# Patient Record
Sex: FEMALE | Race: WHITE | NOT HISPANIC OR LATINO | Employment: OTHER | URBAN - NONMETROPOLITAN AREA
[De-identification: names, ages, dates, MRNs, and addresses within clinical notes are randomized per-mention and may not be internally consistent; named-entity substitution may affect disease eponyms.]

---

## 2017-02-01 ENCOUNTER — LAB REQUISITION (OUTPATIENT)
Dept: LAB | Facility: HOSPITAL | Age: 27
End: 2017-02-01

## 2017-02-01 DIAGNOSIS — R19.7 DIARRHEA: ICD-10-CM

## 2017-02-01 PROCEDURE — 87493 C DIFF AMPLIFIED PROBE: CPT | Performed by: INTERNAL MEDICINE

## 2017-02-02 LAB — C DIFF TOX GENS STL QL NAA+PROBE: POSITIVE

## 2017-02-19 ENCOUNTER — APPOINTMENT (OUTPATIENT)
Dept: GENERAL RADIOLOGY | Facility: HOSPITAL | Age: 27
End: 2017-02-19

## 2017-02-19 ENCOUNTER — HOSPITAL ENCOUNTER (EMERGENCY)
Facility: HOSPITAL | Age: 27
Discharge: HOME | End: 2017-02-19
Attending: EMERGENCY MEDICINE | Admitting: EMERGENCY MEDICINE

## 2017-02-19 VITALS
HEIGHT: 63 IN | OXYGEN SATURATION: 97 % | BODY MASS INDEX: 17.36 KG/M2 | WEIGHT: 98 LBS | SYSTOLIC BLOOD PRESSURE: 137 MMHG | RESPIRATION RATE: 22 BRPM | TEMPERATURE: 97.5 F | HEART RATE: 94 BPM | DIASTOLIC BLOOD PRESSURE: 86 MMHG

## 2017-02-19 DIAGNOSIS — R07.89 CHEST WALL PAIN: Primary | ICD-10-CM

## 2017-02-19 LAB
ALBUMIN SERPL-MCNC: 3.6 G/DL (ref 3.2–4.8)
ALBUMIN/GLOB SERPL: 1.4 G/DL (ref 1.5–2.5)
ALP SERPL-CCNC: 132 U/L (ref 25–100)
ALT SERPL W P-5'-P-CCNC: 55 U/L (ref 7–40)
ANION GAP SERPL CALCULATED.3IONS-SCNC: 13 MMOL/L (ref 3–11)
AST SERPL-CCNC: 39 U/L (ref 0–33)
BASOPHILS # BLD AUTO: 0.01 10*3/MM3 (ref 0–0.2)
BASOPHILS NFR BLD AUTO: 0.1 % (ref 0–1)
BILIRUB SERPL-MCNC: 0.1 MG/DL (ref 0.3–1.2)
BUN BLD-MCNC: 71 MG/DL (ref 9–23)
BUN/CREAT SERPL: 12 (ref 7–25)
CALCIUM SPEC-SCNC: 6.9 MG/DL (ref 8.7–10.4)
CHLORIDE SERPL-SCNC: 99 MMOL/L (ref 99–109)
CO2 SERPL-SCNC: 20 MMOL/L (ref 20–31)
CREAT BLD-MCNC: 5.9 MG/DL (ref 0.6–1.3)
DEPRECATED RDW RBC AUTO: 53.9 FL (ref 37–54)
EOSINOPHIL # BLD AUTO: 0.07 10*3/MM3 (ref 0.1–0.3)
EOSINOPHIL NFR BLD AUTO: 0.5 % (ref 0–3)
ERYTHROCYTE [DISTWIDTH] IN BLOOD BY AUTOMATED COUNT: 15.2 % (ref 11.3–14.5)
GFR SERPL CREATININE-BSD FRML MDRD: 9 ML/MIN/1.73
GLOBULIN UR ELPH-MCNC: 2.5 GM/DL
GLUCOSE BLD-MCNC: 77 MG/DL (ref 70–100)
HCT VFR BLD AUTO: 27.2 % (ref 34.5–44)
HGB BLD-MCNC: 8.9 G/DL (ref 11.5–15.5)
HOLD SPECIMEN: NORMAL
HOLD SPECIMEN: NORMAL
IMM GRANULOCYTES # BLD: 0.21 10*3/MM3 (ref 0–0.03)
IMM GRANULOCYTES NFR BLD: 1.6 % (ref 0–0.6)
LYMPHOCYTES # BLD AUTO: 2.11 10*3/MM3 (ref 0.6–4.8)
LYMPHOCYTES NFR BLD AUTO: 15.9 % (ref 24–44)
MCH RBC QN AUTO: 31.2 PG (ref 27–31)
MCHC RBC AUTO-ENTMCNC: 32.7 G/DL (ref 32–36)
MCV RBC AUTO: 95.4 FL (ref 80–99)
MONOCYTES # BLD AUTO: 1.57 10*3/MM3 (ref 0–1)
MONOCYTES NFR BLD AUTO: 11.9 % (ref 0–12)
NEUTROPHILS # BLD AUTO: 9.26 10*3/MM3 (ref 1.5–8.3)
NEUTROPHILS NFR BLD AUTO: 70 % (ref 41–71)
PLATELET # BLD AUTO: 333 10*3/MM3 (ref 150–450)
PMV BLD AUTO: 8.1 FL (ref 6–12)
POTASSIUM BLD-SCNC: 4.3 MMOL/L (ref 3.5–5.5)
PROT SERPL-MCNC: 6.1 G/DL (ref 5.7–8.2)
RBC # BLD AUTO: 2.85 10*6/MM3 (ref 3.89–5.14)
SODIUM BLD-SCNC: 132 MMOL/L (ref 132–146)
WBC NRBC COR # BLD: 13.23 10*3/MM3 (ref 3.5–10.8)
WHOLE BLOOD HOLD SPECIMEN: NORMAL
WHOLE BLOOD HOLD SPECIMEN: NORMAL

## 2017-02-19 PROCEDURE — 99283 EMERGENCY DEPT VISIT LOW MDM: CPT

## 2017-02-19 PROCEDURE — 85025 COMPLETE CBC W/AUTO DIFF WBC: CPT | Performed by: EMERGENCY MEDICINE

## 2017-02-19 PROCEDURE — 80053 COMPREHEN METABOLIC PANEL: CPT | Performed by: EMERGENCY MEDICINE

## 2017-02-19 PROCEDURE — 71020 HC CHEST PA AND LATERAL: CPT

## 2017-02-19 RX ORDER — LIDOCAINE 50 MG/G
1 PATCH TOPICAL EVERY 24 HOURS
Qty: 30 PATCH | Refills: 0 | Status: SHIPPED | OUTPATIENT
Start: 2017-02-19 | End: 2017-02-23

## 2017-02-19 RX ORDER — SODIUM CHLORIDE 0.9 % (FLUSH) 0.9 %
10 SYRINGE (ML) INJECTION AS NEEDED
Status: DISCONTINUED | OUTPATIENT
Start: 2017-02-19 | End: 2017-02-19 | Stop reason: HOSPADM

## 2017-02-19 NOTE — DISCHARGE INSTRUCTIONS
Follow up with one of the physician centers below to setup primary care.    [facility], [phone], [address], [address], [place] [address]    [facility], [phone], [address], [address] [place] [address], [phone], [address] [place] [address] [address] [place] [address];     [facility], [phone], [address] [place] [address]

## 2017-02-19 NOTE — ED PROVIDER NOTES
Subjective   Patient is a 26 y.o. female presenting with chest pain.   History provided by:  Patient   used: No    Chest Pain   Pain location:  Substernal area, L chest and R chest  Pain quality: sharp and stabbing    Pain radiates to:  Does not radiate  Pain severity:  Severe  Onset quality:  Sudden  Timing:  Constant  Progression:  Unchanged  Chronicity:  New  Context: movement and raising an arm    Context: not breathing    Context comment:  Pt states that she had CPR done on her at [facility] after her heart stopped.  Discharged with percocet 5 but not helping pain.    Relieved by:  Rest  Worsened by:  Movement and coughing  Ineffective treatments: percocet 5 with no relief.  Associated symptoms: no abdominal pain, no back pain, no fever, no headache, no nausea, no palpitations, no shortness of breath, no syncope and no vomiting        Review of Systems   Constitutional: Negative for chills and fever.   Respiratory: Negative for apnea, chest tightness, shortness of breath and wheezing.    Cardiovascular: Positive for chest pain. Negative for palpitations and syncope.   Gastrointestinal: Negative for abdominal pain, constipation, nausea and vomiting.   Musculoskeletal: Negative for back pain.   Neurological: Negative for headaches.   Psychiatric/Behavioral: Negative.    All other systems reviewed and are negative.      Past Medical History   Diagnosis Date   • Abdominal pain    • Anemia    • Bipolar disorder    • CKD (chronic kidney disease), stage IV    • Constipation    • COPD (chronic obstructive pulmonary disease)    • Depression    • End stage renal disease on dialysis    • Esophageal reflux      reflux   • Fahr's syndrome    • Hepatitis C antibody test positive    • Hyperparathyroidism    • Hypertension    • Hypocalcemia    • Nausea and vomiting    • Pancreatitis    • Recurrent urinary tract infection    • Upper gastrointestinal bleeding        Allergies   Allergen Reactions   • Acetaminophen  Itching   • Hydrocodone Itching   • Ibuprofen    • Lortab [Hydrocodone-Acetaminophen]      Lortab TABS   • Ciprofloxacin Rash       Past Surgical History   Procedure Laterality Date   • Dialysis fistula creation       port   • Dilatation and curettage     • Exploratory laparotomy       For uterine and ovarian issues   • Kidney surgery Left 2013     Biopsy       Family History   Problem Relation Age of Onset   • Arthritis Mother    • Hypertension Mother    • Kidney disease Father      Chronic renal failure syndrome   • Pancreatic cancer Father    • Hypertension Brother    • Kidney disease Other    • Diabetes Other      Uncle   • Lung cancer Other      Grandmother   • Hyperlipidemia Other      High cholesterol - Uncle       Social History     Social History   • Marital status:      Spouse name: N/A   • Number of children: N/A   • Years of education: N/A     Social History Main Topics   • Smoking status: Current Every Day Smoker   • Smokeless tobacco: None      Comment: 3 packs daily   • Alcohol use No   • Drug use: Yes     Special: Marijuana   • Sexual activity: Defer     Other Topics Concern   • None     Social History Narrative   • None           Objective   Physical Exam   Constitutional: She is oriented to person, place, and time. She appears well-developed and well-nourished.   Appears drowsy, dry mouth   HENT:   Head: Normocephalic and atraumatic.   Right Ear: External ear normal.   Left Ear: External ear normal.   Nose: Nose normal.   Mouth/Throat: Oropharynx is clear and moist.   Eyes: Conjunctivae and EOM are normal. Pupils are equal, round, and reactive to light. No scleral icterus.   Neck: Normal range of motion. No thyromegaly present.   Cardiovascular: Normal rate, regular rhythm and normal heart sounds.    Pulmonary/Chest: Effort normal and breath sounds normal. No respiratory distress. She has no wheezes. She has no rales. She exhibits tenderness (Exquisitely tender to palpation and even light  touch of the skin).   Abdominal: Soft. Bowel sounds are normal. She exhibits no distension. There is no tenderness.   Musculoskeletal: Normal range of motion.   Lymphadenopathy:     She has no cervical adenopathy.   Neurological: She is alert and oriented to person, place, and time. She has normal reflexes. She displays normal reflexes. No cranial nerve deficit. Coordination normal.   Skin: Skin is warm and dry.   Patient has a fistula in the right arm.  .  There is no ecchymosis or bruising noted on the chest wall.   Psychiatric: She has a normal mood and affect. Her behavior is normal. Judgment and thought content normal.   Nursing note and vitals reviewed.      Procedures         ED Course  ED Course                  MDM  Number of Diagnoses or Management Options  Chest wall pain: new and requires workup  Diagnosis management comments: Patient states she any relief.  States that she did not want go back to  because  they treated her there.  Patient's requesting IV Dilaudid.  I advised her we would not be given her IV Dilaudid.  Her chest x-ray was negative her laboratory data did not looki  far off from her chronic renal failure and other labs.       Amount and/or Complexity of Data Reviewed  Clinical lab tests: ordered and reviewed  Tests in the radiology section of CPT®: ordered and reviewed  Tests in the medicine section of CPT®: reviewed and ordered  Discuss the patient with other providers: yes    Patient Progress  Patient progress: stable      Final diagnoses:   Chest wall pain            MENDEL Florian  02/20/17 6462

## 2017-02-23 ENCOUNTER — OFFICE VISIT (OUTPATIENT)
Dept: FAMILY MEDICINE CLINIC | Facility: CLINIC | Age: 27
End: 2017-02-23

## 2017-02-23 VITALS
HEIGHT: 63 IN | DIASTOLIC BLOOD PRESSURE: 57 MMHG | OXYGEN SATURATION: 98 % | TEMPERATURE: 98.3 F | HEART RATE: 114 BPM | RESPIRATION RATE: 16 BRPM | BODY MASS INDEX: 20.91 KG/M2 | SYSTOLIC BLOOD PRESSURE: 140 MMHG | WEIGHT: 118 LBS

## 2017-02-23 DIAGNOSIS — J45.20 MILD INTERMITTENT ASTHMA WITHOUT COMPLICATION: ICD-10-CM

## 2017-02-23 DIAGNOSIS — A04.72 C. DIFFICILE COLITIS: Primary | ICD-10-CM

## 2017-02-23 DIAGNOSIS — J06.9 ACUTE URI: ICD-10-CM

## 2017-02-23 DIAGNOSIS — J40 BRONCHITIS: ICD-10-CM

## 2017-02-23 PROCEDURE — 99213 OFFICE O/P EST LOW 20 MIN: CPT | Performed by: INTERNAL MEDICINE

## 2017-02-23 RX ORDER — PREDNISONE 10 MG/1
10 TABLET ORAL DAILY
Qty: 5 TABLET | Refills: 0 | Status: ON HOLD | OUTPATIENT
Start: 2017-02-23 | End: 2017-04-19

## 2017-02-23 RX ORDER — ALBUTEROL SULFATE 90 UG/1
2 AEROSOL, METERED RESPIRATORY (INHALATION) EVERY 6 HOURS PRN
Qty: 1 INHALER | Refills: 1 | Status: ON HOLD | OUTPATIENT
Start: 2017-02-23 | End: 2017-04-19

## 2017-02-23 RX ORDER — NIFEDIPINE 60 MG/1
60 TABLET, EXTENDED RELEASE ORAL DAILY
Status: ON HOLD | COMMUNITY
End: 2017-04-18

## 2017-02-23 RX ORDER — HYDROXYZINE PAMOATE 50 MG/1
100 CAPSULE ORAL 4 TIMES DAILY PRN
Status: ON HOLD | COMMUNITY
End: 2017-04-18

## 2017-02-28 NOTE — PROGRESS NOTES
Subjective   Mandy Espino is a 26 y.o. female.     Chief Complaint   Patient presents with   • Follow-up     Patient is here to follow-up from hospitalization in November   • Rib Injury     Patient is having pain in ribs   • Panic Attack     Patient is ahving excessive panic attacks and anxiety       History of Present Illness   Patient is here to be evaluated for C diff infection, s/p recent admission.  Patient gives a complicated medical history, with most recently admitted at  s/p ? Cardiac arrest. Patient says she was resuscitated successfully and later she developed C diff colitis, treated with Metronidazole. She says that diarrhea has resolved at this time.  Patient c/o cough, wheezing, congestion for a week.    The following portions of the patient's history were reviewed and updated as appropriate: allergies, current medications, past family history, past medical history, past social history, past surgical history and problem list.    Past Medical History   Diagnosis Date   • Abdominal pain    • Anemia    • Anxiety    • Asthma    • Bipolar disorder    • CKD (chronic kidney disease), stage IV    • Constipation    • COPD (chronic obstructive pulmonary disease)    • Depression    • End stage renal disease on dialysis    • Esophageal reflux      reflux   • Fahr's syndrome    • Hepatitis C antibody test positive    • Hyperparathyroidism    • Hypertension    • Hypocalcemia    • Nausea and vomiting    • Pancreatitis    • Recurrent urinary tract infection    • Renal insufficiency    • Upper gastrointestinal bleeding        Past Surgical History   Procedure Laterality Date   • Dialysis fistula creation       port   • Dilatation and curettage     • Exploratory laparotomy       For uterine and ovarian issues   • Kidney surgery Left 2013     Biopsy       No current outpatient prescriptions on file prior to visit.     No current facility-administered medications on file prior to visit.        Social History  "    Social History   • Marital status:      Spouse name: N/A   • Number of children: N/A   • Years of education: N/A     Occupational History   • Not on file.     Social History Main Topics   • Smoking status: Current Every Day Smoker   • Smokeless tobacco: Not on file      Comment: 3 packs daily   • Alcohol use No   • Drug use: Yes     Special: Marijuana   • Sexual activity: Defer     Other Topics Concern   • Not on file     Social History Narrative   • No narrative on file       Review of Systems   Constitutional: Negative for chills, fatigue and fever.   HENT: Negative for congestion, ear pain, rhinorrhea, sinus pressure and sore throat.    Eyes: Negative for visual disturbance.   Respiratory: Negative for cough, chest tightness, shortness of breath and wheezing.    Cardiovascular: Negative for chest pain, palpitations and leg swelling.   Gastrointestinal: Negative for abdominal pain, blood in stool, constipation, diarrhea, nausea and vomiting.   Endocrine: Negative for polydipsia and polyuria.   Genitourinary: Negative for dysuria and hematuria.   Musculoskeletal: Negative for back pain.   Skin: Negative for rash.   Neurological: Negative for dizziness, light-headedness, numbness and headaches.   Psychiatric/Behavioral: Positive for confusion, dysphoric mood and sleep disturbance. The patient is nervous/anxious.        Objective   Blood pressure 140/57, pulse 114, temperature 98.3 °F (36.8 °C), temperature source Oral, resp. rate 16, height 63\" (160 cm), weight 118 lb (53.5 kg), last menstrual period 02/16/2017, SpO2 98 %.    Physical Exam   Constitutional: She is oriented to person, place, and time.   Anxious appearing   HENT:   Head: Atraumatic.   Right Ear: External ear normal.   Left Ear: External ear normal.   Eyes: Conjunctivae are normal. Right eye exhibits no discharge. Left eye exhibits no discharge.   Neck: Normal range of motion. Neck supple.   Cardiovascular: Normal rate and regular rhythm.  "   No murmur heard.  Pulmonary/Chest: Effort normal. She has wheezes. She has no rales.   Abdominal: Soft. Bowel sounds are normal. She exhibits no distension. There is no tenderness.   Neurological: She is alert and oriented to person, place, and time.   Skin: No rash noted.   Psychiatric: She has a normal mood and affect.   Nursing note and vitals reviewed.      Assessment/Plan   Mandy was seen today for follow-up, rib injury and panic attack.    Diagnoses and all orders for this visit:    C. difficile colitis    Bronchitis    Acute URI    Mild intermittent asthma without complication    Other orders  -     albuterol (PROVENTIL HFA;VENTOLIN HFA) 108 (90 BASE) MCG/ACT inhaler; Inhale 2 puffs Every 6 (Six) Hours As Needed for wheezing.  -     predniSONE (DELTASONE) 10 MG tablet; Take 1 tablet by mouth Daily.        Diarrhea has resolved. No indication for further C diff colitis treatment.  Acute URI/bronchitis/Asthma - Albuterol inhaler  Patient was advised to f/u with her PCP       No Follow-up on file.    There are no Patient Instructions on file for this visit.

## 2017-03-28 ENCOUNTER — APPOINTMENT (OUTPATIENT)
Dept: GENERAL RADIOLOGY | Facility: HOSPITAL | Age: 27
End: 2017-03-28

## 2017-03-28 ENCOUNTER — HOSPITAL ENCOUNTER (EMERGENCY)
Facility: HOSPITAL | Age: 27
Discharge: HOME | End: 2017-03-28
Attending: EMERGENCY MEDICINE | Admitting: EMERGENCY MEDICINE

## 2017-03-28 VITALS
SYSTOLIC BLOOD PRESSURE: 181 MMHG | HEART RATE: 108 BPM | TEMPERATURE: 98.1 F | OXYGEN SATURATION: 95 % | RESPIRATION RATE: 20 BRPM | DIASTOLIC BLOOD PRESSURE: 107 MMHG | WEIGHT: 100 LBS | BODY MASS INDEX: 18.4 KG/M2 | HEIGHT: 62 IN

## 2017-03-28 DIAGNOSIS — R07.1 CHEST PAIN ON BREATHING: Primary | ICD-10-CM

## 2017-03-28 LAB
ALBUMIN SERPL-MCNC: 4.6 G/DL (ref 3.5–5)
ALBUMIN/GLOB SERPL: 1.5 G/DL (ref 1–2)
ALP SERPL-CCNC: 151 U/L (ref 38–126)
ALT SERPL W P-5'-P-CCNC: 53 U/L (ref 13–69)
ANION GAP SERPL CALCULATED.3IONS-SCNC: 19.6 MMOL/L
AST SERPL-CCNC: 65 U/L (ref 15–46)
BASOPHILS # BLD AUTO: 0.04 10*3/MM3 (ref 0–0.2)
BASOPHILS NFR BLD AUTO: 0.5 % (ref 0–2.5)
BILIRUB SERPL-MCNC: 0.7 MG/DL (ref 0.2–1.3)
BUN BLD-MCNC: 52 MG/DL (ref 7–20)
BUN/CREAT SERPL: 6.1 (ref 7.1–23.5)
CALCIUM SPEC-SCNC: 7.7 MG/DL (ref 8.4–10.2)
CHLORIDE SERPL-SCNC: 96 MMOL/L (ref 98–107)
CK MB SERPL-CCNC: 0.97 NG/ML (ref 0.2–2.4)
CO2 SERPL-SCNC: 27 MMOL/L (ref 26–30)
CREAT BLD-MCNC: 8.5 MG/DL (ref 0.6–1.3)
DEPRECATED RDW RBC AUTO: 65.5 FL (ref 37–54)
EOSINOPHIL # BLD AUTO: 0.29 10*3/MM3 (ref 0–0.7)
EOSINOPHIL NFR BLD AUTO: 3.5 % (ref 0–7)
ERYTHROCYTE [DISTWIDTH] IN BLOOD BY AUTOMATED COUNT: 18.6 % (ref 11.5–14.5)
GFR SERPL CREATININE-BSD FRML MDRD: 6 ML/MIN/1.73
GLOBULIN UR ELPH-MCNC: 3 GM/DL
GLUCOSE BLD-MCNC: 101 MG/DL (ref 74–98)
HCT VFR BLD AUTO: 31 % (ref 37–47)
HGB BLD-MCNC: 9.8 G/DL (ref 12–16)
IMM GRANULOCYTES # BLD: 0.05 10*3/MM3 (ref 0–0.06)
IMM GRANULOCYTES NFR BLD: 0.6 % (ref 0–0.6)
LYMPHOCYTES # BLD AUTO: 1.43 10*3/MM3 (ref 0.6–3.4)
LYMPHOCYTES NFR BLD AUTO: 17.1 % (ref 10–50)
MAGNESIUM SERPL-MCNC: 1.7 MG/DL (ref 1.6–2.3)
MCH RBC QN AUTO: 30.1 PG (ref 27–31)
MCHC RBC AUTO-ENTMCNC: 31.6 G/DL (ref 30–37)
MCV RBC AUTO: 95.1 FL (ref 81–99)
MONOCYTES # BLD AUTO: 0.71 10*3/MM3 (ref 0–0.9)
MONOCYTES NFR BLD AUTO: 8.5 % (ref 0–12)
NEUTROPHILS # BLD AUTO: 5.86 10*3/MM3 (ref 2–6.9)
NEUTROPHILS NFR BLD AUTO: 69.8 % (ref 37–80)
NRBC BLD MANUAL-RTO: 0 /100 WBC (ref 0–0)
NT-PROBNP SERPL-MCNC: ABNORMAL PG/ML (ref 0–125)
PLATELET # BLD AUTO: 193 10*3/MM3 (ref 130–400)
PMV BLD AUTO: 9.4 FL (ref 6–12)
POTASSIUM BLD-SCNC: 5.6 MMOL/L (ref 3.5–5.1)
PROT SERPL-MCNC: 7.6 G/DL (ref 6.3–8.2)
RBC # BLD AUTO: 3.26 10*6/MM3 (ref 4.2–5.4)
SODIUM BLD-SCNC: 137 MMOL/L (ref 137–145)
TROPONIN I SERPL-MCNC: <0.012 NG/ML (ref 0–0.03)
WBC NRBC COR # BLD: 8.38 10*3/MM3 (ref 4.8–10.8)

## 2017-03-28 PROCEDURE — 84484 ASSAY OF TROPONIN QUANT: CPT | Performed by: EMERGENCY MEDICINE

## 2017-03-28 PROCEDURE — 71010 HC CHEST PA OR AP: CPT

## 2017-03-28 PROCEDURE — 93005 ELECTROCARDIOGRAM TRACING: CPT | Performed by: EMERGENCY MEDICINE

## 2017-03-28 PROCEDURE — 82553 CREATINE MB FRACTION: CPT | Performed by: EMERGENCY MEDICINE

## 2017-03-28 PROCEDURE — 99284 EMERGENCY DEPT VISIT MOD MDM: CPT

## 2017-03-28 PROCEDURE — 83880 ASSAY OF NATRIURETIC PEPTIDE: CPT | Performed by: EMERGENCY MEDICINE

## 2017-03-28 PROCEDURE — 36415 COLL VENOUS BLD VENIPUNCTURE: CPT

## 2017-03-28 PROCEDURE — 80053 COMPREHEN METABOLIC PANEL: CPT | Performed by: EMERGENCY MEDICINE

## 2017-03-28 PROCEDURE — 85025 COMPLETE CBC W/AUTO DIFF WBC: CPT | Performed by: EMERGENCY MEDICINE

## 2017-03-28 PROCEDURE — 83735 ASSAY OF MAGNESIUM: CPT | Performed by: EMERGENCY MEDICINE

## 2017-03-28 RX ORDER — HYDROCODONE BITARTRATE AND ACETAMINOPHEN 5; 325 MG/1; MG/1
1 TABLET ORAL ONCE
Status: COMPLETED | OUTPATIENT
Start: 2017-03-28 | End: 2017-03-28

## 2017-03-28 RX ADMIN — HYDROCODONE BITARTRATE AND ACETAMINOPHEN 1 TABLET: 5; 325 TABLET ORAL at 22:31

## 2017-03-29 NOTE — ED PROVIDER NOTES
Subjective   HPI Comments: Patient presents to the emergency department with complaint of acute chest wall pain that is worse when she coughs and associated with pain with deep breathing to the sternum.  She states this she recognizes this pain is similar in presentation and intensity to the chest wall pain she has known since having CPR performed on her back in February while at [facility].  Patient has had no accident.  States that she had dialysis yesterday at which time she received a nitroglycerin under her tongue which was not helpful.      History provided by:  Patient   used: No        Review of Systems   Constitutional: Negative.  Negative for diaphoresis and fever.   HENT: Negative for sore throat.    Eyes: Negative.  Negative for pain and visual disturbance.   Respiratory: Negative for cough, shortness of breath, wheezing and stridor.         See history of present illness patient is hyperventilating voluntarily     Cardiovascular: Negative.  Negative for chest pain.   Gastrointestinal: Negative.  Negative for abdominal pain, diarrhea, nausea and vomiting.   Endocrine: Negative.    Genitourinary: Negative.  Negative for dysuria.   Musculoskeletal: Negative.  Negative for back pain and neck pain.   Skin: Negative.  Negative for pallor and rash.   Allergic/Immunologic: Negative.    Neurological: Negative.  Negative for syncope and headaches.   Hematological: Negative.    Psychiatric/Behavioral: Negative for agitation. Nervous/anxious: patient is voluntarily hyperventilating.        Past Medical History:   Diagnosis Date   • Abdominal pain    • Anemia    • Anxiety    • Asthma    • Bipolar disorder    • CKD (chronic kidney disease), stage IV    • Constipation    • COPD (chronic obstructive pulmonary disease)    • Depression    • End stage renal disease on dialysis    • Esophageal reflux     reflux   • Fahr's syndrome    • Hepatitis C antibody test positive    • Hyperparathyroidism     • Hypertension    • Hypocalcemia    • Nausea and vomiting    • Pancreatitis    • Recurrent urinary tract infection    • Renal insufficiency    • Upper gastrointestinal bleeding        Allergies   Allergen Reactions   • Acetaminophen Itching   • Hydrocodone Itching   • Ibuprofen    • Lortab [Hydrocodone-Acetaminophen]      Lortab TABS   • Ciprofloxacin Rash       Past Surgical History:   Procedure Laterality Date   • DIALYSIS FISTULA CREATION      port   • DILATATION AND CURETTAGE     • EXPLORATORY LAPAROTOMY      For uterine and ovarian issues   • KIDNEY SURGERY Left 2013    Biopsy       Family History   Problem Relation Age of Onset   • Arthritis Mother    • Hypertension Mother    • Kidney disease Father      Chronic renal failure syndrome   • Pancreatic cancer Father    • Hypertension Brother    • Kidney disease Other    • Diabetes Other      Uncle   • Lung cancer Other      Grandmother   • Hyperlipidemia Other      High cholesterol - Uncle       Social History     Social History   • Marital status:      Spouse name: N/A   • Number of children: N/A   • Years of education: N/A     Social History Main Topics   • Smoking status: Current Every Day Smoker   • Smokeless tobacco: None      Comment: 3 packs daily   • Alcohol use No   • Drug use: Yes     Special: Marijuana   • Sexual activity: Defer     Other Topics Concern   • None     Social History Narrative           Objective   Physical Exam   Constitutional: She is oriented to person, place, and time. She appears well-developed.   Appears chronically ill     HENT:   Head: Normocephalic.   Eyes: EOM are normal. Pupils are equal, round, and reactive to light.   Neck: Normal range of motion. Neck supple.   Cardiovascular: Regular rhythm.    Pulmonary/Chest: Effort normal. She has wheezes (diffuse). She has no rales.   Abdominal: Soft. Bowel sounds are normal. She exhibits no distension. There is no rebound and no guarding.   Musculoskeletal: Normal range of  motion. She exhibits no edema.   Neurological: She is alert and oriented to person, place, and time.   Skin: Skin is warm and dry.   Psychiatric: She has a normal mood and affect.   Nursing note and vitals reviewed.      Procedures         ED Course  ED Course                  MDM  Number of Diagnoses or Management Options     Amount and/or Complexity of Data Reviewed  Clinical lab tests: reviewed  Tests in the radiology section of CPT®: reviewed  Decide to obtain previous medical records or to obtain history from someone other than the patient: yes  Review and summarize past medical records: yes        Final diagnoses:   Chest pain on breathing            Antonina Tavares, APRN  03/28/17 9995

## 2017-04-16 ENCOUNTER — HOSPITAL ENCOUNTER (EMERGENCY)
Facility: HOSPITAL | Age: 27
Discharge: ANOTHER HOSPITAL | End: 2017-04-17
Attending: STUDENT IN AN ORGANIZED HEALTH CARE EDUCATION/TRAINING PROGRAM | Admitting: STUDENT IN AN ORGANIZED HEALTH CARE EDUCATION/TRAINING PROGRAM

## 2017-04-16 DIAGNOSIS — Z99.2 END STAGE RENAL DISEASE ON DIALYSIS (HCC): ICD-10-CM

## 2017-04-16 DIAGNOSIS — N18.6 END STAGE RENAL DISEASE ON DIALYSIS (HCC): ICD-10-CM

## 2017-04-16 DIAGNOSIS — J96.01 ACUTE RESPIRATORY FAILURE WITH HYPOXIA (HCC): Primary | ICD-10-CM

## 2017-04-16 DIAGNOSIS — J18.9 PNEUMONIA OF BOTH LUNGS DUE TO INFECTIOUS ORGANISM, UNSPECIFIED PART OF LUNG: ICD-10-CM

## 2017-04-16 PROCEDURE — 99284 EMERGENCY DEPT VISIT MOD MDM: CPT

## 2017-04-16 PROCEDURE — 93005 ELECTROCARDIOGRAM TRACING: CPT | Performed by: STUDENT IN AN ORGANIZED HEALTH CARE EDUCATION/TRAINING PROGRAM

## 2017-04-16 RX ORDER — LORAZEPAM 2 MG/ML
2 INJECTION INTRAMUSCULAR ONCE
Status: COMPLETED | OUTPATIENT
Start: 2017-04-17 | End: 2017-04-17

## 2017-04-17 ENCOUNTER — HOSPITAL ENCOUNTER (OUTPATIENT)
Facility: HOSPITAL | Age: 27
Setting detail: OBSERVATION
Discharge: HOME | End: 2017-04-19
Attending: EMERGENCY MEDICINE | Admitting: FAMILY MEDICINE

## 2017-04-17 ENCOUNTER — APPOINTMENT (OUTPATIENT)
Dept: GENERAL RADIOLOGY | Facility: HOSPITAL | Age: 27
End: 2017-04-17

## 2017-04-17 ENCOUNTER — APPOINTMENT (OUTPATIENT)
Dept: CT IMAGING | Facility: HOSPITAL | Age: 27
End: 2017-04-17

## 2017-04-17 VITALS
DIASTOLIC BLOOD PRESSURE: 83 MMHG | RESPIRATION RATE: 14 BRPM | SYSTOLIC BLOOD PRESSURE: 129 MMHG | OXYGEN SATURATION: 100 % | TEMPERATURE: 97.7 F | HEART RATE: 89 BPM

## 2017-04-17 DIAGNOSIS — R07.9 CHEST PAIN, UNSPECIFIED TYPE: Primary | ICD-10-CM

## 2017-04-17 LAB
ALBUMIN SERPL-MCNC: 4.5 G/DL (ref 3.5–5)
ALBUMIN/GLOB SERPL: 1.4 G/DL (ref 1–2)
ALP SERPL-CCNC: 157 U/L (ref 38–126)
ALT SERPL W P-5'-P-CCNC: 54 U/L (ref 13–69)
ANION GAP SERPL CALCULATED.3IONS-SCNC: 20 MMOL/L
ANION GAP SERPL CALCULATED.3IONS-SCNC: 24.1 MMOL/L
ARTERIAL PATENCY WRIST A: POSITIVE
AST SERPL-CCNC: 73 U/L (ref 15–46)
BASE EXCESS BLDA CALC-SCNC: -3.6 MMOL/L
BASOPHILS # BLD AUTO: 0.04 10*3/MM3 (ref 0–0.2)
BASOPHILS # BLD AUTO: 0.11 10*3/MM3 (ref 0–0.2)
BASOPHILS NFR BLD AUTO: 0.4 % (ref 0–2.5)
BASOPHILS NFR BLD AUTO: 0.5 % (ref 0–2.5)
BDY SITE: ABNORMAL
BILIRUB SERPL-MCNC: 0.8 MG/DL (ref 0.2–1.3)
BUN BLD-MCNC: 35 MG/DL (ref 7–20)
BUN BLD-MCNC: 51 MG/DL (ref 7–20)
BUN/CREAT SERPL: 5.5 (ref 7.1–23.5)
BUN/CREAT SERPL: 7 (ref 7.1–23.5)
CALCIUM SPEC-SCNC: 7.5 MG/DL (ref 8.4–10.2)
CALCIUM SPEC-SCNC: 8.5 MG/DL (ref 8.4–10.2)
CHLORIDE SERPL-SCNC: 95 MMOL/L (ref 98–107)
CHLORIDE SERPL-SCNC: 96 MMOL/L (ref 98–107)
CO2 SERPL-SCNC: 23 MMOL/L (ref 26–30)
CO2 SERPL-SCNC: 26 MMOL/L (ref 26–30)
COHGB MFR BLD: 3.3 %
CREAT BLD-MCNC: 5 MG/DL (ref 0.6–1.3)
CREAT BLD-MCNC: 9.2 MG/DL (ref 0.6–1.3)
D-DIMER, QUANTITATIVE (MAD,POW, STR): 5393 NG/ML (FEU) (ref 0–500)
D-LACTATE SERPL-SCNC: 0.8 MMOL/L (ref 0.5–2)
DEPRECATED RDW RBC AUTO: 61.8 FL (ref 37–54)
DEPRECATED RDW RBC AUTO: 68.9 FL (ref 37–54)
EOSINOPHIL # BLD AUTO: 0.11 10*3/MM3 (ref 0–0.7)
EOSINOPHIL # BLD AUTO: 0.41 10*3/MM3 (ref 0–0.7)
EOSINOPHIL NFR BLD AUTO: 1.2 % (ref 0–7)
EOSINOPHIL NFR BLD AUTO: 1.8 % (ref 0–7)
ERYTHROCYTE [DISTWIDTH] IN BLOOD BY AUTOMATED COUNT: 18.2 % (ref 11.5–14.5)
ERYTHROCYTE [DISTWIDTH] IN BLOOD BY AUTOMATED COUNT: 19.1 % (ref 11.5–14.5)
GFR SERPL CREATININE-BSD FRML MDRD: 10 ML/MIN/1.73
GFR SERPL CREATININE-BSD FRML MDRD: 5 ML/MIN/1.73
GLOBULIN UR ELPH-MCNC: 3.3 GM/DL
GLUCOSE BLD-MCNC: 150 MG/DL (ref 74–98)
GLUCOSE BLD-MCNC: 85 MG/DL (ref 74–98)
HCO3 BLDA-SCNC: 22.4 MMOL/L (ref 22–28)
HCT VFR BLD AUTO: 24.9 % (ref 37–47)
HCT VFR BLD AUTO: 29.1 % (ref 37–47)
HGB BLD-MCNC: 8 G/DL (ref 12–16)
HGB BLD-MCNC: 9 G/DL (ref 12–16)
HGB BLDA-MCNC: 8 G/DL (ref 12–18)
HOROWITZ INDEX BLD+IHG-RTO: 100 %
IMM GRANULOCYTES # BLD: 0.07 10*3/MM3 (ref 0–0.06)
IMM GRANULOCYTES # BLD: 0.18 10*3/MM3 (ref 0–0.06)
IMM GRANULOCYTES NFR BLD: 0.7 % (ref 0–0.6)
IMM GRANULOCYTES NFR BLD: 0.8 % (ref 0–0.6)
LYMPHOCYTES # BLD AUTO: 1.14 10*3/MM3 (ref 0.6–3.4)
LYMPHOCYTES # BLD AUTO: 2.79 10*3/MM3 (ref 0.6–3.4)
LYMPHOCYTES NFR BLD AUTO: 12 % (ref 10–50)
LYMPHOCYTES NFR BLD AUTO: 12.3 % (ref 10–50)
MCH RBC QN AUTO: 29.7 PG (ref 27–31)
MCH RBC QN AUTO: 30.1 PG (ref 27–31)
MCHC RBC AUTO-ENTMCNC: 30.9 G/DL (ref 30–37)
MCHC RBC AUTO-ENTMCNC: 32.1 G/DL (ref 30–37)
MCV RBC AUTO: 92.6 FL (ref 81–99)
MCV RBC AUTO: 97.3 FL (ref 81–99)
METHGB BLD QL: 0.7 %
MODALITY: ABNORMAL
MONOCYTES # BLD AUTO: 0.86 10*3/MM3 (ref 0–0.9)
MONOCYTES # BLD AUTO: 1.37 10*3/MM3 (ref 0–0.9)
MONOCYTES NFR BLD AUTO: 6 % (ref 0–12)
MONOCYTES NFR BLD AUTO: 9.1 % (ref 0–12)
NEUTROPHILS # BLD AUTO: 17.87 10*3/MM3 (ref 2–6.9)
NEUTROPHILS # BLD AUTO: 7.27 10*3/MM3 (ref 2–6.9)
NEUTROPHILS NFR BLD AUTO: 76.6 % (ref 37–80)
NEUTROPHILS NFR BLD AUTO: 78.6 % (ref 37–80)
NRBC BLD MANUAL-RTO: 0 /100 WBC (ref 0–0)
NRBC BLD MANUAL-RTO: 0 /100 WBC (ref 0–0)
OXYHGB MFR BLDV: 97.1 % (ref 94–99)
PCO2 BLDA: 42.9 MM HG (ref 35–45)
PH BLDA: 7.33 PH UNITS
PLATELET # BLD AUTO: 242 10*3/MM3 (ref 130–400)
PLATELET # BLD AUTO: 336 10*3/MM3 (ref 130–400)
PMV BLD AUTO: 9.8 FL (ref 6–12)
PMV BLD AUTO: 9.9 FL (ref 6–12)
PO2 BLDA: 306 MM HG (ref 75–100)
POTASSIUM BLD-SCNC: 5 MMOL/L (ref 3.5–5.1)
POTASSIUM BLD-SCNC: 5.1 MMOL/L (ref 3.5–5.1)
PROT SERPL-MCNC: 7.8 G/DL (ref 6.3–8.2)
RBC # BLD AUTO: 2.69 10*6/MM3 (ref 4.2–5.4)
RBC # BLD AUTO: 2.99 10*6/MM3 (ref 4.2–5.4)
SODIUM BLD-SCNC: 137 MMOL/L (ref 137–145)
SODIUM BLD-SCNC: 137 MMOL/L (ref 137–145)
TROPONIN I SERPL-MCNC: 0.03 NG/ML (ref 0–0.03)
TROPONIN I SERPL-MCNC: 0.05 NG/ML (ref 0–0.03)
WBC NRBC COR # BLD: 22.73 10*3/MM3 (ref 4.8–10.8)
WBC NRBC COR # BLD: 9.49 10*3/MM3 (ref 4.8–10.8)

## 2017-04-17 PROCEDURE — 96367 TX/PROPH/DG ADDL SEQ IV INF: CPT

## 2017-04-17 PROCEDURE — 94660 CPAP INITIATION&MGMT: CPT

## 2017-04-17 PROCEDURE — 84484 ASSAY OF TROPONIN QUANT: CPT | Performed by: STUDENT IN AN ORGANIZED HEALTH CARE EDUCATION/TRAINING PROGRAM

## 2017-04-17 PROCEDURE — 71275 CT ANGIOGRAPHY CHEST: CPT

## 2017-04-17 PROCEDURE — 85025 COMPLETE CBC W/AUTO DIFF WBC: CPT | Performed by: EMERGENCY MEDICINE

## 2017-04-17 PROCEDURE — 96374 THER/PROPH/DIAG INJ IV PUSH: CPT

## 2017-04-17 PROCEDURE — 25010000002 FENTANYL CITRATE (PF) 100 MCG/2ML SOLUTION: Performed by: EMERGENCY MEDICINE

## 2017-04-17 PROCEDURE — 25010000002 PIPERACILLIN SOD-TAZOBACTAM PER 1 G: Performed by: STUDENT IN AN ORGANIZED HEALTH CARE EDUCATION/TRAINING PROGRAM

## 2017-04-17 PROCEDURE — 96365 THER/PROPH/DIAG IV INF INIT: CPT

## 2017-04-17 PROCEDURE — 99285 EMERGENCY DEPT VISIT HI MDM: CPT

## 2017-04-17 PROCEDURE — 96375 TX/PRO/DX INJ NEW DRUG ADDON: CPT

## 2017-04-17 PROCEDURE — 85025 COMPLETE CBC W/AUTO DIFF WBC: CPT | Performed by: STUDENT IN AN ORGANIZED HEALTH CARE EDUCATION/TRAINING PROGRAM

## 2017-04-17 PROCEDURE — 87040 BLOOD CULTURE FOR BACTERIA: CPT | Performed by: STUDENT IN AN ORGANIZED HEALTH CARE EDUCATION/TRAINING PROGRAM

## 2017-04-17 PROCEDURE — 71010 HC CHEST PA OR AP: CPT

## 2017-04-17 PROCEDURE — 84484 ASSAY OF TROPONIN QUANT: CPT | Performed by: EMERGENCY MEDICINE

## 2017-04-17 PROCEDURE — 82805 BLOOD GASES W/O2 SATURATION: CPT

## 2017-04-17 PROCEDURE — 85379 FIBRIN DEGRADATION QUANT: CPT | Performed by: EMERGENCY MEDICINE

## 2017-04-17 PROCEDURE — 94799 UNLISTED PULMONARY SVC/PX: CPT

## 2017-04-17 PROCEDURE — 36600 WITHDRAWAL OF ARTERIAL BLOOD: CPT

## 2017-04-17 PROCEDURE — 80053 COMPREHEN METABOLIC PANEL: CPT | Performed by: STUDENT IN AN ORGANIZED HEALTH CARE EDUCATION/TRAINING PROGRAM

## 2017-04-17 PROCEDURE — 71020 HC CHEST PA AND LATERAL: CPT

## 2017-04-17 PROCEDURE — 25010000002 LORAZEPAM PER 2 MG: Performed by: STUDENT IN AN ORGANIZED HEALTH CARE EDUCATION/TRAINING PROGRAM

## 2017-04-17 PROCEDURE — 82375 ASSAY CARBOXYHB QUANT: CPT

## 2017-04-17 PROCEDURE — 25010000002 VANCOMYCIN PER 500 MG: Performed by: STUDENT IN AN ORGANIZED HEALTH CARE EDUCATION/TRAINING PROGRAM

## 2017-04-17 PROCEDURE — 93005 ELECTROCARDIOGRAM TRACING: CPT | Performed by: EMERGENCY MEDICINE

## 2017-04-17 PROCEDURE — 83605 ASSAY OF LACTIC ACID: CPT | Performed by: STUDENT IN AN ORGANIZED HEALTH CARE EDUCATION/TRAINING PROGRAM

## 2017-04-17 PROCEDURE — 83050 HGB METHEMOGLOBIN QUAN: CPT

## 2017-04-17 PROCEDURE — 96376 TX/PRO/DX INJ SAME DRUG ADON: CPT

## 2017-04-17 RX ORDER — FENTANYL CITRATE 50 UG/ML
50 INJECTION, SOLUTION INTRAMUSCULAR; INTRAVENOUS ONCE
Status: COMPLETED | OUTPATIENT
Start: 2017-04-17 | End: 2017-04-17

## 2017-04-17 RX ORDER — FENTANYL CITRATE 50 UG/ML
50 INJECTION, SOLUTION INTRAMUSCULAR; INTRAVENOUS
Status: DISCONTINUED | OUTPATIENT
Start: 2017-04-17 | End: 2017-04-18 | Stop reason: ALTCHOICE

## 2017-04-17 RX ORDER — SODIUM CHLORIDE 0.9 % (FLUSH) 0.9 %
10 SYRINGE (ML) INJECTION AS NEEDED
Status: DISCONTINUED | OUTPATIENT
Start: 2017-04-17 | End: 2017-04-19 | Stop reason: HOSPADM

## 2017-04-17 RX ORDER — LABETALOL HYDROCHLORIDE 5 MG/ML
10 INJECTION, SOLUTION INTRAVENOUS ONCE
Status: COMPLETED | OUTPATIENT
Start: 2017-04-17 | End: 2017-04-18

## 2017-04-17 RX ADMIN — TAZOBACTAM SODIUM AND PIPERACILLIN SODIUM 3.38 G: 375; 3 INJECTION, SOLUTION INTRAVENOUS at 03:04

## 2017-04-17 RX ADMIN — FENTANYL CITRATE 50 MCG: 50 INJECTION INTRAMUSCULAR; INTRAVENOUS at 22:48

## 2017-04-17 RX ADMIN — VANCOMYCIN HYDROCHLORIDE 1000 MG: 1 INJECTION, POWDER, LYOPHILIZED, FOR SOLUTION INTRAVENOUS at 03:33

## 2017-04-17 RX ADMIN — LORAZEPAM 2 MG: 2 INJECTION INTRAMUSCULAR; INTRAVENOUS at 00:01

## 2017-04-17 RX ADMIN — FENTANYL CITRATE 50 MCG: 50 INJECTION INTRAMUSCULAR; INTRAVENOUS at 22:00

## 2017-04-17 NOTE — ED PROVIDER NOTES
Subjective   HPI Comments: Patient is a 26-year-old female that presents via EMS for shortness of breath.  She has a history of bipolar disorder, hepatitis C, and end-stage renal disease.  Patient's  reports compliance with dialysis with her next appointment being tomorrow.  She took 2 nebulizer treatments at home prior to EMS arrival.  Symptoms started suddenly approximately 1 hour before arrival.  Nothing makes better or worse.  The patient states that she needs Ativan.  She has difficulty speaking in complete sentences at this time due to her work of breathing.      Review of Systems   All other systems reviewed and are negative.      Past Medical History:   Diagnosis Date   • Abdominal pain    • Anemia    • Anxiety    • Asthma    • Bipolar disorder    • CKD (chronic kidney disease), stage IV    • Constipation    • COPD (chronic obstructive pulmonary disease)    • Depression    • End stage renal disease on dialysis    • Esophageal reflux     reflux   • Fahr's syndrome    • Hepatitis C antibody test positive    • Hyperparathyroidism    • Hypertension    • Hypocalcemia    • Nausea and vomiting    • Pancreatitis    • Recurrent urinary tract infection    • Renal insufficiency    • Upper gastrointestinal bleeding        Allergies   Allergen Reactions   • Acetaminophen Itching   • Hydrocodone Itching   • Ibuprofen    • Lortab [Hydrocodone-Acetaminophen]      Lortab TABS   • Ciprofloxacin Rash       Past Surgical History:   Procedure Laterality Date   • DIALYSIS FISTULA CREATION      port   • DILATATION AND CURETTAGE     • EXPLORATORY LAPAROTOMY      For uterine and ovarian issues   • KIDNEY SURGERY Left 2013    Biopsy       Family History   Problem Relation Age of Onset   • Arthritis Mother    • Hypertension Mother    • Kidney disease Father      Chronic renal failure syndrome   • Pancreatic cancer Father    • Hypertension Brother    • Kidney disease Other    • Diabetes Other      Uncle   • Lung cancer Other       Grandmother   • Hyperlipidemia Other      High cholesterol - Uncle       Social History     Social History   • Marital status:      Spouse name: N/A   • Number of children: N/A   • Years of education: N/A     Social History Main Topics   • Smoking status: Current Every Day Smoker   • Smokeless tobacco: None      Comment: 3 packs daily   • Alcohol use No   • Drug use: Yes     Special: Marijuana   • Sexual activity: Defer     Other Topics Concern   • None     Social History Narrative           Objective   Physical Exam   Nursing note and vitals reviewed.  GEN: Patient presents in severe distress.  She is tripoding on the bed holding a nonrebreather mask over her face.  Head: Normocephalic, atraumatic  Eyes: Pupils equal round reactive to light  ENT: Posterior pharynx normal in appearance, oral mucosa is moist  Chest: Nontender to palpation  Cardiovascular: Tachycardic in the 140s   Lungs: Tachypneic with a rate in the 40s, prominent bilateral rales on exam  Abdomen: Soft, nontender, nondistended, no peritoneal signs  Extremities: No edema, normal appearance  Neuro: Will follow commands  Psych: Appears to be in full on fight or flight    Procedures         ED Course  ED Course   Value Comment By Time   WBC: (!) 22.73 (Reviewed) Wayne Vega MD 04/17 0023    Spoke with Dr. Mancuso at 0500 at [facility] who accepted the patient for transfer. Wayne Vega MD 04/17 8220                  MDM  Number of Diagnoses or Management Options  Acute respiratory failure with hypoxia:   End stage renal disease on dialysis:   Pneumonia of both lungs due to infectious organism, unspecified part of lung:   Diagnosis management comments: EKG shows sinus tachycardia with a rate of 142.  There are nonspecific ST segments throughout this EKG.  This is an abnormal EKG.  Chest x-ray was interpreted by radiology as showing bilateral pneumonia.  Patient has a prominent leukocytosis.  Blood cultures were drawn and  antibiotics initiated  Patient went treated with BiPAP as well as lorazepam did stabilize significantly.         Amount and/or Complexity of Data Reviewed  Clinical lab tests: ordered and reviewed  Decide to obtain previous medical records or to obtain history from someone other than the patient: yes  Obtain history from someone other than the patient: yes  Review and summarize past medical records: yes  Discuss the patient with other providers: yes  Independent visualization of images, tracings, or specimens: yes    Critical Care  Total time providing critical care: 30-74 minutes      Final diagnoses:   Acute respiratory failure with hypoxia   Pneumonia of both lungs due to infectious organism, unspecified part of lung   End stage renal disease on dialysis            Wayne Vega MD  04/17/17 0543

## 2017-04-17 NOTE — ED NOTES
Attempted to get pt on NC 4L, pt's sat dropped to 88% from 100%, placed pt back on non-rebreather and  MD notified. Pt to be placed on BiPap, respiratory contacted     Tati Cedeño RN  04/17/17 0030

## 2017-04-18 ENCOUNTER — HOSPITAL ENCOUNTER (OUTPATIENT)
Dept: CT IMAGING | Facility: HOSPITAL | Age: 27
Discharge: HOME | End: 2017-04-18

## 2017-04-18 VITALS
BODY MASS INDEX: 17.07 KG/M2 | RESPIRATION RATE: 19 BRPM | TEMPERATURE: 98.6 F | OXYGEN SATURATION: 98 % | HEART RATE: 86 BPM | DIASTOLIC BLOOD PRESSURE: 82 MMHG | HEIGHT: 63 IN | SYSTOLIC BLOOD PRESSURE: 139 MMHG | WEIGHT: 96.34 LBS

## 2017-04-18 PROBLEM — R04.2 HEMOPTYSIS: Status: ACTIVE | Noted: 2017-04-18

## 2017-04-18 PROBLEM — F41.0 SEVERE ANXIETY WITH PANIC: Status: ACTIVE | Noted: 2017-04-18

## 2017-04-18 PROBLEM — R77.8 ELEVATED TROPONIN: Status: ACTIVE | Noted: 2017-04-18

## 2017-04-18 PROBLEM — G89.29 CHRONIC PAIN: Status: ACTIVE | Noted: 2017-04-18

## 2017-04-18 LAB
ALBUMIN SERPL-MCNC: 3.4 G/DL (ref 3.5–5)
ANION GAP SERPL CALCULATED.3IONS-SCNC: 15 MMOL/L
BASOPHILS # BLD AUTO: 0.03 10*3/MM3 (ref 0–0.2)
BASOPHILS NFR BLD AUTO: 0.6 % (ref 0–2.5)
BUN BLD-MCNC: 42 MG/DL (ref 7–20)
BUN/CREAT SERPL: 6.9 (ref 7.1–23.5)
CALCIUM SPEC-SCNC: 7.2 MG/DL (ref 8.4–10.2)
CHLORIDE SERPL-SCNC: 97 MMOL/L (ref 98–107)
CHOLEST SERPL-MCNC: 122 MG/DL (ref 0–199)
CO2 SERPL-SCNC: 28 MMOL/L (ref 26–30)
CREAT BLD-MCNC: 6.1 MG/DL (ref 0.6–1.3)
DEPRECATED RDW RBC AUTO: 63.5 FL (ref 37–54)
EOSINOPHIL # BLD AUTO: 0.1 10*3/MM3 (ref 0–0.7)
EOSINOPHIL NFR BLD AUTO: 2 % (ref 0–7)
ERYTHROCYTE [DISTWIDTH] IN BLOOD BY AUTOMATED COUNT: 18.3 % (ref 11.5–14.5)
FERRITIN SERPL-MCNC: 198 NG/ML (ref 6.24–137)
GFR SERPL CREATININE-BSD FRML MDRD: 8 ML/MIN/1.73
GLUCOSE BLD-MCNC: 105 MG/DL (ref 74–98)
HCT VFR BLD AUTO: 21.6 % (ref 37–47)
HDLC SERPL-MCNC: 44 MG/DL (ref 40–60)
HGB BLD-MCNC: 6.9 G/DL (ref 12–16)
HOLD SPECIMEN: NORMAL
HOLD SPECIMEN: NORMAL
IMM GRANULOCYTES # BLD: 0.02 10*3/MM3 (ref 0–0.06)
IMM GRANULOCYTES NFR BLD: 0.4 % (ref 0–0.6)
INR PPP: 1.59 (ref 0.9–1.1)
IRON 24H UR-MRATE: 46 MCG/DL (ref 37–181)
LDLC SERPL CALC-MCNC: 61 MG/DL (ref 0–99)
LDLC/HDLC SERPL: 1.39 {RATIO}
LYMPHOCYTES # BLD AUTO: 1.12 10*3/MM3 (ref 0.6–3.4)
LYMPHOCYTES NFR BLD AUTO: 21.9 % (ref 10–50)
MCH RBC QN AUTO: 29.9 PG (ref 27–31)
MCHC RBC AUTO-ENTMCNC: 31.9 G/DL (ref 30–37)
MCV RBC AUTO: 93.5 FL (ref 81–99)
MONOCYTES # BLD AUTO: 0.52 10*3/MM3 (ref 0–0.9)
MONOCYTES NFR BLD AUTO: 10.2 % (ref 0–12)
NEUTROPHILS # BLD AUTO: 3.33 10*3/MM3 (ref 2–6.9)
NEUTROPHILS NFR BLD AUTO: 64.9 % (ref 37–80)
NRBC BLD MANUAL-RTO: 0 /100 WBC (ref 0–0)
PHOSPHATE SERPL-MCNC: 4.9 MG/DL (ref 2.5–4.5)
PLATELET # BLD AUTO: 207 10*3/MM3 (ref 130–400)
PMV BLD AUTO: 10.4 FL (ref 6–12)
POTASSIUM BLD-SCNC: 4 MMOL/L (ref 3.5–5.1)
PROTHROMBIN TIME: 17.4 SECONDS (ref 9.3–12.1)
RBC # BLD AUTO: 2.31 10*6/MM3 (ref 4.2–5.4)
SODIUM BLD-SCNC: 136 MMOL/L (ref 137–145)
TRIGL SERPL-MCNC: 84 MG/DL
TROPONIN I SERPL-MCNC: 0.04 NG/ML (ref 0–0.03)
VIT B12 BLD-MCNC: 634 PG/ML (ref 239–931)
VLDLC SERPL-MCNC: 16.8 MG/DL
WBC NRBC COR # BLD: 5.12 10*3/MM3 (ref 4.8–10.8)
WHOLE BLOOD HOLD SPECIMEN: NORMAL
WHOLE BLOOD HOLD SPECIMEN: NORMAL

## 2017-04-18 PROCEDURE — 25010000002 HYDROMORPHONE PER 4 MG: Performed by: INTERNAL MEDICINE

## 2017-04-18 PROCEDURE — 85610 PROTHROMBIN TIME: CPT | Performed by: FAMILY MEDICINE

## 2017-04-18 PROCEDURE — 86901 BLOOD TYPING SEROLOGIC RH(D): CPT

## 2017-04-18 PROCEDURE — G0257 UNSCHED DIALYSIS ESRD PT HOS: HCPCS

## 2017-04-18 PROCEDURE — 82607 VITAMIN B-12: CPT | Performed by: FAMILY MEDICINE

## 2017-04-18 PROCEDURE — 80069 RENAL FUNCTION PANEL: CPT | Performed by: FAMILY MEDICINE

## 2017-04-18 PROCEDURE — 99219 PR INITIAL OBSERVATION CARE/DAY 50 MINUTES: CPT | Performed by: FAMILY MEDICINE

## 2017-04-18 PROCEDURE — 80074 ACUTE HEPATITIS PANEL: CPT | Performed by: INTERNAL MEDICINE

## 2017-04-18 PROCEDURE — 99244 OFF/OP CNSLTJ NEW/EST MOD 40: CPT | Performed by: INTERNAL MEDICINE

## 2017-04-18 PROCEDURE — 86900 BLOOD TYPING SEROLOGIC ABO: CPT

## 2017-04-18 PROCEDURE — 90935 HEMODIALYSIS ONE EVALUATION: CPT

## 2017-04-18 PROCEDURE — 96376 TX/PRO/DX INJ SAME DRUG ADON: CPT

## 2017-04-18 PROCEDURE — G0378 HOSPITAL OBSERVATION PER HR: HCPCS

## 2017-04-18 PROCEDURE — 96375 TX/PRO/DX INJ NEW DRUG ADDON: CPT

## 2017-04-18 PROCEDURE — 83540 ASSAY OF IRON: CPT | Performed by: FAMILY MEDICINE

## 2017-04-18 PROCEDURE — 84484 ASSAY OF TROPONIN QUANT: CPT | Performed by: FAMILY MEDICINE

## 2017-04-18 PROCEDURE — 96372 THER/PROPH/DIAG INJ SC/IM: CPT

## 2017-04-18 PROCEDURE — 25010000002 LORAZEPAM PER 2 MG: Performed by: INTERNAL MEDICINE

## 2017-04-18 PROCEDURE — 25010000002 FENTANYL CITRATE (PF) 100 MCG/2ML SOLUTION: Performed by: EMERGENCY MEDICINE

## 2017-04-18 PROCEDURE — 25010000002 ENOXAPARIN PER 10 MG: Performed by: EMERGENCY MEDICINE

## 2017-04-18 PROCEDURE — 25010000002 ONDANSETRON PER 1 MG: Performed by: FAMILY MEDICINE

## 2017-04-18 PROCEDURE — 0 IOPAMIDOL 61 % SOLUTION: Performed by: EMERGENCY MEDICINE

## 2017-04-18 PROCEDURE — 80061 LIPID PANEL: CPT | Performed by: FAMILY MEDICINE

## 2017-04-18 PROCEDURE — 85025 COMPLETE CBC W/AUTO DIFF WBC: CPT | Performed by: FAMILY MEDICINE

## 2017-04-18 PROCEDURE — 82728 ASSAY OF FERRITIN: CPT | Performed by: FAMILY MEDICINE

## 2017-04-18 PROCEDURE — 25010000002 HYDROMORPHONE PER 4 MG: Performed by: FAMILY MEDICINE

## 2017-04-18 RX ORDER — CLONAZEPAM 0.5 MG/1
0.25 TABLET ORAL EVERY 8 HOURS PRN
Status: DISCONTINUED | OUTPATIENT
Start: 2017-04-18 | End: 2017-04-18

## 2017-04-18 RX ORDER — IPRATROPIUM BROMIDE AND ALBUTEROL SULFATE 2.5; .5 MG/3ML; MG/3ML
3 SOLUTION RESPIRATORY (INHALATION) EVERY 4 HOURS PRN
Status: DISCONTINUED | OUTPATIENT
Start: 2017-04-18 | End: 2017-04-19 | Stop reason: HOSPADM

## 2017-04-18 RX ORDER — SODIUM CHLORIDE 0.9 % (FLUSH) 0.9 %
1-10 SYRINGE (ML) INJECTION AS NEEDED
Status: DISCONTINUED | OUTPATIENT
Start: 2017-04-18 | End: 2017-04-19 | Stop reason: HOSPADM

## 2017-04-18 RX ORDER — ONDANSETRON 4 MG/1
4 TABLET, ORALLY DISINTEGRATING ORAL EVERY 6 HOURS PRN
Status: DISCONTINUED | OUTPATIENT
Start: 2017-04-18 | End: 2017-04-19 | Stop reason: HOSPADM

## 2017-04-18 RX ORDER — LIDOCAINE 50 MG/G
1 PATCH TOPICAL
Status: DISCONTINUED | OUTPATIENT
Start: 2017-04-19 | End: 2017-04-19 | Stop reason: HOSPADM

## 2017-04-18 RX ORDER — CLONAZEPAM 0.5 MG/1
0.5 TABLET ORAL NIGHTLY
Status: DISCONTINUED | OUTPATIENT
Start: 2017-04-18 | End: 2017-04-19 | Stop reason: HOSPADM

## 2017-04-18 RX ORDER — CLONAZEPAM 0.5 MG/1
0.5 TABLET ORAL EVERY 8 HOURS PRN
Status: DISCONTINUED | OUTPATIENT
Start: 2017-04-18 | End: 2017-04-18

## 2017-04-18 RX ORDER — FAMOTIDINE 20 MG/1
10 TABLET, FILM COATED ORAL 2 TIMES DAILY
Status: DISCONTINUED | OUTPATIENT
Start: 2017-04-18 | End: 2017-04-19 | Stop reason: HOSPADM

## 2017-04-18 RX ORDER — ONDANSETRON 2 MG/ML
4 INJECTION INTRAMUSCULAR; INTRAVENOUS EVERY 6 HOURS PRN
Status: DISCONTINUED | OUTPATIENT
Start: 2017-04-18 | End: 2017-04-19 | Stop reason: HOSPADM

## 2017-04-18 RX ORDER — NICOTINE 21 MG/24HR
1 PATCH, TRANSDERMAL 24 HOURS TRANSDERMAL EVERY 24 HOURS
Status: DISCONTINUED | OUTPATIENT
Start: 2017-04-18 | End: 2017-04-19 | Stop reason: HOSPADM

## 2017-04-18 RX ORDER — ONDANSETRON 4 MG/1
4 TABLET, FILM COATED ORAL EVERY 6 HOURS PRN
Status: DISCONTINUED | OUTPATIENT
Start: 2017-04-18 | End: 2017-04-19 | Stop reason: HOSPADM

## 2017-04-18 RX ORDER — GABAPENTIN 800 MG/1
800 TABLET ORAL DAILY
COMMUNITY
End: 2017-04-19 | Stop reason: HOSPADM

## 2017-04-18 RX ORDER — NIFEDIPINE 60 MG/1
60 TABLET, EXTENDED RELEASE ORAL DAILY
Status: DISCONTINUED | OUTPATIENT
Start: 2017-04-18 | End: 2017-04-19 | Stop reason: HOSPADM

## 2017-04-18 RX ORDER — BISACODYL 10 MG
10 SUPPOSITORY, RECTAL RECTAL DAILY PRN
Status: DISCONTINUED | OUTPATIENT
Start: 2017-04-18 | End: 2017-04-19 | Stop reason: HOSPADM

## 2017-04-18 RX ORDER — HYDROXYZINE PAMOATE 25 MG/1
25 CAPSULE ORAL 4 TIMES DAILY PRN
Status: DISCONTINUED | OUTPATIENT
Start: 2017-04-18 | End: 2017-04-19 | Stop reason: HOSPADM

## 2017-04-18 RX ORDER — LORAZEPAM 2 MG/ML
0.5 INJECTION INTRAMUSCULAR EVERY 6 HOURS PRN
Status: DISCONTINUED | OUTPATIENT
Start: 2017-04-18 | End: 2017-04-19 | Stop reason: HOSPADM

## 2017-04-18 RX ADMIN — NIFEDIPINE 60 MG: 60 TABLET, FILM COATED, EXTENDED RELEASE ORAL at 08:23

## 2017-04-18 RX ADMIN — HYDROMORPHONE HYDROCHLORIDE 0.5 MG: 1 INJECTION, SOLUTION INTRAMUSCULAR; INTRAVENOUS; SUBCUTANEOUS at 12:44

## 2017-04-18 RX ADMIN — HYDROMORPHONE HYDROCHLORIDE 0.5 MG: 1 INJECTION, SOLUTION INTRAMUSCULAR; INTRAVENOUS; SUBCUTANEOUS at 10:09

## 2017-04-18 RX ADMIN — IOPAMIDOL 100 ML: 612 INJECTION, SOLUTION INTRAVENOUS at 00:52

## 2017-04-18 RX ADMIN — NICOTINE 1 PATCH: 21 PATCH TRANSDERMAL at 03:04

## 2017-04-18 RX ADMIN — FAMOTIDINE 10 MG: 20 TABLET, FILM COATED ORAL at 08:24

## 2017-04-18 RX ADMIN — ONDANSETRON 4 MG: 2 INJECTION INTRAMUSCULAR; INTRAVENOUS at 08:24

## 2017-04-18 RX ADMIN — HYDROMORPHONE HYDROCHLORIDE 0.25 MG: 1 INJECTION, SOLUTION INTRAMUSCULAR; INTRAVENOUS; SUBCUTANEOUS at 08:29

## 2017-04-18 RX ADMIN — LORAZEPAM 0.5 MG: 2 INJECTION INTRAMUSCULAR; INTRAVENOUS at 16:30

## 2017-04-18 RX ADMIN — FAMOTIDINE 10 MG: 20 TABLET, FILM COATED ORAL at 18:34

## 2017-04-18 RX ADMIN — CLONAZEPAM 0.5 MG: 0.5 TABLET ORAL at 21:08

## 2017-04-18 RX ADMIN — LORAZEPAM 0.5 MG: 2 INJECTION INTRAMUSCULAR; INTRAVENOUS at 23:10

## 2017-04-18 RX ADMIN — ENOXAPARIN SODIUM 20 MG: 150 INJECTION SUBCUTANEOUS at 00:57

## 2017-04-18 RX ADMIN — HYDROMORPHONE HYDROCHLORIDE 0.5 MG: 1 INJECTION, SOLUTION INTRAMUSCULAR; INTRAVENOUS; SUBCUTANEOUS at 04:14

## 2017-04-18 RX ADMIN — CLONAZEPAM 0.25 MG: 0.5 TABLET ORAL at 08:45

## 2017-04-18 RX ADMIN — LORAZEPAM 0.5 MG: 2 INJECTION INTRAMUSCULAR; INTRAVENOUS at 10:11

## 2017-04-18 RX ADMIN — HYDROMORPHONE HYDROCHLORIDE 0.5 MG: 1 INJECTION, SOLUTION INTRAMUSCULAR; INTRAVENOUS; SUBCUTANEOUS at 23:10

## 2017-04-18 RX ADMIN — FENTANYL CITRATE 50 MCG: 50 INJECTION INTRAMUSCULAR; INTRAVENOUS at 00:54

## 2017-04-18 RX ADMIN — HYDROMORPHONE HYDROCHLORIDE 0.5 MG: 1 INJECTION, SOLUTION INTRAMUSCULAR; INTRAVENOUS; SUBCUTANEOUS at 18:34

## 2017-04-18 RX ADMIN — HYDROMORPHONE HYDROCHLORIDE 0.5 MG: 1 INJECTION, SOLUTION INTRAMUSCULAR; INTRAVENOUS; SUBCUTANEOUS at 02:01

## 2017-04-18 RX ADMIN — CLONAZEPAM 0.5 MG: 0.5 TABLET ORAL at 02:01

## 2017-04-18 RX ADMIN — HYDROMORPHONE HYDROCHLORIDE 0.5 MG: 1 INJECTION, SOLUTION INTRAMUSCULAR; INTRAVENOUS; SUBCUTANEOUS at 06:43

## 2017-04-18 RX ADMIN — LABETALOL HYDROCHLORIDE 10 MG: 5 INJECTION, SOLUTION INTRAVENOUS at 00:02

## 2017-04-18 RX ADMIN — HYDROMORPHONE HYDROCHLORIDE 0.5 MG: 1 INJECTION, SOLUTION INTRAMUSCULAR; INTRAVENOUS; SUBCUTANEOUS at 16:27

## 2017-04-18 NOTE — PROGRESS NOTES
[facility]    [facility] NOTE    Name:  Mandy Espino   Age:  26 y.o.  Sex:  female  :  1990  MRN:  [id number]   Visit Number:  [id number]  Admission Date:  2017  Date Of Service:  17  Primary Care Physician:  Neo Gresham DO     LOS: 0 days :  Patient Care Team:  Neo Gresham DO as PCP - General (Family Medicine):    Chief Complaint:    Pleuritic Chest Pain    Subjective / Interval History:   She is seen this morning and was complaining of pleuritic chest pain in her right lower lobe.  Patient states that the pain was worse when she takes deep breaths.  She was asking for pain medications.    Review of Systems:   Review of Systems   Constitutional: Positive for fatigue. Negative for chills and fever.   HENT: Negative for congestion, ear pain, rhinorrhea, sinus pressure and sore throat.    Eyes: Negative for visual disturbance.   Respiratory: Positive for chest tightness and shortness of breath. Negative for cough and wheezing.    Cardiovascular: Negative for chest pain, palpitations and leg swelling.   Gastrointestinal: Negative for abdominal pain, blood in stool, constipation, diarrhea, nausea and vomiting.   Endocrine: Negative for polydipsia and polyuria.   Genitourinary: Negative for dysuria and hematuria.   Musculoskeletal: Negative for back pain.   Skin: Negative for rash.   Neurological: Negative for dizziness, light-headedness, numbness and headaches.   Psychiatric/Behavioral: Negative for dysphoric mood and sleep disturbance. The patient is not nervous/anxious.          Vital Signs:    Temp:  [98.1 °F (36.7 °C)-98.7 °F (37.1 °C)] 98.6 °F (37 °C)  Heart Rate:  [] 94  Resp:  [14-24] 18  BP: (119-200)/() 119/65    Intake and output:       I/O this shift:  In:  [P.O.:720]  Out:  [Other:]    Physical Examination:  Physical Exam   Constitutional: She is oriented to person, place, and time. She appears well-developed and  well-nourished.   HENT:   Head: Normocephalic and atraumatic.   Mouth/Throat: Oropharynx is clear and moist.   Eyes: Conjunctivae are normal. Pupils are equal, round, and reactive to light.   Neck: Normal range of motion. No JVD present. No thyromegaly present.   Cardiovascular: Normal rate and normal heart sounds.    No murmur heard.  Pulmonary/Chest: Effort normal. No respiratory distress. She has no wheezes. She has rales ( bibasilar).   Abdominal: Soft. Bowel sounds are normal. She exhibits no distension. There is no tenderness. There is no rebound.   Musculoskeletal: Normal range of motion. She exhibits no edema or tenderness.   Neurological: She is alert and oriented to person, place, and time.   Skin: Skin is warm and dry.   Psychiatric: She has a normal mood and affect. Her behavior is normal.         Laboratory results:  I have reviewed the patient's laboratory results.      Results from last 7 days  Lab Units 04/18/17 0538 04/17/17 2136 04/17/17  0003   SODIUM mmol/L 136* 137 137   POTASSIUM mmol/L 4.0 5.0 5.1   CHLORIDE mmol/L 97* 96* 95*   TOTAL CO2 mmol/L 28.0 26.0 23.0*   BUN mg/dL 42* 35* 51*   CREATININE mg/dL 6.10* 5.00* 9.20*   CALCIUM mg/dL 7.2* 8.5 7.5*   BILIRUBIN mg/dL  --   --  0.8   ALK PHOS U/L  --   --  157*   ALT (SGPT) U/L  --   --  54   AST (SGOT) U/L  --   --  73*   GLUCOSE mg/dL 105* 85 150*       Results from last 7 days  Lab Units 04/18/17  0538 04/17/17 2136 04/17/17  0003   WBC 10*3/mm3 5.12 9.49 22.73*   HEMOGLOBIN g/dL 6.9* 8.0* 9.0*   HEMATOCRIT % 21.6* 24.9* 29.1*   PLATELETS 10*3/mm3 207 242 336       Results from last 7 days  Lab Units 04/18/17  0538   INR  1.59*       Results from last 7 days  Lab Units 04/18/17  0250 04/17/17 2135 04/17/17  0003   TROPONIN I ng/mL 0.044* 0.052* 0.028       Results from last 7 days  Lab Units 04/17/17  0303 04/17/17  0300   BLOODCX  No growth at 24 hours No growth at 24 hours       Radiology results:  I have reviewed the patient's  radiology reports.  Imaging Results (last 24 hours)     Procedure Component Value Units Date/Time    CT Chest Pulmonary Embolism With Contrast [[id number]] Collected:  04/18/17 0745     Updated:  04/18/17 0748    Narrative:       PROCEDURE: CT CHEST PULMONARY EMBOLISM W CONTRAST-     HISTORY: CP / tachyc; R07.9-Chest pain, unspecified     COMPARISON: None .     TECHNIQUE: Multiple axial CT images were obtained from the thoracic  inlet through the upper abdomen following the administration of Isovue  300 per the CT PE protocol. Coronal and oblique 3D MIP images were  reconstructed from the original axial data set.      FINDINGS: There are no filling defects within the pulmonary arteries to  suggest an embolus. The thoracic aorta is normal in caliber with no  evidence of dissection. The heart is enlarged. There are no pleural or  pericardial effusions. There is no adenopathy. Lung windows reveal  patchy bilateral groundglass opacities. The visualized upper abdomen is  unremarkable. Bone windows reveal no acute osseous abnormalities.       Impression:       1. No evidence of pulmonary embolus or dissection.   2. Cardiomegaly and patchy bilateral groundglass opacities with the  differential including pulmonary edema and a pneumonia             175.15 mGy.cm          This study was performed with techniques to keep radiation doses as low  as reasonably achievable (ALARA). Individualized dose reduction  techniques using automated exposure control or adjustment of mA and/or  kV according to the patient size were employed.      This report was finalized on 4/18/2017 7:46 AM by Kari Jack M.D..    XR Chest 2 View [[id number]] Collected:  04/18/17 0746     Updated:  04/18/17 0748    Narrative:       PROCEDURE: XR CHEST 2 VW-        HISTORY: R chest pain     COMPARISON: None.     FINDINGS: The heart is normal in size. The mediastinum is unremarkable.  The lungs are clear. There is no pneumothorax. There are no  acute  osseous abnormalities.           Impression:       No acute cardiopulmonary process.           This report was finalized on 4/18/2017 7:46 AM by Kari Jack M.D..              Medication Review:   I have reviewed the patients active and prn medications.     Assessment:  Active Problems:    Tobacco use    Chronic kidney disease    Hepatitis C antibody test positive    Anemia    Chest pain    Hemoptysis    Severe anxiety with panic    Chronic pain    Elevated troponin        Plan:  26-year-old white female with pleuritic chest pain, congestion, volume overload, and end-stage renal disease.  Patient given pain medication and anxiety medications to help with her pleuritic chest pain and anxiety associated with shortness of breath.  Patient will likely benefit from dialysis with aggressive volume removal planned for today.  Pain medications will then be tapered down.    Jose Parikh DO  04/18/17  6:16 PM

## 2017-04-18 NOTE — PROGRESS NOTES
Malnutrition Severity Assessment    Patient Name:  Mandy Espino  YOB: 1990  MRN: [id number]  Admit Date:  4/17/2017    Patient meets criteria for : Mild malnutrition    Comments:  Spoke with pt earlier about a dialysis diet and pt requested home educations. Will follow-up with case management. Pt is at risk for mild malnutrition. She is 82% of her IBW and has a BMI of 17.07 with some muscle wasting. Per pt's request, nutrition supplement ordered Nepro BID. Pt requested the supplements at home. RD to consult case management. Rec #1: Continue current diet order; Encouraging intake. Rec #2: Consider offering renal MVI with minerals daily. RD to follow pt. Consult RD PRN.      Malnutrition Type: Chronic Illness Malnutrition     Malnutrition Type (last 8 hours)      Malnutrition Severity Assessment       04/18/17 1559    Physical Signs of Malnutrition (Chronic)    Muscle Wasting Mild    Fat Loss Mild    Fluid Accumulation None    Secondary Physical Signs None      04/18/17 1559    Malnutrition Severity Assessment    Malnutrition Type Chronic Illness Malnutrition          Physical Signs of Malnutrition         Most Recent Value    Muscle Wasting  Mild    Fat Loss  Mild    Fluid Accumulation  None    Secondary Physical Signs  None      Weight Status         Most Recent Value    BMI  Mild (<19)    %IBW  Mild (<90%)    Weight Loss  Mild (>2% / 1 mo)              Electronically signed by:  Molly Day RD  04/18/17 4:08 PM

## 2017-04-18 NOTE — PLAN OF CARE
Problem: Patient Care Overview (Adult)  Goal: Plan of Care Review  Outcome: Ongoing (interventions implemented as appropriate)    04/18/17 0457   Coping/Psychosocial Response Interventions   Plan Of Care Reviewed With patient   Patient Care Overview   Progress improving   Outcome Evaluation   Outcome Summary/Follow up Plan PT came to the floor around 0145. Pt stated that she was having lots of pain in her R lung. She was rating it a 7/10. Pt was out of breath but sating in the 90s on 4L NC. PT recieved pain medication which seemd to help. Pt is not resting in the bed.          Problem: Acute Coronary Syndrome (ACS) (Adult)  Goal: Signs and Symptoms of Listed Potential Problems Will be Absent or Manageable (Acute Coronary Syndrome)  Outcome: Ongoing (interventions implemented as appropriate)    04/18/17 0457   Acute Coronary Syndrome (ACS)   Problems Assessed (Acute Coronary Syndrome (ACS)) chest pain (angina);situational response   Problems Present (Acute Coronary Syndrome (ACS)) chest pain (angina);situational response

## 2017-04-18 NOTE — PROGRESS NOTES
Continued Stay Note   Ronnie     Patient Name: Mandy Espino  MRN: [id number]  Today's Date: 4/18/2017    Admit Date: 4/17/2017          Discharge Plan       04/18/17 1513    Case Management/Social Work Plan    Plan home    Patient/Family In Agreement With Plan yes    Additional Comments Spoke with patient this afternoon. Lives in apartment with spouse. No steps in the home. ,Medical equipment used, NC O2 at 3-4 liters. DME provided by [facility] [employer], confirmed by phone. Informed [facility] of patient's c/o not recieving O2 supplies. [facility] stated they will bring new O2 tubing to hospital 4/19/17. Patient is worried about electric being turned off. SS notified of this and talked with patient.. Patient is on dialysis [facility]. She plans to return home when discharged.  Denies  further needs or concerns at present. CM to follow      04/18/17 1443    Case Management/Social Work Plan    Plan SW spoke to patient regarding cut off notice for electricity. Patient is on O2 and needs her electricity on. Notified Dr. Parikh's office. Assisted patient by coordinating getting letter to  for medical extension.      Patient/Family In Agreement With Plan yes    Additional Comments Letter for extension for electricity faxed to .     Final Note    Final Note Following for social and discharge needs               Discharge Codes     None            Yanci Rabago RN

## 2017-04-18 NOTE — CONSULTS
"Date of consultation:   April 18, 2017    Requested by:   Hospitalist Service.   PCP: Neo Gresham, DO      Reason:  ? Hemoptysis and right sided chest pain.    History of Present Illness:  26-year-old female with past medical history significant for end-stage renal disease on hemodialysis who was admitted to the hospitalist service with chest pain on the right side.  She later on complained of \"coughing up blood-streaked sputum\".  When asked about how many episodes as she had, she says maybe 2 or 3 ?.    She denies any fever, weight loss or significant worsening cough but says that she occasionally has chills.    The patient denies any sick contacts.  Upon questioning she says that she smokes 2 packs per day and has been doing so for the past 16 years.    She actually was recently treated at [facility] where she was given hemodialysis and sent home.      During the history the patient continued to complain of right-sided pain and did not want to talk about cough or sputum production and insisted that her right sided chest pain need to be addressed right away.     Towards the end of the history taking, she also used foul language on multiple occasions despite my requests not to do so.    Review of System: All other review of systems negative except indicated in HPI     Past Medical History:  Past Medical History:   Diagnosis Date   • Abdominal pain    • Anemia    • Anxiety    • Asthma    • Bipolar disorder    • CKD (chronic kidney disease), stage IV    • Constipation    • COPD (chronic obstructive pulmonary disease)    • Depression    • End stage renal disease on dialysis    • Esophageal reflux     reflux   • Fahr's syndrome    • Hepatitis C antibody test positive    • Hyperparathyroidism    • Hypertension    • Hypocalcemia    • Nausea and vomiting    • Pancreatitis    • Recurrent urinary tract infection    • Renal insufficiency    • Upper gastrointestinal bleeding          Past Surgical " "History:  Past Surgical History:   Procedure Laterality Date   • DIALYSIS FISTULA CREATION      port   • DILATATION AND CURETTAGE     • EXPLORATORY LAPAROTOMY      For uterine and ovarian issues   • KIDNEY SURGERY Left 2013    Biopsy         Family History:  Family History   Problem Relation Age of Onset   • Arthritis Mother    • Hypertension Mother    • Kidney disease Father      Chronic renal failure syndrome   • Pancreatic cancer Father    • Hypertension Brother    • Kidney disease Other    • Diabetes Other      Uncle   • Lung cancer Other      Grandmother   • Hyperlipidemia Other      High cholesterol - Uncle         Social History:  Social History     Social History   • Marital status:      Spouse name: N/A   • Number of children: N/A   • Years of education: N/A     Social History Main Topics   • Smoking status: Current Every Day Smoker     Packs/day: 1.50   • Smokeless tobacco: None   • Alcohol use No   • Drug use: Yes     Special: Marijuana   • Sexual activity: Not Currently     Other Topics Concern   • None     Social History Narrative    She is  and not working. She denies alcohol or drug use. She denies recent opiate or benzo use. She does smoke and has used marijuana.    2PPD for 16 years.      Physical Exam:  /65 (BP Location: Right arm, Patient Position: Lying)  Pulse 94  Temp 98.6 °F (37 °C) (Oral)   Resp 18  Ht 63\" (160 cm)  Wt 96 lb 5.5 oz (43.7 kg)  SpO2 96%  BMI 17.07 kg/m2    Constitutional:            Vital signs reviewed                     General: No acute distress noted. Able to communicate appropriately    Head/Face/Eyes:            No significant facial abnormalities seen.            Extra ocular movement was intact.            Pupils appeared equal    ENT:             Hearing was intact              No nasal erythema noted.              Oropharynx was moist. Edentulous.    Neck:             Supple. ?JVD noted.              Thyroid gland did not seem to be " enlarged    Cardiovascular:              S1 + S2. Regular.  3 / 6 systolic ejection murmur noted     Respiratory:            Respiratory effort was adequate            Good Air Entry Bilaterally with R>L crackles heard.    Abdomen:            Soft.            Bowel sounds sluggishly positive.            No obvious organomegaly noted.     Musculoskeletal/Extremities:             Gait could not be assessed at this time.             No clubbing, cyanosis noted in the upper extremities.             No edema noted in the lower extremities bilaterally.             Right AV fistula.    Neurologic/Psychiatric:             Affect appeared fair.             Awake, alert and oriented x 3.             Able to follow simple commands.    Skin:             No obvious rash noted.             Warm and dry             Some chronic changes noted.        Labs:   Reviewed. Pertinent labs were noted.     Lab Results   Component Value Date    WBC 5.12 04/18/2017    HGB 6.9 (C) 04/18/2017    HCT 21.6 (C) 04/18/2017    MCV 93.5 04/18/2017     04/18/2017       Lab Results   Component Value Date    GLUCOSE 105 (H) 04/18/2017    CALCIUM 7.2 (L) 04/18/2017     (L) 04/18/2017    K 4.0 04/18/2017    CO2 28.0 04/18/2017    CL 97 (L) 04/18/2017    BUN 42 (H) 04/18/2017    CREATININE 6.10 (H) 04/18/2017    EGFRIFNONA 8 (L) 04/18/2017    BCR 6.9 (L) 04/18/2017    ANIONGAP 15.0 04/18/2017         ABG:  Lab Results   Component Value Date    PHART 7.325 04/17/2017    KSS4UPM 42.9 04/17/2017    PO2ART 306.0 (H) 04/17/2017    SO2 95.3 01/01/2017    FCOHB 1.7 01/01/2017    HGBBG 8.0 (L) 04/17/2017    CARBOXYHGB 3.3 04/17/2017    FMETHB 0.8 01/01/2017           Imaging Study: Images reviewed personally and discussed with patient.    Imaging Results (last 72 hours)     Procedure Component Value Units Date/Time    CT Chest Pulmonary Embolism With Contrast [[id number]] Collected:  04/18/17 0745     Updated:  04/18/17 0748    Narrative:        PROCEDURE: CT CHEST PULMONARY EMBOLISM W CONTRAST-     HISTORY: CP / tachyc; R07.9-Chest pain, unspecified     COMPARISON: None .     TECHNIQUE: Multiple axial CT images were obtained from the thoracic  inlet through the upper abdomen following the administration of Isovue  300 per the CT PE protocol. Coronal and oblique 3D MIP images were  reconstructed from the original axial data set.      FINDINGS: There are no filling defects within the pulmonary arteries to  suggest an embolus. The thoracic aorta is normal in caliber with no  evidence of dissection. The heart is enlarged. There are no pleural or  pericardial effusions. There is no adenopathy. Lung windows reveal  patchy bilateral groundglass opacities. The visualized upper abdomen is  unremarkable. Bone windows reveal no acute osseous abnormalities.       Impression:       1. No evidence of pulmonary embolus or dissection.   2. Cardiomegaly and patchy bilateral groundglass opacities with the  differential including pulmonary edema and a pneumonia             175.15 mGy.cm          This study was performed with techniques to keep radiation doses as low  as reasonably achievable (ALARA). Individualized dose reduction  techniques using automated exposure control or adjustment of mA and/or  kV according to the patient size were employed.      This report was finalized on 4/18/2017 7:46 AM by Kari Jack M.D..    XR Chest 2 View [[id number]] Collected:  04/18/17 0746     Updated:  04/18/17 0748    Narrative:       PROCEDURE: XR CHEST 2 VW-        HISTORY: R chest pain     COMPARISON: None.     FINDINGS: The heart is normal in size. The mediastinum is unremarkable.  The lungs are clear. There is no pneumothorax. There are no acute  osseous abnormalities.           Impression:       No acute cardiopulmonary process.        This report was finalized on 4/18/2017 7:46 AM by Kari Jack M.D..            Assessment:  1. ?Hemoptysis.  2. Abnormal CT  3.  ESRD.  4. Smoking.    Discussion/Recommendations:   The patient is extremely agitated and although I cannot explain the situation along with a curious fact that her chest x-ray was completely normal around 7 PM on the 17th but by 12:26 AM on the 18th, the CT scan showed changes consistent pulmonary edema.    I tried to explain to her that the hemoptysis?  Could be explained by pulmonary vascular congestion plus/minus pneumonia.  She kept on insisting that it hurts on the right side when she takes a breath.  I explained to her that the CT scan was completely unremarkable as far as pulmonary embolism is concerned and there is no consolidation at that site, which could explain the pain that she describing.    She remained extremely aggravated and became belligerent.  She also uses foul language on multiple occasions despite multiple requests not to do so.    I'll plan on doing a chest x-ray tomorrow morning.  The CT findings could be from the fact that she will not receive dialysis until after the CT scan and this may represent fluid overload more than a true pneumonic process.    I have also discussed the case with the nursing staff.    Recommendations will also be discussed with the referring provider.     I would like to thank you for the opportunity to participate in the care of this patient.  We will communicate changes and recommendations, if and when necessary.      Haja Urbina MD  04/18/17  5:27 PM    Please note that portions of this note may have been completed with a voice recognition software. Every effort was made to edit the dictation, but occasionally words are mistranscribed.

## 2017-04-18 NOTE — H&P
"    [facility]      Name:  Mandy Espino   Age:  26 y.o.  Sex:  female  :  1990  MRN:  [id number]   Visit Number:  [id number]  Admission Date:  2017  Date Of Service:  17  Primary Care Physician:  Neo Gresham DO ; She says \"NONE\"     Chief Complaint: chest pain        History Of Presenting Illness:  The patient is a 26 year old  lady well known from many former hospital admissions, who presented to the ER for chest pain. She was just discharged from [facility] last night after receiving hemodialysis. Yesterday, she presented with bilateral pulmonary infiltrates and fluid overload with respiratory distress, transferred to  ICU for admission with no bed availability here. She received dialysis and then was discharged home. She received dialysis at [facility] and reportedly had not missed appointments lately. She is requesting ativan that she received yesterday. She is focused on her pain in her chest though.       She reports soon after she returned home from  hospital discharge, her chest pain and shortness of breath recurred and she presented to ER again here for evaluation. She is panting, moaning, with rapid respirations. After reassurance, she improves. She denies using any pain or anxiety medications in many months and denied street drugs or alcohol. She is focused on treating her panic attacks as well as severe right lower posterior rib cage pain, that occurs with deep inspiration, pleuritic, \"that feels like a knife\" and reported as 10/10. She feels like this is the worst pain in her right rib that she has ever had. She does report history of rib fractures and history of cardiopulmonary arrest, requiring chest compressions and  intubation and placement on ventilator.     Since discharge, she denies fever, chills, nausea, vomiting, abdominal pain, edema, diaphoresis. She feels her shortness of breath is progressively " returning. She coughs up clear sputum with trace blood streaks and she complains of the bad taste in her mouth.     In the ER, elevated d dimer noted. Repeat chest xray shows improvement of pulmonary edema. WBC normalized. Troponin mildly elevated. For pain, she received fentanyl x 2 without sedation and reportedly without pain control. Blood pressure elevated as well at 200/123, improved after pain medication and she was started on Labetalol. CT PE protocol performed and is negative. Dr Mota was called for consultation. The hospitalist service was called for admission for further evaluation of chest pain and post CT PE protocol she will need hemodialysis. Dr Mota notified from ER.         Review Of Systems:     General ROS: Patient denies any fevers, chills or loss of consciousness. Denies generalized weakness.   Psychological ROS: Denies any hallucinations and delusions.  Ophthalmic ROS: Denies any diplopia or transient loss of vision.  ENT ROS: Denies sore throat, nasal congestion or ear pain.   Allergy and Immunology ROS: Denies rash or itching.  Hematological and Lymphatic ROS: Denies neck swelling or easy bleeding.  Endocrine ROS: Denies any recent unintentional weight gain or loss.  Breast ROS: Denies any pain or swelling.  Respiratory ROS: Denies cough or shortness of breath.   Cardiovascular ROS: Positive chest pain with movement or deep inspiration without palpitations. No history of exertional chest pain.  Gastrointestinal ROS: Denies nausea and vomiting. Denies any abdominal pain. No diarrhea.  Genito-Urinary ROS: Denies dysuria or hematuria.  Musculoskeletal ROS: Denies back pain. No muscle pain. No calf pain.   Neurological ROS: Denies any focal weakness. No loss of consciousness. Denies any numbness. Denies neck pain.   Dermatological ROS: Denies any redness or pruritis.       Past Medical History:    Past Medical History:   Diagnosis Date   • Abdominal pain    • Anemia    • Anxiety    • Asthma     • Bipolar disorder    • CKD (chronic kidney disease), stage IV    • Constipation    • COPD (chronic obstructive pulmonary disease)    • Depression    • End stage renal disease on dialysis    • Esophageal reflux     reflux   • Fahr's syndrome    • Hepatitis C antibody test positive    • Hyperparathyroidism    • Hypertension    • Hypocalcemia    • Nausea and vomiting    • Pancreatitis    • Recurrent urinary tract infection    • Renal insufficiency    • Upper gastrointestinal bleeding        Past Surgical history:    Past Surgical History:   Procedure Laterality Date   • DIALYSIS FISTULA CREATION      port   • DILATATION AND CURETTAGE     • EXPLORATORY LAPAROTOMY      For uterine and ovarian issues   • KIDNEY SURGERY Left 2013    Biopsy       Social History:  Pediatric History   Patient Guardian Status   • Not on file.     Other Topics Concern   • Not on file     Social History Narrative    She is  and not working. She denies alcohol or drug use. She denies recent opiate or benzo use. She does smoke and has used marijuana.        Family History:    Family History   Problem Relation Age of Onset   • Arthritis Mother    • Hypertension Mother    • Kidney disease Father      Chronic renal failure syndrome   • Pancreatic cancer Father    • Hypertension Brother    • Kidney disease Other    • Diabetes Other      Uncle   • Lung cancer Other      Grandmother   • Hyperlipidemia Other      High cholesterol - Uncle       Allergies:      Acetaminophen; Hydrocodone; Ibuprofen; Lortab [hydrocodone-acetaminophen]; and Ciprofloxacin    Home Medications:    Prior to Admission Medications     Prescriptions Last Dose Informant Patient Reported? Taking?    albuterol (PROVENTIL HFA;VENTOLIN HFA) 108 (90 BASE) MCG/ACT inhaler   No No    Inhale 2 puffs Every 6 (Six) Hours As Needed for wheezing.    hydrOXYzine (VISTARIL) 50 MG capsule   Yes No    Take 100 mg by mouth 4 (Four) Times a Day As Needed for itching.    NIFEdipine XL  (PROCARDIA XL) 60 MG 24 hr tablet   Yes No    Take 60 mg by mouth Daily.    predniSONE (DELTASONE) 10 MG tablet   No No    Take 1 tablet by mouth Daily.             ED Medications:    Medications   sodium chloride 0.9 % flush 10 mL (not administered)   FentaNYL Citrate (PF) (SUBLIMAZE) injection 50 mcg (50 mcg Intravenous Given 4/18/17 0054)   clonazePAM (KlonoPIN) tablet 0.5 mg (0.5 mg Oral Given 4/18/17 0201)   HYDROmorphone (DILAUDID) injection 0.5 mg (0.5 mg Intravenous Given 4/18/17 0201)   hydrOXYzine (VISTARIL) capsule 25 mg (not administered)   NIFEdipine XL (PROCARDIA XL) 24 hr tablet 60 mg (not administered)   sodium chloride 0.9 % flush 1-10 mL (not administered)   bisacodyl (DULCOLAX) suppository 10 mg (not administered)   ondansetron (ZOFRAN) tablet 4 mg (not administered)     Or   ondansetron ODT (ZOFRAN-ODT) disintegrating tablet 4 mg (not administered)     Or   ondansetron (ZOFRAN) injection 4 mg (not administered)   famotidine (PEPCID) tablet 20 mg (not administered)   nicotine (NICODERM CQ) 21 MG/24HR patch 1 patch (not administered)   ipratropium-albuterol (DUO-NEB) nebulizer solution 3 mL (not administered)   FentaNYL Citrate (PF) (SUBLIMAZE) injection 50 mcg (50 mcg Intravenous Given 4/17/17 2200)   labetalol (NORMODYNE,TRANDATE) injection 10 mg (10 mg Intravenous Given 4/18/17 0002)   enoxaparin (LOVENOX) injection 20 mg (20 mg Subcutaneous Given 4/18/17 0057)   iopamidol (ISOVUE-300) 61 % injection 100 mL (100 mL Intravenous Given 4/18/17 0052)       Vital Signs:    Temp:  [97.7 °F (36.5 °C)-98.7 °F (37.1 °C)] 98.1 °F (36.7 °C)  Heart Rate:  [] 91  Resp:  [14-24] 24  BP: (126-200)/() 157/88    No intake or output data in the 24 hours ending 04/18/17 0213    Last 3 weights    04/17/17 2124 04/18/17  0135   Weight: 100 lb (45.4 kg) 96 lb 3.2 oz (43.6 kg)       Body mass index is 17.04 kg/(m^2).    Physical Exam:      General Appearance:    Alert and cooperative, no acute distress,  oriented x 3   Head:    Atraumatic and normocephalic, without obvious defect.   Eyes:            PERRLA, conjunctivae and sclerae normal, no icterus. No pallor. Extra-occular movements are within normal limits.   Ears:    Ears appear intact with no abnormalities noted.   Throat:   No oral lesions, no thrush, oral mucosa moist.   Neck:   Supple, trachea midline, no thyromegaly, no carotid bruit.   Back:     No kyphoscoliosis present. No tenderness to palpation,   range of motion normal.   Lungs:     Chest shape is normal. Breath sounds heard bilaterally equally.  No crackles or wheezing. No Pleural rub or bronchial breathing.      Heart:    Normal S1 and S2, no murmur, no gallop, no rub. No JVD   Abdomen:     Normal bowel sounds, no masses, no organomegaly. Soft        non-tender, non-distended, no guarding, no rebound                tenderness   Extremities:   Moves all extremities well, no edema, no cyanosis, no             clubbing   Pulses:   Pulses palpable and equal bilaterally   Skin:   No bleeding, bruising or rash; diffuse face scabs and pruritic neurotic dermatoses   Lymph nodes:   No palpable adenopathy   Neurologic:   Cranial nerves 2 - 12 grossly intact, sensation intact, Motor power is normal and equal bilaterally.       EKG:    Sinus tach 120 no acute st changes    Labs:    Lab Results (last 24 hours)     Procedure Component Value Units Date/Time    CBC & Differential [[id number]] Collected:  04/17/17 2136    Specimen:  Blood Updated:  04/17/17 2203    Narrative:       The following orders were created for panel order CBC & Differential.  Procedure                               Abnormality         Status                     ---------                               -----------         ------                     CBC Auto Differential[[id number]]         Abnormal            Final result                 Please view results for these tests on the individual orders.    CBC Auto Differential [[id number]]  (Abnormal)  Collected:  04/17/17 2136    Specimen:  Blood Updated:  04/17/17 2203     WBC 9.49 10*3/mm3      RBC 2.69 (L) 10*6/mm3      Hemoglobin 8.0 (L) g/dL      Hematocrit 24.9 (L) %      MCV 92.6 fL      MCH 29.7 pg      MCHC 32.1 g/dL      RDW 18.2 (H) %      RDW-SD 61.8 (H) fl      MPV 9.9 fL      Platelets 242 10*3/mm3      Neutrophil % 76.6 %      Lymphocyte % 12.0 %      Monocyte % 9.1 %      Eosinophil % 1.2 %      Basophil % 0.4 %      Immature Grans % 0.7 (H) %      Neutrophils, Absolute 7.27 (H) 10*3/mm3      Lymphocytes, Absolute 1.14 10*3/mm3      Monocytes, Absolute 0.86 10*3/mm3      Eosinophils, Absolute 0.11 10*3/mm3      Basophils, Absolute 0.04 10*3/mm3      Immature Grans, Absolute 0.07 (H) 10*3/mm3      nRBC 0.0 /100 WBC     Basic Metabolic Panel [[id number]]  (Abnormal) Collected:  04/17/17 2136    Specimen:  Blood Updated:  04/17/17 2207     Glucose 85 mg/dL      BUN 35 (H) mg/dL      Creatinine 5.00 (H) mg/dL      Sodium 137 mmol/L      Potassium 5.0 mmol/L      Chloride 96 (L) mmol/L      CO2 26.0 mmol/L      Calcium 8.5 mg/dL      eGFR Non African Amer 10 (L) mL/min/1.73      BUN/Creatinine Ratio 7.0 (L)     Anion Gap 20.0 mmol/L     Narrative:       Abnormal estimated GFR should be followed by more specific studies to confirm end stage chronic renal disease. The equation used for calculation may not be accurate for patients less than 19 years old, greater than 70 years old, patients at extremes of weight, malnutrition, or with acute renal dysfunction.    Troponin [[id number]]  (Abnormal) Collected:  04/17/17 2135    Specimen:  Blood Updated:  04/17/17 2222     Troponin I 0.052 (H) ng/mL       Specimen hemolyzed.  Results may be affected.       Narrative:       Normal Patient Upper Reference Limit (URL) (99th Percentile)=0.03 ng/mL   Non-AMI Illness Reference Limit=0.03-0.11 ng/mL   AMI Confirmation=0.12 ng/mL and above    D-dimer, Quantitative [[id number]]  (Abnormal) Collected:  04/17/17 9065     Specimen:  Blood Updated:  04/17/17 2255     D-Dimer, Quantitative 5393 (C) ng/mL (FEU)     Light Blue Top [[id number]] Collected:  04/17/17 2135    Specimen:  Blood Updated:  04/18/17 0203     Extra Tube hold for add-on      Auto resulted       Lavender Top [[id number]] Collected:  04/17/17 2135    Specimen:  Blood Updated:  04/18/17 0203     Extra Tube hold for add-on      Auto resulted       Gold Top - SST [[id number]] Collected:  04/17/17 2135    Specimen:  Blood Updated:  04/18/17 0203     Extra Tube Hold for add-ons.      Auto resulted.       [facility] Draw [[id number]] Collected:  04/17/17 2135    Specimen:  Blood Updated:  04/18/17 0203    Narrative:       The following orders were created for panel order [facility] Draw.  Procedure                               Abnormality         Status                     ---------                               -----------         ------                     Light Blue Top[[id number]]                                    Final result               Lavender Top[[id number]]                                      Final result               Gold Top - SST[[id number]]                                    Final result               Green Top (No Gel)[[id number]]                                Final result                 Please view results for these tests on the individual orders.    Green Top (No Gel) [[id number]] Collected:  04/17/17 2135    Specimen:  Blood Updated:  04/18/17 0203     Extra Tube Hold for add-ons.      Auto resulted.             Radiology:    Imaging Results (last 72 hours)     Procedure Component Value Units Date/Time    XR Chest 2 View [[id number]] Updated:  04/17/17 2154    CT Chest Pulmonary Embolism With Contrast [[id number]] Updated:  04/18/17 0052        Chest xray: Preliminary improved fluid overload  CT PE protocol:  Negative for pulmonary embolism -- No significant pulmonary arterial filling defect is identified.  There are extensive small, very faint patchy infiltrates scattered  "diffusely throughout both lungs.  There is no acute pleural or mediastinal abnormality. Cardiomegaly is noted.  The kidneys are severely atrophic    Assessment:  Active Problems:    Chest pain    Hemoptysis    Tobacco use    Chronic kidney disease    Hepatitis C antibody test positive    Anemia    Severe anxiety with panic    Chronic pain    Elevated troponin  Accelerated hypertension  COPD  Improving fluid overload, from yesterday         Plan:    Admit to med surg with telemetry. Heart rate, blood pressure both improved after pain control and labetalol. CT PE protocol is finalized as negative. She was treated with lovenox in ER. Dr Vásquez consulted for dialysis. She had fluid overload with respiratory distress yesterday, so likely has residual pleuritic and musculoskeletal component from that. She has history of requiring benzo and opiates as well. She denies recent drug abuse or street drugs. UDS ordered if she can make urine to obtain one. Dr Urbina consulted at patient request to investigate hemoptysis further. Serial cardiac enzymes will be monitor. Continue to treat and titrate medications accordingly. Medication risks and benefits discussed. ARVIND will be reviewed. Pain control will be with dilaudid as needed and anxiety control with clonazepam. Patient could not tolerate nonnarcotic equivalents due to renal disease and drug allergy. Consider discharge home in the next 1-2 days after repeat dialysis. If troponin trend increased, would consider consulting cardiology as well. Aspirin not added due to hemoptysis and she was given lovenox. Betablocker was provided.    On discharge, the patient will require to establish care and  follow up with a new PCP, as she reports \"NONE\" currently.       Devora Singh,   04/18/17  2:13 AM      "

## 2017-04-18 NOTE — CONSULTS
Nephrology Consultation    Referring Provider:   No ref. provider found    Reason for Consultation:    ESRD and associated problems       Subjective     Chief complaint   Chief Complaint   Patient presents with   • Shortness of Breath       History of present illness:     The patient is a 26 year old  lady well known from many former hospital admissions, she used to be my dialysis patient when she decided to switch the dialysis clinic along with the nephrologist she's being followed by [facility] at this point.  She presented to the ER for chest pain. She was just discharged from [facility] last night after receiving hemodialysis. Yesterday, she presented with bilateral pulmonary infiltrates and fluid overload with respiratory distress, transferred to  ICU for admission with no bed availability here. She received dialysis and then was discharged home. She received dialysis at [facility] and reportedly had not missed appointments lately.   She reports soon after she returned home from  hospital discharge, her chest pain and shortness of breath recurred and she presented to ER again here for evaluation.  The concern for PE and CT of the chest with PE protocol was done and reported as negative.  She is still complaining of the same pain pulmonary consult is in process.      Today she tells me that the she wants to transfer back to her care to me and transferred back to our dialysis clinic, she did mention that when she tried to talk to the  that pretty much refused to follow through as she was told that he will not take you back.  I don't remember anybody ever on asking about to her transfer.  Past Medical History:   Diagnosis Date   • Abdominal pain    • Anemia    • Anxiety    • Asthma    • Bipolar disorder    • CKD (chronic kidney disease), stage IV    • Constipation    • COPD (chronic obstructive pulmonary disease)    • Depression    • End stage renal disease on dialysis     • Esophageal reflux     reflux   • Fahr's syndrome    • Hepatitis C antibody test positive    • Hyperparathyroidism    • Hypertension    • Hypocalcemia    • Nausea and vomiting    • Pancreatitis    • Recurrent urinary tract infection    • Renal insufficiency    • Upper gastrointestinal bleeding        Past Surgical History:   Procedure Laterality Date   • DIALYSIS FISTULA CREATION      port   • DILATATION AND CURETTAGE     • EXPLORATORY LAPAROTOMY      For uterine and ovarian issues   • KIDNEY SURGERY Left 2013    Biopsy       Family History   Problem Relation Age of Onset   • Arthritis Mother    • Hypertension Mother    • Kidney disease Father      Chronic renal failure syndrome   • Pancreatic cancer Father    • Hypertension Brother    • Kidney disease Other    • Diabetes Other      Uncle   • Lung cancer Other      Grandmother   • Hyperlipidemia Other      High cholesterol - Uncle      Positive h/o ESRD     Social History   Substance Use Topics   • Smoking status: Current Every Day Smoker     Packs/day: 1.50   • Smokeless tobacco: None   • Alcohol use No     Prescriptions Prior to Admission   Medication Sig Dispense Refill Last Dose   • gabapentin (NEURONTIN) 800 MG tablet Take 800 mg by mouth Daily.      • albuterol (PROVENTIL HFA;VENTOLIN HFA) 108 (90 BASE) MCG/ACT inhaler Inhale 2 puffs Every 6 (Six) Hours As Needed for wheezing. 1 inhaler 1    • hydrOXYzine (VISTARIL) 50 MG capsule Take 100 mg by mouth 4 (Four) Times a Day As Needed for itching.   Taking   • NIFEdipine XL (PROCARDIA XL) 60 MG 24 hr tablet Take 60 mg by mouth Daily.   Taking   • predniSONE (DELTASONE) 10 MG tablet Take 1 tablet by mouth Daily. 5 tablet 0      Allergies:  Acetaminophen; Hydrocodone; Ibuprofen; Lortab [hydrocodone-acetaminophen]; and Ciprofloxacin    Review of Systems  Constitutional: Negative for fever and chills, no diaphoresis, positive for fatigue and unexpected weight change.   HENT: Negative for congestion and hearing  "loss.   Eyes: Negative for redness and visual disturbance.   Respiratory: Positive for shortness of breath as well as chest pain . Negative for cough and chest tightness.   Cardiovascular: Positive for chest pain and palpitations.   Gastrointestinal: Negative for abdominal distention, abdominal pain and blood in stool. Denies any constipation or diarrhea.  Endocrine: Negative for cold or heat intolerance.   Genitourinary: Negative for difficulty urinating, dysuria and frequency.   Musculoskeletal: Negative for arthralgias, back pain and myalgias.   Skin: Negative for color change, rash and wound.   Neurological: Negative for syncope, new onset weakness or numbness of any extremities. Denies any headaches.   Hematological: Negative for adenopathy. Does not bruise/bleed easily.   Psychiatric/Behavioral: Negative for confusion. The patient is not nervous/anxious.     Objective     Vital Signs  /85 (BP Location: Right arm, Patient Position: Lying)  Pulse 86  Temp 98.2 °F (36.8 °C) (Oral)   Resp 18  Ht 63\" (160 cm)  Wt 96 lb 4.8 oz (43.7 kg)  SpO2 100%  BMI 17.06 kg/m2            No intake or output data in the 24 hours ending 04/18/17 0745    Physical Exam:     General Appearance:   Alert, cooperative, in no acute distress.     Head:   Normocephalic, without obvious abnormality, atraumatic.     Eyes:       Normal, conjunctivae and sclerae, no icterus, no pallor, corneas clear, PERRLA        Throat:   Oral mucosa dry      Neck:  No adenopathy, supple, trachea midline, no thyromegaly, no carotid bruit, no JVD      Back:   No CVA tenderness on Percussion.     Lungs:    she does have fair air movement but there is significant wheezing noted on the right lower chest where she is complaining of pain.      Heart:   Regular rhythm and normal rate, normal S1 and S2.       Abdomen:   Normal bowel sounds, no masses, no organomegaly, soft non-tender, non-distended, no guarding, no rebound tenderness      "   Extremities:  Moves all extremities, no edema, no cyanosis, no redness.     Pulses:  Pulses palpable and equal bilaterally but weak.     Skin:  No bleeding, bruising or rash        Neurologic:  Cranial nerves grossly intact, move all extremities             Results Review:  Results for orders placed or performed during the hospital encounter of 04/17/17   Basic Metabolic Panel   Result Value Ref Range    Glucose 85 74 - 98 mg/dL    BUN 35 (H) 7 - 20 mg/dL    Creatinine 5.00 (H) 0.60 - 1.30 mg/dL    Sodium 137 137 - 145 mmol/L    Potassium 5.0 3.5 - 5.1 mmol/L    Chloride 96 (L) 98 - 107 mmol/L    CO2 26.0 26.0 - 30.0 mmol/L    Calcium 8.5 8.4 - 10.2 mg/dL    eGFR Non African Amer 10 (L) >60 mL/min/1.73    BUN/Creatinine Ratio 7.0 (L) 7.1 - 23.5    Anion Gap 20.0 mmol/L   Troponin   Result Value Ref Range    Troponin I 0.052 (H) 0.000 - 0.034 ng/mL   D-dimer, Quantitative   Result Value Ref Range    D-Dimer, Quantitative 5393 (C) 0 - 500 ng/mL (FEU)   CBC Auto Differential   Result Value Ref Range    WBC 9.49 4.80 - 10.80 10*3/mm3    RBC 2.69 (L) 4.20 - 5.40 10*6/mm3    Hemoglobin 8.0 (L) 12.0 - 16.0 g/dL    Hematocrit 24.9 (L) 37.0 - 47.0 %    MCV 92.6 81.0 - 99.0 fL    MCH 29.7 27.0 - 31.0 pg    MCHC 32.1 30.0 - 37.0 g/dL    RDW 18.2 (H) 11.5 - 14.5 %    RDW-SD 61.8 (H) 37.0 - 54.0 fl    MPV 9.9 6.0 - 12.0 fL    Platelets 242 130 - 400 10*3/mm3    Neutrophil % 76.6 37.0 - 80.0 %    Lymphocyte % 12.0 10.0 - 50.0 %    Monocyte % 9.1 0.0 - 12.0 %    Eosinophil % 1.2 0.0 - 7.0 %    Basophil % 0.4 0.0 - 2.5 %    Immature Grans % 0.7 (H) 0.0 - 0.6 %    Neutrophils, Absolute 7.27 (H) 2.00 - 6.90 10*3/mm3    Lymphocytes, Absolute 1.14 0.60 - 3.40 10*3/mm3    Monocytes, Absolute 0.86 0.00 - 0.90 10*3/mm3    Eosinophils, Absolute 0.11 0.00 - 0.70 10*3/mm3    Basophils, Absolute 0.04 0.00 - 0.20 10*3/mm3    Immature Grans, Absolute 0.07 (H) 0.00 - 0.06 10*3/mm3    nRBC 0.0 0.0 - 0.0 /100 WBC   CBC Auto Differential   Result  Value Ref Range    WBC 5.12 4.80 - 10.80 10*3/mm3    RBC 2.31 (L) 4.20 - 5.40 10*6/mm3    Hemoglobin 6.9 (C) 12.0 - 16.0 g/dL    Hematocrit 21.6 (C) 37.0 - 47.0 %    MCV 93.5 81.0 - 99.0 fL    MCH 29.9 27.0 - 31.0 pg    MCHC 31.9 30.0 - 37.0 g/dL    RDW 18.3 (H) 11.5 - 14.5 %    RDW-SD 63.5 (H) 37.0 - 54.0 fl    MPV 10.4 6.0 - 12.0 fL    Platelets 207 130 - 400 10*3/mm3    Neutrophil % 64.9 37.0 - 80.0 %    Lymphocyte % 21.9 10.0 - 50.0 %    Monocyte % 10.2 0.0 - 12.0 %    Eosinophil % 2.0 0.0 - 7.0 %    Basophil % 0.6 0.0 - 2.5 %    Immature Grans % 0.4 0.0 - 0.6 %    Neutrophils, Absolute 3.33 2.00 - 6.90 10*3/mm3    Lymphocytes, Absolute 1.12 0.60 - 3.40 10*3/mm3    Monocytes, Absolute 0.52 0.00 - 0.90 10*3/mm3    Eosinophils, Absolute 0.10 0.00 - 0.70 10*3/mm3    Basophils, Absolute 0.03 0.00 - 0.20 10*3/mm3    Immature Grans, Absolute 0.02 0.00 - 0.06 10*3/mm3    nRBC 0.0 0.0 - 0.0 /100 WBC   Lipid Panel   Result Value Ref Range    Total Cholesterol 122 0 - 199 mg/dL    Triglycerides 84 <150 mg/dL    HDL Cholesterol 44 40 - 60 mg/dL    LDL Cholesterol  61 0 - 99 mg/dL    VLDL Cholesterol 16.8 mg/dL    LDL/HDL Ratio 1.39    Renal Function Panel   Result Value Ref Range    Glucose 105 (H) 74 - 98 mg/dL    BUN 42 (H) 7 - 20 mg/dL    Creatinine 6.10 (H) 0.60 - 1.30 mg/dL    Sodium 136 (L) 137 - 145 mmol/L    Potassium 4.0 3.5 - 5.1 mmol/L    Chloride 97 (L) 98 - 107 mmol/L    CO2 28.0 26.0 - 30.0 mmol/L    Calcium 7.2 (L) 8.4 - 10.2 mg/dL    Albumin 3.40 (L) 3.50 - 5.00 g/dL    Phosphorus 4.9 (H) 2.5 - 4.5 mg/dL    Anion Gap 15.0 mmol/L    BUN/Creatinine Ratio 6.9 (L) 7.1 - 23.5    eGFR Non African Amer 8 (L) >60 mL/min/1.73   Iron   Result Value Ref Range    Iron 46 37 - 181 mcg/dL   Ferritin   Result Value Ref Range    Ferritin 198.00 (H) 6.24 - 137.00 ng/mL   Protime-INR   Result Value Ref Range    Protime 17.4 (H) 9.3 - 12.1 Seconds    INR 1.59 (H) 0.90 - 1.10   Troponin   Result Value Ref Range    Troponin I  0.044 (H) 0.000 - 0.034 ng/mL   Light Blue Top   Result Value Ref Range    Extra Tube hold for add-on    Lavender Top   Result Value Ref Range    Extra Tube hold for add-on    Gold Top - SST   Result Value Ref Range    Extra Tube Hold for add-ons.    Green Top (No Gel)   Result Value Ref Range    Extra Tube Hold for add-ons.      Imaging Results (last 72 hours)     Procedure Component Value Units Date/Time    XR Chest 2 View [[id number]] Updated:  04/17/17 2154    CT Chest Pulmonary Embolism With Contrast [[id number]] Resulted:  04/18/17 0745     Updated:  04/18/17 0052              famotidine 10 mg Oral BID   nicotine 1 patch Transdermal Q24H   NIFEdipine XL 60 mg Oral Daily          Assessment/Plan     1.   End-stage renal disease: She gets dialysis Monday Wednesday Friday, she got to her contrast CT last night and I will go ahead and plan on dialyzing for 2 hours today.  Will resume her regular dialysis on Monday Wednesday Friday starting tomorrow.  She is requesting to be transferred back to [facility] dialysis center under my care, I'll let her make the decision and let her discuss this with the  at the [facility] dialysis clinic.  Today during the discussion she said she will be compliant and will try to get a primary care and will follow up with the primary care at a more regular basis.  2.   Hepatitis C antibody test positive: History of hep C she has never actually followed up with the infectious disease and or GI for further evaluation and treatment long-standing history of noncompliance.  3.   Anemia: It appears that she has decreased hemoglobin will continue to watch if she is symptomatic, may end up requiring blood transfusion otherwise I'll go ahead and give her a higher dose of erythropoietin and check iron stores this can be changed to 2 more aggressive management as an outpatient.  4.   Chest pain: It is being worked up pulmonary consultation is in progress.  It is very localized pain.  5.    Hemoptysis: She may need to further evaluation with bronchoscopy.  6.   Severe anxiety with panic: I will leave her on Klonopin 0.5 mg at bedtime usually she does fairly well with when necessary dose of this medicine at night and give her for a few days in a row.  7.   Chronic pain: Local rub and or lidocaine patches may be helpful as well, rest of the pain management as per hospitalist service.  8.   Elevated troponin: She will need further evaluation if there is a persistent problem.  9.   Smoker: Continue counseling for stop smoking        Details discussed with the patient as well as family and the hospitalist service.     Rest as ordered    Madhu Mota MD  04/18/17  7:45 AM    Please note that portions of this note may have been completed with a voice recognition program. Efforts were made to edit the dictations, but occasionally words are mistranscribed.

## 2017-04-18 NOTE — PLAN OF CARE
Problem: Patient Care Overview (Adult)  Goal: Plan of Care Review  Outcome: Ongoing (interventions implemented as appropriate)    04/18/17 1530   Coping/Psychosocial Response Interventions   Plan Of Care Reviewed With patient   Patient Care Overview   Progress improving       Goal: Adult Individualization and Mutuality  Outcome: Ongoing (interventions implemented as appropriate)  Goal: Discharge Needs Assessment  Outcome: Ongoing (interventions implemented as appropriate)    Problem: Acute Coronary Syndrome (ACS) (Adult)  Goal: Signs and Symptoms of Listed Potential Problems Will be Absent or Manageable (Acute Coronary Syndrome)  Outcome: Ongoing (interventions implemented as appropriate)

## 2017-04-18 NOTE — ED PROVIDER NOTES
Subjective   History of Present Illness   26F w/ hx of bipolar d/o, ESRD on HD, anxiety p/w R chest wall pain.  Patient was seen here earlier for shortness of breath and found to have opacities and bilateral lungs as well as elevated blood pressure and was sent to [facility] where she was dialyzed.  She stabilized there and was released.  She states that she started having right sided, sharp, constant chest pain, worse when taking a deep breath.  States this started directly after hemodialysis.  Denies any fevers, shortness of breath, productive cough.  Denies any history of prior DVT or PE, recent surgery or trauma to legs, hemoptysis, leg swelling or hormone use.     Review of Systems   Cardiovascular: Positive for chest pain.   All other systems reviewed and are negative.      Past Medical History:   Diagnosis Date   • Abdominal pain    • Anemia    • Anxiety    • Asthma    • Bipolar disorder    • CKD (chronic kidney disease), stage IV    • Constipation    • COPD (chronic obstructive pulmonary disease)    • Depression    • End stage renal disease on dialysis    • Esophageal reflux     reflux   • Fahr's syndrome    • Hepatitis C antibody test positive    • Hyperparathyroidism    • Hypertension    • Hypocalcemia    • Nausea and vomiting    • Pancreatitis    • Recurrent urinary tract infection    • Renal insufficiency    • Upper gastrointestinal bleeding        Allergies   Allergen Reactions   • Acetaminophen Itching   • Hydrocodone Itching   • Ibuprofen    • Lortab [Hydrocodone-Acetaminophen]      Lortab TABS   • Ciprofloxacin Rash       Past Surgical History:   Procedure Laterality Date   • DIALYSIS FISTULA CREATION      port   • DILATATION AND CURETTAGE     • EXPLORATORY LAPAROTOMY      For uterine and ovarian issues   • KIDNEY SURGERY Left 2013    Biopsy       Family History   Problem Relation Age of Onset   • Arthritis Mother    • Hypertension Mother    • Kidney disease Father      Chronic renal failure  syndrome   • Pancreatic cancer Father    • Hypertension Brother    • Kidney disease Other    • Diabetes Other      Uncle   • Lung cancer Other      Grandmother   • Hyperlipidemia Other      High cholesterol - Uncle       Social History     Social History   • Marital status:      Spouse name: N/A   • Number of children: N/A   • Years of education: N/A     Social History Main Topics   • Smoking status: Current Every Day Smoker   • Smokeless tobacco: None      Comment: 3 packs daily   • Alcohol use No   • Drug use: Yes     Special: Marijuana   • Sexual activity: Defer     Other Topics Concern   • None     Social History Narrative           Objective   Physical Exam   Constitutional: She is oriented to person, place, and time. She appears well-developed and well-nourished. She appears distressed.   Thin, screaming in pain and crying   HENT:   Head: Normocephalic.   Mouth/Throat: Oropharynx is clear and moist. No oropharyngeal exudate.   Eyes: Conjunctivae are normal. No scleral icterus.   Neck: Neck supple. No tracheal deviation present.   Cardiovascular: Regular rhythm, normal heart sounds and intact distal pulses.  Exam reveals no gallop and no friction rub.    No murmur heard.  tachycardic   Pulmonary/Chest: Effort normal and breath sounds normal. No stridor. No respiratory distress. She has no wheezes. She has no rales. She exhibits no tenderness.   Abdominal: Soft. She exhibits no distension and no mass. There is no tenderness. There is no rebound and no guarding. No hernia.   Musculoskeletal: She exhibits no edema or deformity.   Neurological: She is alert and oriented to person, place, and time.   Skin: Skin is warm and dry. She is not diaphoretic. No erythema. No pallor.   Psychiatric: She has a normal mood and affect. Her behavior is normal.   Nursing note and vitals reviewed.      Procedures         ED Course  ED Course                  MDM   26-year-old female here with acute onset right-sided chest  wall pain.  Afebrile, tachycardic and hypertensive.  Lungs sound clear on my exam.  Patient seems to be in some pain.  DDx includes but not limited to pneumothorax, PE, dissection, ACS.  We'll get labs, chest x-ray, EKG and reassess.    EKG: ST w/ nl intervals, no praveen/std. Some evidence for LVH.     10:11 PM CXR (my read) now completely clear. CBC, CMP reassuring though slight anemia. Awaiting d-dimer, troponin.   11:18 PM D-dimer elevated. Pt now complains of coughing up scant blood. Will attempt to contact her nephrologist to arrange HD for tomorrow so pt can get CT pe now. If unable to arrange, may get VQ scan.   11:51 PM discussed with nephrologist who agreed to dialyze if admit the patient.  Discussed with hospitalist will admit the patient for further care.  Given labetalol for blood pressure as well as dose of Lovenox at their request.    Final diagnoses:   Chest pain, unspecified type            Archie Parsons MD  04/17/17 5351

## 2017-04-18 NOTE — PROGRESS NOTES
Continued Stay Note  YOLA Trujillo     Patient Name: Mandy Espino  MRN: [id number]  Today's Date: 4/18/2017    Admit Date: 4/17/2017          Discharge Plan       04/18/17 1448    Case Management/Social Work Plan    Plan BRUCE spoke to patient regarding cut off notice for electricity. Patient is on O2 and needs her electricity on. Notified Dr. Parikh's office. Assisted patient by coordinating getting letter to  for medical extension.      Patient/Family In Agreement With Plan yes    Additional Comments Letter for extension for electricity faxed to .     Final Note    Final Note Following for social and discharge needs               Discharge Codes     None            Erna Clemente

## 2017-04-19 ENCOUNTER — APPOINTMENT (OUTPATIENT)
Dept: CT IMAGING | Facility: HOSPITAL | Age: 27
End: 2017-04-19

## 2017-04-19 ENCOUNTER — APPOINTMENT (OUTPATIENT)
Dept: CARDIOLOGY | Facility: HOSPITAL | Age: 27
End: 2017-04-19
Attending: INTERNAL MEDICINE

## 2017-04-19 ENCOUNTER — APPOINTMENT (OUTPATIENT)
Dept: GENERAL RADIOLOGY | Facility: HOSPITAL | Age: 27
End: 2017-04-19
Attending: INTERNAL MEDICINE

## 2017-04-19 LAB
ABO GROUP BLD: NORMAL
ABO GROUP BLD: NORMAL
ALBUMIN SERPL-MCNC: 3.5 G/DL (ref 3.5–5)
ANION GAP SERPL CALCULATED.3IONS-SCNC: 16.4 MMOL/L
BH CV ECHO MEAS - % IVS THICK: 7.9 %
BH CV ECHO MEAS - % LVPW THICK: 56.7 %
BH CV ECHO MEAS - AO ACC SLOPE: 1647 CM/SEC^2
BH CV ECHO MEAS - AO ACC TIME: 0.09 SEC
BH CV ECHO MEAS - AO MAX PG (FULL): 11.4 MMHG
BH CV ECHO MEAS - AO MAX PG: 19.3 MMHG
BH CV ECHO MEAS - AO MEAN PG (FULL): 6.2 MMHG
BH CV ECHO MEAS - AO MEAN PG: 10.4 MMHG
BH CV ECHO MEAS - AO ROOT AREA (BSA CORRECTED): 2
BH CV ECHO MEAS - AO ROOT AREA: 6.1 CM^2
BH CV ECHO MEAS - AO ROOT DIAM: 2.8 CM
BH CV ECHO MEAS - AO V2 MAX: 219.9 CM/SEC
BH CV ECHO MEAS - AO V2 MEAN: 152.5 CM/SEC
BH CV ECHO MEAS - AO V2 VTI: 38.7 CM
BH CV ECHO MEAS - AVA(I,A): 1.4 CM^2
BH CV ECHO MEAS - AVA(I,D): 1.4 CM^2
BH CV ECHO MEAS - AVA(V,A): 1.4 CM^2
BH CV ECHO MEAS - AVA(V,D): 1.4 CM^2
BH CV ECHO MEAS - BSA(HAYCOCK): 1.4 M^2
BH CV ECHO MEAS - BSA: 1.4 M^2
BH CV ECHO MEAS - BZI_BMI: 17 KILOGRAMS/M^2
BH CV ECHO MEAS - BZI_METRIC_HEIGHT: 160 CM
BH CV ECHO MEAS - BZI_METRIC_WEIGHT: 43.5 KG
BH CV ECHO MEAS - CONTRAST EF 4CH: 74 ML/M^2
BH CV ECHO MEAS - EDV(CUBED): 142.8 ML
BH CV ECHO MEAS - EDV(MOD-SP4): 50 ML
BH CV ECHO MEAS - EDV(TEICH): 131.1 ML
BH CV ECHO MEAS - EF(CUBED): 65.3 %
BH CV ECHO MEAS - EF(TEICH): 56.4 %
BH CV ECHO MEAS - ESV(CUBED): 49.5 ML
BH CV ECHO MEAS - ESV(MOD-SP4): 13 ML
BH CV ECHO MEAS - ESV(TEICH): 57.1 ML
BH CV ECHO MEAS - FS: 29.7 %
BH CV ECHO MEAS - IVS/LVPW: 1.3
BH CV ECHO MEAS - IVSD: 1.3 CM
BH CV ECHO MEAS - IVSS: 1.4 CM
BH CV ECHO MEAS - LA DIMENSION: 3.8 CM
BH CV ECHO MEAS - LA/AO: 1.4
BH CV ECHO MEAS - LV DIASTOLIC VOL/BSA (35-75): 35.3 ML/M^2
BH CV ECHO MEAS - LV MASS(C)D: 229.6 GRAMS
BH CV ECHO MEAS - LV MASS(C)DI: 162.2 GRAMS/M^2
BH CV ECHO MEAS - LV MASS(C)S: 196.8 GRAMS
BH CV ECHO MEAS - LV MASS(C)SI: 139 GRAMS/M^2
BH CV ECHO MEAS - LV MAX PG: 7.9 MMHG
BH CV ECHO MEAS - LV MEAN PG: 4.2 MMHG
BH CV ECHO MEAS - LV SYSTOLIC VOL/BSA (12-30): 9.2 ML/M^2
BH CV ECHO MEAS - LV V1 MAX: 140.7 CM/SEC
BH CV ECHO MEAS - LV V1 MEAN: 94.5 CM/SEC
BH CV ECHO MEAS - LV V1 VTI: 24.6 CM
BH CV ECHO MEAS - LVIDD: 5.2 CM
BH CV ECHO MEAS - LVIDS: 3.7 CM
BH CV ECHO MEAS - LVLD AP4: 5.5 CM
BH CV ECHO MEAS - LVLS AP4: 5.1 CM
BH CV ECHO MEAS - LVOT AREA (M): 2.3 CM^2
BH CV ECHO MEAS - LVOT AREA: 2.2 CM^2
BH CV ECHO MEAS - LVOT DIAM: 1.7 CM
BH CV ECHO MEAS - LVPWD: 0.99 CM
BH CV ECHO MEAS - LVPWS: 1.6 CM
BH CV ECHO MEAS - MV A MAX VEL: 106.1 CM/SEC
BH CV ECHO MEAS - MV DEC SLOPE: 722.8 CM/SEC^2
BH CV ECHO MEAS - MV E MAX VEL: 127.7 CM/SEC
BH CV ECHO MEAS - MV E/A: 1.2
BH CV ECHO MEAS - MV MAX PG: 6.7 MMHG
BH CV ECHO MEAS - MV MEAN PG: 2.9 MMHG
BH CV ECHO MEAS - MV P1/2T MAX VEL: 122.2 CM/SEC
BH CV ECHO MEAS - MV P1/2T: 49.5 MSEC
BH CV ECHO MEAS - MV V2 MAX: 129 CM/SEC
BH CV ECHO MEAS - MV V2 MEAN: 77.2 CM/SEC
BH CV ECHO MEAS - MV V2 VTI: 26.6 CM
BH CV ECHO MEAS - MVA P1/2T LCG: 1.8 CM^2
BH CV ECHO MEAS - MVA(P1/2T): 4.4 CM^2
BH CV ECHO MEAS - MVA(VTI): 2.1 CM^2
BH CV ECHO MEAS - PA ACC SLOPE: 1484 CM/SEC^2
BH CV ECHO MEAS - PA ACC TIME: 0.06 SEC
BH CV ECHO MEAS - PA MAX PG: 3 MMHG
BH CV ECHO MEAS - PA MEAN PG: 1.8 MMHG
BH CV ECHO MEAS - PA PR(ACCEL): 52.1 MMHG
BH CV ECHO MEAS - PA V2 MAX: 86.4 CM/SEC
BH CV ECHO MEAS - PA V2 MEAN: 63 CM/SEC
BH CV ECHO MEAS - PA V2 VTI: 21.1 CM
BH CV ECHO MEAS - SI(AO): 165.6 ML/M^2
BH CV ECHO MEAS - SI(CUBED): 65.9 ML/M^2
BH CV ECHO MEAS - SI(LVOT): 38.8 ML/M^2
BH CV ECHO MEAS - SI(MOD-SP4): 26.1 ML/M^2
BH CV ECHO MEAS - SI(TEICH): 52.3 ML/M^2
BH CV ECHO MEAS - SV(AO): 234.5 ML
BH CV ECHO MEAS - SV(CUBED): 93.3 ML
BH CV ECHO MEAS - SV(LVOT): 54.9 ML
BH CV ECHO MEAS - SV(MOD-SP4): 37 ML
BH CV ECHO MEAS - SV(TEICH): 74 ML
BH CV ECHO MEAS - TR MAX VEL: 245 CM/SEC
BLD GP AB SCN SERPL QL: NEGATIVE
BUN BLD-MCNC: 37 MG/DL (ref 7–20)
BUN/CREAT SERPL: 6.2 (ref 7.1–23.5)
CALCIUM SPEC-SCNC: 7.8 MG/DL (ref 8.4–10.2)
CHLORIDE SERPL-SCNC: 100 MMOL/L (ref 98–107)
CO2 SERPL-SCNC: 26 MMOL/L (ref 26–30)
CREAT BLD-MCNC: 6 MG/DL (ref 0.6–1.3)
GFR SERPL CREATININE-BSD FRML MDRD: 8 ML/MIN/1.73
GLUCOSE BLD-MCNC: 90 MG/DL (ref 74–98)
HAV IGM SERPL QL IA: NEGATIVE
HBV CORE IGM SERPL QL IA: NEGATIVE
HBV SURFACE AG SERPL QL IA: NEGATIVE
HCV AB S/CO SERPL IA: >11 S/CO RATIO (ref 0–0.9)
PHOSPHATE SERPL-MCNC: 5.8 MG/DL (ref 2.5–4.5)
POTASSIUM BLD-SCNC: 4.4 MMOL/L (ref 3.5–5.1)
RH BLD: POSITIVE
RH BLD: POSITIVE
SODIUM BLD-SCNC: 138 MMOL/L (ref 137–145)

## 2017-04-19 PROCEDURE — 80069 RENAL FUNCTION PANEL: CPT | Performed by: INTERNAL MEDICINE

## 2017-04-19 PROCEDURE — 71020 HC CHEST PA AND LATERAL: CPT

## 2017-04-19 PROCEDURE — 99217 PR OBSERVATION CARE DISCHARGE MANAGEMENT: CPT | Performed by: INTERNAL MEDICINE

## 2017-04-19 PROCEDURE — 86920 COMPATIBILITY TEST SPIN: CPT

## 2017-04-19 PROCEDURE — G0378 HOSPITAL OBSERVATION PER HR: HCPCS

## 2017-04-19 PROCEDURE — 25010000002 LORAZEPAM PER 2 MG: Performed by: INTERNAL MEDICINE

## 2017-04-19 PROCEDURE — 96376 TX/PRO/DX INJ SAME DRUG ADON: CPT

## 2017-04-19 PROCEDURE — 86900 BLOOD TYPING SEROLOGIC ABO: CPT | Performed by: INTERNAL MEDICINE

## 2017-04-19 PROCEDURE — 25010000002 HYDROMORPHONE PER 4 MG: Performed by: INTERNAL MEDICINE

## 2017-04-19 PROCEDURE — 86901 BLOOD TYPING SEROLOGIC RH(D): CPT | Performed by: INTERNAL MEDICINE

## 2017-04-19 PROCEDURE — 86850 RBC ANTIBODY SCREEN: CPT | Performed by: INTERNAL MEDICINE

## 2017-04-19 PROCEDURE — 93306 TTE W/DOPPLER COMPLETE: CPT

## 2017-04-19 RX ORDER — ALBUTEROL SULFATE 90 UG/1
2 AEROSOL, METERED RESPIRATORY (INHALATION) EVERY 6 HOURS PRN
Qty: 1 INHALER | Refills: 1 | Status: SHIPPED | OUTPATIENT
Start: 2017-04-19 | End: 2017-05-17

## 2017-04-19 RX ORDER — LIDOCAINE 50 MG/G
1 PATCH TOPICAL
Qty: 15 PATCH | Refills: 0 | Status: ON HOLD | OUTPATIENT
Start: 2017-04-19 | End: 2017-05-05

## 2017-04-19 RX ORDER — PREDNISONE 20 MG/1
40 TABLET ORAL DAILY
Qty: 10 TABLET | Refills: 0 | Status: ON HOLD | OUTPATIENT
Start: 2017-04-19 | End: 2017-05-05

## 2017-04-19 RX ADMIN — FAMOTIDINE 10 MG: 20 TABLET, FILM COATED ORAL at 07:51

## 2017-04-19 RX ADMIN — NIFEDIPINE 60 MG: 60 TABLET, FILM COATED, EXTENDED RELEASE ORAL at 07:51

## 2017-04-19 RX ADMIN — NICOTINE 1 PATCH: 21 PATCH TRANSDERMAL at 03:54

## 2017-04-19 RX ADMIN — HYDROMORPHONE HYDROCHLORIDE 0.5 MG: 1 INJECTION, SOLUTION INTRAMUSCULAR; INTRAVENOUS; SUBCUTANEOUS at 07:50

## 2017-04-19 RX ADMIN — LORAZEPAM 0.5 MG: 2 INJECTION INTRAMUSCULAR; INTRAVENOUS at 07:51

## 2017-04-19 NOTE — PROGRESS NOTES
Nutrition Services    Patient Name:  Mandy Espino  YOB: 1990  MRN: [id number]  Admit Date:  4/17/2017        RD spoke with  about supplying pt with Nepro at home per pt request.  is to speak with Dr. Mota about writing a prescription for Nepro at home depending on insurance policy. RD to follow up with pt and . Consult RD PRN.      Electronically signed by:  Molly Day RD  04/19/17 8:28 AM

## 2017-04-19 NOTE — PROGRESS NOTES
"The patient called me to bedside, yelling at me \"what is going on\"?. She was outside standing and walking around the [place] without difficulty without facial grimace and appearing to have no pain. She continues to report subjective pain severe and she is demanding that her \"dilaudid increased\" and her \"ativan be 1mg every 2 hours\". She has continued to have constant drug seeking behavior with me and the nursing staff. I explained that her current medication dosing is already at high levels, to be on dialysis, and that it is not safe to increased the medication further. I educated her that continued drug seeking behavior means that medications should likely be lowered and further discontinued. I encouraged her to stay in hospital overnight to discuss with Dr Parikh and Dr Mota further. She continued to state she was going to sign out against medical advice, but changed her mind and she agreed to stay. I offered lidoderm patch, which she accepted. She was also advised to stop throwing crayons in her room, while angry.   "

## 2017-04-19 NOTE — DISCHARGE INSTR - OTHER ORDERS
If you have any questions about your recovery, please call [phone]. A registered nurse is available 24 hours a day 7 days a week to assist you.

## 2017-04-19 NOTE — NURSING NOTE
Pt   Asking  For  Pain med   Notified  Dr bell  No  New  Orders  Pt  Refusing   Dialysis   Unless  She  Gets  Pain meds   Dr dooley  And  Dr bell  Informed  Pt   Will be  D/c   Pt   Cussing  Throwing  Stuff in  Room    Emotional  Support   Given

## 2017-04-19 NOTE — PROGRESS NOTES
"Nephrology Progress Note.    LOS: 0 days    Patient Care Team:  Neo Gresham DO as PCP - General (Family Medicine)    Chief Complaint:    Chief Complaint   Patient presents with   • Shortness of Breath       Subjective   Patient seen and examined this morning.  Events from last night noted.  Patient denies having any fevers chills.  No nausea or vomiting no abdominal pain.  Denies any chest pain shortness of breath cough or sputum production.  There is no significant edema.   Patient also denies having new onset weakness of numbness of either extremity. She has been walking out of the hospital and smoking without any problem. She has drug seeking behavior and noncompliance with medicine but not dialysis.      Review of Systems:    The pertinent  ROS was done and it is noted above, rest  was negative.    Objective     Vital Signs  /82 (BP Location: Right arm, Patient Position: Lying)  Pulse 86  Temp 98.6 °F (37 °C) (Oral)   Resp 19  Ht 63\" (160 cm)  Wt 96 lb 5.5 oz (43.7 kg)  SpO2 98%  BMI 17.07 kg/m2           Intake/Output Summary (Last 24 hours) at 04/19/17 0748  Last data filed at 04/18/17 1824   Gross per 24 hour   Intake             1200 ml   Output             2000 ml   Net             -800 ml       Physical Exam:    General Appearance: alert, oriented x 3, no acute distress,   HEENT: pupils round and reactive to light, oral mucosa dry, extra occular movements intact.  Neck: supple, no JVD, trachea midline  Lungs: Clear to Auscultation, unlabored breathing effort  Heart: RRR, normal S1 and S2, no S3, no rub  Abdomen: soft, non-tender, no palpable bladder, present bowel sounds to auscultation  Extremities: no edema, cyanosis or clubbing.   Neuro: normal speech and mental status, grossly non focal.     Results Review:      Results from last 7 days  Lab Units 04/18/17  0538 04/17/17  2136 04/17/17  0003   SODIUM mmol/L 136* 137 137   POTASSIUM mmol/L 4.0 5.0 5.1   CHLORIDE mmol/L 97* 96* 95* "   TOTAL CO2 mmol/L 28.0 26.0 23.0*   BUN mg/dL 42* 35* 51*   CREATININE mg/dL 6.10* 5.00* 9.20*   CALCIUM mg/dL 7.2* 8.5 7.5*   BILIRUBIN mg/dL  --   --  0.8   ALK PHOS U/L  --   --  157*   ALT (SGPT) U/L  --   --  54   AST (SGOT) U/L  --   --  73*   GLUCOSE mg/dL 105* 85 150*       Estimated Creatinine Clearance: 9.6 mL/min (by C-G formula based on Cr of 6.1).      Results from last 7 days  Lab Units 04/18/17  0538   PHOSPHORUS mg/dL 4.9*               Results from last 7 days  Lab Units 04/18/17  0538 04/17/17  2136 04/17/17  0003   WBC 10*3/mm3 5.12 9.49 22.73*   HEMOGLOBIN g/dL 6.9* 8.0* 9.0*   PLATELETS 10*3/mm3 207 242 336         Results from last 7 days  Lab Units 04/18/17  0538   INR  1.59*         Imaging Results (last 24 hours)     ** No results found for the last 24 hours. **          clonazePAM 0.5 mg Oral Nightly   famotidine 10 mg Oral BID   lidocaine 1 patch Transdermal Q24H   nicotine 1 patch Transdermal Q24H   NIFEdipine XL 60 mg Oral Daily          Medication Review:   Current Facility-Administered Medications   Medication Dose Route Frequency Provider Last Rate Last Dose   • bisacodyl (DULCOLAX) suppository 10 mg  10 mg Rectal Daily PRN Devora Singh DO       • clonazePAM (KlonoPIN) tablet 0.5 mg  0.5 mg Oral Nightly Madhu Mota MD   0.5 mg at 04/18/17 2108   • famotidine (PEPCID) tablet 10 mg  10 mg Oral BID Devora Singh DO   10 mg at 04/18/17 1834   • HYDROmorphone (DILAUDID) injection 0.5 mg  0.5 mg Intravenous Q4H PRN Jose Parikh DO   0.5 mg at 04/18/17 2310   • hydrOXYzine (VISTARIL) capsule 25 mg  25 mg Oral 4x Daily PRN Devora Singh DO       • ipratropium-albuterol (DUO-NEB) nebulizer solution 3 mL  3 mL Nebulization Q4H PRN Devora Singh DO       • lidocaine (LIDODERM) 5 % 1 patch  1 patch Transdermal Q24H Devora Singh DO       • LORazepam (ATIVAN) injection 0.5 mg  0.5 mg Intravenous Q6H PRN Jose Parikh DO   0.5 mg at 04/18/17 6920   • nicotine (NICODERM CQ)  21 MG/24HR patch 1 patch  1 patch Transdermal Q24H Devora Singh, DO   1 patch at 04/19/17 0354   • NIFEdipine XL (PROCARDIA XL) 24 hr tablet 60 mg  60 mg Oral Daily Devora Singh, DO   60 mg at 04/18/17 0823   • ondansetron (ZOFRAN) tablet 4 mg  4 mg Oral Q6H PRN Devora Marieedee, DO        Or   • ondansetron ODT (ZOFRAN-ODT) disintegrating tablet 4 mg  4 mg Oral Q6H PRN Devora Marieedee, DO        Or   • ondansetron (ZOFRAN) injection 4 mg  4 mg Intravenous Q6H PRN Devora MIRACLE Gandee, DO   4 mg at 04/18/17 0824   • sodium chloride 0.9 % bolus 1,000 mL  1,000 mL Intravenous PRN Madhu Mota MD       • sodium chloride 0.9 % flush 1-10 mL  1-10 mL Intravenous PRN Devora MIRACLE Francisco, DO       • sodium chloride 0.9 % flush 10 mL  10 mL Intravenous PRN Triage Protocol Emergency, MD           Assessment/Plan     1. End-stage renal disease: She gets dialysis Monday Wednesday Friday, She is requesting to be transferred back to [facility] dialysis center under my care, I'll let her make the decision and let her discuss this with the  at the [facility] dialysis clinic. Today during the discussion she said she will be compliant and will try to get a primary care and will follow up with the primary care at a more regular basis. Dialysis being done again today.  2. Hepatitis C antibody test positive: History of hep C she has never actually followed up with the infectious disease and or GI for further evaluation and treatment long-standing history of noncompliance.  3. Anemia: Being transfused 1 unit today during dialysis, continue XAVI and will increase the dose to be more aggressive with the treatment.  4. Chest pain: It is being worked up pulmonary consultation is in progress. It is very localized pain.  5. Hemoptysis: She may need to further evaluation with bronchoscopy.  6. Severe anxiety with panic: I will leave her on Klonopin 0.5 mg at bedtime usually she does fairly well with when necessary dose of this medicine at night and  give her for a few days in a row.  7. Chronic pain: Local rub and or lidocaine patches may be helpful as well, rest of the pain management as per hospitalist service.  8. Elevated troponin: She will need further evaluation if there is a persistent problem.  9. Smoker: Continue counseling for stop smoking        Details were discussed with the patient as well as family in the room.  I will also discussed the assessment and plan with the hospitalist service.    Rest as ordered.    Madhu Mota MD  04/19/17  7:48 AM  Please note that portions of this note may have been completed with a voice recognition program. Efforts were made to edit the dictations, but occasionally words are mistranscribed.

## 2017-04-19 NOTE — PLAN OF CARE
Problem: Patient Care Overview (Adult)  Goal: Plan of Care Review  Outcome: Ongoing (interventions implemented as appropriate)  Goal: Adult Individualization and Mutuality  Outcome: Outcome(s) achieved Date Met:  04/19/17 04/19/17 0618   Individualization   Patient Specific Goals To decrease amount of pain medicine being used       Goal: Discharge Needs Assessment  Outcome: Ongoing (interventions implemented as appropriate)    04/19/17 0618   Discharge Needs Assessment   Concerns To Be Addressed basic needs concerns   Readmission Within The Last 30 Days no previous admission in last 30 days         Problem: Acute Coronary Syndrome (ACS) (Adult)  Goal: Signs and Symptoms of Listed Potential Problems Will be Absent or Manageable (Acute Coronary Syndrome)  Outcome: Ongoing (interventions implemented as appropriate)    04/19/17 0618   Acute Coronary Syndrome (ACS)   Problems Assessed (Acute Coronary Syndrome (ACS)) all   Problems Present (Acute Coronary Syndrome (ACS)) none

## 2017-04-19 NOTE — DISCHARGE SUMMARY
[facility]   DISCHARGE SUMMARY      Name:  Mandy Espino   Age:  26 y.o.  Sex:  female  :  1990  MRN:  [id number]   Visit Number:  [id number]  Primary Care Physician:  Neo Gresham DO  Date of Discharge:  2017  Admission Date:  2017      Discharge Diagnosis:   Patient Active Problem List   Diagnosis   • Fahr's syndrome   • Tobacco use   • Headache   • Chronic kidney disease   • Hepatitis C antibody test positive   • Anemia   • Bipolar disorder   • Depression   • Esophageal reflux   • Hyperparathyroidism   • Hypocalcemia   • Coccydynia   • Chest pain   • Hemoptysis   • Severe anxiety with panic   • Chronic pain   • Elevated troponin       Presenting Problem:  Chest pain [R07.9]       Consults:   Consults     Date and Time Order Name Status Description    2017 1829 Inpatient Consult to Pulmonology      2017 0213 Inpatient Consult to Pulmonology Completed     2017 0125 Inpatient Consult to Nephrology Completed     2017 2345 IP General Consult (Use specialty-specific consult if known)            Procedures Performed:  NA         History of Presenting Illness:  The patient is a 26 year old  lady well known from many former hospital admissions, who presented to the ER for chest pain. She was just discharged from  hospital last night after receiving hemodialysis. Yesterday, she presented with bilateral pulmonary infiltrates and fluid overload with respiratory distress, transferred to  ICU for admission with no bed availability here. She received dialysis and then was discharged home. She received dialysis at [facility] and reportedly had not missed appointments lately. She is requesting ativan that she received a day prior to admission although she was focused on her pain in her chest.        She reported soon after she returned home from  hospital discharge, her chest pain and shortness of breath recurred and she presented to ER again  "here for evaluation. She is panting, moaning, with rapid respirations. After reassurance, she improves. She denies using any pain or anxiety medications in many months and denied street drugs or alcohol. She is focused on treating her panic attacks as well as severe right lower posterior rib cage pain, that occurs with deep inspiration, pleuritic, \"that feels like a knife\" and reported as 10/10. She feels like this is the worst pain in her right rib that she has ever had. She does report history of rib fractures and history of cardiopulmonary arrest, requiring chest compressions and intubation and placement on ventilator.      Since discharge, she denies fever, chills, nausea, vomiting, abdominal pain, edema, diaphoresis. She feels her shortness of breath is progressively returning. She coughs up clear sputum with trace blood streaks and she complains of the bad taste in her mouth.      In the ER, elevated d dimer noted. Repeat chest xray shows improvement of pulmonary edema. WBC normalized. Troponin mildly elevated. For pain, she received fentanyl x 2 without sedation and reportedly without pain control. Blood pressure elevated as well at 200/123, improved after pain medication and she was started on Labetalol. CT PE protocol performed and is negative. Dr Mota was called for consultation. The hospitalist service was called for admission for further evaluation of chest pain and post CT PE protocol she will need hemodialysis. Dr Mota notified from ER.     Hospital Course:  Patient was admitted to the medicine service for monitoring of chest pain.  Patient reported persistent cough which was initially yellow and then progressed to blood streaked sputum.  She was also having chest pains in her right lower lobe region which was not reproducible to palpation.  She apparently had these symptoms for several days even while being admitted at [facility].  Patient states the other hospital did not address her pain.  "     Cardiac causes of chest pain were ruled out.  She appears to have pleuritic pain possibly due to COPD, smoking 2 PPD, and a history of chest trauma following CPR.  Fluid removal appeared to help with symptoms however patient continued to ask for pain medications and anxiety medications.  She was eventually found in the parking lot self-administering street drugs.  She was ambulating and functioning fairly normal except when she was seen by physicians at which time she would act up in pain and beg for medications.  Patient has drug seeking behaviors. Pain medications and anxiety medications will not be given upon discharge given her behaviors. Acute causes of chest pain were ruled out and patient was stable to discharge home.  Lidoderm patch was presrcribed for musculoskeletal chest pains.    She may follow up with dialysis with [facility] but will need to make her own appointment.  Contact was provided.      Vital Signs:  Heart Rate:  [86] 86  Resp:  [19] 19  BP: (139)/(82) 139/82    Physical Exam:  Physical Exam   Constitutional: She is oriented to person, place, and time. She appears well-developed and well-nourished.   HENT:   Head: Normocephalic and atraumatic.   Eyes: Conjunctivae are normal.   Pulmonary/Chest: Effort normal. She has wheezes (bibasilar).   Musculoskeletal: Normal range of motion.   Neurological: She is alert and oriented to person, place, and time.   Psychiatric: She has a normal mood and affect. Her behavior is normal.   Nursing note and vitals reviewed.      Pertinent Lab Results:       Results from last 7 days  Lab Units 04/19/17  0801 04/18/17  0538 04/17/17  2136 04/17/17  0003   SODIUM mmol/L 138 136* 137 137   POTASSIUM mmol/L 4.4 4.0 5.0 5.1   CHLORIDE mmol/L 100 97* 96* 95*   TOTAL CO2 mmol/L 26.0 28.0 26.0 23.0*   BUN mg/dL 37* 42* 35* 51*   CREATININE mg/dL 6.00* 6.10* 5.00* 9.20*   CALCIUM mg/dL 7.8* 7.2* 8.5 7.5*   BILIRUBIN mg/dL  --   --   --  0.8   ALK PHOS U/L  --   --   --  157*    ALT (SGPT) U/L  --   --   --  54   AST (SGOT) U/L  --   --   --  73*   GLUCOSE mg/dL 90 105* 85 150*       Results from last 7 days  Lab Units 04/18/17  0538 04/17/17  2136 04/17/17  0003   WBC 10*3/mm3 5.12 9.49 22.73*   HEMOGLOBIN g/dL 6.9* 8.0* 9.0*   HEMATOCRIT % 21.6* 24.9* 29.1*   PLATELETS 10*3/mm3 207 242 336       Results from last 7 days  Lab Units 04/18/17  0538   INR  1.59*     Blood Culture   Date Value Ref Range Status   04/17/2017 No growth at 2 days  Preliminary   04/17/2017 No growth at 2 days  Preliminary         Pertinent Radiology Results:  Imaging Results (all)     Procedure Component Value Units Date/Time    CT Chest Pulmonary Embolism With Contrast [[id number]] Collected:  04/18/17 0745     Updated:  04/18/17 0748    Narrative:       PROCEDURE: CT CHEST PULMONARY EMBOLISM W CONTRAST-     HISTORY: CP / tachyc; R07.9-Chest pain, unspecified     COMPARISON: None .     TECHNIQUE: Multiple axial CT images were obtained from the thoracic  inlet through the upper abdomen following the administration of Isovue  300 per the CT PE protocol. Coronal and oblique 3D MIP images were  reconstructed from the original axial data set.      FINDINGS: There are no filling defects within the pulmonary arteries to  suggest an embolus. The thoracic aorta is normal in caliber with no  evidence of dissection. The heart is enlarged. There are no pleural or  pericardial effusions. There is no adenopathy. Lung windows reveal  patchy bilateral groundglass opacities. The visualized upper abdomen is  unremarkable. Bone windows reveal no acute osseous abnormalities.       Impression:       1. No evidence of pulmonary embolus or dissection.   2. Cardiomegaly and patchy bilateral groundglass opacities with the  differential including pulmonary edema and a pneumonia             175.15 mGy.cm          This study was performed with techniques to keep radiation doses as low  as reasonably achievable (ALARA). Individualized dose  reduction  techniques using automated exposure control or adjustment of mA and/or  kV according to the patient size were employed.      This report was finalized on 4/18/2017 7:46 AM by Kari Jack M.D..    XR Chest 2 View [[id number]] Collected:  04/18/17 0746     Updated:  04/18/17 0748    Narrative:       PROCEDURE: XR CHEST 2 VW-        HISTORY: R chest pain     COMPARISON: None.     FINDINGS: The heart is normal in size. The mediastinum is unremarkable.  The lungs are clear. There is no pneumothorax. There are no acute  osseous abnormalities.           Impression:       No acute cardiopulmonary process.           This report was finalized on 4/18/2017 7:46 AM by Kari Jack M.D..    XR Chest 2 View [[id number]] Collected:  04/19/17 0909     Updated:  04/19/17 0912    Narrative:       PROCEDURE: XR CHEST 2 VW-        HISTORY: SOB; R07.9-Chest pain, unspecified     COMPARISON: April 17, 2017.     FINDINGS: The heart is enlarged. The mediastinum is unremarkable. The  previously described groundglass opacities on the recent CT of the chest  are not appreciated radiographically. There is no pneumothorax. There  are no acute osseous abnormalities.           Impression:       Stable cardiomegaly.           This report was finalized on 4/19/2017 9:09 AM by Kari Jack M.D..          Condition on Discharge:    fair    Code status during the hospital stay:  Full    Discharge Disposition:  Home or Self Carefair    Discharge Medication:   Mandy Espino   Home Medication Instructions XOCHILT:[id number]    Printed on:04/19/17 9081   Medication Information                      albuterol (PROVENTIL HFA;VENTOLIN HFA) 108 (90 BASE) MCG/ACT inhaler  Inhale 2 puffs Every 6 (Six) Hours As Needed for Wheezing.             lidocaine (LIDODERM) 5 %  Place 1 patch on the skin Daily. Remove & Discard patch within 12 hours or as directed by MD             predniSONE (DELTASONE) 20 MG tablet  Take 2 tablets by  mouth Daily.                 Discharge Diet:   renal    Activity at Discharge:   as tolerated    Follow-up Appointments:  Future Appointments  Date Time Provider Department Center   4/25/2017 2:45 PM Yuri Witt MD [facility] None         Test Results Pending at Discharge:  none       Jose Pairkh DO  04/19/17  2:45 PM    Time: Discharge 40 min

## 2017-04-22 LAB
BACTERIA SPEC AEROBE CULT: NORMAL
BACTERIA SPEC AEROBE CULT: NORMAL

## 2017-04-24 LAB
ABO + RH BLD: NORMAL
ABO + RH BLD: NORMAL
BH BB BLOOD EXPIRATION DATE: NORMAL
BH BB BLOOD EXPIRATION DATE: NORMAL
BH BB BLOOD TYPE BARCODE: 7300
BH BB BLOOD TYPE BARCODE: 7300
BH BB DISPENSE STATUS: NORMAL
BH BB DISPENSE STATUS: NORMAL
BH BB PRODUCT CODE: NORMAL
BH BB PRODUCT CODE: NORMAL
BH BB UNIT NUMBER: NORMAL
BH BB UNIT NUMBER: NORMAL
CROSSMATCH INTERPRETATION: NORMAL
CROSSMATCH INTERPRETATION: NORMAL
UNIT  ABO: NORMAL
UNIT  ABO: NORMAL
UNIT  RH: NORMAL
UNIT  RH: NORMAL

## 2017-05-02 ENCOUNTER — APPOINTMENT (OUTPATIENT)
Dept: CT IMAGING | Facility: HOSPITAL | Age: 27
End: 2017-05-02

## 2017-05-02 ENCOUNTER — HOSPITAL ENCOUNTER (INPATIENT)
Facility: HOSPITAL | Age: 27
LOS: 4 days | Discharge: HOME | End: 2017-05-06
Attending: EMERGENCY MEDICINE | Admitting: HOSPITALIST

## 2017-05-02 DIAGNOSIS — J18.9 PNEUMONIA OF RIGHT LUNG DUE TO INFECTIOUS ORGANISM, UNSPECIFIED PART OF LUNG: Primary | ICD-10-CM

## 2017-05-02 DIAGNOSIS — Z72.0 TOBACCO ABUSE: ICD-10-CM

## 2017-05-02 PROBLEM — J44.9 COPD (CHRONIC OBSTRUCTIVE PULMONARY DISEASE) (HCC): Status: ACTIVE | Noted: 2017-05-02

## 2017-05-02 PROBLEM — D72.829 LEUKOCYTOSIS: Status: ACTIVE | Noted: 2017-05-02

## 2017-05-02 PROBLEM — M62.82 NON-TRAUMATIC RHABDOMYOLYSIS: Status: ACTIVE | Noted: 2017-05-02

## 2017-05-02 LAB
ALBUMIN SERPL-MCNC: 3.8 G/DL (ref 3.5–5)
ALBUMIN/GLOB SERPL: 1.2 G/DL (ref 1–2)
ALP SERPL-CCNC: 89 U/L (ref 38–126)
ALT SERPL W P-5'-P-CCNC: 62 U/L (ref 13–69)
ANION GAP SERPL CALCULATED.3IONS-SCNC: 18.2 MMOL/L
AST SERPL-CCNC: 108 U/L (ref 15–46)
BASOPHILS # BLD AUTO: 0.03 10*3/MM3 (ref 0–0.2)
BASOPHILS NFR BLD AUTO: 0.2 % (ref 0–2.5)
BILIRUB SERPL-MCNC: 0.6 MG/DL (ref 0.2–1.3)
BUN BLD-MCNC: 35 MG/DL (ref 7–20)
BUN/CREAT SERPL: 5.5 (ref 7.1–23.5)
CALCIUM SPEC-SCNC: 8.2 MG/DL (ref 8.4–10.2)
CHLORIDE SERPL-SCNC: 93 MMOL/L (ref 98–107)
CK MB SERPL-CCNC: 35.8 NG/ML (ref 0.2–2.4)
CK MB SERPL-RTO: 2.6 % (ref 0–2)
CK SERPL-CCNC: 1374 U/L (ref 30–170)
CO2 SERPL-SCNC: 26 MMOL/L (ref 26–30)
CREAT BLD-MCNC: 6.4 MG/DL (ref 0.6–1.3)
D-LACTATE SERPL-SCNC: 1.1 MMOL/L (ref 0.5–2)
DEPRECATED RDW RBC AUTO: 60.3 FL (ref 37–54)
EOSINOPHIL # BLD AUTO: 0.04 10*3/MM3 (ref 0–0.7)
EOSINOPHIL NFR BLD AUTO: 0.3 % (ref 0–7)
ERYTHROCYTE [DISTWIDTH] IN BLOOD BY AUTOMATED COUNT: 18 % (ref 11.5–14.5)
FLUAV AG NPH QL: NEGATIVE
FLUBV AG NPH QL IA: NEGATIVE
GFR SERPL CREATININE-BSD FRML MDRD: 8 ML/MIN/1.73
GLOBULIN UR ELPH-MCNC: 3.2 GM/DL
GLUCOSE BLD-MCNC: 107 MG/DL (ref 74–98)
HCG SERPL QL: NEGATIVE
HCT VFR BLD AUTO: 27.7 % (ref 37–47)
HGB BLD-MCNC: 9.2 G/DL (ref 12–16)
IMM GRANULOCYTES # BLD: 0.14 10*3/MM3 (ref 0–0.06)
IMM GRANULOCYTES NFR BLD: 1 % (ref 0–0.6)
LYMPHOCYTES # BLD AUTO: 0.74 10*3/MM3 (ref 0.6–3.4)
LYMPHOCYTES NFR BLD AUTO: 5.1 % (ref 10–50)
MAGNESIUM SERPL-MCNC: 1.8 MG/DL (ref 1.6–2.3)
MCH RBC QN AUTO: 30.4 PG (ref 27–31)
MCHC RBC AUTO-ENTMCNC: 33.2 G/DL (ref 30–37)
MCV RBC AUTO: 91.4 FL (ref 81–99)
MONOCYTES # BLD AUTO: 1.24 10*3/MM3 (ref 0–0.9)
MONOCYTES NFR BLD AUTO: 8.6 % (ref 0–12)
NEUTROPHILS # BLD AUTO: 12.28 10*3/MM3 (ref 2–6.9)
NEUTROPHILS NFR BLD AUTO: 84.8 % (ref 37–80)
NRBC BLD MANUAL-RTO: 0 /100 WBC (ref 0–0)
PLATELET # BLD AUTO: 286 10*3/MM3 (ref 130–400)
PMV BLD AUTO: 9.5 FL (ref 6–12)
POTASSIUM BLD-SCNC: 4.2 MMOL/L (ref 3.5–5.1)
PROT SERPL-MCNC: 7 G/DL (ref 6.3–8.2)
RBC # BLD AUTO: 3.03 10*6/MM3 (ref 4.2–5.4)
SODIUM BLD-SCNC: 133 MMOL/L (ref 137–145)
TROPONIN I SERPL-MCNC: 0.13 NG/ML (ref 0–0.03)
WBC NRBC COR # BLD: 14.47 10*3/MM3 (ref 4.8–10.8)

## 2017-05-02 PROCEDURE — 85025 COMPLETE CBC W/AUTO DIFF WBC: CPT | Performed by: EMERGENCY MEDICINE

## 2017-05-02 PROCEDURE — 82553 CREATINE MB FRACTION: CPT | Performed by: EMERGENCY MEDICINE

## 2017-05-02 PROCEDURE — 94640 AIRWAY INHALATION TREATMENT: CPT

## 2017-05-02 PROCEDURE — 82550 ASSAY OF CK (CPK): CPT | Performed by: EMERGENCY MEDICINE

## 2017-05-02 PROCEDURE — 94799 UNLISTED PULMONARY SVC/PX: CPT

## 2017-05-02 PROCEDURE — 83605 ASSAY OF LACTIC ACID: CPT | Performed by: EMERGENCY MEDICINE

## 2017-05-02 PROCEDURE — 25010000002 PIPERACILLIN SOD-TAZOBACTAM PER 1 G: Performed by: EMERGENCY MEDICINE

## 2017-05-02 PROCEDURE — 84703 CHORIONIC GONADOTROPIN ASSAY: CPT | Performed by: EMERGENCY MEDICINE

## 2017-05-02 PROCEDURE — 25010000002 PIPERACILLIN SOD-TAZOBACTAM PER 1 G: Performed by: HOSPITALIST

## 2017-05-02 PROCEDURE — 80053 COMPREHEN METABOLIC PANEL: CPT | Performed by: EMERGENCY MEDICINE

## 2017-05-02 PROCEDURE — 94644 CONT INHLJ TX 1ST HOUR: CPT

## 2017-05-02 PROCEDURE — 87804 INFLUENZA ASSAY W/OPTIC: CPT | Performed by: EMERGENCY MEDICINE

## 2017-05-02 PROCEDURE — 87081 CULTURE SCREEN ONLY: CPT | Performed by: HOSPITALIST

## 2017-05-02 PROCEDURE — 84484 ASSAY OF TROPONIN QUANT: CPT | Performed by: EMERGENCY MEDICINE

## 2017-05-02 PROCEDURE — 99222 1ST HOSP IP/OBS MODERATE 55: CPT | Performed by: HOSPITALIST

## 2017-05-02 PROCEDURE — 25010000002 VANCOMYCIN PER 500 MG: Performed by: EMERGENCY MEDICINE

## 2017-05-02 PROCEDURE — 83735 ASSAY OF MAGNESIUM: CPT | Performed by: EMERGENCY MEDICINE

## 2017-05-02 PROCEDURE — 99285 EMERGENCY DEPT VISIT HI MDM: CPT

## 2017-05-02 PROCEDURE — 87040 BLOOD CULTURE FOR BACTERIA: CPT | Performed by: EMERGENCY MEDICINE

## 2017-05-02 PROCEDURE — 25010000002 MORPHINE PER 10 MG: Performed by: INTERNAL MEDICINE

## 2017-05-02 PROCEDURE — 71250 CT THORAX DX C-: CPT

## 2017-05-02 PROCEDURE — 93005 ELECTROCARDIOGRAM TRACING: CPT | Performed by: EMERGENCY MEDICINE

## 2017-05-02 RX ORDER — HEPARIN SODIUM 5000 [USP'U]/ML
5000 INJECTION, SOLUTION INTRAVENOUS; SUBCUTANEOUS EVERY 12 HOURS SCHEDULED
Status: DISCONTINUED | OUTPATIENT
Start: 2017-05-02 | End: 2017-05-06 | Stop reason: HOSPADM

## 2017-05-02 RX ORDER — IPRATROPIUM BROMIDE AND ALBUTEROL SULFATE 2.5; .5 MG/3ML; MG/3ML
6 SOLUTION RESPIRATORY (INHALATION) ONCE
Status: COMPLETED | OUTPATIENT
Start: 2017-05-02 | End: 2017-05-02

## 2017-05-02 RX ORDER — LISINOPRIL 10 MG/1
10 TABLET ORAL DAILY
Status: ON HOLD | COMMUNITY
End: 2017-05-05

## 2017-05-02 RX ORDER — MORPHINE SULFATE 30 MG/1
30 TABLET, FILM COATED, EXTENDED RELEASE ORAL EVERY 12 HOURS SCHEDULED
Status: DISCONTINUED | OUTPATIENT
Start: 2017-05-02 | End: 2017-05-03

## 2017-05-02 RX ORDER — MORPHINE SULFATE 30 MG/1
30 TABLET, FILM COATED, EXTENDED RELEASE ORAL EVERY 12 HOURS SCHEDULED
Status: DISCONTINUED | OUTPATIENT
Start: 2017-05-02 | End: 2017-05-02

## 2017-05-02 RX ORDER — MORPHINE SULFATE 2 MG/ML
2 INJECTION, SOLUTION INTRAMUSCULAR; INTRAVENOUS EVERY 4 HOURS PRN
Status: DISCONTINUED | OUTPATIENT
Start: 2017-05-02 | End: 2017-05-06 | Stop reason: HOSPADM

## 2017-05-02 RX ORDER — SODIUM CHLORIDE FOR INHALATION 3 %
4 VIAL, NEBULIZER (ML) INHALATION EVERY 6 HOURS
Status: DISPENSED | OUTPATIENT
Start: 2017-05-02 | End: 2017-05-04

## 2017-05-02 RX ORDER — BUDESONIDE 0.5 MG/2ML
0.5 INHALANT ORAL
Status: DISCONTINUED | OUTPATIENT
Start: 2017-05-02 | End: 2017-05-06 | Stop reason: HOSPADM

## 2017-05-02 RX ORDER — IPRATROPIUM BROMIDE AND ALBUTEROL SULFATE 2.5; .5 MG/3ML; MG/3ML
3 SOLUTION RESPIRATORY (INHALATION)
Status: DISCONTINUED | OUTPATIENT
Start: 2017-05-02 | End: 2017-05-06 | Stop reason: HOSPADM

## 2017-05-02 RX ORDER — SODIUM CHLORIDE 0.9 % (FLUSH) 0.9 %
1-10 SYRINGE (ML) INJECTION AS NEEDED
Status: DISCONTINUED | OUTPATIENT
Start: 2017-05-02 | End: 2017-05-06 | Stop reason: HOSPADM

## 2017-05-02 RX ADMIN — IPRATROPIUM BROMIDE AND ALBUTEROL SULFATE 6 ML: .5; 3 SOLUTION RESPIRATORY (INHALATION) at 11:26

## 2017-05-02 RX ADMIN — TAZOBACTAM SODIUM AND PIPERACILLIN SODIUM 2.25 G: 250; 2 INJECTION, SOLUTION INTRAVENOUS at 22:51

## 2017-05-02 RX ADMIN — MORPHINE SULFATE 2 MG: 2 INJECTION, SOLUTION INTRAMUSCULAR; INTRAVENOUS at 20:41

## 2017-05-02 RX ADMIN — IPRATROPIUM BROMIDE AND ALBUTEROL SULFATE 3 ML: .5; 3 SOLUTION RESPIRATORY (INHALATION) at 20:56

## 2017-05-02 RX ADMIN — VANCOMYCIN HYDROCHLORIDE 1000 MG: 1 INJECTION, POWDER, LYOPHILIZED, FOR SOLUTION INTRAVENOUS at 16:02

## 2017-05-02 RX ADMIN — SODIUM CHLORIDE SOLN NEBU 3% 4 ML: 3 NEBU SOLN at 20:56

## 2017-05-02 RX ADMIN — TAZOBACTAM SODIUM AND PIPERACILLIN SODIUM 3.38 G: 375; 3 INJECTION, SOLUTION INTRAVENOUS at 14:17

## 2017-05-02 RX ADMIN — MORPHINE SULFATE 30 MG: 30 TABLET, EXTENDED RELEASE ORAL at 17:16

## 2017-05-03 ENCOUNTER — APPOINTMENT (OUTPATIENT)
Dept: CARDIOLOGY | Facility: HOSPITAL | Age: 27
End: 2017-05-03
Attending: HOSPITALIST

## 2017-05-03 LAB
ABO GROUP BLD: NORMAL
ANION GAP SERPL CALCULATED.3IONS-SCNC: 18.3 MMOL/L
ANISOCYTOSIS BLD QL: NORMAL
BASOPHILS # BLD AUTO: 0.01 10*3/MM3 (ref 0–0.2)
BASOPHILS NFR BLD AUTO: 0.1 % (ref 0–2.5)
BH CV ECHO MEAS - % IVS THICK: 26.1 %
BH CV ECHO MEAS - % LVPW THICK: 53.8 %
BH CV ECHO MEAS - AO ACC SLOPE: 2628 CM/SEC^2
BH CV ECHO MEAS - AO ACC TIME: 0.08 SEC
BH CV ECHO MEAS - AO MAX PG (FULL): 14 MMHG
BH CV ECHO MEAS - AO MAX PG: 28.6 MMHG
BH CV ECHO MEAS - AO MEAN PG (FULL): 5.2 MMHG
BH CV ECHO MEAS - AO MEAN PG: 12.6 MMHG
BH CV ECHO MEAS - AO ROOT AREA (BSA CORRECTED): 1.8
BH CV ECHO MEAS - AO ROOT AREA: 5.1 CM^2
BH CV ECHO MEAS - AO ROOT DIAM: 2.5 CM
BH CV ECHO MEAS - AO V2 MAX: 267.4 CM/SEC
BH CV ECHO MEAS - AO V2 MEAN: 161.3 CM/SEC
BH CV ECHO MEAS - AO V2 VTI: 38.6 CM
BH CV ECHO MEAS - AVA(I,A): 1.7 CM^2
BH CV ECHO MEAS - AVA(I,D): 1.7 CM^2
BH CV ECHO MEAS - AVA(V,A): 1.7 CM^2
BH CV ECHO MEAS - AVA(V,D): 1.7 CM^2
BH CV ECHO MEAS - BSA(HAYCOCK): 1.4 M^2
BH CV ECHO MEAS - BSA: 1.4 M^2
BH CV ECHO MEAS - BZI_BMI: 19.8 KILOGRAMS/M^2
BH CV ECHO MEAS - BZI_METRIC_HEIGHT: 154.9 CM
BH CV ECHO MEAS - BZI_METRIC_WEIGHT: 47.6 KG
BH CV ECHO MEAS - CONTRAST EF 4CH: 64.7 ML/M^2
BH CV ECHO MEAS - EDV(CUBED): 157.2 ML
BH CV ECHO MEAS - EDV(MOD-SP4): 17 ML
BH CV ECHO MEAS - EDV(TEICH): 141.1 ML
BH CV ECHO MEAS - EF(CUBED): 75.1 %
BH CV ECHO MEAS - EF(MOD-SP4): 64.7 %
BH CV ECHO MEAS - EF(TEICH): 66.5 %
BH CV ECHO MEAS - ESV(CUBED): 39.2 ML
BH CV ECHO MEAS - ESV(MOD-SP4): 6 ML
BH CV ECHO MEAS - ESV(TEICH): 47.3 ML
BH CV ECHO MEAS - FS: 37.1 %
BH CV ECHO MEAS - IVS/LVPW: 0.88
BH CV ECHO MEAS - IVSD: 0.82 CM
BH CV ECHO MEAS - IVSS: 1 CM
BH CV ECHO MEAS - LA DIMENSION: 3.2 CM
BH CV ECHO MEAS - LA/AO: 1.3
BH CV ECHO MEAS - LV DIASTOLIC VOL/BSA (35-75): 11.8 ML/M^2
BH CV ECHO MEAS - LV MASS(C)D: 173.7 GRAMS
BH CV ECHO MEAS - LV MASS(C)DI: 120.9 GRAMS/M^2
BH CV ECHO MEAS - LV MASS(C)S: 135.6 GRAMS
BH CV ECHO MEAS - LV MASS(C)SI: 94.4 GRAMS/M^2
BH CV ECHO MEAS - LV MAX PG: 14.6 MMHG
BH CV ECHO MEAS - LV MEAN PG: 7.4 MMHG
BH CV ECHO MEAS - LV SYSTOLIC VOL/BSA (12-30): 4.2 ML/M^2
BH CV ECHO MEAS - LV V1 MAX: 191.3 CM/SEC
BH CV ECHO MEAS - LV V1 MEAN: 124.9 CM/SEC
BH CV ECHO MEAS - LV V1 VTI: 27.7 CM
BH CV ECHO MEAS - LVIDD: 5.4 CM
BH CV ECHO MEAS - LVIDS: 3.4 CM
BH CV ECHO MEAS - LVLD AP4: 4.9 CM
BH CV ECHO MEAS - LVLS AP4: 4.8 CM
BH CV ECHO MEAS - LVOT AREA (M): 2.3 CM^2
BH CV ECHO MEAS - LVOT AREA: 2.4 CM^2
BH CV ECHO MEAS - LVOT DIAM: 1.7 CM
BH CV ECHO MEAS - LVPWD: 0.93 CM
BH CV ECHO MEAS - LVPWS: 1.4 CM
BH CV ECHO MEAS - MV A MAX VEL: 89.9 CM/SEC
BH CV ECHO MEAS - MV DEC SLOPE: 506.7 CM/SEC^2
BH CV ECHO MEAS - MV E MAX VEL: 122.3 CM/SEC
BH CV ECHO MEAS - MV E/A: 1.4
BH CV ECHO MEAS - MV MAX PG: 5.9 MMHG
BH CV ECHO MEAS - MV MEAN PG: 2.9 MMHG
BH CV ECHO MEAS - MV P1/2T MAX VEL: 120.6 CM/SEC
BH CV ECHO MEAS - MV P1/2T: 69.7 MSEC
BH CV ECHO MEAS - MV V2 MAX: 121.2 CM/SEC
BH CV ECHO MEAS - MV V2 MEAN: 79.4 CM/SEC
BH CV ECHO MEAS - MV V2 VTI: 26.6 CM
BH CV ECHO MEAS - MVA P1/2T LCG: 1.8 CM^2
BH CV ECHO MEAS - MVA(P1/2T): 3.2 CM^2
BH CV ECHO MEAS - MVA(VTI): 2.5 CM^2
BH CV ECHO MEAS - PA ACC SLOPE: 1020 CM/SEC^2
BH CV ECHO MEAS - PA ACC TIME: 0.12 SEC
BH CV ECHO MEAS - PA MAX PG: 5.2 MMHG
BH CV ECHO MEAS - PA MEAN PG: 2.8 MMHG
BH CV ECHO MEAS - PA PR(ACCEL): 26.7 MMHG
BH CV ECHO MEAS - PA V2 MAX: 114 CM/SEC
BH CV ECHO MEAS - PA V2 MEAN: 77.4 CM/SEC
BH CV ECHO MEAS - PA V2 VTI: 22.6 CM
BH CV ECHO MEAS - SI(AO): 136 ML/M^2
BH CV ECHO MEAS - SI(CUBED): 82.2 ML/M^2
BH CV ECHO MEAS - SI(LVOT): 45.6 ML/M^2
BH CV ECHO MEAS - SI(MOD-SP4): 7.7 ML/M^2
BH CV ECHO MEAS - SI(TEICH): 65.3 ML/M^2
BH CV ECHO MEAS - SV(AO): 195.3 ML
BH CV ECHO MEAS - SV(CUBED): 118.1 ML
BH CV ECHO MEAS - SV(LVOT): 65.4 ML
BH CV ECHO MEAS - SV(MOD-SP4): 11 ML
BH CV ECHO MEAS - SV(TEICH): 93.9 ML
BH CV ECHO MEAS - TR MAX VEL: 213.4 CM/SEC
BLD GP AB SCN SERPL QL: NEGATIVE
BUN BLD-MCNC: 43 MG/DL (ref 7–20)
BUN/CREAT SERPL: 5.2 (ref 7.1–23.5)
CALCIUM SPEC-SCNC: 7 MG/DL (ref 8.4–10.2)
CHLORIDE SERPL-SCNC: 95 MMOL/L (ref 98–107)
CK MB SERPL-CCNC: 10.8 NG/ML (ref 0.2–2.4)
CK MB SERPL-RTO: 1.4 % (ref 0–2)
CK SERPL-CCNC: 777 U/L (ref 30–170)
CO2 SERPL-SCNC: 25 MMOL/L (ref 26–30)
CREAT BLD-MCNC: 8.2 MG/DL (ref 0.6–1.3)
DACRYOCYTES BLD QL SMEAR: NORMAL
DEPRECATED RDW RBC AUTO: 60.6 FL (ref 37–54)
DEPRECATED RDW RBC AUTO: 62.3 FL (ref 37–54)
ELLIPTOCYTES BLD QL SMEAR: NORMAL
EOSINOPHIL # BLD AUTO: 0.13 10*3/MM3 (ref 0–0.7)
EOSINOPHIL # BLD MANUAL: 0.26 10*3/MM3 (ref 0–0.7)
EOSINOPHIL NFR BLD AUTO: 1.5 % (ref 0–7)
EOSINOPHIL NFR BLD MANUAL: 3 % (ref 0–7)
ERYTHROCYTE [DISTWIDTH] IN BLOOD BY AUTOMATED COUNT: 17.8 % (ref 11.5–14.5)
ERYTHROCYTE [DISTWIDTH] IN BLOOD BY AUTOMATED COUNT: 18.1 % (ref 11.5–14.5)
GFR SERPL CREATININE-BSD FRML MDRD: 6 ML/MIN/1.73
GLUCOSE BLD-MCNC: 108 MG/DL (ref 74–98)
HCT VFR BLD AUTO: 22.5 % (ref 37–47)
HCT VFR BLD AUTO: 23.2 % (ref 37–47)
HGB BLD-MCNC: 7.3 G/DL (ref 12–16)
HGB BLD-MCNC: 7.3 G/DL (ref 12–16)
HYPOCHROMIA BLD QL: NORMAL
IMM GRANULOCYTES # BLD: 0.09 10*3/MM3 (ref 0–0.06)
IMM GRANULOCYTES NFR BLD: 1 % (ref 0–0.6)
LYMPHOCYTES # BLD AUTO: 0.74 10*3/MM3 (ref 0.6–3.4)
LYMPHOCYTES # BLD MANUAL: 1.03 10*3/MM3 (ref 0.6–3.4)
LYMPHOCYTES NFR BLD AUTO: 8.5 % (ref 10–50)
LYMPHOCYTES NFR BLD MANUAL: 12 % (ref 10–50)
LYMPHOCYTES NFR BLD MANUAL: 9 % (ref 0–12)
MCH RBC QN AUTO: 29.6 PG (ref 27–31)
MCH RBC QN AUTO: 29.8 PG (ref 27–31)
MCHC RBC AUTO-ENTMCNC: 31.5 G/DL (ref 30–37)
MCHC RBC AUTO-ENTMCNC: 32.4 G/DL (ref 30–37)
MCV RBC AUTO: 91.8 FL (ref 81–99)
MCV RBC AUTO: 93.9 FL (ref 81–99)
MONOCYTES # BLD AUTO: 0.77 10*3/MM3 (ref 0–0.9)
MONOCYTES # BLD AUTO: 1.08 10*3/MM3 (ref 0–0.9)
MONOCYTES NFR BLD AUTO: 12.4 % (ref 0–12)
NEUTROPHILS # BLD AUTO: 6.52 10*3/MM3 (ref 2–6.9)
NEUTROPHILS # BLD AUTO: 6.69 10*3/MM3 (ref 2–6.9)
NEUTROPHILS NFR BLD AUTO: 76.5 % (ref 37–80)
NEUTROPHILS NFR BLD MANUAL: 74 % (ref 37–80)
NEUTS BAND NFR BLD MANUAL: 2 % (ref 0–6)
NRBC BLD MANUAL-RTO: 0 /100 WBC (ref 0–0)
PLATELET # BLD AUTO: 236 10*3/MM3 (ref 130–400)
PLATELET # BLD AUTO: 241 10*3/MM3 (ref 130–400)
PMV BLD AUTO: 9.1 FL (ref 6–12)
PMV BLD AUTO: 9.7 FL (ref 6–12)
POIKILOCYTOSIS BLD QL SMEAR: NORMAL
POLYCHROMASIA BLD QL SMEAR: NORMAL
POTASSIUM BLD-SCNC: 4.3 MMOL/L (ref 3.5–5.1)
RBC # BLD AUTO: 2.45 10*6/MM3 (ref 4.2–5.4)
RBC # BLD AUTO: 2.47 10*6/MM3 (ref 4.2–5.4)
RH BLD: POSITIVE
SMALL PLATELETS BLD QL SMEAR: ADEQUATE
SODIUM BLD-SCNC: 134 MMOL/L (ref 137–145)
TROPONIN I SERPL-MCNC: 0.07 NG/ML (ref 0–0.03)
TSH SERPL DL<=0.05 MIU/L-ACNC: 4.36 MIU/ML (ref 0.47–4.68)
WBC MORPH BLD: NORMAL
WBC NRBC COR # BLD: 8.58 10*3/MM3 (ref 4.8–10.8)
WBC NRBC COR # BLD: 8.74 10*3/MM3 (ref 4.8–10.8)

## 2017-05-03 PROCEDURE — 86920 COMPATIBILITY TEST SPIN: CPT

## 2017-05-03 PROCEDURE — 25010000002 MORPHINE PER 10 MG: Performed by: INTERNAL MEDICINE

## 2017-05-03 PROCEDURE — 86900 BLOOD TYPING SEROLOGIC ABO: CPT | Performed by: INTERNAL MEDICINE

## 2017-05-03 PROCEDURE — 82553 CREATINE MB FRACTION: CPT | Performed by: HOSPITALIST

## 2017-05-03 PROCEDURE — 36591 DRAW BLOOD OFF VENOUS DEVICE: CPT

## 2017-05-03 PROCEDURE — 94799 UNLISTED PULMONARY SVC/PX: CPT

## 2017-05-03 PROCEDURE — 36430 TRANSFUSION BLD/BLD COMPNT: CPT

## 2017-05-03 PROCEDURE — 85025 COMPLETE CBC W/AUTO DIFF WBC: CPT | Performed by: HOSPITALIST

## 2017-05-03 PROCEDURE — 86901 BLOOD TYPING SEROLOGIC RH(D): CPT | Performed by: INTERNAL MEDICINE

## 2017-05-03 PROCEDURE — 87070 CULTURE OTHR SPECIMN AEROBIC: CPT | Performed by: INTERNAL MEDICINE

## 2017-05-03 PROCEDURE — 93306 TTE W/DOPPLER COMPLETE: CPT

## 2017-05-03 PROCEDURE — 5A1D60Z PERFORMANCE OF URINARY FILTRATION, MULTIPLE: ICD-10-PCS | Performed by: INTERNAL MEDICINE

## 2017-05-03 PROCEDURE — 82550 ASSAY OF CK (CPK): CPT | Performed by: HOSPITALIST

## 2017-05-03 PROCEDURE — P9016 RBC LEUKOCYTES REDUCED: HCPCS

## 2017-05-03 PROCEDURE — 99232 SBSQ HOSP IP/OBS MODERATE 35: CPT | Performed by: HOSPITALIST

## 2017-05-03 PROCEDURE — 84484 ASSAY OF TROPONIN QUANT: CPT | Performed by: HOSPITALIST

## 2017-05-03 PROCEDURE — 25010000002 PIPERACILLIN SOD-TAZOBACTAM PER 1 G: Performed by: HOSPITALIST

## 2017-05-03 PROCEDURE — 87205 SMEAR GRAM STAIN: CPT | Performed by: INTERNAL MEDICINE

## 2017-05-03 PROCEDURE — 85027 COMPLETE CBC AUTOMATED: CPT | Performed by: HOSPITALIST

## 2017-05-03 PROCEDURE — 25010000002 VANCOMYCIN PER 500 MG: Performed by: HOSPITALIST

## 2017-05-03 PROCEDURE — 80048 BASIC METABOLIC PNL TOTAL CA: CPT | Performed by: HOSPITALIST

## 2017-05-03 PROCEDURE — 99255 IP/OBS CONSLTJ NEW/EST HI 80: CPT | Performed by: INTERNAL MEDICINE

## 2017-05-03 PROCEDURE — 86900 BLOOD TYPING SEROLOGIC ABO: CPT

## 2017-05-03 PROCEDURE — 85007 BL SMEAR W/DIFF WBC COUNT: CPT | Performed by: HOSPITALIST

## 2017-05-03 PROCEDURE — 25010000002 HEPARIN (PORCINE) PER 1000 UNITS: Performed by: HOSPITALIST

## 2017-05-03 PROCEDURE — 90935 HEMODIALYSIS ONE EVALUATION: CPT

## 2017-05-03 PROCEDURE — 86850 RBC ANTIBODY SCREEN: CPT | Performed by: INTERNAL MEDICINE

## 2017-05-03 PROCEDURE — 84443 ASSAY THYROID STIM HORMONE: CPT | Performed by: HOSPITALIST

## 2017-05-03 RX ORDER — MORPHINE SULFATE 30 MG/1
30 TABLET, FILM COATED, EXTENDED RELEASE ORAL EVERY 12 HOURS
Status: DISCONTINUED | OUTPATIENT
Start: 2017-05-03 | End: 2017-05-06 | Stop reason: HOSPADM

## 2017-05-03 RX ORDER — CALCIUM CARBONATE 200(500)MG
3 TABLET,CHEWABLE ORAL 3 TIMES DAILY
Status: DISCONTINUED | OUTPATIENT
Start: 2017-05-03 | End: 2017-05-06 | Stop reason: HOSPADM

## 2017-05-03 RX ADMIN — MORPHINE SULFATE 2 MG: 2 INJECTION, SOLUTION INTRAMUSCULAR; INTRAVENOUS at 17:34

## 2017-05-03 RX ADMIN — MORPHINE SULFATE 30 MG: 30 TABLET, EXTENDED RELEASE ORAL at 06:42

## 2017-05-03 RX ADMIN — HEPARIN SODIUM 5000 UNITS: 5000 INJECTION, SOLUTION INTRAVENOUS; SUBCUTANEOUS at 09:16

## 2017-05-03 RX ADMIN — MORPHINE SULFATE 2 MG: 2 INJECTION, SOLUTION INTRAMUSCULAR; INTRAVENOUS at 13:30

## 2017-05-03 RX ADMIN — VANCOMYCIN HYDROCHLORIDE 750 MG: 750 INJECTION, SOLUTION INTRAVENOUS at 15:54

## 2017-05-03 RX ADMIN — TAZOBACTAM SODIUM AND PIPERACILLIN SODIUM 2.25 G: 250; 2 INJECTION, SOLUTION INTRAVENOUS at 06:36

## 2017-05-03 RX ADMIN — MORPHINE SULFATE 2 MG: 2 INJECTION, SOLUTION INTRAMUSCULAR; INTRAVENOUS at 21:19

## 2017-05-03 RX ADMIN — CALCIUM CARBONATE 3 TABLET: 500 TABLET, CHEWABLE ORAL at 15:54

## 2017-05-03 RX ADMIN — CALCIUM CARBONATE 3 TABLET: 500 TABLET, CHEWABLE ORAL at 21:18

## 2017-05-03 RX ADMIN — HEPARIN SODIUM 5000 UNITS: 5000 INJECTION, SOLUTION INTRAVENOUS; SUBCUTANEOUS at 21:18

## 2017-05-03 RX ADMIN — MORPHINE SULFATE 2 MG: 2 INJECTION, SOLUTION INTRAMUSCULAR; INTRAVENOUS at 09:16

## 2017-05-03 RX ADMIN — IPRATROPIUM BROMIDE AND ALBUTEROL SULFATE 3 ML: .5; 3 SOLUTION RESPIRATORY (INHALATION) at 07:22

## 2017-05-03 RX ADMIN — MORPHINE SULFATE 2 MG: 2 INJECTION, SOLUTION INTRAMUSCULAR; INTRAVENOUS at 01:50

## 2017-05-03 RX ADMIN — IPRATROPIUM BROMIDE AND ALBUTEROL SULFATE 3 ML: .5; 3 SOLUTION RESPIRATORY (INHALATION) at 13:03

## 2017-05-03 RX ADMIN — TAZOBACTAM SODIUM AND PIPERACILLIN SODIUM 2.25 G: 250; 2 INJECTION, SOLUTION INTRAVENOUS at 15:10

## 2017-05-03 RX ADMIN — MORPHINE SULFATE 30 MG: 30 TABLET, EXTENDED RELEASE ORAL at 17:33

## 2017-05-03 RX ADMIN — TAZOBACTAM SODIUM AND PIPERACILLIN SODIUM 2.25 G: 250; 2 INJECTION, SOLUTION INTRAVENOUS at 22:33

## 2017-05-03 RX ADMIN — BUDESONIDE 0.5 MG: 0.5 INHALANT RESPIRATORY (INHALATION) at 07:21

## 2017-05-04 LAB
ABO + RH BLD: NORMAL
ABO + RH BLD: NORMAL
ALBUMIN SERPL-MCNC: 3.3 G/DL (ref 3.5–5)
ANION GAP SERPL CALCULATED.3IONS-SCNC: 16.8 MMOL/L
BH BB BLOOD EXPIRATION DATE: NORMAL
BH BB BLOOD EXPIRATION DATE: NORMAL
BH BB BLOOD TYPE BARCODE: 7300
BH BB BLOOD TYPE BARCODE: 7300
BH BB DISPENSE STATUS: NORMAL
BH BB DISPENSE STATUS: NORMAL
BH BB PRODUCT CODE: NORMAL
BH BB PRODUCT CODE: NORMAL
BH BB UNIT NUMBER: NORMAL
BH BB UNIT NUMBER: NORMAL
BUN BLD-MCNC: 24 MG/DL (ref 7–20)
BUN/CREAT SERPL: 3.9 (ref 7.1–23.5)
CALCIUM SPEC-SCNC: 7.6 MG/DL (ref 8.4–10.2)
CHLORIDE SERPL-SCNC: 94 MMOL/L (ref 98–107)
CO2 SERPL-SCNC: 27 MMOL/L (ref 26–30)
CREAT BLD-MCNC: 6.1 MG/DL (ref 0.6–1.3)
CROSSMATCH INTERPRETATION: NORMAL
CROSSMATCH INTERPRETATION: NORMAL
DEPRECATED RDW RBC AUTO: 57.9 FL (ref 37–54)
ERYTHROCYTE [DISTWIDTH] IN BLOOD BY AUTOMATED COUNT: 18.4 % (ref 11.5–14.5)
GFR SERPL CREATININE-BSD FRML MDRD: 8 ML/MIN/1.73
GLUCOSE BLD-MCNC: 90 MG/DL (ref 74–98)
HCT VFR BLD AUTO: 30.5 % (ref 37–47)
HGB BLD-MCNC: 9.9 G/DL (ref 12–16)
MCH RBC QN AUTO: 28.4 PG (ref 27–31)
MCHC RBC AUTO-ENTMCNC: 32.5 G/DL (ref 30–37)
MCV RBC AUTO: 87.6 FL (ref 81–99)
MRSA SPEC QL CULT: NORMAL
PHOSPHATE SERPL-MCNC: 5.3 MG/DL (ref 2.5–4.5)
PLATELET # BLD AUTO: 214 10*3/MM3 (ref 130–400)
PMV BLD AUTO: 9.4 FL (ref 6–12)
POTASSIUM BLD-SCNC: 3.8 MMOL/L (ref 3.5–5.1)
RBC # BLD AUTO: 3.48 10*6/MM3 (ref 4.2–5.4)
SODIUM BLD-SCNC: 134 MMOL/L (ref 137–145)
UNIT  ABO: NORMAL
UNIT  ABO: NORMAL
UNIT  RH: NORMAL
UNIT  RH: NORMAL
WBC NRBC COR # BLD: 8.6 10*3/MM3 (ref 4.8–10.8)

## 2017-05-04 PROCEDURE — 25010000002 PIPERACILLIN SOD-TAZOBACTAM PER 1 G: Performed by: HOSPITALIST

## 2017-05-04 PROCEDURE — 99232 SBSQ HOSP IP/OBS MODERATE 35: CPT | Performed by: HOSPITALIST

## 2017-05-04 PROCEDURE — 25010000002 MORPHINE PER 10 MG: Performed by: HOSPITALIST

## 2017-05-04 PROCEDURE — 94799 UNLISTED PULMONARY SVC/PX: CPT

## 2017-05-04 PROCEDURE — 25010000002 HEPARIN (PORCINE) PER 1000 UNITS: Performed by: HOSPITALIST

## 2017-05-04 PROCEDURE — 25010000002 MORPHINE PER 10 MG: Performed by: INTERNAL MEDICINE

## 2017-05-04 PROCEDURE — 63510000001 EPOETIN ALFA PER 1000 UNITS: Performed by: INTERNAL MEDICINE

## 2017-05-04 PROCEDURE — 85027 COMPLETE CBC AUTOMATED: CPT | Performed by: HOSPITALIST

## 2017-05-04 PROCEDURE — 25010000002 KETOROLAC TROMETHAMINE PER 15 MG: Performed by: HOSPITALIST

## 2017-05-04 PROCEDURE — 80069 RENAL FUNCTION PANEL: CPT | Performed by: HOSPITALIST

## 2017-05-04 RX ORDER — NICOTINE 21 MG/24HR
1 PATCH, TRANSDERMAL 24 HOURS TRANSDERMAL EVERY 24 HOURS
Status: DISCONTINUED | OUTPATIENT
Start: 2017-05-04 | End: 2017-05-06 | Stop reason: HOSPADM

## 2017-05-04 RX ORDER — MORPHINE SULFATE 2 MG/ML
2 INJECTION, SOLUTION INTRAMUSCULAR; INTRAVENOUS ONCE
Status: COMPLETED | OUTPATIENT
Start: 2017-05-04 | End: 2017-05-04

## 2017-05-04 RX ORDER — MINOXIDIL 10 MG/1
10 TABLET ORAL 2 TIMES DAILY
COMMUNITY
End: 2017-05-17

## 2017-05-04 RX ORDER — GABAPENTIN 100 MG/1
100 CAPSULE ORAL 3 TIMES DAILY
Status: ON HOLD | COMMUNITY
End: 2017-05-05

## 2017-05-04 RX ORDER — CITALOPRAM 10 MG/1
10 TABLET ORAL DAILY
COMMUNITY
End: 2017-05-17

## 2017-05-04 RX ORDER — IPRATROPIUM BROMIDE AND ALBUTEROL SULFATE 2.5; .5 MG/3ML; MG/3ML
3 SOLUTION RESPIRATORY (INHALATION) EVERY 4 HOURS PRN
COMMUNITY
End: 2017-05-17

## 2017-05-04 RX ORDER — GABAPENTIN 800 MG/1
800 TABLET ORAL 3 TIMES DAILY
Status: ON HOLD | COMMUNITY
End: 2017-05-05

## 2017-05-04 RX ORDER — DEXTROMETHORPHAN HYDROBROMIDE AND PROMETHAZINE HYDROCHLORIDE 15; 6.25 MG/5ML; MG/5ML
5 SYRUP ORAL 4 TIMES DAILY PRN
COMMUNITY
End: 2017-05-17

## 2017-05-04 RX ORDER — GABAPENTIN 600 MG/1
600 TABLET ORAL 3 TIMES DAILY
COMMUNITY
End: 2017-05-17

## 2017-05-04 RX ORDER — NIFEDIPINE 30 MG/1
30 TABLET, EXTENDED RELEASE ORAL DAILY
Status: ON HOLD | COMMUNITY
End: 2017-05-05

## 2017-05-04 RX ORDER — CHLORDIAZEPOXIDE HYDROCHLORIDE 25 MG/1
25 CAPSULE, GELATIN COATED ORAL ONCE
Status: COMPLETED | OUTPATIENT
Start: 2017-05-04 | End: 2017-05-04

## 2017-05-04 RX ORDER — KETOROLAC TROMETHAMINE 30 MG/ML
30 INJECTION, SOLUTION INTRAMUSCULAR; INTRAVENOUS EVERY 6 HOURS PRN
Status: DISCONTINUED | OUTPATIENT
Start: 2017-05-04 | End: 2017-05-06 | Stop reason: HOSPADM

## 2017-05-04 RX ORDER — LISINOPRIL 20 MG/1
20 TABLET ORAL 2 TIMES DAILY
COMMUNITY
End: 2017-05-17

## 2017-05-04 RX ADMIN — SODIUM CHLORIDE SOLN NEBU 3% 4 ML: 3 NEBU SOLN at 01:52

## 2017-05-04 RX ADMIN — HEPARIN SODIUM 5000 UNITS: 5000 INJECTION, SOLUTION INTRAVENOUS; SUBCUTANEOUS at 20:29

## 2017-05-04 RX ADMIN — KETOROLAC TROMETHAMINE 30 MG: 30 INJECTION, SOLUTION INTRAMUSCULAR at 16:25

## 2017-05-04 RX ADMIN — MORPHINE SULFATE 2 MG: 2 INJECTION, SOLUTION INTRAMUSCULAR; INTRAVENOUS at 09:51

## 2017-05-04 RX ADMIN — BUDESONIDE 0.5 MG: 0.5 INHALANT RESPIRATORY (INHALATION) at 07:01

## 2017-05-04 RX ADMIN — IPRATROPIUM BROMIDE AND ALBUTEROL SULFATE 3 ML: .5; 3 SOLUTION RESPIRATORY (INHALATION) at 07:01

## 2017-05-04 RX ADMIN — ERYTHROPOIETIN 10000 UNITS: 10000 INJECTION, SOLUTION INTRAVENOUS; SUBCUTANEOUS at 09:58

## 2017-05-04 RX ADMIN — MORPHINE SULFATE 2 MG: 2 INJECTION, SOLUTION INTRAMUSCULAR; INTRAVENOUS at 20:29

## 2017-05-04 RX ADMIN — IPRATROPIUM BROMIDE AND ALBUTEROL SULFATE 3 ML: .5; 3 SOLUTION RESPIRATORY (INHALATION) at 19:08

## 2017-05-04 RX ADMIN — MORPHINE SULFATE 2 MG: 2 INJECTION, SOLUTION INTRAMUSCULAR; INTRAVENOUS at 01:50

## 2017-05-04 RX ADMIN — BUDESONIDE 0.5 MG: 0.5 INHALANT RESPIRATORY (INHALATION) at 19:08

## 2017-05-04 RX ADMIN — TAZOBACTAM SODIUM AND PIPERACILLIN SODIUM 2.25 G: 250; 2 INJECTION, SOLUTION INTRAVENOUS at 18:31

## 2017-05-04 RX ADMIN — HEPARIN SODIUM 5000 UNITS: 5000 INJECTION, SOLUTION INTRAVENOUS; SUBCUTANEOUS at 09:51

## 2017-05-04 RX ADMIN — MORPHINE SULFATE 2 MG: 2 INJECTION, SOLUTION INTRAMUSCULAR; INTRAVENOUS at 05:44

## 2017-05-04 RX ADMIN — CALCIUM CARBONATE 3 TABLET: 500 TABLET, CHEWABLE ORAL at 09:51

## 2017-05-04 RX ADMIN — MORPHINE SULFATE 30 MG: 30 TABLET, EXTENDED RELEASE ORAL at 18:26

## 2017-05-04 RX ADMIN — TAZOBACTAM SODIUM AND PIPERACILLIN SODIUM 2.25 G: 250; 2 INJECTION, SOLUTION INTRAVENOUS at 23:26

## 2017-05-04 RX ADMIN — TAZOBACTAM SODIUM AND PIPERACILLIN SODIUM 2.25 G: 250; 2 INJECTION, SOLUTION INTRAVENOUS at 05:45

## 2017-05-04 RX ADMIN — IPRATROPIUM BROMIDE AND ALBUTEROL SULFATE 3 ML: .5; 3 SOLUTION RESPIRATORY (INHALATION) at 13:35

## 2017-05-04 RX ADMIN — MORPHINE SULFATE 2 MG: 2 INJECTION, SOLUTION INTRAMUSCULAR; INTRAVENOUS at 14:06

## 2017-05-04 RX ADMIN — NICOTINE 1 PATCH: 14 PATCH TRANSDERMAL at 11:51

## 2017-05-04 RX ADMIN — CHLORDIAZEPOXIDE HYDROCHLORIDE 25 MG: 25 CAPSULE ORAL at 16:36

## 2017-05-04 RX ADMIN — MORPHINE SULFATE 2 MG: 2 INJECTION, SOLUTION INTRAMUSCULAR; INTRAVENOUS at 16:20

## 2017-05-04 RX ADMIN — MORPHINE SULFATE 2 MG: 2 INJECTION, SOLUTION INTRAMUSCULAR; INTRAVENOUS at 16:25

## 2017-05-04 RX ADMIN — CALCIUM CARBONATE 3 TABLET: 500 TABLET, CHEWABLE ORAL at 16:24

## 2017-05-04 RX ADMIN — MORPHINE SULFATE 30 MG: 30 TABLET, EXTENDED RELEASE ORAL at 05:43

## 2017-05-04 RX ADMIN — CALCIUM CARBONATE 3 TABLET: 500 TABLET, CHEWABLE ORAL at 20:29

## 2017-05-05 ENCOUNTER — APPOINTMENT (OUTPATIENT)
Dept: GENERAL RADIOLOGY | Facility: HOSPITAL | Age: 27
End: 2017-05-05

## 2017-05-05 ENCOUNTER — DOCUMENTATION (OUTPATIENT)
Dept: EMERGENCY DEPT | Facility: HOSPITAL | Age: 27
End: 2017-05-05

## 2017-05-05 LAB
ACINETOBACTER SCREEN CX: NO GROWTH
ALBUMIN SERPL-MCNC: 3.6 G/DL (ref 3.5–5)
ANION GAP SERPL CALCULATED.3IONS-SCNC: 19.7 MMOL/L
BACTERIA SPEC RESP CULT: NORMAL
BASOPHILS # BLD AUTO: 0.05 10*3/MM3 (ref 0–0.2)
BASOPHILS NFR BLD AUTO: 0.3 % (ref 0–2.5)
BUN BLD-MCNC: 31 MG/DL (ref 7–20)
BUN/CREAT SERPL: 4 (ref 7.1–23.5)
CALCIUM SPEC-SCNC: 8.9 MG/DL (ref 8.4–10.2)
CHLORIDE SERPL-SCNC: 90 MMOL/L (ref 98–107)
CO2 SERPL-SCNC: 26 MMOL/L (ref 26–30)
CREAT BLD-MCNC: 7.8 MG/DL (ref 0.6–1.3)
DEPRECATED RDW RBC AUTO: 54.2 FL (ref 37–54)
EOSINOPHIL # BLD AUTO: 0.2 10*3/MM3 (ref 0–0.7)
EOSINOPHIL NFR BLD AUTO: 1.3 % (ref 0–7)
ERYTHROCYTE [DISTWIDTH] IN BLOOD BY AUTOMATED COUNT: 17.5 % (ref 11.5–14.5)
GFR SERPL CREATININE-BSD FRML MDRD: 6 ML/MIN/1.73
GLUCOSE BLD-MCNC: 103 MG/DL (ref 74–98)
GRAM STN SPEC: NORMAL
HCT VFR BLD AUTO: 31.9 % (ref 37–47)
HGB BLD-MCNC: 10.6 G/DL (ref 12–16)
IMM GRANULOCYTES # BLD: 0.07 10*3/MM3 (ref 0–0.06)
IMM GRANULOCYTES NFR BLD: 0.5 % (ref 0–0.6)
LYMPHOCYTES # BLD AUTO: 0.98 10*3/MM3 (ref 0.6–3.4)
LYMPHOCYTES NFR BLD AUTO: 6.6 % (ref 10–50)
MCH RBC QN AUTO: 28.5 PG (ref 27–31)
MCHC RBC AUTO-ENTMCNC: 33.2 G/DL (ref 30–37)
MCV RBC AUTO: 85.8 FL (ref 81–99)
MONOCYTES # BLD AUTO: 1.02 10*3/MM3 (ref 0–0.9)
MONOCYTES NFR BLD AUTO: 6.8 % (ref 0–12)
NEUTROPHILS # BLD AUTO: 12.59 10*3/MM3 (ref 2–6.9)
NEUTROPHILS NFR BLD AUTO: 84.5 % (ref 37–80)
NRBC BLD MANUAL-RTO: 0 /100 WBC (ref 0–0)
PHOSPHATE SERPL-MCNC: 5.5 MG/DL (ref 2.5–4.5)
PLATELET # BLD AUTO: 228 10*3/MM3 (ref 130–400)
PMV BLD AUTO: 9.7 FL (ref 6–12)
POTASSIUM BLD-SCNC: 3.7 MMOL/L (ref 3.5–5.1)
RBC # BLD AUTO: 3.72 10*6/MM3 (ref 4.2–5.4)
SODIUM BLD-SCNC: 132 MMOL/L (ref 137–145)
VANCOMYCIN SERPL-MCNC: 26.51 MCG/ML (ref 5–40)
VRE SPEC QL CULT: NORMAL
WBC NRBC COR # BLD: 14.91 10*3/MM3 (ref 4.8–10.8)

## 2017-05-05 PROCEDURE — 85025 COMPLETE CBC W/AUTO DIFF WBC: CPT | Performed by: HOSPITALIST

## 2017-05-05 PROCEDURE — 90935 HEMODIALYSIS ONE EVALUATION: CPT

## 2017-05-05 PROCEDURE — 80069 RENAL FUNCTION PANEL: CPT | Performed by: HOSPITALIST

## 2017-05-05 PROCEDURE — 71020 HC CHEST PA AND LATERAL: CPT

## 2017-05-05 PROCEDURE — 99231 SBSQ HOSP IP/OBS SF/LOW 25: CPT | Performed by: HOSPITALIST

## 2017-05-05 PROCEDURE — 94799 UNLISTED PULMONARY SVC/PX: CPT

## 2017-05-05 PROCEDURE — 80202 ASSAY OF VANCOMYCIN: CPT

## 2017-05-05 PROCEDURE — 25010000002 PIPERACILLIN SOD-TAZOBACTAM PER 1 G: Performed by: HOSPITALIST

## 2017-05-05 PROCEDURE — 25010000002 HEPARIN (PORCINE) PER 1000 UNITS: Performed by: HOSPITALIST

## 2017-05-05 PROCEDURE — 25010000002 HYDRALAZINE PER 20 MG: Performed by: INTERNAL MEDICINE

## 2017-05-05 PROCEDURE — 25010000002 MORPHINE PER 10 MG: Performed by: INTERNAL MEDICINE

## 2017-05-05 PROCEDURE — 25010000002 KETOROLAC TROMETHAMINE PER 15 MG: Performed by: HOSPITALIST

## 2017-05-05 RX ORDER — LANOLIN ALCOHOL/MO/W.PET/CERES
CREAM (GRAM) TOPICAL
Status: DISCONTINUED | OUTPATIENT
Start: 2017-05-05 | End: 2017-05-06 | Stop reason: HOSPADM

## 2017-05-05 RX ORDER — HYDRALAZINE HYDROCHLORIDE 20 MG/ML
10 INJECTION INTRAMUSCULAR; INTRAVENOUS EVERY 6 HOURS PRN
Status: DISCONTINUED | OUTPATIENT
Start: 2017-05-05 | End: 2017-05-06 | Stop reason: HOSPADM

## 2017-05-05 RX ORDER — AMMONIUM LACTATE 12 G/100G
LOTION TOPICAL 2 TIMES DAILY PRN
Status: DISCONTINUED | OUTPATIENT
Start: 2017-05-05 | End: 2017-05-06 | Stop reason: HOSPADM

## 2017-05-05 RX ORDER — METOPROLOL TARTRATE 50 MG/1
50 TABLET, FILM COATED ORAL ONCE
Status: COMPLETED | OUTPATIENT
Start: 2017-05-05 | End: 2017-05-05

## 2017-05-05 RX ADMIN — TAZOBACTAM SODIUM AND PIPERACILLIN SODIUM 2.25 G: 250; 2 INJECTION, SOLUTION INTRAVENOUS at 22:55

## 2017-05-05 RX ADMIN — BUDESONIDE 0.5 MG: 0.5 INHALANT RESPIRATORY (INHALATION) at 07:30

## 2017-05-05 RX ADMIN — BUDESONIDE 0.5 MG: 0.5 INHALANT RESPIRATORY (INHALATION) at 19:13

## 2017-05-05 RX ADMIN — MORPHINE SULFATE 2 MG: 2 INJECTION, SOLUTION INTRAMUSCULAR; INTRAVENOUS at 20:51

## 2017-05-05 RX ADMIN — MORPHINE SULFATE 30 MG: 30 TABLET, EXTENDED RELEASE ORAL at 17:16

## 2017-05-05 RX ADMIN — HEPARIN SODIUM 5000 UNITS: 5000 INJECTION, SOLUTION INTRAVENOUS; SUBCUTANEOUS at 20:51

## 2017-05-05 RX ADMIN — IPRATROPIUM BROMIDE AND ALBUTEROL SULFATE 3 ML: .5; 3 SOLUTION RESPIRATORY (INHALATION) at 07:30

## 2017-05-05 RX ADMIN — MORPHINE SULFATE 30 MG: 30 TABLET, EXTENDED RELEASE ORAL at 06:10

## 2017-05-05 RX ADMIN — METOPROLOL TARTRATE 25 MG: 25 TABLET ORAL at 07:11

## 2017-05-05 RX ADMIN — TAZOBACTAM SODIUM AND PIPERACILLIN SODIUM 2.25 G: 250; 2 INJECTION, SOLUTION INTRAVENOUS at 15:22

## 2017-05-05 RX ADMIN — HYDRALAZINE HYDROCHLORIDE 10 MG: 20 INJECTION INTRAMUSCULAR; INTRAVENOUS at 03:54

## 2017-05-05 RX ADMIN — MORPHINE SULFATE 2 MG: 2 INJECTION, SOLUTION INTRAMUSCULAR; INTRAVENOUS at 00:56

## 2017-05-05 RX ADMIN — CALCIUM CARBONATE 3 TABLET: 500 TABLET, CHEWABLE ORAL at 17:16

## 2017-05-05 RX ADMIN — IPRATROPIUM BROMIDE AND ALBUTEROL SULFATE 3 ML: .5; 3 SOLUTION RESPIRATORY (INHALATION) at 19:13

## 2017-05-05 RX ADMIN — NICOTINE 1 PATCH: 14 PATCH TRANSDERMAL at 13:00

## 2017-05-05 RX ADMIN — IPRATROPIUM BROMIDE AND ALBUTEROL SULFATE 3 ML: .5; 3 SOLUTION RESPIRATORY (INHALATION) at 16:41

## 2017-05-05 RX ADMIN — KETOROLAC TROMETHAMINE 30 MG: 30 INJECTION, SOLUTION INTRAMUSCULAR at 04:04

## 2017-05-05 RX ADMIN — METOPROLOL TARTRATE 50 MG: 50 TABLET ORAL at 14:35

## 2017-05-05 RX ADMIN — TAZOBACTAM SODIUM AND PIPERACILLIN SODIUM 2.25 G: 250; 2 INJECTION, SOLUTION INTRAVENOUS at 06:09

## 2017-05-05 RX ADMIN — MORPHINE SULFATE 2 MG: 2 INJECTION, SOLUTION INTRAMUSCULAR; INTRAVENOUS at 06:09

## 2017-05-05 RX ADMIN — HYDRALAZINE HYDROCHLORIDE 10 MG: 20 INJECTION INTRAMUSCULAR; INTRAVENOUS at 12:59

## 2017-05-05 RX ADMIN — MORPHINE SULFATE 2 MG: 2 INJECTION, SOLUTION INTRAMUSCULAR; INTRAVENOUS at 12:59

## 2017-05-06 VITALS
TEMPERATURE: 97.9 F | OXYGEN SATURATION: 98 % | WEIGHT: 101 LBS | RESPIRATION RATE: 18 BRPM | SYSTOLIC BLOOD PRESSURE: 141 MMHG | HEIGHT: 61 IN | DIASTOLIC BLOOD PRESSURE: 88 MMHG | BODY MASS INDEX: 19.07 KG/M2 | HEART RATE: 98 BPM

## 2017-05-06 PROCEDURE — 94799 UNLISTED PULMONARY SVC/PX: CPT

## 2017-05-06 PROCEDURE — 25010000002 PIPERACILLIN SOD-TAZOBACTAM PER 1 G: Performed by: HOSPITALIST

## 2017-05-06 PROCEDURE — 25010000002 MORPHINE PER 10 MG: Performed by: INTERNAL MEDICINE

## 2017-05-06 PROCEDURE — 99238 HOSP IP/OBS DSCHRG MGMT 30/<: CPT | Performed by: INTERNAL MEDICINE

## 2017-05-06 PROCEDURE — 25010000002 HYDRALAZINE PER 20 MG: Performed by: INTERNAL MEDICINE

## 2017-05-06 PROCEDURE — 25010000002 HEPARIN (PORCINE) PER 1000 UNITS: Performed by: HOSPITALIST

## 2017-05-06 RX ORDER — AMOXICILLIN AND CLAVULANATE POTASSIUM 875; 125 MG/1; MG/1
1 TABLET, FILM COATED ORAL 2 TIMES DAILY
Qty: 10 TABLET | Refills: 0 | Status: SHIPPED | OUTPATIENT
Start: 2017-05-06 | End: 2017-05-17

## 2017-05-06 RX ORDER — FOLIC ACID/VIT B COMPLEX AND C 0.8 MG
1 TABLET ORAL DAILY
Status: DISCONTINUED | OUTPATIENT
Start: 2017-05-06 | End: 2017-05-06 | Stop reason: HOSPADM

## 2017-05-06 RX ORDER — AMLODIPINE BESYLATE 5 MG/1
10 TABLET ORAL
Status: DISCONTINUED | OUTPATIENT
Start: 2017-05-06 | End: 2017-05-06 | Stop reason: HOSPADM

## 2017-05-06 RX ORDER — AMLODIPINE BESYLATE 10 MG/1
10 TABLET ORAL
Qty: 30 TABLET | Refills: 0 | Status: SHIPPED | OUTPATIENT
Start: 2017-05-06 | End: 2017-05-17

## 2017-05-06 RX ADMIN — MORPHINE SULFATE 2 MG: 2 INJECTION, SOLUTION INTRAMUSCULAR; INTRAVENOUS at 05:52

## 2017-05-06 RX ADMIN — HEPARIN SODIUM 5000 UNITS: 5000 INJECTION, SOLUTION INTRAVENOUS; SUBCUTANEOUS at 08:27

## 2017-05-06 RX ADMIN — CALCIUM CARBONATE 3 TABLET: 500 TABLET, CHEWABLE ORAL at 08:26

## 2017-05-06 RX ADMIN — BUDESONIDE 0.5 MG: 0.5 INHALANT RESPIRATORY (INHALATION) at 07:14

## 2017-05-06 RX ADMIN — MORPHINE SULFATE 2 MG: 2 INJECTION, SOLUTION INTRAMUSCULAR; INTRAVENOUS at 10:47

## 2017-05-06 RX ADMIN — MORPHINE SULFATE 30 MG: 30 TABLET, EXTENDED RELEASE ORAL at 05:52

## 2017-05-06 RX ADMIN — IPRATROPIUM BROMIDE AND ALBUTEROL SULFATE 3 ML: .5; 3 SOLUTION RESPIRATORY (INHALATION) at 07:14

## 2017-05-06 RX ADMIN — AMLODIPINE BESYLATE 10 MG: 5 TABLET ORAL at 10:46

## 2017-05-06 RX ADMIN — MORPHINE SULFATE 2 MG: 2 INJECTION, SOLUTION INTRAMUSCULAR; INTRAVENOUS at 01:07

## 2017-05-06 RX ADMIN — HYDRALAZINE HYDROCHLORIDE 10 MG: 20 INJECTION INTRAMUSCULAR; INTRAVENOUS at 05:52

## 2017-05-06 RX ADMIN — TAZOBACTAM SODIUM AND PIPERACILLIN SODIUM 2.25 G: 250; 2 INJECTION, SOLUTION INTRAVENOUS at 05:58

## 2017-05-07 LAB
BACTERIA SPEC AEROBE CULT: NORMAL
BACTERIA SPEC AEROBE CULT: NORMAL

## 2017-05-17 ENCOUNTER — OFFICE VISIT (OUTPATIENT)
Dept: OBSTETRICS AND GYNECOLOGY | Facility: CLINIC | Age: 27
End: 2017-05-17

## 2017-05-17 VITALS
DIASTOLIC BLOOD PRESSURE: 70 MMHG | HEIGHT: 62 IN | WEIGHT: 101 LBS | BODY MASS INDEX: 18.58 KG/M2 | SYSTOLIC BLOOD PRESSURE: 132 MMHG

## 2017-05-17 DIAGNOSIS — N76.0 ACUTE VAGINITIS: Primary | ICD-10-CM

## 2017-05-17 DIAGNOSIS — R10.2 PELVIC PAIN IN FEMALE: ICD-10-CM

## 2017-05-17 DIAGNOSIS — N91.2 AMENORRHEA: ICD-10-CM

## 2017-05-17 PROCEDURE — 99214 OFFICE O/P EST MOD 30 MIN: CPT | Performed by: OBSTETRICS & GYNECOLOGY

## 2017-05-18 LAB
ESTRADIOL SERPL-MCNC: 20.6 PG/ML
FSH SERPL-ACNC: 7.5 MIU/ML
HCG INTACT+B SERPL-ACNC: <2.39 MIU/ML
PROLACTIN SERPL-MCNC: 25.3 NG/ML (ref 4.8–23.3)
TSH SERPL DL<=0.005 MIU/L-ACNC: 2.02 MIU/ML (ref 0.47–4.68)

## 2017-05-22 LAB

## 2017-05-27 ENCOUNTER — HOSPITAL ENCOUNTER (EMERGENCY)
Facility: HOSPITAL | Age: 27
Discharge: HOME | End: 2017-05-27
Attending: EMERGENCY MEDICINE | Admitting: EMERGENCY MEDICINE

## 2017-05-27 ENCOUNTER — APPOINTMENT (OUTPATIENT)
Dept: GENERAL RADIOLOGY | Facility: HOSPITAL | Age: 27
End: 2017-05-27

## 2017-05-27 VITALS
OXYGEN SATURATION: 95 % | RESPIRATION RATE: 18 BRPM | HEART RATE: 87 BPM | HEIGHT: 61 IN | WEIGHT: 97 LBS | BODY MASS INDEX: 18.31 KG/M2 | SYSTOLIC BLOOD PRESSURE: 125 MMHG | TEMPERATURE: 98.8 F | DIASTOLIC BLOOD PRESSURE: 77 MMHG

## 2017-05-27 DIAGNOSIS — R07.1 CHEST PAIN ON BREATHING: Primary | ICD-10-CM

## 2017-05-27 LAB
ALBUMIN SERPL-MCNC: 3.9 G/DL (ref 3.5–5)
ALBUMIN/GLOB SERPL: 1.3 G/DL (ref 1–2)
ALP SERPL-CCNC: 104 U/L (ref 38–126)
ALT SERPL W P-5'-P-CCNC: 36 U/L (ref 13–69)
ANION GAP SERPL CALCULATED.3IONS-SCNC: 21.3 MMOL/L
AST SERPL-CCNC: 44 U/L (ref 15–46)
BASOPHILS # BLD AUTO: 0.06 10*3/MM3 (ref 0–0.2)
BASOPHILS NFR BLD AUTO: 1 % (ref 0–2.5)
BILIRUB SERPL-MCNC: 0.5 MG/DL (ref 0.2–1.3)
BUN BLD-MCNC: 29 MG/DL (ref 7–20)
BUN/CREAT SERPL: 4.7 (ref 7.1–23.5)
CALCIUM SPEC-SCNC: 8.1 MG/DL (ref 8.4–10.2)
CHLORIDE SERPL-SCNC: 92 MMOL/L (ref 98–107)
CO2 SERPL-SCNC: 28 MMOL/L (ref 26–30)
CREAT BLD-MCNC: 6.2 MG/DL (ref 0.6–1.3)
DEPRECATED RDW RBC AUTO: 61.8 FL (ref 37–54)
EOSINOPHIL # BLD AUTO: 0.22 10*3/MM3 (ref 0–0.7)
EOSINOPHIL NFR BLD AUTO: 3.6 % (ref 0–7)
ERYTHROCYTE [DISTWIDTH] IN BLOOD BY AUTOMATED COUNT: 19 % (ref 11.5–14.5)
GFR SERPL CREATININE-BSD FRML MDRD: 8 ML/MIN/1.73
GLOBULIN UR ELPH-MCNC: 2.9 GM/DL
GLUCOSE BLD-MCNC: 89 MG/DL (ref 74–98)
HCG SERPL QL: NEGATIVE
HCT VFR BLD AUTO: 44.1 % (ref 37–47)
HGB BLD-MCNC: 13.9 G/DL (ref 12–16)
HOLD SPECIMEN: NORMAL
HOLD SPECIMEN: NORMAL
IMM GRANULOCYTES # BLD: 0.06 10*3/MM3 (ref 0–0.06)
IMM GRANULOCYTES NFR BLD: 1 % (ref 0–0.6)
LIPASE SERPL-CCNC: 104 U/L (ref 23–300)
LYMPHOCYTES # BLD AUTO: 1.41 10*3/MM3 (ref 0.6–3.4)
LYMPHOCYTES NFR BLD AUTO: 22.8 % (ref 10–50)
MAGNESIUM SERPL-MCNC: 1.9 MG/DL (ref 1.6–2.3)
MCH RBC QN AUTO: 28.5 PG (ref 27–31)
MCHC RBC AUTO-ENTMCNC: 31.5 G/DL (ref 30–37)
MCV RBC AUTO: 90.6 FL (ref 81–99)
MONOCYTES # BLD AUTO: 0.64 10*3/MM3 (ref 0–0.9)
MONOCYTES NFR BLD AUTO: 10.3 % (ref 0–12)
NEUTROPHILS # BLD AUTO: 3.8 10*3/MM3 (ref 2–6.9)
NEUTROPHILS NFR BLD AUTO: 61.3 % (ref 37–80)
NRBC BLD MANUAL-RTO: 0 /100 WBC (ref 0–0)
PLATELET # BLD AUTO: 246 10*3/MM3 (ref 130–400)
PMV BLD AUTO: 8.6 FL (ref 6–12)
POTASSIUM BLD-SCNC: 4.3 MMOL/L (ref 3.5–5.1)
PROT SERPL-MCNC: 6.8 G/DL (ref 6.3–8.2)
RBC # BLD AUTO: 4.87 10*6/MM3 (ref 4.2–5.4)
SODIUM BLD-SCNC: 137 MMOL/L (ref 137–145)
TROPONIN I SERPL-MCNC: <0.012 NG/ML (ref 0–0.03)
WBC NRBC COR # BLD: 6.19 10*3/MM3 (ref 4.8–10.8)
WHOLE BLOOD HOLD SPECIMEN: NORMAL
WHOLE BLOOD HOLD SPECIMEN: NORMAL

## 2017-05-27 PROCEDURE — 84703 CHORIONIC GONADOTROPIN ASSAY: CPT | Performed by: EMERGENCY MEDICINE

## 2017-05-27 PROCEDURE — 93005 ELECTROCARDIOGRAM TRACING: CPT

## 2017-05-27 PROCEDURE — 84484 ASSAY OF TROPONIN QUANT: CPT | Performed by: EMERGENCY MEDICINE

## 2017-05-27 PROCEDURE — 99284 EMERGENCY DEPT VISIT MOD MDM: CPT

## 2017-05-27 PROCEDURE — 71020 HC CHEST PA AND LATERAL: CPT

## 2017-05-27 PROCEDURE — 85025 COMPLETE CBC W/AUTO DIFF WBC: CPT | Performed by: EMERGENCY MEDICINE

## 2017-05-27 PROCEDURE — 83690 ASSAY OF LIPASE: CPT | Performed by: EMERGENCY MEDICINE

## 2017-05-27 PROCEDURE — 83735 ASSAY OF MAGNESIUM: CPT | Performed by: EMERGENCY MEDICINE

## 2017-05-27 PROCEDURE — 96374 THER/PROPH/DIAG INJ IV PUSH: CPT

## 2017-05-27 PROCEDURE — 25010000002 MORPHINE PER 10 MG: Performed by: EMERGENCY MEDICINE

## 2017-05-27 PROCEDURE — 80053 COMPREHEN METABOLIC PANEL: CPT | Performed by: EMERGENCY MEDICINE

## 2017-05-27 RX ORDER — MINOXIDIL 10 MG/1
10 TABLET ORAL DAILY
COMMUNITY
End: 2017-09-20 | Stop reason: HOSPADM

## 2017-05-27 RX ORDER — AMITRIPTYLINE HYDROCHLORIDE 10 MG/1
10 TABLET, FILM COATED ORAL NIGHTLY
COMMUNITY
End: 2017-09-20 | Stop reason: HOSPADM

## 2017-05-27 RX ORDER — SODIUM CHLORIDE 0.9 % (FLUSH) 0.9 %
10 SYRINGE (ML) INJECTION AS NEEDED
Status: DISCONTINUED | OUTPATIENT
Start: 2017-05-27 | End: 2017-05-27 | Stop reason: HOSPADM

## 2017-05-27 RX ORDER — MORPHINE SULFATE 4 MG/ML
4 INJECTION, SOLUTION INTRAMUSCULAR; INTRAVENOUS ONCE
Status: COMPLETED | OUTPATIENT
Start: 2017-05-27 | End: 2017-05-27

## 2017-05-27 RX ADMIN — MORPHINE SULFATE 4 MG: 4 INJECTION, SOLUTION INTRAMUSCULAR; INTRAVENOUS at 12:32

## 2017-06-07 ENCOUNTER — HOSPITAL ENCOUNTER (EMERGENCY)
Facility: HOSPITAL | Age: 27
Discharge: LEFT BEFORE DISCHARGE | End: 2017-06-07

## 2017-06-07 VITALS
BODY MASS INDEX: 18.69 KG/M2 | SYSTOLIC BLOOD PRESSURE: 159 MMHG | WEIGHT: 99 LBS | RESPIRATION RATE: 28 BRPM | DIASTOLIC BLOOD PRESSURE: 95 MMHG | OXYGEN SATURATION: 94 % | HEART RATE: 110 BPM | TEMPERATURE: 98.8 F | HEIGHT: 61 IN

## 2017-06-07 PROCEDURE — 99211 OFF/OP EST MAY X REQ PHY/QHP: CPT

## 2017-06-11 ENCOUNTER — HOSPITAL ENCOUNTER (INPATIENT)
Facility: HOSPITAL | Age: 27
LOS: 1 days | Discharge: ANOTHER HOSPITAL | End: 2017-06-12
Attending: EMERGENCY MEDICINE | Admitting: INTERNAL MEDICINE

## 2017-06-11 ENCOUNTER — APPOINTMENT (OUTPATIENT)
Dept: GENERAL RADIOLOGY | Facility: HOSPITAL | Age: 27
End: 2017-06-11

## 2017-06-11 DIAGNOSIS — J90 PLEURAL EFFUSION: ICD-10-CM

## 2017-06-11 DIAGNOSIS — J18.9 PNEUMONIA OF RIGHT LOWER LOBE DUE TO INFECTIOUS ORGANISM: Primary | ICD-10-CM

## 2017-06-11 DIAGNOSIS — N18.6 END STAGE RENAL FAILURE ON DIALYSIS (HCC): ICD-10-CM

## 2017-06-11 DIAGNOSIS — Z99.2 END STAGE RENAL FAILURE ON DIALYSIS (HCC): ICD-10-CM

## 2017-06-11 LAB
ALBUMIN SERPL-MCNC: 4.1 G/DL (ref 3.5–5)
ALBUMIN/GLOB SERPL: 1.2 G/DL (ref 1–2)
ALP SERPL-CCNC: 108 U/L (ref 38–126)
ALT SERPL W P-5'-P-CCNC: 41 U/L (ref 13–69)
ANION GAP SERPL CALCULATED.3IONS-SCNC: 25 MMOL/L
ARTERIAL PATENCY WRIST A: POSITIVE
AST SERPL-CCNC: 40 U/L (ref 15–46)
BASE EXCESS BLDA CALC-SCNC: 3.1 MMOL/L
BASOPHILS # BLD AUTO: 0.06 10*3/MM3 (ref 0–0.2)
BASOPHILS NFR BLD AUTO: 0.5 % (ref 0–2.5)
BDY SITE: ABNORMAL
BILIRUB SERPL-MCNC: 0.6 MG/DL (ref 0.2–1.3)
BUN BLD-MCNC: 38 MG/DL (ref 7–20)
BUN/CREAT SERPL: 3.8 (ref 7.1–23.5)
CALCIUM SPEC-SCNC: 8.3 MG/DL (ref 8.4–10.2)
CHLORIDE SERPL-SCNC: 89 MMOL/L (ref 98–107)
CO2 SERPL-SCNC: 28 MMOL/L (ref 26–30)
COHGB MFR BLD: 2 %
CREAT BLD-MCNC: 9.9 MG/DL (ref 0.6–1.3)
D-LACTATE SERPL-SCNC: 1 MMOL/L (ref 0.5–2)
DEPRECATED RDW RBC AUTO: 52.5 FL (ref 37–54)
EOSINOPHIL # BLD AUTO: 0.46 10*3/MM3 (ref 0–0.7)
EOSINOPHIL NFR BLD AUTO: 3.9 % (ref 0–7)
ERYTHROCYTE [DISTWIDTH] IN BLOOD BY AUTOMATED COUNT: 16.1 % (ref 11.5–14.5)
GFR SERPL CREATININE-BSD FRML MDRD: 5 ML/MIN/1.73
GLOBULIN UR ELPH-MCNC: 3.3 GM/DL
GLUCOSE BLD-MCNC: 92 MG/DL (ref 74–98)
HCG SERPL QL: NEGATIVE
HCO3 BLDA-SCNC: 27.3 MMOL/L (ref 22–28)
HCT VFR BLD AUTO: 42.5 % (ref 37–47)
HGB BLD-MCNC: 13.9 G/DL (ref 12–16)
HGB BLDA-MCNC: 13.7 G/DL (ref 12–18)
HOROWITZ INDEX BLD+IHG-RTO: 21 %
IMM GRANULOCYTES # BLD: 0.1 10*3/MM3 (ref 0–0.06)
IMM GRANULOCYTES NFR BLD: 0.8 % (ref 0–0.6)
LYMPHOCYTES # BLD AUTO: 1.2 10*3/MM3 (ref 0.6–3.4)
LYMPHOCYTES NFR BLD AUTO: 10.2 % (ref 10–50)
MCH RBC QN AUTO: 28.9 PG (ref 27–31)
MCHC RBC AUTO-ENTMCNC: 32.7 G/DL (ref 30–37)
MCV RBC AUTO: 88.4 FL (ref 81–99)
METHGB BLD QL: 0.5 %
MODALITY: ABNORMAL
MONOCYTES # BLD AUTO: 0.8 10*3/MM3 (ref 0–0.9)
MONOCYTES NFR BLD AUTO: 6.8 % (ref 0–12)
NEUTROPHILS # BLD AUTO: 9.18 10*3/MM3 (ref 2–6.9)
NEUTROPHILS NFR BLD AUTO: 77.8 % (ref 37–80)
NRBC BLD MANUAL-RTO: 0 /100 WBC (ref 0–0)
OXYHGB MFR BLDV: 89.1 % (ref 94–99)
PCO2 BLDA: 40.7 MM HG (ref 35–45)
PH BLDA: 7.44 PH UNITS
PLATELET # BLD AUTO: 249 10*3/MM3 (ref 130–400)
PMV BLD AUTO: 9.2 FL (ref 6–12)
PO2 BLDA: 61.6 MM HG (ref 75–100)
POTASSIUM BLD-SCNC: 4 MMOL/L (ref 3.5–5.1)
PROT SERPL-MCNC: 7.4 G/DL (ref 6.3–8.2)
RBC # BLD AUTO: 4.81 10*6/MM3 (ref 4.2–5.4)
SAO2 % BLDCOA: 91.4 %
SODIUM BLD-SCNC: 138 MMOL/L (ref 137–145)
TROPONIN I SERPL-MCNC: <0.012 NG/ML (ref 0–0.03)
WBC NRBC COR # BLD: 11.8 10*3/MM3 (ref 4.8–10.8)

## 2017-06-11 PROCEDURE — 87040 BLOOD CULTURE FOR BACTERIA: CPT | Performed by: PHYSICIAN ASSISTANT

## 2017-06-11 PROCEDURE — 87186 SC STD MICRODIL/AGAR DIL: CPT | Performed by: PHYSICIAN ASSISTANT

## 2017-06-11 PROCEDURE — 84484 ASSAY OF TROPONIN QUANT: CPT | Performed by: PHYSICIAN ASSISTANT

## 2017-06-11 PROCEDURE — 25010000002 VANCOMYCIN PER 500 MG: Performed by: PHYSICIAN ASSISTANT

## 2017-06-11 PROCEDURE — 82375 ASSAY CARBOXYHB QUANT: CPT

## 2017-06-11 PROCEDURE — 25010000002 PIPERACILLIN SOD-TAZOBACTAM PER 1 G: Performed by: PHYSICIAN ASSISTANT

## 2017-06-11 PROCEDURE — G0378 HOSPITAL OBSERVATION PER HR: HCPCS

## 2017-06-11 PROCEDURE — 93005 ELECTROCARDIOGRAM TRACING: CPT | Performed by: PHYSICIAN ASSISTANT

## 2017-06-11 PROCEDURE — 87070 CULTURE OTHR SPECIMN AEROBIC: CPT | Performed by: PHYSICIAN ASSISTANT

## 2017-06-11 PROCEDURE — 87081 CULTURE SCREEN ONLY: CPT | Performed by: INTERNAL MEDICINE

## 2017-06-11 PROCEDURE — 99223 1ST HOSP IP/OBS HIGH 75: CPT | Performed by: INTERNAL MEDICINE

## 2017-06-11 PROCEDURE — 87147 CULTURE TYPE IMMUNOLOGIC: CPT | Performed by: PHYSICIAN ASSISTANT

## 2017-06-11 PROCEDURE — 25010000002 AZITHROMYCIN: Performed by: PHYSICIAN ASSISTANT

## 2017-06-11 PROCEDURE — 84703 CHORIONIC GONADOTROPIN ASSAY: CPT | Performed by: INTERNAL MEDICINE

## 2017-06-11 PROCEDURE — 94640 AIRWAY INHALATION TREATMENT: CPT

## 2017-06-11 PROCEDURE — 83605 ASSAY OF LACTIC ACID: CPT | Performed by: PHYSICIAN ASSISTANT

## 2017-06-11 PROCEDURE — 25010000002 MORPHINE PER 10 MG: Performed by: INTERNAL MEDICINE

## 2017-06-11 PROCEDURE — 83050 HGB METHEMOGLOBIN QUAN: CPT

## 2017-06-11 PROCEDURE — 80053 COMPREHEN METABOLIC PANEL: CPT | Performed by: PHYSICIAN ASSISTANT

## 2017-06-11 PROCEDURE — 71010 HC CHEST PA OR AP: CPT

## 2017-06-11 PROCEDURE — 82805 BLOOD GASES W/O2 SATURATION: CPT

## 2017-06-11 PROCEDURE — 87077 CULTURE AEROBIC IDENTIFY: CPT | Performed by: PHYSICIAN ASSISTANT

## 2017-06-11 PROCEDURE — 25010000002 METHYLPREDNISOLONE PER 125 MG: Performed by: PHYSICIAN ASSISTANT

## 2017-06-11 PROCEDURE — 87205 SMEAR GRAM STAIN: CPT | Performed by: PHYSICIAN ASSISTANT

## 2017-06-11 PROCEDURE — 85025 COMPLETE CBC W/AUTO DIFF WBC: CPT | Performed by: PHYSICIAN ASSISTANT

## 2017-06-11 PROCEDURE — 99285 EMERGENCY DEPT VISIT HI MDM: CPT

## 2017-06-11 PROCEDURE — 36600 WITHDRAWAL OF ARTERIAL BLOOD: CPT

## 2017-06-11 RX ORDER — METHYLPREDNISOLONE SODIUM SUCCINATE 40 MG/ML
40 INJECTION, POWDER, LYOPHILIZED, FOR SOLUTION INTRAMUSCULAR; INTRAVENOUS EVERY 8 HOURS
Status: DISCONTINUED | OUTPATIENT
Start: 2017-06-12 | End: 2017-06-12

## 2017-06-11 RX ORDER — NALOXONE HCL 0.4 MG/ML
0.4 VIAL (ML) INJECTION
Status: DISCONTINUED | OUTPATIENT
Start: 2017-06-11 | End: 2017-06-12 | Stop reason: HOSPADM

## 2017-06-11 RX ORDER — PANTOPRAZOLE SODIUM 40 MG/1
40 TABLET, DELAYED RELEASE ORAL
Status: DISCONTINUED | OUTPATIENT
Start: 2017-06-12 | End: 2017-06-12 | Stop reason: HOSPADM

## 2017-06-11 RX ORDER — ALPRAZOLAM 0.5 MG/1
0.5 TABLET ORAL NIGHTLY PRN
COMMUNITY
End: 2017-08-26

## 2017-06-11 RX ORDER — PANTOPRAZOLE SODIUM 20 MG/1
20 TABLET, DELAYED RELEASE ORAL DAILY
Status: DISCONTINUED | OUTPATIENT
Start: 2017-06-12 | End: 2017-06-11

## 2017-06-11 RX ORDER — MINOXIDIL 2.5 MG/1
10 TABLET ORAL DAILY
Status: DISCONTINUED | OUTPATIENT
Start: 2017-06-12 | End: 2017-06-12 | Stop reason: HOSPADM

## 2017-06-11 RX ORDER — SODIUM CHLORIDE 0.9 % (FLUSH) 0.9 %
1-10 SYRINGE (ML) INJECTION AS NEEDED
Status: DISCONTINUED | OUTPATIENT
Start: 2017-06-11 | End: 2017-06-12 | Stop reason: HOSPADM

## 2017-06-11 RX ORDER — SODIUM CHLORIDE 9 MG/ML
125 INJECTION, SOLUTION INTRAVENOUS CONTINUOUS
Status: DISCONTINUED | OUTPATIENT
Start: 2017-06-11 | End: 2017-06-11

## 2017-06-11 RX ORDER — ALPRAZOLAM 0.5 MG/1
0.5 TABLET ORAL NIGHTLY PRN
Status: DISCONTINUED | OUTPATIENT
Start: 2017-06-11 | End: 2017-06-12 | Stop reason: HOSPADM

## 2017-06-11 RX ORDER — ONDANSETRON 2 MG/ML
4 INJECTION INTRAMUSCULAR; INTRAVENOUS EVERY 6 HOURS PRN
Status: DISCONTINUED | OUTPATIENT
Start: 2017-06-11 | End: 2017-06-12 | Stop reason: HOSPADM

## 2017-06-11 RX ORDER — IPRATROPIUM BROMIDE AND ALBUTEROL SULFATE 2.5; .5 MG/3ML; MG/3ML
3 SOLUTION RESPIRATORY (INHALATION) ONCE
Status: COMPLETED | OUTPATIENT
Start: 2017-06-11 | End: 2017-06-11

## 2017-06-11 RX ORDER — METHYLPREDNISOLONE SODIUM SUCCINATE 125 MG/2ML
125 INJECTION, POWDER, LYOPHILIZED, FOR SOLUTION INTRAMUSCULAR; INTRAVENOUS ONCE
Status: COMPLETED | OUTPATIENT
Start: 2017-06-11 | End: 2017-06-11

## 2017-06-11 RX ORDER — HEPARIN SODIUM 5000 [USP'U]/ML
5000 INJECTION, SOLUTION INTRAVENOUS; SUBCUTANEOUS EVERY 12 HOURS SCHEDULED
Status: DISCONTINUED | OUTPATIENT
Start: 2017-06-11 | End: 2017-06-12 | Stop reason: HOSPADM

## 2017-06-11 RX ORDER — IPRATROPIUM BROMIDE AND ALBUTEROL SULFATE 2.5; .5 MG/3ML; MG/3ML
3 SOLUTION RESPIRATORY (INHALATION)
Status: DISCONTINUED | OUTPATIENT
Start: 2017-06-12 | End: 2017-06-12 | Stop reason: HOSPADM

## 2017-06-11 RX ORDER — PANTOPRAZOLE SODIUM 20 MG/1
20 TABLET, DELAYED RELEASE ORAL DAILY
COMMUNITY
End: 2017-06-12 | Stop reason: HOSPADM

## 2017-06-11 RX ORDER — AMITRIPTYLINE HYDROCHLORIDE 10 MG/1
10 TABLET, FILM COATED ORAL NIGHTLY
Status: DISCONTINUED | OUTPATIENT
Start: 2017-06-11 | End: 2017-06-12 | Stop reason: HOSPADM

## 2017-06-11 RX ORDER — MORPHINE SULFATE 2 MG/ML
2 INJECTION, SOLUTION INTRAMUSCULAR; INTRAVENOUS EVERY 4 HOURS PRN
Status: DISCONTINUED | OUTPATIENT
Start: 2017-06-11 | End: 2017-06-12 | Stop reason: HOSPADM

## 2017-06-11 RX ADMIN — MORPHINE SULFATE 2 MG: 2 INJECTION, SOLUTION INTRAMUSCULAR; INTRAVENOUS at 22:49

## 2017-06-11 RX ADMIN — AZITHROMYCIN 500 MG: 500 INJECTION, POWDER, LYOPHILIZED, FOR SOLUTION INTRAVENOUS at 17:26

## 2017-06-11 RX ADMIN — TAZOBACTAM SODIUM AND PIPERACILLIN SODIUM 2.25 G: 250; 2 INJECTION, SOLUTION INTRAVENOUS at 17:25

## 2017-06-11 RX ADMIN — VANCOMYCIN HYDROCHLORIDE 1000 MG: 1 INJECTION, POWDER, LYOPHILIZED, FOR SOLUTION INTRAVENOUS at 17:25

## 2017-06-11 RX ADMIN — AMITRIPTYLINE HYDROCHLORIDE 10 MG: 10 TABLET, FILM COATED ORAL at 22:49

## 2017-06-11 RX ADMIN — METHYLPREDNISOLONE SODIUM SUCCINATE 125 MG: 125 INJECTION, POWDER, FOR SOLUTION INTRAMUSCULAR; INTRAVENOUS at 15:56

## 2017-06-11 RX ADMIN — IPRATROPIUM BROMIDE AND ALBUTEROL SULFATE 3 ML: .5; 3 SOLUTION RESPIRATORY (INHALATION) at 15:53

## 2017-06-11 RX ADMIN — MUPIROCIN: 20 OINTMENT TOPICAL at 22:49

## 2017-06-11 RX ADMIN — ALPRAZOLAM 0.5 MG: 0.5 TABLET ORAL at 22:49

## 2017-06-11 NOTE — ED NOTES
DR. HARPER WAS CALLED AT 1848 AND TRANSFERRED TO [facility] AT THIS TIME.      Enrico Davis  06/11/17 1851

## 2017-06-11 NOTE — ED NOTES
DR. DEAN WAS CALLED AT 1827, BUT DIDN'T ANSWER. HE WAS CALLED BACK AT 1840 AND TRANSFERRED TO [facility] AT THIS TIME.      Enrico Davis  06/11/17 1843

## 2017-06-11 NOTE — ED PROVIDER NOTES
Subjective   HPI Comments: 26-year-old female driven to the emergency room by her .  The patient is complaining of a productive cough for the past 4-5 days.  Her chest hurts with cough only.  She states she has had fever up to 102 for the past 2 days.  Is been alternating Tylenol and Motrin twice a day.  States she was admitted to the hospital last month with pneumonia.  She denies any abdominal pain vomiting or diarrhea.  She smokes one pack daily.  She is a dialysis patient and goes to dialysis 3 times a week.  Using her home nebulizer without relief.  She is also on home O2 at 3 around-the-clock.  There is no history of PE or DVT.      History provided by:  Patient  History limited by: no limits.   used: No        Review of Systems   Constitutional: Positive for fatigue and fever. Negative for activity change and appetite change.   HENT: Negative for congestion, ear pain, rhinorrhea, sneezing and sore throat.    Eyes: Negative for pain, discharge and redness.        Edema around eyes   Respiratory: Positive for cough, shortness of breath and wheezing.    Cardiovascular: Positive for leg swelling.   Gastrointestinal: Negative for abdominal pain, constipation, diarrhea, nausea and vomiting.   Endocrine: Negative.    Genitourinary: Negative for difficulty urinating, dysuria and frequency.   Musculoskeletal: Negative for back pain and neck pain.   Skin: Positive for wound. Negative for color change, pallor and rash.        Facial lesions.   Allergic/Immunologic: Negative.    Neurological: Negative.    Hematological: Negative.    Psychiatric/Behavioral: Negative.    All other systems reviewed and are negative.      Past Medical History:   Diagnosis Date   • Abdominal pain    • Anemia    • Anxiety    • Asthma    • Bipolar disorder    • CKD (chronic kidney disease), stage IV    • Constipation    • COPD (chronic obstructive pulmonary disease)    • Depression    • End stage renal disease on  dialysis    • Esophageal reflux     reflux   • Fahr's syndrome    • Hepatitis C antibody test positive    • Hyperparathyroidism    • Hypertension    • Hypocalcemia    • Nausea and vomiting    • Non-traumatic rhabdomyolysis 5/2/2017   • Pancreatitis    • Pneumonia of right lung due to infectious organism 5/2/2017   • Recurrent urinary tract infection    • Renal insufficiency    • Upper gastrointestinal bleeding        Allergies   Allergen Reactions   • Acetaminophen Itching   • Hydrocodone Itching   • Ibuprofen    • Lortab [Hydrocodone-Acetaminophen]      Lortab TABS   • Ciprofloxacin Rash       Past Surgical History:   Procedure Laterality Date   • DIALYSIS FISTULA CREATION      port   • DILATATION AND CURETTAGE     • EXPLORATORY LAPAROTOMY      For uterine and ovarian issues   • KIDNEY SURGERY Left 2013    Biopsy       Family History   Problem Relation Age of Onset   • Arthritis Mother    • Hypertension Mother    • Kidney disease Father      Chronic renal failure syndrome   • Pancreatic cancer Father    • Hypertension Brother    • Kidney disease Other    • Diabetes Other      Uncle   • Lung cancer Other      Grandmother   • Hyperlipidemia Other      High cholesterol - Uncle       Social History     Social History   • Marital status:      Spouse name: N/A   • Number of children: N/A   • Years of education: N/A     Social History Main Topics   • Smoking status: Current Every Day Smoker     Packs/day: 1.50   • Smokeless tobacco: None   • Alcohol use No   • Drug use: Yes     Special: Marijuana   • Sexual activity: Not Currently     Other Topics Concern   • None     Social History Narrative    She is  and not working. She denies alcohol or drug use. She denies recent opiate or benzo use. She does smoke and has used marijuana.            Objective   Physical Exam   Constitutional: She is oriented to person, place, and time. She appears well-developed and well-nourished. No distress.   HENT:   Head:  Normocephalic and atraumatic.   Right Ear: External ear normal.   Left Ear: External ear normal.   Nose: Nose normal.   Mouth/Throat: No oropharyngeal exudate.   Dry oral mucosa   Eyes: Conjunctivae and EOM are normal. Pupils are equal, round, and reactive to light. Right eye exhibits no discharge. Left eye exhibits no discharge. No scleral icterus.   Neck: Normal range of motion. Neck supple. No JVD present. No tracheal deviation present.   Cardiovascular: Regular rhythm, normal heart sounds and intact distal pulses.    tachycardic   Pulmonary/Chest: Effort normal. No stridor. No respiratory distress. She has wheezes. She has rales.   Abdominal: Soft. Bowel sounds are normal. She exhibits no distension and no mass. There is no tenderness. There is no rebound and no guarding. No hernia.   Musculoskeletal: Normal range of motion. She exhibits no edema, tenderness or deformity.   Neurological: She is alert and oriented to person, place, and time. No cranial nerve deficit. Coordination normal.   Skin: Skin is warm and dry. No rash noted. She is not diaphoretic. No erythema.   Several round red lesions scattered about face.    Psychiatric: She has a normal mood and affect. Her behavior is normal. Judgment and thought content normal.   Nursing note and vitals reviewed.      ECG 12 Lead    Date/Time: 6/11/2017 5:10 PM  Performed by: NOEMY CABA  Authorized by: NOEMY CABA   Interpreted by physician  Comparison: compared with previous ECG from 5/2/2017  Similar to previous ECG  Rhythm: sinus tachycardia  Rate: tachycardic  QRS axis: normal  Conduction: conduction normal  ST Segments: ST segments normal  T Waves: T waves normal  Clinical impression: abnormal ECG               ED Course  ED Course   Comment By Time   Discussed with Dr. Urbina, he will see patient in hospital as consult. Noemy Caba PA-C 06/11 9904   Discussed with Dr. Pedersen, he accepted pt to telemetry. Noemy Caba PA-C 06/11 0752                  [facility]    Final  diagnoses:   Pneumonia of right lower lobe due to infectious organism   Pleural effusion   End stage renal failure on dialysis            Noemy Caba PA-C  06/11/17 [id number]

## 2017-06-12 ENCOUNTER — APPOINTMENT (OUTPATIENT)
Dept: ULTRASOUND IMAGING | Facility: HOSPITAL | Age: 27
End: 2017-06-12

## 2017-06-12 ENCOUNTER — HOSPITAL ENCOUNTER (INPATIENT)
Facility: HOSPITAL | Age: 27
LOS: 11 days | Discharge: HOME | End: 2017-06-23
Attending: FAMILY MEDICINE | Admitting: FAMILY MEDICINE

## 2017-06-12 ENCOUNTER — APPOINTMENT (OUTPATIENT)
Dept: CT IMAGING | Facility: HOSPITAL | Age: 27
End: 2017-06-12

## 2017-06-12 VITALS
HEART RATE: 98 BPM | WEIGHT: 97.44 LBS | DIASTOLIC BLOOD PRESSURE: 86 MMHG | BODY MASS INDEX: 18.4 KG/M2 | TEMPERATURE: 98.2 F | OXYGEN SATURATION: 97 % | RESPIRATION RATE: 18 BRPM | SYSTOLIC BLOOD PRESSURE: 137 MMHG | HEIGHT: 61 IN

## 2017-06-12 DIAGNOSIS — J90 PLEURAL EFFUSION: ICD-10-CM

## 2017-06-12 PROBLEM — D63.8 ANEMIA OF CHRONIC DISEASE: Status: ACTIVE | Noted: 2017-06-12

## 2017-06-12 PROBLEM — N18.6 ESRD (END STAGE RENAL DISEASE) ON DIALYSIS (HCC): Status: ACTIVE | Noted: 2017-06-12

## 2017-06-12 PROBLEM — Z99.2 ESRD (END STAGE RENAL DISEASE) ON DIALYSIS (HCC): Status: ACTIVE | Noted: 2017-06-12

## 2017-06-12 PROBLEM — J18.9 PNEUMONIA OF RIGHT LOWER LOBE DUE TO INFECTIOUS ORGANISM: Status: ACTIVE | Noted: 2017-06-12

## 2017-06-12 PROBLEM — I15.0 RENOVASCULAR HYPERTENSION: Status: ACTIVE | Noted: 2017-06-12

## 2017-06-12 PROBLEM — F19.10 IV DRUG ABUSE (HCC): Status: ACTIVE | Noted: 2017-06-12

## 2017-06-12 PROBLEM — J44.1 COPD EXACERBATION (HCC): Status: ACTIVE | Noted: 2017-06-12

## 2017-06-12 PROBLEM — R76.8 HEPATITIS C ANTIBODY POSITIVE IN BLOOD: Status: ACTIVE | Noted: 2017-06-12

## 2017-06-12 LAB
ANION GAP SERPL CALCULATED.3IONS-SCNC: 23.6 MMOL/L
BASOPHILS # BLD AUTO: 0.02 10*3/MM3 (ref 0–0.2)
BASOPHILS NFR BLD AUTO: 0.2 % (ref 0–2.5)
BUN BLD-MCNC: 51 MG/DL (ref 7–20)
BUN/CREAT SERPL: 4.8 (ref 7.1–23.5)
CALCIUM SPEC-SCNC: 7.7 MG/DL (ref 8.4–10.2)
CHLORIDE SERPL-SCNC: 90 MMOL/L (ref 98–107)
CO2 SERPL-SCNC: 25 MMOL/L (ref 26–30)
CREAT BLD-MCNC: 10.7 MG/DL (ref 0.6–1.3)
CRP SERPL-MCNC: 22 MG/DL (ref 0–1)
DEPRECATED RDW RBC AUTO: 50.3 FL (ref 37–54)
EOSINOPHIL # BLD AUTO: 0 10*3/MM3 (ref 0–0.7)
EOSINOPHIL NFR BLD AUTO: 0 % (ref 0–7)
ERYTHROCYTE [DISTWIDTH] IN BLOOD BY AUTOMATED COUNT: 15.9 % (ref 11.5–14.5)
ERYTHROCYTE [SEDIMENTATION RATE] IN BLOOD: 44 MM/HR (ref 0–20)
GFR SERPL CREATININE-BSD FRML MDRD: 4 ML/MIN/1.73
GLUCOSE BLD-MCNC: 184 MG/DL (ref 74–98)
GLUCOSE FLD-MCNC: 189 MG/DL
HCT VFR BLD AUTO: 38.2 % (ref 37–47)
HGB BLD-MCNC: 12.7 G/DL (ref 12–16)
IMM GRANULOCYTES # BLD: 0.14 10*3/MM3 (ref 0–0.06)
IMM GRANULOCYTES NFR BLD: 1.2 % (ref 0–0.6)
INR PPP: 1.34 (ref 0.9–1.1)
LDH FLD-CCNC: 589 U/L
LDH SERPL-CCNC: 528 U/L (ref 313–618)
LYMPHOCYTES # BLD AUTO: 0.48 10*3/MM3 (ref 0.6–3.4)
LYMPHOCYTES NFR BLD AUTO: 4.1 % (ref 10–50)
MCH RBC QN AUTO: 29 PG (ref 27–31)
MCHC RBC AUTO-ENTMCNC: 33.2 G/DL (ref 30–37)
MCV RBC AUTO: 87.2 FL (ref 81–99)
MONOCYTES # BLD AUTO: 0.14 10*3/MM3 (ref 0–0.9)
MONOCYTES NFR BLD AUTO: 1.2 % (ref 0–12)
NEUTROPHILS # BLD AUTO: 10.98 10*3/MM3 (ref 2–6.9)
NEUTROPHILS NFR BLD AUTO: 93.3 % (ref 37–80)
NRBC BLD MANUAL-RTO: 0 /100 WBC (ref 0–0)
PLATELET # BLD AUTO: 219 10*3/MM3 (ref 130–400)
PMV BLD AUTO: 9.2 FL (ref 6–12)
POTASSIUM BLD-SCNC: 4.6 MMOL/L (ref 3.5–5.1)
PROT FLD-MCNC: 4.3 G/DL
PROTHROMBIN TIME: 14.7 SECONDS (ref 9.3–12.1)
RBC # BLD AUTO: 4.38 10*6/MM3 (ref 4.2–5.4)
SODIUM BLD-SCNC: 134 MMOL/L (ref 137–145)
VANCOMYCIN SERPL-MCNC: 20.9 MCG/ML (ref 5–40)
WBC NRBC COR # BLD: 11.76 10*3/MM3 (ref 4.8–10.8)

## 2017-06-12 PROCEDURE — 25010000002 HEPARIN (PORCINE) PER 1000 UNITS: Performed by: PHYSICIAN ASSISTANT

## 2017-06-12 PROCEDURE — 90935 HEMODIALYSIS ONE EVALUATION: CPT

## 2017-06-12 PROCEDURE — 87116 MYCOBACTERIA CULTURE: CPT | Performed by: NURSE PRACTITIONER

## 2017-06-12 PROCEDURE — 25010000002 PIPERACILLIN SOD-TAZOBACTAM PER 1 G: Performed by: INTERNAL MEDICINE

## 2017-06-12 PROCEDURE — 94799 UNLISTED PULMONARY SVC/PX: CPT

## 2017-06-12 PROCEDURE — 25010000002 METHYLPREDNISOLONE PER 40 MG: Performed by: INTERNAL MEDICINE

## 2017-06-12 PROCEDURE — 84157 ASSAY OF PROTEIN OTHER: CPT | Performed by: NURSE PRACTITIONER

## 2017-06-12 PROCEDURE — 88305 TISSUE EXAM BY PATHOLOGIST: CPT | Performed by: NURSE PRACTITIONER

## 2017-06-12 PROCEDURE — 0W993ZZ DRAINAGE OF RIGHT PLEURAL CAVITY, PERCUTANEOUS APPROACH: ICD-10-PCS | Performed by: INTERNAL MEDICINE

## 2017-06-12 PROCEDURE — 85060 BLOOD SMEAR INTERPRETATION: CPT | Performed by: NURSE PRACTITIONER

## 2017-06-12 PROCEDURE — 85651 RBC SED RATE NONAUTOMATED: CPT | Performed by: INTERNAL MEDICINE

## 2017-06-12 PROCEDURE — 86140 C-REACTIVE PROTEIN: CPT | Performed by: INTERNAL MEDICINE

## 2017-06-12 PROCEDURE — 85025 COMPLETE CBC W/AUTO DIFF WBC: CPT | Performed by: INTERNAL MEDICINE

## 2017-06-12 PROCEDURE — 87015 SPECIMEN INFECT AGNT CONCNTJ: CPT | Performed by: NURSE PRACTITIONER

## 2017-06-12 PROCEDURE — 5A1D60Z PERFORMANCE OF URINARY FILTRATION, MULTIPLE: ICD-10-PCS | Performed by: INTERNAL MEDICINE

## 2017-06-12 PROCEDURE — 83615 LACTATE (LD) (LDH) ENZYME: CPT | Performed by: INTERNAL MEDICINE

## 2017-06-12 PROCEDURE — 99223 1ST HOSP IP/OBS HIGH 75: CPT | Performed by: INTERNAL MEDICINE

## 2017-06-12 PROCEDURE — 83615 LACTATE (LD) (LDH) ENZYME: CPT | Performed by: NURSE PRACTITIONER

## 2017-06-12 PROCEDURE — 87075 CULTR BACTERIA EXCEPT BLOOD: CPT | Performed by: NURSE PRACTITIONER

## 2017-06-12 PROCEDURE — 80202 ASSAY OF VANCOMYCIN: CPT | Performed by: FAMILY MEDICINE

## 2017-06-12 PROCEDURE — 25010000002 AZITHROMYCIN: Performed by: PHYSICIAN ASSISTANT

## 2017-06-12 PROCEDURE — 25010000002 HEPARIN (PORCINE) PER 1000 UNITS: Performed by: INTERNAL MEDICINE

## 2017-06-12 PROCEDURE — 94640 AIRWAY INHALATION TREATMENT: CPT

## 2017-06-12 PROCEDURE — 89051 BODY FLUID CELL COUNT: CPT | Performed by: NURSE PRACTITIONER

## 2017-06-12 PROCEDURE — 82945 GLUCOSE OTHER FLUID: CPT | Performed by: NURSE PRACTITIONER

## 2017-06-12 PROCEDURE — 76942 ECHO GUIDE FOR BIOPSY: CPT

## 2017-06-12 PROCEDURE — 99239 HOSP IP/OBS DSCHRG MGMT >30: CPT | Performed by: NURSE PRACTITIONER

## 2017-06-12 PROCEDURE — 88112 CYTOPATH CELL ENHANCE TECH: CPT | Performed by: NURSE PRACTITIONER

## 2017-06-12 PROCEDURE — 87206 SMEAR FLUORESCENT/ACID STAI: CPT | Performed by: NURSE PRACTITIONER

## 2017-06-12 PROCEDURE — 87070 CULTURE OTHR SPECIMN AEROBIC: CPT | Performed by: NURSE PRACTITIONER

## 2017-06-12 PROCEDURE — 85610 PROTHROMBIN TIME: CPT | Performed by: INTERNAL MEDICINE

## 2017-06-12 PROCEDURE — 87102 FUNGUS ISOLATION CULTURE: CPT | Performed by: NURSE PRACTITIONER

## 2017-06-12 PROCEDURE — 80048 BASIC METABOLIC PNL TOTAL CA: CPT | Performed by: INTERNAL MEDICINE

## 2017-06-12 PROCEDURE — 25010000002 MORPHINE PER 10 MG: Performed by: NURSE PRACTITIONER

## 2017-06-12 PROCEDURE — 25010000002 PIPERACILLIN-TAZOBACTAM: Performed by: PHYSICIAN ASSISTANT

## 2017-06-12 PROCEDURE — 25010000002 MORPHINE PER 10 MG: Performed by: INTERNAL MEDICINE

## 2017-06-12 RX ORDER — AMITRIPTYLINE HYDROCHLORIDE 10 MG/1
10 TABLET, FILM COATED ORAL NIGHTLY
Status: DISCONTINUED | OUTPATIENT
Start: 2017-06-12 | End: 2017-06-23 | Stop reason: HOSPADM

## 2017-06-12 RX ORDER — IPRATROPIUM BROMIDE AND ALBUTEROL SULFATE 2.5; .5 MG/3ML; MG/3ML
3 SOLUTION RESPIRATORY (INHALATION)
Status: DISCONTINUED | OUTPATIENT
Start: 2017-06-12 | End: 2017-06-23 | Stop reason: HOSPADM

## 2017-06-12 RX ORDER — VANCOMYCIN HYDROCHLORIDE
1250 EVERY 12 HOURS
Status: DISCONTINUED | OUTPATIENT
Start: 2017-06-12 | End: 2017-06-12 | Stop reason: SDUPTHER

## 2017-06-12 RX ORDER — PANTOPRAZOLE SODIUM 40 MG/1
40 TABLET, DELAYED RELEASE ORAL DAILY
Status: ON HOLD
Start: 2017-06-12 | End: 2017-06-18 | Stop reason: DRUGHIGH

## 2017-06-12 RX ORDER — HYDRALAZINE HYDROCHLORIDE 20 MG/ML
10 INJECTION INTRAMUSCULAR; INTRAVENOUS EVERY 6 HOURS PRN
Status: DISCONTINUED | OUTPATIENT
Start: 2017-06-12 | End: 2017-06-23 | Stop reason: HOSPADM

## 2017-06-12 RX ORDER — NICOTINE 21 MG/24HR
1 PATCH, TRANSDERMAL 24 HOURS TRANSDERMAL ONCE
Status: COMPLETED | OUTPATIENT
Start: 2017-06-12 | End: 2017-06-13

## 2017-06-12 RX ORDER — METHYLPREDNISOLONE SODIUM SUCCINATE 40 MG/ML
40 INJECTION, POWDER, LYOPHILIZED, FOR SOLUTION INTRAMUSCULAR; INTRAVENOUS EVERY 8 HOURS
Status: ON HOLD
Start: 2017-06-12 | End: 2017-06-18

## 2017-06-12 RX ORDER — DOCUSATE SODIUM 100 MG/1
100 CAPSULE, LIQUID FILLED ORAL 2 TIMES DAILY
Status: DISCONTINUED | OUTPATIENT
Start: 2017-06-12 | End: 2017-06-19

## 2017-06-12 RX ORDER — MORPHINE SULFATE 2 MG/ML
1 INJECTION, SOLUTION INTRAMUSCULAR; INTRAVENOUS EVERY 4 HOURS PRN
Status: DISCONTINUED | OUTPATIENT
Start: 2017-06-12 | End: 2017-06-13

## 2017-06-12 RX ORDER — NICOTINE 21 MG/24HR
1 PATCH, TRANSDERMAL 24 HOURS TRANSDERMAL DAILY
Status: DISCONTINUED | OUTPATIENT
Start: 2017-06-13 | End: 2017-06-23 | Stop reason: HOSPADM

## 2017-06-12 RX ORDER — HEPARIN SODIUM 5000 [USP'U]/ML
5000 INJECTION, SOLUTION INTRAVENOUS; SUBCUTANEOUS EVERY 12 HOURS SCHEDULED
Status: DISCONTINUED | OUTPATIENT
Start: 2017-06-12 | End: 2017-06-13

## 2017-06-12 RX ORDER — MINOXIDIL 10 MG/1
10 TABLET ORAL DAILY
Status: DISCONTINUED | OUTPATIENT
Start: 2017-06-13 | End: 2017-06-17

## 2017-06-12 RX ORDER — ONDANSETRON 4 MG/1
4 TABLET, FILM COATED ORAL EVERY 6 HOURS PRN
Status: DISCONTINUED | OUTPATIENT
Start: 2017-06-12 | End: 2017-06-23 | Stop reason: HOSPADM

## 2017-06-12 RX ORDER — HEPARIN SODIUM 5000 [USP'U]/ML
5000 INJECTION, SOLUTION INTRAVENOUS; SUBCUTANEOUS EVERY 12 HOURS SCHEDULED
Status: ON HOLD
Start: 2017-06-12 | End: 2017-06-18

## 2017-06-12 RX ORDER — BENZONATATE 100 MG/1
100 CAPSULE ORAL 3 TIMES DAILY PRN
Status: DISCONTINUED | OUTPATIENT
Start: 2017-06-12 | End: 2017-06-23 | Stop reason: HOSPADM

## 2017-06-12 RX ORDER — SODIUM CHLORIDE 0.9 % (FLUSH) 0.9 %
1-10 SYRINGE (ML) INJECTION AS NEEDED
Status: DISCONTINUED | OUTPATIENT
Start: 2017-06-12 | End: 2017-06-23 | Stop reason: HOSPADM

## 2017-06-12 RX ORDER — OXYCODONE HYDROCHLORIDE AND ACETAMINOPHEN 5; 325 MG/1; MG/1
2 TABLET ORAL EVERY 4 HOURS PRN
Status: DISCONTINUED | OUTPATIENT
Start: 2017-06-22 | End: 2017-06-13

## 2017-06-12 RX ORDER — OXYCODONE HYDROCHLORIDE AND ACETAMINOPHEN 5; 325 MG/1; MG/1
1 TABLET ORAL EVERY 4 HOURS PRN
Status: DISCONTINUED | OUTPATIENT
Start: 2017-06-12 | End: 2017-06-13

## 2017-06-12 RX ORDER — ALPRAZOLAM 0.5 MG/1
0.5 TABLET ORAL 2 TIMES DAILY PRN
Status: DISCONTINUED | OUTPATIENT
Start: 2017-06-12 | End: 2017-06-18

## 2017-06-12 RX ORDER — ALPRAZOLAM 0.5 MG/1
0.5 TABLET ORAL NIGHTLY PRN
Status: DISCONTINUED | OUTPATIENT
Start: 2017-06-12 | End: 2017-06-12

## 2017-06-12 RX ORDER — ONDANSETRON 2 MG/ML
4 INJECTION INTRAMUSCULAR; INTRAVENOUS EVERY 6 HOURS PRN
Status: DISCONTINUED | OUTPATIENT
Start: 2017-06-12 | End: 2017-06-23 | Stop reason: HOSPADM

## 2017-06-12 RX ORDER — MORPHINE SULFATE 2 MG/ML
1 INJECTION, SOLUTION INTRAMUSCULAR; INTRAVENOUS ONCE
Status: COMPLETED | OUTPATIENT
Start: 2017-06-12 | End: 2017-06-12

## 2017-06-12 RX ADMIN — IPRATROPIUM BROMIDE AND ALBUTEROL SULFATE 3 ML: .5; 3 SOLUTION RESPIRATORY (INHALATION) at 06:50

## 2017-06-12 RX ADMIN — IPRATROPIUM BROMIDE AND ALBUTEROL SULFATE 3 ML: .5; 3 SOLUTION RESPIRATORY (INHALATION) at 20:56

## 2017-06-12 RX ADMIN — MORPHINE SULFATE 2 MG: 2 INJECTION, SOLUTION INTRAMUSCULAR; INTRAVENOUS at 06:12

## 2017-06-12 RX ADMIN — METHYLPREDNISOLONE SODIUM SUCCINATE 40 MG: 40 INJECTION, POWDER, FOR SOLUTION INTRAMUSCULAR; INTRAVENOUS at 01:08

## 2017-06-12 RX ADMIN — HEPARIN SODIUM 5000 UNITS: 5000 INJECTION, SOLUTION INTRAVENOUS; SUBCUTANEOUS at 21:42

## 2017-06-12 RX ADMIN — NICOTINE 1 PATCH: 21 PATCH, EXTENDED RELEASE TRANSDERMAL at 22:55

## 2017-06-12 RX ADMIN — PIPERACILLIN SODIUM,TAZOBACTAM SODIUM 3.38 G: 3; .375 INJECTION, POWDER, FOR SOLUTION INTRAVENOUS at 22:55

## 2017-06-12 RX ADMIN — DOCUSATE SODIUM 100 MG: 100 CAPSULE, LIQUID FILLED ORAL at 21:42

## 2017-06-12 RX ADMIN — MORPHINE SULFATE 2 MG: 2 INJECTION, SOLUTION INTRAMUSCULAR; INTRAVENOUS at 16:06

## 2017-06-12 RX ADMIN — AZITHROMYCIN 500 MG: 500 INJECTION, POWDER, LYOPHILIZED, FOR SOLUTION INTRAVENOUS at 21:42

## 2017-06-12 RX ADMIN — MORPHINE SULFATE 1 MG: 2 INJECTION, SOLUTION INTRAMUSCULAR; INTRAVENOUS at 11:12

## 2017-06-12 RX ADMIN — ALPRAZOLAM 0.5 MG: 0.5 TABLET ORAL at 22:55

## 2017-06-12 RX ADMIN — MUPIROCIN: 20 OINTMENT TOPICAL at 09:50

## 2017-06-12 RX ADMIN — TAZOBACTAM SODIUM AND PIPERACILLIN SODIUM 2.25 G: 250; 2 INJECTION, SOLUTION INTRAVENOUS at 06:13

## 2017-06-12 RX ADMIN — METHYLPREDNISOLONE SODIUM SUCCINATE 40 MG: 40 INJECTION, POWDER, FOR SOLUTION INTRAMUSCULAR; INTRAVENOUS at 09:49

## 2017-06-12 RX ADMIN — HEPARIN SODIUM 5000 UNITS: 5000 INJECTION, SOLUTION INTRAVENOUS; SUBCUTANEOUS at 09:49

## 2017-06-12 RX ADMIN — MINOXIDIL 10 MG: 2.5 TABLET ORAL at 09:50

## 2017-06-12 RX ADMIN — OXYCODONE AND ACETAMINOPHEN 2 TABLET: 5; 325 TABLET ORAL at 22:06

## 2017-06-12 RX ADMIN — IPRATROPIUM BROMIDE AND ALBUTEROL SULFATE 3 ML: .5; 3 SOLUTION RESPIRATORY (INHALATION) at 01:11

## 2017-06-12 RX ADMIN — AMITRIPTYLINE HYDROCHLORIDE 10 MG: 10 TABLET, FILM COATED ORAL at 22:55

## 2017-06-12 RX ADMIN — MORPHINE SULFATE 2 MG: 2 INJECTION, SOLUTION INTRAMUSCULAR; INTRAVENOUS at 10:28

## 2017-06-12 NOTE — DISCHARGE INSTR - OTHER ORDERS
If you have any questions about your recovery, please call [phone]. A registered nurse is available 24 hours a day 7 days a week to assist you.

## 2017-06-12 NOTE — PAYOR COMM NOTE
"TO:[employer]   FROM:ROCKY BRODERICK PHONE [phone]  INPT NOTIFICATION AND CLINICALS    Jermaine Espino (26 y.o. Female)     Date of Birth Social Security Number Address Home Phone MRN    1990  [address] [place] [address] [phone] [id number]    [facility] Mar[facility] Status          None        Admission Date Admission Type Admitting Provider Attending Provider Department, Room/Bed    17 Emergency Joseluis Pedersen DO Hinsberg, Francis J, DO [facility] MED SURG  3, 325/1    Discharge Date Discharge Disposition Discharge Destination                      Attending Provider: Joseluis Pedersen DO     Allergies:  Acetaminophen, Hydrocodone, Ibuprofen, Lortab [Hydrocodone-acetaminophen], Ciprofloxacin    Isolation:  Contact   Infection:  Other (17), Hepatitis C (17), MRSA/History Only (17)   Code Status:  FULL    Ht:  61\" (154.9 cm)   Wt:  97 lb 7 oz (44.2 kg)    Admission Cmt:  None   Principal Problem:  None                Active Insurance as of 2017     Primary Coverage     Payor Plan Insurance Group Employer/Plan Group    [employer] [place] [employer]      Payor Plan Address Payor Plan Phone Number Effective From Effective To    [address] [address] [phone] [address]     [place] [address]       Subscriber Name Subscriber Birth Date Member ID       JERMAINE ESPINO 1990 [id number]                 Emergency Contacts      (Rel.) Home Phone Work Phone Mobile Phone    Jin Espino (Spouse) [phone] -- --               History & Physical      Joseluis Pedersen DO at 2017  9:58 PM              [facility] HOSPITALIST   HISTORY AND PHYSICAL      Name:  Jermaine Espino   Age:  26 y.o.  Sex:  female  :  1990  MRN:  [id number]   Visit Number:  [id number]  Admission Date:  2017  Date Of Service:  17  Primary Care Physician:  Neo Gresham DO    History Obtained " From:    patient    Chief Complaint:     Difficulty in breathing    History Of Presenting Illness:      Patient is a 26-year-old  female with a history of end-stage renal disease on hemodialysis, COPD, bipolar disease, hypertension and IV drug abuse who comes in with a 4 to five-day history of a productive cough.  She was admitted here in mid May for right-sided pneumonia.  She's had a fever of 102×2 days.  She felt like she was better when she left last time however this has worsened over the past 4-5 days.  She is on home O2 as well as nebulizer, this has not been helping.  She is coughing up yellowish sputum.  She claims she has 10 out of 10 pain in her back when she takes a deep breath on the right side.  She also has some pain on the left side as well.  She's had no appetite times last 2 days.  He did have dialysis on Friday.  She's also had some nausea.  She states the back pain radiates into bilateral shoulders.  In the emergency room chest x-ray revealed right pleural effusion.  One month ago she had a right lower lobe pneumonia.  Her WBC count is 11.80, her lactic acid is normal.  She was given vancomycin, Zosyn and Zithromax in the emergency room.  She is asking for pain medications.  Her last menstrual period was several months ago.  Her blood gas was done  in the emergency room, with mild hypoxia with a PO2 of 61.6.  Dr. Urbina was consulted by the emergency department, for possible thoracentesis tomorrow.    Review Of Systems:     General ROS: positive for  - chills and fever  Psychological ROS: negative  Ophthalmic ROS: negative  ENT ROS: negative  Allergy and Immunology ROS: negative  Hematological and Lymphatic ROS: negative  Endocrine ROS: negative  Breast ROS: negative  Respiratory ROS: positive for - shortness of breath  Cardiovascular ROS: negative  Gastrointestinal ROS: negative  Genito-Urinary ROS: negative  Musculoskeletal ROS: positive for - pain in back - both sides and shoulder -  bilateral  Neurological ROS: negative  Dermatological ROS: positive for rash       Past Medical History:    End-stage renal disease on hemodialysis, hep C positive, bipolar disease, COPD, hypertension, FSGS, IV drug abuse    Past Surgical history:    EGD, AV fistula placement, D&C      Social History:    Pack per day smoker, denies alcohol, denies drugs at present, lives with her     Family History:    Mother  of COPD, father  pancreatic cancer as well as chronic kidney disease    Allergies:      Acetaminophen; Hydrocodone; Ibuprofen; Lortab [hydrocodone-acetaminophen]; and Ciprofloxacin    Home Medications:    Prior to Admission Medications     Prescriptions Last Dose Informant Patient Reported? Taking?    ALPRAZolam (XANAX) 0.5 MG tablet 6/10/2017  Yes Yes    Take 0.5 mg by mouth At Night As Needed for Anxiety or Sleep.    amitriptyline (ELAVIL) 10 MG tablet 6/10/2017  Yes Yes    Take 10 mg by mouth Every Night.    minoxidil (LONITEN) 10 MG tablet 6/10/2017  Yes Yes    Take 10 mg by mouth Daily.    pantoprazole (PROTONIX) 20 MG EC tablet Past Week  Yes Yes    Take 20 mg by mouth Daily.             Hospital Scheduled Meds:      amitriptyline 10 mg Oral Nightly   [START ON 2017] azithromycin 500 mg Intravenous Q24H   heparin (porcine) 5,000 Units Subcutaneous Q12H   [START ON 2017] ipratropium-albuterol 3 mL Nebulization Q6H - RT   methylPREDNISolone sodium succinate 40 mg Intravenous Q8H   [START ON 2017] minoxidil 10 mg Oral Daily   mupirocin  Topical Q12H   [START ON 2017] pantoprazole 20 mg Oral Daily   piperacillin-tazobactam 2.25 g Intravenous Q8H         Pharmacy to dose vancomycin         Vital Signs:    Temp:  [98.3 °F (36.8 °C)-99.2 °F (37.3 °C)] 98.3 °F (36.8 °C)  Heart Rate:  [104-123] 104  Resp:  [20-22] 20  BP: (146-182)/() 151/99    Last 3 weights    17  1526 17  2110 17  2152   Weight: 99 lb (44.9 kg) 97 lb 7 oz (44.2 kg) 97 lb 7 oz (44.2 kg)        Body mass index is 18.41 kg/(m^2).    Physical Exam:      General Appearance:    Alert, cooperative, in no acute distress   Head:    Normocephalic, without obvious abnormality, atraumatic   Eyes:            Lids and lashes normal, conjunctivae and sclerae normal, no   icterus, no pallor, corneas clear, PERRLA   Ears:    Ears appear intact with no abnormalities noted   Throat:   No oral lesions, no thrush, oral mucosa moist   Neck:   No adenopathy, supple, trachea midline, no thyromegaly, no     carotid bruit, no JVD   Back:     No kyphosis present, no scoliosis present, no skin lesions,       erythema or scars, no tenderness to percussion or                   palpation,   range of motion normal   Lungs:     Wheezing bilaterally without uses accessory muscles respiration.      Heart:    Regular rhythm and normal rate, normal S1 and S2, no            murmur, no gallop, no rub, no click   Breast Exam:    Deferred   Abdomen:     Normal bowel sounds, no masses, no organomegaly, soft        non-tender, non-distended, no guarding, no rebound                 tenderness   Genitalia:    Deferred   Extremities:   Moves all extremities well, no edema, no cyanosis, no              redness   Pulses:   Pulses palpable and equal bilaterally   Skin:   No bleeding, bruising or rash   Lymph nodes:   No palpable adenopathy   Neurologic:   Cranial nerves 2 - 12 grossly intact, sensation intact, DTR        present and equal bilaterally       EKG:      Normal sinus rhythm with noted ST elevation in V2, V3 similar to previous EKGs.    Telemetry:      Normal sinus rhythm noted.    I have personally looked at both the EKG and the telemetry strips.    Labs:      Results from last 7 days  Lab Units 06/11/17  1652 06/11/17  1552   LACTATE mmol/L 1.0  --    WBC 10*3/mm3  --  11.80*   HEMOGLOBIN g/dL  --  13.9   HEMATOCRIT %  --  42.5   MCV fL  --  88.4   MCHC g/dL  --  32.7   PLATELETS 10*3/mm3  --  249       Results from last 7 days  Lab  Units 06/11/17  1803   PH, ARTERIAL pH units 7.436   PO2 ART mm Hg 61.6*   PCO2, ARTERIAL mm Hg 40.7   HCO3 ART mmol/L 27.3       Results from last 7 days  Lab Units 06/11/17  1552   SODIUM mmol/L 138   POTASSIUM mmol/L 4.0   CHLORIDE mmol/L 89*   TOTAL CO2 mmol/L 28.0   BUN mg/dL 38*   CREATININE mg/dL 9.90*   EGFR IF NONAFRICN AM mL/min/1.73 5*   CALCIUM mg/dL 8.3*   GLUCOSE mg/dL 92   ALBUMIN g/dL 4.10   BILIRUBIN mg/dL 0.6   ALK PHOS U/L 108   AST (SGOT) U/L 40   ALT (SGPT) U/L 41   Estimated Creatinine Clearance: 6 mL/min (by C-G formula based on Cr of 9.9).  No results found for: AMMONIA    Results from last 7 days  Lab Units 06/11/17  1552   TROPONIN I ng/mL <0.012         Lab Results   Component Value Date    HGBA1C 5.5 06/03/2014     Lab Results   Component Value Date    TSH 2.020 05/17/2017    FREET4 1.55 06/27/2016     Lab Results   Component Value Date    PREGTESTUR NEGATIVE 10/23/2016    HCGQUANT <2.39 05/17/2017     Pain Management Panel     Pain Management Panel Latest Ref Rng & Units 6/28/2016 6/28/2016    AMPHETAMINES SCREEN, URINE NEGATIVE PRESUMPTIVE POSITIVE -    BARBITURATES SCREEN NEGATIVE Negative -    BENZODIAZEPINE SCREEN, URINE NEGATIVE Negative PRESUMPTIVE POSITIVE    COCAINE SCREEN, URINE NEGATIVE Negative -    METHADONE SCREEN, URINE NEGATIVE Negative -    METHAMPHETAMINE UR NEGATIVE Negative -                          Radiology:    Imaging Results (last 7 days)     Procedure Component Value Units Date/Time    XR Chest 1 View [[id number]] Updated:  06/11/17 1614          Assessment:    1.  Right pleural effusion, Suspect parapneumonic  2.  Possible right-sided pneumonia, present on admission, considered be healthcare acquired   3.  COPD exacerbation  4.  End-stage renal disease on hemodialysis  5.  Bipolar disease  6.  History of IV drug abuse    Plan:     We'll place on vancomycin, Zosyn and Zithromax empirically.  Place on Solu-Medrol 40 mg every 8 hours.  Prescribed DuoNeb.  Check an  hCG.  Blood and sputum culture have been ordered.  Dialysis Monday Wednesday Friday.  Consult Dr. Urbina as well as Dr. Mota.  Heparin for DVT prophylaxis.  Further recommendations will depend on clinical course.    Joseluis Pedersen DO  06/11/17  9:58 PM     Electronically signed by Joseluis Pedersen DO at 6/11/2017 10:09 PM           Emergency Department Notes      Noemy Caba PA-C at 6/11/2017  3:53 PM      Procedure Orders:    1. ECG 12 Lead [[id number]] ordered by Noemy Caba PA-C at 06/11/17 1537                Subjective   HPI Comments: 26-year-old female driven to the emergency room by her .  The patient is complaining of a productive cough for the past 4-5 days.  Her chest hurts with cough only.  She states she has had fever up to 102 for the past 2 days.  Is been alternating Tylenol and Motrin twice a day.  States she was admitted to the hospital last month with pneumonia.  She denies any abdominal pain vomiting or diarrhea.  She smokes one pack daily.  She is a dialysis patient and goes to dialysis 3 times a week.  Using her home nebulizer without relief.  She is also on home O2 at 3 around-the-clock.  There is no history of PE or DVT.      History provided by:  Patient  History limited by: no limits.   used: No        Review of Systems   Constitutional: Positive for fatigue and fever. Negative for activity change and appetite change.   HENT: Negative for congestion, ear pain, rhinorrhea, sneezing and sore throat.    Eyes: Negative for pain, discharge and redness.        Edema around eyes   Respiratory: Positive for cough, shortness of breath and wheezing.    Cardiovascular: Positive for leg swelling.   Gastrointestinal: Negative for abdominal pain, constipation, diarrhea, nausea and vomiting.   Endocrine: Negative.    Genitourinary: Negative for difficulty urinating, dysuria and frequency.   Musculoskeletal: Negative for back pain and neck pain.   Skin: Positive for wound.  Negative for color change, pallor and rash.        Facial lesions.   Allergic/Immunologic: Negative.    Neurological: Negative.    Hematological: Negative.    Psychiatric/Behavioral: Negative.    All other systems reviewed and are negative.      Past Medical History:   Diagnosis Date   • Abdominal pain    • Anemia    • Anxiety    • Asthma    • Bipolar disorder    • CKD (chronic kidney disease), stage IV    • Constipation    • COPD (chronic obstructive pulmonary disease)    • Depression    • End stage renal disease on dialysis    • Esophageal reflux     reflux   • Fahr's syndrome    • Hepatitis C antibody test positive    • Hyperparathyroidism    • Hypertension    • Hypocalcemia    • Nausea and vomiting    • Non-traumatic rhabdomyolysis 5/2/2017   • Pancreatitis    • Pneumonia of right lung due to infectious organism 5/2/2017   • Recurrent urinary tract infection    • Renal insufficiency    • Upper gastrointestinal bleeding        Allergies   Allergen Reactions   • Acetaminophen Itching   • Hydrocodone Itching   • Ibuprofen    • Lortab [Hydrocodone-Acetaminophen]      Lortab TABS   • Ciprofloxacin Rash       Past Surgical History:   Procedure Laterality Date   • DIALYSIS FISTULA CREATION      port   • DILATATION AND CURETTAGE     • EXPLORATORY LAPAROTOMY      For uterine and ovarian issues   • KIDNEY SURGERY Left 2013    Biopsy       Family History   Problem Relation Age of Onset   • Arthritis Mother    • Hypertension Mother    • Kidney disease Father      Chronic renal failure syndrome   • Pancreatic cancer Father    • Hypertension Brother    • Kidney disease Other    • Diabetes Other      Uncle   • Lung cancer Other      Grandmother   • Hyperlipidemia Other      High cholesterol - Uncle       Social History     Social History   • Marital status:      Spouse name: N/A   • Number of children: N/A   • Years of education: N/A     Social History Main Topics   • Smoking status: Current Every Day Smoker      Packs/day: 1.50   • Smokeless tobacco: None   • Alcohol use No   • Drug use: Yes     Special: Marijuana   • Sexual activity: Not Currently     Other Topics Concern   • None     Social History Narrative    She is  and not working. She denies alcohol or drug use. She denies recent opiate or benzo use. She does smoke and has used marijuana.            Objective   Physical Exam   Constitutional: She is oriented to person, place, and time. She appears well-developed and well-nourished. No distress.   HENT:   Head: Normocephalic and atraumatic.   Right Ear: External ear normal.   Left Ear: External ear normal.   Nose: Nose normal.   Mouth/Throat: No oropharyngeal exudate.   Dry oral mucosa   Eyes: Conjunctivae and EOM are normal. Pupils are equal, round, and reactive to light. Right eye exhibits no discharge. Left eye exhibits no discharge. No scleral icterus.   Neck: Normal range of motion. Neck supple. No JVD present. No tracheal deviation present.   Cardiovascular: Regular rhythm, normal heart sounds and intact distal pulses.    tachycardic   Pulmonary/Chest: Effort normal. No stridor. No respiratory distress. She has wheezes. She has rales.   Abdominal: Soft. Bowel sounds are normal. She exhibits no distension and no mass. There is no tenderness. There is no rebound and no guarding. No hernia.   Musculoskeletal: Normal range of motion. She exhibits no edema, tenderness or deformity.   Neurological: She is alert and oriented to person, place, and time. No cranial nerve deficit. Coordination normal.   Skin: Skin is warm and dry. No rash noted. She is not diaphoretic. No erythema.   Several round red lesions scattered about face.    Psychiatric: She has a normal mood and affect. Her behavior is normal. Judgment and thought content normal.   Nursing note and vitals reviewed.      ECG 12 Lead    Date/Time: 6/11/2017 5:10 PM  Performed by: CATARINO VOGT  Authorized by: CATARINO VOGT   Interpreted by physician  Comparison:  compared with previous ECG from 5/2/2017  Similar to previous ECG  Rhythm: sinus tachycardia  Rate: tachycardic  QRS axis: normal  Conduction: conduction normal  ST Segments: ST segments normal  T Waves: T waves normal  Clinical impression: abnormal ECG              ED Course  ED Course   Comment By Time   Discussed with Dr. Dean, he will see patient in hospital as consult. Noemy Caba PA-C 06/11 1844   Discussed with Dr. Pedersen, he accepted pt to telemetry. Noemy Caba PA-C 06/11 1852                  MDM    Final diagnoses:   Pneumonia of right lower lobe due to infectious organism   Pleural effusion   End stage renal failure on dialysis            Noemy Caba PA-C  06/11/17 2136       Electronically signed by Noemy Caba PA-C at 6/11/2017  9:36 PM      Enrico Davis at 6/11/2017  6:41 PM          DR. DEAN WAS CALLED AT 1827, BUT DIDN'T ANSWER. HE WAS CALLED BACK AT 1840 AND TRANSFERRED TO [facility] AT THIS TIME.      Enrico Davis  06/11/17 1843       Electronically signed by Enrico Davis at 6/11/2017  6:43 PM      Enrico Davis at 6/11/2017  6:50 PM          DR. PEDERSEN WAS CALLED AT 1848 AND TRANSFERRED TO [facility] AT THIS TIME.      Enrico Davis  06/11/17 1851       Electronically signed by Enrico Davis at 6/11/2017  6:51 PM        Vital Signs (last 24 hours)       06/11 0700  -  06/12 0659 06/12 0700  -  06/12 1109   Most Recent    Temp (°F) 97.9 -  99.2       97.9 (36.6)    Heart Rate 96 -  (!)123       102    Resp 18 -  22       18    BP (!) 146/102 -  (!) 182/120      (!) 146/101     (!) 146/101    SpO2 (%) (!)85 -  100       94          Hospital Medications (active)       Dose Frequency Start End    ALPRAZolam (XANAX) tablet 0.5 mg 0.5 mg Nightly PRN 6/11/2017     Sig - Route: Take 1 tablet by mouth At Night As Needed for Anxiety or Sleep. - Oral    amitriptyline (ELAVIL) tablet 10 mg 10 mg Nightly 6/11/2017     Sig - Route: Take 1 tablet by mouth Every Night. - Oral    AZITHROMYCIN 500 MG/250 ML 0.9% NS IVPB  "(vial-mate) 500 mg Once 6/11/2017 6/11/2017    Sig - Route: Infuse 500 mg into a venous catheter 1 (One) Time. - Intravenous    Cosign for Ordering: Accepted by Koki Shah MD on 6/11/2017  6:44 PM    heparin (porcine) 5000 UNIT/ML injection 5,000 Units 5,000 Units Every 12 Hours Scheduled 6/11/2017     Sig - Route: Inject 1 mL under the skin Every 12 (Twelve) Hours. - Subcutaneous    ipratropium-albuterol (DUO-NEB) nebulizer solution 3 mL 3 mL Once 6/11/2017 6/11/2017    Sig - Route: Take 3 mL by nebulization 1 (One) Time. - Nebulization    Cosign for Ordering: Accepted by Koki Shah MD on 6/11/2017  3:45 PM    ipratropium-albuterol (DUO-NEB) nebulizer solution 3 mL 3 mL Every 6 Hours - RT 6/12/2017     Sig - Route: Take 3 mL by nebulization Every 6 (Six) Hours. - Nebulization    methylPREDNISolone sodium succinate (SOLU-Medrol) injection 125 mg 125 mg Once 6/11/2017 6/11/2017    Sig - Route: Infuse 2 mL into a venous catheter 1 (One) Time. - Intravenous    Cosign for Ordering: Accepted by Koki Shah MD on 6/11/2017  3:45 PM    methylPREDNISolone sodium succinate (SOLU-Medrol) injection 40 mg 40 mg Every 8 Hours 6/12/2017     Sig - Route: Infuse 1 mL into a venous catheter Every 8 (Eight) Hours. - Intravenous    minoxidil (LONITEN) tablet 10 mg 10 mg Daily 6/12/2017     Sig - Route: Take 4 tablets by mouth Daily. - Oral    morphine injection 1 mg 1 mg Once 6/12/2017     Sig - Route: Infuse 0.5 mL into a venous catheter 1 (One) Time. - Intravenous    morphine injection 2 mg 2 mg Every 4 Hours PRN 6/11/2017 6/21/2017    Sig - Route: Infuse 1 mL into a venous catheter Every 4 (Four) Hours As Needed for Moderate Pain (4-6). - Intravenous    Linked Group 1:  \"And\" Linked Group Details        mupirocin (BACTROBAN) 2 % ointment  Every 12 Hours Scheduled 6/11/2017     Sig - Route: Apply  topically Every 12 (Twelve) Hours. - Topical    naloxone (NARCAN) injection 0.4 mg 0.4 mg Every 5 " "Minutes PRN 6/11/2017     Sig - Route: Infuse 1 mL into a venous catheter Every 5 (Five) Minutes As Needed for Respiratory Depression. - Intravenous    Linked Group 1:  \"And\" Linked Group Details        ondansetron (ZOFRAN) injection 4 mg 4 mg Every 6 Hours PRN 6/11/2017     Sig - Route: Infuse 2 mL into a venous catheter Every 6 (Six) Hours As Needed for Nausea or Vomiting. - Intravenous    pantoprazole (PROTONIX) EC tablet 40 mg 40 mg Every Early Morning 6/12/2017     Sig - Route: Take 1 tablet by mouth Every Morning. - Oral    Pharmacy to dose vancomycin  Continuous PRN 6/11/2017     Sig - Route: Continuous As Needed for Consult. - Does not apply    piperacillin-tazobactam (ZOSYN) in iso-osmotic dextrose IVPB 2.25 g (premix) 2.25 g Once 6/11/2017 6/11/2017    Sig - Route: Infuse 50 mL into a venous catheter 1 (One) Time. - Intravenous    Cosign for Ordering: Accepted by Koki Shah MD on 6/11/2017  6:44 PM    piperacillin-tazobactam (ZOSYN) in iso-osmotic dextrose IVPB 2.25 g (premix) 2.25 g Every 12 Hours 6/12/2017     Sig - Route: Infuse 50 mL into a venous catheter Every 12 (Twelve) Hours. - Intravenous    sodium chloride 0.9 % flush 1-10 mL 1-10 mL As Needed 6/11/2017     Sig - Route: Infuse 1-10 mL into a venous catheter As Needed for Line Care. - Intravenous    vancomycin 1000 mg in sodium chloride 0.9% 250 mL IVPB 1,000 mg Once 6/11/2017 6/11/2017    Sig - Route: Infuse 1,000 mg into a venous catheter 1 (One) Time. - Intravenous    Cosign for Ordering: Accepted by Koki Shah MD on 6/11/2017  6:44 PM    vancomycin in dextrose 5% 150 mL (VANCOCIN) IVPB 750 mg 750 mg As Needed 6/12/2017     Sig - Route: Infuse 150 mL into a venous catheter As Needed (post dialysis). - Intravenous    AZITHROMYCIN 500 MG/250 ML 0.9% NS IVPB (vial-mate) (Discontinued) 500 mg Every 24 Hours 6/12/2017 6/11/2017    Sig - Route: Infuse 500 mg into a venous catheter Daily. - Intravenous    pantoprazole " (PROTONIX) EC tablet 20 mg (Discontinued) 20 mg Daily 6/12/2017 6/11/2017    Sig - Route: Take 1 tablet by mouth Daily. - Oral    sodium chloride 0.9 % infusion (Discontinued) 125 mL/hr Continuous 6/11/2017 6/11/2017    Sig - Route: Infuse 125 mL/hr into a venous catheter Continuous. - Intravenous    Cosign for Ordering: Accepted by Koki Shah MD on 6/11/2017  3:45 PM            Lab Results (last 24 hours)     Procedure Component Value Units Date/Time    CBC & Differential [[id number]] Collected:  06/11/17 1552    Specimen:  Blood Updated:  06/11/17 1654    Narrative:       The following orders were created for panel order CBC & Differential.  Procedure                               Abnormality         Status                     ---------                               -----------         ------                     CBC Auto Differential[[id number]]        Abnormal            Final result                 Please view results for these tests on the individual orders.    CBC Auto Differential [[id number]]  (Abnormal) Collected:  06/11/17 1552    Specimen:  Blood Updated:  06/11/17 1654     WBC 11.80 (H) 10*3/mm3      RBC 4.81 10*6/mm3      Hemoglobin 13.9 g/dL      Hematocrit 42.5 %      MCV 88.4 fL      MCH 28.9 pg      MCHC 32.7 g/dL      RDW 16.1 (H) %      RDW-SD 52.5 fl      MPV 9.2 fL      Platelets 249 10*3/mm3      Neutrophil % 77.8 %      Lymphocyte % 10.2 %      Monocyte % 6.8 %      Eosinophil % 3.9 %      Basophil % 0.5 %      Immature Grans % 0.8 (H) %      Neutrophils, Absolute 9.18 (H) 10*3/mm3      Lymphocytes, Absolute 1.20 10*3/mm3      Monocytes, Absolute 0.80 10*3/mm3      Eosinophils, Absolute 0.46 10*3/mm3      Basophils, Absolute 0.06 10*3/mm3      Immature Grans, Absolute 0.10 (H) 10*3/mm3      nRBC 0.0 /100 WBC     Lactic Acid, Plasma [[id number]]  (Normal) Collected:  06/11/17 1652    Specimen:  Blood Updated:  06/11/17 1714     Lactate 1.0 mmol/L     Troponin [[id number]]  (Normal)  Collected:  06/11/17 1552    Specimen:  Blood Updated:  06/11/17 1727     Troponin I <0.012 ng/mL     Narrative:       Normal Patient Upper Reference Limit (URL) (99th Percentile)=0.03 ng/mL   Non-AMI Illness Reference Limit=0.03-0.11 ng/mL   AMI Confirmation=0.12 ng/mL and above    Comprehensive Metabolic Panel [[id number]]  (Abnormal) Collected:  06/11/17 1552    Specimen:  Blood Updated:  06/11/17 1728     Glucose 92 mg/dL      BUN 38 (H) mg/dL      Creatinine 9.90 (H) mg/dL      Sodium 138 mmol/L      Potassium 4.0 mmol/L      Chloride 89 (L) mmol/L      CO2 28.0 mmol/L      Calcium 8.3 (L) mg/dL      Total Protein 7.4 g/dL      Albumin 4.10 g/dL      ALT (SGPT) 41 U/L      AST (SGOT) 40 U/L      Alkaline Phosphatase 108 U/L      Total Bilirubin 0.6 mg/dL      eGFR Non African Amer 5 (L) mL/min/1.73      Globulin 3.3 gm/dL      A/G Ratio 1.2 g/dL      BUN/Creatinine Ratio 3.8 (L)     Anion Gap 25.0 mmol/L     Narrative:       Abnormal estimated GFR should be followed by more specific studies to confirm end stage chronic renal disease. The equation used for calculation may not be accurate for patients less than 19 years old, greater than 70 years old, patients at extremes of weight, malnutrition, or with acute renal dysfunction.    Blood Gas, Arterial With Co-Ox [[id number]]  (Abnormal) Collected:  06/11/17 1803    Specimen:  Arterial Blood Updated:  06/11/17 1804     Site Arterial: left radial     Reynaldo's Test Positive     pH, Arterial 7.436 pH units      pCO2, Arterial 40.7 mm Hg      pO2, Arterial 61.6 (L) mm Hg      HCO3, Arterial 27.3 mmol/L      Base Excess, Arterial 3.1 mmol/L      O2 Saturation, Arterial 91.4 %      Hemoglobin, Blood Gas 13.7 g/dL      Oxyhemoglobin 89.1 (L) %      Methemoglobin 0.5 %      Carboxyhemoglobin 2.0 %      Modality Room air     FIO2 21 %     Respiratory Culture [[id number]] Collected:  06/11/17 1726    Specimen:  Sputum from Cough Updated:  06/11/17 1820     Gram Stain Result  Greater than 20 WBCs seen      Less than 10 Epithelial cells seen      Few (2+) Gram positive cocci in pairs, chains and clusters      Few (2+) Gram positive bacilli    hCG, Serum, Qualitative [[id number]]  (Normal) Collected:  06/11/17 1737    Specimen:  Blood Updated:  06/11/17 2213     HCG Qualitative Negative    Acinetobacter Screen [[id number]] Collected:  06/11/17 2310    Specimen:  Swab from Axilla, Left Updated:  06/11/17 2345    MRSA Screen Culture [[id number]] Collected:  06/11/17 2310    Specimen:  Swab from Nares Updated:  06/11/17 2345    VRE Culture [[id number]] Collected:  06/11/17 2311    Specimen:  Swab from Per Rectum Updated:  06/11/17 2345    Blood Culture [[id number]]  (Normal) Collected:  06/11/17 1552    Specimen:  Blood from Arm, Left Updated:  06/12/17 0501     Blood Culture No growth at less than 24 hours    Blood Culture [[id number]]  (Normal) Collected:  06/11/17 1604    Specimen:  Blood from Hand, Left Updated:  06/12/17 0501     Blood Culture No growth at less than 24 hours    Basic Metabolic Panel [[id number]]  (Abnormal) Collected:  06/12/17 0554    Specimen:  Blood Updated:  06/12/17 0658     Glucose 184 (H) mg/dL      BUN 51 (H) mg/dL      Creatinine 10.70 (H) mg/dL      Sodium 134 (L) mmol/L      Potassium 4.6 mmol/L      Chloride 90 (L) mmol/L      CO2 25.0 (L) mmol/L      Calcium 7.7 (L) mg/dL      eGFR Non African Amer 4 (L) mL/min/1.73      BUN/Creatinine Ratio 4.8 (L)     Anion Gap 23.6 mmol/L     Narrative:       Abnormal estimated GFR should be followed by more specific studies to confirm end stage chronic renal disease. The equation used for calculation may not be accurate for patients less than 19 years old, greater than 70 years old, patients at extremes of weight, malnutrition, or with acute renal dysfunction.    Protime-INR [[id number]]  (Abnormal) Collected:  06/12/17 0554    Specimen:  Blood Updated:  06/12/17 0659     Protime 14.7 (H) Seconds      INR 1.34 (H)    CBC Auto  Differential [[id number]]  (Abnormal) Collected:  06/12/17 0554    Specimen:  Blood Updated:  06/12/17 0659     WBC 11.76 (H) 10*3/mm3      RBC 4.38 10*6/mm3      Hemoglobin 12.7 g/dL      Hematocrit 38.2 %      MCV 87.2 fL      MCH 29.0 pg      MCHC 33.2 g/dL      RDW 15.9 (H) %      RDW-SD 50.3 fl      MPV 9.2 fL      Platelets 219 10*3/mm3      Neutrophil % 93.3 (H) %      Lymphocyte % 4.1 (L) %      Monocyte % 1.2 %      Eosinophil % 0.0 %      Basophil % 0.2 %      Immature Grans % 1.2 (H) %      Neutrophils, Absolute 10.98 (H) 10*3/mm3      Lymphocytes, Absolute 0.48 (L) 10*3/mm3      Monocytes, Absolute 0.14 10*3/mm3      Eosinophils, Absolute 0.00 10*3/mm3      Basophils, Absolute 0.02 10*3/mm3      Immature Grans, Absolute 0.14 (H) 10*3/mm3      nRBC 0.0 /100 WBC     Lactate Dehydrogenase [[id number]]  (Normal) Collected:  06/12/17 1025    Specimen:  Blood Updated:  06/12/17 1047      U/L         Imaging Results (last 24 hours)     Procedure Component Value Units Date/Time    XR Chest 1 View [[id number]] Collected:  06/12/17 0920     Updated:  06/12/17 0943    Narrative:       PROCEDURE: XR CHEST 1 VW-     HISTORY: soa     COMPARISON: May 27, 2017.     FINDINGS: The heart is normal in size. The mediastinum is unremarkable.  There is a worsening right pleural effusion and worsening right basilar  opacity which may be due to atelectasis or pneumonia. There is a stable  small left pleural effusion. There is no pneumothorax.  There are no  acute osseous abnormalities.           Impression:       1. Worsening right pleural effusion and right basilar opacity which may  be due to atelectasis or pneumonia.  2. Stable small left pleural effusion.     Continued followup is recommended.           Images were reviewed, interpreted, and dictated by Dr. Jack.  Transcribed by Marizol Sinclair PA-C.     This report was finalized on 6/12/2017 9:41 AM by Kari Jack M.D..        Physician Progress Notes  (last 24 hours) (Notes from 6/11/2017 11:09 AM through 6/12/2017 11:09 AM)     No notes of this type exist for this encounter.

## 2017-06-12 NOTE — CONSULTS
"Date of consultation:   June 12, 2017     Requested by:   Hospitalist Service.   PCP: Neo Gresham DO      Reason:  PNA/Pl Effusion.    History of Present Illness:  26-year-old female with multiple recent admissions and discharges AGAINST MEDICAL ADVICE who came to the emergency room with complaints of shortness of breath and cough.  She was admitted to hospitalist service and pulmonary consult was requested.    The patient is currently undergoing dialysis but says that 4-5 days ago she started having cough or phlegm production and although she always \"hurts on the right side\", she felt that it was getting worse.  She also reports a temperature 102 for the past 2 days.  The patient was evaluated in the ER included chest x-ray and was found to have right-sided pleural effusion and pulmonary consultation was requested.    She actually underwent ultrasound-guided thoracentesis and was found to have multiple loculations.    The patient continues to smoke.      Review of System: All the review of systems are negative as per history of present illness.  Also positive for pain, occasional lower extremity weakness and pain, easy bruisability, anxiety and depression.     Past Medical History:  Past Medical History:   Diagnosis Date   • Abdominal pain    • Anemia    • Anxiety    • Asthma    • Bipolar disorder    • CKD (chronic kidney disease), stage IV    • Constipation    • COPD (chronic obstructive pulmonary disease)    • Depression    • End stage renal disease on dialysis    • Esophageal reflux     reflux   • Fahr's syndrome    • Hepatitis C antibody test positive    • Hyperparathyroidism    • Hypertension    • Hypocalcemia    • Nausea and vomiting    • Non-traumatic rhabdomyolysis 5/2/2017   • Pancreatitis    • Pneumonia of right lung due to infectious organism 5/2/2017   • Recurrent urinary tract infection    • Renal insufficiency    • Upper gastrointestinal bleeding          Past Surgical History:  Past Surgical " "History:   Procedure Laterality Date   • DIALYSIS FISTULA CREATION      port   • DILATATION AND CURETTAGE     • EXPLORATORY LAPAROTOMY      For uterine and ovarian issues   • KIDNEY SURGERY Left 2013    Biopsy         Family History:  Family History   Problem Relation Age of Onset   • Arthritis Mother    • Hypertension Mother    • Kidney disease Father      Chronic renal failure syndrome   • Pancreatic cancer Father    • Hypertension Brother    • Kidney disease Other    • Diabetes Other      Uncle   • Lung cancer Other      Grandmother   • Hyperlipidemia Other      High cholesterol - Uncle         Social History:  Social History     Social History   • Marital status:      Spouse name: N/A   • Number of children: N/A   • Years of education: N/A     Social History Main Topics   • Smoking status: Current Every Day Smoker     Packs/day: 1.50   • Smokeless tobacco: None   • Alcohol use No   • Drug use: Yes     Special: Marijuana   • Sexual activity: Not Currently     Other Topics Concern   • None     Social History Narrative    She is  and not working. She denies alcohol or drug use. She denies recent opiate or benzo use. She does smoke and has used marijuana.          Physical Exam:  /98 (BP Location: Right arm, Patient Position: Lying)  Pulse 100  Temp 97.9 °F (36.6 °C) (Oral)   Resp 18  Ht 61\" (154.9 cm)  Wt 97 lb 7 oz (44.2 kg)  LMP 12/17/2016  SpO2 97%  BMI 18.41 kg/m2    Constitutional:   General: No acute distress noted. Able to communicate appropriately     Head/Face/Eyes:   No significant facial abnormalities seen.   Extra ocular movement was intact.   Pupils appeared equal              Few old scars noted on face.     ENT:   Hearing was intact    No nasal erythema noted.    Oropharynx was moist. Edentulous.      Neck:   Supple. ?JVD noted.    Thyroid gland did not seem to be enlarged     Cardiovascular:    S1 + S2. Regular. 3/6 systolic ejection murmur noted "      Respiratory:   Respiratory effort was adequate   Good Air Entry Bilaterally with R>L crackles heard.               Right-sided dullness to percussion noted.  Exam was somewhat limited since the patient is a dialysis     Abdomen:   Soft.   Bowel sounds sluggishly positive.   No obvious organomegaly noted.       Musculoskeletal/Extremities:   Gait could not be assessed at this time.   No clubbing, cyanosis noted in the upper extremities.   No edema noted in the lower extremities bilaterally.   Right AV fistula.     Neurologic/Psychiatric:   Affect appeared fair.   Awake, alert and oriented x 3.   Able to follow simple commands.     Skin:   Warm and dry   Some chronic changes noted.         Labs:   Reviewed. Pertinent labs were noted.     Lab Results   Component Value Date    WBC 11.76 (H) 06/12/2017    HGB 12.7 06/12/2017    HCT 38.2 06/12/2017    MCV 87.2 06/12/2017     06/12/2017       Lab Results   Component Value Date    GLUCOSE 184 (H) 06/12/2017    CALCIUM 7.7 (L) 06/12/2017     (L) 06/12/2017    K 4.6 06/12/2017    CO2 25.0 (L) 06/12/2017    CL 90 (L) 06/12/2017    BUN 51 (H) 06/12/2017    CREATININE 10.70 (H) 06/12/2017    EGFRIFNONA 4 (L) 06/12/2017    BCR 4.8 (L) 06/12/2017    ANIONGAP 23.6 06/12/2017         ABG:  Lab Results   Component Value Date    PHART 7.436 06/11/2017    OPY0OCY 40.7 06/11/2017    PO2ART 61.6 (L) 06/11/2017    SO2 95.3 01/01/2017    FCOHB 1.7 01/01/2017    HGBBG 13.7 06/11/2017    Q7XYVQCS 91.4 06/11/2017    CARBOXYHGB 2.0 06/11/2017    FMETHB 0.8 01/01/2017           Imaging Study: Images reviewed personally and discussed with patient  Imaging Results (last 24 hours)     Procedure Component Value Units Date/Time    XR Chest 1 View [[id number]] Collected:  06/12/17 0920     Updated:  06/12/17 0943    Narrative:       PROCEDURE: XR CHEST 1 VW-     HISTORY: soa     COMPARISON: May 27, 2017.     FINDINGS: The heart is normal in size. The mediastinum is  unremarkable.  There is a worsening right pleural effusion and worsening right basilar  opacity which may be due to atelectasis or pneumonia. There is a stable  small left pleural effusion. There is no pneumothorax.  There are no  acute osseous abnormalities.           Impression:       1. Worsening right pleural effusion and right basilar opacity which may  be due to atelectasis or pneumonia.  2. Stable small left pleural effusion.     Continued followup is recommended.           Images were reviewed, interpreted, and dictated by Dr. Jack.  Transcribed by Marizol Sinclair PA-C.     This report was finalized on 6/12/2017 9:41 AM by Kari Jack M.D..    US Thoracentesis Right [[id number]] Collected:  06/12/17 1157     Updated:  06/12/17 1209    Narrative:       PROCEDURE: US THORACENTESIS RIGHT-     HISTORY: right pleural effusion ; J18.9-Pneumonia, unspecified organism;  J25-Vouehub effusion, not elsewhere classified; N18.6-End stage renal  disease; Z99.2-Dependence on renal dialysis     FINDINGS: The patient was brought to the ultrasound suite and placed in  the seated upright position. A severely loculated pocket of pleural  fluid was identified in the right pleural space. The area was then  prepped and draped in the usual sterile fashion. The skin was  anesthetized with 1% lidocaine without epinephrine. Thoracentesis was  then achieved using a [employer] needle system. Approximately 200 mL of a  serous fluid was obtained. A portion was sent to the lab for preordered  studies. The procedure was then ended with no immediate complications or  patient distress.       Impression:       Successful ultrasound-guided thoracentesis of a severely  loculated right pleural effusion with removal of approximately 200 mL of  serous fluid which was sent for laboratory analysis.        This report was finalized on 6/12/2017 11:58 AM by Kari Jack M.D..              Assessment:  1.  Acute Respiratory  Failure.  2.  Right sided loculated effusion  3.  Smoking  4.  ESRD  5.  ? Drug seeking behavior.      Discussion/Recommendations:   The patient clearly has loculated right-sided pleural effusion in the setting of ESRD, she will definitely need to have surgical exploration.  I reviewed the thoracentesis fluid analysis which is clearly exudative with an LDH of 589 and protein content of 4.3.  And also had 60% neutrophils    I agree with the hospitalist and transferring her to a facility with CT surgery is available, as she will definitely need video-assisted thoracoscopy plus/minus open thoracotomy.    She unfortunately continues to refuse to quit smoking.    Due to underlying ESRD and immunocompromise state which is associated with it, I would recommend continuation of vancomycin and Zosyn.  I will discontinue Solu Medrol.    I will also add ESR and CRP to the blood panel.    I have also discussed the case with the nursing staff.    Recommendations were also discussed with the referring provider.     I would like to thank you for the opportunity to participate in the care of this patient.  We will communicate changes and recommendations, if and when necessary.      Haja Urbina MD  06/12/17  2:35 PM      Please note that portions of this note may have been completed with a voice recognition software. Every effort was made to edit the dictation, but occasionally words are mistranscribed.

## 2017-06-12 NOTE — DISCHARGE SUMMARY
[facility]   DISCHARGE SUMMARY    Name:  Mandy Espino   Age:  26 y.o.  Sex:  female  :  1990  MRN:  [id number]   Visit Number:  [id number]  Primary Care Physician:  Neo Gresham DO  Date of Discharge:  2017  Admission Date:  2017      Presenting Problem:    Pneumonia [J18.9]  Pneumonia of right lower lobe due to infectious organism [J18.9]       Discharge Diagnosis:     Principal Problem:    Loculated pleural effusion  Active Problems:    Anemia    Bipolar disorder    Chronic pain    COPD (chronic obstructive pulmonary disease)    Pneumonia    ESRD (end stage renal disease) on dialysis    Renovascular hypertension    Hepatitis C antibody positive in blood    IV drug abuse        Past Medical History:  Past Medical History:   Diagnosis Date   • Abdominal pain    • Anemia    • Anxiety    • Asthma    • Bipolar disorder    • CKD (chronic kidney disease), stage IV    • Constipation    • COPD (chronic obstructive pulmonary disease)    • Depression    • End stage renal disease on dialysis    • Esophageal reflux     reflux   • Fahr's syndrome    • Hepatitis C antibody test positive    • Hyperparathyroidism    • Hypertension    • Hypocalcemia    • Nausea and vomiting    • Non-traumatic rhabdomyolysis 2017   • Pancreatitis    • Pneumonia of right lung due to infectious organism 2017   • Recurrent urinary tract infection    • Renal insufficiency    • Upper gastrointestinal bleeding          Consults:     Consults     Date and Time Order Name Status Description    2017 Inpatient Consult to Nephrology      2017 Inpatient Consult to Pulmonology            Procedures Performed:  Thoracentesis             History of presenting illness:  This is a 26 her old female with past medical history significant for 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  Patient was admitted in May for right-sided pneumonia.  She was treated with IV antibiotic and discharged home on Augmentin.  According to the patient she did feel some better after discharge home but however over the past 4 days prior to readmission she is started having cough, shortness of breath and production of sputum.  According to the patient she has been febrile with a temp up to 102 for the past 48 hours.  She is on home oxygen as well and has been using nebulizer at home which is not helping.  She does complain of pain pleuritic type when she takes a deep breath on the right side.  She has had decreased appetite.  She denies having missed any dialysis.  Upon evaluation in the emergency room chest x-ray was done which did reveal a right-sided pleural effusion.  She was started on IV antibiotic for the form of Zosyn, vancomycin and azithromycin and admitted to the hospitalist service.      Hospital Course:  Upon admission to the hospitalist service patient was continued on IV antibiotic.  Dr. Urbina with pulmonary service was consulted.  Patient was sent down this morning where she underwent  Ultrasound-guided thoracentesis.  She was noted to have a severely loculated right pleural effusion with removal of approximately 200 mL of serous fluid which was sent for lab analysis.  I have spoken with interventional radiologist and Dr. Urbina with pulmonary who both recommend CT surgery evaluation.  I have spoken with Dr. Hicks with CT surgery who is agreed to consult the patient at [facility] in [place] as well as Dr. Pringle with the hospitalist service who is accepted the patient for transfer.    Patient is currently in dialysis.  She is otherwise hemodynamically stable.  She has been [facility] telemetry unit.  She still complains of some shortness of breath as well as  pleuritic type pain with deep inspiration.  She is a little anxious about transfer.  I have reassured the patient and explained that CT surgery evaluation is necessary for long term improvement.  She is otherwise stable for transfer when a bed is available.  Please note that patient is actually a patient of [facility].    Vital Signs:    Temp:  [97.9 °F (36.6 °C)-99.2 °F (37.3 °C)] 97.9 °F (36.6 °C)  Heart Rate:  [] 100  Resp:  [18-22] 18  BP: (146-182)/() 161/98    Physical Exam:  General Appearance:    Alert and cooperative, not in any acute distress.   Head:    Atraumatic and normocephalic, without obvious abnormality.   Eyes:            PERRLA, conjunctivae and sclerae normal, no Icterus. No pallor. Extra-occular movements are within normal limits.   Ears:    Ears appear intact with no abnormalities noted.   Throat:   No oral lesions, no thrush, oral mucosa moist.   Neck:   Supple, trachea midline, no thyromegaly, no carotid bruit.   Back:     No kyphoscoliosis present. No tenderness to palpation,   range of motion normal.   Lungs:     Chest shape is normal. Breath sounds decreased in right.  Bilateral crackles and wheezing.  No crackles or wheezing. No Pleural rub or bronchial breathing.    Heart:    Normal S1 and S2, no murmur, no gallop, no rub. No JVD   Abdomen:     Normal bowel sounds, no masses, no organomegaly. Soft        non-tender, non-distended, no guarding, no rebound                tenderness   Extremities:   Moves all extremities well, no edema, no cyanosis, no             clubbing   Pulses:   Pulses palpable and equal bilaterally   Skin:   No bleeding, bruising or rash   Lymph nodes:  Psychiatric/Behavior:    No palpable adenopathy    Normal mood, normal behavior   Neurologic:   Cranial nerves 2 - 12 grossly intact, sensation intact, Motor power is normal and equal bilaterally.           Pertinent Lab Results:       Results from last 7 days  Lab Units  06/12/17  0554 06/11/17  1552   SODIUM mmol/L 134* 138   POTASSIUM mmol/L 4.6 4.0   CHLORIDE mmol/L 90* 89*   TOTAL CO2 mmol/L 25.0* 28.0   BUN mg/dL 51* 38*   CREATININE mg/dL 10.70* 9.90*   CALCIUM mg/dL 7.7* 8.3*   BILIRUBIN mg/dL  --  0.6   ALK PHOS U/L  --  108   ALT (SGPT) U/L  --  41   AST (SGOT) U/L  --  40   GLUCOSE mg/dL 184* 92       Results from last 7 days  Lab Units 06/12/17  0554 06/11/17  1552   WBC 10*3/mm3 11.76* 11.80*   HEMOGLOBIN g/dL 12.7 13.9   HEMATOCRIT % 38.2 42.5   PLATELETS 10*3/mm3 219 249       Results from last 7 days  Lab Units 06/12/17  0554   INR  1.34*     Blood Culture   Date Value Ref Range Status   06/11/2017 No growth at less than 24 hours  Preliminary   06/11/2017 No growth at less than 24 hours  Preliminary         Pertinent Radiology Results:    Imaging Results (all)     Procedure Component Value Units Date/Time    XR Chest 1 View [[id number]] Collected:  06/12/17 0920     Updated:  06/12/17 0943    Narrative:       PROCEDURE: XR CHEST 1 VW-     HISTORY: soa     COMPARISON: May 27, 2017.     FINDINGS: The heart is normal in size. The mediastinum is unremarkable.  There is a worsening right pleural effusion and worsening right basilar  opacity which may be due to atelectasis or pneumonia. There is a stable  small left pleural effusion. There is no pneumothorax.  There are no  acute osseous abnormalities.           Impression:       1. Worsening right pleural effusion and right basilar opacity which may  be due to atelectasis or pneumonia.  2. Stable small left pleural effusion.     Continued followup is recommended.           Images were reviewed, interpreted, and dictated by Dr. Jack.  Transcribed by Marizol Sinclair PA-C.     This report was finalized on 6/12/2017 9:41 AM by Kari Jack M.D..    US Thoracentesis Right [[id number]] Collected:  06/12/17 1157     Updated:  06/12/17 1209    Narrative:       PROCEDURE: US THORACENTESIS RIGHT-     HISTORY: right  pleural effusion ; J18.9-Pneumonia, unspecified organism;  V47-Edlojtc effusion, not elsewhere classified; N18.6-End stage renal  disease; Z99.2-Dependence on renal dialysis     FINDINGS: The patient was brought to the ultrasound suite and placed in  the seated upright position. A severely loculated pocket of pleural  fluid was identified in the right pleural space. The area was then  prepped and draped in the usual sterile fashion. The skin was  anesthetized with 1% lidocaine without epinephrine. Thoracentesis was  then achieved using a [employer] needle system. Approximately 200 mL of a  serous fluid was obtained. A portion was sent to the lab for preordered  studies. The procedure was then ended with no immediate complications or  patient distress.       Impression:       Successful ultrasound-guided thoracentesis of a severely  loculated right pleural effusion with removal of approximately 200 mL of  serous fluid which was sent for laboratory analysis.        This report was finalized on 6/12/2017 11:58 AM by Kari aJck M.D..          Condition on Discharge:    Stable        Discharge Disposition:        Discharge Medication:     Mandy Espino   Home Medication Instructions XOCHILT:[id number]    Printed on:06/12/17 1254   Medication Information                      ALPRAZolam (XANAX) 0.5 MG tablet  Take 0.5 mg by mouth At Night As Needed for Anxiety or Sleep.             amitriptyline (ELAVIL) 10 MG tablet  Take 10 mg by mouth Every Night.             Heparin Sodium, Porcine, (HEPARIN, PORCINE,) 5000 UNIT/ML injection  Inject 1 mL under the skin Every 12 (Twelve) Hours.             methylPREDNISolone sodium succinate (SOLU-Medrol) 40 MG injection  Infuse 1 mL into a venous catheter Every 8 (Eight) Hours.             minoxidil (LONITEN) 10 MG tablet  Take 10 mg by mouth Daily.             mupirocin (BACTROBAN) 2 % ointment  Apply  topically Every 12 (Twelve) Hours.             pantoprazole (PROTONIX)  40 MG EC tablet  Take 1 tablet by mouth Daily.             piperacillin-tazobactam (ZOSYN) 2-0.25 GM/50ML IVPB  Infuse 50 mL into a venous catheter Every 12 (Twelve) Hours. Indications: Pneumonia             vancomycin in dextrose 5% 150 mL (VANCOCIN) 750-5 MG/150ML-% IVPB  Infuse 150 mL into a venous catheter As Needed (post dialysis). Indications: Pneumonia                 Discharge Diet:     Diet Instructions     Diet: Renal; Thin Liquids, No Restrictions       Discharge Diet:  Renal   Fluid Consistency:  Thin Liquids, No Restrictions                 Activity at Discharge:     Activity Instructions     Discharge Activity                   Dialysis on Monday, Wednesday, Friday.  [facility] is primary nephrologist.    Follow-up Appointments:    Future Appointments  Date Time Provider Department Center   6/13/2017 11:00 AM [facility]  [facility] None   6/13/2017 11:20 AM Kavin Hyatt MD [facility] None         Test Results Pending at Discharge:     Order Current Status    AFB Culture In process    Acinetobacter Screen In process    Anaerobic Culture In process    Body Fluid Cell Count With Differential In process    Body fluid cell count In process    Body fluid differential In process    Fungus Culture In process    Glucose, Body Fluid In process    Lactate Dehydrogenase, Body Fluid In process    MRSA Screen Culture In process    Non-gynecologic Cytology In process    Protein, Body Fluid In process    VRE Culture In process    Blood Culture Preliminary result    Blood Culture Preliminary result    Respiratory Culture Preliminary result             REY Zhou  06/12/17  12:51 PM    Time:  35  minutes were spent reviewing labs, history, evaluating patient and discharge planning.

## 2017-06-12 NOTE — H&P
[facility]   HISTORY AND PHYSICAL      Name:  Mandy Espino   Age:  26 y.o.  Sex:  female  :  1990  MRN:  [id number]   Visit Number:  [id number]  Admission Date:  2017  Date Of Service:  17  Primary Care Physician:  Neo Gresham DO    History Obtained From:    patient    Chief Complaint:     Difficulty in breathing    History Of Presenting Illness:      Patient is a 26-year-old  female with a history of end-stage renal disease on hemodialysis, COPD, bipolar disease, hypertension and IV drug abuse who comes in with a 4 to five-day history of a productive cough.  She was admitted here in mid May for right-sided pneumonia.  She's had a fever of 102×2 days.  She felt like she was better when she left last time however this has worsened over the past 4-5 days.  She is on home O2 as well as nebulizer, this has not been helping.  She is coughing up yellowish sputum.  She claims she has 10 out of 10 pain in her back when she takes a deep breath on the right side.  She also has some pain on the left side as well.  She's had no appetite times last 2 days.  He did have dialysis on Friday.  She's also had some nausea.  She states the back pain radiates into bilateral shoulders.  In the emergency room chest x-ray revealed right pleural effusion.  One month ago she had a right lower lobe pneumonia.  Her WBC count is 11.80, her lactic acid is normal.  She was given vancomycin, Zosyn and Zithromax in the emergency room.  She is asking for pain medications.  Her last menstrual period was several months ago.  Her blood gas was done  in the emergency room, with mild hypoxia with a PO2 of 61.6.  Dr. Urbina was consulted by the emergency department, for possible thoracentesis tomorrow.    Review Of Systems:     General ROS: positive for  - chills and fever  Psychological ROS: negative  Ophthalmic ROS: negative  ENT ROS: negative  Allergy and Immunology ROS:  negative  Hematological and Lymphatic ROS: negative  Endocrine ROS: negative  Breast ROS: negative  Respiratory ROS: positive for - shortness of breath  Cardiovascular ROS: negative  Gastrointestinal ROS: negative  Genito-Urinary ROS: negative  Musculoskeletal ROS: positive for - pain in back - both sides and shoulder - bilateral  Neurological ROS: negative  Dermatological ROS: positive for rash       Past Medical History:    End-stage renal disease on hemodialysis, hep C positive, bipolar disease, COPD, hypertension, FSGS, IV drug abuse    Past Surgical history:    EGD, AV fistula placement, D&C      Social History:    Pack per day smoker, denies alcohol, denies drugs at present, lives with her     Family History:    Mother  of COPD, father  pancreatic cancer as well as chronic kidney disease    Allergies:      Acetaminophen; Hydrocodone; Ibuprofen; Lortab [hydrocodone-acetaminophen]; and Ciprofloxacin    Home Medications:    Prior to Admission Medications     Prescriptions Last Dose Informant Patient Reported? Taking?    ALPRAZolam (XANAX) 0.5 MG tablet 6/10/2017  Yes Yes    Take 0.5 mg by mouth At Night As Needed for Anxiety or Sleep.    amitriptyline (ELAVIL) 10 MG tablet 6/10/2017  Yes Yes    Take 10 mg by mouth Every Night.    minoxidil (LONITEN) 10 MG tablet 6/10/2017  Yes Yes    Take 10 mg by mouth Daily.    pantoprazole (PROTONIX) 20 MG EC tablet Past Week  Yes Yes    Take 20 mg by mouth Daily.             Hospital Scheduled Meds:      amitriptyline 10 mg Oral Nightly   [START ON 2017] azithromycin 500 mg Intravenous Q24H   heparin (porcine) 5,000 Units Subcutaneous Q12H   [START ON 2017] ipratropium-albuterol 3 mL Nebulization Q6H - RT   methylPREDNISolone sodium succinate 40 mg Intravenous Q8H   [START ON 2017] minoxidil 10 mg Oral Daily   mupirocin  Topical Q12H   [START ON 2017] pantoprazole 20 mg Oral Daily   piperacillin-tazobactam 2.25 g Intravenous Q8H          Pharmacy to dose vancomycin         Vital Signs:    Temp:  [98.3 °F (36.8 °C)-99.2 °F (37.3 °C)] 98.3 °F (36.8 °C)  Heart Rate:  [104-123] 104  Resp:  [20-22] 20  BP: (146-182)/() 151/99    Last 3 weights    06/11/17  1526 06/11/17  2110 06/11/17  2152   Weight: 99 lb (44.9 kg) 97 lb 7 oz (44.2 kg) 97 lb 7 oz (44.2 kg)       Body mass index is 18.41 kg/(m^2).    Physical Exam:      General Appearance:    Alert, cooperative, in no acute distress   Head:    Normocephalic, without obvious abnormality, atraumatic   Eyes:            Lids and lashes normal, conjunctivae and sclerae normal, no   icterus, no pallor, corneas clear, PERRLA   Ears:    Ears appear intact with no abnormalities noted   Throat:   No oral lesions, no thrush, oral mucosa moist   Neck:   No adenopathy, supple, trachea midline, no thyromegaly, no     carotid bruit, no JVD   Back:     No kyphosis present, no scoliosis present, no skin lesions,       erythema or scars, no tenderness to percussion or                   palpation,   range of motion normal   Lungs:     Wheezing bilaterally without uses accessory muscles respiration.      Heart:    Regular rhythm and normal rate, normal S1 and S2, no            murmur, no gallop, no rub, no click   Breast Exam:    Deferred   Abdomen:     Normal bowel sounds, no masses, no organomegaly, soft        non-tender, non-distended, no guarding, no rebound                 tenderness   Genitalia:    Deferred   Extremities:   Moves all extremities well, no edema, no cyanosis, no              redness   Pulses:   Pulses palpable and equal bilaterally   Skin:   No bleeding, bruising or rash   Lymph nodes:   No palpable adenopathy   Neurologic:   Cranial nerves 2 - 12 grossly intact, sensation intact, DTR        present and equal bilaterally       EKG:      Normal sinus rhythm with noted ST elevation in V2, V3 similar to previous EKGs.    Telemetry:      Normal sinus rhythm noted.    I have personally looked  at both the EKG and the telemetry strips.    Labs:      Results from last 7 days  Lab Units 06/11/17  1652 06/11/17  1552   LACTATE mmol/L 1.0  --    WBC 10*3/mm3  --  11.80*   HEMOGLOBIN g/dL  --  13.9   HEMATOCRIT %  --  42.5   MCV fL  --  88.4   MCHC g/dL  --  32.7   PLATELETS 10*3/mm3  --  249       Results from last 7 days  Lab Units 06/11/17  1803   PH, ARTERIAL pH units 7.436   PO2 ART mm Hg 61.6*   PCO2, ARTERIAL mm Hg 40.7   HCO3 ART mmol/L 27.3       Results from last 7 days  Lab Units 06/11/17  1552   SODIUM mmol/L 138   POTASSIUM mmol/L 4.0   CHLORIDE mmol/L 89*   TOTAL CO2 mmol/L 28.0   BUN mg/dL 38*   CREATININE mg/dL 9.90*   EGFR IF NONAFRICN AM mL/min/1.73 5*   CALCIUM mg/dL 8.3*   GLUCOSE mg/dL 92   ALBUMIN g/dL 4.10   BILIRUBIN mg/dL 0.6   ALK PHOS U/L 108   AST (SGOT) U/L 40   ALT (SGPT) U/L 41   Estimated Creatinine Clearance: 6 mL/min (by C-G formula based on Cr of 9.9).  No results found for: AMMONIA    Results from last 7 days  Lab Units 06/11/17  1552   TROPONIN I ng/mL <0.012         Lab Results   Component Value Date    HGBA1C 5.5 06/03/2014     Lab Results   Component Value Date    TSH 2.020 05/17/2017    FREET4 1.55 06/27/2016     Lab Results   Component Value Date    PREGTESTUR NEGATIVE 10/23/2016    HCGQUANT <2.39 05/17/2017     Pain Management Panel     Pain Management Panel Latest Ref Rng & Units 6/28/2016 6/28/2016    AMPHETAMINES SCREEN, URINE NEGATIVE PRESUMPTIVE POSITIVE -    BARBITURATES SCREEN NEGATIVE Negative -    BENZODIAZEPINE SCREEN, URINE NEGATIVE Negative PRESUMPTIVE POSITIVE    COCAINE SCREEN, URINE NEGATIVE Negative -    METHADONE SCREEN, URINE NEGATIVE Negative -    METHAMPHETAMINE UR NEGATIVE Negative -                          Radiology:    Imaging Results (last 7 days)     Procedure Component Value Units Date/Time    XR Chest 1 View [[id number]] Updated:  06/11/17 1614          Assessment:    1.  Right pleural effusion, Suspect parapneumonic  2.  Possible right-sided  pneumonia, present on admission, considered be healthcare acquired   3.  COPD exacerbation  4.  End-stage renal disease on hemodialysis  5.  Bipolar disease  6.  History of IV drug abuse    Plan:     We'll place on vancomycin, Zosyn and Zithromax empirically.  Place on Solu-Medrol 40 mg every 8 hours.  Prescribed DuoNeb.  Check an hCG.  Blood and sputum culture have been ordered.  Dialysis Monday Wednesday Friday.  Consult Dr. Urbina as well as Dr. Mota.  Heparin for DVT prophylaxis.  Further recommendations will depend on clinical course.    Joseluis Pedersen DO  06/11/17  9:58 PM

## 2017-06-12 NOTE — CONSULTS
Nephrology Consultation    Referring Provider:   No ref. provider found    Reason for Consultation:    ESRD and associated problems       Subjective     Chief complaint   Chief Complaint   Patient presents with   • Fever   • Cough       History of present illness:    Patient is 27 yo female with multiple medical problems as listed below in the past medical history who presented to the ER with productive cough and shortness of breath, was found to have acute pneumonia and large pleural effusion was admitted for further treatment and management. For ful details on admission please see the H+P from the hospitalist which was reviewed by me. 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     Patient does have a long-standing history of noncompliance with medications and follow-ups. She has been fairly compliant with the her 3 times a week dialysis. She has moved to a different dialysis clinic and is under the care of [facility]. She said she has been noticing some skin lesions that she's been scratching and has multiple of those seen on the face all over. Recently she has been noted to have issues with IV drug abuse as well. She was noted to be injecting into her iv while in the hospital.    Past Medical History:   Diagnosis Date   • Abdominal pain    • Anemia    • Anxiety    • Asthma    • Bipolar disorder    • CKD (chronic kidney disease), stage IV    • Constipation    • COPD (chronic obstructive pulmonary disease)    • Depression    • End stage renal disease on dialysis    • Esophageal reflux     reflux   • Fahr's syndrome    • Hepatitis C antibody test positive    • Hyperparathyroidism    • Hypertension    • Hypocalcemia    • Nausea and vomiting    • Non-traumatic rhabdomyolysis 5/2/2017   • Pancreatitis    • Pneumonia of right lung due to infectious organism 5/2/2017   • Recurrent urinary tract  infection    • Renal insufficiency    • Upper gastrointestinal bleeding        Past Surgical History:   Procedure Laterality Date   • DIALYSIS FISTULA CREATION      port   • DILATATION AND CURETTAGE     • EXPLORATORY LAPAROTOMY      For uterine and ovarian issues   • KIDNEY SURGERY Left 2013    Biopsy       Family History   Problem Relation Age of Onset   • Arthritis Mother    • Hypertension Mother    • Kidney disease Father      Chronic renal failure syndrome   • Pancreatic cancer Father    • Hypertension Brother    • Kidney disease Other    • Diabetes Other      Uncle   • Lung cancer Other      Grandmother   • Hyperlipidemia Other      High cholesterol - Uncle          positive h/o ESRD she has a cousin who is on dialysis as well.    Social History   Substance Use Topics   • Smoking status: Current Every Day Smoker     Packs/day: 1.50   • Smokeless tobacco: None   • Alcohol use No     Prescriptions Prior to Admission   Medication Sig Dispense Refill Last Dose   • ALPRAZolam (XANAX) 0.5 MG tablet Take 0.5 mg by mouth At Night As Needed for Anxiety or Sleep.   6/10/2017 at Unknown time   • amitriptyline (ELAVIL) 10 MG tablet Take 10 mg by mouth Every Night.   6/10/2017 at Unknown time   • minoxidil (LONITEN) 10 MG tablet Take 10 mg by mouth Daily.   6/10/2017 at Unknown time   • pantoprazole (PROTONIX) 20 MG EC tablet Take 20 mg by mouth Daily.   Past Week at Unknown time     Allergies:  Acetaminophen; Hydrocodone; Ibuprofen; Lortab [hydrocodone-acetaminophen]; and Ciprofloxacin    Review of Systems  Constitutional: +ve for fever and chills, diaphoresis, fatigue and unexpected weight change.   HENT: =ve for congestion and denies any hearing loss.   Eyes: Negative for redness and visual disturbance.   Respiratory: +ve for shortness of breath. Negative for chest pain . +ve for cough and chest tightness.   Cardiovascular: Negative for chest pain and palpitations.   Gastrointestinal: Negative for abdominal distention,  "abdominal pain and blood in stool. Denies any constipation or diarrhea.  Endocrine: Negative for cold or heat intolerance.   Genitourinary: Negative for difficulty urinating, dysuria and frequency.   Musculoskeletal: Negative for arthralgias, back pain and myalgias.   Skin: Negative for color change, rash and wound.   Neurological: Negative for syncope, new onset weakness or numbness of any extremities. Denies any headaches.   Hematological: Negative for adenopathy. Does not bruise/bleed easily.   Psychiatric/Behavioral: Negative for confusion. The patient is not nervous/anxious.     Objective     Vital Signs  BP (!) 168/104 (BP Location: Left arm, Patient Position: Lying) Comment: nurse notified  Pulse 102  Temp 97.9 °F (36.6 °C) (Oral)   Resp 18  Ht 61\" (154.9 cm)  Wt 97 lb 7 oz (44.2 kg)  LMP 12/17/2016  SpO2 94%  BMI 18.41 kg/m2              Intake/Output Summary (Last 24 hours) at 06/12/17 0858  Last data filed at 06/11/17 2037   Gross per 24 hour   Intake              550 ml   Output                0 ml   Net              550 ml       Physical Exam:     General Appearance:   Alert, cooperative, in no acute distress.     Head:   Normocephalic, without obvious abnormality, atraumatic.     Eyes:       Normal, conjunctivae and sclerae, no icterus, no pallor, corneas clear, PERRLA        Throat:   Oral mucosa moist     Neck:  No adenopathy, supple, trachea midline, no thyromegaly, no carotid bruit, no JVD      Back:   No CVA tenderness on Percussion.     Lungs:    Scattered rhonchi all over the chest and decreased BS on both bases right greater than left..      Heart:   Regular rhythm and normal rate, normal S1 and S2.       Abdomen:    Normal bowel sounds, no masses, no organomegaly, soft non-tender, non-distended, no guarding, no rebound tenderness        Extremities:  Moves all extremities, no edema, no cyanosis, no redness.     Pulses:  Pulses palpable and equal bilaterally but weak.     Skin:  No " bleeding, bruising or rash        Neurologic:  Cranial nerves grossly intact, move all extremities             Results Review:  Lab Results (last 7 days)     Procedure Component Value Units Date/Time    CBC & Differential [[id number]] Collected:  06/11/17 1552    Specimen:  Blood Updated:  06/11/17 1654    Narrative:       The following orders were created for panel order CBC & Differential.  Procedure                               Abnormality         Status                     ---------                               -----------         ------                     CBC Auto Differential[[id number]]        Abnormal            Final result                 Please view results for these tests on the individual orders.    CBC Auto Differential [[id number]]  (Abnormal) Collected:  06/11/17 1552    Specimen:  Blood Updated:  06/11/17 1654     WBC 11.80 (H) 10*3/mm3      RBC 4.81 10*6/mm3      Hemoglobin 13.9 g/dL      Hematocrit 42.5 %      MCV 88.4 fL      MCH 28.9 pg      MCHC 32.7 g/dL      RDW 16.1 (H) %      RDW-SD 52.5 fl      MPV 9.2 fL      Platelets 249 10*3/mm3      Neutrophil % 77.8 %      Lymphocyte % 10.2 %      Monocyte % 6.8 %      Eosinophil % 3.9 %      Basophil % 0.5 %      Immature Grans % 0.8 (H) %      Neutrophils, Absolute 9.18 (H) 10*3/mm3      Lymphocytes, Absolute 1.20 10*3/mm3      Monocytes, Absolute 0.80 10*3/mm3      Eosinophils, Absolute 0.46 10*3/mm3      Basophils, Absolute 0.06 10*3/mm3      Immature Grans, Absolute 0.10 (H) 10*3/mm3      nRBC 0.0 /100 WBC     Lactic Acid, Plasma [[id number]]  (Normal) Collected:  06/11/17 1652    Specimen:  Blood Updated:  06/11/17 1714     Lactate 1.0 mmol/L     Troponin [[id number]]  (Normal) Collected:  06/11/17 1552    Specimen:  Blood Updated:  06/11/17 1727     Troponin I <0.012 ng/mL     Narrative:       Normal Patient Upper Reference Limit (URL) (99th Percentile)=0.03 ng/mL   Non-AMI Illness Reference Limit=0.03-0.11 ng/mL   AMI Confirmation=0.12 ng/mL  and above    Comprehensive Metabolic Panel [[id number]]  (Abnormal) Collected:  06/11/17 1552    Specimen:  Blood Updated:  06/11/17 1728     Glucose 92 mg/dL      BUN 38 (H) mg/dL      Creatinine 9.90 (H) mg/dL      Sodium 138 mmol/L      Potassium 4.0 mmol/L      Chloride 89 (L) mmol/L      CO2 28.0 mmol/L      Calcium 8.3 (L) mg/dL      Total Protein 7.4 g/dL      Albumin 4.10 g/dL      ALT (SGPT) 41 U/L      AST (SGOT) 40 U/L      Alkaline Phosphatase 108 U/L      Total Bilirubin 0.6 mg/dL      eGFR Non African Amer 5 (L) mL/min/1.73      Globulin 3.3 gm/dL      A/G Ratio 1.2 g/dL      BUN/Creatinine Ratio 3.8 (L)     Anion Gap 25.0 mmol/L     Narrative:       Abnormal estimated GFR should be followed by more specific studies to confirm end stage chronic renal disease. The equation used for calculation may not be accurate for patients less than 19 years old, greater than 70 years old, patients at extremes of weight, malnutrition, or with acute renal dysfunction.    Blood Gas, Arterial With Co-Ox [[id number]]  (Abnormal) Collected:  06/11/17 1803    Specimen:  Arterial Blood Updated:  06/11/17 1804     Site Arterial: left radial     Reynaldo's Test Positive     pH, Arterial 7.436 pH units      pCO2, Arterial 40.7 mm Hg      pO2, Arterial 61.6 (L) mm Hg      HCO3, Arterial 27.3 mmol/L      Base Excess, Arterial 3.1 mmol/L      O2 Saturation, Arterial 91.4 %      Hemoglobin, Blood Gas 13.7 g/dL      Oxyhemoglobin 89.1 (L) %      Methemoglobin 0.5 %      Carboxyhemoglobin 2.0 %      Modality Room air     FIO2 21 %     Respiratory Culture [[id number]] Collected:  06/11/17 1726    Specimen:  Sputum from Cough Updated:  06/11/17 1820     Gram Stain Result Greater than 20 WBCs seen      Less than 10 Epithelial cells seen      Few (2+) Gram positive cocci in pairs, chains and clusters      Few (2+) Gram positive bacilli    hCG, Serum, Qualitative [[id number]]  (Normal) Collected:  06/11/17 1737    Specimen:  Blood Updated:   06/11/17 2213     HCG Qualitative Negative    Acinetobacter Screen [[id number]] Collected:  06/11/17 2310    Specimen:  Swab from Axilla, Left Updated:  06/11/17 2345    MRSA Screen Culture [[id number]] Collected:  06/11/17 2310    Specimen:  Swab from Nares Updated:  06/11/17 2345    VRE Culture [[id number]] Collected:  06/11/17 2311    Specimen:  Swab from Per Rectum Updated:  06/11/17 2345    Blood Culture [[id number]]  (Normal) Collected:  06/11/17 1552    Specimen:  Blood from Arm, Left Updated:  06/12/17 0501     Blood Culture No growth at less than 24 hours    Blood Culture [[id number]]  (Normal) Collected:  06/11/17 1604    Specimen:  Blood from Hand, Left Updated:  06/12/17 0501     Blood Culture No growth at less than 24 hours    Basic Metabolic Panel [[id number]]  (Abnormal) Collected:  06/12/17 0554    Specimen:  Blood Updated:  06/12/17 0658     Glucose 184 (H) mg/dL      BUN 51 (H) mg/dL      Creatinine 10.70 (H) mg/dL      Sodium 134 (L) mmol/L      Potassium 4.6 mmol/L      Chloride 90 (L) mmol/L      CO2 25.0 (L) mmol/L      Calcium 7.7 (L) mg/dL      eGFR Non African Amer 4 (L) mL/min/1.73      BUN/Creatinine Ratio 4.8 (L)     Anion Gap 23.6 mmol/L     Narrative:       Abnormal estimated GFR should be followed by more specific studies to confirm end stage chronic renal disease. The equation used for calculation may not be accurate for patients less than 19 years old, greater than 70 years old, patients at extremes of weight, malnutrition, or with acute renal dysfunction.    Protime-INR [[id number]]  (Abnormal) Collected:  06/12/17 0554    Specimen:  Blood Updated:  06/12/17 0659     Protime 14.7 (H) Seconds      INR 1.34 (H)    CBC Auto Differential [[id number]]  (Abnormal) Collected:  06/12/17 0554    Specimen:  Blood Updated:  06/12/17 0659     WBC 11.76 (H) 10*3/mm3      RBC 4.38 10*6/mm3      Hemoglobin 12.7 g/dL      Hematocrit 38.2 %      MCV 87.2 fL      MCH 29.0 pg      MCHC 33.2 g/dL      RDW 15.9  (H) %      RDW-SD 50.3 fl      MPV 9.2 fL      Platelets 219 10*3/mm3      Neutrophil % 93.3 (H) %      Lymphocyte % 4.1 (L) %      Monocyte % 1.2 %      Eosinophil % 0.0 %      Basophil % 0.2 %      Immature Grans % 1.2 (H) %      Neutrophils, Absolute 10.98 (H) 10*3/mm3      Lymphocytes, Absolute 0.48 (L) 10*3/mm3      Monocytes, Absolute 0.14 10*3/mm3      Eosinophils, Absolute 0.00 10*3/mm3      Basophils, Absolute 0.02 10*3/mm3      Immature Grans, Absolute 0.14 (H) 10*3/mm3      nRBC 0.0 /100 WBC         Imaging Results (last 72 hours)     Procedure Component Value Units Date/Time    XR Chest 1 View [[id number]] Updated:  06/11/17 1614              amitriptyline 10 mg Oral Nightly   heparin (porcine) 5,000 Units Subcutaneous Q12H   ipratropium-albuterol 3 mL Nebulization Q6H - RT   methylPREDNISolone sodium succinate 40 mg Intravenous Q8H   minoxidil 10 mg Oral Daily   mupirocin  Topical Q12H   pantoprazole 40 mg Oral Q AM   piperacillin-tazobactam 2.25 g Intravenous Q12H       Pharmacy to dose vancomycin        Assessment/Plan   1. End-stage renal disease: Secondary to FSGS. She gets dialysis Monday Wednesday Friday, will continue dialysis as ordered  2. Hepatitis C antibody test positive: History of hep C she has never actually followed up with the infectious disease and or GI for further evaluation and treatment long-standing history of noncompliance.  3. Anemia:Contine XAVI with dialysis  4. MBD: I will check her [calcium and phosphorus] with next blood work on dialysis and adjust medications as needed. Her parathyroid has been low for long time.  5. Severe anxiety with panic: treated  6. Chronic pain: Local rub and or lidocaine patches may be helpful as well, rest of the pain management as per hospitalist service.  7. Pneumonia of right lung due to infectious organism- being treated.  8. Smoker: Continue counseling for stop smoking  9. History of IV drug abuse:  10. Multiple skin lesions likely  infectious:      Details discussed with the patient as well as  the hospitalist service.     Rest as ordered    In closing, I sincerely appreciate opportunity to participate in care of this patient. If I can be of any further assistance with the management of this patient, please don’t hesitate to contact me.    Madhu Mota MD  06/12/17  8:58 AM    EMR Dragon/Transcription disclaimer:   Much of this encounter note is an electronic transcription/translation of spoken language to printed text. The electronic translation of spoken language may permit erroneous, or at times, nonsensical words or phrases to be inadvertently transcribed; Although I have reviewed the note for such errors, some may still exist.

## 2017-06-12 NOTE — PLAN OF CARE
Problem: Patient Care Overview (Adult)  Goal: Plan of Care Review  Outcome: Ongoing (interventions implemented as appropriate)    06/12/17 0509   Coping/Psychosocial Response Interventions   Plan Of Care Reviewed With patient   Patient Care Overview   Progress no change   Outcome Evaluation   Outcome Summary/Follow up Plan New admission this shift, Will continue to receive antibiotics. Dialysis MWF       Goal: Adult Individualization and Mutuality  Outcome: Ongoing (interventions implemented as appropriate)  Goal: Discharge Needs Assessment  Outcome: Ongoing (interventions implemented as appropriate)    Problem: Pneumonia (Adult)  Goal: Signs and Symptoms of Listed Potential Problems Will be Absent or Manageable (Pneumonia)  Outcome: Ongoing (interventions implemented as appropriate)    Problem: Pain, Chronic (Adult)  Goal: Identify Related Risk Factors and Signs and Symptoms  Outcome: Ongoing (interventions implemented as appropriate)  Goal: Acceptable Pain Control/Comfort Level  Outcome: Ongoing (interventions implemented as appropriate)

## 2017-06-12 NOTE — PROGRESS NOTES
Continued Stay Note  YOL[facility] Trujillo     Patient Name: Mandy Espino  MRN: [id number]  Today's Date: 6/12/2017    Admit Date: 6/11/2017          Discharge Plan       06/12/17 1104    Case Management/Social Work Plan    Plan home, self care    Patient/Family In Agreement With Plan yes    Additional Comments [facility]  oxygen 4 liters continously,  [facility] M, W, F  plans on returning home              Discharge Codes     None            Evelyn Wayne RN

## 2017-06-12 NOTE — PLAN OF CARE
Problem: Patient Care Overview (Adult)  Goal: Plan of Care Review  Outcome: Unable to achieve outcome(s) by discharge Date Met:  06/12/17 06/12/17 4268   Coping/Psychosocial Response Interventions   Plan Of Care Reviewed With patient   Patient Care Overview   Progress no change   Outcome Evaluation   Outcome Summary/Follow up Plan Pt is to be transfered to [facility] for thoracic surgery after her dialysis. Pt. still complaints of pain. She is anxious about the transfer.        Goal: Adult Individualization and Mutuality  Outcome: Outcome(s) achieved Date Met:  06/12/17  Goal: Discharge Needs Assessment  Outcome: Outcome(s) achieved Date Met:  06/12/17    Problem: Pneumonia (Adult)  Goal: Signs and Symptoms of Listed Potential Problems Will be Absent or Manageable (Pneumonia)  Outcome: Unable to achieve outcome(s) by discharge Date Met:  06/12/17    Problem: Pain, Chronic (Adult)  Goal: Identify Related Risk Factors and Signs and Symptoms  Outcome: Outcome(s) achieved Date Met:  06/12/17  Goal: Acceptable Pain Control/Comfort Level  Outcome: Ongoing (interventions implemented as appropriate)

## 2017-06-12 NOTE — PROGRESS NOTES
PROGRESS NOTE        Date of Admission: 6/11/2017  Length of Stay: 0  Primary Care Physician: Neo Gresham DO    Subjective   Chief Complaint: dyspnea  HPI:  Patient is seen today.  I have discussed with Dr. Urbina and we will arrange for interventional radiology to do a thoracentesis under CT guidance.  I have discussed this with the patient.  She is anxious about the procedure but is in agreement for the procedure.  I have ordered labs as well as cultures and cytology.  She does have a right-sided pleural effusion which is worse than previous.  This is likely a parapneumonic effusion.  She is on IV antibiotics in the form of vancomycin, Zosyn and azithromycin.  She does complain of shortness of breath and cough.  She has been coughing up some yellowish colored sputum.      Review Of Systems:   Review of Systems   General ROS: Patient denies any fevers, chills or loss of consciousness. Complains of generalized weakness.   Psychological ROS: Denies any hallucinations and delusions.  Ophthalmic ROS: Denies any diplopia or transient loss of vision.  ENT ROS: Denies sore throat, nasal congestion or ear pain.   Allergy and Immunology ROS: Denies rash or itching.  Hematological and Lymphatic ROS: Denies neck swelling or easy bleeding.  Endocrine ROS: Denies any recent unintentional weight gain or loss.  Breast ROS: Denies any pain or swelling.  Respiratory ROS: complaints of cough and shortness of breath.   Cardiovascular ROS: Denies chest pain or palpitations. No history of exertional chest pain.   Gastrointestinal ROS: Denies nausea and vomiting. Denies any abdominal pain. No diarrhea.  Genito-Urinary ROS: Denies dysuria or hematuria.  Musculoskeletal ROS: Denies back pain. No muscle pain. No calf pain.   Neurological ROS: Denies any focal weakness. No loss of consciousness. Denies any numbness. Denies neck pain.   Dermatological ROS: Denies any redness or pruritis.    Objective      Temp:  [97.9 °F (36.6 °C)-99.2  °F (37.3 °C)] 97.9 °F (36.6 °C)  Heart Rate:  [] 102  Resp:  [18-22] 18  BP: (146-182)/() 146/101  Physical Exam    General Appearance:  Alert and cooperative, not in any acute distress.   Head:  Atraumatic and normocephalic, without obvious abnormality.   Eyes:          PERRLA, conjunctivae and sclerae normal, no Icterus. No pallor. Extra-occular movements are within normal limits.   Ears:  Ears appear intact with no abnormalities noted.   Throat: No oral lesions, no thrush, oral mucosa moist.   Neck: Supple, trachea midline, no thyromegaly, no carotid bruit.   Back:   No kyphoscoliosis present. No tenderness to palpation,   range of motion normal.   Lungs:   Chest shape is normal. Breath sounds heard bilaterally equally.   Wheezing and rhonchi bilaterally.  Decreased right base.   No Pleural rub or bronchial breathing.   Heart:  Normal S1 and S2, no murmur, no gallop, no rub. No JVD   Abdomen:   Normal bowel sounds, no masses, no organomegaly. Soft     non-tender, non-distended, no guarding, no rebound                tenderness   Extremities: Moves all extremities well, no edema, no cyanosis, no clubbing.   Pulses: Pulses palpable and equal bilaterally   Skin: No bleeding, bruising or rash  Multiple sores on her face and arms.     Lymph nodes: No palpable adenopathy   Neurologic:    Psychiatric/Behavior:     Cranial nerves 2 - 12 grossly intact, sensation intact, Motor power is normal and equal bilaterally.  Mood normal, behavior normal       Results Review:    I have reviewed the labs, radiology results and diagnostic studies.      Results from last 7 days  Lab Units 06/12/17  0554   WBC 10*3/mm3 11.76*   HEMOGLOBIN g/dL 12.7   PLATELETS 10*3/mm3 219       Results from last 7 days  Lab Units 06/12/17  0554   SODIUM mmol/L 134*   POTASSIUM mmol/L 4.6   TOTAL CO2 mmol/L 25.0*   CREATININE mg/dL 10.70*   GLUCOSE mg/dL 184*       Culture Data:   Blood Culture   Date Value Ref Range Status   06/11/2017 No  growth at less than 24 hours  Preliminary   06/11/2017 No growth at less than 24 hours  Preliminary     Radiology Data:   Cardiology Data:    I have reviewed the medications.    Assessment/Plan     Assessment and Plan:  1.  Right pleural effusion suspected parapneumonic-patient will go down today to interventional radiology where she'll undergo a CT-guided thoracentesis.  We will send labs cultures and cytology.  She is on IV antibiotic in the form of Zosyn and vancomycin and azithromycin.  We will continue her breathing treatments steroids mucolytics.  Dr. Urbian with pulmonary has been consulted.    2.  Suspected right-sided pneumonia present upon admission-IV antibiotic breathing treatments mucolytics and steroids    3.  COPD with exacerbation same treatment as #2    4.  End-stage renal disease on hemodialysis-Dr. Mota with nephrology has been consulted for dialysis management.    5.  Bipolar disorder    6,  History of IV drug use    7.  Hepatits C          DVT prophylaxis:  Discharge Planning:   Maria G Toscano, REY 06/12/17 10:34 AM

## 2017-06-13 ENCOUNTER — ANESTHESIA EVENT (OUTPATIENT)
Dept: PERIOP | Facility: HOSPITAL | Age: 27
End: 2017-06-13

## 2017-06-13 ENCOUNTER — PREP FOR SURGERY (OUTPATIENT)
Dept: OTHER | Facility: HOSPITAL | Age: 27
End: 2017-06-13

## 2017-06-13 DIAGNOSIS — J90 PLEURAL EFFUSION: Primary | ICD-10-CM

## 2017-06-13 PROBLEM — Z91.199 MEDICAL NON-COMPLIANCE: Status: ACTIVE | Noted: 2017-06-13

## 2017-06-13 LAB
ALBUMIN SERPL-MCNC: 3.7 G/DL (ref 3.2–4.8)
ALBUMIN/GLOB SERPL: 1.3 G/DL (ref 1.5–2.5)
ALP SERPL-CCNC: 98 U/L (ref 25–100)
ALT SERPL W P-5'-P-CCNC: 20 U/L (ref 7–40)
ANION GAP SERPL CALCULATED.3IONS-SCNC: 15 MMOL/L (ref 3–11)
AST SERPL-CCNC: 20 U/L (ref 0–33)
BASOPHILS # BLD AUTO: 0.01 10*3/MM3 (ref 0–0.2)
BASOPHILS NFR BLD AUTO: 0.1 % (ref 0–1)
BILIRUB SERPL-MCNC: 0.1 MG/DL (ref 0.3–1.2)
BUN BLD-MCNC: 27 MG/DL (ref 9–23)
BUN/CREAT SERPL: 4.7 (ref 7–25)
CALCIUM SPEC-SCNC: 8.1 MG/DL (ref 8.7–10.4)
CHLORIDE SERPL-SCNC: 99 MMOL/L (ref 99–109)
CO2 SERPL-SCNC: 24 MMOL/L (ref 20–31)
CREAT BLD-MCNC: 5.8 MG/DL (ref 0.6–1.3)
DEPRECATED RDW RBC AUTO: 57.1 FL (ref 37–54)
EOSINOPHIL # BLD AUTO: 0 10*3/MM3 (ref 0.1–0.3)
EOSINOPHIL NFR BLD AUTO: 0 % (ref 0–3)
ERYTHROCYTE [DISTWIDTH] IN BLOOD BY AUTOMATED COUNT: 17 % (ref 11.3–14.5)
GFR SERPL CREATININE-BSD FRML MDRD: 9 ML/MIN/1.73
GLOBULIN UR ELPH-MCNC: 2.9 GM/DL
GLUCOSE BLD-MCNC: 105 MG/DL (ref 70–100)
HCT VFR BLD AUTO: 38 % (ref 34.5–44)
HGB BLD-MCNC: 12 G/DL (ref 11.5–15.5)
HIV1+2 AB SER QL: NORMAL
IMM GRANULOCYTES # BLD: 0.1 10*3/MM3 (ref 0–0.03)
IMM GRANULOCYTES NFR BLD: 0.5 % (ref 0–0.6)
LYMPHOCYTES # BLD AUTO: 1.28 10*3/MM3 (ref 0.6–4.8)
LYMPHOCYTES NFR BLD AUTO: 7 % (ref 24–44)
MCH RBC QN AUTO: 28.9 PG (ref 27–31)
MCHC RBC AUTO-ENTMCNC: 31.6 G/DL (ref 32–36)
MCV RBC AUTO: 91.6 FL (ref 80–99)
MONOCYTES # BLD AUTO: 0.84 10*3/MM3 (ref 0–1)
MONOCYTES NFR BLD AUTO: 4.6 % (ref 0–12)
NEUTROPHILS # BLD AUTO: 16.02 10*3/MM3 (ref 1.5–8.3)
NEUTROPHILS NFR BLD AUTO: 87.8 % (ref 41–71)
PLATELET # BLD AUTO: 224 10*3/MM3 (ref 150–450)
PMV BLD AUTO: 8.8 FL (ref 6–12)
POTASSIUM BLD-SCNC: 4.4 MMOL/L (ref 3.5–5.5)
PROT SERPL-MCNC: 6.6 G/DL (ref 5.7–8.2)
RBC # BLD AUTO: 4.15 10*6/MM3 (ref 3.89–5.14)
SODIUM BLD-SCNC: 138 MMOL/L (ref 132–146)
WBC NRBC COR # BLD: 18.25 10*3/MM3 (ref 3.5–10.8)

## 2017-06-13 PROCEDURE — 80053 COMPREHEN METABOLIC PANEL: CPT | Performed by: PHYSICIAN ASSISTANT

## 2017-06-13 PROCEDURE — 94799 UNLISTED PULMONARY SVC/PX: CPT

## 2017-06-13 PROCEDURE — 85025 COMPLETE CBC W/AUTO DIFF WBC: CPT | Performed by: PHYSICIAN ASSISTANT

## 2017-06-13 PROCEDURE — G0432 EIA HIV-1/HIV-2 SCREEN: HCPCS | Performed by: INTERNAL MEDICINE

## 2017-06-13 PROCEDURE — 87798 DETECT AGENT NOS DNA AMP: CPT | Performed by: INTERNAL MEDICINE

## 2017-06-13 PROCEDURE — 99255 IP/OBS CONSLTJ NEW/EST HI 80: CPT | Performed by: THORACIC SURGERY (CARDIOTHORACIC VASCULAR SURGERY)

## 2017-06-13 PROCEDURE — 25010000002 MORPHINE SULFATE (PF) 2 MG/ML SOLUTION: Performed by: INTERNAL MEDICINE

## 2017-06-13 PROCEDURE — 87486 CHLMYD PNEUM DNA AMP PROBE: CPT | Performed by: INTERNAL MEDICINE

## 2017-06-13 PROCEDURE — 99233 SBSQ HOSP IP/OBS HIGH 50: CPT | Performed by: FAMILY MEDICINE

## 2017-06-13 PROCEDURE — 94640 AIRWAY INHALATION TREATMENT: CPT

## 2017-06-13 PROCEDURE — 25010000002 HYDROMORPHONE PER 4 MG: Performed by: FAMILY MEDICINE

## 2017-06-13 PROCEDURE — 25010000002 PIPERACILLIN-TAZOBACTAM: Performed by: PHYSICIAN ASSISTANT

## 2017-06-13 PROCEDURE — 87633 RESP VIRUS 12-25 TARGETS: CPT | Performed by: INTERNAL MEDICINE

## 2017-06-13 PROCEDURE — 87581 M.PNEUMON DNA AMP PROBE: CPT | Performed by: INTERNAL MEDICINE

## 2017-06-13 PROCEDURE — 25010000002 AZITHROMYCIN: Performed by: PHYSICIAN ASSISTANT

## 2017-06-13 PROCEDURE — 25010000002 VANCOMYCIN PER 500 MG: Performed by: INTERNAL MEDICINE

## 2017-06-13 PROCEDURE — 25010000002 HEPARIN (PORCINE) PER 1000 UNITS: Performed by: PHYSICIAN ASSISTANT

## 2017-06-13 RX ORDER — LIDOCAINE 50 MG/G
2 PATCH TOPICAL
Status: DISCONTINUED | OUTPATIENT
Start: 2017-06-13 | End: 2017-06-21

## 2017-06-13 RX ADMIN — MUPIROCIN: 20 OINTMENT TOPICAL at 20:32

## 2017-06-13 RX ADMIN — IPRATROPIUM BROMIDE AND ALBUTEROL SULFATE 3 ML: .5; 3 SOLUTION RESPIRATORY (INHALATION) at 08:00

## 2017-06-13 RX ADMIN — PIPERACILLIN SODIUM,TAZOBACTAM SODIUM 3.38 G: 3; .375 INJECTION, POWDER, FOR SOLUTION INTRAVENOUS at 15:13

## 2017-06-13 RX ADMIN — PIPERACILLIN SODIUM,TAZOBACTAM SODIUM 3.38 G: 3; .375 INJECTION, POWDER, FOR SOLUTION INTRAVENOUS at 21:24

## 2017-06-13 RX ADMIN — IPRATROPIUM BROMIDE AND ALBUTEROL SULFATE 3 ML: .5; 3 SOLUTION RESPIRATORY (INHALATION) at 19:36

## 2017-06-13 RX ADMIN — VANCOMYCIN HYDROCHLORIDE 750 MG: 750 INJECTION, SOLUTION INTRAVENOUS at 11:44

## 2017-06-13 RX ADMIN — HYDROMORPHONE HYDROCHLORIDE 1 MG: 1 INJECTION, SOLUTION INTRAMUSCULAR; INTRAVENOUS; SUBCUTANEOUS at 16:38

## 2017-06-13 RX ADMIN — AMITRIPTYLINE HYDROCHLORIDE 10 MG: 10 TABLET, FILM COATED ORAL at 20:33

## 2017-06-13 RX ADMIN — ALPRAZOLAM 0.5 MG: 0.5 TABLET ORAL at 08:14

## 2017-06-13 RX ADMIN — IPRATROPIUM BROMIDE AND ALBUTEROL SULFATE 3 ML: .5; 3 SOLUTION RESPIRATORY (INHALATION) at 16:29

## 2017-06-13 RX ADMIN — MORPHINE SULFATE 1 MG: 2 INJECTION, SOLUTION INTRAMUSCULAR; INTRAVENOUS at 07:45

## 2017-06-13 RX ADMIN — DOCUSATE SODIUM 100 MG: 100 CAPSULE, LIQUID FILLED ORAL at 17:29

## 2017-06-13 RX ADMIN — MINOXIDIL 10 MG: 10 TABLET ORAL at 08:10

## 2017-06-13 RX ADMIN — PIPERACILLIN SODIUM,TAZOBACTAM SODIUM 3.38 G: 3; .375 INJECTION, POWDER, FOR SOLUTION INTRAVENOUS at 05:41

## 2017-06-13 RX ADMIN — HYDROMORPHONE HYDROCHLORIDE 1 MG: 1 INJECTION, SOLUTION INTRAMUSCULAR; INTRAVENOUS; SUBCUTANEOUS at 12:02

## 2017-06-13 RX ADMIN — LIDOCAINE 2 PATCH: 50 PATCH CUTANEOUS at 11:44

## 2017-06-13 RX ADMIN — HEPARIN SODIUM 5000 UNITS: 5000 INJECTION, SOLUTION INTRAVENOUS; SUBCUTANEOUS at 08:10

## 2017-06-13 RX ADMIN — DOCUSATE SODIUM 100 MG: 100 CAPSULE, LIQUID FILLED ORAL at 08:10

## 2017-06-13 RX ADMIN — ALPRAZOLAM 0.5 MG: 0.5 TABLET ORAL at 21:24

## 2017-06-13 RX ADMIN — NICOTINE 1 PATCH: 21 PATCH, EXTENDED RELEASE TRANSDERMAL at 08:09

## 2017-06-13 RX ADMIN — HYDROMORPHONE HYDROCHLORIDE 1 MG: 1 INJECTION, SOLUTION INTRAMUSCULAR; INTRAVENOUS; SUBCUTANEOUS at 21:24

## 2017-06-13 RX ADMIN — OXYCODONE AND ACETAMINOPHEN 2 TABLET: 5; 325 TABLET ORAL at 04:50

## 2017-06-13 RX ADMIN — AZITHROMYCIN 500 MG: 500 INJECTION, POWDER, LYOPHILIZED, FOR SOLUTION INTRAVENOUS at 20:33

## 2017-06-13 NOTE — PROGRESS NOTES
"    [facility] Medicine Services  INPATIENT PROGRESS NOTE    Date of Admission: 6/12/2017  Length of Stay: 1  Primary Care Physician: Neo Gresham,     Subjective   CC:  RLL HCAP, empyema     HPI:  \"I need more pain medicine or I'll die, I wouldn't ask for it unless I really needed it\".  Claims pain is 10/10 back pain but denies CP currently. Pt was asleep comfortably and when awakened preoccupied during entire exam with talking about how much pain medicine she can have.  Non-labored breaths. Pt aware of plan for decortication in OR tomorrow and denies questions.     Review Of Systems:   Review of Systems   Constitutional: Positive for fatigue.   Respiratory: Negative for shortness of breath.    Cardiovascular: Negative for chest pain.   Gastrointestinal: Negative for abdominal pain.         Objective      Temp:  [96.9 °F (36.1 °C)-98.2 °F (36.8 °C)] 98 °F (36.7 °C)  Heart Rate:  [89-99] 89  Resp:  [16-18] 16  BP: (119-161)/() 119/63  Physical Exam  Temp:  [96.9 °F (36.1 °C)-98.2 °F (36.8 °C)] 98 °F (36.7 °C)  Heart Rate:  [89-99] 89  Resp:  [16-18] 16  BP: (119-161)/() 119/63  Constitutional: no acute distress, awake, alert, disheveled, very thin and chronically ill appearing  Respiratory: no breath sound RLL, nonlabored respirations   Cardiovascular: RRR, no murmur  Gastrointestinal: Positive bowel sounds, soft, nontender, nondistended  Musculoskeletal: No bilateral ankle edema  Psychiatric: oriented x 3, distracted and anxious affect, cooperative  Neurologic: Strength symmetric in all extremities   Derm: neurosis dermatitis of face and BUE      Results Review:    I have reviewed the labs, radiology results and diagnostic studies.      Results from last 7 days  Lab Units 06/13/17  0637   WBC 10*3/mm3 18.25*   HEMOGLOBIN g/dL 12.0   PLATELETS 10*3/mm3 224       Results from last 7 days  Lab Units 06/13/17  0637   SODIUM mmol/L 138   POTASSIUM mmol/L 4.4   CHLORIDE mmol/L 99 "   TOTAL CO2 mmol/L 24.0   BUN mg/dL 27*   CREATININE mg/dL 5.80*   GLUCOSE mg/dL 105*   CALCIUM mg/dL 8.1*       Culture Data: Cultures:    Blood Culture   Date Value Ref Range Status   06/11/2017 No growth at 24 hours  Preliminary   06/11/2017 No growth at 24 hours  Preliminary       Radiology Data: Reviewed    I have reviewed the medications.    Assessment/Plan     Problem List  Hospital Problem List     * (Principal)Loculated pleural effusion    Tobacco use    Bipolar disorder    Depression    Hyperparathyroidism    Pneumonia of right lung due to infectious organism    COPD (chronic obstructive pulmonary disease)    ESRD (end stage renal disease) on dialysis    Renovascular hypertension    Hepatitis C antibody positive in blood    Overview Signed 6/12/2017 12:47 PM by Maria G Toscano, APRN     Has not followed up with ID          IV drug abuse    Anemia of ESRD    Pleural effusion    COPD exacerbation    Medical non-compliance               Assessment/Plan:    - empiric IV abx oer ID  - CTS planning for decortication likely in am  - Nephro follows for M/W/F HD orders  - pt asking for more pain medications, do believe she has real pain but must balance with her desire to mask her opiate withdraw sx's  - may need to limit visitors if ANY concerns of behaviors or indications patient receiving outside meds/drugs from visiting persons.  When visitors present must leave door OPEN.   - hx of medical noncompliance and leaving hospitalizations AMA.  Pt may NOT leave the floor.      DVT prophylaxis:  SQ hep  Discharge Planning: unclear until see how clinically responds to surgical intervention    Kailey Conteh MD   06/13/17   3:01 PM

## 2017-06-13 NOTE — NURSING NOTE
"PT was on the phone with  as I entered the room, she asked her  to \"bring the heroin\" when he comes tomorrow. House supervisor and security notified.   Demetrice Wilkinson RN   "

## 2017-06-13 NOTE — PLAN OF CARE
Problem: Patient Care Overview (Adult)  Goal: Adult Individualization and Mutuality  Outcome: Ongoing (interventions implemented as appropriate)  Goal: Discharge Needs Assessment  Outcome: Ongoing (interventions implemented as appropriate)    Problem: Respiratory Insufficiency (Adult)  Goal: Identify Related Risk Factors and Signs and Symptoms  Outcome: Ongoing (interventions implemented as appropriate)  Goal: Acid/Base Balance  Outcome: Ongoing (interventions implemented as appropriate)  Goal: Effective Ventilation  Outcome: Ongoing (interventions implemented as appropriate)

## 2017-06-13 NOTE — CONSULTS
Mandy Espino  1990  [id number]    Date of Consult: 6/13/2017 6/12/2017      Requesting Provider: Johnna Pringle MD  Evaluating Physician: Fercho Singletary MD    Chief Complaint: Cough/shortness of breath and right sided pleurisy    Reason for Consultation: Pneumonia with loculated pleural effusion    History of present illness:    Patient is a 26 y.o.  Yr old female PT OF DR GRANADOS,  ID physician in [place],with history of drug abuse in the past but not current per her, with chronic hepatitis C and has been seen at  hepatology regarding management, history of C. difficile treated approximately January 2017, end-stage renal disease possibly from FSGS per past records, and numerous other comorbidity as outlined below.  She has a history of medical noncompliance and apparently has left hospital on multiple occasions AGAINST MEDICAL ADVICE per outside notes.  She was apparently admitted mid May with diagnosis pneumonia and treated with empiric antibiotics but no specific microbiologic diagnosis.  She is readmitted to [facility] with diagnosis right sided pneumonia and loculated pleural effusion associated with dyspnea/cough and right-sided pleurisy.  She was transferred to [facility] for consideration of decortication.  Of particular note, when she has done being treated at [facility], she prefers referral back to her infectious disease doctor, Dr. Granados.    She has right-sided pleurisy, worse with deep breath and sharp but intermittent.  Can be 10 out of 10 when it comes most severely.  Better with pain meds.  She has shortness of breath and intermittently productive cough but no hemoptysis and currently on nasal cannula oxygen.  No headache photophobia or neck stiffness.  No nausea vomiting diarrhea or abdominal pain.  Some intermittent urine production.  He gets dialysis via a right arm access fistula.  No rash.    No raw or undercooked food.  No  unpasteurized milk or milk products.  No animal insect or arthropod exposure.  No tick bites.  No outdoor camping or hunting exposure.  No travel exposure.  No ill exposure.  No history of TB or TB exposure.   Denies a history of MRSA/VRE and no history of  ESBL/KPC  organisms.      Past Medical History:   Diagnosis Date   • Abdominal pain    • Anemia    • Anxiety    • Asthma    • Bipolar disorder    • CKD (chronic kidney disease), stage IV    • Constipation    • COPD (chronic obstructive pulmonary disease)    • Depression    • End stage renal disease on dialysis    • Esophageal reflux     reflux   • Fahr's syndrome    • Hepatitis C antibody test positive    • Hyperparathyroidism    • Hypertension    • Hypocalcemia    • Nausea and vomiting    • Non-traumatic rhabdomyolysis 5/2/2017   • Pancreatitis    • Pneumonia of right lung due to infectious organism 5/2/2017   • Recurrent urinary tract infection    • Renal insufficiency    • Upper gastrointestinal bleeding        Past Surgical History:   Procedure Laterality Date   • DIALYSIS FISTULA CREATION      port   • DILATATION AND CURETTAGE     • EXPLORATORY LAPAROTOMY      For uterine and ovarian issues   • KIDNEY SURGERY Left 2013    Biopsy       Pediatric History   Patient Guardian Status   • Not on file.     Other Topics Concern   • Not on file     Social History Narrative    She is  and not working. She denies alcohol or drug use. She denies recent opiate or benzo use. She does smoke and has used marijuana.        family history includes Arthritis in her mother; Diabetes in her other; Hyperlipidemia in her other; Hypertension in her brother and mother; Kidney disease in her father and other; Lung cancer in her other; Pancreatic cancer in her father.    Allergies   Allergen Reactions   • Acetaminophen Itching   • Hydrocodone Itching   • Ibuprofen    • Lortab [Hydrocodone-Acetaminophen]      Lortab TABS   • Ciprofloxacin Rash       Medication:  Current  Facility-Administered Medications   Medication Dose Route Frequency Provider Last Rate Last Dose   • ALPRAZolam (XANAX) tablet 0.5 mg  0.5 mg Oral BID PRN Brock Hooker MD   0.5 mg at 06/13/17 0814   • amitriptyline (ELAVIL) tablet 10 mg  10 mg Oral Nightly Brock Hooker MD   10 mg at 06/12/17 2255   • AZITHROMYCIN 500 MG/250 ML 0.9% NS IVPB (MBP)  500 mg Intravenous Q24H Jonelle Smith PA-C   500 mg at 06/12/17 2142   • benzonatate (TESSALON) capsule 100 mg  100 mg Oral TID PRN Jonelle Smith PA-C       • docusate sodium (COLACE) capsule 100 mg  100 mg Oral BID Jonelle Smith PA-C   100 mg at 06/13/17 0810   • heparin (porcine) 5000 UNIT/ML injection 5,000 Units  5,000 Units Subcutaneous Q12H Jonelle Smith PA-C   5,000 Units at 06/13/17 0810   • hydrALAZINE (APRESOLINE) injection 10 mg  10 mg Intravenous Q6H PRN Jonelle Smith PA-C       • ipratropium-albuterol (DUO-NEB) nebulizer solution 3 mL  3 mL Nebulization 4x Daily - RT Jonelle Smith PA-C   3 mL at 06/13/17 0800   • melatonin sublingual tablet 5 mg  5 mg Sublingual Nightly PRN Jonelle Smith PA-C       • minoxidil (LONITEN) tablet 10 mg  10 mg Oral Daily Brock Hooker MD   10 mg at 06/13/17 0810   • Morphine sulfate (PF) injection 1 mg  1 mg Intravenous Q4H PRN Brock Hooker MD   1 mg at 06/13/17 0745   • nicotine (NICODERM CQ) 21 MG/24HR patch 1 patch  1 patch Transdermal Daily Brock Hooker MD   1 patch at 06/13/17 0809   • nicotine (NICODERM CQ) 21 MG/24HR patch 1 patch  1 patch Transdermal Once Brock Hooker MD   1 patch at 06/12/17 2255   • ondansetron (ZOFRAN) tablet 4 mg  4 mg Oral Q6H PRN Jonelle Smith PA-C        Or   • ondansetron (ZOFRAN) injection 4 mg  4 mg Intravenous Q6H PRN Jonelle Smith PA-C       • oxyCODONE-acetaminophen (PERCOCET) 5-325 MG per tablet 1 tablet  1 tablet Oral Q4H PRN Brock Hooker MD        Followed by   • [START ON 6/22/2017] oxyCODONE-acetaminophen (PERCOCET) 5-325 MG per  tablet 2 tablet  2 tablet Oral Q4H PRN Brock Hooker MD   2 tablet at 06/13/17 0450   • Pharmacy to dose vancomycin   Does not apply Continuous PRN Jonelle Smith PA-C       • piperacillin-tazobactam (ZOSYN) 3.375 g/100 mL 0.9% NS IVPB (mbp)  3.375 g Intravenous Q8H Jonelle Smith PA-C   3.375 g at 06/13/17 0541   • sodium chloride 0.9 % flush 1-10 mL  1-10 mL Intravenous PRN Jonelle Smith PA-C       • vancomycin (dosing per levels)   Does not apply Daily Johnna Pringle MD       • vancomycin in dextrose 5% 150 mL (VANCOCIN) IVPB 750 mg  750 mg Intravenous Once Brock Hooker MD           Antibiotics:  IV Anti-Infectives     Ordered     Dose/Rate Route Frequency Start Stop    06/12/17 2156  vancomycin in dextrose 5% 150 mL (VANCOCIN) IVPB 750 mg     Ordering Provider:  Brock Hooker MD    750 mg  over 60 Minutes Intravenous Once 06/13/17 1200      06/12/17 2007  piperacillin-tazobactam (ZOSYN) 3.375 g/100 mL 0.9% NS IVPB (mbp)     Ordering Provider:  Jonelle Smith PA-C    3.375 g Intravenous Every 8 Hours 06/12/17 2200      06/12/17 2007  AZITHROMYCIN 500 MG/250 ML 0.9% NS IVPB (MBP)     Ordering Provider:  Jonelle Smith PA-C    500 mg  over 60 Minutes Intravenous Every 24 Hours 06/12/17 2100      06/12/17 2013  vancomycin (dosing per levels)     Ordering Provider:  Johnna Pringle MD     Does not apply Daily 06/12/17 2045      06/12/17 2007  Pharmacy to dose vancomycin     Ordering Provider:  Jonelle Smith PA-C     Does not apply Continuous PRN 06/12/17 2004              Review of Systems    Constitutional-- Intermittent subjective fever but denies chills or sweats.  Appetite good, and no malaise.  Generally she is fatigued  Heent-- No new vision, hearing or throat complaints.  No epistaxis or oral sores.  Denies odynophagia or dysphagia.  No flashers, floaters or eye pain. No odynophagia or dysphagia. No headache, photophobia or neck stiffness.  CV-- No chest pain, palpitation or  "syncope  Resp-- as above  GI- No nausea, vomiting, or diarrhea.  No hematochezia, melena, or hematemesis. Denies jaundice or chronic liver disease.  -- No dysuria, hematuria, or flank pain.  Denies hesitancy, urgency or flank pain.  Lymph- no swollen lymph nodes in neck/axilla or groin.   Heme- No active bruising or bleeding; no Hx of DVT or PE.  MS-- no swelling or pain in the bones or joints of arms/legs.  No new back pain.  Neuro-- No acute focal weakness or numbness in the arms or legs.  No seizures.    Full 12 point review of systems reviewed and negative otherwise for acute complaints, except for above    Physical Exam:   Vital Signs   /84 (BP Location: Left arm, Patient Position: Lying)  Pulse 89  Temp 97.5 °F (36.4 °C) (Oral)   Resp 16  Ht 61\" (154.9 cm)  Wt 97 lb (44 kg)  LMP 12/17/2016  SpO2 100%  BMI 18.33 kg/m2    GENERAL: Awake and alert, in no acute distress.  Nasal cannula oxygen  HEENT: Normocephalic, atraumatic.  PERRL. EOMI. No conjunctival injection. No icterus. Oropharynx clear without evidence of thrush or exudate. No evidence of peridontal disease.    NECK: Supple without nuchal rigidity. No mass.  LYMPH: No cervical, axillary or inguinal lymphadenopathy.  HEART: RRR; No murmur, rubs, gallops.   LUNGS: Diminished more so on right than left with scattered rhonchi/rales. Normal respiratory effort. Nonlabored. No dullness.  ABDOMEN: Soft, nontender, nondistended. Positive bowel sounds. No rebound or guarding. NO mass or HSM.  EXT:  No cyanosis, clubbing or edema. No cord.  : Genitalia generally unremarkable.  Without Burgos catheter.  MSK: FROM without joint effusions noted arms/legs.    SKIN: Warm and dry without cutaneous eruptions on Inspection/palpation.    NEURO: Oriented to PPT. No focal deficits on motor/sensory exam at arms/legs.  PSYCHIATRIC: Normal insight and judgement. Cooperative with PE    Right arm fistula with no redness induration or warmth.  No mass bulge or " fluctuance.  No crepitus or bulla    No peripheral stigmata or phenomena of endocarditis    Laboratory Data      Results from last 7 days  Lab Units 06/13/17  0637 06/12/17  0554 06/11/17  1552   WBC 10*3/mm3 18.25* 11.76* 11.80*   HEMOGLOBIN g/dL 12.0 12.7 13.9   HEMATOCRIT % 38.0 38.2 42.5   PLATELETS 10*3/mm3 224 219 249       Results from last 7 days  Lab Units 06/13/17  0637   SODIUM mmol/L 138   POTASSIUM mmol/L 4.4   CHLORIDE mmol/L 99   TOTAL CO2 mmol/L 24.0   BUN mg/dL 27*   CREATININE mg/dL 5.80*   GLUCOSE mg/dL 105*   CALCIUM mg/dL 8.1*       Results from last 7 days  Lab Units 06/13/17  0637   ALK PHOS U/L 98   BILIRUBIN mg/dL 0.1*   ALT (SGPT) U/L 20   AST (SGOT) U/L 20       Results from last 7 days  Lab Units 06/12/17  1504   SED RATE mm/hr 44*       Results from last 7 days  Lab Units 06/12/17  1504   CRP mg/dL 22.00*       Estimated Creatinine Clearance: 10.2 mL/min (by C-G formula based on Cr of 5.8).      Microbiology:      Radiology:  Imaging Results (last 72 hours)     ** No results found for the last 72 hours. **            Impression:   --Acute loculated right pleural effusion presumed associated with right lower lobe pneumonia.  Empiric antibiotics ongoing for community-acquired/healthcare associated pneumonia.  Cardiothoracic surgery has seen with potential plans for decortication.  Microbiology studies pending.  If not steadily better with this approach, you need to give consideration to further workup such as pulmonary consultation and bronchoscopy.  No opportunistic process so far the patient is aware of.    --History hepatitis C.  I do not treat viral hepatitis as a part of my practice.  She has seen hepatology at [facility] and she should be referred back there after discharge for ongoing care and evaluation regarding possible treatment options.  I discussed with her risks associated with hepatitis C including chronic hepatitis/cirrhosis and hepatocellular carcinoma.  Follow-up  at  is her responsibility.  She is aware    --History C. difficile.  Currently no diarrhea or abdominal complaints.  Monitor for relapse.  She understands she is at risk for relapse.    --End-stage renal disease associated with FSGS; dialysis ongoing    --History polysubstance abuse which she reports is not active.    --History medical noncompliance with multiple episodes of leaving hospital AGAINST MEDICAL ADVICE per outside records.  She understands the implication of her behavior    PLAN: Thank you for asking us to see Mandy Espino, I recommend the following:    --IV vancomycin/Zosyn and Zithromax    --I discussed potential risks and benefits of the prescribed antibiotics that include, but are not limited to, solid organ toxicity, neuro/kaley/vestibular toxicity,  CDiff, cytopenias, hypersensitivity,  etc.. Patient voices understanding and agree to proceed.    --Monitor IV and IV antibiotic with risk for systemic complication and potential drug interaction    --Check/review labs cultures and scans    --Highly complex set of issues with high risk for further serious morbidity and other serious sequela/mortality    --Discussed with microbiology.  Partial history per nursing staff       Fercho Singletary MD  6/13/2017

## 2017-06-13 NOTE — PROGRESS NOTES
Pharmacy consult to dose vancomycin for PNA.  Pt 27 YO, 44.2 kg, and CrCl ~ 6 ml/min.  Due to renal function dosing will be based from random levels (goal 15-20)  An initial dose of 1 G IV was given.    Saul Chauhan [facility]  6/12/2017  8:16 PM

## 2017-06-13 NOTE — PROGRESS NOTES
Discharge Planning Assessment  [facility]     Patient Name: Mandy Espino  MRN: [id number]  Today's Date: 6/13/2017    Admit Date: 6/12/2017          Discharge Needs Assessment       06/13/17 1525    Living Environment    Lives With spouse    Living Arrangements apartment    Home Accessibility no concerns    Type of Financial/Environmental Concern none    Transportation Available car;family or friend will provide    Living Environment    Provides Primary Care For no one, unable/limited ability to care for self    Quality Of Family Relationships unable to assess    Able to Return to Prior Living Arrangements yes    Discharge Needs Assessment    Concerns To Be Addressed discharge planning concerns    Readmission Within The Last 30 Days no previous admission in last 30 days   Transferred to [facility] from [facility]    Outpatient/Agency/Support Group Needs outpatient hemodialysis (specify)    Community Agency Name(S) [facility] in [place], . [phone], fax [phone]    Equipment Currently Used at Home oxygen   Home O2 provided by [facility]    Current Discharge Risk chronically ill;substance abuse;psychiatric illness    Discharge Disposition still a patient    Discharge Contact Information if Applicable Jin Espino, , [phone]            Discharge Plan       06/13/17 1534    Case Management/Social Work Plan    Plan Home    Patient/Family In Agreement With Plan yes    Additional Comments Met with patient in the room to initiate discharge planning. Patient lives with her  in an apartment in [facility]. She is independent with ADLs and wear 3-4 liters home oxygen continuously. She is a hemodialysis patient at [facility] in [place], and, per Cordelia, has an established chair time on M-W-F. She states that patient usually comes to the appointments but will frequently leave before dialysis has been completed. CM will fax the DC summary to [facility] when appropriate and will need to notify [facility] if  patient will be in the hospital longer than a week. Patient denies any discharge needs at this time. Her  will provide her ride home. CM will continue to follow.         Discharge Placement     No information found        Expected Discharge Date and Time     Expected Discharge Date Expected Discharge Time    Jun 16, 2017               Demographic Summary       06/13/17 1521    Referral Information    Referral Source admission list    Reason For Consult discharge planning    Record Reviewed history and physical;medical record;patient profile    Contact Information    Permission Granted to Share Information With ;family/designee   , Jin Espino    Primary Care Physician Information    Name Neo Gresham            Functional Status       06/13/17 1522    Functional Status Prior    Ambulation 0-->independent    Transferring 0-->independent    Toileting 0-->independent    Bathing 0-->independent    Dressing 0-->independent    Eating 0-->independent    Communication 0-->understands/communicates without difficulty    Swallowing 0-->swallows foods/liquids without difficulty    Employment/Financial    Employment/Finance Comments Medical and rx coverage through [employer]; no issues affording meds; uses [facility] in [facility]     None            Abuse/Neglect     None            Legal     None            Substance Abuse     None            Patient Forms     None          Suzie Martinez

## 2017-06-13 NOTE — PAYOR COMM NOTE
"Jermaine Espino (26 y.o. Female)   Anamika Singh, RN  [phone]    Initial Clinicals    Date of Birth Social Security Number Address Home Phone MRN    [id number]  [id number] [place] [address] [phone] [id number]    Restoration Marital Status          None        Admission Date Admission Type Admitting Provider Attending Provider Department, Room/Bed    6/12/17 Elective Kailey Conteh MD Hall, Holly, MD [facility] 3E, S344/1    Discharge Date Discharge Disposition Discharge Destination                      Attending Provider: Kailey Conteh MD     Allergies:  Acetaminophen, Hydrocodone, Ibuprofen, Lortab [Hydrocodone-acetaminophen], Ciprofloxacin    Isolation:  None   Infection:  Other (06/12/17), Hepatitis C (04/19/17), MRSA/History Only (02/03/17)   Code Status:  FULL    Ht:  61\" (154.9 cm)   Wt:  97 lb (44 kg)    Admission Cmt:  None   Principal Problem:  Loculated pleural effusion [J90]                 Active Insurance as of 6/12/2017     Primary Coverage     Payor Plan Insurance Group Employer/Plan Group    [employer] [place] [employer] [place]ICA[place]      Payor Plan Address Payor Plan Phone Number Effective From Effective To    [address] [address] [phone] 6/1/2016     [place] [address]       Subscriber Name Subscriber Birth Date Member ID       JERMAINE ESPINO 1990 [id number]                 Emergency Contacts      (Rel.) Home Phone Work Phone Mobile Phone    Jin Espino (Spouse) [phone] -- --               History & Physical      Brock Hooker MD at 6/12/2017  8:10 PM              [facility]  HISTORY AND PHYSICAL    Primary Care Physician: Neo Gresham DO    Subjective     Chief Complaint: Cough    History of Present Illness:   This is a 26 year old female with extensive past medical history significant for COPD, on 3L supplemental oxygen around the clock, ESRD secondary to FSGS on hemodialysis MWF, " "Hepatitis C, hypertension, anemia of chronic diease, bipolar disorder, depression and IV drug abuse who presents as a transfer from [facility] after being evaluated and treated for pneumonia of right lower lung. Patient initially presented with productive cough of yellow sputum, pleurisy, and SOB X 4 days. Stated nebulizer and oxygen did not help. She is currently being treated with HCAP ABX; Zosyn, Vanc, and zithromax as she is a recent discharge from [facility] 1 month prior for pneumonia of same location. At that time she was discharged home on Augmentin, but states she \"just got worse\". During this admission at [facility],  Pulmonology was consulted concerning right pleural effusion. Patient subsequently underwent ultrasound guided thoracentesis which resulted in 200 mL. Serous fluid was sent to lab for analysis. Thoracentesis also demonstrated multiple loculations. It was determined that patient undergo evaluation per CT surgery for this per Dr. Hicks. She now presents to [facility] for further evaluation and treatment. Patient hemodynamically stable upon arrival. Complaints of back pain and right rib pain. No complaints of chest pain, SOB, or N/V/D.    Review of Systems   Constitutional: Positive for fever.   HENT: Negative for congestion and trouble swallowing.    Eyes: Negative for photophobia and visual disturbance.   Respiratory: Positive for cough and shortness of breath.    Cardiovascular: Negative for chest pain and leg swelling.   Gastrointestinal: Negative for abdominal pain, diarrhea, nausea and vomiting.   Endocrine: Negative for polyphagia and polyuria.   Genitourinary: Negative for dysuria and flank pain.   Musculoskeletal: Positive for arthralgias and back pain. Negative for gait problem, neck pain and neck stiffness.   Skin: Positive for rash. Negative for pallor.   Allergic/Immunologic: Positive for immunocompromised state.   Neurological: Negative for dizziness, syncope, speech difficulty, numbness and " headaches.   Hematological: Negative for adenopathy.   Psychiatric/Behavioral: Negative for agitation and confusion.      Otherwise complete ROS performed and negative except as mentioned in the HPI.    Past Medical History:   Diagnosis Date   • Abdominal pain    • Anemia    • Anxiety    • Asthma    • Bipolar disorder    • CKD (chronic kidney disease), stage IV    • Constipation    • COPD (chronic obstructive pulmonary disease)    • Depression    • End stage renal disease on dialysis    • Esophageal reflux     reflux   • Fahr's syndrome    • Hepatitis C antibody test positive    • Hyperparathyroidism    • Hypertension    • Hypocalcemia    • Nausea and vomiting    • Non-traumatic rhabdomyolysis 5/2/2017   • Pancreatitis    • Pneumonia of right lung due to infectious organism 5/2/2017   • Recurrent urinary tract infection    • Renal insufficiency    • Upper gastrointestinal bleeding        Past Surgical History:   Procedure Laterality Date   • DIALYSIS FISTULA CREATION      port   • DILATATION AND CURETTAGE     • EXPLORATORY LAPAROTOMY      For uterine and ovarian issues   • KIDNEY SURGERY Left 2013    Biopsy       Family History   Problem Relation Age of Onset   • Arthritis Mother    • Hypertension Mother    • Kidney disease Father      Chronic renal failure syndrome   • Pancreatic cancer Father    • Hypertension Brother    • Kidney disease Other    • Diabetes Other      Uncle   • Lung cancer Other      Grandmother   • Hyperlipidemia Other      High cholesterol - Uncle       Social History     Social History   • Marital status:      Spouse name: N/A   • Number of children: N/A   • Years of education: N/A     Occupational History   • Not on file.     Social History Main Topics   • Smoking status: Current Every Day Smoker     Packs/day: 1.50   • Smokeless tobacco: Not on file   • Alcohol use No   • Drug use: Yes     Special: Marijuana   • Sexual activity: Not Currently     Other Topics Concern   • Not on file      Social History Narrative    She is  and not working. She denies alcohol or drug use. She denies recent opiate or benzo use. She does smoke and has used marijuana.        Medications:  Prescriptions Prior to Admission   Medication Sig Dispense Refill Last Dose   • ALPRAZolam (XANAX) 0.5 MG tablet Take 0.5 mg by mouth At Night As Needed for Anxiety or Sleep.   6/10/2017 at Unknown time   • amitriptyline (ELAVIL) 10 MG tablet Take 10 mg by mouth Every Night.   6/10/2017 at Unknown time   • Heparin Sodium, Porcine, (HEPARIN, PORCINE,) 5000 UNIT/ML injection Inject 1 mL under the skin Every 12 (Twelve) Hours.      • methylPREDNISolone sodium succinate (SOLU-Medrol) 40 MG injection Infuse 1 mL into a venous catheter Every 8 (Eight) Hours.      • minoxidil (LONITEN) 10 MG tablet Take 10 mg by mouth Daily.   6/10/2017 at Unknown time   • mupirocin (BACTROBAN) 2 % ointment Apply  topically Every 12 (Twelve) Hours.  0    • pantoprazole (PROTONIX) 40 MG EC tablet Take 1 tablet by mouth Daily.      • piperacillin-tazobactam (ZOSYN) 2-0.25 GM/50ML IVPB Infuse 50 mL into a venous catheter Every 12 (Twelve) Hours. Indications: Pneumonia  0    • vancomycin in dextrose 5% 150 mL (VANCOCIN) 750-5 MG/150ML-% IVPB Infuse 150 mL into a venous catheter As Needed (post dialysis). Indications: Pneumonia  0        Allergies:  Allergies   Allergen Reactions   • Acetaminophen Itching   • Hydrocodone Itching   • Ibuprofen    • Lortab [Hydrocodone-Acetaminophen]      Lortab TABS   • Ciprofloxacin Rash         Objective     Physical Exam:  Vital Signs: BP (!) 161/101 (BP Location: Left arm, Patient Position: Sitting)  Pulse 99  Temp 97.7 °F (36.5 °C) (Oral)   Resp 18  LMP 12/17/2016  SpO2 99%  Physical Exam  Temp:  [97.7 °F (36.5 °C)-98.6 °F (37 °C)] 97.7 °F (36.5 °C)  Heart Rate:  [] 99  Resp:  [18-22] 18  BP: (137-168)/() 161/101  Constitutional: no acute distress, awake, alert, cachectic, herpetic-like rash on  forehead and cheeks bilaterally  Eyes: PERRLA, sclerae anicteric, no conjunctival injection  Neck: supple, no thyromegaly, trachea midline  Respiratory: mild expiratory wheezes, nonlabored respirations   Cardiovascular: tender to palpation of chest wall, RRR, no murmurs, rubs, or gallops, palpable pedal pulses bilaterally  Gastrointestinal: Positive bowel sounds, soft, nontender, nondistended  Musculoskeletal: No bilateral ankle edema, no clubbing or cyanosis to bilateral lower extremities  Psychiatric: oriented x 3, appropriate affect, cooperative  Neurologic: Strength symmetric in all extremities, Cranial Nerves grossly intact to confrontation         Results Reviewed:    Results from last 7 days  Lab Units 06/12/17  0554   WBC 10*3/mm3 11.76*   HEMOGLOBIN g/dL 12.7   PLATELETS 10*3/mm3 219       Results from last 7 days  Lab Units 06/12/17  0554   SODIUM mmol/L 134*   POTASSIUM mmol/L 4.6   TOTAL CO2 mmol/L 25.0*   CREATININE mg/dL 10.70*   GLUCOSE mg/dL 184*   CALCIUM mg/dL 7.7*       I have personally reviewed and interpreted available lab data, radiology studies and ECG obtained at time of admission.     Assessment / Plan     Assessment/Problem List:   Hospital Problem List     * (Principal)Loculated pleural effusion    Pneumonia of right lung due to infectious organism    ESRD (end stage renal disease) on dialysis    Pleural effusion    COPD exacerbation    COPD (chronic obstructive pulmonary disease)    Renovascular hypertension    Hepatitis C antibody positive in blood    Overview Signed 6/12/2017 12:47 PM by Maria G Toscano APRN     Has not followed up with ID          Tobacco use    Bipolar disorder    Depression    Hyperparathyroidism    IV drug abuse    Anemia of chronic disease            Plan:  1. Loculated Pleural Effusion secondary to pneumonia or right lower lung with COPD exacerbation:  - Vitals stable. Oxygen saturation favorable on 94 % on 4L.  - underwent US guided thoracentesis. 200 mL  serous fluid send to lab for analysis.  - awaiting blood cultures  - Consult to CT surgery. Consider Pulmonology consult.  - Consult to ID. For now will continue; Vanc, zithromax, and zosyn  - pulmonary hygiene; duonebs, mucolytics, antitussives, IS  - will discontinue IV steroids  - am labs    2. ESRD secondary to FSGS:  - hemodialysis MWF. Consult nephrology.  - Creatinine at baseline. Monitor.  - avoid nephrotoxic agents    3. Renovascular hypertension:  - stable and controlled. Continue home meds. PRN hydralazine.    4. Hepatitis C:  - History of IV drug abuse. Patient however denies this.  - Failed to follow-up with ID as outpatient    5. Anemia of chronic disease:  - stable at baseline of 12.6-12.7. Monitor.    6. Polysubstance abuse:  - nicotine patch as needed. Counseled patient on the benefits of tobacco abuse cessation.   - history of non compliance and recurrent AMA discharges. Consider case management involvement.   - minimize narcotics and sedatives.    Patient stable for admission to Telemetry. Labs and vitals routine per shira.      DVT prophylaxis: heparin q 12  Code Status: full code  Admission Status: Patient will be admitted to (LEXOBS or LEXINPT)     Jonelle Smith PA-C 06/12/17 8:10 PM     Brief Attending Note       I have seen and examined the patient, performing an independent face-to-face diagnostic evaluation with plan of care reviewed and developed with the advanced practice clinician (APC).       Brief Summary Statement/HPI:   25 yo with history or ESRD, IVDA and recent pneumonia presents as transfer from [facility] with a loculated pleural effusion. Ms Espino underwent ultrasound guided thoracentesis today, cultures pending, but exudative in character. CT surgery evaluation pending for possible VATS/thoracotomy.         Attending Physical Exam:  Thin female  Fistula RUE  Gen-no acute distress, non toxic - somewhat somnolent  CV-RRR, S1 S2 normal, no m/r/g  Resp-Diminished right  base  Abd-soft, Non tender, Non distended, normo active bowel sounds  Ext-No lower extremity cyanosis clubbing or edema bilaterlly  Neuro-moves all extremities  Psych-normal affect   Skin- some skin lesions on chin and forehead        Brief Assessment/Plan:    Loculated parapneumonic effusion  - continue empiric antibiotics  - CT surgery and ID consultations am    ESRD  - continue dialysis inpatient, will alert Nephrology in am    COPD  - ongoing tobacco abuse    Tobacco abuse  - counseled cessation, nicotine replacement    Hepatitis C    HTN    Substance abuse  -  to visit    Anemia    Bipolar disorder    VTe proph: heparin        For additional information see above per APC which I have reviewed and/or edited.      Brock Hooker MD  06/12/17  9:53 PM                  Electronically signed by Brock Hooker MD at 6/12/2017 10:09 PM        Vital Signs (last 24 hours)       06/12 0700  -  06/13 0659 06/13 0700  -  06/13 1311   Most Recent    Temp (°F) 96.9 -  98.2       97.5 (36.4)    Heart Rate 89 -  100       89    Resp   18      16     16    /86 -  (!) 161/101      119/63     119/63    SpO2 (%) 97 -  100       100          Hospital Medications (active)       Dose Frequency Start End    ALPRAZolam (XANAX) tablet 0.5 mg 0.5 mg 2 Times Daily PRN 6/12/2017     Sig - Route: Take 1 tablet by mouth 2 (Two) Times a Day As Needed for Anxiety. - Oral    amitriptyline (ELAVIL) tablet 10 mg 10 mg Nightly 6/12/2017     Sig - Route: Take 1 tablet by mouth Every Night. - Oral    AZITHROMYCIN 500 MG/250 ML 0.9% NS IVPB (MBP) 500 mg Every 24 Hours 6/12/2017     Sig - Route: Infuse 250 mL into a venous catheter Daily. - Intravenous    benzonatate (TESSALON) capsule 100 mg 100 mg 3 Times Daily PRN 6/12/2017     Sig - Route: Take 1 capsule by mouth 3 (Three) Times a Day As Needed for Cough. - Oral    docusate sodium (COLACE) capsule 100 mg 100 mg 2 Times Daily 6/12/2017     Sig - Route: Take 1 capsule by mouth  "2 (Two) Times a Day. - Oral    heparin (porcine) 5000 UNIT/ML injection 5,000 Units 5,000 Units Every 12 Hours Scheduled 6/12/2017     Sig - Route: Inject 1 mL under the skin Every 12 (Twelve) Hours. - Subcutaneous    hydrALAZINE (APRESOLINE) injection 10 mg 10 mg Every 6 Hours PRN 6/12/2017     Sig - Route: Infuse 0.5 mL into a venous catheter Every 6 (Six) Hours As Needed for High Blood Pressure (SBP>160 DBP>100). - Intravenous    HYDROmorphone (DILAUDID) injection 1 mg 1 mg Every 4 Hours PRN 6/13/2017 6/23/2017    Sig - Route: Infuse 1 mg into a venous catheter Every 4 (Four) Hours As Needed for Severe Pain (7-10). - Intravenous    ipratropium-albuterol (DUO-NEB) nebulizer solution 3 mL 3 mL 4 Times Daily - RT 6/12/2017     Sig - Route: Take 3 mL by nebulization 4 (Four) Times a Day. - Nebulization    lidocaine (LIDODERM) 5 % 2 patch 2 patch Every 24 Hours Scheduled 6/13/2017     Sig - Route: Place 2 patches on the skin Daily. - Transdermal    melatonin sublingual tablet 5 mg 5 mg Nightly PRN 6/12/2017     Sig - Route: Place 1 tablet under the tongue At Night As Needed (sleep). - Sublingual    minoxidil (LONITEN) tablet 10 mg 10 mg Daily 6/13/2017     Sig - Route: Take 1 tablet by mouth Daily. - Oral    nicotine (NICODERM CQ) 21 MG/24HR patch 1 patch 1 patch Daily 6/13/2017     Sig - Route: Place 1 patch on the skin Daily. - Transdermal    nicotine (NICODERM CQ) 21 MG/24HR patch 1 patch 1 patch Once 6/12/2017     Sig - Route: Place 1 patch on the skin 1 (One) Time. - Transdermal    ondansetron (ZOFRAN) injection 4 mg 4 mg Every 6 Hours PRN 6/12/2017     Sig - Route: Infuse 2 mL into a venous catheter Every 6 (Six) Hours As Needed for Nausea or Vomiting. - Intravenous    Linked Group 1:  \"Or\" Linked Group Details        ondansetron (ZOFRAN) tablet 4 mg 4 mg Every 6 Hours PRN 6/12/2017     Sig - Route: Take 1 tablet by mouth Every 6 (Six) Hours As Needed for Nausea or Vomiting. - Oral    Linked Group 1:  \"Or\" " Linked Group Details        Pharmacy to dose vancomycin  Continuous PRN 6/12/2017     Sig - Route: Continuous As Needed for Consult. - Does not apply    piperacillin-tazobactam (ZOSYN) 3.375 g/100 mL 0.9% NS IVPB (mbp) 3.375 g Every 8 Hours 6/12/2017     Sig - Route: Infuse 100 mL into a venous catheter Every 8 (Eight) Hours. - Intravenous    sodium chloride 0.9 % flush 1-10 mL 1-10 mL As Needed 6/12/2017     Sig - Route: Infuse 1-10 mL into a venous catheter As Needed for Line Care. - Intravenous    vancomycin (dosing per levels)  Daily 6/12/2017     Sig - Route: Daily. - Does not apply    vancomycin in dextrose 5% 150 mL (VANCOCIN) IVPB 750 mg 750 mg Once 6/13/2017 6/13/2017    Sig - Route: Infuse 150 mL into a venous catheter 1 (One) Time. - Intravenous    ALPRAZolam (XANAX) tablet 0.5 mg (Discontinued) 0.5 mg Nightly PRN 6/11/2017 6/12/2017    Sig - Route: Take 1 tablet by mouth At Night As Needed for Anxiety or Sleep. - Oral    Reason for Discontinue: Patient Discharge    ALPRAZolam (XANAX) tablet 0.5 mg (Discontinued) 0.5 mg Nightly PRN 6/12/2017 6/12/2017    Sig - Route: Take 1 tablet by mouth At Night As Needed for Anxiety. - Oral    amitriptyline (ELAVIL) tablet 10 mg (Discontinued) 10 mg Nightly 6/11/2017 6/12/2017    Sig - Route: Take 1 tablet by mouth Every Night. - Oral    Reason for Discontinue: Patient Discharge    heparin (porcine) 5000 UNIT/ML injection 5,000 Units (Discontinued) 5,000 Units Every 12 Hours Scheduled 6/11/2017 6/12/2017    Sig - Route: Inject 1 mL under the skin Every 12 (Twelve) Hours. - Subcutaneous    Reason for Discontinue: Patient Discharge    ipratropium-albuterol (DUO-NEB) nebulizer solution 3 mL (Discontinued) 3 mL Every 6 Hours - RT 6/12/2017 6/12/2017    Sig - Route: Take 3 mL by nebulization Every 6 (Six) Hours. - Nebulization    Reason for Discontinue: Patient Discharge    methylPREDNISolone sodium succinate (SOLU-Medrol) injection 40 mg (Discontinued) 40 mg Every 8 Hours  "6/12/2017 6/12/2017    Sig - Route: Infuse 1 mL into a venous catheter Every 8 (Eight) Hours. - Intravenous    minoxidil (LONITEN) tablet 10 mg (Discontinued) 10 mg Daily 6/12/2017 6/12/2017    Sig - Route: Take 4 tablets by mouth Daily. - Oral    Reason for Discontinue: Patient Discharge    morphine injection 2 mg (Discontinued) 2 mg Every 4 Hours PRN 6/11/2017 6/12/2017    Sig - Route: Infuse 1 mL into a venous catheter Every 4 (Four) Hours As Needed for Moderate Pain (4-6). - Intravenous    Reason for Discontinue: Patient Discharge    Linked Group 2:  \"And\" Linked Group Details        Morphine sulfate (PF) injection 1 mg (Discontinued) 1 mg Every 4 Hours PRN 6/12/2017 6/13/2017    Sig - Route: Infuse 0.5 mL into a venous catheter Every 4 (Four) Hours As Needed for Severe Pain (7-10). - Intravenous    mupirocin (BACTROBAN) 2 % ointment (Discontinued)  Every 12 Hours Scheduled 6/11/2017 6/12/2017    Sig - Route: Apply  topically Every 12 (Twelve) Hours. - Topical    Reason for Discontinue: Patient Discharge    naloxone (NARCAN) injection 0.4 mg (Discontinued) 0.4 mg Every 5 Minutes PRN 6/11/2017 6/12/2017    Sig - Route: Infuse 1 mL into a venous catheter Every 5 (Five) Minutes As Needed for Respiratory Depression. - Intravenous    Reason for Discontinue: Patient Discharge    Linked Group 2:  \"And\" Linked Group Details        ondansetron (ZOFRAN) injection 4 mg (Discontinued) 4 mg Every 6 Hours PRN 6/11/2017 6/12/2017    Sig - Route: Infuse 2 mL into a venous catheter Every 6 (Six) Hours As Needed for Nausea or Vomiting. - Intravenous    Reason for Discontinue: Patient Discharge    oxyCODONE-acetaminophen (PERCOCET) 5-325 MG per tablet 1 tablet (Discontinued) 1 tablet Every 4 Hours PRN 6/12/2017 6/13/2017    Sig - Route: Take 1 tablet by mouth Every 4 (Four) Hours As Needed for Moderate Pain (4-6). - Oral    Linked Group 3:  \"Followed by\" Linked Group Details        oxyCODONE-acetaminophen (PERCOCET) 5-325 MG per " "tablet 2 tablet (Discontinued) 2 tablet Every 4 Hours PRN 6/22/2017 6/13/2017    Sig - Route: Take 2 tablets by mouth Every 4 (Four) Hours As Needed for Severe Pain (7-10). - Oral    Linked Group 3:  \"Followed by\" Linked Group Details        pantoprazole (PROTONIX) EC tablet 40 mg (Discontinued) 40 mg Every Early Morning 6/12/2017 6/12/2017    Sig - Route: Take 1 tablet by mouth Every Morning. - Oral    Reason for Discontinue: Patient Discharge    Pharmacy to dose vancomycin (Discontinued)  Continuous PRN 6/11/2017 6/12/2017    Sig - Route: Continuous As Needed for Consult. - Does not apply    Reason for Discontinue: Patient Discharge    piperacillin-tazobactam (ZOSYN) in iso-osmotic dextrose IVPB 2.25 g (premix) (Discontinued) 2.25 g Every 12 Hours 6/12/2017 6/12/2017    Sig - Route: Infuse 50 mL into a venous catheter Every 12 (Twelve) Hours. - Intravenous    Reason for Discontinue: Patient Discharge    sodium chloride 0.9 % flush 1-10 mL (Discontinued) 1-10 mL As Needed 6/11/2017 6/12/2017    Sig - Route: Infuse 1-10 mL into a venous catheter As Needed for Line Care. - Intravenous    Reason for Discontinue: Patient Discharge    vancomycin in dextrose 5% 150 mL (VANCOCIN) IVPB 750 mg (Discontinued) 750 mg As Needed 6/12/2017 6/12/2017    Sig - Route: Infuse 150 mL into a venous catheter As Needed (post dialysis). - Intravenous    Reason for Discontinue: Patient Discharge    vancomycin IVPB 1250 mg in 250 mL NS (premix) (Discontinued) 1,250 mg Every 12 Hours 6/12/2017 6/12/2017    Sig - Route: Infuse 250 mL into a venous catheter Every 12 (Twelve) Hours. - Intravenous    Reason for Discontinue: Duplicate order            Lab Results (last 24 hours)     Procedure Component Value Units Date/Time    Vancomycin, Random [[id number]]  (Normal) Collected:  06/12/17 2027    Specimen:  Blood Updated:  06/12/17 2123     Vancomycin Random 20.90 mcg/mL     CBC Auto Differential [[id number]]  (Abnormal) Collected:  06/13/17 0637 "    Specimen:  Blood Updated:  06/13/17 0724     WBC 18.25 (H) 10*3/mm3      RBC 4.15 10*6/mm3      Hemoglobin 12.0 g/dL      Hematocrit 38.0 %      MCV 91.6 fL      MCH 28.9 pg      MCHC 31.6 (L) g/dL      RDW 17.0 (H) %      RDW-SD 57.1 (H) fl      MPV 8.8 fL      Platelets 224 10*3/mm3      Neutrophil % 87.8 (H) %      Lymphocyte % 7.0 (L) %      Monocyte % 4.6 %      Eosinophil % 0.0 %      Basophil % 0.1 %      Immature Grans % 0.5 %      Neutrophils, Absolute 16.02 (H) 10*3/mm3      Lymphocytes, Absolute 1.28 10*3/mm3      Monocytes, Absolute 0.84 10*3/mm3      Eosinophils, Absolute 0.00 (L) 10*3/mm3      Basophils, Absolute 0.01 10*3/mm3      Immature Grans, Absolute 0.10 (H) 10*3/mm3     Comprehensive Metabolic Panel [[id number]]  (Abnormal) Collected:  06/13/17 0637    Specimen:  Blood Updated:  06/13/17 0730     Glucose 105 (H) mg/dL      BUN 27 (H) mg/dL      Creatinine 5.80 (H) mg/dL      Sodium 138 mmol/L      Potassium 4.4 mmol/L      Chloride 99 mmol/L      CO2 24.0 mmol/L      Calcium 8.1 (L) mg/dL      Total Protein 6.6 g/dL      Albumin 3.70 g/dL      ALT (SGPT) 20 U/L      AST (SGOT) 20 U/L      Alkaline Phosphatase 98 U/L      Total Bilirubin 0.1 (L) mg/dL      eGFR Non African Amer 9 (L) mL/min/1.73      Globulin 2.9 gm/dL      A/G Ratio 1.3 (L) g/dL      BUN/Creatinine Ratio 4.7 (L)     Anion Gap 15.0 (H) mmol/L     Narrative:       National Kid[employer] Guidelines    Stage     Description        GFR  1         Normal or High     90+  2         Mild decrease      60-89  3         Moderate decrease  30-59  4         Severe decrease    15-29  5         Kidney failure     <15    HIV-1 / O / 2 Ag / Antibody 4th Generation [[id number]]  (Normal) Collected:  06/13/17 0730    Specimen:  Blood Updated:  06/13/17 1104     HIV-1/ HIV-2 Non-Reactive    Narrative:       The HIV antigen/antibody combo assay is a qualitative assay for HIV that includes the p24 antigen as well as antibodies to HIV types 1  and 2.  The assay is intended to be used as a screening assay in the diagnosis of HIV infection in pediatric and adult populations, but has not been validated for children under age 2.  A reactive result does not distinguish HIV-1 p24 antigen, HIV-1 antibody, HIV-2 antibody, and HIV-1 group O antibody.  A positive result will be reflexed to a follow up immunoassay for antibody differentiation.    Respiratory Panel, PCR [[id number]] Collected:  06/13/17 1217    Specimen:  Swab from Nasopharynx Updated:  06/13/17 1217        Imaging Results (last 24 hours)     ** No results found for the last 24 hours. **        Operative/Procedure Notes (all)     No notes of this type exist for this encounter.        Physician Progress Notes (last 24 hours) (Notes from 6/12/2017  1:12 PM through 6/13/2017  1:12 PM)     No notes of this type exist for this encounter.           Consult Notes (last 24 hours) (Notes from 6/12/2017  1:12 PM through 6/13/2017  1:12 PM)      Shawn Hicks MD at 6/13/2017  7:04 AM  Version 1 of 1     Consult Orders:    1. Inpatient Consult to Cardiothoracic Surgery [[id number]] ordered by Brock Hooker MD at 06/12/17 2152                  Referring Provider:  Reason for Consultation: Shortness of breath    Patient Care Team:  Neo Gresham DO as PCP - General (Family Medicine)    Chief complaint shortness of breath    .     History of present illness: I have been asked to see this patient by Dr. Hooker. The patient has been transferred from [facility] because of a loculated right pleural effusion. Patient has a long extensive medical history. She has had a persistent right pleural effusion for several weeks. She is been very short of breath. She has been on oxygen. They attempted to do a right thoracentesis only got 200 mL out. Cause of this patient is a decortication is indicated    Review of Systems    The following systems were reviewed and negative;  constitution, eyes, ENT,  cardiovascular, gastrointestinal, genitourinary, integument, breast, hematologic / lymphatic, musculoskeletal, neurological, behavioral/psych, endocrine and allergies / immunologic    History  Past Medical History:   Diagnosis Date   • Abdominal pain    • Anemia    • Anxiety    • Asthma    • Bipolar disorder    • CKD (chronic kidney disease), stage IV    • Constipation    • COPD (chronic obstructive pulmonary disease)    • Depression    • End stage renal disease on dialysis    • Esophageal reflux     reflux   • Fahr's syndrome    • Hepatitis C antibody test positive    • Hyperparathyroidism    • Hypertension    • Hypocalcemia    • Nausea and vomiting    • Non-traumatic rhabdomyolysis 5/2/2017   • Pancreatitis    • Pneumonia of right lung due to infectious organism 5/2/2017   • Recurrent urinary tract infection    • Renal insufficiency    • Upper gastrointestinal bleeding    , Past Surgical History:   Procedure Laterality Date   • DIALYSIS FISTULA CREATION      port   • DILATATION AND CURETTAGE     • EXPLORATORY LAPAROTOMY      For uterine and ovarian issues   • KIDNEY SURGERY Left 2013    Biopsy   , Family History   Problem Relation Age of Onset   • Arthritis Mother    • Hypertension Mother    • Kidney disease Father      Chronic renal failure syndrome   • Pancreatic cancer Father    • Hypertension Brother    • Kidney disease Other    • Diabetes Other      Uncle   • Lung cancer Other      Grandmother   • Hyperlipidemia Other      High cholesterol - Uncle   , Social History   Substance Use Topics   • Smoking status: Current Every Day Smoker     Packs/day: 1.50   • Smokeless tobacco: None   • Alcohol use No   , Prescriptions Prior to Admission   Medication Sig Dispense Refill Last Dose   • ALPRAZolam (XANAX) 0.5 MG tablet Take 0.5 mg by mouth At Night As Needed for Anxiety or Sleep.   6/10/2017 at Unknown time   • amitriptyline (ELAVIL) 10 MG tablet Take 10 mg by mouth Every Night.   6/10/2017 at Unknown time   •  Heparin Sodium, Porcine, (HEPARIN, PORCINE,) 5000 UNIT/ML injection Inject 1 mL under the skin Every 12 (Twelve) Hours.      • methylPREDNISolone sodium succinate (SOLU-Medrol) 40 MG injection Infuse 1 mL into a venous catheter Every 8 (Eight) Hours.      • minoxidil (LONITEN) 10 MG tablet Take 10 mg by mouth Daily.   6/10/2017 at Unknown time   • mupirocin (BACTROBAN) 2 % ointment Apply  topically Every 12 (Twelve) Hours.  0    • pantoprazole (PROTONIX) 40 MG EC tablet Take 1 tablet by mouth Daily.      • piperacillin-tazobactam (ZOSYN) 2-0.25 GM/50ML IVPB Infuse 50 mL into a venous catheter Every 12 (Twelve) Hours. Indications: Pneumonia  0    • vancomycin in dextrose 5% 150 mL (VANCOCIN) 750-5 MG/150ML-% IVPB Infuse 150 mL into a venous catheter As Needed (post dialysis). Indications: Pneumonia  0    , Scheduled Meds:    amitriptyline 10 mg Oral Nightly   azithromycin 500 mg Intravenous Q24H   docusate sodium 100 mg Oral BID   heparin (porcine) 5,000 Units Subcutaneous Q12H   ipratropium-albuterol 3 mL Nebulization 4x Daily - RT   minoxidil 10 mg Oral Daily   nicotine 1 patch Transdermal Daily   nicotine 1 patch Transdermal Once   piperacillin-tazobactam 3.375 g Intravenous Q8H   vancomycin (dosing per levels)  Does not apply Daily   vancomycin 750 mg Intravenous Once   , Continuous Infusions:    Pharmacy to dose vancomycin    , PRN Meds:  •  ALPRAZolam  •  benzonatate  •  hydrALAZINE  •  melatonin  •  Morphine  •  ondansetron **OR** ondansetron  •  oxyCODONE-acetaminophen **FOLLOWED BY** [START ON 6/22/2017] oxyCODONE-acetaminophen  •  Pharmacy to dose vancomycin  •  sodium chloride and Allergies:  Acetaminophen; Hydrocodone; Ibuprofen; Lortab [hydrocodone-acetaminophen]; and Ciprofloxacin    Objective     Vital Sign Min/Max for last 24 hours  Temp  Min: 96.9 °F (36.1 °C)  Max: 98.2 °F (36.8 °C)   BP  Min: 137/86  Max: 161/98   Pulse  Min: 89  Max: 100   Resp  Min: 18  Max: 18   SpO2  Min: 97 %  Max: 100 %  "  Flow (L/min)  Min: 4  Max: 4   Weight  Min: 97 lb (44 kg)  Max: 97 lb (44 kg)     Flowsheet Rows         First Filed Value    Admission Height  61\" (154.9 cm) Documented at 06/13/2017 0525    Admission Weight  97 lb (44 kg) Documented at 06/13/2017 0525               Physical Exam:     General Appearance:    Alert, cooperative, in no acute distress   Head:    Normocephalic, without obvious abnormality, atraumatic   Eyes:            Lids and lashes normal, conjunctivae and sclerae normal, no   icterus, no pallor, corneas clear, PERRLA   Ears:    Ears appear intact with no abnormalities noted   Throat:   No oral lesions, no thrush, oral mucosa moist   Neck:   No adenopathy, supple, trachea midline, no thyromegaly, no   carotid bruit, no JVD   Back:     No kyphosis present, no scoliosis present, no skin lesions,      erythema or scars, no tenderness to percussion or                   palpation,   range of motion normal   Lungs:     Decreased in the right base,respirations regular, even and                  unlabored    Heart:    Regular rhythm and normal rate, normal S1 and S2, no            murmur, no gallop, no rub, no click   Chest Wall:    No abnormalities observed   Abdomen:     Normal bowel sounds, no masses, no organomegaly, soft        non-tender, non-distended, no guarding, no rebound                tenderness   Rectal:     Deferred   Extremities:   Moves all extremities well, no edema, no cyanosis, no             redness   Pulses:   Pulses palpable and equal bilaterally   Skin:   No bleeding, bruising or rash   Lymph nodes:   No palpable adenopathy   Neurologic:   Cranial nerves 2 - 12 grossly intact, sensation intact, DTR       present and equal bilaterally             Assessment/Plan     Principal Problem:    Loculated pleural effusion  Active Problems:    Tobacco use    Bipolar disorder    Depression    Hyperparathyroidism    Pneumonia of right lung due to infectious organism    COPD (chronic obstructive " pulmonary disease)    ESRD (end stage renal disease) on dialysis    Renovascular hypertension    Hepatitis C antibody positive in blood    IV drug abuse    Anemia of chronic disease    Pleural effusion    COPD exacerbation      Patient is a very complicated female who presents now with a loculated right pleural effusion. This is not been amenable to thoracentesis. Abdomen acid patient guards possible decortication. The patient has an extensive medical history involvement hepatitis C end stage renal function with dialysis bipolar disorder hyperparathyroidism COPD IV drug abuse COPD. After review of all these problems. As feel that probably a decortication is indicated. She will be elevated surgical risk because of all the above. Of explained the operation risk alternatives. Of explained the risk which includes wrist her life bleeding infection strokes heart attack chronic pain other risk factors. She appears to understand all the above and desires to proceed. We will await the evaluation cardiology and infectious disease. Like thank this consultation.    I discussed the patients findings and my recommendations with patient, nursing staff and consulting provider      Please note that portions of this note were completed with a voice recognition program. Efforts were made to edit the dictations, but words may be mistranscribed    Shawn Hicks MD  06/13/17  7:04 AM                 Electronically signed by Shawn Hicks MD at 6/13/2017  7:07 AM      Fercho Singletary MD at 6/13/2017  9:34 AM  Version 1 of 1     Consult Orders:    1. Inpatient Consult to Infectious Diseases [[id number]] ordered by Brock Hooker MD at 06/12/17 2152                Mandy Litaasia Espino  1990  [id number]    Date of Consult: 6/13/2017 6/12/2017      Requesting Provider: Johnna Pringle MD  Evaluating Physician: Fercho Singletary MD    Chief Complaint: Cough/shortness of breath and right sided pleurisy    Reason for  Consultation: Pneumonia with loculated pleural effusion    History of present illness:    Patient is a 26 y.o.  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 difficile treated approximately January 2017, end-stage renal disease possibly from FSGS per past records, and numerous other comorbidity as outlined below.  She has a history of medical noncompliance and apparently has left hospital on multiple occasions AGAINST MEDICAL ADVICE per outside notes.  She was apparently admitted mid May with diagnosis pneumonia and treated with empiric antibiotics but no specific microbiologic diagnosis.  She is readmitted to [facility] with diagnosis right sided pneumonia and loculated pleural effusion associated with dyspnea/cough and right-sided pleurisy.  She was transferred to [facility] for consideration of decortication.  Of particular note, when she has done being treated at [facility], she prefers referral back to her infectious disease doctor, Dr. Granados.    She has right-sided pleurisy, worse with deep breath and sharp but intermittent.  Can be 10 out of 10 when it comes most severely.  Better with pain meds.  She has shortness of breath and intermittently productive cough but no hemoptysis and currently on nasal cannula oxygen.  No headache photophobia or neck stiffness.  No nausea vomiting diarrhea or abdominal pain.  Some intermittent urine production.  He gets dialysis via a right arm access fistula.  No rash.    No raw or undercooked food.  No unpasteurized milk or milk products.  No animal insect or arthropod exposure.  No tick bites.  No outdoor camping or hunting exposure.  No travel exposure.  No ill exposure.  No history of TB or TB exposure.   Denies a history of MRSA/VRE and no history of  ESBL/KPC   organisms.      Past Medical History:   Diagnosis Date   • Abdominal pain    • Anemia    • Anxiety    • Asthma    • Bipolar disorder    • CKD (chronic kidney disease), stage IV    • Constipation    • COPD (chronic obstructive pulmonary disease)    • Depression    • End stage renal disease on dialysis    • Esophageal reflux     reflux   • Fahr's syndrome    • Hepatitis C antibody test positive    • Hyperparathyroidism    • Hypertension    • Hypocalcemia    • Nausea and vomiting    • Non-traumatic rhabdomyolysis 5/2/2017   • Pancreatitis    • Pneumonia of right lung due to infectious organism 5/2/2017   • Recurrent urinary tract infection    • Renal insufficiency    • Upper gastrointestinal bleeding        Past Surgical History:   Procedure Laterality Date   • DIALYSIS FISTULA CREATION      port   • DILATATION AND CURETTAGE     • EXPLORATORY LAPAROTOMY      For uterine and ovarian issues   • KIDNEY SURGERY Left 2013    Biopsy       Pediatric History   Patient Guardian Status   • Not on file.     Other Topics Concern   • Not on file     Social History Narrative    She is  and not working. She denies alcohol or drug use. She denies recent opiate or benzo use. She does smoke and has used marijuana.        family history includes Arthritis in her mother; Diabetes in her other; Hyperlipidemia in her other; Hypertension in her brother and mother; Kidney disease in her father and other; Lung cancer in her other; Pancreatic cancer in her father.    Allergies   Allergen Reactions   • Acetaminophen Itching   • Hydrocodone Itching   • Ibuprofen    • Lortab [Hydrocodone-Acetaminophen]      Lortab TABS   • Ciprofloxacin Rash       Medication:  Current Facility-Administered Medications   Medication Dose Route Frequency Provider Last Rate Last Dose   • ALPRAZolam (XANAX) tablet 0.5 mg  0.5 mg Oral BID PRN Brock Hooker MD   0.5 mg at 06/13/17 0814   • amitriptyline (ELAVIL) tablet 10 mg  10 mg Oral Nightly Brock Hooker  MD   10 mg at 06/12/17 2255   • AZITHROMYCIN 500 MG/250 ML 0.9% NS IVPB (MBP)  500 mg Intravenous Q24H Jonelle Smith PA-C   500 mg at 06/12/17 2142   • benzonatate (TESSALON) capsule 100 mg  100 mg Oral TID PRN Jonelle Smith PA-C       • docusate sodium (COLACE) capsule 100 mg  100 mg Oral BID Jonelle Smith PA-C   100 mg at 06/13/17 0810   • heparin (porcine) 5000 UNIT/ML injection 5,000 Units  5,000 Units Subcutaneous Q12H Jonelle Smith PA-C   5,000 Units at 06/13/17 0810   • hydrALAZINE (APRESOLINE) injection 10 mg  10 mg Intravenous Q6H PRN Jonelle Smith PA-C       • ipratropium-albuterol (DUO-NEB) nebulizer solution 3 mL  3 mL Nebulization 4x Daily - RT Jonelle Smith PA-C   3 mL at 06/13/17 0800   • melatonin sublingual tablet 5 mg  5 mg Sublingual Nightly PRN Jonelle Smith PA-C       • minoxidil (LONITEN) tablet 10 mg  10 mg Oral Daily Brock Hooker MD   10 mg at 06/13/17 0810   • Morphine sulfate (PF) injection 1 mg  1 mg Intravenous Q4H PRN Brock Hooker MD   1 mg at 06/13/17 0745   • nicotine (NICODERM CQ) 21 MG/24HR patch 1 patch  1 patch Transdermal Daily Brock Hooker MD   1 patch at 06/13/17 0809   • nicotine (NICODERM CQ) 21 MG/24HR patch 1 patch  1 patch Transdermal Once Brock Hooker MD   1 patch at 06/12/17 2255   • ondansetron (ZOFRAN) tablet 4 mg  4 mg Oral Q6H PRN Jonelle Smith PA-C        Or   • ondansetron (ZOFRAN) injection 4 mg  4 mg Intravenous Q6H PRN Jonelle Smith PA-C       • oxyCODONE-acetaminophen (PERCOCET) 5-325 MG per tablet 1 tablet  1 tablet Oral Q4H PRN Brock Hooker MD        Followed by   • [START ON 6/22/2017] oxyCODONE-acetaminophen (PERCOCET) 5-325 MG per tablet 2 tablet  2 tablet Oral Q4H PRN Brock Hooker MD   2 tablet at 06/13/17 0450   • Pharmacy to dose vancomycin   Does not apply Continuous PRN Jonelle Smith PA-C       • piperacillin-tazobactam (ZOSYN) 3.375 g/100 mL 0.9% NS IVPB (mbp)  3.375 g Intravenous Q8H Jonelle  LENY Smith PA-C   3.375 g at 06/13/17 0541   • sodium chloride 0.9 % flush 1-10 mL  1-10 mL Intravenous PRN Jonelle Smith PA-C       • vancomycin (dosing per levels)   Does not apply Daily Johnna Pringle MD       • vancomycin in dextrose 5% 150 mL (VANCOCIN) IVPB 750 mg  750 mg Intravenous Once Brock Hooker MD           Antibiotics:  IV Anti-Infectives     Ordered     Dose/Rate Route Frequency Start Stop    06/12/17 2156  vancomycin in dextrose 5% 150 mL (VANCOCIN) IVPB 750 mg     Ordering Provider:  Brock Hooker MD    750 mg  over 60 Minutes Intravenous Once 06/13/17 1200      06/12/17 2007  piperacillin-tazobactam (ZOSYN) 3.375 g/100 mL 0.9% NS IVPB (mbp)     Ordering Provider:  Jonelle Smith PA-C    3.375 g Intravenous Every 8 Hours 06/12/17 2200      06/12/17 2007  AZITHROMYCIN 500 MG/250 ML 0.9% NS IVPB (MBP)     Ordering Provider:  Jonelle Smith PA-C    500 mg  over 60 Minutes Intravenous Every 24 Hours 06/12/17 2100      06/12/17 2013  vancomycin (dosing per levels)     Ordering Provider:  Johnna Pringle MD     Does not apply Daily 06/12/17 2045      06/12/17 2007  Pharmacy to dose vancomycin     Ordering Provider:  Jonelle Smith PA-C     Does not apply Continuous PRN 06/12/17 2004              Review of Systems    Constitutional-- Intermittent subjective fever but denies chills or sweats.  Appetite good, and no malaise.  Generally she is fatigued  Heent-- No new vision, hearing or throat complaints.  No epistaxis or oral sores.  Denies odynophagia or dysphagia.  No flashers, floaters or eye pain. No odynophagia or dysphagia. No headache, photophobia or neck stiffness.  CV-- No chest pain, palpitation or syncope  Resp-- as above  GI- No nausea, vomiting, or diarrhea.  No hematochezia, melena, or hematemesis. Denies jaundice or chronic liver disease.  -- No dysuria, hematuria, or flank pain.  Denies hesitancy, urgency or flank pain.  Lymph- no swollen lymph nodes in neck/axilla or  "groin.   Heme- No active bruising or bleeding; no Hx of DVT or PE.  MS-- no swelling or pain in the bones or joints of arms/legs.  No new back pain.  Neuro-- No acute focal weakness or numbness in the arms or legs.  No seizures.    Full 12 point review of systems reviewed and negative otherwise for acute complaints, except for above    Physical Exam:   Vital Signs   /84 (BP Location: Left arm, Patient Position: Lying)  Pulse 89  Temp 97.5 °F (36.4 °C) (Oral)   Resp 16  Ht 61\" (154.9 cm)  Wt 97 lb (44 kg)  LMP 12/17/2016  SpO2 100%  BMI 18.33 kg/m2    GENERAL: Awake and alert, in no acute distress.  Nasal cannula oxygen  HEENT: Normocephalic, atraumatic.  PERRL. EOMI. No conjunctival injection. No icterus. Oropharynx clear without evidence of thrush or exudate. No evidence of peridontal disease.    NECK: Supple without nuchal rigidity. No mass.  LYMPH: No cervical, axillary or inguinal lymphadenopathy.  HEART: RRR; No murmur, rubs, gallops.   LUNGS: Diminished more so on right than left with scattered rhonchi/rales. Normal respiratory effort. Nonlabored. No dullness.  ABDOMEN: Soft, nontender, nondistended. Positive bowel sounds. No rebound or guarding. NO mass or HSM.  EXT:  No cyanosis, clubbing or edema. No cord.  : Genitalia generally unremarkable.  Without Burgos catheter.  MSK: FROM without joint effusions noted arms/legs.    SKIN: Warm and dry without cutaneous eruptions on Inspection/palpation.    NEURO: Oriented to PPT. No focal deficits on motor/sensory exam at arms/legs.  PSYCHIATRIC: Normal insight and judgement. Cooperative with PE    Right arm fistula with no redness induration or warmth.  No mass bulge or fluctuance.  No crepitus or bulla    No peripheral stigmata or phenomena of endocarditis    Laboratory Data      Results from last 7 days  Lab Units 06/13/17  0637 06/12/17  0554 06/11/17  1552   WBC 10*3/mm3 18.25* 11.76* 11.80*   HEMOGLOBIN g/dL 12.0 12.7 13.9   HEMATOCRIT % 38.0 38.2 " 42.5   PLATELETS 10*3/mm3 224 219 249       Results from last 7 days  Lab Units 06/13/17  0637   SODIUM mmol/L 138   POTASSIUM mmol/L 4.4   CHLORIDE mmol/L 99   TOTAL CO2 mmol/L 24.0   BUN mg/dL 27*   CREATININE mg/dL 5.80*   GLUCOSE mg/dL 105*   CALCIUM mg/dL 8.1*       Results from last 7 days  Lab Units 06/13/17  0637   ALK PHOS U/L 98   BILIRUBIN mg/dL 0.1*   ALT (SGPT) U/L 20   AST (SGOT) U/L 20       Results from last 7 days  Lab Units 06/12/17  1504   SED RATE mm/hr 44*       Results from last 7 days  Lab Units 06/12/17  1504   CRP mg/dL 22.00*       Estimated Creatinine Clearance: 10.2 mL/min (by C-G formula based on Cr of 5.8).      Microbiology:      Radiology:  Imaging Results (last 72 hours)     ** No results found for the last 72 hours. **            Impression:   --Acute loculated right pleural effusion presumed associated with right lower lobe pneumonia.  Empiric antibiotics ongoing for community-acquired/healthcare associated pneumonia.  Cardiothoracic surgery has seen with potential plans for decortication.  Microbiology studies pending.  If not steadily better with this approach, you need to give consideration to further workup such as pulmonary consultation and bronchoscopy.  No opportunistic process so far the patient is aware of.    --History hepatitis C.  I do not treat viral hepatitis as a part of my practice.  She has seen hepatology at [facility] and she should be referred back there after discharge for ongoing care and evaluation regarding possible treatment options.  I discussed with her risks associated with hepatitis C including chronic hepatitis/cirrhosis and hepatocellular carcinoma.  Follow-up at  is her responsibility.  She is aware    --History C. difficile.  Currently no diarrhea or abdominal complaints.  Monitor for relapse.  She understands she is at risk for relapse.    --End-stage renal disease associated with FSGS; dialysis ongoing    --History polysubstance abuse  which she reports is not active.    --History medical noncompliance with multiple episodes of leaving hospital AGAINST MEDICAL ADVICE per outside records.  She understands the implication of her behavior    PLAN: Thank you for asking us to see Mandy Espino, I recommend the following:    --IV vancomycin/Zosyn and Zithromax    --I discussed potential risks and benefits of the prescribed antibiotics that include, but are not limited to, solid organ toxicity, neuro/kaley/vestibular toxicity,  CDiff, cytopenias, hypersensitivity,  etc.. Patient voices understanding and agree to proceed.    --Monitor IV and IV antibiotic with risk for systemic complication and potential drug interaction    --Check/review labs cultures and scans    --Highly complex set of issues with high risk for further serious morbidity and other serious sequela/mortality    --Discussed with microbiology.  Partial history per nursing staff       Fercho Singletary MD  6/13/2017                 Electronically signed by Fercho Singletary MD at 6/13/2017  9:43 AM

## 2017-06-13 NOTE — PLAN OF CARE
Problem: Patient Care Overview (Adult)  Goal: Plan of Care Review  Outcome: Ongoing (interventions implemented as appropriate)    06/13/17 0328   Coping/Psychosocial Response Interventions   Plan Of Care Reviewed With patient   Patient Care Overview   Progress no change   Outcome Evaluation   Outcome Summary/Follow up Plan PT has been noncomplaint with care this evening and verbally abusive to the staff on the floor. She was unhappy that we were unable to give her morphine and get her pain medicine as quickly as [facility]. She refused to have her IV changed and refused teds/scds.         Problem: Respiratory Insufficiency (Adult)  Goal: Identify Related Risk Factors and Signs and Symptoms  Outcome: Ongoing (interventions implemented as appropriate)  Goal: Acid/Base Balance  Outcome: Ongoing (interventions implemented as appropriate)  Goal: Effective Ventilation  Outcome: Ongoing (interventions implemented as appropriate)

## 2017-06-13 NOTE — H&P
"    [facility] Medicine Services  HISTORY AND PHYSICAL    Primary Care Physician: Neo Gresham DO    Subjective     Chief Complaint: Cough    History of Present Illness:   This is a 26 year old female with extensive past medical history significant for COPD, on 3L supplemental oxygen around the clock, ESRD secondary to FSGS on hemodialysis MWF, Hepatitis C, hypertension, anemia of chronic diease, bipolar disorder, depression and IV drug abuse who presents as a transfer from [facility] after being evaluated and treated for pneumonia of right lower lung. Patient initially presented with productive cough of yellow sputum, pleurisy, and SOB X 4 days. Stated nebulizer and oxygen did not help. She is currently being treated with HCAP ABX; Zosyn, Vanc, and zithromax as she is a recent discharge from [facility] 1 month prior for pneumonia of same location. At that time she was discharged home on Augmentin, but states she \"just got worse\". During this admission at [facility],  Pulmonology was consulted concerning right pleural effusion. Patient subsequently underwent ultrasound guided thoracentesis which resulted in 200 mL. Serous fluid was sent to lab for analysis. Thoracentesis also demonstrated multiple loculations. It was determined that patient undergo evaluation per CT surgery for this per Dr. Hicks. She now presents to [facility] for further evaluation and treatment. Patient hemodynamically stable upon arrival. Complaints of back pain and right rib pain. No complaints of chest pain, SOB, or N/V/D.    Review of Systems   Constitutional: Positive for fever.   HENT: Negative for congestion and trouble swallowing.    Eyes: Negative for photophobia and visual disturbance.   Respiratory: Positive for cough and shortness of breath.    Cardiovascular: Negative for chest pain and leg swelling.   Gastrointestinal: Negative for abdominal pain, diarrhea, nausea and vomiting.   Endocrine: Negative for polyphagia and " polyuria.   Genitourinary: Negative for dysuria and flank pain.   Musculoskeletal: Positive for arthralgias and back pain. Negative for gait problem, neck pain and neck stiffness.   Skin: Positive for rash. Negative for pallor.   Allergic/Immunologic: Positive for immunocompromised state.   Neurological: Negative for dizziness, syncope, speech difficulty, numbness and headaches.   Hematological: Negative for adenopathy.   Psychiatric/Behavioral: Negative for agitation and confusion.      Otherwise complete ROS performed and negative except as mentioned in the HPI.    Past Medical History:   Diagnosis Date   • Abdominal pain    • Anemia    • Anxiety    • Asthma    • Bipolar disorder    • CKD (chronic kidney disease), stage IV    • Constipation    • COPD (chronic obstructive pulmonary disease)    • Depression    • End stage renal disease on dialysis    • Esophageal reflux     reflux   • Fahr's syndrome    • Hepatitis C antibody test positive    • Hyperparathyroidism    • Hypertension    • Hypocalcemia    • Nausea and vomiting    • Non-traumatic rhabdomyolysis 5/2/2017   • Pancreatitis    • Pneumonia of right lung due to infectious organism 5/2/2017   • Recurrent urinary tract infection    • Renal insufficiency    • Upper gastrointestinal bleeding        Past Surgical History:   Procedure Laterality Date   • DIALYSIS FISTULA CREATION      port   • DILATATION AND CURETTAGE     • EXPLORATORY LAPAROTOMY      For uterine and ovarian issues   • KIDNEY SURGERY Left 2013    Biopsy       Family History   Problem Relation Age of Onset   • Arthritis Mother    • Hypertension Mother    • Kidney disease Father      Chronic renal failure syndrome   • Pancreatic cancer Father    • Hypertension Brother    • Kidney disease Other    • Diabetes Other      Uncle   • Lung cancer Other      Grandmother   • Hyperlipidemia Other      High cholesterol - Uncle       Social History     Social History   • Marital status:      Spouse name:  N/A   • Number of children: N/A   • Years of education: N/A     Occupational History   • Not on file.     Social History Main Topics   • Smoking status: Current Every Day Smoker     Packs/day: 1.50   • Smokeless tobacco: Not on file   • Alcohol use No   • Drug use: Yes     Special: Marijuana   • Sexual activity: Not Currently     Other Topics Concern   • Not on file     Social History Narrative    She is  and not working. She denies alcohol or drug use. She denies recent opiate or benzo use. She does smoke and has used marijuana.        Medications:  Prescriptions Prior to Admission   Medication Sig Dispense Refill Last Dose   • ALPRAZolam (XANAX) 0.5 MG tablet Take 0.5 mg by mouth At Night As Needed for Anxiety or Sleep.   6/10/2017 at Unknown time   • amitriptyline (ELAVIL) 10 MG tablet Take 10 mg by mouth Every Night.   6/10/2017 at Unknown time   • Heparin Sodium, Porcine, (HEPARIN, PORCINE,) 5000 UNIT/ML injection Inject 1 mL under the skin Every 12 (Twelve) Hours.      • methylPREDNISolone sodium succinate (SOLU-Medrol) 40 MG injection Infuse 1 mL into a venous catheter Every 8 (Eight) Hours.      • minoxidil (LONITEN) 10 MG tablet Take 10 mg by mouth Daily.   6/10/2017 at Unknown time   • mupirocin (BACTROBAN) 2 % ointment Apply  topically Every 12 (Twelve) Hours.  0    • pantoprazole (PROTONIX) 40 MG EC tablet Take 1 tablet by mouth Daily.      • piperacillin-tazobactam (ZOSYN) 2-0.25 GM/50ML IVPB Infuse 50 mL into a venous catheter Every 12 (Twelve) Hours. Indications: Pneumonia  0    • vancomycin in dextrose 5% 150 mL (VANCOCIN) 750-5 MG/150ML-% IVPB Infuse 150 mL into a venous catheter As Needed (post dialysis). Indications: Pneumonia  0        Allergies:  Allergies   Allergen Reactions   • Acetaminophen Itching   • Hydrocodone Itching   • Ibuprofen    • Lortab [Hydrocodone-Acetaminophen]      Lortab TABS   • Ciprofloxacin Rash         Objective     Physical Exam:  Vital Signs: BP (!) 161/101 (BP  Location: Left arm, Patient Position: Sitting)  Pulse 99  Temp 97.7 °F (36.5 °C) (Oral)   Resp 18  LMP 12/17/2016  SpO2 99%  Physical Exam  Temp:  [97.7 °F (36.5 °C)-98.6 °F (37 °C)] 97.7 °F (36.5 °C)  Heart Rate:  [] 99  Resp:  [18-22] 18  BP: (137-168)/() 161/101  Constitutional: no acute distress, awake, alert, cachectic, herpetic-like rash on forehead and cheeks bilaterally  Eyes: PERRLA, sclerae anicteric, no conjunctival injection  Neck: supple, no thyromegaly, trachea midline  Respiratory: mild expiratory wheezes, nonlabored respirations   Cardiovascular: tender to palpation of chest wall, RRR, no murmurs, rubs, or gallops, palpable pedal pulses bilaterally  Gastrointestinal: Positive bowel sounds, soft, nontender, nondistended  Musculoskeletal: No bilateral ankle edema, no clubbing or cyanosis to bilateral lower extremities  Psychiatric: oriented x 3, appropriate affect, cooperative  Neurologic: Strength symmetric in all extremities, Cranial Nerves grossly intact to confrontation         Results Reviewed:    Results from last 7 days  Lab Units 06/12/17  0554   WBC 10*3/mm3 11.76*   HEMOGLOBIN g/dL 12.7   PLATELETS 10*3/mm3 219       Results from last 7 days  Lab Units 06/12/17  0554   SODIUM mmol/L 134*   POTASSIUM mmol/L 4.6   TOTAL CO2 mmol/L 25.0*   CREATININE mg/dL 10.70*   GLUCOSE mg/dL 184*   CALCIUM mg/dL 7.7*       I have personally reviewed and interpreted available lab data, radiology studies and ECG obtained at time of admission.     Assessment / Plan     Assessment/Problem List:   Hospital Problem List     * (Principal)Loculated pleural effusion    Pneumonia of right lung due to infectious organism    ESRD (end stage renal disease) on dialysis    Pleural effusion    COPD exacerbation    COPD (chronic obstructive pulmonary disease)    Renovascular hypertension    Hepatitis C antibody positive in blood    Overview Signed 6/12/2017 12:47 PM by REY Zhou     Has not  followed up with ID          Tobacco use    Bipolar disorder    Depression    Hyperparathyroidism    IV drug abuse    Anemia of chronic disease            Plan:  1. Loculated Pleural Effusion secondary to pneumonia or right lower lung with COPD exacerbation:  - Vitals stable. Oxygen saturation favorable on 94 % on 4L.  - underwent US guided thoracentesis. 200 mL serous fluid send to lab for analysis.  - awaiting blood cultures  - Consult to CT surgery. Consider Pulmonology consult.  - Consult to ID. For now will continue; Vanc, zithromax, and zosyn  - pulmonary hygiene; duonebs, mucolytics, antitussives, IS  - will discontinue IV steroids  - am labs    2. ESRD secondary to FSGS:  - hemodialysis MWF. Consult nephrology.  - Creatinine at baseline. Monitor.  - avoid nephrotoxic agents    3. Renovascular hypertension:  - stable and controlled. Continue home meds. PRN hydralazine.    4. Hepatitis C:  - History of IV drug abuse. Patient however denies this.  - Failed to follow-up with ID as outpatient    5. Anemia of chronic disease:  - stable at baseline of 12.6-12.7. Monitor.    6. Polysubstance abuse:  - nicotine patch as needed. Counseled patient on the benefits of tobacco abuse cessation.   - history of non compliance and recurrent AMA discharges. Consider case management involvement.   - minimize narcotics and sedatives.    Patient stable for admission to Telemetry. Labs and vitals routine per shira.      DVT prophylaxis: heparin q 12  Code Status: full code  Admission Status: Patient will be admitted to (LEXOBS or LEXINPT)     Jonelle Smith PA-C 06/12/17 8:10 PM     Brief Attending Note       I have seen and examined the patient, performing an independent face-to-face diagnostic evaluation with plan of care reviewed and developed with the advanced practice clinician (APC).       Brief Summary Statement/HPI:   25 yo with history or ESRD, IVDA and recent pneumonia presents as transfer from [facility] with a loculated  pleural effusion. Ms Espino underwent ultrasound guided thoracentesis today, cultures pending, but exudative in character. CT surgery evaluation pending for possible VATS/thoracotomy.         Attending Physical Exam:  Thin female  Fistula RUE  Gen-no acute distress, non toxic - somewhat somnolent  CV-RRR, S1 S2 normal, no m/r/g  Resp-Diminished right base  Abd-soft, Non tender, Non distended, normo active bowel sounds  Ext-No lower extremity cyanosis clubbing or edema bilaterlly  Neuro-moves all extremities  Psych-normal affect   Skin- some skin lesions on chin and forehead        Brief Assessment/Plan:    Loculated parapneumonic effusion  - continue empiric antibiotics  - CT surgery and ID consultations am    ESRD  - continue dialysis inpatient, will alert Nephrology in am    COPD  - ongoing tobacco abuse    Tobacco abuse  - counseled cessation, nicotine replacement    Hepatitis C    HTN    Substance abuse  -  to visit    Anemia    Bipolar disorder    VTe proph: heparin        For additional information see above per APC which I have reviewed and/or edited.      Brock Hooker MD  06/12/17  9:53 PM

## 2017-06-13 NOTE — CONSULTS
Referring Provider:  Reason for Consultation: Shortness of breath    Patient Care Team:  Neo Gresham DO as PCP - General (Family Medicine)    Chief complaint shortness of breath    .     History of present illness: I have been asked to see this patient by Dr. Hooker. The patient has been transferred from [facility] because of a loculated right pleural effusion. Patient has a long extensive medical history. She has had a persistent right pleural effusion for several weeks. She is been very short of breath. She has been on oxygen. They attempted to do a right thoracentesis only got 200 mL out. Cause of this patient is a decortication is indicated    Review of Systems    The following systems were reviewed and negative;  constitution, eyes, ENT, cardiovascular, gastrointestinal, genitourinary, integument, breast, hematologic / lymphatic, musculoskeletal, neurological, behavioral/psych, endocrine and allergies / immunologic    History  Past Medical History:   Diagnosis Date   • Abdominal pain    • Anemia    • Anxiety    • Asthma    • Bipolar disorder    • CKD (chronic kidney disease), stage IV    • Constipation    • COPD (chronic obstructive pulmonary disease)    • Depression    • End stage renal disease on dialysis    • Esophageal reflux     reflux   • Fahr's syndrome    • Hepatitis C antibody test positive    • Hyperparathyroidism    • Hypertension    • Hypocalcemia    • Nausea and vomiting    • Non-traumatic rhabdomyolysis 5/2/2017   • Pancreatitis    • Pneumonia of right lung due to infectious organism 5/2/2017   • Recurrent urinary tract infection    • Renal insufficiency    • Upper gastrointestinal bleeding    , Past Surgical History:   Procedure Laterality Date   • DIALYSIS FISTULA CREATION      port   • DILATATION AND CURETTAGE     • EXPLORATORY LAPAROTOMY      For uterine and ovarian issues   • KIDNEY SURGERY Left 2013    Biopsy   , Family History   Problem Relation Age of Onset   • Arthritis  Mother    • Hypertension Mother    • Kidney disease Father      Chronic renal failure syndrome   • Pancreatic cancer Father    • Hypertension Brother    • Kidney disease Other    • Diabetes Other      Uncle   • Lung cancer Other      Grandmother   • Hyperlipidemia Other      High cholesterol - Uncle   , Social History   Substance Use Topics   • Smoking status: Current Every Day Smoker     Packs/day: 1.50   • Smokeless tobacco: None   • Alcohol use No   , Prescriptions Prior to Admission   Medication Sig Dispense Refill Last Dose   • ALPRAZolam (XANAX) 0.5 MG tablet Take 0.5 mg by mouth At Night As Needed for Anxiety or Sleep.   6/10/2017 at Unknown time   • amitriptyline (ELAVIL) 10 MG tablet Take 10 mg by mouth Every Night.   6/10/2017 at Unknown time   • Heparin Sodium, Porcine, (HEPARIN, PORCINE,) 5000 UNIT/ML injection Inject 1 mL under the skin Every 12 (Twelve) Hours.      • methylPREDNISolone sodium succinate (SOLU-Medrol) 40 MG injection Infuse 1 mL into a venous catheter Every 8 (Eight) Hours.      • minoxidil (LONITEN) 10 MG tablet Take 10 mg by mouth Daily.   6/10/2017 at Unknown time   • mupirocin (BACTROBAN) 2 % ointment Apply  topically Every 12 (Twelve) Hours.  0    • pantoprazole (PROTONIX) 40 MG EC tablet Take 1 tablet by mouth Daily.      • piperacillin-tazobactam (ZOSYN) 2-0.25 GM/50ML IVPB Infuse 50 mL into a venous catheter Every 12 (Twelve) Hours. Indications: Pneumonia  0    • vancomycin in dextrose 5% 150 mL (VANCOCIN) 750-5 MG/150ML-% IVPB Infuse 150 mL into a venous catheter As Needed (post dialysis). Indications: Pneumonia  0    , Scheduled Meds:    amitriptyline 10 mg Oral Nightly   azithromycin 500 mg Intravenous Q24H   docusate sodium 100 mg Oral BID   heparin (porcine) 5,000 Units Subcutaneous Q12H   ipratropium-albuterol 3 mL Nebulization 4x Daily - RT   minoxidil 10 mg Oral Daily   nicotine 1 patch Transdermal Daily   nicotine 1 patch Transdermal Once   piperacillin-tazobactam 3.375  "g Intravenous Q8H   vancomycin (dosing per levels)  Does not apply Daily   vancomycin 750 mg Intravenous Once   , Continuous Infusions:    Pharmacy to dose vancomycin    , PRN Meds:  •  ALPRAZolam  •  benzonatate  •  hydrALAZINE  •  melatonin  •  Morphine  •  ondansetron **OR** ondansetron  •  oxyCODONE-acetaminophen **FOLLOWED BY** [START ON 6/22/2017] oxyCODONE-acetaminophen  •  Pharmacy to dose vancomycin  •  sodium chloride and Allergies:  Acetaminophen; Hydrocodone; Ibuprofen; Lortab [hydrocodone-acetaminophen]; and Ciprofloxacin    Objective     Vital Sign Min/Max for last 24 hours  Temp  Min: 96.9 °F (36.1 °C)  Max: 98.2 °F (36.8 °C)   BP  Min: 137/86  Max: 161/98   Pulse  Min: 89  Max: 100   Resp  Min: 18  Max: 18   SpO2  Min: 97 %  Max: 100 %   Flow (L/min)  Min: 4  Max: 4   Weight  Min: 97 lb (44 kg)  Max: 97 lb (44 kg)     Flowsheet Rows         First Filed Value    Admission Height  61\" (154.9 cm) Documented at 06/13/2017 0525    Admission Weight  97 lb (44 kg) Documented at 06/13/2017 0525               Physical Exam:     General Appearance:    Alert, cooperative, in no acute distress   Head:    Normocephalic, without obvious abnormality, atraumatic   Eyes:            Lids and lashes normal, conjunctivae and sclerae normal, no   icterus, no pallor, corneas clear, PERRLA   Ears:    Ears appear intact with no abnormalities noted   Throat:   No oral lesions, no thrush, oral mucosa moist   Neck:   No adenopathy, supple, trachea midline, no thyromegaly, no   carotid bruit, no JVD   Back:     No kyphosis present, no scoliosis present, no skin lesions,      erythema or scars, no tenderness to percussion or                   palpation,   range of motion normal   Lungs:     Decreased in the right base,respirations regular, even and                  unlabored    Heart:    Regular rhythm and normal rate, normal S1 and S2, no            murmur, no gallop, no rub, no click   Chest Wall:    No abnormalities observed "   Abdomen:     Normal bowel sounds, no masses, no organomegaly, soft        non-tender, non-distended, no guarding, no rebound                tenderness   Rectal:     Deferred   Extremities:   Moves all extremities well, no edema, no cyanosis, no             redness   Pulses:   Pulses palpable and equal bilaterally   Skin:   No bleeding, bruising or rash   Lymph nodes:   No palpable adenopathy   Neurologic:   Cranial nerves 2 - 12 grossly intact, sensation intact, DTR       present and equal bilaterally             Assessment/Plan     Principal Problem:    Loculated pleural effusion  Active Problems:    Tobacco use    Bipolar disorder    Depression    Hyperparathyroidism    Pneumonia of right lung due to infectious organism    COPD (chronic obstructive pulmonary disease)    ESRD (end stage renal disease) on dialysis    Renovascular hypertension    Hepatitis C antibody positive in blood    IV drug abuse    Anemia of chronic disease    Pleural effusion    COPD exacerbation      Patient is a very complicated female who presents now with a loculated right pleural effusion. This is not been amenable to thoracentesis. Abdomen acid patient guards possible decortication. The patient has an extensive medical history involvement hepatitis C end stage renal function with dialysis bipolar disorder hyperparathyroidism COPD IV drug abuse COPD. After review of all these problems. As feel that probably a decortication is indicated. She will be elevated surgical risk because of all the above. Of explained the operation risk alternatives. Of explained the risk which includes wrist her life bleeding infection strokes heart attack chronic pain other risk factors. She appears to understand all the above and desires to proceed. We will await the evaluation cardiology and infectious disease. Like thank this consultation.    I discussed the patients findings and my recommendations with patient, nursing staff and consulting  provider      Please note that portions of this note were completed with a voice recognition program. Efforts were made to edit the dictations, but words may be mistranscribed    Shawn Hicks MD  06/13/17  7:04 AM

## 2017-06-14 ENCOUNTER — APPOINTMENT (OUTPATIENT)
Dept: GENERAL RADIOLOGY | Facility: HOSPITAL | Age: 27
End: 2017-06-14

## 2017-06-14 ENCOUNTER — ANESTHESIA (OUTPATIENT)
Dept: PERIOP | Facility: HOSPITAL | Age: 27
End: 2017-06-14

## 2017-06-14 LAB
ABO GROUP BLD: NORMAL
ACINETOBACTER SCREEN CX: NO GROWTH
ANION GAP SERPL CALCULATED.3IONS-SCNC: 14 MMOL/L (ref 3–11)
ARTERIAL PATENCY WRIST A: ABNORMAL
ATMOSPHERIC PRESS: ABNORMAL MMHG
BASE EXCESS BLDA CALC-SCNC: -2.9 MMOL/L (ref 0–2)
BDY SITE: ABNORMAL
BLD GP AB SCN SERPL QL: NEGATIVE
BUN BLD-MCNC: 46 MG/DL (ref 9–23)
BUN/CREAT SERPL: 6.5 (ref 7–25)
CALCIUM SPEC-SCNC: 7.7 MG/DL (ref 8.7–10.4)
CHLORIDE SERPL-SCNC: 101 MMOL/L (ref 99–109)
CO2 BLDA-SCNC: 25.9 MMOL/L (ref 22–33)
CO2 SERPL-SCNC: 22 MMOL/L (ref 20–31)
COHGB MFR BLD: 1.2 % (ref 0–2)
CREAT BLD-MCNC: 7.1 MG/DL (ref 0.6–1.3)
DEPRECATED RDW RBC AUTO: 55.3 FL (ref 37–54)
ERYTHROCYTE [DISTWIDTH] IN BLOOD BY AUTOMATED COUNT: 16.8 % (ref 11.3–14.5)
GFR SERPL CREATININE-BSD FRML MDRD: 7 ML/MIN/1.73
GLUCOSE BLD-MCNC: 79 MG/DL (ref 70–100)
GLUCOSE BLDC GLUCOMTR-MCNC: 104 MG/DL (ref 70–130)
GLUCOSE BLDC GLUCOMTR-MCNC: 110 MG/DL (ref 70–130)
HCG INTACT+B SERPL-ACNC: <5 MIU/ML
HCO3 BLDA-SCNC: 24.3 MMOL/L (ref 20–26)
HCT VFR BLD AUTO: 38.2 % (ref 34.5–44)
HCT VFR BLD CALC: 41.9 %
HGB BLD-MCNC: 12.3 G/DL (ref 11.5–15.5)
HGB BLDA-MCNC: 13.7 G/DL (ref 14–18)
HOROWITZ INDEX BLD+IHG-RTO: 70 %
INR PPP: 1.1
MCH RBC QN AUTO: 28.8 PG (ref 27–31)
MCHC RBC AUTO-ENTMCNC: 32.2 G/DL (ref 32–36)
MCV RBC AUTO: 89.5 FL (ref 80–99)
METHGB BLD QL: 1.1 % (ref 0–1.5)
MODALITY: ABNORMAL
OXYHGB MFR BLDV: 97.8 % (ref 94–99)
PCO2 BLDA: 53.9 MM HG (ref 35–45)
PH BLDA: 7.26 PH UNITS (ref 7.35–7.45)
PLATELET # BLD AUTO: 226 10*3/MM3 (ref 150–450)
PMV BLD AUTO: 9.3 FL (ref 6–12)
PO2 BLDA: 294 MM HG (ref 83–108)
POTASSIUM BLD-SCNC: 4.2 MMOL/L (ref 3.5–5.5)
PROTHROMBIN TIME: 12 SECONDS (ref 9.6–11.5)
RBC # BLD AUTO: 4.27 10*6/MM3 (ref 3.89–5.14)
RH BLD: POSITIVE
SODIUM BLD-SCNC: 137 MMOL/L (ref 132–146)
VANCOMYCIN SERPL-MCNC: 38 MCG/ML (ref 5–40)
WBC NRBC COR # BLD: 9.39 10*3/MM3 (ref 3.5–10.8)

## 2017-06-14 PROCEDURE — 0BDN0ZZ EXTRACTION OF RIGHT PLEURA, OPEN APPROACH: ICD-10-PCS | Performed by: THORACIC SURGERY (CARDIOTHORACIC VASCULAR SURGERY)

## 2017-06-14 PROCEDURE — 25010000002 FENTANYL CITRATE (PF) 100 MCG/2ML SOLUTION: Performed by: NURSE ANESTHETIST, CERTIFIED REGISTERED

## 2017-06-14 PROCEDURE — 86901 BLOOD TYPING SEROLOGIC RH(D): CPT | Performed by: PHYSICIAN ASSISTANT

## 2017-06-14 PROCEDURE — 25010000002 DEXAMETHASONE PER 1 MG: Performed by: NURSE ANESTHETIST, CERTIFIED REGISTERED

## 2017-06-14 PROCEDURE — 86900 BLOOD TYPING SEROLOGIC ABO: CPT | Performed by: PHYSICIAN ASSISTANT

## 2017-06-14 PROCEDURE — 94799 UNLISTED PULMONARY SVC/PX: CPT

## 2017-06-14 PROCEDURE — 87176 TISSUE HOMOGENIZATION CULTR: CPT | Performed by: THORACIC SURGERY (CARDIOTHORACIC VASCULAR SURGERY)

## 2017-06-14 PROCEDURE — 25010000002 PIPERACILLIN-TAZOBACTAM: Performed by: PHYSICIAN ASSISTANT

## 2017-06-14 PROCEDURE — 87075 CULTR BACTERIA EXCEPT BLOOD: CPT | Performed by: THORACIC SURGERY (CARDIOTHORACIC VASCULAR SURGERY)

## 2017-06-14 PROCEDURE — 25010000002 HYDRALAZINE PER 20 MG: Performed by: PHYSICIAN ASSISTANT

## 2017-06-14 PROCEDURE — 31622 DX BRONCHOSCOPE/WASH: CPT | Performed by: THORACIC SURGERY (CARDIOTHORACIC VASCULAR SURGERY)

## 2017-06-14 PROCEDURE — 25010000002 AZITHROMYCIN: Performed by: PHYSICIAN ASSISTANT

## 2017-06-14 PROCEDURE — 99291 CRITICAL CARE FIRST HOUR: CPT | Performed by: INTERNAL MEDICINE

## 2017-06-14 PROCEDURE — 86920 COMPATIBILITY TEST SPIN: CPT

## 2017-06-14 PROCEDURE — 93005 ELECTROCARDIOGRAM TRACING: CPT | Performed by: ANESTHESIOLOGY

## 2017-06-14 PROCEDURE — 86850 RBC ANTIBODY SCREEN: CPT | Performed by: PHYSICIAN ASSISTANT

## 2017-06-14 PROCEDURE — 32225 PARTIAL RELEASE OF LUNG: CPT | Performed by: PHYSICIAN ASSISTANT

## 2017-06-14 PROCEDURE — 87102 FUNGUS ISOLATION CULTURE: CPT | Performed by: THORACIC SURGERY (CARDIOTHORACIC VASCULAR SURGERY)

## 2017-06-14 PROCEDURE — 25010000002 DIAZEPAM PER 5 MG: Performed by: INTERNAL MEDICINE

## 2017-06-14 PROCEDURE — 88305 TISSUE EXAM BY PATHOLOGIST: CPT | Performed by: THORACIC SURGERY (CARDIOTHORACIC VASCULAR SURGERY)

## 2017-06-14 PROCEDURE — 87206 SMEAR FLUORESCENT/ACID STAI: CPT | Performed by: THORACIC SURGERY (CARDIOTHORACIC VASCULAR SURGERY)

## 2017-06-14 PROCEDURE — 84702 CHORIONIC GONADOTROPIN TEST: CPT | Performed by: ANESTHESIOLOGY

## 2017-06-14 PROCEDURE — 94640 AIRWAY INHALATION TREATMENT: CPT

## 2017-06-14 PROCEDURE — 87070 CULTURE OTHR SPECIMN AEROBIC: CPT | Performed by: THORACIC SURGERY (CARDIOTHORACIC VASCULAR SURGERY)

## 2017-06-14 PROCEDURE — 32225 PARTIAL RELEASE OF LUNG: CPT | Performed by: THORACIC SURGERY (CARDIOTHORACIC VASCULAR SURGERY)

## 2017-06-14 PROCEDURE — 25010000002 ONDANSETRON PER 1 MG: Performed by: PHYSICIAN ASSISTANT

## 2017-06-14 PROCEDURE — 71010 HC CHEST PA OR AP: CPT

## 2017-06-14 PROCEDURE — 25010000002 PROPOFOL 10 MG/ML EMULSION: Performed by: NURSE ANESTHETIST, CERTIFIED REGISTERED

## 2017-06-14 PROCEDURE — 85610 PROTHROMBIN TIME: CPT | Performed by: PHYSICIAN ASSISTANT

## 2017-06-14 PROCEDURE — 25010000002 HYDROMORPHONE PER 4 MG: Performed by: FAMILY MEDICINE

## 2017-06-14 PROCEDURE — 94002 VENT MGMT INPAT INIT DAY: CPT

## 2017-06-14 PROCEDURE — 85027 COMPLETE CBC AUTOMATED: CPT | Performed by: PHYSICIAN ASSISTANT

## 2017-06-14 PROCEDURE — 82962 GLUCOSE BLOOD TEST: CPT

## 2017-06-14 PROCEDURE — 36600 WITHDRAWAL OF ARTERIAL BLOOD: CPT | Performed by: FAMILY MEDICINE

## 2017-06-14 PROCEDURE — 0BJ08ZZ INSPECTION OF TRACHEOBRONCHIAL TREE, VIA NATURAL OR ARTIFICIAL OPENING ENDOSCOPIC: ICD-10-PCS | Performed by: THORACIC SURGERY (CARDIOTHORACIC VASCULAR SURGERY)

## 2017-06-14 PROCEDURE — 25010000002 PROPOFOL 1000 MG/ML EMULSION: Performed by: NURSE ANESTHETIST, CERTIFIED REGISTERED

## 2017-06-14 PROCEDURE — 87015 SPECIMEN INFECT AGNT CONCNTJ: CPT | Performed by: THORACIC SURGERY (CARDIOTHORACIC VASCULAR SURGERY)

## 2017-06-14 PROCEDURE — 80048 BASIC METABOLIC PNL TOTAL CA: CPT | Performed by: PHYSICIAN ASSISTANT

## 2017-06-14 PROCEDURE — 87116 MYCOBACTERIA CULTURE: CPT | Performed by: THORACIC SURGERY (CARDIOTHORACIC VASCULAR SURGERY)

## 2017-06-14 PROCEDURE — 82805 BLOOD GASES W/O2 SATURATION: CPT | Performed by: FAMILY MEDICINE

## 2017-06-14 PROCEDURE — 87205 SMEAR GRAM STAIN: CPT | Performed by: THORACIC SURGERY (CARDIOTHORACIC VASCULAR SURGERY)

## 2017-06-14 PROCEDURE — 25010000002 PIPERACILLIN-TAZOBACTAM

## 2017-06-14 PROCEDURE — 80202 ASSAY OF VANCOMYCIN: CPT

## 2017-06-14 RX ORDER — FENTANYL CITRATE 50 UG/ML
50 INJECTION, SOLUTION INTRAMUSCULAR; INTRAVENOUS
Status: DISCONTINUED | OUTPATIENT
Start: 2017-06-14 | End: 2017-06-14 | Stop reason: SDUPTHER

## 2017-06-14 RX ORDER — FENTANYL CITRATE-0.9 % NACL/PF 10 MCG/ML
PLASTIC BAG, INJECTION (ML) INTRAVENOUS
Status: DISPENSED
Start: 2017-06-14 | End: 2017-06-15

## 2017-06-14 RX ORDER — LIDOCAINE HYDROCHLORIDE 10 MG/ML
0.5 INJECTION, SOLUTION EPIDURAL; INFILTRATION; INTRACAUDAL; PERINEURAL ONCE AS NEEDED
Status: DISCONTINUED | OUTPATIENT
Start: 2017-06-14 | End: 2017-06-14 | Stop reason: HOSPADM

## 2017-06-14 RX ORDER — SODIUM CHLORIDE 9 MG/ML
9 INJECTION, SOLUTION INTRAVENOUS CONTINUOUS
Status: CANCELLED | OUTPATIENT
Start: 2017-06-14

## 2017-06-14 RX ORDER — SODIUM CHLORIDE, SODIUM LACTATE, POTASSIUM CHLORIDE, CALCIUM CHLORIDE 600; 310; 30; 20 MG/100ML; MG/100ML; MG/100ML; MG/100ML
INJECTION, SOLUTION INTRAVENOUS CONTINUOUS PRN
Status: DISCONTINUED | OUTPATIENT
Start: 2017-06-14 | End: 2017-06-14 | Stop reason: SURG

## 2017-06-14 RX ORDER — SODIUM CHLORIDE 9 MG/ML
30 INJECTION, SOLUTION INTRAVENOUS CONTINUOUS
Status: DISCONTINUED | OUTPATIENT
Start: 2017-06-14 | End: 2017-06-15

## 2017-06-14 RX ORDER — DIAZEPAM 5 MG/ML
10 INJECTION, SOLUTION INTRAMUSCULAR; INTRAVENOUS ONCE
Status: COMPLETED | OUTPATIENT
Start: 2017-06-14 | End: 2017-06-14

## 2017-06-14 RX ORDER — ONDANSETRON 2 MG/ML
4 INJECTION INTRAMUSCULAR; INTRAVENOUS ONCE AS NEEDED
Status: DISCONTINUED | OUTPATIENT
Start: 2017-06-14 | End: 2017-06-14 | Stop reason: SDUPTHER

## 2017-06-14 RX ORDER — FAMOTIDINE 20 MG/1
20 TABLET, FILM COATED ORAL ONCE
Status: COMPLETED | OUTPATIENT
Start: 2017-06-14 | End: 2017-06-14

## 2017-06-14 RX ORDER — SENNA AND DOCUSATE SODIUM 50; 8.6 MG/1; MG/1
2 TABLET, FILM COATED ORAL NIGHTLY
Status: DISCONTINUED | OUTPATIENT
Start: 2017-06-14 | End: 2017-06-19

## 2017-06-14 RX ORDER — FENTANYL CITRATE-0.9 % NACL/PF 10 MCG/ML
50-300 PLASTIC BAG, INJECTION (ML) INTRAVENOUS
Status: DISCONTINUED | OUTPATIENT
Start: 2017-06-14 | End: 2017-06-17

## 2017-06-14 RX ORDER — MEPERIDINE HYDROCHLORIDE 25 MG/ML
12.5 INJECTION INTRAMUSCULAR; INTRAVENOUS; SUBCUTANEOUS
Status: ACTIVE | OUTPATIENT
Start: 2017-06-14 | End: 2017-06-15

## 2017-06-14 RX ORDER — KETAMINE HCL IN 0.9 % NACL 50 MG/5 ML
SYRINGE (ML) INTRAVENOUS AS NEEDED
Status: DISCONTINUED | OUTPATIENT
Start: 2017-06-14 | End: 2017-06-14 | Stop reason: SURG

## 2017-06-14 RX ORDER — BISACODYL 10 MG
10 SUPPOSITORY, RECTAL RECTAL DAILY PRN
Status: DISCONTINUED | OUTPATIENT
Start: 2017-06-14 | End: 2017-06-23 | Stop reason: HOSPADM

## 2017-06-14 RX ORDER — CHLORHEXIDINE GLUCONATE 0.12 MG/ML
15 RINSE ORAL EVERY 12 HOURS SCHEDULED
Status: DISCONTINUED | OUTPATIENT
Start: 2017-06-14 | End: 2017-06-19

## 2017-06-14 RX ORDER — SODIUM CHLORIDE, SODIUM LACTATE, POTASSIUM CHLORIDE, CALCIUM CHLORIDE 600; 310; 30; 20 MG/100ML; MG/100ML; MG/100ML; MG/100ML
100 INJECTION, SOLUTION INTRAVENOUS CONTINUOUS
Status: DISCONTINUED | OUTPATIENT
Start: 2017-06-14 | End: 2017-06-15

## 2017-06-14 RX ORDER — SODIUM CHLORIDE 0.9 % (FLUSH) 0.9 %
1-10 SYRINGE (ML) INJECTION AS NEEDED
Status: DISCONTINUED | OUTPATIENT
Start: 2017-06-14 | End: 2017-06-14 | Stop reason: HOSPADM

## 2017-06-14 RX ORDER — ONDANSETRON 4 MG/1
4 TABLET, FILM COATED ORAL EVERY 6 HOURS PRN
Status: DISCONTINUED | OUTPATIENT
Start: 2017-06-14 | End: 2017-06-14 | Stop reason: SDUPTHER

## 2017-06-14 RX ORDER — FAMOTIDINE 10 MG/ML
20 INJECTION, SOLUTION INTRAVENOUS ONCE
Status: DISCONTINUED | OUTPATIENT
Start: 2017-06-14 | End: 2017-06-14

## 2017-06-14 RX ORDER — SODIUM CHLORIDE, SODIUM LACTATE, POTASSIUM CHLORIDE, CALCIUM CHLORIDE 600; 310; 30; 20 MG/100ML; MG/100ML; MG/100ML; MG/100ML
9 INJECTION, SOLUTION INTRAVENOUS CONTINUOUS
Status: DISCONTINUED | OUTPATIENT
Start: 2017-06-14 | End: 2017-06-14

## 2017-06-14 RX ORDER — ONDANSETRON 2 MG/ML
4 INJECTION INTRAMUSCULAR; INTRAVENOUS EVERY 6 HOURS PRN
Status: DISCONTINUED | OUTPATIENT
Start: 2017-06-14 | End: 2017-06-14 | Stop reason: SDUPTHER

## 2017-06-14 RX ORDER — HYDROMORPHONE HYDROCHLORIDE 1 MG/ML
0.5 INJECTION, SOLUTION INTRAMUSCULAR; INTRAVENOUS; SUBCUTANEOUS
Status: DISCONTINUED | OUTPATIENT
Start: 2017-06-14 | End: 2017-06-15

## 2017-06-14 RX ORDER — PROPOFOL 10 MG/ML
VIAL (ML) INTRAVENOUS AS NEEDED
Status: DISCONTINUED | OUTPATIENT
Start: 2017-06-14 | End: 2017-06-14 | Stop reason: SURG

## 2017-06-14 RX ORDER — DEXAMETHASONE SODIUM PHOSPHATE 4 MG/ML
INJECTION, SOLUTION INTRA-ARTICULAR; INTRALESIONAL; INTRAMUSCULAR; INTRAVENOUS; SOFT TISSUE AS NEEDED
Status: DISCONTINUED | OUTPATIENT
Start: 2017-06-14 | End: 2017-06-14 | Stop reason: SURG

## 2017-06-14 RX ORDER — LIDOCAINE HYDROCHLORIDE 10 MG/ML
INJECTION, SOLUTION EPIDURAL; INFILTRATION; INTRACAUDAL; PERINEURAL AS NEEDED
Status: DISCONTINUED | OUTPATIENT
Start: 2017-06-14 | End: 2017-06-14 | Stop reason: SURG

## 2017-06-14 RX ORDER — MAGNESIUM HYDROXIDE 1200 MG/15ML
LIQUID ORAL AS NEEDED
Status: DISCONTINUED | OUTPATIENT
Start: 2017-06-14 | End: 2017-06-14 | Stop reason: HOSPADM

## 2017-06-14 RX ORDER — ATRACURIUM BESYLATE 10 MG/ML
INJECTION, SOLUTION INTRAVENOUS AS NEEDED
Status: DISCONTINUED | OUTPATIENT
Start: 2017-06-14 | End: 2017-06-14 | Stop reason: SURG

## 2017-06-14 RX ORDER — FENTANYL CITRATE 50 UG/ML
INJECTION, SOLUTION INTRAMUSCULAR; INTRAVENOUS AS NEEDED
Status: DISCONTINUED | OUTPATIENT
Start: 2017-06-14 | End: 2017-06-14 | Stop reason: SURG

## 2017-06-14 RX ORDER — FENTANYL CITRATE-0.9 % NACL/PF 10 MCG/ML
PLASTIC BAG, INJECTION (ML) INTRAVENOUS
Status: COMPLETED
Start: 2017-06-14 | End: 2017-06-14

## 2017-06-14 RX ORDER — MINERAL OIL AND WHITE PETROLATUM 150; 830 MG/G; MG/G
OINTMENT OPHTHALMIC
Status: DISCONTINUED | OUTPATIENT
Start: 2017-06-14 | End: 2017-06-23 | Stop reason: HOSPADM

## 2017-06-14 RX ORDER — ULTRASOUND COUPLING MEDIUM
GEL (GRAM) TOPICAL AS NEEDED
Status: DISCONTINUED | OUTPATIENT
Start: 2017-06-14 | End: 2017-06-14 | Stop reason: HOSPADM

## 2017-06-14 RX ADMIN — SODIUM CHLORIDE, POTASSIUM CHLORIDE, SODIUM LACTATE AND CALCIUM CHLORIDE: 600; 310; 30; 20 INJECTION, SOLUTION INTRAVENOUS at 10:29

## 2017-06-14 RX ADMIN — ALPRAZOLAM 0.5 MG: 0.5 TABLET ORAL at 06:57

## 2017-06-14 RX ADMIN — IPRATROPIUM BROMIDE AND ALBUTEROL SULFATE 3 ML: .5; 3 SOLUTION RESPIRATORY (INHALATION) at 20:16

## 2017-06-14 RX ADMIN — SODIUM CHLORIDE, POTASSIUM CHLORIDE, SODIUM LACTATE AND CALCIUM CHLORIDE 100 ML/HR: 600; 310; 30; 20 INJECTION, SOLUTION INTRAVENOUS at 12:41

## 2017-06-14 RX ADMIN — MUPIROCIN: 20 OINTMENT TOPICAL at 10:05

## 2017-06-14 RX ADMIN — SODIUM CHLORIDE 5 MG/HR: 9 INJECTION, SOLUTION INTRAVENOUS at 12:25

## 2017-06-14 RX ADMIN — IPRATROPIUM BROMIDE AND ALBUTEROL SULFATE 3 ML: .5; 3 SOLUTION RESPIRATORY (INHALATION) at 16:23

## 2017-06-14 RX ADMIN — PROPOFOL 100 MCG/KG/MIN: 10 INJECTION, EMULSION INTRAVENOUS at 21:17

## 2017-06-14 RX ADMIN — PIPERACILLIN AND TAZOBACTAM 2.25 G: 2; .25 INJECTION, POWDER, LYOPHILIZED, FOR SOLUTION INTRAVENOUS; PARENTERAL at 16:58

## 2017-06-14 RX ADMIN — SODIUM CHLORIDE 30 ML/HR: 9 INJECTION, SOLUTION INTRAVENOUS at 12:53

## 2017-06-14 RX ADMIN — SODIUM CHLORIDE 1000 ML: 9 INJECTION, SOLUTION INTRAVENOUS at 10:05

## 2017-06-14 RX ADMIN — AMITRIPTYLINE HYDROCHLORIDE 10 MG: 10 TABLET, FILM COATED ORAL at 20:59

## 2017-06-14 RX ADMIN — ONDANSETRON 4 MG: 2 INJECTION INTRAMUSCULAR; INTRAVENOUS at 11:04

## 2017-06-14 RX ADMIN — PROPOFOL 50 MCG/KG/MIN: 10 INJECTION, EMULSION INTRAVENOUS at 12:00

## 2017-06-14 RX ADMIN — PROPOFOL 150 MG: 10 INJECTION, EMULSION INTRAVENOUS at 10:38

## 2017-06-14 RX ADMIN — HYDRALAZINE HYDROCHLORIDE 10 MG: 20 INJECTION INTRAMUSCULAR; INTRAVENOUS at 16:52

## 2017-06-14 RX ADMIN — PROPOFOL 100 MCG/KG/MIN: 10 INJECTION, EMULSION INTRAVENOUS at 14:35

## 2017-06-14 RX ADMIN — DIAZEPAM 10 MG: 5 INJECTION, SOLUTION INTRAMUSCULAR; INTRAVENOUS at 14:09

## 2017-06-14 RX ADMIN — Medication 200 MCG/HR: at 18:09

## 2017-06-14 RX ADMIN — CHLORHEXIDINE GLUCONATE 15 ML: 1.2 RINSE ORAL at 21:03

## 2017-06-14 RX ADMIN — Medication 2 TABLET: at 20:59

## 2017-06-14 RX ADMIN — Medication 200 MCG/HR: at 23:35

## 2017-06-14 RX ADMIN — PROPOFOL 100 MCG/KG/MIN: 10 INJECTION, EMULSION INTRAVENOUS at 18:07

## 2017-06-14 RX ADMIN — HYDROMORPHONE HYDROCHLORIDE 1 MG: 1 INJECTION, SOLUTION INTRAMUSCULAR; INTRAVENOUS; SUBCUTANEOUS at 06:43

## 2017-06-14 RX ADMIN — Medication 200 MCG/HR: at 14:34

## 2017-06-14 RX ADMIN — Medication 100 MCG/HR: at 12:23

## 2017-06-14 RX ADMIN — NICARDIPINE HYDROCHLORIDE 50 MG: 25 INJECTION INTRAVENOUS at 12:55

## 2017-06-14 RX ADMIN — FENTANYL CITRATE 100 MCG: 50 INJECTION, SOLUTION INTRAMUSCULAR; INTRAVENOUS at 10:38

## 2017-06-14 RX ADMIN — PIPERACILLIN SODIUM,TAZOBACTAM SODIUM 3.38 G: 3; .375 INJECTION, POWDER, FOR SOLUTION INTRAVENOUS at 05:44

## 2017-06-14 RX ADMIN — FAMOTIDINE 20 MG: 20 TABLET, FILM COATED ORAL at 10:05

## 2017-06-14 RX ADMIN — LIDOCAINE HYDROCHLORIDE 50 MG: 10 INJECTION, SOLUTION EPIDURAL; INFILTRATION; INTRACAUDAL; PERINEURAL at 10:38

## 2017-06-14 RX ADMIN — KETAMINE HCL-NACL SOLN PREF SY 50 MG/5ML-0.9% (10MG/ML) 25 MG: 10 SOLUTION PREFILLED SYRINGE at 10:38

## 2017-06-14 RX ADMIN — DEXAMETHASONE SODIUM PHOSPHATE 4 MG: 4 INJECTION, SOLUTION INTRAMUSCULAR; INTRAVENOUS at 10:49

## 2017-06-14 RX ADMIN — MUPIROCIN: 20 OINTMENT TOPICAL at 20:59

## 2017-06-14 RX ADMIN — ATRACURIUM BESYLATE 40 MG: 10 INJECTION, SOLUTION INTRAVENOUS at 10:38

## 2017-06-14 RX ADMIN — ATRACURIUM BESYLATE 10 MG: 10 INJECTION, SOLUTION INTRAVENOUS at 11:31

## 2017-06-14 RX ADMIN — AZITHROMYCIN 500 MG: 500 INJECTION, POWDER, LYOPHILIZED, FOR SOLUTION INTRAVENOUS at 21:18

## 2017-06-14 NOTE — PROGRESS NOTES
Malnutrition Severity Assessment    Patient Name:  Mandy Espino  YOB: 1990  MRN: [id number]  Admit Date:  6/12/2017    Patient meets criteria for : Severe malnutrition (based on muscle and fat loss may use dx of severe chronic malnutrition )    Comments:        Malnutrition Type: Chronic Illness Malnutrition     Malnutrition Type (last 8 hours)      Malnutrition Severity Assessment       06/14/17 1706    Physical Signs of Malnutrition (Chronic)    Muscle Wasting Severe    Fat Loss Severe      06/14/17 1706    Malnutrition Severity Assessment    Malnutrition Type Chronic Illness Malnutrition          Physical Signs of Malnutrition         Most Recent Value    Muscle Wasting  Severe    Fat Loss  Severe      Weight Status         Most Recent Value    BMI  Mild (<19)    %UBW  Mild (86-90%)    Weight Loss  -- [19% about 1 yr]      Energy Intake Status         Most Recent Value    Energy Intake  -- [unable to assess]              Electronically signed by:  Emerald Schmitt RD  06/14/17 5:16 PM

## 2017-06-14 NOTE — CONSULTS
Referring Provider: Brock Hooker M.D.  Reason for Consultation: ESRD    Subjective     Chief complaint PNA    History of present illness:  This is a 26 year old female with extensive past medical history significant for COPD, on 3L supplemental oxygen around the clock, ESRD secondary to FSGS on hemodialysis MWF, Hepatitis C, hypertension, anemia of chronic diease, bipolar disorder, depression and IV drug abuse who presents as a transfer from [facility] after being evaluated and treated for pneumonia of right lower lung. Nephrology service has been consulted for ESRD management.     History  Past Medical History:   Diagnosis Date   • Abdominal pain    • Anemia    • Anxiety    • Asthma    • Bipolar disorder    • CKD (chronic kidney disease), stage IV    • Constipation    • COPD (chronic obstructive pulmonary disease)    • Depression    • End stage renal disease on dialysis    • Esophageal reflux     reflux   • Fahr's syndrome    • Hepatitis C antibody test positive    • Hyperparathyroidism    • Hypertension    • Hypocalcemia    • Nausea and vomiting    • Non-traumatic rhabdomyolysis 5/2/2017   • Pancreatitis    • Pneumonia of right lung due to infectious organism 5/2/2017   • Recurrent urinary tract infection    • Renal insufficiency    • Upper gastrointestinal bleeding    ,   Past Surgical History:   Procedure Laterality Date   • DIALYSIS FISTULA CREATION      port   • DILATATION AND CURETTAGE     • EXPLORATORY LAPAROTOMY      For uterine and ovarian issues   • KIDNEY SURGERY Left 2013    Biopsy   ,   Family History   Problem Relation Age of Onset   • Arthritis Mother    • Hypertension Mother    • Kidney disease Father      Chronic renal failure syndrome   • Pancreatic cancer Father    • Hypertension Brother    • Kidney disease Other    • Diabetes Other      Uncle   • Lung cancer Other      Grandmother   • Hyperlipidemia Other      High cholesterol - Uncle   ,   Social History   Substance Use Topics   • Smoking  status: Current Every Day Smoker     Packs/day: 1.50   • Smokeless tobacco: None   • Alcohol use No   ,   Prescriptions Prior to Admission   Medication Sig Dispense Refill Last Dose   • ALPRAZolam (XANAX) 0.5 MG tablet Take 0.5 mg by mouth At Night As Needed for Anxiety or Sleep.   6/10/2017 at Unknown time   • amitriptyline (ELAVIL) 10 MG tablet Take 10 mg by mouth Every Night.   6/10/2017 at Unknown time   • Heparin Sodium, Porcine, (HEPARIN, PORCINE,) 5000 UNIT/ML injection Inject 1 mL under the skin Every 12 (Twelve) Hours.      • methylPREDNISolone sodium succinate (SOLU-Medrol) 40 MG injection Infuse 1 mL into a venous catheter Every 8 (Eight) Hours.      • minoxidil (LONITEN) 10 MG tablet Take 10 mg by mouth Daily.   6/10/2017 at Unknown time   • mupirocin (BACTROBAN) 2 % ointment Apply  topically Every 12 (Twelve) Hours.  0    • pantoprazole (PROTONIX) 40 MG EC tablet Take 1 tablet by mouth Daily.      • piperacillin-tazobactam (ZOSYN) 2-0.25 GM/50ML IVPB Infuse 50 mL into a venous catheter Every 12 (Twelve) Hours. Indications: Pneumonia  0    • vancomycin in dextrose 5% 150 mL (VANCOCIN) 750-5 MG/150ML-% IVPB Infuse 150 mL into a venous catheter As Needed (post dialysis). Indications: Pneumonia  0    , Scheduled Meds:    amitriptyline 10 mg Oral Nightly   azithromycin 500 mg Intravenous Q24H   docusate sodium 100 mg Oral BID   ipratropium-albuterol 3 mL Nebulization 4x Daily - RT   lidocaine 2 patch Transdermal Q24H   minoxidil 10 mg Oral Daily   mupirocin  Topical Q12H   nicotine 1 patch Transdermal Daily   piperacillin-tazobactam 2.25 g Intravenous Q8H   vancomycin (dosing per levels)  Does not apply Daily   , Continuous Infusions:    Pharmacy to dose vancomycin    , PRN Meds:  •  ALPRAZolam  •  benzonatate  •  hydrALAZINE  •  HYDROmorphone  •  melatonin  •  ondansetron **OR** ondansetron  •  Pharmacy to dose vancomycin  •  sodium chloride  •  sodium chloride and Allergies:  Acetaminophen; Hydrocodone;  Ibuprofen; Lortab [hydrocodone-acetaminophen]; and Ciprofloxacin    Review of Systems  Pertinent items are noted in HPI    Objective     Vital Signs  Temp:  [96.3 °F (35.7 °C)-98.6 °F (37 °C)] 98.5 °F (36.9 °C)  Heart Rate:  [82-98] 82  Resp:  [16-24] 20  BP: (119-163)/() 148/93       I/O last 3 completed shifts:  In: 2390 [P.O.:2040; IV Piggyback:350]  Out: -     Physical Exam:     General Appearance:    Alert, cachectic, herpetic-like rash on forehead and cheeks bilaterally   Head:    Normocephalic, without obvious abnormality, atraumatic   Eyes:            Lids and lashes normal, conjunctivae and sclerae normal, no   icterus, no pallor, corneas clear, PERRLA           Neck:   No adenopathy, supple, trachea midline, no thyromegaly, no     carotid bruit, no JVD       Lungs:    Wheezing and decrease air entry to bases,respirations regular, even and unlabored    Heart:    Regular rhythm and normal rate, normal S1 and S2, no            murmur, no gallop, no rub, no click   Breast Exam:    Deferred   Abdomen:     Normal bowel sounds, no masses, no organomegaly, soft        non-tender, non-distended, no guarding, no rebound                 tenderness   Genitalia:    Deferred   Extremities:   Moves all extremities well, no edema, no cyanosis, no              redness   Pulses:   Pulses palpable and equal bilaterally   Skin:   No bleeding, bruising or rash   Lymph nodes:   No palpable adenopathy   Neurologic:   Cranial nerves 2 - 12 grossly intact, sensation intact, DTR        present and equal bilaterally       Results Review:   I reviewed the patient's new clinical results.    WBC WBC   Date Value Ref Range Status   06/14/2017 9.39 3.50 - 10.80 10*3/mm3 Final   06/13/2017 18.25 (H) 3.50 - 10.80 10*3/mm3 Final   06/12/2017 11.76 (H) 4.80 - 10.80 10*3/mm3 Final   06/11/2017 11.80 (H) 4.80 - 10.80 10*3/mm3 Final      HGB Hemoglobin   Date Value Ref Range Status   06/14/2017 12.3 11.5 - 15.5 g/dL Final   06/13/2017  12.0 11.5 - 15.5 g/dL Final   06/12/2017 12.7 12.0 - 16.0 g/dL Final   06/11/2017 13.9 12.0 - 16.0 g/dL Final      HCT Hematocrit   Date Value Ref Range Status   06/14/2017 38.2 34.5 - 44.0 % Final   06/13/2017 38.0 34.5 - 44.0 % Final   06/12/2017 38.2 37.0 - 47.0 % Final   06/11/2017 42.5 37.0 - 47.0 % Final      Platlets No results found for: LABPLAT   MCV MCV   Date Value Ref Range Status   06/14/2017 89.5 80.0 - 99.0 fL Final   06/13/2017 91.6 80.0 - 99.0 fL Final   06/12/2017 87.2 81.0 - 99.0 fL Final   06/11/2017 88.4 81.0 - 99.0 fL Final          Sodium Sodium   Date Value Ref Range Status   06/14/2017 137 132 - 146 mmol/L Final   06/13/2017 138 132 - 146 mmol/L Final   06/12/2017 134 (L) 137 - 145 mmol/L Final   06/11/2017 138 137 - 145 mmol/L Final      Potassium Potassium   Date Value Ref Range Status   06/14/2017 4.2 3.5 - 5.5 mmol/L Final   06/13/2017 4.4 3.5 - 5.5 mmol/L Final   06/12/2017 4.6 3.5 - 5.1 mmol/L Final   06/11/2017 4.0 3.5 - 5.1 mmol/L Final      Chloride Chloride   Date Value Ref Range Status   06/14/2017 101 99 - 109 mmol/L Final   06/13/2017 99 99 - 109 mmol/L Final   06/12/2017 90 (L) 98 - 107 mmol/L Final   06/11/2017 89 (L) 98 - 107 mmol/L Final      CO2 CO2   Date Value Ref Range Status   06/14/2017 22.0 20.0 - 31.0 mmol/L Final   06/13/2017 24.0 20.0 - 31.0 mmol/L Final   06/12/2017 25.0 (L) 26.0 - 30.0 mmol/L Final   06/11/2017 28.0 26.0 - 30.0 mmol/L Final      BUN BUN   Date Value Ref Range Status   06/14/2017 46 (H) 9 - 23 mg/dL Final   06/13/2017 27 (H) 9 - 23 mg/dL Final   06/12/2017 51 (H) 7 - 20 mg/dL Final   06/11/2017 38 (H) 7 - 20 mg/dL Final      Creatinine Creatinine   Date Value Ref Range Status   06/14/2017 7.10 (H) 0.60 - 1.30 mg/dL Final   06/13/2017 5.80 (H) 0.60 - 1.30 mg/dL Final   06/12/2017 10.70 (H) 0.60 - 1.30 mg/dL Final   06/11/2017 9.90 (H) 0.60 - 1.30 mg/dL Final      Calcium Calcium   Date Value Ref Range Status   06/14/2017 7.7 (L) 8.7 - 10.4 mg/dL  Final   06/13/2017 8.1 (L) 8.7 - 10.4 mg/dL Final   06/12/2017 7.7 (L) 8.4 - 10.2 mg/dL Final   06/11/2017 8.3 (L) 8.4 - 10.2 mg/dL Final      PO4 No results found for: CAPO4   Albumin Albumin   Date Value Ref Range Status   06/13/2017 3.70 3.20 - 4.80 g/dL Final   06/11/2017 4.10 3.50 - 5.00 g/dL Final      Magnesium No results found for: MG   Uric Acid No results found for: URICACID           amitriptyline 10 mg Oral Nightly   azithromycin 500 mg Intravenous Q24H   docusate sodium 100 mg Oral BID   ipratropium-albuterol 3 mL Nebulization 4x Daily - RT   lidocaine 2 patch Transdermal Q24H   minoxidil 10 mg Oral Daily   mupirocin  Topical Q12H   nicotine 1 patch Transdermal Daily   piperacillin-tazobactam 2.25 g Intravenous Q8H   vancomycin (dosing per levels)  Does not apply Daily       Pharmacy to dose vancomycin        Assessment/Plan     Principal Problem:    Loculated pleural effusion  Active Problems:    Tobacco use    Bipolar disorder    Depression    Hyperparathyroidism    Pneumonia of right lung due to infectious organism    COPD (chronic obstructive pulmonary disease)    ESRD (end stage renal disease) on dialysis    Renovascular hypertension    Hepatitis C antibody positive in blood    IV drug abuse    Anemia of ESRD    Pleural effusion    COPD exacerbation    Medical non-compliance      1- 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   3- Anemia of chronic disease. hgb at target   4- PNA with plueral loculation.   5- Volume overload.     Plan:  - HD today. Patient seen on dialysis.   - UF as tolerated.   - renal diet.   - Adjust meds per renal function  - Thoracotomy today.     Thank you very much for the consult. Will follow along.     I discussed the patients findings and my recommendations with patient    Haja Lane MD  06/14/17  @NOW

## 2017-06-14 NOTE — ANESTHESIA PREPROCEDURE EVALUATION
Anesthesia Evaluation     Patient summary reviewed and Nursing notes reviewed          Airway   Mallampati: II  TM distance: >3 FB  Neck ROM: full  no difficulty expected  Dental - normal exam     Pulmonary - normal exam   (+) pneumonia , a smoker, COPD, asthma,     ROS comment: Right pleural effusion  Cardiovascular - normal exam    (+) hypertension,     ROS comment: Echo 5/17- normal EF, mild MR/TR    Neuro/Psych- negative ROS  GI/Hepatic/Renal/Endo    (+)  GERD, hepatitis C, liver disease, renal disease CRI, ESRD and dialysis,     Musculoskeletal (-) negative ROS    Abdominal  - normal exam    Bowel sounds: normal.   Substance History   (+) drug use     OB/GYN negative ob/gyn ROS         Other                                        Anesthesia Plan    ASA 4     general   (DLETT)  intravenous induction   Anesthetic plan and risks discussed with patient.    Plan discussed with CRNA.

## 2017-06-14 NOTE — PROGRESS NOTES
Pharmacy Consult--Antimicrobial Dosing  Pt is a 25 yo female with pneumonia of RLL, loculated parapneumonic effusion, Hep C.  History of drug abuse.  Pharmacy consulted to dose vancomycin for pneumonia.  ID consulted    Consulting Provider: Hospitalist    Current Antimicrobial Therapy  1.  Azithromycin (6/11 -  2.  Zosyn  (6/12 -  3.  Vancomycin  (6/11 -    Allergies:  Acetaminophen; Hydrocodone; Ibuprofen; Lortab [hydrocodone-acetaminophen]; and Ciprofloxacin    Labs  Results from last 7 days   Lab Units 06/14/17  0634 06/13/17  0637 06/12/17  0554 06/11/17  1552   SODIUM mmol/L 137 138 134* 138   POTASSIUM mmol/L 4.2 4.4 4.6 4.0   CHLORIDE mmol/L 101 99 90* 89*   TOTAL CO2 mmol/L 22.0 24.0 25.0* 28.0   BUN mg/dL 46* 27* 51* 38*   CREATININE mg/dL 7.10* 5.80* 10.70* 9.90*   CALCIUM mg/dL 7.7* 8.1* 7.7* 8.3*   BILIRUBIN mg/dL --  0.1* --  0.6   ALK PHOS U/L --  98 --  108   ALT (SGPT) U/L --  20 --  41   AST (SGOT) U/L --  20 --  40   GLUCOSE mg/dL 79 105* 184* 92   Results from last 7 days   Lab Units 06/14/17  0634 06/13/17  0637 06/12/17  0554   WBC 10*3/mm3 9.39 18.25* 11.76*   HEMOGLOBIN g/dL 12.3 12.0 12.7   HEMATOCRIT % 38.2 38.0 38.2   PLATELETS 10*3/mm3 226 224 219   Max Temperature in Last 24 Hours:    Evaluation of Dosing  Weight:  44 kg  Goal Trough: 15-20    Estimated Creatinine Clearance: 8.3 mL/min (by C-G formula based on Cr of 7.1).    I/O last 3 completed shifts:  In: 2390 [P.O.:2040; IV Piggyback:350]  Out: -      Microbiology and Radiology  6/11 sputum with moderate S. aureus - awaiting C&S    Blood Culture   Date Value Ref Range Status   06/11/2017 No growth at 24 hours  Preliminary     Evaluation of Level    Lab Results   Component Value Date          VANCORANDOM 38.00 06/14/2017   This is a pre-HD random level    Received the following vancomycin doses:  Vancomycin 1000 mg IV 6/11 at 1725  Vancomycin   750 mg IV 6/13 at 1144    Plan:  1. Patient on MWF HD schedule.  HD today and in OR for  thoracotomy.   2.  Random vancomycin level this AM indicates that patient will not require dose of vancomycin post HD today.    3.  Will repeat random vancomycin level pre-HD on Friday to see if patient requires dose at that point.     Pharmacy will follow and make adjustments as needed.    Thank you for this consult,  Elma Gaytan, PharmD  06/14/17  10:56 AM

## 2017-06-14 NOTE — PROGRESS NOTES
Pulmonary/Critical Care History and Physical Exam     LOS: 2 days   Patient Care Team:  Neo Gresham DO as PCP - General (Family Medicine)    Chief Complaint:   Shortness of air    Subjective     HPI:     History taken from: Chart.  Patient is sedated and there is no family present at this time    Mandy Espino is a 26 y.o. female, present smoker, who initially presented on 6/12/17 with a 4 day history of shortness of air, productive cough with yellow sputum, and a diagnosis at [facility] of pneumonia involving the right lower lung.  Prior to admission she tried her nebulizer and oxygen which did not help.  She had also been treated 1 month prior at [facility] for right lung pneumonia.  She came with current treatment of Zosyn, Vancomycin and Zithromax.  While in [place] she underwent ultrasound guided right thoracentesis in which 200 mL of serous fluid that was exudative and demonstrated loculations.  She underwent evaluation with Dr. Hicks of cardiothoracic surgery, and now comes to ICU following decortication.  She has a significant past medical history for multiple comorbidities including COPD, hepatitis C, IV drug abuse, end-stage renal disease with dialysis, hyperparathyroidism, hypertension, and per her recent evaluation she continues to smoke.    Nephrology has been following her for dialysis.  She remained on sedation and mechanical ventilation until tomorrow per Dr. Hicks.      Past Medical History:   Diagnosis Date   • Abdominal pain    • Anemia    • Anxiety    • Asthma    • Bipolar disorder    • CKD (chronic kidney disease), stage IV    • Constipation    • COPD (chronic obstructive pulmonary disease)    • Depression    • End stage renal disease on dialysis    • Esophageal reflux     reflux   • Fahr's syndrome    • Hepatitis C antibody test positive    • Hyperparathyroidism    • Hypertension    • Hypocalcemia    • Nausea and vomiting    • Non-traumatic rhabdomyolysis  5/2/2017   • Pancreatitis    • Pneumonia of right lung due to infectious organism 5/2/2017   • Recurrent urinary tract infection    • Renal insufficiency    • Upper gastrointestinal bleeding        Past Surgical History:   Procedure Laterality Date   • DIALYSIS FISTULA CREATION      port   • DILATATION AND CURETTAGE     • ESOPHAGOSCOPY / EGD  2014    esophagitis   • EXPLORATORY LAPAROTOMY      For uterine and ovarian issues   • KIDNEY SURGERY Left 2013    Biopsy       Family History   Problem Relation Age of Onset   • Arthritis Mother    • Hypertension Mother    • Kidney disease Father      Chronic renal failure syndrome   • Pancreatic cancer Father    • Hypertension Brother    • Kidney disease Other    • Diabetes Other      Uncle   • Lung cancer Other      Grandmother   • Hyperlipidemia Other      High cholesterol - Uncle       Social History     Social History   • Marital status:      Spouse name: N/A   • Number of children: N/A   • Years of education: N/A     Occupational History   • Not on file.     Social History Main Topics   • Smoking status: Current Every Day Smoker     Packs/day: 1.50   • Smokeless tobacco: Not on file   • Alcohol use No   • Drug use: Yes     Special: Marijuana   • Sexual activity: Not Currently     Other Topics Concern   • Not on file     Social History Narrative    She is  and not working. She denies alcohol or drug use. She denies recent opiate or benzo use. She does smoke and has used marijuana.        Allergies   Allergen Reactions   • Acetaminophen Itching   • Hydrocodone Itching   • Ibuprofen    • Lortab [Hydrocodone-Acetaminophen]      Lortab TABS   • Ciprofloxacin Rash       PMH/FH/SocH were reviewed by me and updates were made.     Review of Systems:    Unable to assess.  Patient is sedated and mechanically ventilated.  There is no family present at this time.    Objective     Vital Signs  Temp:  [96.3 °F (35.7 °C)-98.6 °F (37 °C)] 98 °F (36.7 °C)  Heart Rate:   [] 81  Resp:  [12-24] 18  BP: (114-192)/() 161/83  FiO2 (%):  [30 %-100 %] 30 %  Body mass index is 18.33 kg/(m^2).  Vent Mode: VC+/AC  FiO2 (%):  [30 %-100 %] 30 %  S RR:  [12-18] 18  PEEP/CPAP (cm H2O):  [5 cm H20] 5 cm H20  AL SUP:  [0 cm H20] 0 cm H20  MAP (cm H2O):  [7.8-11] 11    Physical Exam:     General Appearance:    Sedated, ventilated, appears older than stated age, appears cachectic    Head:    Normocephalic, without obvious abnormality, Multiple areas of apparent bruisingand abrasions  about the forehead, lips, and chin    Eyes:            Lids and lashes normal, conjunctivae and sclerae normal, no   icterus, no pallor, corneas clear, PERRL   ENMT:   Ears appear intact with no abnormalities noted      No oral lesions, no thrush, oral mucosa moist   Neck:   No adenopathy, supple, trachea midline, no thyromegaly, no   carotid bruit, no JVD, butterfly tatoo right neck   Lungs/resp:     Normal effort, symmetric chest rise, no crepitus, Coarse to      auscultation bilaterally particularly in the left upper lobe, no chest wall tenderness, oral ET tube intact event, CT X2  Right chest   With yellow output           Heart/CV:    Regular rhythm and Tachycardic, normal S1 and S2, no            murmur   Abdomen/GI:     Normal bowel sounds, no masses, no organomegaly, soft        non-tender, non-distended   G/U:     Deferred   Extremities/MSK:   No clubbing, cyanosis or edema.  Normal tone.  Tract marks bilateral arms. AVF right arm   Pulses:   Pulses palpable and equal bilaterally   Skin:   No bleeding, bruising or rash   Hem/Lymph:   No cervical or supraclavicular adenopathy.    Neurologic: unable to assess as patient is sedated on propofol             Psychiatric:  Sedated      Results Review:     I reviewed the patient's new clinical results.     Results from last 7 days  Lab Units 06/14/17  0634 06/13/17  0637 06/12/17  0554 06/11/17  1552   SODIUM mmol/L 137 138 134* 138   POTASSIUM mmol/L 4.2  4.4 4.6 4.0   CHLORIDE mmol/L 101 99 90* 89*   TOTAL CO2 mmol/L 22.0 24.0 25.0* 28.0   BUN mg/dL 46* 27* 51* 38*   CREATININE mg/dL 7.10* 5.80* 10.70* 9.90*   CALCIUM mg/dL 7.7* 8.1* 7.7* 8.3*   BILIRUBIN mg/dL  --  0.1*  --  0.6   ALK PHOS U/L  --  98  --  108   ALT (SGPT) U/L  --  20  --  41   AST (SGOT) U/L  --  20  --  40   GLUCOSE mg/dL 79 105* 184* 92       Results from last 7 days  Lab Units 06/14/17  0634 06/13/17  0637 06/12/17  0554   WBC 10*3/mm3 9.39 18.25* 11.76*   HEMOGLOBIN g/dL 12.3 12.0 12.7   HEMATOCRIT % 38.2 38.0 38.2   PLATELETS 10*3/mm3 226 224 219       Results from last 7 days  Lab Units 06/14/17  1305   PH, ARTERIAL pH units 7.262*   PO2 ART mm Hg 294.0*   PCO2, ARTERIAL mm Hg 53.9*   HCO3 ART mmol/L 24.3       I reviewed the patient's new imaging including images and reports.    Medication Review:     amitriptyline 10 mg Oral Nightly   azithromycin 500 mg Intravenous Q24H   chlorhexidine 15 mL Mouth/Throat Q12H   docusate sodium 100 mg Oral BID   FentaNYL Citrate 10mcg/1mL NS      ipratropium-albuterol 3 mL Nebulization 4x Daily - RT   lidocaine 2 patch Transdermal Q24H   minoxidil 10 mg Oral Daily   mupirocin  Topical Q12H   nicotine 1 patch Transdermal Daily   piperacillin-tazobactam 2.25 g Intravenous Q8H   sennosides-docusate sodium 2 tablet Oral Nightly   vancomycin (dosing per levels)  Does not apply Daily       fentanyl 10 mcg/mL  mcg/hr Last Rate: 200 mcg/hr (06/14/17 1434)   lactated ringers 100 mL/hr Last Rate: 100 mL/hr (06/14/17 1241)   niCARdipine 5-15 mg/hr Last Rate: 5 mg/hr (06/14/17 1225)   Pharmacy to dose vancomycin     propofol 5-100 mcg/kg/min Last Rate: 100 mcg/kg/min (06/14/17 0968)   sodium chloride 30 mL/hr Last Rate: 30 mL/hr (06/14/17 1253)       Assessment/Plan     Hospital Problem List     * (Principal)Loculated pleural effusion    Tobacco use    Bipolar disorder    Depression    Hyperparathyroidism    Pneumonia of right lung due to infectious organism     ESRD (end stage renal disease) on dialysis    Renovascular hypertension    Hepatitis C antibody positive in blood    Overview Signed 6/12/2017 12:47 PM by REY Zhou     Has not followed up with ID          IV drug abuse    Anemia of ESRD    COPD exacerbation    Medical non-compliance          26-year-old, current smoker, IVDA, with Hepatitis C now presenting with a loculated parapneumonic pneumonia.  She has been treated for the past month and a half with antibiotics, and today   required thoracotomy and decortication.   She is to remain sedated and mechanically ventilated until tomorrow per Dr. Hicks preference.  She is on 30% FiO2 with a saturation of 100%. She was under ventilated on her initial blood gas. In the meantime, she'll continue her antimicrobials including Zosyn, vancomycin, and Zithromax. Postoperative chest x-ray shows adequate expansion of the right lung without acute infiltrate or pneumothorax. Sputum culture from June 11 was positive for staph aureus. Gram stain from her pleural fluid and pleural tissue reveals no organism.  She is HIV negative.  She is on hemodialysis 3x weekly via an AVF in her right arm.  Potassium is 4.2, Cr is 7.      Maintain mechanical ventilation until tomorrow after hemodialysis   Follow-up tissue and pleural cultures  Vancomycin, Zosyn, azithromycin  Nephrology to follow for end-stage renal disease  Nebulized broncho-dilators  Pepcid for ulcer prophylaxis  Minoxidil for hypertension        Charu Chavez MD  06/14/17  4:52 PM    I reviewed her record, reviewed her x-rays and laboratory data, did an independent physical examination, modified the above note reflects my findings and amended the above summary.  Time: Critical care 30 min

## 2017-06-14 NOTE — OP NOTE
Operative Report    Preop Diagnosis: Right lung empyema        Postop Diagnosis same        Procedure: #1 bronchoscopy #2 right thoracotomy #3 decortication        Surgeons: Shawn Hicks        Assistant: Tere Queen        Operative Findings: Entrapped right lung        Description: The patient was brought to the operating room placed under general anesthesia. The fiberoptic bronchoscopy was carried out through the endotracheal tube. The scope was advanced on the right side. The right upper right middle and right lower lobes were inspected. Scope was withdrawn mass to left side. Left upper left lower lobes were inspected. There was a lot of secretions. No masses are noted. The patient was placed in a left lateral decubitus position with axillary roll in place. The right chest was prepped and draped in usual sterile fashion. A right posterolateral thoracotomy incision Zometa and entered chest cavity and the sixth interspace. On entering the chest cavity was a lot of loculated fluid. This multiple loculations were broken down and decortication was carried out of the right lower lobe primarily. Wounds were freed. Everything was suctioned out multiple cultures were taken both of the fluid as well as the tissue. After completion of the decortication was good lung expansion. 2 32 chest tubes were placed inferiorly and sutured in place. The ribs were reapproximated with 4 pericostal #1 Vicryl sutures. Muscle layers were reapproximated 0 Vicryl suture. Subcutaneous tissues were closed with 2-0 Vicryl. A 3-0 Monocryl completed skin closure.        EBL: 100 mL      Please note that portions of this note were completed with a voice recognition program. Efforts were made to edit the dictations, but words may be mistranscribed

## 2017-06-14 NOTE — ANESTHESIA PROCEDURE NOTES
Airway  Urgency: elective    Date/Time: 6/14/2017 10:58 AM  End Time:6/14/2017 10:58 AM  Airway not difficult    General Information and Staff    Patient location during procedure: OR  Anesthesiologist: ANIKA WATKINS  CRNA: RANDY RODRIGUEZ    Indications and Patient Condition  Indications for airway management: airway protection    Preoxygenated: yes  MILS not maintained throughout  Mask difficulty assessment: 1 - vent by mask    Final Airway Details  Final airway type: endotracheal airway      Successful airway: ETT  Cuffed: yes   Successful intubation technique: direct laryngoscopy  Endotracheal tube insertion site: oral  Blade: Marcelle  Blade size: #3  ETT size: 8.0 mm  Cormack-Lehane Classification: grade I - full view of glottis  Placement verified by: chest auscultation and capnometry   Measured from: lips  ETT to lips (cm): 20  Number of attempts at approach: 1    Additional Comments  Negative epigastric sounds, Breath sound equal bilaterally with symmetric chest rise and fall

## 2017-06-14 NOTE — PROGRESS NOTES
[facility] Progress Note    6/12/2017      Antibiotics:  IV Anti-Infectives     Ordered     Dose/Rate Route Frequency Start Stop    06/14/17 0755  piperacillin-tazobactam (ZOSYN) 2.25 g/100 mL 0.9% NS IVPB (mbp)     Ordering Provider:  Elma Gaytan RPH    2.25 g Intravenous Every 8 Hours 06/14/17 1400      06/12/17 2156  vancomycin in dextrose 5% 150 mL (VANCOCIN) IVPB 750 mg     Ordering Provider:  Brock Hooker MD    750 mg  over 60 Minutes Intravenous Once 06/13/17 1200 06/13/17 1244    06/12/17 2007  AZITHROMYCIN 500 MG/250 ML 0.9% NS IVPB (MBP)     Ordering Provider:  Jonelle Smith PA-C    500 mg  over 60 Minutes Intravenous Every 24 Hours 06/12/17 2100      06/12/17 2013  vancomycin (dosing per levels)     Ordering Provider:  Johnna Pringle MD     Does not apply Daily 06/12/17 2045      06/12/17 2007  Pharmacy to dose vancomycin     Ordering Provider:  Jonelle Smith PA-C     Does not apply Continuous PRN 06/12/17 2004            CC:cough/pleurisy    HPI:  Patient is a 26 y.o. 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 difficile treated approximately January 2017, end-stage renal disease possibly from FSGS per past records, and numerous other comorbidity as outlined below. She has a history of medical noncompliance and apparently has left hospital on multiple occasions AGAINST MEDICAL ADVICE per outside notes.  She was apparently admitted mid May with diagnosis pneumonia and treated with empiric antibiotics but no specific microbiologic diagnosis. She is readmitted to [facility] with diagnosis right sided pneumonia and loculated pleural effusion associated with dyspnea/cough and right-sided pleurisy. She was transferred to [facility] for consideration of decortication. Of particular note, when she has done  "being treated at [facility], she prefers referral back to her infectious disease doctor, Dr. Witt.     6/14/17 seen early ans sleepy;  Per nursing, She has shortness of breath and intermittently productive cough but no hemoptysis and currently on nasal cannula oxygen. No headache photophobia or neck stiffness. No nausea vomiting diarrhea or abdominal pain. Some intermittent urine production. He gets dialysis via a right arm access fistula. No rash.    ROS:      6/14/17 No f/c/s. No n/v/d. No rash. No new ADR to Abx.     PE:   /100 (BP Location: Left arm, Patient Position: Lying)  Pulse 69  Temp 98.5 °F (36.9 °C)  Resp 20  Ht 61\" (154.9 cm)  Wt 97 lb (44 kg)  LMP 12/17/2016  SpO2 94%  BMI 18.33 kg/m2    GENERAL: sleepy, in no acute distress.   HEENT:  No conjunctival injection. No icterus. Oropharynx clear without evidence of thrush or exudate.     HEART: RRR; No murmur, rubs, gallops.   LUNGS: diminished on right c/t left, scattered rhonchi. Normal respiratory effort. Nonlabored. No dullness.  ABDOMEN: Soft, nontender, nondistended. Positive bowel sounds. No rebound or guarding. NO mass or HSM.  EXT:  No cyanosis, clubbing or edema. No cord.  : Genitalia generally unremarkable.  Without Burgos catheter.  SKIN: Warm and dry without cutaneous eruptions on Inspection/palpation.     No IE phenomena     Laboratory Data      Results from last 7 days  Lab Units 06/14/17  0634 06/13/17  0637 06/12/17  0554   WBC 10*3/mm3 9.39 18.25* 11.76*   HEMOGLOBIN g/dL 12.3 12.0 12.7   HEMATOCRIT % 38.2 38.0 38.2   PLATELETS 10*3/mm3 226 224 219       Results from last 7 days  Lab Units 06/14/17  0634   SODIUM mmol/L 137   POTASSIUM mmol/L 4.2   CHLORIDE mmol/L 101   TOTAL CO2 mmol/L 22.0   BUN mg/dL 46*   CREATININE mg/dL 7.10*   GLUCOSE mg/dL 79   CALCIUM mg/dL 7.7*       Results from last 7 days  Lab Units 06/13/17  0637   ALK PHOS U/L 98   BILIRUBIN mg/dL 0.1*   ALT (SGPT) U/L 20   AST (SGOT) U/L 20 "       Results from last 7 days  Lab Units 06/12/17  1504   SED RATE mm/hr 44*       Results from last 7 days  Lab Units 06/12/17  1504   CRP mg/dL 22.00*       Estimated Creatinine Clearance: 8.3 mL/min (by C-G formula based on Cr of 7.1).      Microbiology:      Radiology:  Imaging Results (last 72 hours)     ** No results found for the last 72 hours. **            Impression:   --Acute loculated right pleural effusion presumed associated with right lower lobe pneumonia. Empiric antibiotics ongoing for community-acquired/healthcare associated pneumonia. Cardiothoracic surgery has seen with potential plans for decortication. Microbiology studies pending. If not steadily better with this approach, you need to give consideration to further workup such as pulmonary consultation and bronchoscopy. No opportunistic process so far the patient is aware of.     --History hepatitis C. I do not treat viral hepatitis as a part of my practice. She has seen hepatology at [facility] and she should be referred back there after discharge for ongoing care and evaluation regarding possible treatment options. I discussed with her risks associated with hepatitis C including chronic hepatitis/cirrhosis and hepatocellular carcinoma. Follow-up at  is her responsibility. She is aware     --History C. difficile. Currently no diarrhea or abdominal complaints. Monitor for relapse. She understands she is at risk for relapse.     --End-stage renal disease associated with FSGS; dialysis ongoing     --History polysubstance abuse which she reports is not active.     --History medical noncompliance with multiple episodes of leaving hospital AGAINST MEDICAL ADVICE per outside records. She understands the implication of her behavior    PLAN:   --IV vancomycin/Zosyn and Zithromax     --I discussed potential risks and benefits of the prescribed antibiotics that include, but are not limited to, solid organ toxicity, neuro/kaley/vestibular  toxicity, CDiff, cytopenias, hypersensitivity, etc.. Patient voices understanding and agree to proceed.     --Monitor IV and IV antibiotic with risk for systemic complication and potential drug interaction     --Check/review labs cultures and scans     --Highly complex set of issues with high risk for further serious morbidity and other serious sequela/mortality     --Discussed with microbiology. Partial history per nursing staff    --surgical timing per Dr Kurt Singletary MD  6/14/2017

## 2017-06-14 NOTE — PROGRESS NOTES
"Adult Nutrition  Assessment/PES    Patient Name:  Mandy Espino  YOB: 1990  MRN: [id number]  Admit Date:  6/12/2017    Assessment Date:  6/14/2017        Reason for Assessment       06/14/17 1651    Reason for Assessment    Reason For Assessment/Visit identified at risk by screening criteria    Identified At Risk By Screening Criteria BMI    Time Spent (min) 45    Cardiac HTN    Endocrine Hyperparathyroid    Gastrointestinal GERD/Reflux;Other (comment)   hx pancreatitis and of UGIB    Hematological Anemia    Hepatic Other (comment)   Hep C    Infectious Disease Other (comment)   hx recurrent UTI    Neurological Other (comment)   Fahr's Syndrome    Psychosocial Other (comment)   Bipolar    Pulmonary/Critical Care COPD;Pneumonia   now s/p decortication    Renal ESRD    Skin Other (comment)   Rash    Substance Use Tobacco   hx IV drug    Other diagnosis Fever on adm; arthralgias; hx rhabdomyolysis              Nutrition/Diet History       06/14/17 1703    Nutrition/Diet History    Reported/Observed By Patient    Other on 6/13 rpted eats \"like a rat\" normally. Wt was 120 lbs unclear of how long ago - thought at least a year.             Anthropometrics       06/14/17 1704    Anthropometrics    Height 154.9 cm (61\")    Weight 44 kg (97 lb)    Ideal Body Weight (IBW)    Ideal Body Weight (IBW), Female 48.55    % Ideal Body Weight 90.82    Usual Body Weight (UBW)    Usual Body Weight 54.4 kg (120 lb)    % Usual Body Weight 80.83    Weight Loss 10.4 kg (23 lb)    % Weight Loss  19 %    Weight Loss Time Frame 1 yr    Body Mass Index (BMI)    BMI (kg/m2) 18.37            Labs/Tests/Procedures/Meds       06/14/17 1705    Labs/Tests/Procedures/Meds    Labs/Tests Review Reviewed                Nutrition Prescription Ordered       06/14/17 1705    Nutrition Prescription PO    Current PO Diet NPO            Evaluation of Received Nutrient/Fluid Intake       06/14/17 1705    PO Evaluation    Number of Meals 2 "    % PO Intake 62   on renal diet              Malnutrition Severity Assessment       06/14/17 1706    Malnutrition Severity Assessment    Malnutrition Type Chronic Illness Malnutrition    Physical Signs of Malnutrition (Chronic)    Muscle Wasting Severe    Fat Loss Severe    Weight Status (Chronic)    BMI Mild (<19)    %UBW Mild (86-90%)    Weight Loss --   19% about 1 yr    Energy Intake Status (Chronic)    Energy Intake --   unable to assess    Criteria Met (Must meet criteria for severity in at least 2 of these categories: M Wasting, Fat Loss, Fluid, Secondary Signs, Wt. Status, Intake)    Patient meets criteria for  Severe malnutrition   based on muscle and fat loss may use dx of severe chronic malnutrition           Problem/Interventions:        Problem 1       06/14/17 1710    Nutrition Diagnoses Problem 1    Problem 1 Malnutrition    Etiology (related to) --   multiple medical conditions see dx list    Signs/Symptoms (evidenced by) Other (comment);Report/Observation;Unintended Weight Change   rpt of inconsistent intake    Unintended Weight Change Loss    Number of Pounds Lost 23 lbs    Weight loss time period about 1 yr    Reported/Observed By RD/Tech    Other Comment obs mucle and fat wasting.                    Intervention Goal       06/14/17 1714    Intervention Goal    General Nutrition support treatment            Nutrition Intervention       06/14/17 1714    Nutrition Intervention    RD/Tech Action Menu provided;Follow Tx progress;Care plan reviewd;Advise alternate selection   on 6/13              Education/Evaluation       06/14/17 1714    Monitor/Evaluation    Monitor Per protocol;PO intake;Symptoms        Comments:      Electronically signed by:  Emerald Schmitt RD  06/14/17 5:15 PM

## 2017-06-14 NOTE — PLAN OF CARE
Problem: Patient Care Overview (Adult)  Goal: Plan of Care Review  Outcome: Ongoing (interventions implemented as appropriate)    06/14/17 9309   Coping/Psychosocial Response Interventions   Plan Of Care Reviewed With patient;spouse   Patient Care Overview   Progress no change   Outcome Evaluation   Outcome Summary/Follow up Plan Patient admitted post operatively to ICU for overnight sedation/mechical ventilation following thoracotomy/decortication. Patria has two CT with serosangious drainage. She has some sub q aiir noted anterior on the right below chest tubes, marked and will watch. Patient restraited for saftey., she was extremely hard to sedated and keep BP under control post-op. She ended up on diprivan 100mcg/kg/hr , fentanyl 200mg/hr, valium 10mg iv x1, and cardene and hydralizine for bp control. Patient currently comfortable will arouse and follow commands with stimulation.  currently at bedside and updated.        Goal: Adult Individualization and Mutuality  Outcome: Ongoing (interventions implemented as appropriate)  Goal: Discharge Needs Assessment  Outcome: Ongoing (interventions implemented as appropriate)    Problem: Respiratory Insufficiency (Adult)  Goal: Identify Related Risk Factors and Signs and Symptoms  Outcome: Ongoing (interventions implemented as appropriate)  Goal: Acid/Base Balance  Outcome: Ongoing (interventions implemented as appropriate)  Goal: Effective Ventilation  Outcome: Ongoing (interventions implemented as appropriate)    Problem: Perioperative Period (Adult)  Goal: Signs and Symptoms of Listed Potential Problems Will be Absent or Manageable (Perioperative Period)  Outcome: Ongoing (interventions implemented as appropriate)    Problem: Mechanical Ventilation, Invasive (Adult)  Goal: Signs and Symptoms of Listed Potential Problems Will be Absent or Manageable (Mechanical Ventilation, Invasive)  Outcome: Ongoing (interventions implemented as appropriate)    Problem: Lung  Surgery (via Thoracotomy) (Adult)  Goal: Signs and Symptoms of Listed Potential Problems Will be Absent or Manageable (Lung Surgery)  Outcome: Ongoing (interventions implemented as appropriate)    Problem: Pain, Acute (Adult)  Goal: Identify Related Risk Factors and Signs and Symptoms  Outcome: Ongoing (interventions implemented as appropriate)  Goal: Acceptable Pain Control/Comfort Level  Outcome: Ongoing (interventions implemented as appropriate)

## 2017-06-15 ENCOUNTER — APPOINTMENT (OUTPATIENT)
Dept: GENERAL RADIOLOGY | Facility: HOSPITAL | Age: 27
End: 2017-06-15

## 2017-06-15 LAB
ANION GAP SERPL CALCULATED.3IONS-SCNC: 10 MMOL/L (ref 3–11)
APPEARANCE FLD: ABNORMAL
ARTERIAL PATENCY WRIST A: ABNORMAL
ATMOSPHERIC PRESS: ABNORMAL MMHG
B PERT.PT PRMT NPH QL NAA+NON-PROBE: NOT DETECTED
BACTERIA SPEC RESP CULT: ABNORMAL
BASE EXCESS BLDA CALC-SCNC: -4 MMOL/L (ref 0–2)
BASOPHILS # BLD AUTO: 0.01 10*3/MM3 (ref 0–0.2)
BASOPHILS NFR BLD AUTO: 0.1 % (ref 0–1)
BDY SITE: ABNORMAL
BUN BLD-MCNC: 30 MG/DL (ref 9–23)
BUN/CREAT SERPL: 6.3 (ref 7–25)
C PNEUM DNA NPH QL NAA+NON-PROBE: NOT DETECTED
CA-I SERPL ISE-MCNC: 0.93 MMOL/L (ref 1.12–1.32)
CALCIUM SPEC-SCNC: 7.3 MG/DL (ref 8.7–10.4)
CHLORIDE SERPL-SCNC: 102 MMOL/L (ref 99–109)
CO2 BLDA-SCNC: 21 MMOL/L (ref 22–33)
CO2 SERPL-SCNC: 20 MMOL/L (ref 20–31)
COHGB MFR BLD: 0.6 % (ref 0–2)
COLOR FLD: ABNORMAL
CREAT BLD-MCNC: 4.8 MG/DL (ref 0.6–1.3)
CYTO UR: NORMAL
DEPRECATED RDW RBC AUTO: 55.7 FL (ref 37–54)
EOSINOPHIL # BLD AUTO: 0.01 10*3/MM3 (ref 0.1–0.3)
EOSINOPHIL NFR BLD AUTO: 0.1 % (ref 0–3)
ERYTHROCYTE [DISTWIDTH] IN BLOOD BY AUTOMATED COUNT: 16.7 % (ref 11.3–14.5)
FLUAV H1 RNA NPH QL NAA+NON-PROBE: NOT DETECTED
FLUAV H3 RNA NPH QL NAA+NON-PROBE: NOT DETECTED
FLUAV RNA SPEC QL NAA+PROBE: NOT DETECTED
FLUBV RNA SPEC QL NAA+PROBE: NOT DETECTED
GFR SERPL CREATININE-BSD FRML MDRD: 11 ML/MIN/1.73
GLUCOSE BLD-MCNC: 89 MG/DL (ref 70–100)
GLUCOSE BLDC GLUCOMTR-MCNC: 136 MG/DL (ref 70–130)
GLUCOSE BLDC GLUCOMTR-MCNC: 86 MG/DL (ref 70–130)
GLUCOSE BLDC GLUCOMTR-MCNC: 91 MG/DL (ref 70–130)
GRAM STN SPEC: ABNORMAL
HADV DNA SPEC QL NAA+PROBE: NOT DETECTED
HCO3 BLDA-SCNC: 20 MMOL/L (ref 20–26)
HCOV 229E RNA NPH QL NAA+NON-PROBE: NOT DETECTED
HCOV HKU1 RNA NPH QL NAA+NON-PROBE: NOT DETECTED
HCOV NL63 RNA NPH QL NAA+NON-PROBE: NOT DETECTED
HCOV OC43 RNA NPH QL NAA+NON-PROBE: NOT DETECTED
HCT VFR BLD AUTO: 38.2 % (ref 34.5–44)
HCT VFR BLD CALC: 37.8 %
HGB BLD-MCNC: 11.7 G/DL (ref 11.5–15.5)
HGB BLDA-MCNC: 12.3 G/DL (ref 14–18)
HMPV RNA SPEC QL NAA+PROBE: NOT DETECTED
HOROWITZ INDEX BLD+IHG-RTO: 30 %
HPIV1 RNA SPEC QL NAA+PROBE: NOT DETECTED
HPIV2 SPEC QL CULT: NOT DETECTED
HPIV3 SPEC QL CULT: NOT DETECTED
HPIV4 RNA NPH QL NAA+NON-PROBE: NOT DETECTED
IMM GRANULOCYTES # BLD: 0.03 10*3/MM3 (ref 0–0.03)
IMM GRANULOCYTES NFR BLD: 0.3 % (ref 0–0.6)
INR PPP: NOT DETECTED
INTERPRETATION: NORMAL
LAB AP CASE REPORT: NORMAL
LAB AP CLINICAL INFORMATION: NORMAL
LYMPHOCYTES # BLD AUTO: 1.27 10*3/MM3 (ref 0.6–4.8)
LYMPHOCYTES NFR BLD AUTO: 12.3 % (ref 24–44)
LYMPHOCYTES NFR FLD MANUAL: 40 %
Lab: NORMAL
M PNEUMO DNA SPEC QL NAA+PROBE: NOT DETECTED
MAGNESIUM SERPL-MCNC: 1.9 MG/DL (ref 1.3–2.7)
MCH RBC QN AUTO: 27.9 PG (ref 27–31)
MCHC RBC AUTO-ENTMCNC: 30.6 G/DL (ref 32–36)
MCV RBC AUTO: 91.2 FL (ref 80–99)
METHGB BLD QL: 0.8 % (ref 0–1.5)
MODALITY: ABNORMAL
MONOCYTES # BLD AUTO: 0.73 10*3/MM3 (ref 0–1)
MONOCYTES NFR BLD AUTO: 7.1 % (ref 0–12)
MRSA SPEC QL CULT: ABNORMAL
NEUTROPHILS # BLD AUTO: 8.29 10*3/MM3 (ref 1.5–8.3)
NEUTROPHILS NFR BLD AUTO: 80.1 % (ref 41–71)
NEUTROPHILS NFR FLD MANUAL: 60 %
OXYHGB MFR BLDV: 96.4 % (ref 94–99)
PATH REPORT.FINAL DX SPEC: NORMAL
PATH REPORT.GROSS SPEC: NORMAL
PCO2 BLDA: 31.2 MM HG (ref 35–45)
PH BLDA: 7.42 PH UNITS (ref 7.35–7.45)
PHOSPHATE SERPL-MCNC: 4.2 MG/DL (ref 2.4–5.1)
PLATELET # BLD AUTO: 210 10*3/MM3 (ref 150–450)
PMV BLD AUTO: 8.7 FL (ref 6–12)
PO2 BLDA: 119 MM HG (ref 83–108)
POTASSIUM BLD-SCNC: 5.6 MMOL/L (ref 3.5–5.5)
POTASSIUM BLD-SCNC: 5.6 MMOL/L (ref 3.5–5.5)
RBC # BLD AUTO: 4.19 10*6/MM3 (ref 3.89–5.14)
RBC # FLD AUTO: ABNORMAL /MM3 (ref 0–200000)
RHINOVIRUS RNA SPEC QL NAA+PROBE: DETECTED
RSV AG SPEC QL: NOT DETECTED
SODIUM BLD-SCNC: 132 MMOL/L (ref 132–146)
VRE SPEC QL CULT: NORMAL
WBC # FLD: 815 /MM3 (ref 0–1000)
WBC NRBC COR # BLD: 10.34 10*3/MM3 (ref 3.5–10.8)

## 2017-06-15 PROCEDURE — 63710000001 INSULIN REGULAR HUMAN PER 5 UNITS: Performed by: INTERNAL MEDICINE

## 2017-06-15 PROCEDURE — 94799 UNLISTED PULMONARY SVC/PX: CPT

## 2017-06-15 PROCEDURE — 36600 WITHDRAWAL OF ARTERIAL BLOOD: CPT | Performed by: NURSE PRACTITIONER

## 2017-06-15 PROCEDURE — 82962 GLUCOSE BLOOD TEST: CPT

## 2017-06-15 PROCEDURE — 85025 COMPLETE CBC W/AUTO DIFF WBC: CPT | Performed by: PHYSICIAN ASSISTANT

## 2017-06-15 PROCEDURE — 80048 BASIC METABOLIC PNL TOTAL CA: CPT | Performed by: PHYSICIAN ASSISTANT

## 2017-06-15 PROCEDURE — 25010000002 PIPERACILLIN-TAZOBACTAM

## 2017-06-15 PROCEDURE — 82330 ASSAY OF CALCIUM: CPT | Performed by: NURSE PRACTITIONER

## 2017-06-15 PROCEDURE — 84132 ASSAY OF SERUM POTASSIUM: CPT

## 2017-06-15 PROCEDURE — 25010000002 HYDRALAZINE PER 20 MG: Performed by: PHYSICIAN ASSISTANT

## 2017-06-15 PROCEDURE — 25010000002 PROPOFOL 1000 MG/ML EMULSION: Performed by: NURSE ANESTHETIST, CERTIFIED REGISTERED

## 2017-06-15 PROCEDURE — 99291 CRITICAL CARE FIRST HOUR: CPT | Performed by: INTERNAL MEDICINE

## 2017-06-15 PROCEDURE — 84100 ASSAY OF PHOSPHORUS: CPT | Performed by: NURSE PRACTITIONER

## 2017-06-15 PROCEDURE — 84132 ASSAY OF SERUM POTASSIUM: CPT | Performed by: NURSE PRACTITIONER

## 2017-06-15 PROCEDURE — 25010000002 AZITHROMYCIN: Performed by: PHYSICIAN ASSISTANT

## 2017-06-15 PROCEDURE — 99024 POSTOP FOLLOW-UP VISIT: CPT | Performed by: THORACIC SURGERY (CARDIOTHORACIC VASCULAR SURGERY)

## 2017-06-15 PROCEDURE — 94640 AIRWAY INHALATION TREATMENT: CPT

## 2017-06-15 PROCEDURE — 25010000002 HYDROMORPHONE PER 4 MG: Performed by: FAMILY MEDICINE

## 2017-06-15 PROCEDURE — 71010 HC CHEST PA OR AP: CPT

## 2017-06-15 PROCEDURE — 83735 ASSAY OF MAGNESIUM: CPT | Performed by: NURSE PRACTITIONER

## 2017-06-15 PROCEDURE — 94760 N-INVAS EAR/PLS OXIMETRY 1: CPT

## 2017-06-15 PROCEDURE — 82805 BLOOD GASES W/O2 SATURATION: CPT | Performed by: NURSE PRACTITIONER

## 2017-06-15 RX ORDER — SODIUM POLYSTYRENE SULFONATE 15 G/60ML
30 SUSPENSION ORAL; RECTAL ONCE
Status: COMPLETED | OUTPATIENT
Start: 2017-06-15 | End: 2017-06-15

## 2017-06-15 RX ORDER — OXYCODONE HYDROCHLORIDE AND ACETAMINOPHEN 5; 325 MG/1; MG/1
1 TABLET ORAL EVERY 4 HOURS PRN
Status: DISCONTINUED | OUTPATIENT
Start: 2017-06-15 | End: 2017-06-18

## 2017-06-15 RX ORDER — DEXMEDETOMIDINE HYDROCHLORIDE 4 UG/ML
.2-1.5 INJECTION, SOLUTION INTRAVENOUS
Status: DISCONTINUED | OUTPATIENT
Start: 2017-06-15 | End: 2017-06-18

## 2017-06-15 RX ORDER — DEXTROSE MONOHYDRATE 25 G/50ML
50 INJECTION, SOLUTION INTRAVENOUS ONCE
Status: COMPLETED | OUTPATIENT
Start: 2017-06-15 | End: 2017-06-15

## 2017-06-15 RX ORDER — FAMOTIDINE 20 MG/1
20 TABLET, FILM COATED ORAL DAILY
Status: DISCONTINUED | OUTPATIENT
Start: 2017-06-15 | End: 2017-06-23 | Stop reason: HOSPADM

## 2017-06-15 RX ORDER — NALOXONE HCL 0.4 MG/ML
0.1 VIAL (ML) INJECTION
Status: DISCONTINUED | OUTPATIENT
Start: 2017-06-15 | End: 2017-06-23 | Stop reason: HOSPADM

## 2017-06-15 RX ADMIN — Medication 2 TABLET: at 21:48

## 2017-06-15 RX ADMIN — PROPOFOL 90 MCG/KG/MIN: 10 INJECTION, EMULSION INTRAVENOUS at 11:26

## 2017-06-15 RX ADMIN — SODIUM CHLORIDE 30 ML/HR: 9 INJECTION, SOLUTION INTRAVENOUS at 08:26

## 2017-06-15 RX ADMIN — PROPOFOL 100 MCG/KG/MIN: 10 INJECTION, EMULSION INTRAVENOUS at 08:46

## 2017-06-15 RX ADMIN — PIPERACILLIN AND TAZOBACTAM 2.25 G: 2; .25 INJECTION, POWDER, LYOPHILIZED, FOR SOLUTION INTRAVENOUS; PARENTERAL at 01:09

## 2017-06-15 RX ADMIN — AMITRIPTYLINE HYDROCHLORIDE 10 MG: 10 TABLET, FILM COATED ORAL at 21:49

## 2017-06-15 RX ADMIN — ALBUTEROL SULFATE 10 MG: 2.5 SOLUTION RESPIRATORY (INHALATION) at 10:28

## 2017-06-15 RX ADMIN — IPRATROPIUM BROMIDE AND ALBUTEROL SULFATE 3 ML: .5; 3 SOLUTION RESPIRATORY (INHALATION) at 20:10

## 2017-06-15 RX ADMIN — DEXMEDETOMIDINE HYDROCHLORIDE 0.2 MCG/KG/HR: 4 INJECTION, SOLUTION INTRAVENOUS at 13:28

## 2017-06-15 RX ADMIN — SODIUM POLYSTYRENE SULFONATE 30 G: 15 SUSPENSION ORAL; RECTAL at 10:21

## 2017-06-15 RX ADMIN — HYDRALAZINE HYDROCHLORIDE 10 MG: 20 INJECTION INTRAMUSCULAR; INTRAVENOUS at 19:35

## 2017-06-15 RX ADMIN — IPRATROPIUM BROMIDE AND ALBUTEROL SULFATE 3 ML: .5; 3 SOLUTION RESPIRATORY (INHALATION) at 16:29

## 2017-06-15 RX ADMIN — MUPIROCIN: 20 OINTMENT TOPICAL at 21:49

## 2017-06-15 RX ADMIN — PIPERACILLIN AND TAZOBACTAM 2.25 G: 2; .25 INJECTION, POWDER, LYOPHILIZED, FOR SOLUTION INTRAVENOUS; PARENTERAL at 08:44

## 2017-06-15 RX ADMIN — MUPIROCIN 1 APPLICATION: 20 OINTMENT TOPICAL at 08:27

## 2017-06-15 RX ADMIN — IPRATROPIUM BROMIDE AND ALBUTEROL SULFATE 3 ML: .5; 3 SOLUTION RESPIRATORY (INHALATION) at 07:25

## 2017-06-15 RX ADMIN — CHLORHEXIDINE GLUCONATE 15 ML: 1.2 RINSE ORAL at 08:45

## 2017-06-15 RX ADMIN — ALPRAZOLAM 0.5 MG: 0.5 TABLET ORAL at 21:48

## 2017-06-15 RX ADMIN — DOCUSATE SODIUM 100 MG: 100 CAPSULE, LIQUID FILLED ORAL at 17:33

## 2017-06-15 RX ADMIN — HYDROMORPHONE HYDROCHLORIDE 1 MG: 1 INJECTION, SOLUTION INTRAMUSCULAR; INTRAVENOUS; SUBCUTANEOUS at 17:34

## 2017-06-15 RX ADMIN — MINOXIDIL 10 MG: 10 TABLET ORAL at 09:00

## 2017-06-15 RX ADMIN — FAMOTIDINE 20 MG: 20 TABLET ORAL at 17:33

## 2017-06-15 RX ADMIN — Medication 175 MCG/HR: at 13:47

## 2017-06-15 RX ADMIN — INSULIN HUMAN 10 UNITS: 100 INJECTION, SOLUTION PARENTERAL at 10:20

## 2017-06-15 RX ADMIN — AZITHROMYCIN 500 MG: 500 INJECTION, POWDER, LYOPHILIZED, FOR SOLUTION INTRAVENOUS at 21:49

## 2017-06-15 RX ADMIN — DEXMEDETOMIDINE HYDROCHLORIDE 1.1 MCG/KG/HR: 4 INJECTION, SOLUTION INTRAVENOUS at 21:48

## 2017-06-15 RX ADMIN — OXYCODONE AND ACETAMINOPHEN 1 TABLET: 5; 325 TABLET ORAL at 21:48

## 2017-06-15 RX ADMIN — NICOTINE 1 PATCH: 21 PATCH, EXTENDED RELEASE TRANSDERMAL at 08:44

## 2017-06-15 RX ADMIN — PROPOFOL 80 MCG/KG/MIN: 10 INJECTION, EMULSION INTRAVENOUS at 01:09

## 2017-06-15 RX ADMIN — Medication: at 21:34

## 2017-06-15 RX ADMIN — DEXTROSE MONOHYDRATE 50 ML: 25 INJECTION, SOLUTION INTRAVENOUS at 10:20

## 2017-06-15 RX ADMIN — Medication 200 MCG/HR: at 04:24

## 2017-06-15 RX ADMIN — Medication 200 MCG/HR: at 08:29

## 2017-06-15 RX ADMIN — PIPERACILLIN AND TAZOBACTAM 2.25 G: 2; .25 INJECTION, POWDER, LYOPHILIZED, FOR SOLUTION INTRAVENOUS; PARENTERAL at 16:42

## 2017-06-15 RX ADMIN — IPRATROPIUM BROMIDE AND ALBUTEROL SULFATE 3 ML: .5; 3 SOLUTION RESPIRATORY (INHALATION) at 12:32

## 2017-06-15 RX ADMIN — HYDROMORPHONE HYDROCHLORIDE 1 MG: 1 INJECTION, SOLUTION INTRAMUSCULAR; INTRAVENOUS; SUBCUTANEOUS at 21:33

## 2017-06-15 RX ADMIN — PROPOFOL 100 MCG/KG/MIN: 10 INJECTION, EMULSION INTRAVENOUS at 05:37

## 2017-06-15 NOTE — PLAN OF CARE
Problem: Patient Care Overview (Adult)  Goal: Plan of Care Review  Outcome: Ongoing (interventions implemented as appropriate)    06/15/17 0632   Coping/Psychosocial Response Interventions   Plan Of Care Reviewed With patient   Outcome Evaluation   Outcome Summary/Follow up Plan Pt rested well throughout the night. In early evening pt bp began to decrease but rebounded with decrease in diprivan which later had to be increased for agitation. Chest tube requires frequent stripping and drainage has came slowly after 0100, though both site do still drain. Right lung has diminished sounds with rhonchi but does not sound excessively wet, pt is moving air well and has maintained O2 saturations at 100 percent all night. Crepitus noted at the tube site has not extended further than when present at shift change. Chest tube site appeared clean with no discharge during dressing change. Pt has remained afebrile.

## 2017-06-15 NOTE — ANESTHESIA POSTPROCEDURE EVALUATION
Patient: Mandy Espino    Procedure Summary     Date Anesthesia Start Anesthesia Stop Room / Location    06/14/17 1029 1149 [facility] / [facility]       Procedure Diagnosis Surgeon Provider    BRONCHOSCOPY RIGHT THORACOTOMY WITH LUNG DECORTICATION  (Right Bronchus) Pleural effusion  (Pleural effusion [J90]) MD Chlio Gagnon MD          Anesthesia Type: general  Last vitals  BP      Temp      Pulse     Resp      SpO2        Post Anesthesia Care and Evaluation    Patient location during evaluation: ICU  Patient participation: complete - patient cannot participate  Pain score: 0  Pain management: adequate  Airway patency: patent  Anesthetic complications: No anesthetic complications  PONV Status: none  Cardiovascular status: hemodynamically stable and acceptable  Respiratory status: acceptable, intubated and ventilator  Hydration status: acceptable

## 2017-06-15 NOTE — PROGRESS NOTES
CTS Progress Note      POD #1 s/p Right Thoracotomy, Decortication        Subjective  Sedated and intubated.      Objective    Physical Exam:   Vital Signs   Temp:  [97.4 °F (36.3 °C)-98.4 °F (36.9 °C)] 98.4 °F (36.9 °C)  Heart Rate:  [] 57  Resp:  [12-18] 14  BP: ()/() 150/78  FiO2 (%):  [30 %-100 %] 30 %   GEN: NAD   RESP: Decreased bibasilar breath sounds right greater than the left with no wheezes, rales or rhonchi.  No air leak.    CV: Regular rate and rhythm with no murmurs, rubs or gallops   ABD: Soft, nontender/nondistended with hypooactive bowel sounds    EXT: Warm with good color and well perfused with no bilateral lower extremity edema   INT: Incision c/d/i   CT Output: 178 mL/18 hours    Intake/Output Summary (Last 24 hours) at 06/15/17 0830  Last data filed at 06/15/17 0200   Gross per 24 hour   Intake          1901.08 ml   Output              178 ml   Net          1723.08 ml     Results       Results from last 7 days  Lab Units 06/15/17  0322   WBC 10*3/mm3 10.34   HEMOGLOBIN g/dL 11.7   HEMATOCRIT % 38.2   PLATELETS 10*3/mm3 210       Results from last 7 days  Lab Units 06/15/17  0322   SODIUM mmol/L 132   POTASSIUM mmol/L 5.6*   CHLORIDE mmol/L 102   TOTAL CO2 mmol/L 20.0   BUN mg/dL 30*   CREATININE mg/dL 4.80*   GLUCOSE mg/dL 89   CALCIUM mg/dL 7.3*       Results from last 7 days  Lab Units 06/14/17  0634   INR  1.10       CXR: Small right apical pneumothorax, right chest tubes in place, small right pleural effusion      Assessment/Plan     Principal Problem:    Loculated pleural effusion  Active Problems:    Tobacco use    Bipolar disorder    Depression    Hyperparathyroidism    Pneumonia of right lung due to infectious organism    ESRD (end stage renal disease) on dialysis    Renovascular hypertension    Hepatitis C antibody positive in blood    IV drug abuse    Anemia of ESRD    COPD exacerbation    Medical non-compliance        Plan   Leave chest tubes  Wean off ventilator as  tolerated  Chest x-ray in a.m.  I agree with the above  MENDEL Mohr  06/15/17  8:30 AM

## 2017-06-15 NOTE — PROGRESS NOTES
[facility] Progress Note    6/12/2017      Antibiotics:  IV Anti-Infectives     Ordered     Dose/Rate Route Frequency Start Stop    06/14/17 1600  piperacillin-tazobactam (ZOSYN) 2.25 g/100 mL 0.9% NS IVPB (mbp)     Ordering Provider:  Kavin Deng IV, RPH    2.25 g  over 0.5 Hours Intravenous Every 8 Hours 06/14/17 1700      06/12/17 2156  vancomycin in dextrose 5% 150 mL (VANCOCIN) IVPB 750 mg     Ordering Provider:  Brock Hooker MD    750 mg  over 60 Minutes Intravenous Once 06/13/17 1200 06/13/17 1244    06/12/17 2007  AZITHROMYCIN 500 MG/250 ML 0.9% NS IVPB (MBP)     Ordering Provider:  Jonelle Smith PA-C    500 mg  over 60 Minutes Intravenous Every 24 Hours 06/12/17 2100      06/12/17 2013  vancomycin (dosing per levels)     Ordering Provider:  Johnna Pringle MD     Does not apply Daily 06/12/17 2045 06/12/17 2007  Pharmacy to dose vancomycin     Ordering Provider:  Jonelle Smith PA-C     Does not apply Continuous PRN 06/12/17 2004            CC:cough/pleurisy    HPI:  Patient is a 26 y.o. 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 difficile treated approximately January 2017, end-stage renal disease possibly from FSGS per past records, and numerous other comorbidity as outlined below. She has a history of medical noncompliance and apparently has left hospital on multiple occasions AGAINST MEDICAL ADVICE per outside notes.  She was apparently admitted mid May with diagnosis pneumonia and treated with empiric antibiotics but no specific microbiologic diagnosis. She is readmitted to [facility] with diagnosis right sided pneumonia and loculated pleural effusion associated with dyspnea/cough and right-sided pleurisy. She was transferred to [facility] for consideration of decortication. Of particular  "note, when she has done being treated at [facility], she prefers referral back to her infectious disease doctor, Dr. Witt.    6/14 decortication for empyema; post op hypercapnic resp failure requiring ventilatory support     6/15/17 Intubated and sedated;  Per nursing, No diarrhea, on vent, no pressors, no rash, no new focal pain.  No bleeding, stable tubes and lines with small right apical ptx.    ROS:      6/15/17 complete review of systems unable to obtain    PE:   /52  Pulse 72  Temp 96.6 °F (35.9 °C) (Axillary)   Resp 16  Ht 61\" (154.9 cm)  Wt 97 lb (44 kg)  LMP 12/17/2016  SpO2 100%  Breastfeeding? No  BMI 18.33 kg/m2    GENERAL: Sedated on ventilator  HEENT:  No conjunctival injection. No icterus. Oropharynx clear without evidence of thrush or exudate.     HEART: RRR; No murmur, rubs, gallops.   LUNGS: diminished on right c/t left, scattered rhonchi.  On ventilator with oral endotracheal tube and right chest tube  ABDOMEN: Soft, nontender, nondistended. Positive bowel sounds. No rebound or guarding. NO mass or HSM.  EXT:  No cyanosis, clubbing or edema. No cord.  : Genitalia generally unremarkable.  Without Burgos catheter.  SKIN: Warm and dry without cutaneous eruptions on Inspection/palpation.   Neck supple no mass  Lymph--neck and groin negative  Musculoskeletal-- no joint effusions appreciated in the arms and legs.  Free range of motion at shoulders elbows wrists, hips knees and ankles    IV with no redness or purulence    No IE phenomena     Laboratory Data      Results from last 7 days  Lab Units 06/15/17  0322 06/14/17  0634 06/13/17  0637   WBC 10*3/mm3 10.34 9.39 18.25*   HEMOGLOBIN g/dL 11.7 12.3 12.0   HEMATOCRIT % 38.2 38.2 38.0   PLATELETS 10*3/mm3 210 226 224       Results from last 7 days  Lab Units 06/15/17  0322   SODIUM mmol/L 132   POTASSIUM mmol/L 5.6*   CHLORIDE mmol/L 102   TOTAL CO2 mmol/L 20.0   BUN mg/dL 30*   CREATININE mg/dL 4.80*   GLUCOSE mg/dL 89 "   CALCIUM mg/dL 7.3*       Results from last 7 days  Lab Units 06/13/17  0637   ALK PHOS U/L 98   BILIRUBIN mg/dL 0.1*   ALT (SGPT) U/L 20   AST (SGOT) U/L 20       Results from last 7 days  Lab Units 06/12/17  1504   SED RATE mm/hr 44*       Results from last 7 days  Lab Units 06/12/17  1504   CRP mg/dL 22.00*       Estimated Creatinine Clearance: 12.3 mL/min (by C-G formula based on Cr of 4.8).      Microbiology:      Radiology:  Imaging Results (last 72 hours)     ** No results found for the last 72 hours. **            Impression:   --Acute hypercapnic respiratory failure.  Remains intubated postop, sedated.  After decortication.  Small right apical pneumothorax with chest tube.    --Acute loculated right pleural effusion/empyema per cardiothoracic surgery  associated with right lower lobe pneumonia. Empiric antibiotics ongoing for community-acquired/healthcare associated pneumonia.  Decortication on June 14.  MRSA so far and recent cultures.  If no other specific pathogen isolated then likely taper antibiotics in upcoming days     --History hepatitis C. I do not treat viral hepatitis as a part of my practice. She has seen hepatology at [facility] and she should be referred back there after discharge for ongoing care and evaluation regarding possible treatment options. I discussed with her risks associated with hepatitis C including chronic hepatitis/cirrhosis and hepatocellular carcinoma. Follow-up at  is her responsibility. She is aware     --History C. difficile. Currently no diarrhea or abdominal complaints. Monitor for relapse. She understands she is at risk for relapse.     --End-stage renal disease associated with FSGS; dialysis ongoing     --History polysubstance abuse which she reports is not active.     --History medical noncompliance with multiple episodes of leaving hospital AGAINST MEDICAL ADVICE per outside records. She understands the implication of her behavior    PLAN:   --IV  "vancomycin/Zosyn and Zithromax     --Critically ill with high risk for further serious morbidity and other serious sequela/mortality     --Monitor IV and IV antibiotic with risk for systemic complication and potential drug interaction     --Check/review labs cultures and scans     --Discussed with microbiology. Partial history per nursing staff    --Discussed with family      Time In 10:15  Time Out 10:55  CCT 40\"        Fercho Singletary MD  6/15/2017        "

## 2017-06-15 NOTE — PLAN OF CARE
Problem: Respiratory Insufficiency (Adult)  Goal: Identify Related Risk Factors and Signs and Symptoms  Outcome: Ongoing (interventions implemented as appropriate)    06/15/17 1566   Respiratory Insufficiency   Related Risk Factors (Respiratory Insufficiency) physiological factors;smoking   Signs and Symptoms (Respiratory Insufficiency) abnormal ABGs;abnormal breath sounds;blood pressure/heart rate changes

## 2017-06-15 NOTE — PROGRESS NOTES
Multidisciplinary Rounds    Time: 20min  Patient Name: Mandy Espino  Date of Encounter: 06/15/17 10:13 AM  MRN: [id number]  Admission date: 6/12/2017      Reason for visit: MDR. RD to continue to follow per protocol.     Additional information obtained during MDR: Per RN no plans for HD today, plans for D50+kayexalate enema. Plans to attempt extubation today. Regular diet in place at time of MDR.    Current diet: NPO Diet      Intervention:  Follow treatment plan  Care plan reviewed  Diet adjusted: NPO    Follow up:   Per protocol      Charleen Tripp RDN, LD  10:13 AM

## 2017-06-15 NOTE — PROGRESS NOTES
"Critical Care Note     LOS: 3 days   Patient Care Team:  Neo Gresham DO as PCP - General (Family Medicine)    Chief Complaint/Reason for visit:  Loculated right pleural effusion,VATS      Subjective     Interval History:     Patient remained intubated overnight. She has a problem with IV drug abuse and hepatitis C with end-stage renal disease on hemodialysis. There are no plans for dialysis today.    Review of Systems:    All systems were reviewed and negative except as noted in subjective.    Medical history, surgical history, social history, family history reviewed    Objective     Intake/Output:    Intake/Output Summary (Last 24 hours) at 06/15/17 1417  Last data filed at 06/15/17 1052   Gross per 24 hour   Intake          2472.08 ml   Output               58 ml   Net          2414.08 ml       Nutrition:Nothing by mouth    Infusions:    dexmedetomidine 0.2-1.5 mcg/kg/hr Last Rate: 0.3 mcg/kg/hr (06/15/17 1349)   fentanyl 10 mcg/mL  mcg/hr Last Rate: 125 mcg/hr (06/15/17 1415)   niCARdipine 5-15 mg/hr Last Rate: 5 mg/hr (06/14/17 1225)   Pharmacy to dose vancomycin     propofol 5-100 mcg/kg/min Last Rate: 10 mcg/kg/min (06/15/17 1359)       Respiratory:  Vent Mode: VC+/AC  FiO2 (%):  [30 %] 30 %  S RR:  [16-18] 16  PEEP/CPAP (cm H2O):  [5 cm H20] 5 cm H20  ME SUP:  [0 cm H20] 0 cm H20  MAP (cm H2O):  [9-13] 9    Telemetry:    Vital Signs  Blood pressure 159/80, pulse 78, temperature 96.8 °F (36 °C), temperature source Axillary, resp. rate 26, height 61\" (154.9 cm), weight 97 lb (44 kg), last menstrual period 12/17/2016, SpO2 100 %, not currently breastfeeding.    Physical Exam:  General Appearance:  Cachectic older appearing 26-year-old    Head:  Abrasions and scabs over her for head, chin, lip    Eyes:          No jaundice, conjunctiva pink, pupils small, equal    Ears:     Throat: Orally intubated    Neck: Trachea midline, no palpable thyroid    Back:      Lungs:   2 right chest tubes, no air leak, " symmetric chest excursion, no wheeze or rhonchi     Heart:  Heart sounds normal,  regular, no murmur    Abdomen:   Flat, bowel sounds present    Rectal:   Deferred   Extremities: Right upper arm AV fistula with palpable thrill, no pitting edema    Pulses:    Skin: Multiple track marks    Lymph nodes:    Neurologic: Sedated       Results Review:     I reviewed the patient's new clinical results.     Results from last 7 days  Lab Units 06/15/17  0322 06/14/17  0634 06/13/17  0637  06/11/17  1552   SODIUM mmol/L 132 137 138  < > 138   POTASSIUM mmol/L 5.6* 4.2 4.4  < > 4.0   CHLORIDE mmol/L 102 101 99  < > 89*   TOTAL CO2 mmol/L 20.0 22.0 24.0  < > 28.0   BUN mg/dL 30* 46* 27*  < > 38*   CREATININE mg/dL 4.80* 7.10* 5.80*  < > 9.90*   CALCIUM mg/dL 7.3* 7.7* 8.1*  < > 8.3*   BILIRUBIN mg/dL  --   --  0.1*  --  0.6   ALK PHOS U/L  --   --  98  --  108   ALT (SGPT) U/L  --   --  20  --  41   AST (SGOT) U/L  --   --  20  --  40   GLUCOSE mg/dL 89 79 105*  < > 92   < > = values in this interval not displayed.    Results from last 7 days  Lab Units 06/15/17  0322 06/14/17  0634 06/13/17  0637   WBC 10*3/mm3 10.34 9.39 18.25*   HEMOGLOBIN g/dL 11.7 12.3 12.0   HEMATOCRIT % 38.2 38.2 38.0   PLATELETS 10*3/mm3 210 226 224       Results from last 7 days  Lab Units 06/15/17  0340   PH, ARTERIAL pH units 7.416   PO2 ART mm Hg 119.0*   PCO2, ARTERIAL mm Hg 31.2*   HCO3 ART mmol/L 20.0     Lab Results   Component Value Date    BLOODCX No growth at 3 days 06/11/2017     No results found for: URINECX    I reviewed the patient's new imaging including images and reports.      IMPRESSION:  Minimal bibasilar atelectasis. There has been no significant  change when compared to 06/14/2017.      D: 06/15/2017    All medications reviewed.     amitriptyline 10 mg Oral Nightly   azithromycin 500 mg Intravenous Q24H   chlorhexidine 15 mL Mouth/Throat Q12H   docusate sodium 100 mg Oral BID   ipratropium-albuterol 3 mL Nebulization 4x Daily - RT    lidocaine 2 patch Transdermal Q24H   minoxidil 10 mg Oral Daily   mupirocin  Topical Q12H   nicotine 1 patch Transdermal Daily   piperacillin-tazobactam 2.25 g Intravenous Q8H   sennosides-docusate sodium 2 tablet Oral Nightly   vancomycin (dosing per levels)  Does not apply Daily         Assessment/Plan     Principal Problem:    Loculated pleural effusion  Active Problems:    Tobacco use    Bipolar disorder    Depression    Hyperparathyroidism    Pneumonia of right lung due to infectious organism    ESRD (end stage renal disease) on dialysis    Renovascular hypertension    Hepatitis C antibody positive in blood    IV drug abuse    Anemia of ESRD    COPD exacerbation    Medical non-compliance    26-year-old woman, current smoker, IV drug abuser with hepatitis C and end-stage renal disease presenting with a loculated parapneumonic effusion. June 14 she underwent thoracotomy with decortication. She was maintained on mechanical ventilation overnight. This morning blood gases indicated she was over ventilated and tidal volume was decreased from 500 to 400, rate decreased to 16. Her last hemodialysis was preoperatively. Potassium was mildly elevated this morning and treated with IV glucose, insulin, Kayexalate enema.      PLAN:  Decrease sedation and wean to extubation  Continue home antihypertensives  Vancomycin, Zosyn, azithromycin  Nicotine patch  Start H2 receptor blocker    VTE Prophylaxis:SCDS    Stress Ulcer Prophylaxis:none    Charu Chavez MD  06/15/17  2:17 PM      Time: 30min  I personally provided care to this critically ill patient as documented above.  Critical care time does not include time spent on separately billed procedures.  Non of my critical care time was concurrent with other critical care providers.

## 2017-06-15 NOTE — PLAN OF CARE
Problem: Perioperative Period (Adult)  Goal: Signs and Symptoms of Listed Potential Problems Will be Absent or Manageable (Perioperative Period)  Outcome: Ongoing (interventions implemented as appropriate)    06/15/17 0639   Perioperative Period   Problems Assessed (Perioperative Period) all   Problems Present (Perioperative Period) pain;wound complications;physiologic stress response;situational response         Problem: Mechanical Ventilation, Invasive (Adult)  Goal: Signs and Symptoms of Listed Potential Problems Will be Absent or Manageable (Mechanical Ventilation, Invasive)  Outcome: Ongoing (interventions implemented as appropriate)    06/15/17 0639   Mechanical Ventilation, Invasive   Problems Assessed (Mechanical Ventilation, Invasive) all   Problems Present (Mechanical Ventilation, Invasive) artificial airway induced skin/tissue breakdown;immobility;situational response         Problem: Lung Surgery (via Thoracotomy) (Adult)  Goal: Signs and Symptoms of Listed Potential Problems Will be Absent or Manageable (Lung Surgery)  Outcome: Ongoing (interventions implemented as appropriate)    06/15/17 0639   Lung Surgery (via Thoracotomy)   Problems Assessed (Lung Surgery) all   Problems Present (Lung Surgery) bleeding;hypoxia/hypoxemia;situational response         Problem: Pain, Acute (Adult)  Goal: Identify Related Risk Factors and Signs and Symptoms  Outcome: Ongoing (interventions implemented as appropriate)    06/15/17 0639   Pain, Acute   Related Risk Factors (Acute Pain) disease process;infection;knowledge deficit;persistent pain;procedure/treatment   Signs and Symptoms (Acute Pain) BADLs/IADLs reluctance/inability to perform

## 2017-06-16 ENCOUNTER — APPOINTMENT (OUTPATIENT)
Dept: GENERAL RADIOLOGY | Facility: HOSPITAL | Age: 27
End: 2017-06-16

## 2017-06-16 LAB
ABO + RH BLD: NORMAL
ABO + RH BLD: NORMAL
ALBUMIN SERPL-MCNC: 3.4 G/DL (ref 3.2–4.8)
ANION GAP SERPL CALCULATED.3IONS-SCNC: 15 MMOL/L (ref 3–11)
BACTERIA FLD CULT: NO GROWTH
BACTERIA SPEC AEROBE CULT: NORMAL
BACTERIA SPEC AEROBE CULT: NORMAL
BACTERIA SPEC ANAEROBE CULT: NO GROWTH
BH BB BLOOD EXPIRATION DATE: NORMAL
BH BB BLOOD EXPIRATION DATE: NORMAL
BH BB BLOOD TYPE BARCODE: 7300
BH BB BLOOD TYPE BARCODE: 7300
BH BB DISPENSE STATUS: NORMAL
BH BB DISPENSE STATUS: NORMAL
BH BB PRODUCT CODE: NORMAL
BH BB PRODUCT CODE: NORMAL
BH BB UNIT NUMBER: NORMAL
BH BB UNIT NUMBER: NORMAL
BUN BLD-MCNC: 43 MG/DL (ref 9–23)
BUN/CREAT SERPL: 7 (ref 7–25)
CALCIUM SPEC-SCNC: 7.3 MG/DL (ref 8.7–10.4)
CHLORIDE SERPL-SCNC: 102 MMOL/L (ref 99–109)
CO2 SERPL-SCNC: 19 MMOL/L (ref 20–31)
CREAT BLD-MCNC: 6.1 MG/DL (ref 0.6–1.3)
CROSSMATCH INTERPRETATION: NORMAL
CROSSMATCH INTERPRETATION: NORMAL
DEPRECATED RDW RBC AUTO: 55.3 FL (ref 37–54)
ERYTHROCYTE [DISTWIDTH] IN BLOOD BY AUTOMATED COUNT: 16.7 % (ref 11.3–14.5)
GFR SERPL CREATININE-BSD FRML MDRD: 8 ML/MIN/1.73
GLUCOSE BLD-MCNC: 68 MG/DL (ref 70–100)
GLUCOSE BLDC GLUCOMTR-MCNC: 76 MG/DL (ref 70–130)
HCT VFR BLD AUTO: 37.8 % (ref 34.5–44)
HGB BLD-MCNC: 12.1 G/DL (ref 11.5–15.5)
LAB AP CASE REPORT: NORMAL
LAB AP DIAGNOSIS COMMENT: NORMAL
Lab: NORMAL
MCH RBC QN AUTO: 28.7 PG (ref 27–31)
MCHC RBC AUTO-ENTMCNC: 32 G/DL (ref 32–36)
MCV RBC AUTO: 89.6 FL (ref 80–99)
PHOSPHATE SERPL-MCNC: 4.8 MG/DL (ref 2.4–5.1)
PLATELET # BLD AUTO: 212 10*3/MM3 (ref 150–450)
PMV BLD AUTO: 8.8 FL (ref 6–12)
POTASSIUM BLD-SCNC: 4.3 MMOL/L (ref 3.5–5.5)
POTASSIUM BLD-SCNC: 4.3 MMOL/L (ref 3.5–5.5)
RBC # BLD AUTO: 4.22 10*6/MM3 (ref 3.89–5.14)
SODIUM BLD-SCNC: 136 MMOL/L (ref 132–146)
TROPONIN I SERPL-MCNC: <0.006 NG/ML
UNIT  ABO: NORMAL
UNIT  ABO: NORMAL
UNIT  RH: NORMAL
UNIT  RH: NORMAL
VANCOMYCIN SERPL-MCNC: 25.8 MCG/ML (ref 5–40)
WBC NRBC COR # BLD: 11.14 10*3/MM3 (ref 3.5–10.8)

## 2017-06-16 PROCEDURE — 85027 COMPLETE CBC AUTOMATED: CPT | Performed by: INTERNAL MEDICINE

## 2017-06-16 PROCEDURE — 80202 ASSAY OF VANCOMYCIN: CPT

## 2017-06-16 PROCEDURE — 25010000002 PIPERACILLIN-TAZOBACTAM

## 2017-06-16 PROCEDURE — 94799 UNLISTED PULMONARY SVC/PX: CPT

## 2017-06-16 PROCEDURE — 94640 AIRWAY INHALATION TREATMENT: CPT

## 2017-06-16 PROCEDURE — 25010000002 HYDRALAZINE PER 20 MG: Performed by: PHYSICIAN ASSISTANT

## 2017-06-16 PROCEDURE — 25010000002 VANCOMYCIN

## 2017-06-16 PROCEDURE — 93010 ELECTROCARDIOGRAM REPORT: CPT | Performed by: INTERNAL MEDICINE

## 2017-06-16 PROCEDURE — 71010 HC CHEST PA OR AP: CPT

## 2017-06-16 PROCEDURE — 25010000002 HYDROMORPHONE PER 4 MG: Performed by: FAMILY MEDICINE

## 2017-06-16 PROCEDURE — 94760 N-INVAS EAR/PLS OXIMETRY 1: CPT

## 2017-06-16 PROCEDURE — 99233 SBSQ HOSP IP/OBS HIGH 50: CPT | Performed by: INTERNAL MEDICINE

## 2017-06-16 PROCEDURE — 82962 GLUCOSE BLOOD TEST: CPT

## 2017-06-16 PROCEDURE — 25010000002 AZITHROMYCIN: Performed by: PHYSICIAN ASSISTANT

## 2017-06-16 PROCEDURE — 84484 ASSAY OF TROPONIN QUANT: CPT | Performed by: NURSE PRACTITIONER

## 2017-06-16 PROCEDURE — 80069 RENAL FUNCTION PANEL: CPT | Performed by: INTERNAL MEDICINE

## 2017-06-16 RX ORDER — AMLODIPINE BESYLATE 5 MG/1
5 TABLET ORAL
Status: DISCONTINUED | OUTPATIENT
Start: 2017-06-16 | End: 2017-06-23

## 2017-06-16 RX ORDER — ALBUMIN (HUMAN) 12.5 G/50ML
12.5 SOLUTION INTRAVENOUS AS NEEDED
Status: ACTIVE | OUTPATIENT
Start: 2017-06-16 | End: 2017-06-16

## 2017-06-16 RX ORDER — SIMETHICONE 80 MG
80 TABLET,CHEWABLE ORAL 4 TIMES DAILY PRN
Status: DISCONTINUED | OUTPATIENT
Start: 2017-06-16 | End: 2017-06-23 | Stop reason: HOSPADM

## 2017-06-16 RX ADMIN — IPRATROPIUM BROMIDE AND ALBUTEROL SULFATE 3 ML: .5; 3 SOLUTION RESPIRATORY (INHALATION) at 20:08

## 2017-06-16 RX ADMIN — HYDRALAZINE HYDROCHLORIDE 10 MG: 20 INJECTION INTRAMUSCULAR; INTRAVENOUS at 11:31

## 2017-06-16 RX ADMIN — MUPIROCIN 1 APPLICATION: 20 OINTMENT TOPICAL at 08:11

## 2017-06-16 RX ADMIN — AZITHROMYCIN 500 MG: 500 INJECTION, POWDER, LYOPHILIZED, FOR SOLUTION INTRAVENOUS at 20:09

## 2017-06-16 RX ADMIN — ALPRAZOLAM 0.5 MG: 0.5 TABLET ORAL at 08:11

## 2017-06-16 RX ADMIN — Medication 2 TABLET: at 20:08

## 2017-06-16 RX ADMIN — NICOTINE 1 PATCH: 21 PATCH, EXTENDED RELEASE TRANSDERMAL at 08:11

## 2017-06-16 RX ADMIN — FAMOTIDINE 20 MG: 20 TABLET ORAL at 10:32

## 2017-06-16 RX ADMIN — IPRATROPIUM BROMIDE AND ALBUTEROL SULFATE 3 ML: .5; 3 SOLUTION RESPIRATORY (INHALATION) at 07:27

## 2017-06-16 RX ADMIN — DEXMEDETOMIDINE HYDROCHLORIDE 0.8 MCG/KG/HR: 4 INJECTION, SOLUTION INTRAVENOUS at 17:25

## 2017-06-16 RX ADMIN — MINOXIDIL 10 MG: 10 TABLET ORAL at 10:32

## 2017-06-16 RX ADMIN — CHLORHEXIDINE GLUCONATE 15 ML: 1.2 RINSE ORAL at 20:16

## 2017-06-16 RX ADMIN — AMLODIPINE BESYLATE 5 MG: 5 TABLET ORAL at 10:36

## 2017-06-16 RX ADMIN — PIPERACILLIN AND TAZOBACTAM 2.25 G: 2; .25 INJECTION, POWDER, LYOPHILIZED, FOR SOLUTION INTRAVENOUS; PARENTERAL at 02:03

## 2017-06-16 RX ADMIN — IPRATROPIUM BROMIDE AND ALBUTEROL SULFATE 3 ML: .5; 3 SOLUTION RESPIRATORY (INHALATION) at 12:16

## 2017-06-16 RX ADMIN — HYDRALAZINE HYDROCHLORIDE 10 MG: 20 INJECTION INTRAMUSCULAR; INTRAVENOUS at 02:02

## 2017-06-16 RX ADMIN — DOCUSATE SODIUM 100 MG: 100 CAPSULE, LIQUID FILLED ORAL at 17:26

## 2017-06-16 RX ADMIN — DEXMEDETOMIDINE HYDROCHLORIDE 1.4 MCG/KG/HR: 4 INJECTION, SOLUTION INTRAVENOUS at 20:09

## 2017-06-16 RX ADMIN — HYDROMORPHONE HYDROCHLORIDE 1 MG: 1 INJECTION, SOLUTION INTRAMUSCULAR; INTRAVENOUS; SUBCUTANEOUS at 10:33

## 2017-06-16 RX ADMIN — CHLORHEXIDINE GLUCONATE 15 ML: 1.2 RINSE ORAL at 08:11

## 2017-06-16 RX ADMIN — Medication: at 09:42

## 2017-06-16 RX ADMIN — SIMETHICONE CHEW TAB 80 MG 80 MG: 80 TABLET ORAL at 14:06

## 2017-06-16 RX ADMIN — ALPRAZOLAM 0.5 MG: 0.5 TABLET ORAL at 20:09

## 2017-06-16 RX ADMIN — OXYCODONE AND ACETAMINOPHEN 1 TABLET: 5; 325 TABLET ORAL at 10:32

## 2017-06-16 RX ADMIN — MUPIROCIN: 20 OINTMENT TOPICAL at 20:16

## 2017-06-16 RX ADMIN — AMITRIPTYLINE HYDROCHLORIDE 10 MG: 10 TABLET, FILM COATED ORAL at 20:09

## 2017-06-16 RX ADMIN — Medication: at 17:29

## 2017-06-16 RX ADMIN — HYDROMORPHONE HYDROCHLORIDE 1 MG: 1 INJECTION, SOLUTION INTRAMUSCULAR; INTRAVENOUS; SUBCUTANEOUS at 14:34

## 2017-06-16 RX ADMIN — IPRATROPIUM BROMIDE AND ALBUTEROL SULFATE 3 ML: .5; 3 SOLUTION RESPIRATORY (INHALATION) at 16:08

## 2017-06-16 RX ADMIN — VANCOMYCIN HYDROCHLORIDE 500 MG: 500 INJECTION, POWDER, LYOPHILIZED, FOR SOLUTION INTRAVENOUS at 11:48

## 2017-06-16 RX ADMIN — OXYCODONE AND ACETAMINOPHEN 1 TABLET: 5; 325 TABLET ORAL at 17:25

## 2017-06-16 RX ADMIN — DEXMEDETOMIDINE HYDROCHLORIDE 0.7 MCG/KG/HR: 4 INJECTION, SOLUTION INTRAVENOUS at 08:10

## 2017-06-16 RX ADMIN — OXYCODONE AND ACETAMINOPHEN 1 TABLET: 5; 325 TABLET ORAL at 22:12

## 2017-06-16 RX ADMIN — HYDROMORPHONE HYDROCHLORIDE 1 MG: 1 INJECTION, SOLUTION INTRAMUSCULAR; INTRAVENOUS; SUBCUTANEOUS at 22:12

## 2017-06-16 RX ADMIN — HYDROMORPHONE HYDROCHLORIDE 1 MG: 1 INJECTION, SOLUTION INTRAMUSCULAR; INTRAVENOUS; SUBCUTANEOUS at 18:11

## 2017-06-16 RX ADMIN — SIMETHICONE CHEW TAB 80 MG 80 MG: 80 TABLET ORAL at 20:08

## 2017-06-16 RX ADMIN — PIPERACILLIN AND TAZOBACTAM 2.25 G: 2; .25 INJECTION, POWDER, LYOPHILIZED, FOR SOLUTION INTRAVENOUS; PARENTERAL at 17:25

## 2017-06-16 RX ADMIN — PIPERACILLIN AND TAZOBACTAM 2.25 G: 2; .25 INJECTION, POWDER, LYOPHILIZED, FOR SOLUTION INTRAVENOUS; PARENTERAL at 10:33

## 2017-06-16 RX ADMIN — DOCUSATE SODIUM 100 MG: 100 CAPSULE, LIQUID FILLED ORAL at 10:32

## 2017-06-16 RX ADMIN — OXYCODONE AND ACETAMINOPHEN 1 TABLET: 5; 325 TABLET ORAL at 02:02

## 2017-06-16 NOTE — PLAN OF CARE
Problem: Patient Care Overview (Adult)  Goal: Plan of Care Review  Outcome: Ongoing (interventions implemented as appropriate)    06/16/17 1805 06/16/17 1808   Coping/Psychosocial Response Interventions   Plan Of Care Reviewed With --  patient   Patient Care Overview   Progress --  improving   Outcome Evaluation   Outcome Summary/Follow up Plan PCA dilaudid and oral percocet controlling pain more effectively along with precedex, up to chair x 4 hours, walked 30 feet, c/o chest pain-no changes noted, troponin WNL, CT output 20 ml  --        Goal: Adult Individualization and Mutuality  Outcome: Ongoing (interventions implemented as appropriate)  Goal: Discharge Needs Assessment  Outcome: Ongoing (interventions implemented as appropriate)    Problem: Respiratory Insufficiency (Adult)  Goal: Acid/Base Balance  Outcome: Ongoing (interventions implemented as appropriate)  Goal: Effective Ventilation  Outcome: Ongoing (interventions implemented as appropriate)    Problem: Perioperative Period (Adult)  Goal: Signs and Symptoms of Listed Potential Problems Will be Absent or Manageable (Perioperative Period)  Outcome: Ongoing (interventions implemented as appropriate)    Problem: Lung Surgery (via Thoracotomy) (Adult)  Goal: Signs and Symptoms of Listed Potential Problems Will be Absent or Manageable (Lung Surgery)  Outcome: Ongoing (interventions implemented as appropriate)    Problem: Pain, Acute (Adult)  Goal: Acceptable Pain Control/Comfort Level  Outcome: Ongoing (interventions implemented as appropriate)    Problem: Pressure Ulcer Risk (Heri Scale) (Adult,Obstetrics,Pediatric)  Goal: Skin Integrity  Outcome: Ongoing (interventions implemented as appropriate)

## 2017-06-16 NOTE — PROGRESS NOTES
Pharmacy Dosing Consult: Vancomycin    Patient is a 25yo F with pleural effusion, ESRD. H/O IVDA. Current antibiotics include vancomycin per levels, azithromycin, pip/tazo.  Last HD session today; pre-HD vanc level = 25.8    Lab Results   Component Value Date    GLUCOSE 68 (L) 06/16/2017    CALCIUM 7.3 (L) 06/16/2017     06/16/2017    K 4.3 06/16/2017    CO2 19.0 (L) 06/16/2017     06/16/2017    BUN 43 (H) 06/16/2017    CREATININE 6.10 (H) 06/16/2017    EGFRIFNONA 8 (L) 06/16/2017    BCR 7.0 06/16/2017    ANIONGAP 15.0 (H) 06/16/2017     Lab Results   Component Value Date    WBC 11.14 (H) 06/16/2017    HGB 12.1 06/16/2017    HCT 37.8 06/16/2017    MCV 89.6 06/16/2017     06/16/2017     Lab Results   Component Value Date    VANCOTROUGH 17 (C) 05/09/2016    VANCORANDOM 25.80 06/16/2017     Plan  Previous level of 38 pre-HD roughly 48 hours ago.  Based on current HD clearance, we will give 500 mg vancomycin (~10 mg/kg) post-HD on HD days only and continue to monitor patient.     Thank you,  Leobardo Mayo, PharmD, BCPS

## 2017-06-16 NOTE — PROGRESS NOTES
"Critical Care Note     LOS: 4 days   Patient Care Team:  Neo Gresham DO as PCP - General (Family Medicine)    Chief Complaint/Reason for visit:  Loculated right pleural effusion,VATS      Subjective     Interval History:     Patient extubated yesterday. She has a problem with IV drug abuse and hepatitis C with end-stage renal disease on hemodialysis. Dilaudid PCA started last pm.  Hemodialysis this am    Review of Systems:    All systems were reviewed and negative except as noted in subjective.    Medical history, surgical history, social history, family history reviewed    Objective     Intake/Output:    Intake/Output Summary (Last 24 hours) at 06/16/17 1543  Last data filed at 06/16/17 1107   Gross per 24 hour   Intake              982 ml   Output             3365 ml   Net            -2383 ml       Nutrition:renal diet    Infusions:    dexmedetomidine 0.2-1.5 mcg/kg/hr Last Rate: 0.7 mcg/kg/hr (06/16/17 0810)   fentanyl 10 mcg/mL  mcg/hr Last Rate: Stopped (06/15/17 1644)   HYDROmorphone     niCARdipine 5-15 mg/hr Last Rate: 5 mg/hr (06/14/17 1225)   Pharmacy to dose vancomycin     propofol 5-100 mcg/kg/min Last Rate: Stopped (06/15/17 1419)       Respiratory:4 liters       Telemetry:sinus tachycardia    Vital Signs  Blood pressure 157/93, pulse 108, temperature 98.1 °F (36.7 °C), temperature source Oral, resp. rate (!) 32, height 61\" (154.9 cm), weight 97 lb (44 kg), last menstrual period 12/17/2016, SpO2 99 %, not currently breastfeeding.    Physical Exam:  General Appearance:  Cachectic older appearing 26-year-old    Head:  Abrasions and scabs over her for head, chin, lip    Eyes:          No jaundice, conjunctiva pink, pupils small, equal    Ears:     Throat: Poor teeth   Neck: Trachea midline, no palpable thyroid    Back:      Lungs:   2 right chest tubes, no air leak, symmetric chest excursion, no wheeze or rhonchi, but decreased left base    Heart:  Heart sounds normal,  regular, no murmur  "   Abdomen:   Flat, bowel sounds present    Rectal:   Deferred   Extremities: Right upper arm AV fistula with palpable thrill, no pitting edema    Pulses:    Skin: Multiple track marks    Lymph nodes:    Neurologic: Sedated but arouses easily      Results Review:     I reviewed the patient's new clinical results.     Results from last 7 days  Lab Units 06/16/17  0334 06/15/17  2354 06/15/17  1802 06/15/17  0322 06/14/17  0634 06/13/17  0637  06/11/17  1552   SODIUM mmol/L 136  --   --  132 137 138  < > 138   POTASSIUM mmol/L 4.3 4.3 5.6* 5.6* 4.2 4.4  < > 4.0   CHLORIDE mmol/L 102  --   --  102 101 99  < > 89*   TOTAL CO2 mmol/L 19.0*  --   --  20.0 22.0 24.0  < > 28.0   BUN mg/dL 43*  --   --  30* 46* 27*  < > 38*   CREATININE mg/dL 6.10*  --   --  4.80* 7.10* 5.80*  < > 9.90*   CALCIUM mg/dL 7.3*  --   --  7.3* 7.7* 8.1*  < > 8.3*   BILIRUBIN mg/dL  --   --   --   --   --  0.1*  --  0.6   ALK PHOS U/L  --   --   --   --   --  98  --  108   ALT (SGPT) U/L  --   --   --   --   --  20  --  41   AST (SGOT) U/L  --   --   --   --   --  20  --  40   GLUCOSE mg/dL 68*  --   --  89 79 105*  < > 92   < > = values in this interval not displayed.    Results from last 7 days  Lab Units 06/16/17  0334 06/15/17  0322 06/14/17  0634   WBC 10*3/mm3 11.14* 10.34 9.39   HEMOGLOBIN g/dL 12.1 11.7 12.3   HEMATOCRIT % 37.8 38.2 38.2   PLATELETS 10*3/mm3 212 210 226       Results from last 7 days  Lab Units 06/15/17  0340   PH, ARTERIAL pH units 7.416   PO2 ART mm Hg 119.0*   PCO2, ARTERIAL mm Hg 31.2*   HCO3 ART mmol/L 20.0     Lab Results   Component Value Date    BLOODCX No growth at 4 days 06/11/2017     No results found for: URINECX    I reviewed the patient's new imaging including images and reports.          IMPRESSION:  1. New left basilar atelectasis and effusion.  2. Minimal but new right apical pneumothorax.  3. Crowding lung markings following extubation. Mild if any interstitial  edema.      D: 06/16/2017    All medications  reviewed.     amitriptyline 10 mg Oral Nightly   amLODIPine 5 mg Oral Q24H   azithromycin 500 mg Intravenous Q24H   chlorhexidine 15 mL Mouth/Throat Q12H   docusate sodium 100 mg Oral BID   famotidine 20 mg Oral Daily   ipratropium-albuterol 3 mL Nebulization 4x Daily - RT   lidocaine 2 patch Transdermal Q24H   minoxidil 10 mg Oral Daily   mupirocin  Topical Q12H   nicotine 1 patch Transdermal Daily   piperacillin-tazobactam 2.25 g Intravenous Q8H   sennosides-docusate sodium 2 tablet Oral Nightly   vancomycin 500 mg Intravenous Once per day on Mon Wed Fri         Assessment/Plan     Principal Problem:    Loculated pleural effusion  Active Problems:    Tobacco use    Bipolar disorder    Depression    Hyperparathyroidism    Pneumonia of right lung due to infectious organism    ESRD (end stage renal disease) on dialysis    Renovascular hypertension    Hepatitis C antibody positive in blood    IV drug abuse    Anemia of ESRD    COPD exacerbation    Medical non-compliance    26-year-old woman, current smoker, IV drug abuser with hepatitis C and end-stage renal disease presenting with a loculated parapneumonic effusion. June 14 she underwent thoracotomy with decortication. She is extubated on 4 liters.  She has new atelectasis in left base. As expected, pain management challenging.       PLAN:  Dilaudid PCA/po percocet  lidoderm  Continue home antihypertensives  Vancomycin, Zosyn, azithromycin  Nicotine patch  Up to chair after dialysis  Duo-neb 4x daily  Respirex,   Dialysis MWF    VTE Prophylaxis:SCDS    Stress Ulcer Prophylaxis:marilynn Chavez MD  06/16/17  3:43 PM

## 2017-06-16 NOTE — PROGRESS NOTES
"Continued Stay Note  [facility]     Patient Name: Mandy Espino  MRN: [id number]  Today's Date: 6/16/2017    Admit Date: 6/12/2017          Discharge Plan       06/16/17 1201    Case Management/Social Work Plan    Plan Social work spoke with  and gave her a substance abuse packet of information.  She stated \" I don't need any drug rehab\"  Social work said she would leave the inforamtion in her room if Mrs. Espino decided she did want to have substance abuse rehab.    Patient/Family In Agreement With Plan yes              Discharge Codes     None        Expected Discharge Date and Time     Expected Discharge Date Expected Discharge Time    Jun 19, 2017             MANDO Gee    "

## 2017-06-16 NOTE — PLAN OF CARE
Problem: Patient Care Overview (Adult)  Goal: Plan of Care Review  Outcome: Ongoing (interventions implemented as appropriate)    06/15/17 2013   Coping/Psychosocial Response Interventions   Plan Of Care Reviewed With patient;spouse;mother   Patient Care Overview   Progress improving   Outcome Evaluation   Outcome Summary/Follow up Plan extubated at 1520 to 2l/NC on fent gtt at 25 mcg/hr and precedex, fent gtt weaned off and prn dilaudid used for pain, up in chair x 30 min, pt refuses to deep breath and cough,, K level 5.6 insulin-d50 -kayexalate enema given, repeat k level 5.6 will monitor at midnight, passed post extubation dysphagia screen,        Goal: Adult Individualization and Mutuality  Outcome: Ongoing (interventions implemented as appropriate)  Goal: Discharge Needs Assessment  Outcome: Ongoing (interventions implemented as appropriate)    Problem: Respiratory Insufficiency (Adult)  Goal: Identify Related Risk Factors and Signs and Symptoms  Outcome: Outcome(s) achieved Date Met:  06/15/17  Goal: Acid/Base Balance  Outcome: Ongoing (interventions implemented as appropriate)  Goal: Effective Ventilation  Outcome: Ongoing (interventions implemented as appropriate)    Problem: Perioperative Period (Adult)  Goal: Signs and Symptoms of Listed Potential Problems Will be Absent or Manageable (Perioperative Period)  Outcome: Ongoing (interventions implemented as appropriate)    Problem: Mechanical Ventilation, Invasive (Adult)  Goal: Signs and Symptoms of Listed Potential Problems Will be Absent or Manageable (Mechanical Ventilation, Invasive)  Outcome: Outcome(s) achieved Date Met:  06/15/17    Problem: Lung Surgery (via Thoracotomy) (Adult)  Goal: Signs and Symptoms of Listed Potential Problems Will be Absent or Manageable (Lung Surgery)  Outcome: Ongoing (interventions implemented as appropriate)    Problem: Pain, Acute (Adult)  Goal: Identify Related Risk Factors and Signs and Symptoms  Outcome: Outcome(s)  achieved Date Met:  06/15/17  Goal: Acceptable Pain Control/Comfort Level  Outcome: Ongoing (interventions implemented as appropriate)    Problem: Pressure Ulcer Risk (Heri Scale) (Adult,Obstetrics,Pediatric)  Goal: Identify Related Risk Factors and Signs and Symptoms  Outcome: Outcome(s) achieved Date Met:  06/15/17  Goal: Skin Integrity  Outcome: Ongoing (interventions implemented as appropriate)

## 2017-06-16 NOTE — PROGRESS NOTES
[facility] Progress Note    6/12/2017      Antibiotics:  IV Anti-Infectives     Ordered     Dose/Rate Route Frequency Start Stop    06/14/17 1600  piperacillin-tazobactam (ZOSYN) 2.25 g/100 mL 0.9% NS IVPB (mbp)     Ordering Provider:  Kavin Deng IV, RPH    2.25 g  over 0.5 Hours Intravenous Every 8 Hours 06/14/17 1700      06/12/17 2156  vancomycin in dextrose 5% 150 mL (VANCOCIN) IVPB 750 mg     Ordering Provider:  Brock Hooker MD    750 mg  over 60 Minutes Intravenous Once 06/13/17 1200 06/13/17 1244    06/12/17 2007  AZITHROMYCIN 500 MG/250 ML 0.9% NS IVPB (MBP)     Ordering Provider:  Jonelle Smith PA-C    500 mg  over 60 Minutes Intravenous Every 24 Hours 06/12/17 2100      06/12/17 2013  vancomycin (dosing per levels)     Ordering Provider:  Johnna Pringle MD     Does not apply Daily 06/12/17 2045 06/12/17 2007  Pharmacy to dose vancomycin     Ordering Provider:  Jonelle Smith PA-C     Does not apply Continuous PRN 06/12/17 2004            CC:cough/pleurisy    HPI:  Patient is a 26 y.o. 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 difficile treated approximately January 2017, end-stage renal disease possibly from FSGS per past records, and numerous other comorbidity as outlined below. She has a history of medical noncompliance and apparently has left hospital on multiple occasions AGAINST MEDICAL ADVICE per outside notes.  She was apparently admitted mid May with diagnosis pneumonia and treated with empiric antibiotics but no specific microbiologic diagnosis. She is readmitted to [facility] with diagnosis right sided pneumonia and loculated pleural effusion associated with dyspnea/cough and right-sided pleurisy. She was transferred to [facility] for consideration of decortication. Of particular  "note, when she has done being treated at [facility], she prefers referral back to her infectious disease doctor, Dr. Witt.    6/14 decortication for empyema; post op hypercapnic resp failure requiring ventilatory support     6/16/17 seen early and sleepy;  Extubated; per nursing, no pressors, no rash, no new focal pain.  No bleeding, stable tubes and lines with small right apical ptx.    ROS:      6/16/17 per nursing, No F/C/S,  No rash, No N/V/D and resp status stable otherwise    PE:   /83  Pulse 101  Temp 98.5 °F (36.9 °C)  Resp 20  Ht 61\" (154.9 cm)  Wt 97 lb (44 kg)  LMP 12/17/2016  SpO2 98%  Breastfeeding? No  BMI 18.33 kg/m2    GENERAL: nc O2 sleepy  HEENT:  No conjunctival injection. No icterus. Oropharynx clear without evidence of thrush or exudate.     HEART: RRR; No murmur, rubs, gallops.   LUNGS: diminished on right c/t left, scattered rhonchi.    ABDOMEN: Soft, nontender, nondistended. Positive bowel sounds. No rebound or guarding. NO mass or HSM.  EXT:  No cyanosis, clubbing or edema. No cord.  : Genitalia generally unremarkable.  Without Burgos catheter.  SKIN: Warm and dry without cutaneous eruptions on Inspection/palpation.   Neck supple no mass  Lymph--neck and groin negative  Musculoskeletal-- no joint effusions appreciated in the arms and legs.  Free range of motion at shoulders elbows wrists, hips knees and ankles    IV with no redness or purulence    No IE phenomena     Laboratory Data      Results from last 7 days  Lab Units 06/16/17  0334 06/15/17  0322 06/14/17  0634   WBC 10*3/mm3 11.14* 10.34 9.39   HEMOGLOBIN g/dL 12.1 11.7 12.3   HEMATOCRIT % 37.8 38.2 38.2   PLATELETS 10*3/mm3 212 210 226       Results from last 7 days  Lab Units 06/16/17  0334   SODIUM mmol/L 136   POTASSIUM mmol/L 4.3   CHLORIDE mmol/L 102   TOTAL CO2 mmol/L 19.0*   BUN mg/dL 43*   CREATININE mg/dL 6.10*   GLUCOSE mg/dL 68*   CALCIUM mg/dL 7.3*       Results from last 7 days  Lab Units " 06/13/17  0637   ALK PHOS U/L 98   BILIRUBIN mg/dL 0.1*   ALT (SGPT) U/L 20   AST (SGOT) U/L 20       Results from last 7 days  Lab Units 06/12/17  1504   SED RATE mm/hr 44*       Results from last 7 days  Lab Units 06/12/17  1504   CRP mg/dL 22.00*       Estimated Creatinine Clearance: 9.7 mL/min (by C-G formula based on Cr of 6.1).      Microbiology:      Radiology:  Imaging Results (last 72 hours)     ** No results found for the last 72 hours. **            Impression:   --Acute hypercapnic respiratory failure.  Remains intubated postop, sedated.  After decortication.  Small right apical pneumothorax with chest tube.    --Acute loculated right pleural effusion/empyema per cardiothoracic surgery  associated with right lower lobe pneumonia. Empiric antibiotics ongoing for community-acquired/healthcare associated pneumonia.  Decortication on June 14.  MRSA so far and recent cultures.  If no other specific pathogen isolated then likely taper antibiotics in upcoming days    --Rhinovirus positive     --History hepatitis C. I do not treat viral hepatitis as a part of my practice. She has seen hepatology at [facility] and she should be referred back there after discharge for ongoing care and evaluation regarding possible treatment options. I discussed with her risks associated with hepatitis C including chronic hepatitis/cirrhosis and hepatocellular carcinoma. Follow-up at  is her responsibility. She is aware     --History C. difficile. Currently no diarrhea or abdominal complaints. Monitor for relapse. She understands she is at risk for relapse.     --End-stage renal disease associated with FSGS; dialysis ongoing     --History polysubstance abuse which she reports is not active.     --History medical noncompliance with multiple episodes of leaving hospital AGAINST MEDICAL ADVICE per outside records. She understands the implication of her behavior    PLAN:   --IV vancomycin/Zosyn and Zithromax     --Highly  complex issues with high risk for further serious morbidity and other serious sequela/mortality     --Monitor IV and IV antibiotic with risk for systemic complication and potential drug interaction     --Check/review labs cultures and scans     --Discussed with microbiology. Partial history per nursing staff    --I am outuntil Monday ;  Call me if needed sooner;  [phone]          Fercho Singletary MD  6/16/2017

## 2017-06-16 NOTE — PROGRESS NOTES
Adult Nutrition  Assessment/PES    Patient Name:  Mandy Espino  YOB: 1990  MRN: [id number]  Admit Date:  6/12/2017    Assessment Date:  6/16/2017     Comments: RD follow-up for pt on PO diet. Pt reports poor appetite. Food preferences noted and sent to diet office. Will send Boost Glucose Control 3x/day. Will continue to follow.           Reason for Assessment       06/16/17 1038    Reason for Assessment    Reason For Assessment/Visit follow up protocol;multidisciplinary rounds    Time Spent (min) 30    Diagnosis --   per notes this adm    Pulmonary/Critical Care Extubated   yesterday (6/15)    Renal Hemodialysis   currently receiving HD this AM; HD scheduled for MWF; ESRD secondary to FSGS   Principal Problem:    Loculated pleural effusion  Active Problems:    Tobacco use    Bipolar disorder    Depression    Hyperparathyroidism    Pneumonia of right lung due to infectious organism    ESRD (end stage renal disease) on dialysis    Renovascular hypertension    Hepatitis C antibody positive in blood    IV drug abuse    Anemia of ESRD    COPD exacerbation    Medical non-compliance             Nutrition/Diet History       06/16/17 1039    Nutrition/Diet History    Reported/Observed By Patient;RN    Appetite Poor    Other per RN- reports pt does not need disposable meal trays, states that pt is in isolation precautions because of a cold. Pt reports that she is having trouble chewing meats, would like to continue with chopped meats              Labs/Tests/Procedures/Meds       06/16/17 1043    Labs/Tests/Procedures/Meds    Labs/Tests Review Reviewed;Glucose;BUN;Creat    Medication Review Reviewed, pertinent     Results from last 7 days  Lab Units 06/16/17  0334  06/13/17  0637   SODIUM mmol/L 136  < > 138   POTASSIUM mmol/L 4.3  < > 4.4   CHLORIDE mmol/L 102  < > 99   TOTAL CO2 mmol/L 19.0*  < > 24.0   BUN mg/dL 43*  < > 27*   CREATININE mg/dL 6.10*  < > 5.80*   CALCIUM mg/dL 7.3*  < > 8.1*  "  BILIRUBIN mg/dL  --   --  0.1*   ALK PHOS U/L  --   --  98   ALT (SGPT) U/L  --   --  20   AST (SGOT) U/L  --   --  20   GLUCOSE mg/dL 68*  < > 105*   < > = values in this interval not displayed.           Estimated/Assessed Needs       06/16/17 1043    Calculation Measurements    Weight Used For Calculations 44 kg (97 lb)    Height Used for Calculations 1.549 m (5' 1\")    Estimated/Assessed Energy Needs    Energy Need Method Kcal/kg    kcal/kg 25   25-35 kcal/kg= 8195-4276 kcal/day    25 Kcal/Kg (kcal) 1099.97    Estimated/Assessed Protein Needs    Weight Used for Protein Calculation 44 kg (97 lb)    Protein (gm/kg) 1.2   1.2-1.5 g/kg= 53-66 g pro/day    1.2 Gm Protein (gm) 52.8            Nutrition Prescription Ordered       06/16/17 1045    Nutrition Prescription PO    Current PO Diet Soft Texture    Texture Chopped    Common Modifiers Renal;Low Potassium            Evaluation of Received Nutrient/Fluid Intake       06/16/17 1045    PO Evaluation    Number of Meals 1    % PO Intake 75   noted pt ate \"bites\" this AM              Problem/Interventions:          Problem 2       06/16/17 1048    Nutrition Diagnoses Problem 2    Problem 2 Inadequate Intake/Infusion    Inadequate Intake Type Oral    Etiology (related to) --   clinical condition, poor appetite    Signs/Symptoms (evidenced by) Report of Minimal PO Intake                  Intervention Goal       06/16/17 1050    Intervention Goal    General Nutrition support treatment    PO Establish PO            Nutrition Intervention       06/16/17 1050    Nutrition Intervention    RD/Tech Action Follow Tx progress;Care plan reviewd;Recommend/ordered;Supplement provided;Encourage intake;Interview for preference;Menu provided;Advise alternate selection    Recommended/Ordered Supplement;Diet            Nutrition Prescription       06/16/17 1050    Nutrition Prescription PO    PO Prescription Begin/change diet;Begin/change supplement    Begin/Change Diet to Soft Texture "    Texture Chopped    Supplement Boost Glucose Control   strawberry; 3 Boost Glucose Control= 42 g pro/day    Supplement Frequency 3 times a day    Common Modifiers Renal    New PO Prescription Ordered? Yes            Education/Evaluation       06/16/17 1051    Monitor/Evaluation    Monitor Per protocol;PO intake;Supplement intake;Pertinent labs;Symptoms;Weight          Electronically signed by:  Demetrice Woodard MS RD/BEN [facility]  06/16/17 10:54 AM

## 2017-06-16 NOTE — PAYOR COMM NOTE
"Jermaine Baldwin (26 y.o. Female) auth# [id number]  ATT: Sasha     Need more days, still in ICU     Date of Birth Social Security Number Address Home Phone MRN    [id number]  [id number] [place] [address] [phone] [id number]    [employer] Marital Status          None        Admission Date Admission Type Admitting Provider Attending Provider Department, Room/Bed    6/12/17 Elective Charu Chavez MD Gerhardstein, Donna C, MD [facility] 2A ICU, N213/1    Discharge Date Discharge Disposition Discharge Destination                      Attending Provider: Charu Chavez MD     Allergies:  Acetaminophen, Hydrocodone, Ibuprofen, Lortab [Hydrocodone-acetaminophen], Ciprofloxacin    Isolation:  Droplet   Infection:  MRSA (06/15/17), Hepatitis C (04/19/17), Rhinovirus  (06/15/17)   Code Status:  FULL    Ht:  61\" (154.9 cm)   Wt:  97 lb (44 kg)    Admission Cmt:  None   Principal Problem:  Loculated pleural effusion [J90]                 Active Insurance as of 6/12/2017     Primary Coverage     Payor Plan Insurance Group Employer/Plan Group    [employer] [place] [employer] [place]ICA[place]      Payor Plan Address Payor Plan Phone Number Effective From Effective To    [address] [address] [phone] 6/1/2016     [place] [address]       Subscriber Name Subscriber Birth Date Member ID       JERMAINE BALDWIN 1990 [id number]                 Emergency Contacts      (Rel.) Home Phone Work Phone Mobile Phone    Jin Baldwin (Spouse) [phone] -- --            Hospital Medications (active)       Dose Frequency Start End    albumin human 25 % IV SOLN 12.5 g 12.5 g As Needed 6/16/2017 6/16/2017    Sig - Route: Infuse 50 mL into a venous catheter As Needed (Hypotension During Dialysis). - Intravenous    albuterol (PROVENTIL) nebulizer solution 0.5% 2.5 mg/0.5mL 10 mg Once 6/15/2017 6/15/2017    Sig - Route: Take 2 mL by nebulization 1 (One) Time. - Nebulization    ALPRAZolam " "(XANAX) tablet 0.5 mg 0.5 mg 2 Times Daily PRN 6/12/2017     Sig - Route: Take 1 tablet by mouth 2 (Two) Times a Day As Needed for Anxiety. - Oral    amitriptyline (ELAVIL) tablet 10 mg 10 mg Nightly 6/12/2017     Sig - Route: Take 1 tablet by mouth Every Night. - Oral    artificial tears (LUBRIFRESH P.M.) ophthalmic ointment  Every 1 Hour PRN 6/14/2017     Sig - Route: Apply  topically Every 1 (One) Hour As Needed (dry eyes). - Topical    AZITHROMYCIN 500 MG/250 ML 0.9% NS IVPB (MBP) 500 mg Every 24 Hours 6/12/2017     Sig - Route: Infuse 250 mL into a venous catheter Daily. - Intravenous    benzonatate (TESSALON) capsule 100 mg 100 mg 3 Times Daily PRN 6/12/2017     Sig - Route: Take 1 capsule by mouth 3 (Three) Times a Day As Needed for Cough. - Oral    bisacodyl (DULCOLAX) suppository 10 mg 10 mg Daily PRN 6/14/2017     Sig - Route: Insert 1 suppository into the rectum Daily As Needed for Constipation. - Rectal    chlorhexidine (PERIDEX) 0.12 % solution 15 mL 15 mL Every 12 Hours Scheduled 6/14/2017     Sig - Route: Apply 15 mL to the mouth or throat Every 12 (Twelve) Hours. - Mouth/Throat    dexmedetomidine (PRECEDEX) 400 mcg/100 mL (4 mcg/mL) infusion 0.2-1.5 mcg/kg/hr × 44 kg Titrated 6/15/2017     Sig - Route: Infuse 8.8-66 mcg/hr into a venous catheter Dose Adjusted By Provider As Needed. - Intravenous    dextrose (D50W) solution 50 mL 50 mL Once 6/15/2017 6/15/2017    Sig - Route: Infuse 50 mL into a venous catheter 1 (One) Time. - Intravenous    Linked Group 1:  \"And\" Linked Group Details        docusate sodium (COLACE) capsule 100 mg 100 mg 2 Times Daily 6/12/2017     Sig - Route: Take 1 capsule by mouth 2 (Two) Times a Day. - Oral    famotidine (PEPCID) tablet 20 mg 20 mg Daily 6/15/2017     Sig - Route: Take 1 tablet by mouth Daily. - Oral    fentanyl 1000mcg/100 mL NS  mcg/hr Titrated 6/14/2017     Sig - Route: Infuse  mcg/hr into a venous catheter Dose Adjusted By Provider As Needed. - " "Intravenous    hydrALAZINE (APRESOLINE) injection 10 mg 10 mg Every 6 Hours PRN 6/12/2017     Sig - Route: Infuse 0.5 mL into a venous catheter Every 6 (Six) Hours As Needed for High Blood Pressure (SBP>160 DBP>100). - Intravenous    HYDROmorphone (DILAUDID) injection 1 mg 1 mg Every 4 Hours PRN 6/13/2017 6/23/2017    Sig - Route: Infuse 1 mg into a venous catheter Every 4 (Four) Hours As Needed for Severe Pain (7-10). - Intravenous    HYDROmorphone (DILAUDID) PCA 0.1 mg/mL 30 mL syringe  Continuous 6/15/2017 6/29/2017    Sig - Route: Infuse  into a venous catheter Continuous. - Intravenous    insulin regular (humuLIN R,novoLIN R) injection 10 Units 10 Units Once 6/15/2017 6/15/2017    Sig - Route: Infuse 10 Units into a venous catheter 1 (One) Time. - Intravenous    Linked Group 1:  \"And\" Linked Group Details        ipratropium-albuterol (DUO-NEB) nebulizer solution 3 mL 3 mL 4 Times Daily - RT 6/12/2017     Sig - Route: Take 3 mL by nebulization 4 (Four) Times a Day. - Nebulization    lidocaine (LIDODERM) 5 % 2 patch 2 patch Every 24 Hours Scheduled 6/13/2017     Sig - Route: Place 2 patches on the skin Daily. - Transdermal    melatonin sublingual tablet 5 mg 5 mg Nightly PRN 6/12/2017     Sig - Route: Place 1 tablet under the tongue At Night As Needed (sleep). - Sublingual    meperidine (DEMEROL) injection 12.5 mg 12.5 mg Every 5 Minutes PRN 6/14/2017 6/15/2017    Sig - Route: Infuse 0.5 mL into a venous catheter Every 5 (Five) Minutes As Needed for Shivering (May repeat x 1). - Intravenous    minoxidil (LONITEN) tablet 10 mg 10 mg Daily 6/13/2017     Sig - Route: Take 1 tablet by mouth Daily. - Oral    mupirocin (BACTROBAN) 2 % ointment  Every 12 Hours Scheduled 6/13/2017     Sig - Route: Apply  topically Every 12 (Twelve) Hours. - Topical    naloxone (NARCAN) injection 0.1 mg 0.1 mg Every 5 Minutes PRN 6/15/2017     Sig - Route: Infuse 0.25 mL into a venous catheter Every 5 (Five) Minutes As Needed for Opioid " "Reversal or Respiratory Depression (see administration instructions). - Intravenous    niCARdipine (CARDENE) 25 mg in sodium chloride 0.9 % 250 mL (0.1 mg/mL) infusion 5-15 mg/hr Titrated 6/14/2017     Sig - Route: Infuse 5-15 mg/hr into a venous catheter Dose Adjusted By Provider As Needed. - Intravenous    nicotine (NICODERM CQ) 21 MG/24HR patch 1 patch 1 patch Daily 6/13/2017     Sig - Route: Place 1 patch on the skin Daily. - Transdermal    ondansetron (ZOFRAN) injection 4 mg 4 mg Every 6 Hours PRN 6/12/2017     Sig - Route: Infuse 2 mL into a venous catheter Every 6 (Six) Hours As Needed for Nausea or Vomiting. - Intravenous    Linked Group 2:  \"Or\" Linked Group Details        ondansetron (ZOFRAN) tablet 4 mg 4 mg Every 6 Hours PRN 6/12/2017     Sig - Route: Take 1 tablet by mouth Every 6 (Six) Hours As Needed for Nausea or Vomiting. - Oral    Linked Group 2:  \"Or\" Linked Group Details        oxyCODONE-acetaminophen (PERCOCET) 5-325 MG per tablet 1 tablet 1 tablet Every 4 Hours PRN 6/15/2017 6/25/2017    Sig - Route: Take 1 tablet by mouth Every 4 (Four) Hours As Needed for Severe Pain (7-10). - Oral    Pharmacy to dose vancomycin  Continuous PRN 6/12/2017     Sig - Route: Continuous As Needed for Consult. - Does not apply    piperacillin-tazobactam (ZOSYN) 2.25 g/100 mL 0.9% NS IVPB (mbp) 2.25 g Every 8 Hours 6/14/2017     Sig - Route: Infuse 100 mL into a venous catheter Every 8 (Eight) Hours. - Intravenous    propofol (DIPRIVAN) infusion 10 mg/mL 100 mL 5-100 mcg/kg/min × 44 kg Titrated 6/14/2017     Sig - Route: Infuse 220-4,400 mcg/min into a venous catheter Dose Adjusted By Provider As Needed. - Intravenous    sennosides-docusate sodium (SENOKOT-S) 8.6-50 MG tablet 2 tablet 2 tablet Nightly 6/14/2017     Sig - Route: Take 2 tablets by mouth Every Night. - Oral    sodium chloride 0.9 % flush 1-10 mL 1-10 mL As Needed 6/12/2017     Sig - Route: Infuse 1-10 mL into a venous catheter As Needed for Line Care. " - Intravenous    sodium polystyrene (KAYEXALATE) 15 GM/60ML suspension 30 g 30 g Once 6/15/2017 6/15/2017    Sig - Route: Insert 120 mL into the rectum 1 (One) Time. - Rectal    vancomycin (dosing per levels)  Daily 6/12/2017     Sig - Route: Daily. - Does not apply    HYDROmorphone (DILAUDID) injection 0.5 mg (Discontinued) 0.5 mg Every 15 Minutes PRN 6/14/2017 6/15/2017    Sig - Route: Infuse 0.5 mg into a venous catheter Every 15 (Fifteen) Minutes As Needed for Moderate Pain (4-6). - Intravenous    lactated ringers infusion (Discontinued) 100 mL/hr Continuous 6/14/2017 6/15/2017    Sig - Route: Infuse 100 mL/hr into a venous catheter Continuous. - Intravenous    sodium chloride 0.9 % infusion (Discontinued) 30 mL/hr Continuous 6/14/2017 6/15/2017    Sig - Route: Infuse 30 mL/hr into a venous catheter Continuous. - Intravenous            Lab Results (last 24 hours)     Procedure Component Value Units Date/Time    POC Glucose Fingerstick [[id number]]  (Abnormal) Collected:  06/15/17 1121    Specimen:  Blood Updated:  06/15/17 1123     Glucose 136 (H) mg/dL     Narrative:       Meter: [id number] : [id number] Leif Montes    Anaerobic Culture [[id number]]  (Normal) Collected:  06/14/17 1058    Specimen:  Tissue from Pleura Updated:  06/15/17 1207     Culture No anaerobes isolated    Anaerobic Culture [[id number]]  (Normal) Collected:  06/14/17 1057    Specimen:  Body Fluid from Pleural Cavity Updated:  06/15/17 1209     Culture No anaerobes isolated    AFB Culture [[id number]] Collected:  06/14/17 1058    Specimen:  Tissue from Pleura Updated:  06/15/17 1403     AFB Stain No acid fast bacilli seen on concentrated smear    AFB Culture [[id number]] Collected:  06/14/17 1057    Specimen:  Body Fluid from Pleural Cavity Updated:  06/15/17 1405     AFB Stain No acid fast bacilli seen on concentrated smear    Tissue Exam [[id number]] Collected:  06/14/17 1057    Specimen:  Tissue from Pleura Updated:  06/15/17 1520     Case  Report --     Surgical Pathology Report                         Case: [id number]                                  Authorizing Provider:  Shawn Hicks MD       Collected:           06/14/2017 10:57 AM          Ordering Location:     [facility]   Received:            06/14/2017 01:01 PM                                 OR                                                                           Pathologist:           Hamlet Palacios MD                                                            Specimen:    Pleura, Pleural peel                                                                        Clinical Information --     The working history is pleural effusion.        Final Diagnosis --     PLEURA BIOPSY:  Necrosis and acute inflammation with focal mesothelial hyperplasia.  Negative for neoplasm.    JFJ/mbc        Gross Description --     Received in formalin labeled as pleural peel is a 2.5 x 2.5 x 0.4 cm aggregate of gray/pink soft tissue, submitted entirely in one cassette. HBM/klb        Microscopic Description --     Sections show pleura with extensive necrosis and acute inflammation. There is surface fibrinous material as well as focal mesothelial hyperplasia. There is no evidence of neoplasm.       Embedded Images --    POC Glucose Fingerstick [[id number]]  (Normal) Collected:  06/15/17 1737    Specimen:  Blood Updated:  06/15/17 1739     Glucose 91 mg/dL     Narrative:       Meter: [id number] : [id number] Leif Montes    Potassium [[id number]]  (Abnormal) Collected:  06/15/17 1802    Specimen:  Blood Updated:  06/15/17 1844     Potassium 5.6 (H) mmol/L     Potassium [[id number]]  (Normal) Collected:  06/15/17 2354    Specimen:  Blood Updated:  06/16/17 0055     Potassium 4.3 mmol/L     CBC (No Diff) [[id number]]  (Abnormal) Collected:  06/16/17 0334    Specimen:  Blood Updated:  06/16/17 0451     WBC 11.14 (H) 10*3/mm3      RBC 4.22 10*6/mm3      Hemoglobin 12.1 g/dL      Hematocrit 37.8 %       MCV 89.6 fL      MCH 28.7 pg      MCHC 32.0 g/dL      RDW 16.7 (H) %      RDW-SD 55.3 (H) fl      MPV 8.8 fL      Platelets 212 10*3/mm3     Renal Function Panel [[id number]]  (Abnormal) Collected:  06/16/17 0334    Specimen:  Blood Updated:  06/16/17 0522     Glucose 68 (L) mg/dL      BUN 43 (H) mg/dL      Creatinine 6.10 (H) mg/dL      Sodium 136 mmol/L      Potassium 4.3 mmol/L      Chloride 102 mmol/L      CO2 19.0 (L) mmol/L      Calcium 7.3 (L) mg/dL      Albumin 3.40 g/dL      Phosphorus 4.8 mg/dL      Anion Gap 15.0 (H) mmol/L      BUN/Creatinine Ratio 7.0     eGFR Non African Amer 8 (L) mL/min/1.73     Narrative:       National Kid[employer] Guidelines    Stage     Description        GFR  1         Normal or High     90+  2         Mild decrease      60-89  3         Moderate decrease  30-59  4         Severe decrease    15-29  5         Kidney failure     <15    Vancomycin, Random [[id number]]  (Normal) Collected:  06/16/17 0334    Specimen:  Blood Updated:  06/16/17 0620     Vancomycin Random 25.80 mcg/mL     Tissue/Bone Culture [[id number]]  (Normal) Collected:  06/14/17 1058    Specimen:  Tissue from Pleura Updated:  06/16/17 0659     Culture No growth at 2 days     Gram Stain Result Moderate (3+) WBCs seen      No organisms seen    Body Fluid Culture [[id number]]  (Normal) Collected:  06/14/17 1057    Specimen:  Body Fluid from Pleural Cavity Updated:  06/16/17 0700     BF Culture No growth at 2 days     Gram Stain Result Occasional WBCs seen      No organisms seen    POC Glucose Fingerstick [[id number]]  (Normal) Collected:  06/16/17 0808    Specimen:  Blood Updated:  06/16/17 0811     Glucose 76 mg/dL     Narrative:       Meter: [id number] : [id number] Day Fortino        Imaging Results (last 24 hours)     Procedure Component Value Units Date/Time    XR Chest 1 View [[id number]] Collected:  06/15/17 0923     Updated:  06/15/17 1305    Narrative:       EXAMINATION: XR CHEST 1 VW-      INDICATION:  "Postop; F68-Rutmmrl effusion, not elsewhere classified.      COMPARISON: 06/14/2017.     FINDINGS: The cardiac silhouette is normal.  Two right chest tubes are  in position with the right lung expanded.  There is minimal bibasilar  atelectasis. The endotracheal tube is well positioned.           Impression:       Minimal bibasilar atelectasis. There has been no significant  change when compared to 06/14/2017.     D:  06/15/2017  E:  06/15/2017     This report was finalized on 6/15/2017 1:03 PM by Dr. Remigio Rush MD.       XR Chest 1 View [[id number]] Updated:  06/16/17 0438        Ventilator/Non-Invasive Ventilation Settings     Start     Ordered    06/14/17 1655  Ventilator - AC/VC+; (18); 30; 92%; 6; 400  Continuous     Question Answer Comment   Vent Mode AC/VC+    Breath rate  18   FiO2 30    FiO2 titrate for Sp02% =/> 92%    PEEP 6    Tidal Volume 400        06/14/17 1656             Physician Progress Notes (last 72 hours) (Notes from 6/13/2017  9:07 AM through 6/16/2017  9:07 AM)      Kailey Conteh MD at 6/13/2017  2:53 PM  Version 1 of 1             [facility] Medicine Services  INPATIENT PROGRESS NOTE    Date of Admission: 6/12/2017  Length of Stay: 1  Primary Care Physician: Neo Gresham DO    Subjective   CC:  RLL HCAP, empyema     HPI:  \"I need more pain medicine or I'll die, I wouldn't ask for it unless I really needed it\".  Claims pain is 10/10 back pain but denies CP currently. Pt was asleep comfortably and when awakened preoccupied during entire exam with talking about how much pain medicine she can have.  Non-labored breaths. Pt aware of plan for decortication in OR tomorrow and denies questions.     Review Of Systems:   Review of Systems   Constitutional: Positive for fatigue.   Respiratory: Negative for shortness of breath.    Cardiovascular: Negative for chest pain.   Gastrointestinal: Negative for abdominal pain.         Objective      Temp:  [96.9 °F (36.1 °C)-98.2 °F " (36.8 °C)] 98 °F (36.7 °C)  Heart Rate:  [89-99] 89  Resp:  [16-18] 16  BP: (119-161)/() 119/63  Physical Exam  Temp:  [96.9 °F (36.1 °C)-98.2 °F (36.8 °C)] 98 °F (36.7 °C)  Heart Rate:  [89-99] 89  Resp:  [16-18] 16  BP: (119-161)/() 119/63  Constitutional: no acute distress, awake, alert, disheveled, very thin and chronically ill appearing  Respiratory: no breath sound RLL, nonlabored respirations   Cardiovascular: RRR, no murmur  Gastrointestinal: Positive bowel sounds, soft, nontender, nondistended  Musculoskeletal: No bilateral ankle edema  Psychiatric: oriented x 3, distracted and anxious affect, cooperative  Neurologic: Strength symmetric in all extremities   Derm: neurosis dermatitis of face and BUE      Results Review:    I have reviewed the labs, radiology results and diagnostic studies.      Results from last 7 days  Lab Units 06/13/17  0637   WBC 10*3/mm3 18.25*   HEMOGLOBIN g/dL 12.0   PLATELETS 10*3/mm3 224       Results from last 7 days  Lab Units 06/13/17  0637   SODIUM mmol/L 138   POTASSIUM mmol/L 4.4   CHLORIDE mmol/L 99   TOTAL CO2 mmol/L 24.0   BUN mg/dL 27*   CREATININE mg/dL 5.80*   GLUCOSE mg/dL 105*   CALCIUM mg/dL 8.1*       Culture Data: Cultures:    Blood Culture   Date Value Ref Range Status   06/11/2017 No growth at 24 hours  Preliminary   06/11/2017 No growth at 24 hours  Preliminary       Radiology Data: Reviewed    I have reviewed the medications.    Assessment/Plan     Problem List  Hospital Problem List     * (Principal)Loculated pleural effusion    Tobacco use    Bipolar disorder    Depression    Hyperparathyroidism    Pneumonia of right lung due to infectious organism    COPD (chronic obstructive pulmonary disease)    ESRD (end stage renal disease) on dialysis    Renovascular hypertension    Hepatitis C antibody positive in blood    Overview Signed 6/12/2017 12:47 PM by Maria G Toscano APRN     Has not followed up with ID          IV drug abuse    Anemia of ESRD     Pleural effusion    COPD exacerbation    Medical non-compliance               Assessment/Plan:    - empiric IV abx oer ID  - CTS planning for decortication likely in am  - Nephro follows for M/W/F HD orders  - pt asking for more pain medications, do believe she has real pain but must balance with her desire to mask her opiate withdraw sx's  - may need to limit visitors if ANY concerns of behaviors or indications patient receiving outside meds/drugs from visiting persons.  When visitors present must leave door OPEN.   - hx of medical noncompliance and leaving hospitalizations AMA.  Pt may NOT leave the floor.      DVT prophylaxis:  SQ hep  Discharge Planning: unclear until see how clinically responds to surgical intervention    Kailey Conteh MD   06/13/17   3:01 PM         Electronically signed by Kailey Conteh MD at 6/13/2017  3:08 PM      Fercho Singletary MD at 6/14/2017  9:35 AM  Version 1 of 1         [facility] Progress Note    6/12/2017      Antibiotics:  IV Anti-Infectives     Ordered     Dose/Rate Route Frequency Start Stop    06/14/17 0755  piperacillin-tazobactam (ZOSYN) 2.25 g/100 mL 0.9% NS IVPB (mbp)     Ordering Provider:  Elma Gaytan RPH    2.25 g Intravenous Every 8 Hours 06/14/17 1400      06/12/17 2156  vancomycin in dextrose 5% 150 mL (VANCOCIN) IVPB 750 mg     Ordering Provider:  Brock Hooker MD    750 mg  over 60 Minutes Intravenous Once 06/13/17 1200 06/13/17 1244    06/12/17 2007  AZITHROMYCIN 500 MG/250 ML 0.9% NS IVPB (MBP)     Ordering Provider:  Jonelle Smith PA-C    500 mg  over 60 Minutes Intravenous Every 24 Hours 06/12/17 2100      06/12/17 2013  vancomycin (dosing per levels)     Ordering Provider:  Johnna Pringle MD     Does not apply Daily 06/12/17 2045      06/12/17 2007  Pharmacy to dose vancomycin     Ordering Provider:  Jonelle Smith PA-C     Does not apply Continuous PRN 06/12/17 2004            CC:cough/pleurisy    HPI:  Patient is a 26 y.o. 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 difficile treated approximately January 2017, end-stage renal disease possibly from FSGS per past records, and numerous other comorbidity as outlined below. She has a history of medical noncompliance and apparently has left hospital on multiple occasions AGAINST MEDICAL ADVICE per outside notes.  She was apparently admitted mid May with diagnosis pneumonia and treated with empiric antibiotics but no specific microbiologic diagnosis. She is readmitted to [facility] with diagnosis right sided pneumonia and loculated pleural effusion associated with dyspnea/cough and right-sided pleurisy. She was transferred to [facility] for consideration of decortication. Of particular note, when she has done being treated at [facility], she prefers referral back to her infectious disease doctor, Dr. Witt.     6/14/17 seen early ans sleepy;  Per nursing, She has shortness of breath and intermittently productive cough but no hemoptysis and currently on nasal cannula oxygen. No headache photophobia or neck stiffness. No nausea vomiting diarrhea or abdominal pain. Some intermittent urine production. He gets dialysis via a right arm access fistula. No rash.    ROS:      6/14/17 No f/c/s. No n/v/d. No rash. No new ADR to Abx.     PE:   /100 (BP Location: Left arm, Patient Position: Lying)  Pulse 69  Temp 98.5 °F (36.9 °C)  Resp 20  Ht 61\" (154.9 cm)  Wt 97 lb (44 kg)  LMP 12/17/2016  SpO2 94%  BMI 18.33 kg/m2    GENERAL: sleepy, in no acute distress.   HEENT:  No conjunctival injection. No icterus. Oropharynx clear without evidence of thrush or exudate.     HEART: RRR; No murmur, rubs, gallops.   LUNGS: diminished on right c/t left, scattered rhonchi. Normal respiratory effort. Nonlabored. No " dullness.  ABDOMEN: Soft, nontender, nondistended. Positive bowel sounds. No rebound or guarding. NO mass or HSM.  EXT:  No cyanosis, clubbing or edema. No cord.  : Genitalia generally unremarkable.  Without Burgos catheter.  SKIN: Warm and dry without cutaneous eruptions on Inspection/palpation.     No IE phenomena     Laboratory Data      Results from last 7 days  Lab Units 06/14/17  0634 06/13/17  0637 06/12/17  0554   WBC 10*3/mm3 9.39 18.25* 11.76*   HEMOGLOBIN g/dL 12.3 12.0 12.7   HEMATOCRIT % 38.2 38.0 38.2   PLATELETS 10*3/mm3 226 224 219       Results from last 7 days  Lab Units 06/14/17  0634   SODIUM mmol/L 137   POTASSIUM mmol/L 4.2   CHLORIDE mmol/L 101   TOTAL CO2 mmol/L 22.0   BUN mg/dL 46*   CREATININE mg/dL 7.10*   GLUCOSE mg/dL 79   CALCIUM mg/dL 7.7*       Results from last 7 days  Lab Units 06/13/17  0637   ALK PHOS U/L 98   BILIRUBIN mg/dL 0.1*   ALT (SGPT) U/L 20   AST (SGOT) U/L 20       Results from last 7 days  Lab Units 06/12/17  1504   SED RATE mm/hr 44*       Results from last 7 days  Lab Units 06/12/17  1504   CRP mg/dL 22.00*       Estimated Creatinine Clearance: 8.3 mL/min (by C-G formula based on Cr of 7.1).      Microbiology:      Radiology:  Imaging Results (last 72 hours)     ** No results found for the last 72 hours. **            Impression:   --Acute loculated right pleural effusion presumed associated with right lower lobe pneumonia. Empiric antibiotics ongoing for community-acquired/healthcare associated pneumonia. Cardiothoracic surgery has seen with potential plans for decortication. Microbiology studies pending. If not steadily better with this approach, you need to give consideration to further workup such as pulmonary consultation and bronchoscopy. No opportunistic process so far the patient is aware of.     --History hepatitis C. I do not treat viral hepatitis as a part of my practice. She has seen hepatology at [facility] and she should be referred back there  after discharge for ongoing care and evaluation regarding possible treatment options. I discussed with her risks associated with hepatitis C including chronic hepatitis/cirrhosis and hepatocellular carcinoma. Follow-up at  is her responsibility. She is aware     --History C. difficile. Currently no diarrhea or abdominal complaints. Monitor for relapse. She understands she is at risk for relapse.     --End-stage renal disease associated with FSGS; dialysis ongoing     --History polysubstance abuse which she reports is not active.     --History medical noncompliance with multiple episodes of leaving hospital AGAINST MEDICAL ADVICE per outside records. She understands the implication of her behavior    PLAN:   --IV vancomycin/Zosyn and Zithromax     --I discussed potential risks and benefits of the prescribed antibiotics that include, but are not limited to, solid organ toxicity, neuro/kaley/vestibular toxicity, CDiff, cytopenias, hypersensitivity, etc.. Patient voices understanding and agree to proceed.     --Monitor IV and IV antibiotic with risk for systemic complication and potential drug interaction     --Check/review labs cultures and scans     --Highly complex set of issues with high risk for further serious morbidity and other serious sequela/mortality     --Discussed with microbiology. Partial history per nursing staff    --surgical timing per Dr Kurt Singletary MD  6/14/2017           Electronically signed by Fercho Singletary MD at 6/14/2017  9:37 AM      Charu Chavez MD at 6/14/2017 12:44 PM  Version 1 of 1         Pulmonary/Critical Care History and Physical Exam     LOS: 2 days   Patient Care Team:  Neo Gresham DO as PCP - General (Family Medicine)    Chief Complaint:   Shortness of air    Subjective     HPI:     History taken from: Chart.  Patient is sedated and there is no family present at this time    Mandy Espino is a 26 y.o. female, present smoker, who  initially presented on 6/12/17 with a 4 day history of shortness of air, productive cough with yellow sputum, and a diagnosis at [facility] of pneumonia involving the right lower lung.  Prior to admission she tried her nebulizer and oxygen which did not help.  She had also been treated 1 month prior at [facility] for right lung pneumonia.  She came with current treatment of Zosyn, Vancomycin and Zithromax.  While in [place] she underwent ultrasound guided right thoracentesis in which 200 mL of serous fluid that was exudative and demonstrated loculations.  She underwent evaluation with Dr. Hicks of cardiothoracic surgery, and now comes to ICU following decortication.  She has a significant past medical history for multiple comorbidities including COPD, hepatitis C, IV drug abuse, end-stage renal disease with dialysis, hyperparathyroidism, hypertension, and per her recent evaluation she continues to smoke.    Nephrology has been following her for dialysis.  She remained on sedation and mechanical ventilation until tomorrow per Dr. Hicks.      Past Medical History:   Diagnosis Date   • Abdominal pain    • Anemia    • Anxiety    • Asthma    • Bipolar disorder    • CKD (chronic kidney disease), stage IV    • Constipation    • COPD (chronic obstructive pulmonary disease)    • Depression    • End stage renal disease on dialysis    • Esophageal reflux     reflux   • Fahr's syndrome    • Hepatitis C antibody test positive    • Hyperparathyroidism    • Hypertension    • Hypocalcemia    • Nausea and vomiting    • Non-traumatic rhabdomyolysis 5/2/2017   • Pancreatitis    • Pneumonia of right lung due to infectious organism 5/2/2017   • Recurrent urinary tract infection    • Renal insufficiency    • Upper gastrointestinal bleeding        Past Surgical History:   Procedure Laterality Date   • DIALYSIS FISTULA CREATION      port   • DILATATION AND CURETTAGE     • ESOPHAGOSCOPY / EGD  2014    esophagitis    • EXPLORATORY LAPAROTOMY      For uterine and ovarian issues   • KIDNEY SURGERY Left 2013    Biopsy       Family History   Problem Relation Age of Onset   • Arthritis Mother    • Hypertension Mother    • Kidney disease Father      Chronic renal failure syndrome   • Pancreatic cancer Father    • Hypertension Brother    • Kidney disease Other    • Diabetes Other      Uncle   • Lung cancer Other      Grandmother   • Hyperlipidemia Other      High cholesterol - Uncle       Social History     Social History   • Marital status:      Spouse name: N/A   • Number of children: N/A   • Years of education: N/A     Occupational History   • Not on file.     Social History Main Topics   • Smoking status: Current Every Day Smoker     Packs/day: 1.50   • Smokeless tobacco: Not on file   • Alcohol use No   • Drug use: Yes     Special: Marijuana   • Sexual activity: Not Currently     Other Topics Concern   • Not on file     Social History Narrative    She is  and not working. She denies alcohol or drug use. She denies recent opiate or benzo use. She does smoke and has used marijuana.        Allergies   Allergen Reactions   • Acetaminophen Itching   • Hydrocodone Itching   • Ibuprofen    • Lortab [Hydrocodone-Acetaminophen]      Lortab TABS   • Ciprofloxacin Rash       PMH/FH/SocH were reviewed by me and updates were made.     Review of Systems:    Unable to assess.  Patient is sedated and mechanically ventilated.  There is no family present at this time.    Objective     Vital Signs  Temp:  [96.3 °F (35.7 °C)-98.6 °F (37 °C)] 98 °F (36.7 °C)  Heart Rate:  [] 81  Resp:  [12-24] 18  BP: (114-192)/() 161/83  FiO2 (%):  [30 %-100 %] 30 %  Body mass index is 18.33 kg/(m^2).  Vent Mode: VC+/AC  FiO2 (%):  [30 %-100 %] 30 %  S RR:  [12-18] 18  PEEP/CPAP (cm H2O):  [5 cm H20] 5 cm H20  UT SUP:  [0 cm H20] 0 cm H20  MAP (cm H2O):  [7.8-11] 11    Physical Exam:     General Appearance:    Sedated, ventilated, appears  older than stated age, appears cachectic    Head:    Normocephalic, without obvious abnormality, Multiple areas of apparent bruisingand abrasions  about the forehead, lips, and chin    Eyes:            Lids and lashes normal, conjunctivae and sclerae normal, no   icterus, no pallor, corneas clear, PERRL   ENMT:   Ears appear intact with no abnormalities noted      No oral lesions, no thrush, oral mucosa moist   Neck:   No adenopathy, supple, trachea midline, no thyromegaly, no   carotid bruit, no JVD, butterfly tatoo right neck   Lungs/resp:     Normal effort, symmetric chest rise, no crepitus, Coarse to      auscultation bilaterally particularly in the left upper lobe, no chest wall tenderness, oral ET tube intact event, CT X2  Right chest   With yellow output           Heart/CV:    Regular rhythm and Tachycardic, normal S1 and S2, no            murmur   Abdomen/GI:     Normal bowel sounds, no masses, no organomegaly, soft        non-tender, non-distended   G/U:     Deferred   Extremities/MSK:   No clubbing, cyanosis or edema.  Normal tone.  Tract marks bilateral arms. AVF right arm   Pulses:   Pulses palpable and equal bilaterally   Skin:   No bleeding, bruising or rash   Hem/Lymph:   No cervical or supraclavicular adenopathy.    Neurologic: unable to assess as patient is sedated on propofol             Psychiatric:  Sedated      Results Review:     I reviewed the patient's new clinical results.     Results from last 7 days  Lab Units 06/14/17  0634 06/13/17  0637 06/12/17  0554 06/11/17  1552   SODIUM mmol/L 137 138 134* 138   POTASSIUM mmol/L 4.2 4.4 4.6 4.0   CHLORIDE mmol/L 101 99 90* 89*   TOTAL CO2 mmol/L 22.0 24.0 25.0* 28.0   BUN mg/dL 46* 27* 51* 38*   CREATININE mg/dL 7.10* 5.80* 10.70* 9.90*   CALCIUM mg/dL 7.7* 8.1* 7.7* 8.3*   BILIRUBIN mg/dL  --  0.1*  --  0.6   ALK PHOS U/L  --  98  --  108   ALT (SGPT) U/L  --  20  --  41   AST (SGOT) U/L  --  20  --  40   GLUCOSE mg/dL 79 105* 184* 92        Results from last 7 days  Lab Units 06/14/17  0634 06/13/17  0637 06/12/17  0554   WBC 10*3/mm3 9.39 18.25* 11.76*   HEMOGLOBIN g/dL 12.3 12.0 12.7   HEMATOCRIT % 38.2 38.0 38.2   PLATELETS 10*3/mm3 226 224 219       Results from last 7 days  Lab Units 06/14/17  1305   PH, ARTERIAL pH units 7.262*   PO2 ART mm Hg 294.0*   PCO2, ARTERIAL mm Hg 53.9*   HCO3 ART mmol/L 24.3       I reviewed the patient's new imaging including images and reports.    Medication Review:     amitriptyline 10 mg Oral Nightly   azithromycin 500 mg Intravenous Q24H   chlorhexidine 15 mL Mouth/Throat Q12H   docusate sodium 100 mg Oral BID   FentaNYL Citrate 10mcg/1mL NS      ipratropium-albuterol 3 mL Nebulization 4x Daily - RT   lidocaine 2 patch Transdermal Q24H   minoxidil 10 mg Oral Daily   mupirocin  Topical Q12H   nicotine 1 patch Transdermal Daily   piperacillin-tazobactam 2.25 g Intravenous Q8H   sennosides-docusate sodium 2 tablet Oral Nightly   vancomycin (dosing per levels)  Does not apply Daily       fentanyl 10 mcg/mL  mcg/hr Last Rate: 200 mcg/hr (06/14/17 1434)   lactated ringers 100 mL/hr Last Rate: 100 mL/hr (06/14/17 1241)   niCARdipine 5-15 mg/hr Last Rate: 5 mg/hr (06/14/17 1225)   Pharmacy to dose vancomycin     propofol 5-100 mcg/kg/min Last Rate: 100 mcg/kg/min (06/14/17 1435)   sodium chloride 30 mL/hr Last Rate: 30 mL/hr (06/14/17 1253)       Assessment/Plan     Hospital Problem List     * (Principal)Loculated pleural effusion    Tobacco use    Bipolar disorder    Depression    Hyperparathyroidism    Pneumonia of right lung due to infectious organism    ESRD (end stage renal disease) on dialysis    Renovascular hypertension    Hepatitis C antibody positive in blood    Overview Signed 6/12/2017 12:47 PM by Maria G Toscano APRN     Has not followed up with ID          IV drug abuse    Anemia of ESRD    COPD exacerbation    Medical non-compliance          26-year-old, current smoker, IVDA, with Hepatitis  C now presenting with a loculated parapneumonic pneumonia.  She has been treated for the past month and a half with antibiotics, and today   required thoracotomy and decortication.   She is to remain sedated and mechanically ventilated until tomorrow per Dr. Hicks preference.  She is on 30% FiO2 with a saturation of 100%. She was under ventilated on her initial blood gas. In the meantime, she'll continue her antimicrobials including Zosyn, vancomycin, and Zithromax. Postoperative chest x-ray shows adequate expansion of the right lung without acute infiltrate or pneumothorax. Sputum culture from June 11 was positive for staph aureus. Gram stain from her pleural fluid and pleural tissue reveals no organism.  She is HIV negative.  She is on hemodialysis 3x weekly via an AVF in her right arm.  Potassium is 4.2, Cr is 7.      Maintain mechanical ventilation until tomorrow after hemodialysis   Follow-up tissue and pleural cultures  Vancomycin, Zosyn, azithromycin  Nephrology to follow for end-stage renal disease  Nebulized broncho-dilators  Pepcid for ulcer prophylaxis  Minoxidil for hypertension        Charu Chavez MD  06/14/17  4:52 PM    I reviewed her record, reviewed her x-rays and laboratory data, did an independent physical examination, modified the above note reflects my findings and amended the above summary.  Time: Critical care 30 min       Electronically signed by Charu Chavez MD at 6/14/2017  4:53 PM      Wilson Hopper MD at 6/15/2017  8:30 AM  Version 2 of 2         CTS Progress Note      POD #1 s/p Right Thoracotomy, Decortication        Subjective  Sedated and intubated.      Objective    Physical Exam:   Vital Signs   Temp:  [97.4 °F (36.3 °C)-98.4 °F (36.9 °C)] 98.4 °F (36.9 °C)  Heart Rate:  [] 57  Resp:  [12-18] 14  BP: ()/() 150/78  FiO2 (%):  [30 %-100 %] 30 %   GEN: NAD   RESP: Decreased bibasilar breath sounds right greater than the left with no wheezes, rales  or rhonchi.  No air leak.    CV: Regular rate and rhythm with no murmurs, rubs or gallops   ABD: Soft, nontender/nondistended with hypooactive bowel sounds    EXT: Warm with good color and well perfused with no bilateral lower extremity edema   INT: Incision c/d/i   CT Output: 178 mL/18 hours    Intake/Output Summary (Last 24 hours) at 06/15/17 0830  Last data filed at 06/15/17 0200   Gross per 24 hour   Intake          1901.08 ml   Output              178 ml   Net          1723.08 ml     Results       Results from last 7 days  Lab Units 06/15/17  0322   WBC 10*3/mm3 10.34   HEMOGLOBIN g/dL 11.7   HEMATOCRIT % 38.2   PLATELETS 10*3/mm3 210       Results from last 7 days  Lab Units 06/15/17  0322   SODIUM mmol/L 132   POTASSIUM mmol/L 5.6*   CHLORIDE mmol/L 102   TOTAL CO2 mmol/L 20.0   BUN mg/dL 30*   CREATININE mg/dL 4.80*   GLUCOSE mg/dL 89   CALCIUM mg/dL 7.3*       Results from last 7 days  Lab Units 06/14/17  0634   INR  1.10       CXR: Small right apical pneumothorax, right chest tubes in place, small right pleural effusion      Assessment/Plan     Principal Problem:    Loculated pleural effusion  Active Problems:    Tobacco use    Bipolar disorder    Depression    Hyperparathyroidism    Pneumonia of right lung due to infectious organism    ESRD (end stage renal disease) on dialysis    Renovascular hypertension    Hepatitis C antibody positive in blood    IV drug abuse    Anemia of ESRD    COPD exacerbation    Medical non-compliance        Plan   Leave chest tubes  Wean off ventilator as tolerated  Chest x-ray in a.m.  I agree with the above  MENDEL Mohr  06/15/17  8:30 AM                   Electronically signed by Wilson Hopper MD at 6/15/2017  2:37 PM      MENDEL Hermosillo at 6/15/2017  8:30 AM  Version 1 of 2         CTS Progress Note      POD #1 s/p Right Thoracotomy, Decortication        Subjective  Sedated and intubated.      Objective    Physical Exam:   Vital Signs   Temp:  [97.4 °F (36.3  °C)-98.4 °F (36.9 °C)] 98.4 °F (36.9 °C)  Heart Rate:  [] 57  Resp:  [12-18] 14  BP: ()/() 150/78  FiO2 (%):  [30 %-100 %] 30 %   GEN: NAD   RESP: Decreased bibasilar breath sounds right greater than the left with no wheezes, rales or rhonchi.  No air leak.    CV: Regular rate and rhythm with no murmurs, rubs or gallops   ABD: Soft, nontender/nondistended with hypooactive bowel sounds    EXT: Warm with good color and well perfused with no bilateral lower extremity edema   INT: Incision c/d/i   CT Output: 178 mL/18 hours    Intake/Output Summary (Last 24 hours) at 06/15/17 0830  Last data filed at 06/15/17 0200   Gross per 24 hour   Intake          1901.08 ml   Output              178 ml   Net          1723.08 ml     Results       Results from last 7 days  Lab Units 06/15/17  0322   WBC 10*3/mm3 10.34   HEMOGLOBIN g/dL 11.7   HEMATOCRIT % 38.2   PLATELETS 10*3/mm3 210       Results from last 7 days  Lab Units 06/15/17  0322   SODIUM mmol/L 132   POTASSIUM mmol/L 5.6*   CHLORIDE mmol/L 102   TOTAL CO2 mmol/L 20.0   BUN mg/dL 30*   CREATININE mg/dL 4.80*   GLUCOSE mg/dL 89   CALCIUM mg/dL 7.3*       Results from last 7 days  Lab Units 06/14/17  0634   INR  1.10       CXR: Small right apical pneumothorax, right chest tubes in place, small right pleural effusion      Assessment/Plan     Principal Problem:    Loculated pleural effusion  Active Problems:    Tobacco use    Bipolar disorder    Depression    Hyperparathyroidism    Pneumonia of right lung due to infectious organism    ESRD (end stage renal disease) on dialysis    Renovascular hypertension    Hepatitis C antibody positive in blood    IV drug abuse    Anemia of ESRD    COPD exacerbation    Medical non-compliance        Plan   Leave chest tubes  Wean off ventilator as tolerated  Chest x-ray in a.m.    MENDEL Mohr  06/15/17  8:30 AM                   Electronically signed by MENDEL Hermosillo at 6/15/2017  8:33 AM      Haja Holley  "MD Domingo at 6/15/2017  9:37 AM  Version 1 of 1            LOS: 3 days    Patient Care Team:  Neo Gresham DO as PCP - General (Family Medicine)    Chief Complaint: shortness of breath    Subjective     Interval History:   S/pthoractomy - intubated and sedated.     Review of Systems:   Unable to perform secondary to patient factor.    Objective     Vital Sign Min/Max for last 24 hours  Temp  Min: 97.4 °F (36.3 °C)  Max: 98.4 °F (36.9 °C)   BP  Min: 98/54  Max: 192/105   Pulse  Min: 56  Max: 112   Resp  Min: 12  Max: 18   SpO2  Min: 98 %  Max: 100 %   Flow (L/min)  Min: 2  Max: 2   Weight  Min: 97 lb (44 kg)  Max: 97 lb (44 kg)     Flowsheet Rows         First Filed Value    Admission Height  61\" (154.9 cm) Documented at 06/13/2017 0525    Admission Weight  97 lb (44 kg) Documented at 06/13/2017 0525             I/O last 3 completed shifts:  In: 2491.1 [P.O.:240; I.V.:1451.1; IV Piggyback:800]  Out: 178 [Other:178]    Physical Exam:     General Appearance:  Intubated and sedated.   Head:    Normocephalic, without obvious abnormality, atraumatic               Neck:   No adenopathy, supple, trachea midline, no thyromegaly, no     carotid bruit, no JVD       Lungs:     Clear to auscultation,respirations regular, even and                   unlabored    Heart:    Regular rhythm and normal rate, normal S1 and S2, no            murmur, no gallop, no rub, no click       Abdomen:     Normal bowel sounds, no masses, no organomegaly, soft        non-tender, non-distended, no guarding, no rebound                 tenderness       Extremities:   Moves all extremities well, no edema, no cyanosis, no              redness   Pulses:   Pulses palpable and equal bilaterally   Skin:   No bleeding, bruising or rash       Neurologic:   Intubated and sedated.        WBC WBC   Date Value Ref Range Status   06/15/2017 10.34 3.50 - 10.80 10*3/mm3 Final   06/14/2017 9.39 3.50 - 10.80 10*3/mm3 Final   06/13/2017 18.25 (H) 3.50 - 10.80 10*3/mm3 " Final      HGB Hemoglobin   Date Value Ref Range Status   06/15/2017 11.7 11.5 - 15.5 g/dL Final   06/14/2017 12.3 11.5 - 15.5 g/dL Final   06/13/2017 12.0 11.5 - 15.5 g/dL Final      HCT Hematocrit   Date Value Ref Range Status   06/15/2017 38.2 34.5 - 44.0 % Final   06/14/2017 38.2 34.5 - 44.0 % Final   06/13/2017 38.0 34.5 - 44.0 % Final      Platlets No results found for: LABPLAT   MCV MCV   Date Value Ref Range Status   06/15/2017 91.2 80.0 - 99.0 fL Final   06/14/2017 89.5 80.0 - 99.0 fL Final   06/13/2017 91.6 80.0 - 99.0 fL Final          Sodium Sodium   Date Value Ref Range Status   06/15/2017 132 132 - 146 mmol/L Final   06/14/2017 137 132 - 146 mmol/L Final   06/13/2017 138 132 - 146 mmol/L Final      Potassium Potassium   Date Value Ref Range Status   06/15/2017 5.6 (H) 3.5 - 5.5 mmol/L Final     Comment:     Result checked   06/14/2017 4.2 3.5 - 5.5 mmol/L Final   06/13/2017 4.4 3.5 - 5.5 mmol/L Final      Chloride Chloride   Date Value Ref Range Status   06/15/2017 102 99 - 109 mmol/L Final   06/14/2017 101 99 - 109 mmol/L Final   06/13/2017 99 99 - 109 mmol/L Final      CO2 CO2   Date Value Ref Range Status   06/15/2017 20.0 20.0 - 31.0 mmol/L Final   06/14/2017 22.0 20.0 - 31.0 mmol/L Final   06/13/2017 24.0 20.0 - 31.0 mmol/L Final      BUN BUN   Date Value Ref Range Status   06/15/2017 30 (H) 9 - 23 mg/dL Final   06/14/2017 46 (H) 9 - 23 mg/dL Final   06/13/2017 27 (H) 9 - 23 mg/dL Final      Creatinine Creatinine   Date Value Ref Range Status   06/15/2017 4.80 (H) 0.60 - 1.30 mg/dL Final   06/14/2017 7.10 (H) 0.60 - 1.30 mg/dL Final   06/13/2017 5.80 (H) 0.60 - 1.30 mg/dL Final      Calcium Calcium   Date Value Ref Range Status   06/15/2017 7.3 (L) 8.7 - 10.4 mg/dL Final   06/14/2017 7.7 (L) 8.7 - 10.4 mg/dL Final   06/13/2017 8.1 (L) 8.7 - 10.4 mg/dL Final      PO4 No results found for: CAPO4   Albumin Albumin   Date Value Ref Range Status   06/13/2017 3.70 3.20 - 4.80 g/dL Final      Magnesium  Magnesium   Date Value Ref Range Status   06/15/2017 1.9 1.3 - 2.7 mg/dL Final      Uric Acid No results found for: URICACID        Results Review:     I reviewed the patient's new clinical results.      albuterol 10 mg Nebulization Once   amitriptyline 10 mg Oral Nightly   azithromycin 500 mg Intravenous Q24H   chlorhexidine 15 mL Mouth/Throat Q12H   dextrose 50 mL Intravenous Once   And      insulin regular 10 Units Intravenous Once   docusate sodium 100 mg Oral BID   ipratropium-albuterol 3 mL Nebulization 4x Daily - RT   lidocaine 2 patch Transdermal Q24H   minoxidil 10 mg Oral Daily   mupirocin  Topical Q12H   nicotine 1 patch Transdermal Daily   piperacillin-tazobactam 2.25 g Intravenous Q8H   sennosides-docusate sodium 2 tablet Oral Nightly   sodium polystyrene 30 g Rectal Once   vancomycin (dosing per levels)  Does not apply Daily       fentanyl 10 mcg/mL  mcg/hr Last Rate: 200 mcg/hr (06/15/17 0829)   lactated ringers 100 mL/hr Last Rate: 100 mL/hr (06/14/17 1241)   niCARdipine 5-15 mg/hr Last Rate: 5 mg/hr (06/14/17 1225)   Pharmacy to dose vancomycin     propofol 5-100 mcg/kg/min Last Rate: 100 mcg/kg/min (06/15/17 0846)   sodium chloride 30 mL/hr Last Rate: 30 mL/hr (06/15/17 0826)       Medication Review:     Assessment/Plan     Principal Problem:    Loculated pleural effusion  Active Problems:    Tobacco use    Bipolar disorder    Depression    Hyperparathyroidism    Pneumonia of right lung due to infectious organism    ESRD (end stage renal disease) on dialysis    Renovascular hypertension    Hepatitis C antibody positive in blood    IV drug abuse    Anemia of ESRD    COPD exacerbation    Medical non-compliance      1- 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   3- Anemia of chronic disease. hgb at target   4- PNA with plueral loculation.   5- Volume overload.      Plan:  - HD per schedule.   - Kayxelate, D50 W  with insulin for hyperkalemia  - UF as tolerated.   - renal diet.   - Adjust meds per renal function    Thank you very much for the consult. Will follow along.      I discussed the patients findings and my recommendations with RN    Haja Lane MD  06/15/17  9:37 AM       Electronically signed by Haja Lane MD at 6/15/2017 11:27 AM      Fercho Singletary MD at 6/15/2017 10:55 AM  Version 1 of 1         [facility] Progress Note    6/12/2017      Antibiotics:  IV Anti-Infectives     Ordered     Dose/Rate Route Frequency Start Stop    06/14/17 1600  piperacillin-tazobactam (ZOSYN) 2.25 g/100 mL 0.9% NS IVPB (mbp)     Ordering Provider:  Kavin Deng IV, RPH    2.25 g  over 0.5 Hours Intravenous Every 8 Hours 06/14/17 1700      06/12/17 2156  vancomycin in dextrose 5% 150 mL (VANCOCIN) IVPB 750 mg     Ordering Provider:  Brock Hooker MD    750 mg  over 60 Minutes Intravenous Once 06/13/17 1200 06/13/17 1244    06/12/17 2007  AZITHROMYCIN 500 MG/250 ML 0.9% NS IVPB (MBP)     Ordering Provider:  Jonelle Smith PA-C    500 mg  over 60 Minutes Intravenous Every 24 Hours 06/12/17 2100      06/12/17 2013  vancomycin (dosing per levels)     Ordering Provider:  Johnna Pringle MD     Does not apply Daily 06/12/17 2045      06/12/17 2007  Pharmacy to dose vancomycin     Ordering Provider:  Jonelle Smith PA-C     Does not apply Continuous PRN 06/12/17 2004            CC:cough/pleurisy    HPI:  Patient is a 26 y.o. 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 difficile treated approximately January 2017, end-stage renal disease possibly from FSGS per past records, and numerous other comorbidity as outlined below. She has a history of medical noncompliance and apparently has left hospital on multiple occasions AGAINST MEDICAL ADVICE  "per outside notes.  She was apparently admitted mid May with diagnosis pneumonia and treated with empiric antibiotics but no specific microbiologic diagnosis. She is readmitted to [facility] with diagnosis right sided pneumonia and loculated pleural effusion associated with dyspnea/cough and right-sided pleurisy. She was transferred to [facility] for consideration of decortication. Of particular note, when she has done being treated at [facility], she prefers referral back to her infectious disease doctor, Dr. iWtt.    6/14 decortication for empyema; post op hypercapnic resp failure requiring ventilatory support     6/15/17 Intubated and sedated;  Per nursing, No diarrhea, on vent, no pressors, no rash, no new focal pain.  No bleeding, stable tubes and lines with small right apical ptx.    ROS:      6/15/17 complete review of systems unable to obtain    PE:   /52  Pulse 72  Temp 96.6 °F (35.9 °C) (Axillary)   Resp 16  Ht 61\" (154.9 cm)  Wt 97 lb (44 kg)  LMP 12/17/2016  SpO2 100%  Breastfeeding? No  BMI 18.33 kg/m2    GENERAL: Sedated on ventilator  HEENT:  No conjunctival injection. No icterus. Oropharynx clear without evidence of thrush or exudate.     HEART: RRR; No murmur, rubs, gallops.   LUNGS: diminished on right c/t left, scattered rhonchi.  On ventilator with oral endotracheal tube and right chest tube  ABDOMEN: Soft, nontender, nondistended. Positive bowel sounds. No rebound or guarding. NO mass or HSM.  EXT:  No cyanosis, clubbing or edema. No cord.  : Genitalia generally unremarkable.  Without Burgos catheter.  SKIN: Warm and dry without cutaneous eruptions on Inspection/palpation.   Neck supple no mass  Lymph--neck and groin negative  Musculoskeletal-- no joint effusions appreciated in the arms and legs.  Free range of motion at shoulders elbows wrists, hips knees and ankles    IV with no redness or purulence    No IE phenomena     Laboratory " Data      Results from last 7 days  Lab Units 06/15/17  0322 06/14/17  0634 06/13/17  0637   WBC 10*3/mm3 10.34 9.39 18.25*   HEMOGLOBIN g/dL 11.7 12.3 12.0   HEMATOCRIT % 38.2 38.2 38.0   PLATELETS 10*3/mm3 210 226 224       Results from last 7 days  Lab Units 06/15/17  0322   SODIUM mmol/L 132   POTASSIUM mmol/L 5.6*   CHLORIDE mmol/L 102   TOTAL CO2 mmol/L 20.0   BUN mg/dL 30*   CREATININE mg/dL 4.80*   GLUCOSE mg/dL 89   CALCIUM mg/dL 7.3*       Results from last 7 days  Lab Units 06/13/17  0637   ALK PHOS U/L 98   BILIRUBIN mg/dL 0.1*   ALT (SGPT) U/L 20   AST (SGOT) U/L 20       Results from last 7 days  Lab Units 06/12/17  1504   SED RATE mm/hr 44*       Results from last 7 days  Lab Units 06/12/17  1504   CRP mg/dL 22.00*       Estimated Creatinine Clearance: 12.3 mL/min (by C-G formula based on Cr of 4.8).      Microbiology:      Radiology:  Imaging Results (last 72 hours)     ** No results found for the last 72 hours. **            Impression:   --Acute hypercapnic respiratory failure.  Remains intubated postop, sedated.  After decortication.  Small right apical pneumothorax with chest tube.    --Acute loculated right pleural effusion/empyema per cardiothoracic surgery  associated with right lower lobe pneumonia. Empiric antibiotics ongoing for community-acquired/healthcare associated pneumonia.  Decortication on June 14.  MRSA so far and recent cultures.  If no other specific pathogen isolated then likely taper antibiotics in upcoming days     --History hepatitis C. I do not treat viral hepatitis as a part of my practice. She has seen hepatology at [facility] and she should be referred back there after discharge for ongoing care and evaluation regarding possible treatment options. I discussed with her risks associated with hepatitis C including chronic hepatitis/cirrhosis and hepatocellular carcinoma. Follow-up at  is her responsibility. She is aware     --History C. difficile. Currently no  "diarrhea or abdominal complaints. Monitor for relapse. She understands she is at risk for relapse.     --End-stage renal disease associated with FSGS; dialysis ongoing     --History polysubstance abuse which she reports is not active.     --History medical noncompliance with multiple episodes of leaving hospital AGAINST MEDICAL ADVICE per outside records. She understands the implication of her behavior    PLAN:   --IV vancomycin/Zosyn and Zithromax     --Critically ill with high risk for further serious morbidity and other serious sequela/mortality     --Monitor IV and IV antibiotic with risk for systemic complication and potential drug interaction     --Check/review labs cultures and scans     --Discussed with microbiology. Partial history per nursing staff    --Discussed with family      Time In 10:15  Time Out 10:55  CCT 40\"        Fercho Singletary MD  6/15/2017           Electronically signed by Fercho Singletary MD at 6/15/2017 11:00 AM      Charu Chavez MD at 6/15/2017  2:11 PM  Version 1 of 1         Critical Care Note     LOS: 3 days   Patient Care Team:  Neo Gresham DO as PCP - General (Family Medicine)    Chief Complaint/Reason for visit:  Loculated right pleural effusion,VATS      Subjective     Interval History:     Patient remained intubated overnight. She has a problem with IV drug abuse and hepatitis C with end-stage renal disease on hemodialysis. There are no plans for dialysis today.    Review of Systems:    All systems were reviewed and negative except as noted in subjective.    Medical history, surgical history, social history, family history reviewed    Objective     Intake/Output:    Intake/Output Summary (Last 24 hours) at 06/15/17 1417  Last data filed at 06/15/17 1052   Gross per 24 hour   Intake          2472.08 ml   Output               58 ml   Net          2414.08 ml       Nutrition:Nothing by mouth    Infusions:    dexmedetomidine 0.2-1.5 mcg/kg/hr Last Rate: 0.3 " "mcg/kg/hr (06/15/17 1349)   fentanyl 10 mcg/mL  mcg/hr Last Rate: 125 mcg/hr (06/15/17 1415)   niCARdipine 5-15 mg/hr Last Rate: 5 mg/hr (06/14/17 1225)   Pharmacy to dose vancomycin     propofol 5-100 mcg/kg/min Last Rate: 10 mcg/kg/min (06/15/17 1359)       Respiratory:  Vent Mode: VC+/AC  FiO2 (%):  [30 %] 30 %  S RR:  [16-18] 16  PEEP/CPAP (cm H2O):  [5 cm H20] 5 cm H20  TN SUP:  [0 cm H20] 0 cm H20  MAP (cm H2O):  [9-13] 9    Telemetry:    Vital Signs  Blood pressure 159/80, pulse 78, temperature 96.8 °F (36 °C), temperature source Axillary, resp. rate 26, height 61\" (154.9 cm), weight 97 lb (44 kg), last menstrual period 12/17/2016, SpO2 100 %, not currently breastfeeding.    Physical Exam:  General Appearance:  Cachectic older appearing 26-year-old    Head:  Abrasions and scabs over her for head, chin, lip    Eyes:          No jaundice, conjunctiva pink, pupils small, equal    Ears:     Throat: Orally intubated    Neck: Trachea midline, no palpable thyroid    Back:      Lungs:   2 right chest tubes, no air leak, symmetric chest excursion, no wheeze or rhonchi     Heart:  Heart sounds normal,  regular, no murmur    Abdomen:   Flat, bowel sounds present    Rectal:   Deferred   Extremities: Right upper arm AV fistula with palpable thrill, no pitting edema    Pulses:    Skin: Multiple track marks    Lymph nodes:    Neurologic: Sedated       Results Review:     I reviewed the patient's new clinical results.     Results from last 7 days  Lab Units 06/15/17  0322 06/14/17  0634 06/13/17  0637  06/11/17  1552   SODIUM mmol/L 132 137 138  < > 138   POTASSIUM mmol/L 5.6* 4.2 4.4  < > 4.0   CHLORIDE mmol/L 102 101 99  < > 89*   TOTAL CO2 mmol/L 20.0 22.0 24.0  < > 28.0   BUN mg/dL 30* 46* 27*  < > 38*   CREATININE mg/dL 4.80* 7.10* 5.80*  < > 9.90*   CALCIUM mg/dL 7.3* 7.7* 8.1*  < > 8.3*   BILIRUBIN mg/dL  --   --  0.1*  --  0.6   ALK PHOS U/L  --   --  98  --  108   ALT (SGPT) U/L  --   --  20  --  41   AST " (SGOT) U/L  --   --  20  --  40   GLUCOSE mg/dL 89 79 105*  < > 92   < > = values in this interval not displayed.    Results from last 7 days  Lab Units 06/15/17  0322 06/14/17  0634 06/13/17  0637   WBC 10*3/mm3 10.34 9.39 18.25*   HEMOGLOBIN g/dL 11.7 12.3 12.0   HEMATOCRIT % 38.2 38.2 38.0   PLATELETS 10*3/mm3 210 226 224       Results from last 7 days  Lab Units 06/15/17  0340   PH, ARTERIAL pH units 7.416   PO2 ART mm Hg 119.0*   PCO2, ARTERIAL mm Hg 31.2*   HCO3 ART mmol/L 20.0     Lab Results   Component Value Date    BLOODCX No growth at 3 days 06/11/2017     No results found for: URINECX    I reviewed the patient's new imaging including images and reports.      IMPRESSION:  Minimal bibasilar atelectasis. There has been no significant  change when compared to 06/14/2017.      D: 06/15/2017    All medications reviewed.     amitriptyline 10 mg Oral Nightly   azithromycin 500 mg Intravenous Q24H   chlorhexidine 15 mL Mouth/Throat Q12H   docusate sodium 100 mg Oral BID   ipratropium-albuterol 3 mL Nebulization 4x Daily - RT   lidocaine 2 patch Transdermal Q24H   minoxidil 10 mg Oral Daily   mupirocin  Topical Q12H   nicotine 1 patch Transdermal Daily   piperacillin-tazobactam 2.25 g Intravenous Q8H   sennosides-docusate sodium 2 tablet Oral Nightly   vancomycin (dosing per levels)  Does not apply Daily         Assessment/Plan     Principal Problem:    Loculated pleural effusion  Active Problems:    Tobacco use    Bipolar disorder    Depression    Hyperparathyroidism    Pneumonia of right lung due to infectious organism    ESRD (end stage renal disease) on dialysis    Renovascular hypertension    Hepatitis C antibody positive in blood    IV drug abuse    Anemia of ESRD    COPD exacerbation    Medical non-compliance    26-year-old woman, current smoker, IV drug abuser with hepatitis C and end-stage renal disease presenting with a loculated parapneumonic effusion. June 14 she underwent thoracotomy with  "decortication. She was maintained on mechanical ventilation overnight. This morning blood gases indicated she was over ventilated and tidal volume was decreased from 500 to 400, rate decreased to 16. Her last hemodialysis was preoperatively. Potassium was mildly elevated this morning and treated with IV glucose, insulin, Kayexalate enema.      PLAN:  Decrease sedation and wean to extubation  Continue home antihypertensives  Vancomycin, Zosyn, azithromycin  Nicotine patch  Start H2 receptor blocker    VTE Prophylaxis:SCDS    Stress Ulcer Prophylaxis:none    Charu Chavez MD  06/15/17  2:17 PM      Time: 30min  I personally provided care to this critically ill patient as documented above.  Critical care time does not include time spent on separately billed procedures.  Non of my critical care time was concurrent with other critical care providers.      Electronically signed by Charu Chavez MD at 6/15/2017  2:31 PM      Shawn Hicks MD at 6/16/2017  7:01 AM  Version 1 of 1            LOS: 4 days   Patient Care Team:  Noe Gresham DO as PCP - General (Family Medicine)    Subjective alert complaining of pain    Objective    Vital Sign Min/Max for last 24 hours  Temp  Min: 96.6 °F (35.9 °C)  Max: 98.3 °F (36.8 °C)   BP  Min: 98/41  Max: 180/87   Pulse  Min: 57  Max: 109   Resp  Min: 16  Max: 26   SpO2  Min: 92 %  Max: 100 %   Flow (L/min)  Min: 2  Max: 4   No Data Recorded     Flowsheet Rows         First Filed Value    Admission Height  61\" (154.9 cm) Documented at 06/13/2017 0525    Admission Weight  97 lb (44 kg) Documented at 06/13/2017 0525          Physical Exam:    Wound:    Pulses:     Mediastinal and Chest Tube Drainage:No air leak       Results Review:     Results from last 7 days  Lab Units 06/16/17  0334   WBC 10*3/mm3 11.14*   HEMOGLOBIN g/dL 12.1   HEMATOCRIT % 37.8   PLATELETS 10*3/mm3 212       Results from last 7 days  Lab Units 06/16/17  0334   SODIUM mmol/L 136   POTASSIUM " mmol/L 4.3   CHLORIDE mmol/L 102   TOTAL CO2 mmol/L 19.0*   BUN mg/dL 43*   CREATININE mg/dL 6.10*   GLUCOSE mg/dL 68*   CALCIUM mg/dL 7.3*       Results from last 7 days  Lab Units 06/15/17  0340   PH, ARTERIAL pH units 7.416   PO2 ART mm Hg 119.0*   PCO2, ARTERIAL mm Hg 31.2*   HCO3 ART mmol/L 20.0         Assessment    Principal Problem:    Loculated pleural effusion  Active Problems:    Tobacco use    Bipolar disorder    Depression    Hyperparathyroidism    Pneumonia of right lung due to infectious organism    ESRD (end stage renal disease) on dialysis    Renovascular hypertension    Hepatitis C antibody positive in blood    IV drug abuse    Anemia of ESRD    COPD exacerbation    Medical non-compliance      Chest x-ray satisfactory continuous supportive care        Shawn Hicks MD  06/16/17  7:01 AM      Please note that portions of this note were completed with a voice recognition program. Efforts were made to edit the dictations, but words may be mistranscribed     Electronically signed by Shawn Hicks MD at 6/16/2017  7:02 AM      Fercho Singletary MD at 6/16/2017  7:32 AM  Version 1 of 1         [facility] Progress Note    6/12/2017      Antibiotics:  IV Anti-Infectives     Ordered     Dose/Rate Route Frequency Start Stop    06/14/17 1600  piperacillin-tazobactam (ZOSYN) 2.25 g/100 mL 0.9% NS IVPB (mbp)     Ordering Provider:  Kavin Deng IV, RPH    2.25 g  over 0.5 Hours Intravenous Every 8 Hours 06/14/17 1700      06/12/17 2156  vancomycin in dextrose 5% 150 mL (VANCOCIN) IVPB 750 mg     Ordering Provider:  Brock Hooker MD    750 mg  over 60 Minutes Intravenous Once 06/13/17 1200 06/13/17 1244    06/12/17 2007  AZITHROMYCIN 500 MG/250 ML 0.9% NS IVPB (MBP)     Ordering Provider:  Jonelle Smith PA-C    500 mg  over 60 Minutes Intravenous Every 24 Hours 06/12/17 2100      06/12/17 2013  vancomycin (dosing per levels)     Ordering Provider:  Johnna Pringle MD     Does not apply Daily  "06/12/17 2045 06/12/17 2007  Pharmacy to dose vancomycin     Ordering Provider:  Jonelle Smith PA-C     Does not apply Continuous PRN 06/12/17 2004            CC:cough/pleurisy    HPI:  Patient is a 26 y.o. 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 difficile treated approximately January 2017, end-stage renal disease possibly from FSGS per past records, and numerous other comorbidity as outlined below. She has a history of medical noncompliance and apparently has left hospital on multiple occasions AGAINST MEDICAL ADVICE per outside notes.  She was apparently admitted mid May with diagnosis pneumonia and treated with empiric antibiotics but no specific microbiologic diagnosis. She is readmitted to [facility] with diagnosis right sided pneumonia and loculated pleural effusion associated with dyspnea/cough and right-sided pleurisy. She was transferred to [facility] for consideration of decortication. Of particular note, when she has done being treated at [facility], she prefers referral back to her infectious disease doctor, Dr. Granados.    6/14 decortication for empyema; post op hypercapnic resp failure requiring ventilatory support     6/16/17 seen early and sleepy;  Extubated; per nursing, no pressors, no rash, no new focal pain.  No bleeding, stable tubes and lines with small right apical ptx.    ROS:      6/16/17 per nursing, No F/C/S,  No rash, No N/V/D and resp status stable otherwise    PE:   /83  Pulse 101  Temp 98.5 °F (36.9 °C)  Resp 20  Ht 61\" (154.9 cm)  Wt 97 lb (44 kg)  LMP 12/17/2016  SpO2 98%  Breastfeeding? No  BMI 18.33 kg/m2    GENERAL: nc O2 sleepy  HEENT:  No conjunctival injection. No icterus. Oropharynx clear without evidence of thrush or exudate.     HEART: RRR; " No murmur, rubs, gallops.   LUNGS: diminished on right c/t left, scattered rhonchi.    ABDOMEN: Soft, nontender, nondistended. Positive bowel sounds. No rebound or guarding. NO mass or HSM.  EXT:  No cyanosis, clubbing or edema. No cord.  : Genitalia generally unremarkable.  Without Burgos catheter.  SKIN: Warm and dry without cutaneous eruptions on Inspection/palpation.   Neck supple no mass  Lymph--neck and groin negative  Musculoskeletal-- no joint effusions appreciated in the arms and legs.  Free range of motion at shoulders elbows wrists, hips knees and ankles    IV with no redness or purulence    No IE phenomena     Laboratory Data      Results from last 7 days  Lab Units 06/16/17  0334 06/15/17  0322 06/14/17  0634   WBC 10*3/mm3 11.14* 10.34 9.39   HEMOGLOBIN g/dL 12.1 11.7 12.3   HEMATOCRIT % 37.8 38.2 38.2   PLATELETS 10*3/mm3 212 210 226       Results from last 7 days  Lab Units 06/16/17  0334   SODIUM mmol/L 136   POTASSIUM mmol/L 4.3   CHLORIDE mmol/L 102   TOTAL CO2 mmol/L 19.0*   BUN mg/dL 43*   CREATININE mg/dL 6.10*   GLUCOSE mg/dL 68*   CALCIUM mg/dL 7.3*       Results from last 7 days  Lab Units 06/13/17  0637   ALK PHOS U/L 98   BILIRUBIN mg/dL 0.1*   ALT (SGPT) U/L 20   AST (SGOT) U/L 20       Results from last 7 days  Lab Units 06/12/17  1504   SED RATE mm/hr 44*       Results from last 7 days  Lab Units 06/12/17  1504   CRP mg/dL 22.00*       Estimated Creatinine Clearance: 9.7 mL/min (by C-G formula based on Cr of 6.1).      Microbiology:      Radiology:  Imaging Results (last 72 hours)     ** No results found for the last 72 hours. **            Impression:   --Acute hypercapnic respiratory failure.  Remains intubated postop, sedated.  After decortication.  Small right apical pneumothorax with chest tube.    --Acute loculated right pleural effusion/empyema per cardiothoracic surgery  associated with right lower lobe pneumonia. Empiric antibiotics ongoing for community-acquired/healthcare  associated pneumonia.  Decortication on June 14.  MRSA so far and recent cultures.  If no other specific pathogen isolated then likely taper antibiotics in upcoming days    --Rhinovirus positive     --History hepatitis C. I do not treat viral hepatitis as a part of my practice. She has seen hepatology at [facility] and she should be referred back there after discharge for ongoing care and evaluation regarding possible treatment options. I discussed with her risks associated with hepatitis C including chronic hepatitis/cirrhosis and hepatocellular carcinoma. Follow-up at  is her responsibility. She is aware     --History C. difficile. Currently no diarrhea or abdominal complaints. Monitor for relapse. She understands she is at risk for relapse.     --End-stage renal disease associated with FSGS; dialysis ongoing     --History polysubstance abuse which she reports is not active.     --History medical noncompliance with multiple episodes of leaving hospital AGAINST MEDICAL ADVICE per outside records. She understands the implication of her behavior    PLAN:   --IV vancomycin/Zosyn and Zithromax     --Highly complex issues with high risk for further serious morbidity and other serious sequela/mortality     --Monitor IV and IV antibiotic with risk for systemic complication and potential drug interaction     --Check/review labs cultures and scans     --Discussed with microbiology. Partial history per nursing staff    --I am outuntil Monday ;  Call me if needed sooner;  [phone]          Fercho Singletary MD  6/16/2017           Electronically signed by Fercho Singletary MD at 6/16/2017  7:35 AM        Consult Notes (last 24 hours) (Notes from 6/15/2017  9:07 AM through 6/16/2017  9:07 AM)     No notes of this type exist for this encounter.

## 2017-06-16 NOTE — PROGRESS NOTES
"   LOS: 4 days   Patient Care Team:  Neo Gresham DO as PCP - General (Family Medicine)    Subjective alert complaining of pain    Objective    Vital Sign Min/Max for last 24 hours  Temp  Min: 96.6 °F (35.9 °C)  Max: 98.3 °F (36.8 °C)   BP  Min: 98/41  Max: 180/87   Pulse  Min: 57  Max: 109   Resp  Min: 16  Max: 26   SpO2  Min: 92 %  Max: 100 %   Flow (L/min)  Min: 2  Max: 4   No Data Recorded     Flowsheet Rows         First Filed Value    Admission Height  61\" (154.9 cm) Documented at 06/13/2017 0525    Admission Weight  97 lb (44 kg) Documented at 06/13/2017 0525          Physical Exam:    Wound:    Pulses:     Mediastinal and Chest Tube Drainage:No air leak       Results Review:     Results from last 7 days  Lab Units 06/16/17  0334   WBC 10*3/mm3 11.14*   HEMOGLOBIN g/dL 12.1   HEMATOCRIT % 37.8   PLATELETS 10*3/mm3 212       Results from last 7 days  Lab Units 06/16/17  0334   SODIUM mmol/L 136   POTASSIUM mmol/L 4.3   CHLORIDE mmol/L 102   TOTAL CO2 mmol/L 19.0*   BUN mg/dL 43*   CREATININE mg/dL 6.10*   GLUCOSE mg/dL 68*   CALCIUM mg/dL 7.3*       Results from last 7 days  Lab Units 06/15/17  0340   PH, ARTERIAL pH units 7.416   PO2 ART mm Hg 119.0*   PCO2, ARTERIAL mm Hg 31.2*   HCO3 ART mmol/L 20.0         Assessment    Principal Problem:    Loculated pleural effusion  Active Problems:    Tobacco use    Bipolar disorder    Depression    Hyperparathyroidism    Pneumonia of right lung due to infectious organism    ESRD (end stage renal disease) on dialysis    Renovascular hypertension    Hepatitis C antibody positive in blood    IV drug abuse    Anemia of ESRD    COPD exacerbation    Medical non-compliance      Chest x-ray satisfactory continuous supportive care        Shawn Hicks MD  06/16/17  7:01 AM      Please note that portions of this note were completed with a voice recognition program. Efforts were made to edit the dictations, but words may be mistranscribed  "

## 2017-06-16 NOTE — PLAN OF CARE
Problem: Patient Care Overview (Adult)  Goal: Plan of Care Review  Outcome: Ongoing (interventions implemented as appropriate)    06/15/17 2013 06/16/17 0702   Coping/Psychosocial Response Interventions   Plan Of Care Reviewed With patient;spouse;mother --    Patient Care Overview   Progress --  no change   Outcome Evaluation   Outcome Summary/Follow up Plan --  pain control an issue, patient crying inconsolably and frequently asks for pain medication, potassium level decreased, prn dilaudid not working for pain, dilaudid pca started, percocet prn started, ct put out only 20, crepitus remains the same       Goal: Adult Individualization and Mutuality  Outcome: Ongoing (interventions implemented as appropriate)  Goal: Discharge Needs Assessment  Outcome: Ongoing (interventions implemented as appropriate)    Problem: Respiratory Insufficiency (Adult)  Goal: Acid/Base Balance  Outcome: Ongoing (interventions implemented as appropriate)  Goal: Effective Ventilation  Outcome: Ongoing (interventions implemented as appropriate)    Problem: Perioperative Period (Adult)  Goal: Signs and Symptoms of Listed Potential Problems Will be Absent or Manageable (Perioperative Period)  Outcome: Ongoing (interventions implemented as appropriate)    Problem: Lung Surgery (via Thoracotomy) (Adult)  Goal: Signs and Symptoms of Listed Potential Problems Will be Absent or Manageable (Lung Surgery)  Outcome: Ongoing (interventions implemented as appropriate)    Problem: Pain, Acute (Adult)  Goal: Acceptable Pain Control/Comfort Level  Outcome: Ongoing (interventions implemented as appropriate)    Problem: Pressure Ulcer Risk (Heri Scale) (Adult,Obstetrics,Pediatric)  Goal: Skin Integrity  Outcome: Ongoing (interventions implemented as appropriate)

## 2017-06-16 NOTE — PROGRESS NOTES
"   LOS: 4 days    Patient Care Team:  Neo Gresham DO as PCP - General (Family Medicine)    Chief Complaint: shortness of breath    Subjective     Interval History:   No acute events overnight. No new complaints.     Review of Systems:   No CP  No SOA.     Objective     Vital Sign Min/Max for last 24 hours  Temp  Min: 96.8 °F (36 °C)  Max: 98.5 °F (36.9 °C)   BP  Min: 98/41  Max: 180/87   Pulse  Min: 68  Max: 109   Resp  Min: 16  Max: 26   SpO2  Min: 92 %  Max: 100 %   Flow (L/min)  Min: 2  Max: 4   No Data Recorded     Flowsheet Rows         First Filed Value    Admission Height  61\" (154.9 cm) Documented at 06/13/2017 0525    Admission Weight  97 lb (44 kg) Documented at 06/13/2017 0525             I/O last 3 completed shifts:  In: 2758.9 [P.O.:120; I.V.:1683.9; IV Piggyback:955]  Out: 70 [Other:70]    Physical Exam:     General Appearance:    Alert, in Mod distress.    Head:    Normocephalic, without obvious abnormality, atraumatic               Neck:   No adenopathy, supple, trachea midline, no thyromegaly, no     carotid bruit, no JVD       Lungs:     Clear to auscultation,respirations regular, even and                   unlabored    Heart:    Regular rhythm and normal rate, normal S1 and S2, no            murmur, no gallop, no rub, no click       Abdomen:     Normal bowel sounds, no masses, no organomegaly, soft        non-tender, non-distended, no guarding, no rebound                 tenderness       Extremities:   Moves all extremities well, no edema, no cyanosis, no              redness   Pulses:   Pulses palpable and equal bilaterally   Skin:   No bleeding, bruising or rash       Neurologic:   No focal deficit noted.        WBC WBC   Date Value Ref Range Status   06/16/2017 11.14 (H) 3.50 - 10.80 10*3/mm3 Final   06/15/2017 10.34 3.50 - 10.80 10*3/mm3 Final   06/14/2017 9.39 3.50 - 10.80 10*3/mm3 Final      HGB Hemoglobin   Date Value Ref Range Status   06/16/2017 12.1 11.5 - 15.5 g/dL Final "   06/15/2017 11.7 11.5 - 15.5 g/dL Final   06/14/2017 12.3 11.5 - 15.5 g/dL Final      HCT Hematocrit   Date Value Ref Range Status   06/16/2017 37.8 34.5 - 44.0 % Final   06/15/2017 38.2 34.5 - 44.0 % Final   06/14/2017 38.2 34.5 - 44.0 % Final      Platlets No results found for: LABPLAT   MCV MCV   Date Value Ref Range Status   06/16/2017 89.6 80.0 - 99.0 fL Final   06/15/2017 91.2 80.0 - 99.0 fL Final   06/14/2017 89.5 80.0 - 99.0 fL Final          Sodium Sodium   Date Value Ref Range Status   06/16/2017 136 132 - 146 mmol/L Final   06/15/2017 132 132 - 146 mmol/L Final   06/14/2017 137 132 - 146 mmol/L Final      Potassium Potassium   Date Value Ref Range Status   06/16/2017 4.3 3.5 - 5.5 mmol/L Final   06/15/2017 4.3 3.5 - 5.5 mmol/L Final   06/15/2017 5.6 (H) 3.5 - 5.5 mmol/L Final   06/15/2017 5.6 (H) 3.5 - 5.5 mmol/L Final     Comment:     Result checked   06/14/2017 4.2 3.5 - 5.5 mmol/L Final      Chloride Chloride   Date Value Ref Range Status   06/16/2017 102 99 - 109 mmol/L Final   06/15/2017 102 99 - 109 mmol/L Final   06/14/2017 101 99 - 109 mmol/L Final      CO2 CO2   Date Value Ref Range Status   06/16/2017 19.0 (L) 20.0 - 31.0 mmol/L Final   06/15/2017 20.0 20.0 - 31.0 mmol/L Final   06/14/2017 22.0 20.0 - 31.0 mmol/L Final      BUN BUN   Date Value Ref Range Status   06/16/2017 43 (H) 9 - 23 mg/dL Final   06/15/2017 30 (H) 9 - 23 mg/dL Final   06/14/2017 46 (H) 9 - 23 mg/dL Final      Creatinine Creatinine   Date Value Ref Range Status   06/16/2017 6.10 (H) 0.60 - 1.30 mg/dL Final   06/15/2017 4.80 (H) 0.60 - 1.30 mg/dL Final   06/14/2017 7.10 (H) 0.60 - 1.30 mg/dL Final      Calcium Calcium   Date Value Ref Range Status   06/16/2017 7.3 (L) 8.7 - 10.4 mg/dL Final   06/15/2017 7.3 (L) 8.7 - 10.4 mg/dL Final   06/14/2017 7.7 (L) 8.7 - 10.4 mg/dL Final      PO4 No results found for: CAPO4   Albumin Albumin   Date Value Ref Range Status   06/16/2017 3.40 3.20 - 4.80 g/dL Final      Magnesium Magnesium    Date Value Ref Range Status   06/15/2017 1.9 1.3 - 2.7 mg/dL Final      Uric Acid No results found for: URICACID        Results Review:     I reviewed the patient's new clinical results.      amitriptyline 10 mg Oral Nightly   azithromycin 500 mg Intravenous Q24H   chlorhexidine 15 mL Mouth/Throat Q12H   docusate sodium 100 mg Oral BID   famotidine 20 mg Oral Daily   ipratropium-albuterol 3 mL Nebulization 4x Daily - RT   lidocaine 2 patch Transdermal Q24H   minoxidil 10 mg Oral Daily   mupirocin  Topical Q12H   nicotine 1 patch Transdermal Daily   piperacillin-tazobactam 2.25 g Intravenous Q8H   sennosides-docusate sodium 2 tablet Oral Nightly   vancomycin (dosing per levels)  Does not apply Daily       dexmedetomidine 0.2-1.5 mcg/kg/hr Last Rate: 0.7 mcg/kg/hr (06/16/17 0810)   fentanyl 10 mcg/mL  mcg/hr Last Rate: Stopped (06/15/17 1644)   HYDROmorphone     niCARdipine 5-15 mg/hr Last Rate: 5 mg/hr (06/14/17 1225)   Pharmacy to dose vancomycin     propofol 5-100 mcg/kg/min Last Rate: Stopped (06/15/17 1419)       Medication Review:     Assessment/Plan     Principal Problem:    Loculated pleural effusion  Active Problems:    Tobacco use    Bipolar disorder    Depression    Hyperparathyroidism    Pneumonia of right lung due to infectious organism    ESRD (end stage renal disease) on dialysis    Renovascular hypertension    Hepatitis C antibody positive in blood    IV drug abuse    Anemia of ESRD    COPD exacerbation    Medical non-compliance      1- 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   3- Anemia of chronic disease. hgb at target   4- PNA with plueral loculation.   5- Volume overload.      Plan:  - HD per schedule. Patient seen on dialysis.   - UF as tolerated.   - renal diet.   - Adjust meds per renal function  - Will try to wean off Minoxidil. ( pleural effussion). Will start on amlodipine 5 mg po daily. Once blood  pressure is controlled. Will start weaning off.       I discussed the patients findings and my recommendations with SALINA Lane MD  06/16/17  9:30 AM

## 2017-06-17 ENCOUNTER — APPOINTMENT (OUTPATIENT)
Dept: GENERAL RADIOLOGY | Facility: HOSPITAL | Age: 27
End: 2017-06-17

## 2017-06-17 LAB
GLUCOSE BLDC GLUCOMTR-MCNC: 104 MG/DL (ref 70–130)
GLUCOSE BLDC GLUCOMTR-MCNC: 121 MG/DL (ref 70–130)
GLUCOSE BLDC GLUCOMTR-MCNC: 90 MG/DL (ref 70–130)
GLUCOSE BLDC GLUCOMTR-MCNC: 94 MG/DL (ref 70–130)

## 2017-06-17 PROCEDURE — 94799 UNLISTED PULMONARY SVC/PX: CPT

## 2017-06-17 PROCEDURE — 25010000002 AZITHROMYCIN: Performed by: PHYSICIAN ASSISTANT

## 2017-06-17 PROCEDURE — 25010000002 HYDROMORPHONE PER 4 MG: Performed by: FAMILY MEDICINE

## 2017-06-17 PROCEDURE — 94640 AIRWAY INHALATION TREATMENT: CPT

## 2017-06-17 PROCEDURE — 25010000002 PIPERACILLIN-TAZOBACTAM

## 2017-06-17 PROCEDURE — 94760 N-INVAS EAR/PLS OXIMETRY 1: CPT

## 2017-06-17 PROCEDURE — 82962 GLUCOSE BLOOD TEST: CPT

## 2017-06-17 PROCEDURE — 71010 HC CHEST PA OR AP: CPT

## 2017-06-17 PROCEDURE — 99233 SBSQ HOSP IP/OBS HIGH 50: CPT | Performed by: INTERNAL MEDICINE

## 2017-06-17 RX ORDER — MINOXIDIL 2.5 MG/1
5 TABLET ORAL DAILY
Status: DISCONTINUED | OUTPATIENT
Start: 2017-06-18 | End: 2017-06-23 | Stop reason: HOSPADM

## 2017-06-17 RX ADMIN — FAMOTIDINE 20 MG: 20 TABLET ORAL at 08:11

## 2017-06-17 RX ADMIN — MINOXIDIL 10 MG: 10 TABLET ORAL at 08:10

## 2017-06-17 RX ADMIN — PIPERACILLIN AND TAZOBACTAM 2.25 G: 2; .25 INJECTION, POWDER, LYOPHILIZED, FOR SOLUTION INTRAVENOUS; PARENTERAL at 08:12

## 2017-06-17 RX ADMIN — NICOTINE 1 PATCH: 21 PATCH, EXTENDED RELEASE TRANSDERMAL at 09:00

## 2017-06-17 RX ADMIN — HYDROMORPHONE HYDROCHLORIDE 1 MG: 1 INJECTION, SOLUTION INTRAMUSCULAR; INTRAVENOUS; SUBCUTANEOUS at 04:15

## 2017-06-17 RX ADMIN — HYDROMORPHONE HYDROCHLORIDE 1 MG: 1 INJECTION, SOLUTION INTRAMUSCULAR; INTRAVENOUS; SUBCUTANEOUS at 08:10

## 2017-06-17 RX ADMIN — DEXMEDETOMIDINE HYDROCHLORIDE 1.4 MCG/KG/HR: 4 INJECTION, SOLUTION INTRAVENOUS at 20:35

## 2017-06-17 RX ADMIN — ALPRAZOLAM 0.5 MG: 0.5 TABLET ORAL at 08:11

## 2017-06-17 RX ADMIN — MUPIROCIN: 20 OINTMENT TOPICAL at 20:45

## 2017-06-17 RX ADMIN — IPRATROPIUM BROMIDE AND ALBUTEROL SULFATE 3 ML: .5; 3 SOLUTION RESPIRATORY (INHALATION) at 13:01

## 2017-06-17 RX ADMIN — IPRATROPIUM BROMIDE AND ALBUTEROL SULFATE 3 ML: .5; 3 SOLUTION RESPIRATORY (INHALATION) at 20:49

## 2017-06-17 RX ADMIN — HYDROMORPHONE HYDROCHLORIDE 1 MG: 1 INJECTION, SOLUTION INTRAMUSCULAR; INTRAVENOUS; SUBCUTANEOUS at 16:37

## 2017-06-17 RX ADMIN — HYDROMORPHONE HYDROCHLORIDE 1 MG: 1 INJECTION, SOLUTION INTRAMUSCULAR; INTRAVENOUS; SUBCUTANEOUS at 12:13

## 2017-06-17 RX ADMIN — CHLORHEXIDINE GLUCONATE 15 ML: 1.2 RINSE ORAL at 20:45

## 2017-06-17 RX ADMIN — OXYCODONE AND ACETAMINOPHEN 1 TABLET: 5; 325 TABLET ORAL at 16:37

## 2017-06-17 RX ADMIN — DEXMEDETOMIDINE HYDROCHLORIDE 1.4 MCG/KG/HR: 4 INJECTION, SOLUTION INTRAVENOUS at 12:13

## 2017-06-17 RX ADMIN — IPRATROPIUM BROMIDE AND ALBUTEROL SULFATE 3 ML: .5; 3 SOLUTION RESPIRATORY (INHALATION) at 08:56

## 2017-06-17 RX ADMIN — DOCUSATE SODIUM 100 MG: 100 CAPSULE, LIQUID FILLED ORAL at 08:11

## 2017-06-17 RX ADMIN — AMLODIPINE BESYLATE 5 MG: 5 TABLET ORAL at 08:11

## 2017-06-17 RX ADMIN — OXYCODONE AND ACETAMINOPHEN 1 TABLET: 5; 325 TABLET ORAL at 04:14

## 2017-06-17 RX ADMIN — ALPRAZOLAM 0.5 MG: 0.5 TABLET ORAL at 20:34

## 2017-06-17 RX ADMIN — OXYCODONE AND ACETAMINOPHEN 1 TABLET: 5; 325 TABLET ORAL at 08:11

## 2017-06-17 RX ADMIN — Medication: at 08:08

## 2017-06-17 RX ADMIN — HYDROMORPHONE HYDROCHLORIDE 1 MG: 1 INJECTION, SOLUTION INTRAMUSCULAR; INTRAVENOUS; SUBCUTANEOUS at 20:33

## 2017-06-17 RX ADMIN — OXYCODONE AND ACETAMINOPHEN 1 TABLET: 5; 325 TABLET ORAL at 20:34

## 2017-06-17 RX ADMIN — DOCUSATE SODIUM 100 MG: 100 CAPSULE, LIQUID FILLED ORAL at 18:06

## 2017-06-17 RX ADMIN — OXYCODONE AND ACETAMINOPHEN 1 TABLET: 5; 325 TABLET ORAL at 12:13

## 2017-06-17 RX ADMIN — Medication: at 21:08

## 2017-06-17 RX ADMIN — PIPERACILLIN AND TAZOBACTAM 2.25 G: 2; .25 INJECTION, POWDER, LYOPHILIZED, FOR SOLUTION INTRAVENOUS; PARENTERAL at 16:36

## 2017-06-17 RX ADMIN — DEXMEDETOMIDINE HYDROCHLORIDE 1.4 MCG/KG/HR: 4 INJECTION, SOLUTION INTRAVENOUS at 06:25

## 2017-06-17 RX ADMIN — PIPERACILLIN AND TAZOBACTAM 2.25 G: 2; .25 INJECTION, POWDER, LYOPHILIZED, FOR SOLUTION INTRAVENOUS; PARENTERAL at 00:13

## 2017-06-17 RX ADMIN — AZITHROMYCIN 500 MG: 500 INJECTION, POWDER, LYOPHILIZED, FOR SOLUTION INTRAVENOUS at 20:35

## 2017-06-17 RX ADMIN — AMITRIPTYLINE HYDROCHLORIDE 10 MG: 10 TABLET, FILM COATED ORAL at 20:34

## 2017-06-17 NOTE — PROGRESS NOTES
"   LOS: 5 days    Patient Care Team:  Neo Gresham DO as PCP - General (Family Medicine)    Chief Complaint: shortness of breath    Subjective     Interval History:   No acute events overnight. No new complaints.     Review of Systems:   No CP  No SOA.     Objective     Vital Sign Min/Max for last 24 hours  Temp  Min: 97.9 °F (36.6 °C)  Max: 99.6 °F (37.6 °C)   BP  Min: 94/51  Max: 186/88   Pulse  Min: 92  Max: 115   Resp  Min: 20  Max: 32   SpO2  Min: 93 %  Max: 100 %   Flow (L/min)  Min: 2  Max: 4   No Data Recorded     Flowsheet Rows         First Filed Value    Admission Height  61\" (154.9 cm) Documented at 06/13/2017 0525    Admission Weight  97 lb (44 kg) Documented at 06/13/2017 0525             I/O last 3 completed shifts:  In: 2021.6 [P.O.:480; I.V.:636.6; IV Piggyback:905]  Out: 3375 [Other:3375]    Physical Exam:     General Appearance:    Alert, in Mod distress.    Head:    Normocephalic, without obvious abnormality, atraumatic               Neck:   No adenopathy, supple, trachea midline, no thyromegaly, no     carotid bruit, no JVD       Lungs:     Clear to auscultation,respirations regular, even and                   unlabored    Heart:    Regular rhythm and normal rate, normal S1 and S2, no            murmur, no gallop, no rub, no click       Abdomen:     Normal bowel sounds, no masses, no organomegaly, soft        non-tender, non-distended, no guarding, no rebound                 tenderness       Extremities:   Moves all extremities well, no edema, no cyanosis, no              redness   Pulses:   Pulses palpable and equal bilaterally   Skin:   No bleeding, bruising or rash       Neurologic:   No focal deficit noted.        WBC WBC   Date Value Ref Range Status   06/16/2017 11.14 (H) 3.50 - 10.80 10*3/mm3 Final   06/15/2017 10.34 3.50 - 10.80 10*3/mm3 Final      HGB Hemoglobin   Date Value Ref Range Status   06/16/2017 12.1 11.5 - 15.5 g/dL Final   06/15/2017 11.7 11.5 - 15.5 g/dL Final      HCT " Hematocrit   Date Value Ref Range Status   06/16/2017 37.8 34.5 - 44.0 % Final   06/15/2017 38.2 34.5 - 44.0 % Final      Platlets No results found for: LABPLAT   MCV MCV   Date Value Ref Range Status   06/16/2017 89.6 80.0 - 99.0 fL Final   06/15/2017 91.2 80.0 - 99.0 fL Final          Sodium Sodium   Date Value Ref Range Status   06/16/2017 136 132 - 146 mmol/L Final   06/15/2017 132 132 - 146 mmol/L Final      Potassium Potassium   Date Value Ref Range Status   06/16/2017 4.3 3.5 - 5.5 mmol/L Final   06/15/2017 4.3 3.5 - 5.5 mmol/L Final   06/15/2017 5.6 (H) 3.5 - 5.5 mmol/L Final   06/15/2017 5.6 (H) 3.5 - 5.5 mmol/L Final     Comment:     Result checked      Chloride Chloride   Date Value Ref Range Status   06/16/2017 102 99 - 109 mmol/L Final   06/15/2017 102 99 - 109 mmol/L Final      CO2 CO2   Date Value Ref Range Status   06/16/2017 19.0 (L) 20.0 - 31.0 mmol/L Final   06/15/2017 20.0 20.0 - 31.0 mmol/L Final      BUN BUN   Date Value Ref Range Status   06/16/2017 43 (H) 9 - 23 mg/dL Final   06/15/2017 30 (H) 9 - 23 mg/dL Final      Creatinine Creatinine   Date Value Ref Range Status   06/16/2017 6.10 (H) 0.60 - 1.30 mg/dL Final   06/15/2017 4.80 (H) 0.60 - 1.30 mg/dL Final      Calcium Calcium   Date Value Ref Range Status   06/16/2017 7.3 (L) 8.7 - 10.4 mg/dL Final   06/15/2017 7.3 (L) 8.7 - 10.4 mg/dL Final      PO4 No results found for: CAPO4   Albumin Albumin   Date Value Ref Range Status   06/16/2017 3.40 3.20 - 4.80 g/dL Final      Magnesium Magnesium   Date Value Ref Range Status   06/15/2017 1.9 1.3 - 2.7 mg/dL Final      Uric Acid No results found for: URICACID        Results Review:     I reviewed the patient's new clinical results.      amitriptyline 10 mg Oral Nightly   amLODIPine 5 mg Oral Q24H   azithromycin 500 mg Intravenous Q24H   chlorhexidine 15 mL Mouth/Throat Q12H   docusate sodium 100 mg Oral BID   famotidine 20 mg Oral Daily   ipratropium-albuterol 3 mL Nebulization 4x Daily - RT    lidocaine 2 patch Transdermal Q24H   minoxidil 10 mg Oral Daily   mupirocin  Topical Q12H   nicotine 1 patch Transdermal Daily   piperacillin-tazobactam 2.25 g Intravenous Q8H   sennosides-docusate sodium 2 tablet Oral Nightly   vancomycin 500 mg Intravenous Once per day on Mon Wed Fri       dexmedetomidine 0.2-1.5 mcg/kg/hr Last Rate: 1.4 mcg/kg/hr (06/17/17 0625)   fentanyl 10 mcg/mL  mcg/hr Last Rate: Stopped (06/15/17 1644)   HYDROmorphone     niCARdipine 5-15 mg/hr Last Rate: 5 mg/hr (06/14/17 1225)   Pharmacy to dose vancomycin     propofol 5-100 mcg/kg/min Last Rate: Stopped (06/15/17 1419)       Medication Review:     Assessment/Plan     Principal Problem:    Loculated pleural effusion  Active Problems:    Tobacco use    Bipolar disorder    Depression    Hyperparathyroidism    Pneumonia of right lung due to infectious organism    ESRD (end stage renal disease) on dialysis    Renovascular hypertension    Hepatitis C antibody positive in blood    IV drug abuse    Anemia of ESRD    COPD exacerbation    Medical non-compliance      1- 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   3- Anemia of chronic disease. hgb at target   4- PNA with plueral loculation.   5- Volume overload.      Plan:  - HD per schedule.    - UF as tolerated.   - renal diet.   - Adjust meds per renal function  - Will try to wean off Minoxidil. ( pleural effussion). Continue with amlodipine 5 mg po daily. Once blood pressure is controlled. Will start weaning off.       I discussed the patients findings and my recommendations with SALINA Lane MD  06/17/17  8:23 AM

## 2017-06-17 NOTE — PROGRESS NOTES
Pharmacy Consult--Antimicrobial Dosing  Pt is a 25 yo female with pneumonia of RLL, loculated parapneumonic effusion, Hep C.  History of drug abuse.  Pharmacy consulted to dose vancomycin for pneumonia.  ID consulted    Consulting Provider: Hospitalist    Current Antimicrobial Therapy  1.  Azithromycin (Start Date:  6/11)  2.  Zosyn  (Start Date:  6/12)  3.  Vancomycin  (Start Date:  6/11)    Allergies:  Acetaminophen; Hydrocodone; Ibuprofen; Lortab [hydrocodone-acetaminophen]; and Ciprofloxacin    Labs  Results from last 7 days   Lab Units 06/16/17  0334 06/15/17  2354 06/15/17  1802 06/15/17  0322 06/14/17  0634 06/13/17  0637  06/11/17  1552   SODIUM mmol/L 136 --  --  132 137 138 < > 138   POTASSIUM mmol/L 4.3 4.3 5.6* 5.6* 4.2 4.4 < > 4.0   CHLORIDE mmol/L 102 --  --  102 101 99 < > 89*   TOTAL CO2 mmol/L 19.0* --  --  20.0 22.0 24.0 < > 28.0   BUN mg/dL 43* --  --  30* 46* 27* < > 38*   CREATININE mg/dL 6.10* --  --  4.80* 7.10* 5.80* < > 9.90*   CALCIUM mg/dL 7.3* --  --  7.3* 7.7* 8.1* < > 8.3*   BILIRUBIN mg/dL --  --  --  --  --  0.1* --  0.6   ALK PHOS U/L --  --  --  --  --  98 --  108   ALT (SGPT) U/L --  --  --  --  --  20 --  41   AST (SGOT) U/L --  --  --  --  --  20 --  40   GLUCOSE mg/dL 68* --  --  89 79 105* < > 92   < > = values in this interval not displayed.     Results from last 7 days   Lab Units 06/16/17  0334 06/15/17  0322 06/14/17  0634   WBC 10*3/mm3 11.14* 10.34 9.39   HEMOGLOBIN g/dL 12.1 11.7 12.3   HEMATOCRIT % 37.8 38.2 38.2   PLATELETS 10*3/mm3 212 210 226     Evaluation of Dosing  Weight:  44 kg  Goal Trough: 15-20    Estimated Creatinine Clearance: 9.7 mL/min (by C-G formula based on Cr of 6.1).    I/O last 3 completed shifts:  In: 2021.6 [P.O.:480; I.V.:636.6; IV Piggyback:905]  Out: 3375 [Other:3375]     Microbiology and Radiology  Microbiology Results (last 10 days)       Procedure Component Value - Date/Time      Anaerobic Culture [[id number]]  (Normal) Collected:  06/14/17  1058    Lab Status:  Preliminary result Specimen:  Tissue from Pleura Updated:  06/15/17 1207     Culture No anaerobes isolated    Tissue/Bone Culture [[id number]]  (Normal) Collected:  06/14/17 1058    Lab Status:  Preliminary result Specimen:  Tissue from Pleura Updated:  06/17/17 0635     Culture No growth at 3 days     Gram Stain Result Moderate (3+) WBCs seen      No organisms seen    AFB Culture [[id number]] Collected:  06/14/17 1058    Lab Status:  Preliminary result Specimen:  Tissue from Pleura Updated:  06/15/17 1403     AFB Stain No acid fast bacilli seen on concentrated smear    Anaerobic Culture [[id number]]  (Normal) Collected:  06/14/17 1057    Lab Status:  Preliminary result Specimen:  Body Fluid from Pleural Cavity Updated:  06/15/17 1209     Culture No anaerobes isolated    Body Fluid Culture [[id number]]  (Normal) Collected:  06/14/17 1057    Lab Status:  Preliminary result Specimen:  Body Fluid from Pleural Cavity Updated:  06/17/17 0637     BF Culture No growth at 3 days     Gram Stain Result Occasional WBCs seen      No organisms seen    AFB Culture [[id number]] Collected:  06/14/17 1057    Lab Status:  Preliminary result Specimen:  Body Fluid from Pleural Cavity Updated:  06/15/17 1405     AFB Stain No acid fast bacilli seen on concentrated smear    Respiratory Panel, PCR [[id number]]  (Abnormal) Collected:  06/13/17 1217    Lab Status:  Final result Specimen:  Swab from Nasopharynx Updated:  06/15/17 0615     Adenovirus Detection by PCR Not Detected     Coronavirus HKU1 Not Detected     Coronavirus NL63 Not Detected     Coronavirus 229E Not Detected     Coronavirus OC43 Not Detected     Human Metapneumovirus Not Detected     Human Rhinovirus/Enterovirus Detected (A)     Influenza A PCR Not Detected     Influenza A H1 Not Detected     Influenza 2009 H1N1 by PCR Not Detected     Influenza A H3 Not Detected     Influenza B PCR Not Detected     Parainfluenza Virus 1 Not Detected     Parainfluenza Virus  2 Not Detected     Parainfluenza Virus 3 Not Detected     Parainfluenza Virus 4 Not Detected     Respiratory Syncytial Virus Not Detected     Bordetella pertussis pcr Not Detected     Chlamydophila pneumoniae PCR Not Detected     Mycoplasma pneumo by PCR Not Detected    Narrative:       Performed at:  [address] [place] [address]  [id number]  : Kavin Haskins MD, Phone:  [phone]    Body Fluid Culture [[id number]]  (Normal) Collected:  06/12/17 1158    Lab Status:  Final result Specimen:  Body Fluid from Pleural Cavity Updated:  06/16/17 0753     BF Culture No growth    Anaerobic Culture [[id number]]  (Normal) Collected:  06/12/17 1157    Lab Status:  Final result Specimen:  Pleural Fluid from Pleural Cavity Updated:  06/16/17 0754     Culture No growth    VRE Culture [[id number]]  (Normal) Collected:  06/11/17 2311    Lab Status:  Final result Specimen:  Swab from Per Rectum Updated:  06/15/17 0803     VRE SCREEN CX No Vancomycin Resistant Enterococcus Isolated    Acinetobacter Screen [[id number]]  (Normal) Collected:  06/11/17 2310    Lab Status:  Final result Specimen:  Swab from Axilla, Left Updated:  06/14/17 0616     ACINETOBACTER SCREEN CX No growth    MRSA Screen Culture [[id number]]  (Abnormal) Collected:  06/11/17 2310    Lab Status:  Final result Specimen:  Swab from Nares Updated:  06/15/17 0822     MRSA SCREEN CX Staphylococcus aureus, MRSA (C)        Methicillin resistant Staphylococcus aureus, Patient may be an isolation risk.       Respiratory Culture [[id number]]  (Abnormal)  (Susceptibility) Collected:  06/11/17 1726    Lab Status:  Final result Specimen:  Sputum from Cough Updated:  06/15/17 0823     Respiratory Culture Moderate growth (3+) Staphylococcus aureus, MRSA (C)      STEVE to follow.    Methicillin resistant Staphylococcus aureus, Patient may be an isolation risk.        Gram Stain Result Greater than 20 WBCs seen      Less than 10 Epithelial cells  seen      Few (2+) Gram positive cocci in pairs, chains and clusters      Few (2+) Gram positive bacilli    Susceptibility        Staphylococcus aureus, MRSA     STEVE     Amoxicillin + Clavulanate <=4/2 ug/ml Resistant     Ampicillin >8 ug/ml Resistant     Ampicillin + Sulbactam <=8/4 ug/ml Resistant     Cefazolin <=4 ug/ml Resistant     Ciprofloxacin <=1 ug/ml Susceptible     Clindamycin <=0.5 ug/ml Susceptible     Erythromycin >4 ug/ml Resistant     Gentamicin <=4 ug/ml Susceptible     Levofloxacin <=1 ug/ml Susceptible  [1]      Linezolid 4 ug/ml Susceptible     Moxifloxacin <=0.5 ug/ml Susceptible     Oxacillin >2 ug/ml Resistant     Penicillin G >8 ug/ml Resistant     Quinupristin + Dalfopristin <=1 ug/ml Susceptible     Rifampin <=1 ug/ml Susceptible     Tetracycline <=4 ug/ml Susceptible     Trimethoprim + Sulfamethoxazole <=0.5/9.5 ug/ml Susceptible     Vancomycin 2 ug/ml Susceptible              [1]   Staphylococcus species may develop resistance during prolonged therapy with quinolones.  Isolates that are initially susceptible may become resistant within three to four days after initiation of therapy. Testing of repeat isolates may be warranted.                   Blood Culture [[id number]]  (Normal) Collected:  06/11/17 1604    Lab Status:  Final result Specimen:  Blood from Hand, Left Updated:  06/16/17 1701     Blood Culture No growth at 5 days    Blood Culture [[id number]]  (Normal) Collected:  06/11/17 1552    Lab Status:  Final result Specimen:  Blood from Arm, Left Updated:  06/16/17 1701     Blood Culture No growth at 5 days          Evaluation of Level    Lab Results   Component Value Date    [facility] 17 (C) 05/09/2016    VANCORANDOM 25.80 06/16/2017     Received the following vancomycin doses:  Vancomycin 1000 mg IV 6/11 at 1725  Vancomycin   750 mg IV 6/13 at 1144  Vancomycin   500 mg IV 6/16 at 1148    Plan:  1.  Agree with initiation of vancomycin 500mg IV post HD on MWF.  Continue this  dose.  2.  Would assess trough value on Wednesday or Friday of next week to assess appropriateness of current dosing.  3.  Pharmacy will continue to follow and adjust dose based on renal function, drug levels, and clinical status.    Thanks,  Solo JessicaD, BCPS  #[phone]  06/17/17  9:14 AM

## 2017-06-17 NOTE — PLAN OF CARE
Problem: Patient Care Overview (Adult)  Goal: Plan of Care Review  Outcome: Ongoing (interventions implemented as appropriate)    06/17/17 5550   Coping/Psychosocial Response Interventions   Plan Of Care Reviewed With patient   Outcome Evaluation   Outcome Summary/Follow up Plan Pt refused to get to the chair today, and refused to ambulate. Pt frequently c/o pain despite PCA use and prn meds. Pt is forgetful and mood is labile today.          Goal: Adult Individualization and Mutuality  Outcome: Ongoing (interventions implemented as appropriate)  Goal: Discharge Needs Assessment  Outcome: Ongoing (interventions implemented as appropriate)    Problem: Respiratory Insufficiency (Adult)  Goal: Acid/Base Balance  Outcome: Ongoing (interventions implemented as appropriate)  Goal: Effective Ventilation  Outcome: Ongoing (interventions implemented as appropriate)    Problem: Perioperative Period (Adult)  Goal: Signs and Symptoms of Listed Potential Problems Will be Absent or Manageable (Perioperative Period)  Outcome: Ongoing (interventions implemented as appropriate)    Problem: Lung Surgery (via Thoracotomy) (Adult)  Goal: Signs and Symptoms of Listed Potential Problems Will be Absent or Manageable (Lung Surgery)  Outcome: Ongoing (interventions implemented as appropriate)    Problem: Pain, Acute (Adult)  Goal: Acceptable Pain Control/Comfort Level  Outcome: Ongoing (interventions implemented as appropriate)    Problem: Pressure Ulcer Risk (Heri Scale) (Adult,Obstetrics,Pediatric)  Goal: Skin Integrity  Outcome: Ongoing (interventions implemented as appropriate)

## 2017-06-17 NOTE — PLAN OF CARE
Problem: Patient Care Overview (Adult)  Goal: Plan of Care Review  Outcome: Ongoing (interventions implemented as appropriate)    06/16/17 2000 06/17/17 0542   Coping/Psychosocial Response Interventions   Plan Of Care Reviewed With patient --    Patient Care Overview   Progress --  no change   Outcome Evaluation   Outcome Summary/Follow up Plan --  pain under better control tonight, although patient only has weak cough and needs to be encouraged frequently to cough/deep breathe/use incentive spirometer       Goal: Adult Individualization and Mutuality  Outcome: Ongoing (interventions implemented as appropriate)  Goal: Discharge Needs Assessment  Outcome: Ongoing (interventions implemented as appropriate)    Problem: Respiratory Insufficiency (Adult)  Goal: Acid/Base Balance  Outcome: Ongoing (interventions implemented as appropriate)  Goal: Effective Ventilation  Outcome: Ongoing (interventions implemented as appropriate)    Problem: Perioperative Period (Adult)  Goal: Signs and Symptoms of Listed Potential Problems Will be Absent or Manageable (Perioperative Period)  Outcome: Ongoing (interventions implemented as appropriate)    Problem: Lung Surgery (via Thoracotomy) (Adult)  Goal: Signs and Symptoms of Listed Potential Problems Will be Absent or Manageable (Lung Surgery)  Outcome: Ongoing (interventions implemented as appropriate)    Problem: Pain, Acute (Adult)  Goal: Acceptable Pain Control/Comfort Level  Outcome: Ongoing (interventions implemented as appropriate)    Problem: Pressure Ulcer Risk (Heri Scale) (Adult,Obstetrics,Pediatric)  Goal: Skin Integrity  Outcome: Ongoing (interventions implemented as appropriate)

## 2017-06-17 NOTE — PROGRESS NOTES
"INTENSIVIST NOTE     Hospital Day: 5      Ms. Mandy Espino, 26 y.o. female is followed for:   Loculated pleural effusion       SUBJECTIVE     26-year-old woman, current smoker, IV drug abuser with hepatitis C and end-stage renal disease presenting with a loculated parapneumonic effusion. June 14 she underwent thoracotomy with decortication.    Interval history:    Complaining that her pain is not controlled.    The patient's relevant past medical, surgical and social history were reviewed and updated in Epic as appropriate.       OBJECTIVE     Vital Sign Min/Max for last 24 hours  Temp  Min: 97.9 °F (36.6 °C)  Max: 99.6 °F (37.6 °C)   BP  Min: 94/51  Max: 165/91   Pulse  Min: 87  Max: 115   Resp  Min: 18  Max: 32   SpO2  Min: 93 %  Max: 100 %   Flow (L/min)  Min: 4  Max: 4   No Data Recorded      Intake/Output Summary (Last 24 hours) at 06/17/17 1400  Last data filed at 06/17/17 1100   Gross per 24 hour   Intake           1503.6 ml   Output               40 ml   Net           1463.6 ml      Flowsheet Rows         First Filed Value    Admission Height  61\" (154.9 cm) Documented at 06/13/2017 0525    Admission Weight  97 lb (44 kg) Documented at 06/13/2017 0525         Last 3 weights    06/13/17  0525 06/14/17  0958 06/14/17  1704   Weight: 97 lb (44 kg) 97 lb (44 kg) 97 lb (44 kg)            Objective:  General Appearance:  In no acute distress.    Vital signs: (most recent): Blood pressure 104/48, pulse 90, temperature 98.2 °F (36.8 °C), temperature source Oral, resp. rate 22, height 61\" (154.9 cm), weight 97 lb (44 kg), SpO2 97 %, not currently breastfeeding.    HEENT: Normal HEENT exam.    Lungs:  Normal respiratory rate and normal effort.  She is not in respiratory distress.  There are decreased breath sounds.  No wheezes, rales or rhonchi.    Heart: Normal rate.  Regular rhythm.  S1 normal and S2 normal.  No murmur, gallop or friction rub.   Chest: Symmetric chest wall expansion. (Right chest tube ×2.  No " drainage or air leak of significance)  Abdomen: Abdomen is soft and non-distended.  Bowel sounds are normal.   There is no abdominal tenderness.   There is no mass. There is no splenomegaly. There is no hepatomegaly.   Extremities: There is no deformity or dependent edema.    Neurological: Patient is alert and oriented to person, place and time.    Pupils:  Pupils are equal, round, and reactive to light.    Skin:  Warm and dry.              I reviewed the patient's new clinical results.  I reviewed the patient's new imaging results/reports including actual images and agree with reports.      Chest X-Ray:  Chest tubes ×2 on the right.  Very small apical pneumothorax.  Worsening left effusion.    INFUSIONS    dexmedetomidine 0.2-1.5 mcg/kg/hr Last Rate: 1.4 mcg/kg/hr (06/17/17 1213)   HYDROmorphone     niCARdipine 5-15 mg/hr Last Rate: 5 mg/hr (06/14/17 1225)   Pharmacy to dose vancomycin           Results from last 7 days  Lab Units 06/16/17  0334 06/15/17  0322 06/14/17  0634   WBC 10*3/mm3 11.14* 10.34 9.39   HEMOGLOBIN g/dL 12.1 11.7 12.3   HEMATOCRIT % 37.8 38.2 38.2   PLATELETS 10*3/mm3 212 210 226       Results from last 7 days  Lab Units 06/16/17  0334 06/15/17  2354 06/15/17  1802 06/15/17  0322 06/14/17  0634   SODIUM mmol/L 136  --   --  132 137   POTASSIUM mmol/L 4.3 4.3 5.6* 5.6* 4.2   CHLORIDE mmol/L 102  --   --  102 101   TOTAL CO2 mmol/L 19.0*  --   --  20.0 22.0   BUN mg/dL 43*  --   --  30* 46*   GLUCOSE mg/dL 68*  --   --  89 79   CREATININE mg/dL 6.10*  --   --  4.80* 7.10*   MAGNESIUM mg/dL  --   --   --  1.9  --    CALCIUM mg/dL 7.3*  --   --  7.3* 7.7*           Results from last 7 days  Lab Units 06/15/17  0340 06/14/17  1305 06/11/17  1803   PH, ARTERIAL pH units 7.416 7.262* 7.436   PCO2, ARTERIAL mm Hg 31.2* 53.9* 40.7   PO2 ART mm Hg 119.0* 294.0* 61.6*   FIO2 % 30 70 21         [facility] Ventilation:      I reviewed the patient's medications.    Assessment/Plan   ASSESSMENT      Hospital  Problem List     * (Principal)Loculated pleural effusion    Pneumonia of right lung due to infectious organism    Tobacco use    Bipolar disorder    Depression    Hyperparathyroidism    ESRD (end stage renal disease) on dialysis    Renovascular hypertension    Hepatitis C antibody positive in blood    Overview Signed 6/12/2017 12:47 PM by REY Zhou     Has not followed up with ID          IV drug abuse    Anemia of ESRD    COPD exacerbation    Medical non-compliance            Pain control a challenge due to her chronic narcotic use history.  This morning when I came in and she was asleep and then when I woke her she complained of intractable pain.  She has developed increasing left pleural effusion.  I suspect this is related to volume given her ESRD and her insistence of laying on her left side exclusively.          PLAN     -Best efforts at pain control.  Currently Dilaudid PCA and by mouth Percocet   -Lidoderm   -Continue antibiotics per ID   -Continue to encourage mobilization and pulmonary toilet   -Monitor left pleural effusion.    -Dialysis Monday Wednesday Friday            I discussed the patient's findings and my recommendations with patient and nursing staff     Plan of care and goals reviewed with mulitdisciplinary team at daily rounds.    Altaf Diaz MD  Pulmonary and Critical Care Medicine  06/17/17 2:00 PM

## 2017-06-17 NOTE — PROGRESS NOTES
"   LOS: 5 days   Patient Care Team:  Neo Gresham DO as PCP - General (Family Medicine)    Subjective alert no complaints    Objective    Vital Sign Min/Max for last 24 hours  Temp  Min: 97.9 °F (36.6 °C)  Max: 99.6 °F (37.6 °C)   BP  Min: 94/51  Max: 186/88   Pulse  Min: 92  Max: 115   Resp  Min: 20  Max: 32   SpO2  Min: 93 %  Max: 100 %   Flow (L/min)  Min: 2  Max: 4   No Data Recorded     Flowsheet Rows         First Filed Value    Admission Height  61\" (154.9 cm) Documented at 06/13/2017 0525    Admission Weight  97 lb (44 kg) Documented at 06/13/2017 0525          Physical Exam:    Wound:    Pulses:     Mediastinal and Chest Tube Drainage:No air leak   Results Review:     Results from last 7 days  Lab Units 06/16/17  0334   WBC 10*3/mm3 11.14*   HEMOGLOBIN g/dL 12.1   HEMATOCRIT % 37.8   PLATELETS 10*3/mm3 212       Results from last 7 days  Lab Units 06/16/17  0334   SODIUM mmol/L 136   POTASSIUM mmol/L 4.3   CHLORIDE mmol/L 102   TOTAL CO2 mmol/L 19.0*   BUN mg/dL 43*   CREATININE mg/dL 6.10*   GLUCOSE mg/dL 68*   CALCIUM mg/dL 7.3*       Results from last 7 days  Lab Units 06/15/17  0340   PH, ARTERIAL pH units 7.416   PO2 ART mm Hg 119.0*   PCO2, ARTERIAL mm Hg 31.2*   HCO3 ART mmol/L 20.0         Assessment    Principal Problem:    Loculated pleural effusion  Active Problems:    Tobacco use    Bipolar disorder    Depression    Hyperparathyroidism    Pneumonia of right lung due to infectious organism    ESRD (end stage renal disease) on dialysis    Renovascular hypertension    Hepatitis C antibody positive in blood    IV drug abuse    Anemia of ESRD    COPD exacerbation    Medical non-compliance      Hopefully can DC chest tube in a day or 2.        Shawn Hicks MD  06/17/17  7:46 AM      Please note that portions of this note were completed with a voice recognition program. Efforts were made to edit the dictations, but words may be mistranscribed  "

## 2017-06-18 LAB
BACTERIA FLD CULT: NORMAL
BACTERIA SPEC AEROBE CULT: NORMAL
GRAM STN SPEC: NORMAL

## 2017-06-18 PROCEDURE — 25010000002 HYDRALAZINE PER 20 MG: Performed by: NURSE PRACTITIONER

## 2017-06-18 PROCEDURE — 94799 UNLISTED PULMONARY SVC/PX: CPT

## 2017-06-18 PROCEDURE — 94640 AIRWAY INHALATION TREATMENT: CPT

## 2017-06-18 PROCEDURE — 25010000002 AZITHROMYCIN: Performed by: PHYSICIAN ASSISTANT

## 2017-06-18 PROCEDURE — 93005 ELECTROCARDIOGRAM TRACING: CPT | Performed by: NURSE PRACTITIONER

## 2017-06-18 PROCEDURE — 94760 N-INVAS EAR/PLS OXIMETRY 1: CPT

## 2017-06-18 PROCEDURE — 93010 ELECTROCARDIOGRAM REPORT: CPT | Performed by: INTERNAL MEDICINE

## 2017-06-18 PROCEDURE — 99233 SBSQ HOSP IP/OBS HIGH 50: CPT | Performed by: INTERNAL MEDICINE

## 2017-06-18 PROCEDURE — 25010000002 HYDRALAZINE PER 20 MG: Performed by: PHYSICIAN ASSISTANT

## 2017-06-18 PROCEDURE — 25010000002 PIPERACILLIN-TAZOBACTAM

## 2017-06-18 PROCEDURE — 25010000002 HYDROMORPHONE PER 4 MG: Performed by: FAMILY MEDICINE

## 2017-06-18 RX ORDER — PANTOPRAZOLE SODIUM 20 MG/1
20 TABLET, DELAYED RELEASE ORAL DAILY
COMMUNITY
End: 2017-06-23 | Stop reason: HOSPADM

## 2017-06-18 RX ORDER — ALPRAZOLAM 0.5 MG/1
0.5 TABLET ORAL EVERY 6 HOURS PRN
Status: DISCONTINUED | OUTPATIENT
Start: 2017-06-18 | End: 2017-06-20

## 2017-06-18 RX ORDER — HYDRALAZINE HYDROCHLORIDE 20 MG/ML
10 INJECTION INTRAMUSCULAR; INTRAVENOUS ONCE
Status: COMPLETED | OUTPATIENT
Start: 2017-06-18 | End: 2017-06-18

## 2017-06-18 RX ORDER — OXYCODONE AND ACETAMINOPHEN 10; 325 MG/1; MG/1
1 TABLET ORAL EVERY 4 HOURS PRN
Status: DISCONTINUED | OUTPATIENT
Start: 2017-06-18 | End: 2017-06-21

## 2017-06-18 RX ORDER — DEXMEDETOMIDINE HYDROCHLORIDE 4 UG/ML
.2-1.5 INJECTION, SOLUTION INTRAVENOUS
Status: DISCONTINUED | OUTPATIENT
Start: 2017-06-18 | End: 2017-06-19

## 2017-06-18 RX ADMIN — MINOXIDIL 5 MG: 2.5 TABLET ORAL at 08:44

## 2017-06-18 RX ADMIN — IPRATROPIUM BROMIDE AND ALBUTEROL SULFATE 3 ML: .5; 3 SOLUTION RESPIRATORY (INHALATION) at 13:01

## 2017-06-18 RX ADMIN — OXYCODONE AND ACETAMINOPHEN 1 TABLET: 5; 325 TABLET ORAL at 01:57

## 2017-06-18 RX ADMIN — HYDROMORPHONE HYDROCHLORIDE 1 MG: 1 INJECTION, SOLUTION INTRAMUSCULAR; INTRAVENOUS; SUBCUTANEOUS at 10:09

## 2017-06-18 RX ADMIN — DEXMEDETOMIDINE HYDROCHLORIDE 1.4 MCG/KG/HR: 4 INJECTION, SOLUTION INTRAVENOUS at 02:42

## 2017-06-18 RX ADMIN — OXYCODONE HYDROCHLORIDE AND ACETAMINOPHEN 1 TABLET: 10; 325 TABLET ORAL at 14:12

## 2017-06-18 RX ADMIN — Medication: at 08:13

## 2017-06-18 RX ADMIN — OXYCODONE HYDROCHLORIDE AND ACETAMINOPHEN 1 TABLET: 10; 325 TABLET ORAL at 17:53

## 2017-06-18 RX ADMIN — IPRATROPIUM BROMIDE AND ALBUTEROL SULFATE 3 ML: .5; 3 SOLUTION RESPIRATORY (INHALATION) at 07:59

## 2017-06-18 RX ADMIN — METOPROLOL TARTRATE 25 MG: 25 TABLET ORAL at 22:09

## 2017-06-18 RX ADMIN — ALPRAZOLAM 0.5 MG: 0.5 TABLET ORAL at 08:48

## 2017-06-18 RX ADMIN — IPRATROPIUM BROMIDE AND ALBUTEROL SULFATE 3 ML: .5; 3 SOLUTION RESPIRATORY (INHALATION) at 16:25

## 2017-06-18 RX ADMIN — OXYCODONE HYDROCHLORIDE AND ACETAMINOPHEN 1 TABLET: 10; 325 TABLET ORAL at 22:09

## 2017-06-18 RX ADMIN — Medication: at 19:46

## 2017-06-18 RX ADMIN — OXYCODONE AND ACETAMINOPHEN 1 TABLET: 5; 325 TABLET ORAL at 10:09

## 2017-06-18 RX ADMIN — HYDROMORPHONE HYDROCHLORIDE 1 MG: 1 INJECTION, SOLUTION INTRAMUSCULAR; INTRAVENOUS; SUBCUTANEOUS at 06:00

## 2017-06-18 RX ADMIN — HYDROMORPHONE HYDROCHLORIDE 1 MG: 1 INJECTION, SOLUTION INTRAMUSCULAR; INTRAVENOUS; SUBCUTANEOUS at 01:57

## 2017-06-18 RX ADMIN — OXYCODONE AND ACETAMINOPHEN 1 TABLET: 5; 325 TABLET ORAL at 06:00

## 2017-06-18 RX ADMIN — HYDROMORPHONE HYDROCHLORIDE 1 MG: 1 INJECTION, SOLUTION INTRAMUSCULAR; INTRAVENOUS; SUBCUTANEOUS at 22:09

## 2017-06-18 RX ADMIN — AMLODIPINE BESYLATE 5 MG: 5 TABLET ORAL at 08:44

## 2017-06-18 RX ADMIN — HYDROMORPHONE HYDROCHLORIDE 1 MG: 1 INJECTION, SOLUTION INTRAMUSCULAR; INTRAVENOUS; SUBCUTANEOUS at 17:53

## 2017-06-18 RX ADMIN — NICOTINE 1 PATCH: 21 PATCH, EXTENDED RELEASE TRANSDERMAL at 08:47

## 2017-06-18 RX ADMIN — PIPERACILLIN AND TAZOBACTAM 2.25 G: 2; .25 INJECTION, POWDER, LYOPHILIZED, FOR SOLUTION INTRAVENOUS; PARENTERAL at 18:15

## 2017-06-18 RX ADMIN — HYDROMORPHONE HYDROCHLORIDE 1 MG: 1 INJECTION, SOLUTION INTRAMUSCULAR; INTRAVENOUS; SUBCUTANEOUS at 14:12

## 2017-06-18 RX ADMIN — PIPERACILLIN AND TAZOBACTAM 2.25 G: 2; .25 INJECTION, POWDER, LYOPHILIZED, FOR SOLUTION INTRAVENOUS; PARENTERAL at 01:57

## 2017-06-18 RX ADMIN — HYDRALAZINE HYDROCHLORIDE 10 MG: 20 INJECTION INTRAMUSCULAR; INTRAVENOUS at 18:34

## 2017-06-18 RX ADMIN — DEXMEDETOMIDINE HYDROCHLORIDE 1.4 MCG/KG/HR: 4 INJECTION, SOLUTION INTRAVENOUS at 08:13

## 2017-06-18 RX ADMIN — PIPERACILLIN AND TAZOBACTAM 2.25 G: 2; .25 INJECTION, POWDER, LYOPHILIZED, FOR SOLUTION INTRAVENOUS; PARENTERAL at 08:44

## 2017-06-18 RX ADMIN — FAMOTIDINE 20 MG: 20 TABLET ORAL at 08:44

## 2017-06-18 RX ADMIN — HYDRALAZINE HYDROCHLORIDE 10 MG: 20 INJECTION INTRAMUSCULAR; INTRAVENOUS at 18:13

## 2017-06-18 RX ADMIN — ALPRAZOLAM 0.5 MG: 0.5 TABLET ORAL at 14:12

## 2017-06-18 RX ADMIN — MUPIROCIN: 20 OINTMENT TOPICAL at 20:22

## 2017-06-18 RX ADMIN — ALPRAZOLAM 0.5 MG: 0.5 TABLET ORAL at 20:22

## 2017-06-18 RX ADMIN — AMITRIPTYLINE HYDROCHLORIDE 10 MG: 10 TABLET, FILM COATED ORAL at 20:22

## 2017-06-18 RX ADMIN — AZITHROMYCIN 500 MG: 500 INJECTION, POWDER, LYOPHILIZED, FOR SOLUTION INTRAVENOUS at 20:22

## 2017-06-18 RX ADMIN — IPRATROPIUM BROMIDE AND ALBUTEROL SULFATE 3 ML: .5; 3 SOLUTION RESPIRATORY (INHALATION) at 19:40

## 2017-06-18 NOTE — PROGRESS NOTES
"   LOS: 6 days    Patient Care Team:  Neo Gresham DO as PCP - General (Family Medicine)    Chief Complaint: shortness of breath    Subjective     Interval History:   No acute events overnight. No new complaints.     Review of Systems:   No CP  No SOA.     Objective     Vital Sign Min/Max for last 24 hours  Temp  Min: 97.9 °F (36.6 °C)  Max: 98.2 °F (36.8 °C)   BP  Min: 98/52  Max: 194/104   Pulse  Min: 86  Max: 108   Resp  Min: 18  Max: 28   SpO2  Min: 93 %  Max: 100 %   Flow (L/min)  Min: 4  Max: 4   No Data Recorded     Flowsheet Rows         First Filed Value    Admission Height  61\" (154.9 cm) Documented at 06/13/2017 0525    Admission Weight  97 lb (44 kg) Documented at 06/13/2017 0525             I/O last 3 completed shifts:  In: 2904.6 [P.O.:1200; I.V.:804.6; IV Piggyback:900]  Out: 40 [Other:40]    Physical Exam:     General Appearance:    Alert, in Mod distress.    Head:    Normocephalic, without obvious abnormality, atraumatic               Neck:   No adenopathy, supple, trachea midline, no thyromegaly, no     carotid bruit, no JVD       Lungs:     Clear to auscultation,respirations regular, even and                   unlabored    Heart:    Regular rhythm and normal rate, normal S1 and S2, no            murmur, no gallop, no rub, no click       Abdomen:     Normal bowel sounds, no masses, no organomegaly, soft        non-tender, non-distended, no guarding, no rebound                 tenderness       Extremities:   Moves all extremities well, no edema, no cyanosis, no              redness   Pulses:   Pulses palpable and equal bilaterally   Skin:   No bleeding, bruising or rash       Neurologic:   No focal deficit noted.        WBC WBC   Date Value Ref Range Status   06/16/2017 11.14 (H) 3.50 - 10.80 10*3/mm3 Final      HGB Hemoglobin   Date Value Ref Range Status   06/16/2017 12.1 11.5 - 15.5 g/dL Final      HCT Hematocrit   Date Value Ref Range Status   06/16/2017 37.8 34.5 - 44.0 % Final      Platlets " No results found for: LABPLAT   MCV MCV   Date Value Ref Range Status   06/16/2017 89.6 80.0 - 99.0 fL Final          Sodium Sodium   Date Value Ref Range Status   06/16/2017 136 132 - 146 mmol/L Final      Potassium Potassium   Date Value Ref Range Status   06/16/2017 4.3 3.5 - 5.5 mmol/L Final   06/15/2017 4.3 3.5 - 5.5 mmol/L Final   06/15/2017 5.6 (H) 3.5 - 5.5 mmol/L Final      Chloride Chloride   Date Value Ref Range Status   06/16/2017 102 99 - 109 mmol/L Final      CO2 CO2   Date Value Ref Range Status   06/16/2017 19.0 (L) 20.0 - 31.0 mmol/L Final      BUN BUN   Date Value Ref Range Status   06/16/2017 43 (H) 9 - 23 mg/dL Final      Creatinine Creatinine   Date Value Ref Range Status   06/16/2017 6.10 (H) 0.60 - 1.30 mg/dL Final      Calcium Calcium   Date Value Ref Range Status   06/16/2017 7.3 (L) 8.7 - 10.4 mg/dL Final      PO4 No results found for: CAPO4   Albumin Albumin   Date Value Ref Range Status   06/16/2017 3.40 3.20 - 4.80 g/dL Final      Magnesium No results found for: MG   Uric Acid No results found for: URICACID        Results Review:     I reviewed the patient's new clinical results.      amitriptyline 10 mg Oral Nightly   amLODIPine 5 mg Oral Q24H   azithromycin 500 mg Intravenous Q24H   chlorhexidine 15 mL Mouth/Throat Q12H   docusate sodium 100 mg Oral BID   famotidine 20 mg Oral Daily   ipratropium-albuterol 3 mL Nebulization 4x Daily - RT   lidocaine 2 patch Transdermal Q24H   minoxidil 5 mg Oral Daily   mupirocin  Topical Q12H   nicotine 1 patch Transdermal Daily   piperacillin-tazobactam 2.25 g Intravenous Q8H   sennosides-docusate sodium 2 tablet Oral Nightly   vancomycin 500 mg Intravenous Once per day on Mon Wed Fri       dexmedetomidine 0.2-1.5 mcg/kg/hr Last Rate: 1.4 mcg/kg/hr (06/18/17 1472)   HYDROmorphone     niCARdipine 5-15 mg/hr Last Rate: 5 mg/hr (06/14/17 1225)   Pharmacy to dose vancomycin         Medication Review:     Assessment/Plan     Principal Problem:     Loculated pleural effusion  Active Problems:    Tobacco use    Bipolar disorder    Depression    Hyperparathyroidism    Pneumonia of right lung due to infectious organism    ESRD (end stage renal disease) on dialysis    Renovascular hypertension    Hepatitis C antibody positive in blood    IV drug abuse    Anemia of ESRD    COPD exacerbation    Medical non-compliance      1- 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   3- Anemia of chronic disease. hgb at target   4- PNA with plueral loculation.   5- Volume overload.      Plan:  - HD per schedule.  HD tomorrow  - UF as tolerated.   - renal diet.   - Adjust meds per renal function  - Will try to wean off Minoxidil. ( pleural effussion). Continue with amlodipine 5 mg po daily.    I discussed the patients findings and my recommendations with SALINA Lane MD  06/18/17  7:19 AM

## 2017-06-18 NOTE — PROGRESS NOTES
"INTENSIVIST NOTE     Hospital Day: 6      Ms. Mandy Espino, 26 y.o. female is followed for:   Loculated pleural effusion       SUBJECTIVE     26-year-old woman, current smoker, IV drug abuser with hepatitis C and end-stage renal disease presenting with a loculated parapneumonic effusion. June 14 she underwent thoracotomy with decortication.    Interval history:    Chest tubes out this morning.  Patient still complaining of significant pain and won't get out of bed.    The patient's relevant past medical, surgical and social history were reviewed and updated in Epic as appropriate.       OBJECTIVE     Vital Sign Min/Max for last 24 hours  Temp  Min: 97.9 °F (36.6 °C)  Max: 98.7 °F (37.1 °C)   BP  Min: 105/57  Max: 194/104   Pulse  Min: 86  Max: 108   Resp  Min: 18  Max: 28   SpO2  Min: 93 %  Max: 99 %   Flow (L/min)  Min: 4  Max: 4   No Data Recorded      Intake/Output Summary (Last 24 hours) at 06/18/17 1257  Last data filed at 06/18/17 1000   Gross per 24 hour   Intake             2216 ml   Output               20 ml   Net             2196 ml      Flowsheet Rows         First Filed Value    Admission Height  61\" (154.9 cm) Documented at 06/13/2017 0525    Admission Weight  97 lb (44 kg) Documented at 06/13/2017 0525         Last 3 weights    06/13/17  0525 06/14/17  0958 06/14/17  1704   Weight: 97 lb (44 kg) 97 lb (44 kg) 97 lb (44 kg)            Objective:  General Appearance:  In no acute distress.    Vital signs: (most recent): Blood pressure 135/72, pulse 87, temperature 98.7 °F (37.1 °C), temperature source Oral, resp. rate 22, height 61\" (154.9 cm), weight 97 lb (44 kg), SpO2 96 %, not currently breastfeeding.    HEENT: Normal HEENT exam.    Lungs:  Normal respiratory rate and normal effort.  She is not in respiratory distress.  There are decreased breath sounds.  No wheezes, rales or rhonchi.    Heart: Normal rate.  Regular rhythm.  S1 normal and S2 normal.  No murmur, gallop or friction rub. "   Chest: Symmetric chest wall expansion. (Right chest tube ×2.  No drainage or air leak of significance)  Abdomen: Abdomen is soft and non-distended.  Bowel sounds are normal.   There is no abdominal tenderness.   There is no mass. There is no splenomegaly. There is no hepatomegaly.   Extremities: There is no deformity or dependent edema.    Neurological: Patient is alert and oriented to person, place and time.    Pupils:  Pupils are equal, round, and reactive to light.    Skin:  Warm and dry.              I reviewed the patient's new clinical results.  I reviewed the patient's new imaging results/reports including actual images and agree with reports.      Chest X-Ray: No additional    INFUSIONS    HYDROmorphone    Pharmacy to dose vancomycin          Results from last 7 days  Lab Units 06/16/17  0334 06/15/17  0322 06/14/17  0634   WBC 10*3/mm3 11.14* 10.34 9.39   HEMOGLOBIN g/dL 12.1 11.7 12.3   HEMATOCRIT % 37.8 38.2 38.2   PLATELETS 10*3/mm3 212 210 226       Results from last 7 days  Lab Units 06/16/17  0334 06/15/17  2354 06/15/17  1802 06/15/17  0322 06/14/17  0634   SODIUM mmol/L 136  --   --  132 137   POTASSIUM mmol/L 4.3 4.3 5.6* 5.6* 4.2   CHLORIDE mmol/L 102  --   --  102 101   TOTAL CO2 mmol/L 19.0*  --   --  20.0 22.0   BUN mg/dL 43*  --   --  30* 46*   GLUCOSE mg/dL 68*  --   --  89 79   CREATININE mg/dL 6.10*  --   --  4.80* 7.10*   MAGNESIUM mg/dL  --   --   --  1.9  --    CALCIUM mg/dL 7.3*  --   --  7.3* 7.7*           Results from last 7 days  Lab Units 06/15/17  0340 06/14/17  1305 06/11/17  1803   PH, ARTERIAL pH units 7.416 7.262* 7.436   PCO2, ARTERIAL mm Hg 31.2* 53.9* 40.7   PO2 ART mm Hg 119.0* 294.0* 61.6*   FIO2 % 30 70 21         [facility] Ventilation:      I reviewed the patient's medications.    Assessment/Plan   ASSESSMENT      Hospital Problem List     * (Principal)Loculated pleural effusion    Pneumonia of right lung due to infectious organism    Tobacco use    Bipolar disorder     Depression    Hyperparathyroidism    ESRD (end stage renal disease) on dialysis    Renovascular hypertension    Hepatitis C antibody positive in blood    Overview Signed 6/12/2017 12:47 PM by REY Zhou     Has not followed up with ID          IV drug abuse    Anemia of ESRD    COPD exacerbation    Medical non-compliance            Pain control a challenge due to her chronic narcotic use history. Even with chest tubes out she does not want to get out of bed and adequately pulmonary toilet herself.          PLAN     -Best efforts at pain control.  Currently Dilaudid PCA and by mouth Percocet   -Lidoderm   -Continue antibiotics per ID   -I had a detailed discussion with her today and stressed that she actually has to get out of bed and cough and deep breathe to get better.  -Monitor left pleural effusion.  Suspect this is volume related and due to her tendency to lie on her left side exclusively.   -Dialysis Monday Wednesday Friday            I discussed the patient's findings and my recommendations with patient and nursing staff     Plan of care and goals reviewed with mulitdisciplinary team at daily rounds.    Altaf Diaz MD  Pulmonary and Critical Care Medicine  06/18/17 12:57 PM

## 2017-06-18 NOTE — PROGRESS NOTES
"   LOS: 6 days   Patient Care Team:  Neo Gresham DO as PCP - General (Family Medicine)    Subjective resting comfortably    Objective    Vital Sign Min/Max for last 24 hours  Temp  Min: 97.9 °F (36.6 °C)  Max: 98.2 °F (36.8 °C)   BP  Min: 98/52  Max: 194/104   Pulse  Min: 86  Max: 108   Resp  Min: 18  Max: 28   SpO2  Min: 93 %  Max: 100 %   Flow (L/min)  Min: 4  Max: 4   No Data Recorded     Flowsheet Rows         First Filed Value    Admission Height  61\" (154.9 cm) Documented at 06/13/2017 0525    Admission Weight  97 lb (44 kg) Documented at 06/13/2017 0525          Physical Exam:    Wound:    Pulses:     Mediastinal and Chest Tube Drainage:No air leak       Results Review:     Results from last 7 days  Lab Units 06/16/17  0334   WBC 10*3/mm3 11.14*   HEMOGLOBIN g/dL 12.1   HEMATOCRIT % 37.8   PLATELETS 10*3/mm3 212       Results from last 7 days  Lab Units 06/16/17  0334   SODIUM mmol/L 136   POTASSIUM mmol/L 4.3   CHLORIDE mmol/L 102   TOTAL CO2 mmol/L 19.0*   BUN mg/dL 43*   CREATININE mg/dL 6.10*   GLUCOSE mg/dL 68*   CALCIUM mg/dL 7.3*       Results from last 7 days  Lab Units 06/15/17  0340   PH, ARTERIAL pH units 7.416   PO2 ART mm Hg 119.0*   PCO2, ARTERIAL mm Hg 31.2*   HCO3 ART mmol/L 20.0         Assessment    Principal Problem:    Loculated pleural effusion  Active Problems:    Tobacco use    Bipolar disorder    Depression    Hyperparathyroidism    Pneumonia of right lung due to infectious organism    ESRD (end stage renal disease) on dialysis    Renovascular hypertension    Hepatitis C antibody positive in blood    IV drug abuse    Anemia of ESRD    COPD exacerbation    Medical non-compliance      DC chest tubes have a.m. portable chest x-ray        Shawn Hicks MD  06/18/17  7:36 AM      Please note that portions of this note were completed with a voice recognition program. Efforts were made to edit the dictations, but words may be mistranscribed  "

## 2017-06-18 NOTE — PROGRESS NOTES
Pharmacy Consult--Antimicrobial Dosing  Pt is a 25 yo female with pneumonia of RLL, loculated parapneumonic effusion, Hep C.  History of drug abuse.  Pharmacy consulted to dose vancomycin for pneumonia.  ID consulted    Consulting Provider: Hospitalist    Current Antimicrobial Therapy  1.  Azithromycin (Start Date:  6/11)  2.  Zosyn  (Start Date:  6/12)  3.  Vancomycin  (Start Date:  6/11)    Allergies:  Acetaminophen; Hydrocodone; Ibuprofen; Lortab [hydrocodone-acetaminophen]; and Ciprofloxacin    Labs  Results from last 7 days   Lab Units 06/16/17  0334 06/15/17  2354 06/15/17  1802 06/15/17  0322 06/14/17  0634 06/13/17  0637  06/11/17  1552   SODIUM mmol/L 136 --  --  132 137 138 < > 138   POTASSIUM mmol/L 4.3 4.3 5.6* 5.6* 4.2 4.4 < > 4.0   CHLORIDE mmol/L 102 --  --  102 101 99 < > 89*   TOTAL CO2 mmol/L 19.0* --  --  20.0 22.0 24.0 < > 28.0   BUN mg/dL 43* --  --  30* 46* 27* < > 38*   CREATININE mg/dL 6.10* --  --  4.80* 7.10* 5.80* < > 9.90*   CALCIUM mg/dL 7.3* --  --  7.3* 7.7* 8.1* < > 8.3*   BILIRUBIN mg/dL --  --  --  --  --  0.1* --  0.6   ALK PHOS U/L --  --  --  --  --  98 --  108   ALT (SGPT) U/L --  --  --  --  --  20 --  41   AST (SGOT) U/L --  --  --  --  --  20 --  40   GLUCOSE mg/dL 68* --  --  89 79 105* < > 92   < > = values in this interval not displayed.     Results from last 7 days   Lab Units 06/16/17  0334 06/15/17  0322 06/14/17  0634   WBC 10*3/mm3 11.14* 10.34 9.39   HEMOGLOBIN g/dL 12.1 11.7 12.3   HEMATOCRIT % 37.8 38.2 38.2   PLATELETS 10*3/mm3 212 210 226     Evaluation of Dosing  Weight:  44 kg  Goal Trough: 15-20    Estimated Creatinine Clearance: 9.7 mL/min (by C-G formula based on Cr of 6.1).    I/O last 3 completed shifts:  In: 2904.6 [P.O.:1200; I.V.:804.6; IV Piggyback:900]  Out: 40 [Other:40]     Microbiology and Radiology  Microbiology Results (last 10 days)       Procedure Component Value - Date/Time      Anaerobic Culture [[id number]]  (Normal) Collected:  06/14/17 4569     Lab Status:  Preliminary result Specimen:  Tissue from Pleura Updated:  06/15/17 1207     Culture No anaerobes isolated    Tissue/Bone Culture [[id number]]  (Normal) Collected:  06/14/17 1058    Lab Status:  Final result Specimen:  Tissue from Pleura Updated:  06/18/17 0639     Culture No growth at 4 days     Gram Stain Result Moderate (3+) WBCs seen      No organisms seen    AFB Culture [[id number]] Collected:  06/14/17 1058    Lab Status:  Preliminary result Specimen:  Tissue from Pleura Updated:  06/15/17 1403     AFB Stain No acid fast bacilli seen on concentrated smear    Anaerobic Culture [[id number]]  (Normal) Collected:  06/14/17 1057    Lab Status:  Preliminary result Specimen:  Body Fluid from Pleural Cavity Updated:  06/15/17 1209     Culture No anaerobes isolated    Body Fluid Culture [[id number]]  (Normal) Collected:  06/14/17 1057    Lab Status:  Final result Specimen:  Body Fluid from Pleural Cavity Updated:  06/18/17 0640     BF Culture No growth at 4 days     Gram Stain Result Occasional WBCs seen      No organisms seen    AFB Culture [[id number]] Collected:  06/14/17 1057    Lab Status:  Preliminary result Specimen:  Body Fluid from Pleural Cavity Updated:  06/15/17 1405     AFB Stain No acid fast bacilli seen on concentrated smear    Respiratory Panel, PCR [[id number]]  (Abnormal) Collected:  06/13/17 1217    Lab Status:  Final result Specimen:  Swab from Nasopharynx Updated:  06/15/17 0615     Adenovirus Detection by PCR Not Detected     Coronavirus HKU1 Not Detected     Coronavirus NL63 Not Detected     Coronavirus 229E Not Detected     Coronavirus OC43 Not Detected     Human Metapneumovirus Not Detected     Human Rhinovirus/Enterovirus Detected (A)     Influenza A PCR Not Detected     Influenza A H1 Not Detected     Influenza 2009 H1N1 by PCR Not Detected     Influenza A H3 Not Detected     Influenza B PCR Not Detected     Parainfluenza Virus 1 Not Detected     Parainfluenza Virus 2 Not Detected      Parainfluenza Virus 3 Not Detected     Parainfluenza Virus 4 Not Detected     Respiratory Syncytial Virus Not Detected     Bordetella pertussis pcr Not Detected     Chlamydophila pneumoniae PCR Not Detected     Mycoplasma pneumo by PCR Not Detected    Narrative:       Performed at:  [address] [place] [address]  [id number]  : Kavin Haskins MD, Phone:  [phone]    Body Fluid Culture [[id number]]  (Normal) Collected:  06/12/17 1158    Lab Status:  Final result Specimen:  Body Fluid from Pleural Cavity Updated:  06/16/17 0753     BF Culture No growth    Anaerobic Culture [[id number]]  (Normal) Collected:  06/12/17 1157    Lab Status:  Final result Specimen:  Pleural Fluid from Pleural Cavity Updated:  06/16/17 0754     Culture No growth    VRE Culture [[id number]]  (Normal) Collected:  06/11/17 2311    Lab Status:  Final result Specimen:  Swab from Per Rectum Updated:  06/15/17 0803     VRE SCREEN CX No Vancomycin Resistant Enterococcus Isolated    Acinetobacter Screen [[id number]]  (Normal) Collected:  06/11/17 2310    Lab Status:  Final result Specimen:  Swab from Axilla, Left Updated:  06/14/17 0616     ACINETOBACTER SCREEN CX No growth    MRSA Screen Culture [[id number]]  (Abnormal) Collected:  06/11/17 2310    Lab Status:  Final result Specimen:  Swab from Nares Updated:  06/15/17 0822     MRSA SCREEN CX Staphylococcus aureus, MRSA (C)        Methicillin resistant Staphylococcus aureus, Patient may be an isolation risk.       Respiratory Culture [[id number]]  (Abnormal)  (Susceptibility) Collected:  06/11/17 1726    Lab Status:  Final result Specimen:  Sputum from Cough Updated:  06/15/17 0823     Respiratory Culture Moderate growth (3+) Staphylococcus aureus, MRSA (C)      STEVE to follow.    Methicillin resistant Staphylococcus aureus, Patient may be an isolation risk.        Gram Stain Result Greater than 20 WBCs seen      Less than 10 Epithelial cells seen      Few (2+)  Gram positive cocci in pairs, chains and clusters      Few (2+) Gram positive bacilli    Susceptibility        Staphylococcus aureus, MRSA     STEVE     Amoxicillin + Clavulanate <=4/2 ug/ml Resistant     Ampicillin >8 ug/ml Resistant     Ampicillin + Sulbactam <=8/4 ug/ml Resistant     Cefazolin <=4 ug/ml Resistant     Ciprofloxacin <=1 ug/ml Susceptible     Clindamycin <=0.5 ug/ml Susceptible     Erythromycin >4 ug/ml Resistant     Gentamicin <=4 ug/ml Susceptible     Levofloxacin <=1 ug/ml Susceptible  [1]      Linezolid 4 ug/ml Susceptible     Moxifloxacin <=0.5 ug/ml Susceptible     Oxacillin >2 ug/ml Resistant     Penicillin G >8 ug/ml Resistant     Quinupristin + Dalfopristin <=1 ug/ml Susceptible     Rifampin <=1 ug/ml Susceptible     Tetracycline <=4 ug/ml Susceptible     Trimethoprim + Sulfamethoxazole <=0.5/9.5 ug/ml Susceptible     Vancomycin 2 ug/ml Susceptible              [1]   Staphylococcus species may develop resistance during prolonged therapy with quinolones.  Isolates that are initially susceptible may become resistant within three to four days after initiation of therapy. Testing of repeat isolates may be warranted.                   Blood Culture [[id number]]  (Normal) Collected:  06/11/17 1604    Lab Status:  Final result Specimen:  Blood from Hand, Left Updated:  06/16/17 1701     Blood Culture No growth at 5 days    Blood Culture [[id number]]  (Normal) Collected:  06/11/17 1552    Lab Status:  Final result Specimen:  Blood from Arm, Left Updated:  06/16/17 1701     Blood Culture No growth at 5 days          Evaluation of Level    Lab Results   Component Value Date    [facility] 17 (C) 05/09/2016    [facility]     Received the following vancomycin doses:  Vancomycin 1000 mg IV 6/11 at 1725  Vancomycin   750 mg IV 6/13 at 1144  Vancomycin   500 mg IV 6/16 at 1148    Plan:  1.  Agree with initiation of vancomycin 500mg IV post HD on MWF.  Continue this dose.  2.  Will go ahead  and order pre-HD random level for Wednesday to assess current dosing.  3.  Pharmacy will continue to follow and adjust dose based on renal function, drug levels, and clinical status.    Thanks,  Alex Coon, PharmD, BCPS  #[id number]  06/18/17  8:12 AM

## 2017-06-18 NOTE — PLAN OF CARE
Problem: Patient Care Overview (Adult)  Goal: Plan of Care Review  Outcome: Ongoing (interventions implemented as appropriate)    06/18/17 0650   Coping/Psychosocial Response Interventions   Plan Of Care Reviewed With patient   Patient Care Overview   Progress no change   Outcome Evaluation   Outcome Summary/Follow up Plan had one good coughing session last night with large thick tan secretions, weak cough after and/or refused coughing/deep breathing       Goal: Adult Individualization and Mutuality  Outcome: Ongoing (interventions implemented as appropriate)  Goal: Discharge Needs Assessment  Outcome: Ongoing (interventions implemented as appropriate)    Problem: Respiratory Insufficiency (Adult)  Goal: Acid/Base Balance  Outcome: Ongoing (interventions implemented as appropriate)  Goal: Effective Ventilation  Outcome: Ongoing (interventions implemented as appropriate)    Problem: Perioperative Period (Adult)  Goal: Signs and Symptoms of Listed Potential Problems Will be Absent or Manageable (Perioperative Period)  Outcome: Ongoing (interventions implemented as appropriate)    Problem: Lung Surgery (via Thoracotomy) (Adult)  Goal: Signs and Symptoms of Listed Potential Problems Will be Absent or Manageable (Lung Surgery)  Outcome: Ongoing (interventions implemented as appropriate)    Problem: Pain, Acute (Adult)  Goal: Acceptable Pain Control/Comfort Level  Outcome: Ongoing (interventions implemented as appropriate)    Problem: Pressure Ulcer Risk (Heri Scale) (Adult,Obstetrics,Pediatric)  Goal: Skin Integrity  Outcome: Ongoing (interventions implemented as appropriate)

## 2017-06-19 ENCOUNTER — APPOINTMENT (OUTPATIENT)
Dept: GENERAL RADIOLOGY | Facility: HOSPITAL | Age: 27
End: 2017-06-19

## 2017-06-19 LAB
ANION GAP SERPL CALCULATED.3IONS-SCNC: 17 MMOL/L (ref 3–11)
BASOPHILS # BLD AUTO: 0.03 10*3/MM3 (ref 0–0.2)
BASOPHILS NFR BLD AUTO: 0.2 % (ref 0–1)
BUN BLD-MCNC: 50 MG/DL (ref 9–23)
BUN/CREAT SERPL: 6.9 (ref 7–25)
C DIFF TOX GENS STL QL NAA+PROBE: DETECTED
CALCIUM SPEC-SCNC: 7.3 MG/DL (ref 8.7–10.4)
CHLORIDE SERPL-SCNC: 102 MMOL/L (ref 99–109)
CO2 SERPL-SCNC: 18 MMOL/L (ref 20–31)
CREAT BLD-MCNC: 7.2 MG/DL (ref 0.6–1.3)
DEPRECATED RDW RBC AUTO: 55.6 FL (ref 37–54)
EOSINOPHIL # BLD AUTO: 0.43 10*3/MM3 (ref 0.1–0.3)
EOSINOPHIL NFR BLD AUTO: 2.9 % (ref 0–3)
ERYTHROCYTE [DISTWIDTH] IN BLOOD BY AUTOMATED COUNT: 16.8 % (ref 11.3–14.5)
GFR SERPL CREATININE-BSD FRML MDRD: 7 ML/MIN/1.73
GLUCOSE BLD-MCNC: 90 MG/DL (ref 70–100)
HCT VFR BLD AUTO: 34.4 % (ref 34.5–44)
HGB BLD-MCNC: 11 G/DL (ref 11.5–15.5)
IMM GRANULOCYTES # BLD: 0.41 10*3/MM3 (ref 0–0.03)
IMM GRANULOCYTES NFR BLD: 2.7 % (ref 0–0.6)
LYMPHOCYTES # BLD AUTO: 1.17 10*3/MM3 (ref 0.6–4.8)
LYMPHOCYTES NFR BLD AUTO: 7.8 % (ref 24–44)
MCH RBC QN AUTO: 28.6 PG (ref 27–31)
MCHC RBC AUTO-ENTMCNC: 32 G/DL (ref 32–36)
MCV RBC AUTO: 89.4 FL (ref 80–99)
MONOCYTES # BLD AUTO: 1.12 10*3/MM3 (ref 0–1)
MONOCYTES NFR BLD AUTO: 7.4 % (ref 0–12)
NEUTROPHILS # BLD AUTO: 11.89 10*3/MM3 (ref 1.5–8.3)
NEUTROPHILS NFR BLD AUTO: 79 % (ref 41–71)
PLATELET # BLD AUTO: 212 10*3/MM3 (ref 150–450)
PMV BLD AUTO: 8.7 FL (ref 6–12)
POTASSIUM BLD-SCNC: 4.4 MMOL/L (ref 3.5–5.5)
RBC # BLD AUTO: 3.85 10*6/MM3 (ref 3.89–5.14)
SODIUM BLD-SCNC: 137 MMOL/L (ref 132–146)
WBC NRBC COR # BLD: 15.05 10*3/MM3 (ref 3.5–10.8)

## 2017-06-19 PROCEDURE — 87493 C DIFF AMPLIFIED PROBE: CPT | Performed by: INTERNAL MEDICINE

## 2017-06-19 PROCEDURE — 25010000002 PIPERACILLIN-TAZOBACTAM

## 2017-06-19 PROCEDURE — 94799 UNLISTED PULMONARY SVC/PX: CPT

## 2017-06-19 PROCEDURE — 99233 SBSQ HOSP IP/OBS HIGH 50: CPT | Performed by: INTERNAL MEDICINE

## 2017-06-19 PROCEDURE — 71010 HC CHEST PA OR AP: CPT

## 2017-06-19 PROCEDURE — 94640 AIRWAY INHALATION TREATMENT: CPT

## 2017-06-19 PROCEDURE — 80048 BASIC METABOLIC PNL TOTAL CA: CPT | Performed by: INTERNAL MEDICINE

## 2017-06-19 PROCEDURE — P9046 ALBUMIN (HUMAN), 25%, 20 ML: HCPCS | Performed by: INTERNAL MEDICINE

## 2017-06-19 PROCEDURE — 25010000002 HYDROMORPHONE PER 4 MG: Performed by: FAMILY MEDICINE

## 2017-06-19 PROCEDURE — 25010000002 HYDRALAZINE PER 20 MG: Performed by: PHYSICIAN ASSISTANT

## 2017-06-19 PROCEDURE — 25010000002 ALBUMIN HUMAN 25% PER 50 ML: Performed by: INTERNAL MEDICINE

## 2017-06-19 PROCEDURE — 94760 N-INVAS EAR/PLS OXIMETRY 1: CPT

## 2017-06-19 PROCEDURE — 85025 COMPLETE CBC W/AUTO DIFF WBC: CPT | Performed by: INTERNAL MEDICINE

## 2017-06-19 PROCEDURE — 25010000002 VANCOMYCIN

## 2017-06-19 RX ORDER — ALBUMIN (HUMAN) 12.5 G/50ML
12.5 SOLUTION INTRAVENOUS AS NEEDED
Status: DISPENSED | OUTPATIENT
Start: 2017-06-19 | End: 2017-06-19

## 2017-06-19 RX ADMIN — IPRATROPIUM BROMIDE AND ALBUTEROL SULFATE 3 ML: .5; 3 SOLUTION RESPIRATORY (INHALATION) at 12:40

## 2017-06-19 RX ADMIN — DEXMEDETOMIDINE HYDROCHLORIDE 1.4 MCG/KG/HR: 4 INJECTION, SOLUTION INTRAVENOUS at 07:14

## 2017-06-19 RX ADMIN — Medication 250 MG: at 17:23

## 2017-06-19 RX ADMIN — ALBUMIN HUMAN 50 G: 0.25 SOLUTION INTRAVENOUS at 09:43

## 2017-06-19 RX ADMIN — IPRATROPIUM BROMIDE AND ALBUTEROL SULFATE 3 ML: .5; 3 SOLUTION RESPIRATORY (INHALATION) at 08:10

## 2017-06-19 RX ADMIN — OXYCODONE HYDROCHLORIDE AND ACETAMINOPHEN 1 TABLET: 10; 325 TABLET ORAL at 02:50

## 2017-06-19 RX ADMIN — HYDROMORPHONE HYDROCHLORIDE 1 MG: 1 INJECTION, SOLUTION INTRAMUSCULAR; INTRAVENOUS; SUBCUTANEOUS at 12:02

## 2017-06-19 RX ADMIN — ALPRAZOLAM 0.5 MG: 0.5 TABLET ORAL at 20:37

## 2017-06-19 RX ADMIN — FAMOTIDINE 20 MG: 20 TABLET ORAL at 12:01

## 2017-06-19 RX ADMIN — ALPRAZOLAM 0.5 MG: 0.5 TABLET ORAL at 02:50

## 2017-06-19 RX ADMIN — OXYCODONE HYDROCHLORIDE AND ACETAMINOPHEN 1 TABLET: 10; 325 TABLET ORAL at 13:18

## 2017-06-19 RX ADMIN — IPRATROPIUM BROMIDE AND ALBUTEROL SULFATE 3 ML: .5; 3 SOLUTION RESPIRATORY (INHALATION) at 16:08

## 2017-06-19 RX ADMIN — PIPERACILLIN AND TAZOBACTAM 2.25 G: 2; .25 INJECTION, POWDER, LYOPHILIZED, FOR SOLUTION INTRAVENOUS; PARENTERAL at 00:41

## 2017-06-19 RX ADMIN — ALPRAZOLAM 0.5 MG: 0.5 TABLET ORAL at 08:45

## 2017-06-19 RX ADMIN — AMLODIPINE BESYLATE 5 MG: 5 TABLET ORAL at 16:45

## 2017-06-19 RX ADMIN — HYDROMORPHONE HYDROCHLORIDE 1 MG: 1 INJECTION, SOLUTION INTRAMUSCULAR; INTRAVENOUS; SUBCUTANEOUS at 02:50

## 2017-06-19 RX ADMIN — HYDROMORPHONE HYDROCHLORIDE 1 MG: 1 INJECTION, SOLUTION INTRAMUSCULAR; INTRAVENOUS; SUBCUTANEOUS at 16:44

## 2017-06-19 RX ADMIN — HYDRALAZINE HYDROCHLORIDE 10 MG: 20 INJECTION INTRAMUSCULAR; INTRAVENOUS at 22:10

## 2017-06-19 RX ADMIN — NICOTINE 1 PATCH: 21 PATCH, EXTENDED RELEASE TRANSDERMAL at 12:01

## 2017-06-19 RX ADMIN — PIPERACILLIN AND TAZOBACTAM 2.25 G: 2; .25 INJECTION, POWDER, LYOPHILIZED, FOR SOLUTION INTRAVENOUS; PARENTERAL at 12:01

## 2017-06-19 RX ADMIN — IPRATROPIUM BROMIDE AND ALBUTEROL SULFATE 3 ML: .5; 3 SOLUTION RESPIRATORY (INHALATION) at 19:45

## 2017-06-19 RX ADMIN — Medication: at 19:47

## 2017-06-19 RX ADMIN — Medication: at 13:18

## 2017-06-19 RX ADMIN — OXYCODONE HYDROCHLORIDE AND ACETAMINOPHEN 1 TABLET: 10; 325 TABLET ORAL at 07:13

## 2017-06-19 RX ADMIN — AMITRIPTYLINE HYDROCHLORIDE 10 MG: 10 TABLET, FILM COATED ORAL at 20:37

## 2017-06-19 RX ADMIN — OXYCODONE HYDROCHLORIDE AND ACETAMINOPHEN 1 TABLET: 10; 325 TABLET ORAL at 22:10

## 2017-06-19 RX ADMIN — VANCOMYCIN HYDROCHLORIDE 500 MG: 500 INJECTION, POWDER, LYOPHILIZED, FOR SOLUTION INTRAVENOUS at 12:54

## 2017-06-19 RX ADMIN — LIDOCAINE 2 PATCH: 50 PATCH CUTANEOUS at 12:02

## 2017-06-19 RX ADMIN — OXYCODONE HYDROCHLORIDE AND ACETAMINOPHEN 1 TABLET: 10; 325 TABLET ORAL at 17:46

## 2017-06-19 RX ADMIN — DEXMEDETOMIDINE HYDROCHLORIDE 1.4 MCG/KG/HR: 4 INJECTION, SOLUTION INTRAVENOUS at 02:50

## 2017-06-19 RX ADMIN — ALPRAZOLAM 0.5 MG: 0.5 TABLET ORAL at 15:06

## 2017-06-19 RX ADMIN — HYDROMORPHONE HYDROCHLORIDE 1 MG: 1 INJECTION, SOLUTION INTRAMUSCULAR; INTRAVENOUS; SUBCUTANEOUS at 20:38

## 2017-06-19 RX ADMIN — Medication 5 MG: at 20:37

## 2017-06-19 NOTE — PROGRESS NOTES
Adult Nutrition  Assessment/PES    Patient Name:  Mandy Espino  YOB: 1990  MRN: [id number]  Admit Date:  6/12/2017    Assessment Date:  6/19/2017  PO F/U note: Fair appetite per RN and patient.  Menu provided and discussed with pt. Drinking Boost G.C. Supplement.       Reason for Assessment       06/19/17 1236    Reason for Assessment    Reason For Assessment/Visit follow up protocol;multidisciplinary rounds    Time Spent (min) 30    Diagnosis Diagnosis   per pervious nutrition note.    Principal Problem:    Loculated pleural effusion  Active Problems:    Tobacco use    Bipolar disorder    Depression    Hyperparathyroidism    Pneumonia of right lung due to infectious organism    ESRD (end stage renal disease) on dialysis    Renovascular hypertension    Hepatitis C antibody positive in blood    IV drug abuse    Anemia of ESRD    COPD exacerbation    Medical non-compliance               Nutrition/Diet History       06/19/17 1238    Nutrition/Diet History    Reported/Observed By RN;Patient    Other RN- chest tube removed; pain main issue; good appetite, drinking Boost.  Pt- fair PO intake, loves Boost G.C., drinking 100% TID.               Labs/Tests/Procedures/Meds       06/19/17 1240    Labs/Tests/Procedures/Meds    Labs/Tests Review Reviewed;BUN;Creat    Medication Review Reviewed, pertinent     Results from last 7 days  Lab Units 06/19/17  0350  06/13/17  0637   SODIUM mmol/L 137  < > 138   POTASSIUM mmol/L 4.4  < > 4.4   CHLORIDE mmol/L 102  < > 99   TOTAL CO2 mmol/L 18.0*  < > 24.0   BUN mg/dL 50*  < > 27*   CREATININE mg/dL 7.20*  < > 5.80*   CALCIUM mg/dL 7.3*  < > 8.1*   BILIRUBIN mg/dL  --   --  0.1*   ALK PHOS U/L  --   --  98   ALT (SGPT) U/L  --   --  20   AST (SGOT) U/L  --   --  20   GLUCOSE mg/dL 90  < > 105*   < > = values in this interval not displayed.                 Nutrition Prescription Ordered       06/19/17 1240    Nutrition Prescription PO    Current PO Diet Soft Texture     Texture Chopped    Fluid Consistency Thin    Supplement Boost Glucose Control    Supplement Frequency 3 times a day    Common Modifiers Renal            Evaluation of Received Nutrient/Fluid Intake       06/19/17 1240    PO Evaluation    Number of Days PO Intake Evaluated Insufficient Data    Number of Meals --   No PO intake documented.      % PO Intake --   Pt reports fair appetite; drinking 100% of boost G.C. supplement.               Problem/Interventions:          Problem 2       06/19/17 1241    Nutrition Diagnoses Problem 2    Problem 2 Inadequate Intake/Infusion    Inadequate Intake Type Oral    Etiology (related to) --   clinical condition, poor appetite    Signs/Symptoms (evidenced by) Report of Minimal PO Intake                  Intervention Goal       06/19/17 1241    Intervention Goal    General Nutrition support treatment    PO Increase intake            Nutrition Intervention       06/19/17 1242    Nutrition Intervention    RD/Tech Action Interview for preference;Menu provided;Follow Tx progress;Encourage intake;Supplement provided              Education/Evaluation       06/19/17 1242    Monitor/Evaluation    Monitor Per protocol;PO intake;Supplement intake;Pertinent labs;Symptoms;Weight          Electronically signed by:  Maxine Melgoza RD  06/19/17 12:43 PM

## 2017-06-19 NOTE — PROGRESS NOTES
"INTENSIVIST NOTE     Hospital Day: 7      Ms. Mandy Espino, 26 y.o. female is followed for:   Loculated pleural effusion       SUBJECTIVE     26-year-old woman, current smoker, IV drug abuser with hepatitis C and end-stage renal disease presenting with a loculated parapneumonic effusion. June 14 she underwent thoracotomy with decortication.    Interval history:    Still complaining bitterly of postoperative pain but is more amenable to being mobilized and has been able to ambulate and sit in a chair today.    The patient's relevant past medical, surgical and social history were reviewed and updated in Epic as appropriate.       OBJECTIVE     Vital Sign Min/Max for last 24 hours  Temp  Min: 97.7 °F (36.5 °C)  Max: 98.3 °F (36.8 °C)   BP  Min: 92/45  Max: 227/113   Pulse  Min: 78  Max: 149   Resp  Min: 15  Max: 35   SpO2  Min: 89 %  Max: 100 %   Flow (L/min)  Min: 3  Max: 6   No Data Recorded      Intake/Output Summary (Last 24 hours) at 06/19/17 1515  Last data filed at 06/19/17 1410   Gross per 24 hour   Intake             1445 ml   Output             3270 ml   Net            -1825 ml      Flowsheet Rows         First Filed Value    Admission Height  61\" (154.9 cm) Documented at 06/13/2017 0525    Admission Weight  97 lb (44 kg) Documented at 06/13/2017 0525         Last 3 weights    06/13/17  0525 06/14/17  0958 06/14/17  1704   Weight: 97 lb (44 kg) 97 lb (44 kg) 97 lb (44 kg)            Objective:  General Appearance:  In no acute distress.    Vital signs: (most recent): Blood pressure 110/56, pulse 98, temperature 97.9 °F (36.6 °C), temperature source Axillary, resp. rate 20, height 61\" (154.9 cm), weight 97 lb (44 kg), SpO2 98 %, not currently breastfeeding.    HEENT: Normal HEENT exam.    Lungs:  Normal respiratory rate and normal effort.  She is not in respiratory distress.  There are decreased breath sounds.  No wheezes, rales or rhonchi.    Heart: Normal rate.  Regular rhythm.  S1 normal and S2 " normal.  No murmur, gallop or friction rub.   Chest: Symmetric chest wall expansion. (Right chest tube ×2.  No drainage or air leak of significance)  Abdomen: Abdomen is soft and non-distended.  Bowel sounds are normal.   There is no abdominal tenderness.   There is no mass. There is no splenomegaly. There is no hepatomegaly.   Extremities: There is no deformity or dependent edema.    Neurological: Patient is alert and oriented to person, place and time.    Pupils:  Pupils are equal, round, and reactive to light.    Skin:  Warm and dry.              I reviewed the patient's new clinical results.  I reviewed the patient's new imaging results/reports including actual images and agree with reports.      Chest X-Ray: Significant left-sided effusion and atelectasis.  No right-sided pneumothorax.    INFUSIONS    dexmedetomidine 0.2-1.5 mcg/kg/hr Last Rate: 0.2 mcg/kg/hr (06/19/17 1410)   HYDROmorphone     Pharmacy to dose vancomycin           Results from last 7 days  Lab Units 06/19/17  0350 06/16/17  0334 06/15/17  0322   WBC 10*3/mm3 15.05* 11.14* 10.34   HEMOGLOBIN g/dL 11.0* 12.1 11.7   HEMATOCRIT % 34.4* 37.8 38.2   PLATELETS 10*3/mm3 212 212 210       Results from last 7 days  Lab Units 06/19/17  0350 06/16/17  0334 06/15/17  2354  06/15/17  0322   SODIUM mmol/L 137 136  --   --  132   POTASSIUM mmol/L 4.4 4.3 4.3  < > 5.6*   CHLORIDE mmol/L 102 102  --   --  102   TOTAL CO2 mmol/L 18.0* 19.0*  --   --  20.0   BUN mg/dL 50* 43*  --   --  30*   GLUCOSE mg/dL 90 68*  --   --  89   CREATININE mg/dL 7.20* 6.10*  --   --  4.80*   MAGNESIUM mg/dL  --   --   --   --  1.9   CALCIUM mg/dL 7.3* 7.3*  --   --  7.3*   < > = values in this interval not displayed.        Results from last 7 days  Lab Units 06/15/17  0340 06/14/17  1305   PH, ARTERIAL pH units 7.416 7.262*   PCO2, ARTERIAL mm Hg 31.2* 53.9*   PO2 ART mm Hg 119.0* 294.0*   FIO2 % 30 70         [facility]      I reviewed the patient's  medications.    Assessment/Plan   ASSESSMENT      Hospital Problem List     * (Principal)Loculated pleural effusion    Pneumonia of right lung due to infectious organism    Tobacco use    Bipolar disorder    Depression    Hyperparathyroidism    ESRD (end stage renal disease) on dialysis    Renovascular hypertension    Hepatitis C antibody positive in blood    Overview Signed 6/12/2017 12:47 PM by Maria G Toscano APRN     Has not followed up with ID          IV drug abuse    Anemia of ESRD    COPD exacerbation    Medical non-compliance            Making progress with getting her mobilized and doing more pulmonary toilet.  Hopefully her chest x-ray will improve.  Part of what we are seeing is atelectasis but she certainly has a left effusion.  Hopefully this will respond to mobilization and dialysis as she currently is not agreeable to any additional procedures.          PLAN     -Continued efforts at pain control  Currently Dilaudid PCA and by mouth Percocet   -I would like her to remain off Precedex.  -Lidoderm   -Continue antibiotics per ID   -Continue mobilization and pulmonary toilet  -Monitor left pleural effusion.  As above.  -Dialysis Monday Wednesday Friday   -Home when pain reasonably controlled and chest x-ray improving           I discussed the patient's findings and my recommendations with patient and nursing staff     Plan of care and goals reviewed with mulitdisciplinary team at daily rounds.    Altaf Diaz MD  Pulmonary and Critical Care Medicine  06/19/17 3:15 PM

## 2017-06-19 NOTE — PROGRESS NOTES
"   LOS: 7 days   Patient Care Team:  Neo Gresham DO as PCP - General (Family Medicine)    Subjective feeling well    Objective    Vital Sign Min/Max for last 24 hours  Temp  Min: 98 °F (36.7 °C)  Max: 98.7 °F (37.1 °C)   BP  Min: 102/61  Max: 227/113   Pulse  Min: 78  Max: 149   Resp  Min: 15  Max: 35   SpO2  Min: 89 %  Max: 99 %   Flow (L/min)  Min: 3  Max: 4   No Data Recorded     Flowsheet Rows         First Filed Value    Admission Height  61\" (154.9 cm) Documented at 06/13/2017 0525    Admission Weight  97 lb (44 kg) Documented at 06/13/2017 0525          Physical Exam:    Wound:Satisfactory    Pulses:     Mediastinal and Chest Tube Drainage:       Results Review:     Results from last 7 days  Lab Units 06/19/17  0350   WBC 10*3/mm3 15.05*   HEMOGLOBIN g/dL 11.0*   HEMATOCRIT % 34.4*   PLATELETS 10*3/mm3 212       Results from last 7 days  Lab Units 06/19/17  0350   SODIUM mmol/L 137   POTASSIUM mmol/L 4.4   CHLORIDE mmol/L 102   TOTAL CO2 mmol/L 18.0*   BUN mg/dL 50*   CREATININE mg/dL 7.20*   GLUCOSE mg/dL 90   CALCIUM mg/dL 7.3*       Results from last 7 days  Lab Units 06/15/17  0340   PH, ARTERIAL pH units 7.416   PO2 ART mm Hg 119.0*   PCO2, ARTERIAL mm Hg 31.2*   HCO3 ART mmol/L 20.0         Assessment    Principal Problem:    Loculated pleural effusion  Active Problems:    Tobacco use    Bipolar disorder    Depression    Hyperparathyroidism    Pneumonia of right lung due to infectious organism    ESRD (end stage renal disease) on dialysis    Renovascular hypertension    Hepatitis C antibody positive in blood    IV drug abuse    Anemia of ESRD    COPD exacerbation    Medical non-compliance      Doing satisfactory chest tube removed chest x-ray looks satisfactory. See back in the office.        Shawn Hicks MD  06/19/17  6:54 AM      Please note that portions of this note were completed with a voice recognition program. Efforts were made to edit the dictations, but words may be mistranscribed  "

## 2017-06-19 NOTE — PROGRESS NOTES
[facility] Progress Note    6/12/2017      Antibiotics:  IV Anti-Infectives     Ordered     Dose/Rate Route Frequency Start Stop    06/19/17 1548  vancomycin oral solution 250 mg     Ordering Provider:  REY Waller    250 mg Oral Every 6 Hours Scheduled 06/19/17 1800      06/16/17 1101  vancomycin 500 mg/100 mL 0.9% NS IVPB (mbp)     Ordering Provider:  Kavin Mayo RPH    500 mg Intravenous Once per day on Mon Wed Fri 06/16/17 1200      06/12/17 2156  vancomycin in dextrose 5% 150 mL (VANCOCIN) IVPB 750 mg     Ordering Provider:  Brock Hooker MD    750 mg  over 60 Minutes Intravenous Once 06/13/17 1200 06/13/17 1244    06/12/17 2007  Pharmacy to dose vancomycin     Ordering Provider:  Jonelle Smith PA-C     Does not apply Continuous PRN 06/12/17 2004            CC:cough/pleurisy    HPI:  Patient is a 26 y.o. 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 difficile treated approximately January 2017, end-stage renal disease possibly from FSGS per past records, and numerous other comorbidity as outlined below. She has a history of medical noncompliance and apparently has left hospital on multiple occasions AGAINST MEDICAL ADVICE per outside notes.  She was apparently admitted mid May with diagnosis pneumonia and treated with empiric antibiotics but no specific microbiologic diagnosis. She is readmitted to [facility] with diagnosis right sided pneumonia and loculated pleural effusion associated with dyspnea/cough and right-sided pleurisy. She was transferred to [facility] for consideration of decortication. Of particular note, when she has done being treated at [facility], she prefers referral back to her infectious disease doctor, Dr. Granados.    6/14 decortication for empyema; post op  "hypercapnic resp failure requiring ventilatory support    6/19 CDiff positive     6/19/17 seen early and sleepy;  Nc O2; per nursing, no pressors, no rash, no new focal pain.  No bleeding, no abdominal pain    ROS:      6/19/17 per nursing, No F/C/S,  No rash, No N/V and resp status stable otherwise    All other systems negative for acute complaints on a 12 system review of systems    PE:   /71  Pulse 105  Temp 97.9 °F (36.6 °C) (Axillary)   Resp 20  Ht 61\" (154.9 cm)  Wt 97 lb (44 kg)  SpO2 100%  Breastfeeding? No  BMI 18.33 kg/m2    GENERAL: nc O2 sleepy  HEENT:  No conjunctival injection. No icterus. Oropharynx clear without evidence of thrush or exudate.     HEART: RRR; No murmur, rubs, gallops.   LUNGS: diminished on right c/t left, scattered rhonchi.    ABDOMEN: Soft, nontender, nondistended. Positive bowel sounds. No rebound or guarding. NO mass or HSM.  EXT:  No cyanosis, clubbing or edema. No cord.  : Genitalia generally unremarkable.  Without Burgos catheter.  SKIN: Warm and dry without cutaneous eruptions on Inspection/palpation.   Neck supple no mass  Lymph--neck and groin negative  Musculoskeletal-- no joint effusions appreciated in the arms and legs.  Free range of motion at shoulders elbows wrists, hips knees and ankles    IV with no redness or purulence    No IE phenomena     Laboratory Data      Results from last 7 days  Lab Units 06/19/17  0350 06/16/17  0334 06/15/17  0322   WBC 10*3/mm3 15.05* 11.14* 10.34   HEMOGLOBIN g/dL 11.0* 12.1 11.7   HEMATOCRIT % 34.4* 37.8 38.2   PLATELETS 10*3/mm3 212 212 210       Results from last 7 days  Lab Units 06/19/17  0350   SODIUM mmol/L 137   POTASSIUM mmol/L 4.4   CHLORIDE mmol/L 102   TOTAL CO2 mmol/L 18.0*   BUN mg/dL 50*   CREATININE mg/dL 7.20*   GLUCOSE mg/dL 90   CALCIUM mg/dL 7.3*       Results from last 7 days  Lab Units 06/13/17  0637   ALK PHOS U/L 98   BILIRUBIN mg/dL 0.1*   ALT (SGPT) U/L 20   AST (SGOT) U/L 20           "     Estimated Creatinine Clearance: 8.2 mL/min (by C-G formula based on Cr of 7.2).      Microbiology:      Radiology:  Imaging Results (last 72 hours)     ** No results found for the last 72 hours. **            Impression:   --Acute hypercapnic respiratory failure.  Remains intubated postop, sedated.  After decortication.  Small right apical pneumothorax with chest tube.    --Acute loculated right pleural effusion/empyema per cardiothoracic surgery  associated with right lower lobe pneumonia. Empiric antibiotics ongoing for community-acquired/healthcare associated pneumonia.  Decortication on June 14.  MRSA so far and recent cultures.  Tapered to IV vancomycin    --Rhinovirus positive    --Acute/recurrent C. difficile colitis.  History C. difficile in the past.  Oral vancomycin initiated.I discussed the risk and complexity of CDiff colitis with the patient/family. They understand the importance of hand hygiene with soap and water.  They know the importance of bleach containing cleaning solutions for solid surfaces and risk to contact.  They know to avoid anti motility agents such as lomotil/immodium and similar agents. They know antibiotics are not a guarantee for cure and there will be a risk for relapse/persistence of disease. I have referred them to CDC.gov for additional information as they see fit.     --History hepatitis C. I do not treat viral hepatitis as a part of my practice. She has seen hepatology at [facility] and she should be referred back there after discharge for ongoing care and evaluation regarding possible treatment options. I discussed with her risks associated with hepatitis C including chronic hepatitis/cirrhosis and hepatocellular carcinoma. Follow-up at  is her responsibility. She is aware     --End-stage renal disease associated with FSGS; dialysis ongoing     --History polysubstance abuse which she reports is not active.     --History medical noncompliance with multiple episodes  of leaving hospital AGAINST MEDICAL ADVICE per outside records. She understands the implication of her behavior    PLAN:   --IV vancomycin/oral vancomycin     --Highly complex issues with high risk for further serious morbidity and other serious sequela/mortality     --Monitor IV and IV antibiotic with risk for systemic complication and potential drug interaction     --Check/review labs cultures and scans     --Discussed with microbiology. Partial history per nursing staff              Fercho Singletary MD  6/19/2017

## 2017-06-19 NOTE — PLAN OF CARE
Problem: Patient Care Overview (Adult)  Goal: Plan of Care Review  Outcome: Ongoing (interventions implemented as appropriate)    06/19/17 0614   Coping/Psychosocial Response Interventions   Plan Of Care Reviewed With patient   Patient Care Overview   Progress improving   Outcome Evaluation   Outcome Summary/Follow up Plan panic attack at beginning of shift- pt states she was smothering, hr 150's sbp 200's, precedex gtt restarted, one time po metoprolol given, after these interventions, patient was able to rest well and cooperate with coughing and deep breathing, hr stayed 70-80, sbp normalized to 100-130.       Goal: Adult Individualization and Mutuality  Outcome: Ongoing (interventions implemented as appropriate)  Goal: Discharge Needs Assessment  Outcome: Ongoing (interventions implemented as appropriate)    Problem: Respiratory Insufficiency (Adult)  Goal: Acid/Base Balance  Outcome: Ongoing (interventions implemented as appropriate)  Goal: Effective Ventilation  Outcome: Ongoing (interventions implemented as appropriate)    Problem: Perioperative Period (Adult)  Goal: Signs and Symptoms of Listed Potential Problems Will be Absent or Manageable (Perioperative Period)  Outcome: Ongoing (interventions implemented as appropriate)    Problem: Lung Surgery (via Thoracotomy) (Adult)  Goal: Signs and Symptoms of Listed Potential Problems Will be Absent or Manageable (Lung Surgery)  Outcome: Ongoing (interventions implemented as appropriate)    Problem: Pain, Acute (Adult)  Goal: Acceptable Pain Control/Comfort Level  Outcome: Ongoing (interventions implemented as appropriate)    Problem: Pressure Ulcer Risk (Heri Scale) (Adult,Obstetrics,Pediatric)  Goal: Skin Integrity  Outcome: Ongoing (interventions implemented as appropriate)

## 2017-06-19 NOTE — PROGRESS NOTES
"   LOS: 7 days    Patient Care Team:  Neo Gresham DO as PCP - General (Family Medicine)    Reason For Visit:  F/U ESRD. SEEN ON DIALYSIS.  Subjective           Review of Systems:    Pulm: No soa   CV:  No CP      Objective       amitriptyline 10 mg Oral Nightly   amLODIPine 5 mg Oral Q24H   chlorhexidine 15 mL Mouth/Throat Q12H   docusate sodium 100 mg Oral BID   famotidine 20 mg Oral Daily   ipratropium-albuterol 3 mL Nebulization 4x Daily - RT   lidocaine 2 patch Transdermal Q24H   minoxidil 5 mg Oral Daily   mupirocin  Topical Q12H   nicotine 1 patch Transdermal Daily   piperacillin-tazobactam 2.25 g Intravenous Q8H   sennosides-docusate sodium 2 tablet Oral Nightly   vancomycin 500 mg Intravenous Once per day on Mon Wed Fri       dexmedetomidine 0.2-1.5 mcg/kg/hr Last Rate: 1 mcg/kg/hr (06/19/17 0846)   HYDROmorphone     Pharmacy to dose vancomycin           Vital Signs:  Blood pressure 98/43, pulse 85, temperature 97.7 °F (36.5 °C), temperature source Oral, resp. rate 24, height 61\" (154.9 cm), weight 97 lb (44 kg), SpO2 98 %, not currently breastfeeding.    Flowsheet Rows         First Filed Value    Admission Height  61\" (154.9 cm) Documented at 06/13/2017 0525    Admission Weight  97 lb (44 kg) Documented at 06/13/2017 0525          06/18 0701 - 06/19 0700  In: 993 [I.V.:443]  Out: -     Physical Exam:    General Appearance: NAD, alert and cooperative, Ox3  Eyes: PER, conjunctivae and sclerae normal, no icterus  Lungs: respirations regular and unlabored, no crepitus, clear to auscultation  Heart/CV: regular rhythm & normal rate, no murmur, no gallop, no rub and no edema  Abdomen: not distended, soft, non-tender, no masses,  bowel sounds present  Skin: No rash, Warm and dry. + AVF.    Radiology:            Labs:    Results from last 7 days  Lab Units 06/19/17  0350 06/16/17  0334 06/15/17  0322   WBC 10*3/mm3 15.05* 11.14* 10.34   HEMOGLOBIN g/dL 11.0* 12.1 11.7   HEMATOCRIT % 34.4* 37.8 38.2   PLATELETS " 10*3/mm3 212 212 210       Results from last 7 days  Lab Units 06/19/17  0350 06/16/17  0334 06/15/17  2354 06/15/17  1802 06/15/17  0322 06/14/17  0634 06/13/17  0637   SODIUM mmol/L 137 136  --   --  132 137 138   POTASSIUM mmol/L 4.4 4.3 4.3 5.6* 5.6* 4.2 4.4   CHLORIDE mmol/L 102 102  --   --  102 101 99   TOTAL CO2 mmol/L 18.0* 19.0*  --   --  20.0 22.0 24.0   BUN mg/dL 50* 43*  --   --  30* 46* 27*   CREATININE mg/dL 7.20* 6.10*  --   --  4.80* 7.10* 5.80*   CALCIUM mg/dL 7.3* 7.3*  --   --  7.3* 7.7* 8.1*   PHOSPHORUS mg/dL  --  4.8  --   --  4.2  --   --    MAGNESIUM mg/dL  --   --   --   --  1.9  --   --    ALBUMIN g/dL  --  3.40  --   --   --   --  3.70       Results from last 7 days  Lab Units 06/19/17  0350   GLUCOSE mg/dL 90         Results from last 7 days  Lab Units 06/13/17  0637   ALK PHOS U/L 98   BILIRUBIN mg/dL 0.1*   ALT (SGPT) U/L 20   AST (SGOT) U/L 20       Results from last 7 days  Lab Units 06/15/17  0340   PH, ARTERIAL pH units 7.416   PO2 ART mm Hg 119.0*   PCO2, ARTERIAL mm Hg 31.2*   HCO3 ART mmol/L 20.0             Estimated Creatinine Clearance: 8.2 mL/min (by C-G formula based on Cr of 7.2).      Assessment     Principal Problem:    Loculated pleural effusion  Active Problems:    Tobacco use    Bipolar disorder    Depression    Hyperparathyroidism    Pneumonia of right lung due to infectious organism    ESRD (end stage renal disease) on dialysis    Renovascular hypertension    Hepatitis C antibody positive in blood    IV drug abuse    Anemia of ESRD    COPD exacerbation    Medical non-compliance            Impression: ESRD. ANEMIA.            Recommendations: HD TODAY.      Kavin Hightower MD  06/19/17  10:02 AM

## 2017-06-19 NOTE — PROGRESS NOTES
Continued Stay Note  [facility]     Patient Name: Mandy Espino  MRN: [id number]  Today's Date: 6/19/2017    Admit Date: 6/12/2017          Discharge Plan       06/19/17 1419    Case Management/Social Work Plan    Plan Home    Additional Comments Patient not medically ready.  Patient's plan is to go home.  CM will continue to follow.              Discharge Codes     None        Expected Discharge Date and Time     Expected Discharge Date Expected Discharge Time    Jun 19, 2017             Fabienne Dawn RN

## 2017-06-20 ENCOUNTER — APPOINTMENT (OUTPATIENT)
Dept: GENERAL RADIOLOGY | Facility: HOSPITAL | Age: 27
End: 2017-06-20

## 2017-06-20 LAB
ALBUMIN SERPL-MCNC: 3.7 G/DL (ref 3.2–4.8)
ANION GAP SERPL CALCULATED.3IONS-SCNC: 14 MMOL/L (ref 3–11)
BASOPHILS # BLD AUTO: 0.04 10*3/MM3 (ref 0–0.2)
BASOPHILS NFR BLD AUTO: 0.4 % (ref 0–1)
BUN BLD-MCNC: 26 MG/DL (ref 9–23)
BUN/CREAT SERPL: 5.7 (ref 7–25)
CALCIUM SPEC-SCNC: 8.8 MG/DL (ref 8.7–10.4)
CHLORIDE SERPL-SCNC: 102 MMOL/L (ref 99–109)
CO2 SERPL-SCNC: 20 MMOL/L (ref 20–31)
CREAT BLD-MCNC: 4.6 MG/DL (ref 0.6–1.3)
DEPRECATED RDW RBC AUTO: 59 FL (ref 37–54)
EOSINOPHIL # BLD AUTO: 0.62 10*3/MM3 (ref 0.1–0.3)
EOSINOPHIL NFR BLD AUTO: 6 % (ref 0–3)
ERYTHROCYTE [DISTWIDTH] IN BLOOD BY AUTOMATED COUNT: 17.3 % (ref 11.3–14.5)
GFR SERPL CREATININE-BSD FRML MDRD: 12 ML/MIN/1.73
GLUCOSE BLD-MCNC: 69 MG/DL (ref 70–100)
HCT VFR BLD AUTO: 36.2 % (ref 34.5–44)
HGB BLD-MCNC: 11.1 G/DL (ref 11.5–15.5)
IMM GRANULOCYTES # BLD: 0.24 10*3/MM3 (ref 0–0.03)
IMM GRANULOCYTES NFR BLD: 2.3 % (ref 0–0.6)
LYMPHOCYTES # BLD AUTO: 1.61 10*3/MM3 (ref 0.6–4.8)
LYMPHOCYTES NFR BLD AUTO: 15.5 % (ref 24–44)
MAGNESIUM SERPL-MCNC: 2.3 MG/DL (ref 1.3–2.7)
MCH RBC QN AUTO: 28.4 PG (ref 27–31)
MCHC RBC AUTO-ENTMCNC: 30.7 G/DL (ref 32–36)
MCV RBC AUTO: 92.6 FL (ref 80–99)
MONOCYTES # BLD AUTO: 1.89 10*3/MM3 (ref 0–1)
MONOCYTES NFR BLD AUTO: 18.2 % (ref 0–12)
NEUTROPHILS # BLD AUTO: 5.96 10*3/MM3 (ref 1.5–8.3)
NEUTROPHILS NFR BLD AUTO: 57.6 % (ref 41–71)
PHOSPHATE SERPL-MCNC: 5.1 MG/DL (ref 2.4–5.1)
PLATELET # BLD AUTO: 240 10*3/MM3 (ref 150–450)
PMV BLD AUTO: 8.9 FL (ref 6–12)
POTASSIUM BLD-SCNC: 3.7 MMOL/L (ref 3.5–5.5)
RBC # BLD AUTO: 3.91 10*6/MM3 (ref 3.89–5.14)
SODIUM BLD-SCNC: 136 MMOL/L (ref 132–146)
WBC NRBC COR # BLD: 10.36 10*3/MM3 (ref 3.5–10.8)

## 2017-06-20 PROCEDURE — 25010000002 HYDRALAZINE PER 20 MG: Performed by: PHYSICIAN ASSISTANT

## 2017-06-20 PROCEDURE — 71010 HC CHEST PA OR AP: CPT

## 2017-06-20 PROCEDURE — 94640 AIRWAY INHALATION TREATMENT: CPT

## 2017-06-20 PROCEDURE — 94799 UNLISTED PULMONARY SVC/PX: CPT

## 2017-06-20 PROCEDURE — 25010000002 HYDROMORPHONE PER 4 MG: Performed by: FAMILY MEDICINE

## 2017-06-20 PROCEDURE — 94760 N-INVAS EAR/PLS OXIMETRY 1: CPT

## 2017-06-20 PROCEDURE — 83735 ASSAY OF MAGNESIUM: CPT | Performed by: INTERNAL MEDICINE

## 2017-06-20 PROCEDURE — 99233 SBSQ HOSP IP/OBS HIGH 50: CPT | Performed by: INTERNAL MEDICINE

## 2017-06-20 PROCEDURE — 85025 COMPLETE CBC W/AUTO DIFF WBC: CPT | Performed by: INTERNAL MEDICINE

## 2017-06-20 PROCEDURE — 80069 RENAL FUNCTION PANEL: CPT | Performed by: INTERNAL MEDICINE

## 2017-06-20 RX ORDER — ALBUMIN (HUMAN) 12.5 G/50ML
12.5 SOLUTION INTRAVENOUS AS NEEDED
Status: ACTIVE | OUTPATIENT
Start: 2017-06-20 | End: 2017-06-21

## 2017-06-20 RX ORDER — ALPRAZOLAM 1 MG/1
1 TABLET ORAL EVERY 6 HOURS PRN
Status: DISCONTINUED | OUTPATIENT
Start: 2017-06-20 | End: 2017-06-21

## 2017-06-20 RX ORDER — METOPROLOL SUCCINATE 50 MG/1
50 TABLET, EXTENDED RELEASE ORAL
Status: DISCONTINUED | OUTPATIENT
Start: 2017-06-20 | End: 2017-06-23 | Stop reason: HOSPADM

## 2017-06-20 RX ADMIN — OXYCODONE HYDROCHLORIDE AND ACETAMINOPHEN 1 TABLET: 10; 325 TABLET ORAL at 07:56

## 2017-06-20 RX ADMIN — OXYCODONE HYDROCHLORIDE AND ACETAMINOPHEN 1 TABLET: 10; 325 TABLET ORAL at 16:53

## 2017-06-20 RX ADMIN — ALPRAZOLAM 0.5 MG: 0.5 TABLET ORAL at 07:55

## 2017-06-20 RX ADMIN — HYDROMORPHONE HYDROCHLORIDE 1 MG: 1 INJECTION, SOLUTION INTRAMUSCULAR; INTRAVENOUS; SUBCUTANEOUS at 21:06

## 2017-06-20 RX ADMIN — MINOXIDIL 5 MG: 2.5 TABLET ORAL at 07:56

## 2017-06-20 RX ADMIN — OXYCODONE HYDROCHLORIDE AND ACETAMINOPHEN 1 TABLET: 10; 325 TABLET ORAL at 12:31

## 2017-06-20 RX ADMIN — IPRATROPIUM BROMIDE AND ALBUTEROL SULFATE 3 ML: .5; 3 SOLUTION RESPIRATORY (INHALATION) at 16:22

## 2017-06-20 RX ADMIN — Medication 250 MG: at 12:31

## 2017-06-20 RX ADMIN — AMLODIPINE BESYLATE 5 MG: 5 TABLET ORAL at 07:55

## 2017-06-20 RX ADMIN — HYDROMORPHONE HYDROCHLORIDE 1 MG: 1 INJECTION, SOLUTION INTRAMUSCULAR; INTRAVENOUS; SUBCUTANEOUS at 07:56

## 2017-06-20 RX ADMIN — HYDROMORPHONE HYDROCHLORIDE 1 MG: 1 INJECTION, SOLUTION INTRAMUSCULAR; INTRAVENOUS; SUBCUTANEOUS at 16:53

## 2017-06-20 RX ADMIN — HYDROMORPHONE HYDROCHLORIDE 1 MG: 1 INJECTION, SOLUTION INTRAMUSCULAR; INTRAVENOUS; SUBCUTANEOUS at 12:31

## 2017-06-20 RX ADMIN — OXYCODONE HYDROCHLORIDE AND ACETAMINOPHEN 1 TABLET: 10; 325 TABLET ORAL at 02:41

## 2017-06-20 RX ADMIN — Medication 250 MG: at 00:07

## 2017-06-20 RX ADMIN — HYDRALAZINE HYDROCHLORIDE 10 MG: 20 INJECTION INTRAMUSCULAR; INTRAVENOUS at 03:42

## 2017-06-20 RX ADMIN — ALPRAZOLAM 0.5 MG: 0.5 TABLET ORAL at 02:11

## 2017-06-20 RX ADMIN — Medication: at 03:42

## 2017-06-20 RX ADMIN — LIDOCAINE 2 PATCH: 50 PATCH CUTANEOUS at 10:55

## 2017-06-20 RX ADMIN — Medication 250 MG: at 18:00

## 2017-06-20 RX ADMIN — FAMOTIDINE 20 MG: 20 TABLET ORAL at 07:55

## 2017-06-20 RX ADMIN — SIMETHICONE CHEW TAB 80 MG 80 MG: 80 TABLET ORAL at 01:01

## 2017-06-20 RX ADMIN — IPRATROPIUM BROMIDE AND ALBUTEROL SULFATE 3 ML: .5; 3 SOLUTION RESPIRATORY (INHALATION) at 20:31

## 2017-06-20 RX ADMIN — IPRATROPIUM BROMIDE AND ALBUTEROL SULFATE 3 ML: .5; 3 SOLUTION RESPIRATORY (INHALATION) at 07:41

## 2017-06-20 RX ADMIN — Medication 250 MG: at 05:52

## 2017-06-20 RX ADMIN — IPRATROPIUM BROMIDE AND ALBUTEROL SULFATE 3 ML: .5; 3 SOLUTION RESPIRATORY (INHALATION) at 12:42

## 2017-06-20 RX ADMIN — METOPROLOL SUCCINATE 50 MG: 50 TABLET, EXTENDED RELEASE ORAL at 10:56

## 2017-06-20 RX ADMIN — AMITRIPTYLINE HYDROCHLORIDE 10 MG: 10 TABLET, FILM COATED ORAL at 21:07

## 2017-06-20 RX ADMIN — HYDRALAZINE HYDROCHLORIDE 10 MG: 20 INJECTION INTRAMUSCULAR; INTRAVENOUS at 09:31

## 2017-06-20 RX ADMIN — HYDROMORPHONE HYDROCHLORIDE 1 MG: 1 INJECTION, SOLUTION INTRAMUSCULAR; INTRAVENOUS; SUBCUTANEOUS at 00:07

## 2017-06-20 RX ADMIN — HYDROMORPHONE HYDROCHLORIDE 1 MG: 1 INJECTION, SOLUTION INTRAMUSCULAR; INTRAVENOUS; SUBCUTANEOUS at 04:02

## 2017-06-20 RX ADMIN — ALPRAZOLAM 1 MG: 1 TABLET ORAL at 21:15

## 2017-06-20 RX ADMIN — ALPRAZOLAM 1 MG: 1 TABLET ORAL at 15:17

## 2017-06-20 RX ADMIN — NICOTINE 1 PATCH: 21 PATCH, EXTENDED RELEASE TRANSDERMAL at 07:57

## 2017-06-20 RX ADMIN — Medication: at 11:14

## 2017-06-20 NOTE — PROGRESS NOTES
"INTENSIVIST NOTE     Hospital Day: 8      Ms. Mandy Espino, 26 y.o. female is followed for:   Loculated pleural effusion       SUBJECTIVE     26-year-old woman, current smoker, IV drug abuser with hepatitis C and end-stage renal disease presenting with a loculated parapneumonic effusion. June 14 she underwent thoracotomy with decortication.Postoperatively she developed a left-sided effusion and atelectasis on the contralateral side of her surgery due to the fact that she constantly would lay on that side and not do any pulmonary toilet.    Interval history:    Pain control remains a significant challenge for nursing as nothing that we give relieves her subjective pain and she is constantly crying and demanding pain medications.  This is inhibiting her progress forward.    The patient's relevant past medical, surgical and social history were reviewed and updated in Epic as appropriate.       OBJECTIVE     Vital Sign Min/Max for last 24 hours  Temp  Min: 98.1 °F (36.7 °C)  Max: 98.7 °F (37.1 °C)   BP  Min: 107/48  Max: 223/117   Pulse  Min: 98  Max: 123   Resp  Min: 18  Max: 24   SpO2  Min: 93 %  Max: 100 %   Flow (L/min)  Min: 3  Max: 4   No Data Recorded      Intake/Output Summary (Last 24 hours) at 06/20/17 1335  Last data filed at 06/20/17 0800   Gross per 24 hour   Intake            910.5 ml   Output               25 ml   Net            885.5 ml      Flowsheet Rows         First Filed Value    Admission Height  61\" (154.9 cm) Documented at 06/13/2017 0525    Admission Weight  97 lb (44 kg) Documented at 06/13/2017 0525         Last 3 weights    06/14/17  0958 06/14/17  1704   Weight: 97 lb (44 kg) 97 lb (44 kg)            Objective:  General Appearance:  In no acute distress.    Vital signs: (most recent): Blood pressure 178/85, pulse 107, temperature 98.4 °F (36.9 °C), resp. rate 20, height 61\" (154.9 cm), weight 97 lb (44 kg), SpO2 100 %, not currently breastfeeding.    HEENT: Normal HEENT exam.  "   Lungs:  Normal respiratory rate and normal effort.  She is not in respiratory distress.  There are decreased breath sounds.  No wheezes, rales or rhonchi.    Heart: Normal rate.  Regular rhythm.  S1 normal and S2 normal.  No murmur, gallop or friction rub.   Chest: Symmetric chest wall expansion. (Right chest tube ×2.  No drainage or air leak of significance)  Abdomen: Abdomen is soft and non-distended.  Bowel sounds are normal.   There is no abdominal tenderness.   There is no mass. There is no splenomegaly. There is no hepatomegaly.   Extremities: There is no deformity or dependent edema.    Neurological: Patient is alert and oriented to person, place and time.    Pupils:  Pupils are equal, round, and reactive to light.    Skin:  Warm and dry.              I reviewed the patient's new clinical results.  I reviewed the patient's new imaging results/reports including actual images and agree with reports.      Chest X-Ray: Improvement in her left-sided effusion and atelectasis.  Right side without pneumothorax.    INFUSIONS    HYDROmorphone    Pharmacy to dose vancomycin          Results from last 7 days  Lab Units 06/20/17  0346 06/19/17  0350 06/16/17  0334   WBC 10*3/mm3 10.36 15.05* 11.14*   HEMOGLOBIN g/dL 11.1* 11.0* 12.1   HEMATOCRIT % 36.2 34.4* 37.8   PLATELETS 10*3/mm3 240 212 212       Results from last 7 days  Lab Units 06/20/17  0346 06/19/17  0350 06/16/17  0334  06/15/17  0322   SODIUM mmol/L 136 137 136  --  132   POTASSIUM mmol/L 3.7 4.4 4.3  < > 5.6*   CHLORIDE mmol/L 102 102 102  --  102   TOTAL CO2 mmol/L 20.0 18.0* 19.0*  --  20.0   BUN mg/dL 26* 50* 43*  --  30*   GLUCOSE mg/dL 69* 90 68*  --  89   CREATININE mg/dL 4.60* 7.20* 6.10*  --  4.80*   MAGNESIUM mg/dL 2.3  --   --   --  1.9   CALCIUM mg/dL 8.8 7.3* 7.3*  --  7.3*   < > = values in this interval not displayed.        Results from last 7 days  Lab Units 06/15/17  0340 06/14/17  1305   PH, ARTERIAL pH units 7.416 7.262*   PCO2, ARTERIAL  mm Hg 31.2* 53.9*   PO2 ART mm Hg 119.0* 294.0*   FIO2 % 30 70         Mech Ventilation:      I reviewed the patient's medications.    Assessment/Plan   ASSESSMENT      Hospital Problem List     * (Principal)Loculated pleural effusion    Overview Signed 6/20/2017  1:35 PM by Altaf Diaz MD     Status post decortication         Pneumonia of right lung due to infectious organism    Tobacco use    Bipolar disorder    Depression    Hyperparathyroidism    ESRD (end stage renal disease) on dialysis    Renovascular hypertension    Hepatitis C antibody positive in blood    Overview Signed 6/12/2017 12:47 PM by Maria G Toscano APRN     Has not followed up with ID          IV drug abuse    Anemia of ESRD    COPD exacerbation    Medical non-compliance            Continuing to make some progress with mobilization and pulmonary toilet.  Her chest x-ray has actually improved on the left.  Pain control remains a major challenge for nursing.  I will transfer her to telemetry but am concerned about the ability of the less intense nursing staffing to accommodate her pain issues         PLAN     -Continued efforts at pain control  Currently Dilaudid PCA and by mouth Percocet   -Will ask for assistance from pain management.  See above.  -Increase Xanax  -Lidoderm   -Continue antibiotics per ID   -Continue mobilization and pulmonary toilet  -Monitor left pleural effusion.  It is improved  -Dialysis Monday Wednesday Friday   -Home when pain reasonably controlled and chest x-ray improving           I discussed the patient's findings and my recommendations with patient and nursing staff     Plan of care and goals reviewed with mulitdisciplinary team at daily rounds.    Altaf Diaz MD  Pulmonary and Critical Care Medicine  06/20/17 1:35 PM

## 2017-06-20 NOTE — PROGRESS NOTES
Continued Stay Note  [facility]     Patient Name: Mandy Espino  MRN: [id number]  Today's Date: 6/20/2017    Admit Date: 6/12/2017          Discharge Plan       06/20/17 1334    Case Management/Social Work Plan    Additional Comments Call placed to [facility] in [place] at [phone].  Message left to return CM call.  CM will continue to follow.              Discharge Codes     None        Expected Discharge Date and Time     Expected Discharge Date Expected Discharge Time    Jun 22, 2017             Fabienne Dawn RN

## 2017-06-20 NOTE — PLAN OF CARE
Problem: Patient Care Overview (Adult)  Goal: Plan of Care Review  Outcome: Ongoing (interventions implemented as appropriate)    06/19/17 2002   Coping/Psychosocial Response Interventions   Plan Of Care Reviewed With patient   Patient Care Overview   Progress progress toward functional goals is gradual   Outcome Evaluation   Outcome Summary/Follow up Plan Precedex weaned off. PRN xanax, percocet, Dilaudid PCA controlling pain, loose stool X7 C diff +. Pt. walked twice and sat in the chair.        Goal: Adult Individualization and Mutuality  Outcome: Ongoing (interventions implemented as appropriate)  Goal: Discharge Needs Assessment  Outcome: Ongoing (interventions implemented as appropriate)    Problem: Respiratory Insufficiency (Adult)  Goal: Acid/Base Balance  Outcome: Ongoing (interventions implemented as appropriate)  Goal: Effective Ventilation  Outcome: Ongoing (interventions implemented as appropriate)    Problem: Perioperative Period (Adult)  Goal: Signs and Symptoms of Listed Potential Problems Will be Absent or Manageable (Perioperative Period)  Outcome: Ongoing (interventions implemented as appropriate)    Problem: Lung Surgery (via Thoracotomy) (Adult)  Goal: Signs and Symptoms of Listed Potential Problems Will be Absent or Manageable (Lung Surgery)  Outcome: Ongoing (interventions implemented as appropriate)    Problem: Pain, Acute (Adult)  Goal: Acceptable Pain Control/Comfort Level  Outcome: Ongoing (interventions implemented as appropriate)    Problem: Pressure Ulcer Risk (Heri Scale) (Adult,Obstetrics,Pediatric)  Goal: Skin Integrity  Outcome: Ongoing (interventions implemented as appropriate)

## 2017-06-20 NOTE — PROGRESS NOTES
"   LOS: 8 days    Patient Care Team:  Neo Gresham DO as PCP - General (Family Medicine)    Reason For Visit:  F/U ESRD  Subjective           Review of Systems:    Pulm: No soa   CV:  No CP      Objective       amitriptyline 10 mg Oral Nightly   amLODIPine 5 mg Oral Q24H   famotidine 20 mg Oral Daily   ipratropium-albuterol 3 mL Nebulization 4x Daily - RT   lidocaine 2 patch Transdermal Q24H   minoxidil 5 mg Oral Daily   nicotine 1 patch Transdermal Daily   vancomycin 500 mg Intravenous Once per day on Mon Wed Fri   vancomycin 250 mg Oral Q6H       HYDROmorphone    Pharmacy to dose vancomycin          Vital Signs:  Blood pressure (!) 223/117, pulse 118, temperature 98.4 °F (36.9 °C), resp. rate 22, height 61\" (154.9 cm), weight 97 lb (44 kg), SpO2 98 %, not currently breastfeeding.    Flowsheet Rows         First Filed Value    Admission Height  61\" (154.9 cm) Documented at 06/13/2017 0525    Admission Weight  97 lb (44 kg) Documented at 06/13/2017 0525          06/19 0701 - 06/20 0700  In: 1584.5 [P.O.:1080; I.V.:304.5]  Out: 3295 [Urine:25]    Physical Exam:    General Appearance: MOANING  Eyes: PER, conjunctivae and sclerae normal, no icterus  Lungs: respirations regular and unlabored, no crepitus, clear to auscultation  Heart/CV: regular rhythm & normal rate, no murmur, no gallop, no rub and no edema  Abdomen: not distended, soft, non-tender, no masses,  bowel sounds present  Skin: No rash, Warm and dry. + AVF.    Radiology:            Labs:    Results from last 7 days  Lab Units 06/20/17  0346 06/19/17  0350 06/16/17  0334   WBC 10*3/mm3 10.36 15.05* 11.14*   HEMOGLOBIN g/dL 11.1* 11.0* 12.1   HEMATOCRIT % 36.2 34.4* 37.8   PLATELETS 10*3/mm3 240 212 212       Results from last 7 days  Lab Units 06/20/17  0346 06/19/17  0350 06/16/17  0334 06/15/17  2354  06/15/17  0322   SODIUM mmol/L 136 137 136  --   --  132   POTASSIUM mmol/L 3.7 4.4 4.3 4.3  < > 5.6*   CHLORIDE mmol/L 102 102 102  --   --  102   TOTAL " CO2 mmol/L 20.0 18.0* 19.0*  --   --  20.0   BUN mg/dL 26* 50* 43*  --   --  30*   CREATININE mg/dL 4.60* 7.20* 6.10*  --   --  4.80*   CALCIUM mg/dL 8.8 7.3* 7.3*  --   --  7.3*   PHOSPHORUS mg/dL 5.1  --  4.8  --   --  4.2   MAGNESIUM mg/dL 2.3  --   --   --   --  1.9   ALBUMIN g/dL 3.70  --  3.40  --   --   --    < > = values in this interval not displayed.    Results from last 7 days  Lab Units 06/20/17  0346   GLUCOSE mg/dL 69*             Results from last 7 days  Lab Units 06/15/17  0340   PH, ARTERIAL pH units 7.416   PO2 ART mm Hg 119.0*   PCO2, ARTERIAL mm Hg 31.2*   HCO3 ART mmol/L 20.0             Estimated Creatinine Clearance: 12.9 mL/min (by C-G formula based on Cr of 4.6).      Assessment     Principal Problem:    Loculated pleural effusion  Active Problems:    Tobacco use    Bipolar disorder    Depression    Hyperparathyroidism    Pneumonia of right lung due to infectious organism    ESRD (end stage renal disease) on dialysis    Renovascular hypertension    Hepatitis C antibody positive in blood    IV drug abuse    Anemia of ESRD    COPD exacerbation    Medical non-compliance            Impression: ESRD. PLEURAL EFFUSION.            Recommendations: HD 6/21/17.      Kavin Hightower MD  06/20/17  9:18 AM

## 2017-06-20 NOTE — PLAN OF CARE
Problem: Patient Care Overview (Adult)  Goal: Plan of Care Review  Outcome: Ongoing (interventions implemented as appropriate)  Goal: Adult Individualization and Mutuality  Outcome: Ongoing (interventions implemented as appropriate)  Goal: Discharge Needs Assessment  Outcome: Ongoing (interventions implemented as appropriate)    Problem: Respiratory Insufficiency (Adult)  Goal: Acid/Base Balance  Outcome: Ongoing (interventions implemented as appropriate)  Goal: Effective Ventilation  Outcome: Ongoing (interventions implemented as appropriate)    Problem: Perioperative Period (Adult)  Goal: Signs and Symptoms of Listed Potential Problems Will be Absent or Manageable (Perioperative Period)  Outcome: Ongoing (interventions implemented as appropriate)    Problem: Lung Surgery (via Thoracotomy) (Adult)  Goal: Signs and Symptoms of Listed Potential Problems Will be Absent or Manageable (Lung Surgery)  Outcome: Ongoing (interventions implemented as appropriate)    Problem: Pain, Acute (Adult)  Goal: Acceptable Pain Control/Comfort Level  Outcome: Ongoing (interventions implemented as appropriate)    Problem: Pressure Ulcer Risk (Heri Scale) (Adult,Obstetrics,Pediatric)  Goal: Skin Integrity  Outcome: Ongoing (interventions implemented as appropriate)

## 2017-06-20 NOTE — CONSULTS
Palliative Care Initial Progress Note    Patient Name: Mandy Espino   : 1990  Sex: female    Code Status: full  Advance Directive: does not have  Surrogate decision maker: unsure at this time    Date of Admission: 2017    Referring Provider: Dr. Diaz  Reason for Consult: pain mgmt    PCP: BRANDI LE    Subjective:    Pt is a 26-year-old woman, current smoker, ESRD, Hx hepatitis C, IV drug use, COPD, Bipolar (?), MRSA, Depression, who presented with a loculated parapneumonic effusion. Underwent thoracotomy with decortication on . CDiff positive.     - pt denies IV drug use but discharge records on  indicate pt found self administering street drugs    - States she takes Neurontin at home but not on her home med list    - Dialysis M, W, F -- does have a hx of noncompliance with dialysis    - [facility] reviewed - pt states she has been referred to a pain clinic but has not yet been    - Multiple ED/Hospitalizations in the past three months    - Hx medical noncompliance with multiple episodes of leaving hospital AGAINST MEDICAL ADVICE per outside records.     ROS:  Review of Systems   Constitutional: Negative for appetite change (decreased).   HENT: Negative for trouble swallowing.    Eyes: Negative for visual disturbance.   Respiratory: Negative for cough and shortness of breath.    Cardiovascular: Negative for chest pain and leg swelling.   Gastrointestinal: Negative for abdominal distention, abdominal pain, constipation, nausea and vomiting.   Genitourinary: Negative for difficulty urinating.   Musculoskeletal:        Pain: Right side Rib   Skin: Positive for wound.   Neurological: Positive for weakness.   Psychiatric/Behavioral: Positive for agitation, confusion and sleep disturbance. Negative for hallucinations. The patient is nervous/anxious.        Reviewed current scheduled and prn medications for route, type, dose and frequency.    HYDROmorphone    Pharmacy to dose  "vancomycin      •  albumin human  •  ALPRAZolam  •  artificial tears  •  benzonatate  •  bisacodyl  •  hydrALAZINE  •  HYDROmorphone  •  melatonin  •  naloxone  •  ondansetron **OR** ondansetron  •  oxyCODONE-acetaminophen  •  Pharmacy to dose vancomycin  •  simethicone  •  sodium chloride    Objective:   /79 (BP Location: Right leg)  Pulse 103  Temp 98.4 °F (36.9 °C)  Resp 20  Ht 61\" (154.9 cm)  Wt 97 lb (44 kg)  SpO2 100%  Breastfeeding? No  BMI 18.33 kg/m2   Intake & Output (last day)       06/19 0701 - 06/20 0700 06/20 0701 - 06/21 0700    P.O. 1080     I.V. (mL/kg) 304.5 (6.9) 11 (0.3)    IV Piggyback 200     Total Intake(mL/kg) 1584.5 (36) 11 (0.3)    Urine (mL/kg/hr) 25 (0)     Other 3270 (3.1)     Stool 0 (0)     Total Output 3295      Net -1710.5 +11          Unmeasured Stool Occurrence 9 x         Lab Results (last 24 hours)     Procedure Component Value Units Date/Time    Clostridium Difficile Toxin, PCR [[id number]]  (Abnormal) Collected:  06/19/17 1303    Specimen:  Stool from Per Rectum Updated:  06/19/17 1550     C. Difficile Toxins by PCR Detected (A)      027-[id number] Presumptive Positive       Narrative:         Performance characteristics of test not established for patients <2 years of age.    CBC & Differential [[id number]] Collected:  06/20/17 0346    Specimen:  Blood Updated:  06/20/17 0440    Narrative:       The following orders were created for panel order CBC & Differential.  Procedure                               Abnormality         Status                     ---------                               -----------         ------                     CBC Auto Differential[[id number]]        Abnormal            Final result                 Please view results for these tests on the individual orders.    CBC Auto Differential [[id number]]  (Abnormal) Collected:  06/20/17 0346    Specimen:  Blood Updated:  06/20/17 0440     WBC 10.36 10*3/mm3      RBC 3.91 10*6/mm3      Hemoglobin 11.1 " (L) g/dL      Hematocrit 36.2 %      MCV 92.6 fL      MCH 28.4 pg      MCHC 30.7 (L) g/dL      RDW 17.3 (H) %      RDW-SD 59.0 (H) fl      MPV 8.9 fL      Platelets 240 10*3/mm3      Neutrophil % 57.6 %      Lymphocyte % 15.5 (L) %      Monocyte % 18.2 (H) %      Eosinophil % 6.0 (H) %      Basophil % 0.4 %      Immature Grans % 2.3 (H) %      Neutrophils, Absolute 5.96 10*3/mm3      Lymphocytes, Absolute 1.61 10*3/mm3      Monocytes, Absolute 1.89 (H) 10*3/mm3      Eosinophils, Absolute 0.62 (H) 10*3/mm3      Basophils, Absolute 0.04 10*3/mm3      Immature Grans, Absolute 0.24 (H) 10*3/mm3     Magnesium [[id number]]  (Normal) Collected:  06/20/17 0346    Specimen:  Blood Updated:  06/20/17 0535     Magnesium 2.3 mg/dL     Renal Function Panel [[id number]]  (Abnormal) Collected:  06/20/17 0346    Specimen:  Blood Updated:  06/20/17 0538     Glucose 69 (L) mg/dL      BUN 26 (H) mg/dL      Creatinine 4.60 (H) mg/dL      Sodium 136 mmol/L      Potassium 3.7 mmol/L      Chloride 102 mmol/L      CO2 20.0 mmol/L      Calcium 8.8 mg/dL      Albumin 3.70 g/dL      Phosphorus 5.1 mg/dL      Anion Gap 14.0 (H) mmol/L      BUN/Creatinine Ratio 5.7 (L)     eGFR Non  Amer 12 (L) mL/min/1.73     Narrative:       [employer] Guidelines    Stage     Description        GFR  1         Normal or High     90+  2         Mild decrease      60-89  3         Moderate decrease  30-59  4         Severe decrease    15-29  5         Kidney failure     <15        Imaging Results (last 24 hours)     Procedure Component Value Units Date/Time    XR Chest 1 View [[id number]] Collected:  06/19/17 0915     Updated:  06/19/17 1600    Narrative:       EXAMINATION: XR CHEST 1 VW- 06/19/2017     INDICATION: P95-Lbtxxne effusion, not elsewhere classified      COMPARISON: 06/17/2017     FINDINGS: Portable chest reveals heart to be enlarged. No change seen in  the moderate-sized left pleural effusion. Airspace disease seen within  the  right lung which is new. No pneumothorax. The chest tubes have been  removed.           Impression:       No pneumothorax on the right with airspace disease seen  throughout the right lung. Findings are new. There is no change in the  moderate to large left pleural effusion or enlargement to the left  heart.     D:  06/19/2017  E:  06/19/2017     This report was finalized on 6/19/2017 3:58 PM by Dr. Iza Ho MD.       XR Chest 1 View [[id number]] Collected:  06/20/17 1136     Updated:  06/20/17 1136    Narrative:       EXAMINATION: XR CHEST 1 VIEW-06/20/2017:      INDICATION: Hypoxia; M85-Gczfdwa effusion, not elsewhere classified.      COMPARISON: 06/19/2017.     FINDINGS: Portable chest reveals the heart to be enlarged with  small-to-moderate sized left pleural effusion. Calcified granuloma  identified within the right upper lobe. Minimal increased markings at  the right lung base with tiny right pleural effusion. Slight prominence  of the pulmonary vascularity. The bony structures are unremarkable.       Impression:       The heart is enlarged with prominence of the pulmonary  vascularity and small-to-moderate sized left pleural effusion. Tiny  right pleural effusion present.     D:  06/20/2017  E:  06/20/2017                Physical Exam:  Physical Exam   Constitutional: She appears cachectic. She has a sickly appearance.   frail   Eyes: EOM are normal.   Neck: No tracheal deviation present.   Cardiovascular: Tachycardia present.    Pulmonary/Chest: She has no wheezes.   Tracheal secretions  respir quality, depth, frequency vary depending on conversation. Overall shallow, even during exam.   Abdominal: Soft. She exhibits no distension. There is no tenderness.   Musculoskeletal: She exhibits no edema.   Neurological: She is alert.   Skin: Skin is dry. She is not diaphoretic. There is pallor.   Psychiatric: Her mood appears anxious. Her speech is delayed. She is agitated and withdrawn. She exhibits a  "depressed mood.       Principal Problem:    Loculated pleural effusion  Active Problems:    Tobacco use    Bipolar disorder    Depression    Hyperparathyroidism    Pneumonia of right lung due to infectious organism    ESRD (end stage renal disease) on dialysis    Renovascular hypertension    Hepatitis C antibody positive in blood    IV drug abuse    Anemia of ESRD    COPD exacerbation    Medical non-compliance      Assessment/Plan:   Pain, Right side, ribs   Agitation  Insomnia  Depression    Discussion with pt at bedside regarding pain history. Describes pain only on the Right side where she had the procedure; chest tube has been removed; denies abd pain, denies withdrawal s/s. Medical Records and [facility] reviewed. Pt denies any street or non prescribed drug use. Complicated case 2/2 pt's complex multimorbidities and lack of follow up in the past (hx multiple missed appts). When asked about which providers pt sees the most often and who would know her the best/who will she follow up with, pt starts to cry in pain, begging \"please help me\".    Plan:    - Overall plan is to discontinue dilaudid gtt and transition to oral pain medication. Will discuss with pt prior to doing this so she is aware of the plan.    - Obtain new [facility]    - Call REY Clark who is a provider (according to [facility] report) the pt has seen the most frequently and touch base about discharge plan in navid effort to increase compliance and reduce serial hospitalizations (pain clinic? Psychiatry?). [phone]    - Needs short fills; saw APRN PCP on 6/2/17    - Home medication does include Neurontin 800mg TID -- was decreased to 600mg but then after a couple of months increased back to 800mg     - Home medication also included Remeron for Depression and Anorexia.    - UDS - + for Meth discharged from PCP but has since has been allowed to return to practice and pt UDS screenings have been appropriate    - With pt's permission, involve pt's  " with plan (not mother)    Total Time with Patient: > 60 minutes  Time In/Out: 2984-3255; 2645-6572; 3919-1983    Time spent reviewing medical record, assessing and examining patient, discussing with family, nursing, answering questions, visiting room/floor, formulating a plan of care, and documentation of care.  > 50% face to face.    REY Morocho  (C) [phone]  (O) [phone]  06/20/17  3:47 PM

## 2017-06-20 NOTE — PAYOR COMM NOTE
"Jermaine Baldwin (26 y.o. Female) auth# [id number]   Att: Sasha   Remains in ICU, need more days approved     Date of Birth Social Security Number Address Home Phone MRN    [id number]  [id number] [place] [address] [phone] [id number]    [employer] Marital Status          None        Admission Date Admission Type Admitting Provider Attending Provider Department, Room/Bed    6/12/17 Elective Atlaf Diaz MD Tzouanakis, Alexander E, MD [facility] 2A ICU, N213/1    Discharge Date Discharge Disposition Discharge Destination                      Attending Provider: Altaf Diaz MD     Allergies:  Acetaminophen, Hydrocodone, Ibuprofen, Lortab [Hydrocodone-acetaminophen], Ciprofloxacin    Isolation:  Spore   Infection:  C.difficile (06/19/17), MRSA (06/15/17), Hepatitis C (04/19/17)   Code Status:  FULL    Ht:  61\" (154.9 cm)   Wt:  97 lb (44 kg)    Admission Cmt:  None   Principal Problem:  Loculated pleural effusion [J90]                 Active Insurance as of 6/12/2017     Primary Coverage     Payor Plan Insurance Group Employer/Plan Group    [employer] [place] [employer]ICAID      Payor Plan Address Payor Plan Phone Number Effective From Effective To    [address] [address] [phone] 6/1/2016     [place] [address]       Subscriber Name Subscriber Birth Date Member ID       JERMAINE BALDWIN 1990 [id number]                 Emergency Contacts      (Rel.) Home Phone Work Phone Mobile Phone    Jin Baldwin (Spouse) [phone] -- --            Hospital Medications (active)       Dose Frequency Start End    albumin human 25 % IV SOLN 12.5 g 12.5 g As Needed 6/19/2017 6/19/2017    Sig - Route: Infuse 50 mL into a venous catheter As Needed (Hypotension During Dialysis). - Intravenous    albumin human 25 % IV SOLN 12.5 g 12.5 g As Needed 6/20/2017 6/21/2017    Sig - Route: Infuse 50 mL into a venous catheter As Needed (Hypotension During " Dialysis). - Intravenous    ALPRAZolam (XANAX) tablet 0.5 mg 0.5 mg Every 6 Hours PRN 6/18/2017     Sig - Route: Take 1 tablet by mouth Every 6 (Six) Hours As Needed for Anxiety. - Oral    amitriptyline (ELAVIL) tablet 10 mg 10 mg Nightly 6/12/2017     Sig - Route: Take 1 tablet by mouth Every Night. - Oral    amLODIPine (NORVASC) tablet 5 mg 5 mg Every 24 Hours Scheduled 6/16/2017     Sig - Route: Take 1 tablet by mouth Daily. - Oral    artificial tears (LUBRIFRESH P.M.) ophthalmic ointment  Every 1 Hour PRN 6/14/2017     Sig - Route: Apply  topically Every 1 (One) Hour As Needed (dry eyes). - Topical    benzonatate (TESSALON) capsule 100 mg 100 mg 3 Times Daily PRN 6/12/2017     Sig - Route: Take 1 capsule by mouth 3 (Three) Times a Day As Needed for Cough. - Oral    bisacodyl (DULCOLAX) suppository 10 mg 10 mg Daily PRN 6/14/2017     Sig - Route: Insert 1 suppository into the rectum Daily As Needed for Constipation. - Rectal    famotidine (PEPCID) tablet 20 mg 20 mg Daily 6/15/2017     Sig - Route: Take 1 tablet by mouth Daily. - Oral    hydrALAZINE (APRESOLINE) injection 10 mg 10 mg Every 6 Hours PRN 6/12/2017     Sig - Route: Infuse 0.5 mL into a venous catheter Every 6 (Six) Hours As Needed for High Blood Pressure (SBP>160 DBP>100). - Intravenous    HYDROmorphone (DILAUDID) injection 1 mg 1 mg Every 4 Hours PRN 6/13/2017 6/23/2017    Sig - Route: Infuse 1 mg into a venous catheter Every 4 (Four) Hours As Needed for Severe Pain (7-10). - Intravenous    HYDROmorphone (DILAUDID) PCA 0.1 mg/mL 30 mL syringe  Continuous 6/15/2017 6/29/2017    Sig - Route: Infuse  into a venous catheter Continuous. - Intravenous    ipratropium-albuterol (DUO-NEB) nebulizer solution 3 mL 3 mL 4 Times Daily - RT 6/12/2017     Sig - Route: Take 3 mL by nebulization 4 (Four) Times a Day. - Nebulization    lidocaine (LIDODERM) 5 % 2 patch 2 patch Every 24 Hours Scheduled 6/13/2017     Sig - Route: Place 2 patches on the skin Daily. -  "Transdermal    melatonin sublingual tablet 5 mg 5 mg Nightly PRN 6/12/2017     Sig - Route: Place 1 tablet under the tongue At Night As Needed (sleep). - Sublingual    minoxidil (LONITEN) tablet 5 mg 5 mg Daily 6/18/2017     Sig - Route: Take 2 tablets by mouth Daily. - Oral    naloxone (NARCAN) injection 0.1 mg 0.1 mg Every 5 Minutes PRN 6/15/2017     Sig - Route: Infuse 0.25 mL into a venous catheter Every 5 (Five) Minutes As Needed for Opioid Reversal or Respiratory Depression (see administration instructions). - Intravenous    nicotine (NICODERM CQ) 21 MG/24HR patch 1 patch 1 patch Daily 6/13/2017     Sig - Route: Place 1 patch on the skin Daily. - Transdermal    ondansetron (ZOFRAN) injection 4 mg 4 mg Every 6 Hours PRN 6/12/2017     Sig - Route: Infuse 2 mL into a venous catheter Every 6 (Six) Hours As Needed for Nausea or Vomiting. - Intravenous    Linked Group 1:  \"Or\" Linked Group Details        ondansetron (ZOFRAN) tablet 4 mg 4 mg Every 6 Hours PRN 6/12/2017     Sig - Route: Take 1 tablet by mouth Every 6 (Six) Hours As Needed for Nausea or Vomiting. - Oral    Linked Group 1:  \"Or\" Linked Group Details        oxyCODONE-acetaminophen (PERCOCET)  MG per tablet 1 tablet 1 tablet Every 4 Hours PRN 6/18/2017 6/28/2017    Sig - Route: Take 1 tablet by mouth Every 4 (Four) Hours As Needed for Moderate Pain (4-6). - Oral    Pharmacy to dose vancomycin  Continuous PRN 6/12/2017     Sig - Route: Continuous As Needed for Consult. - Does not apply    simethicone (MYLICON) chewable tablet 80 mg 80 mg 4 Times Daily PRN 6/16/2017     Sig - Route: Chew 1 tablet 4 (Four) Times a Day As Needed for Flatulence. - Oral    sodium chloride 0.9 % flush 1-10 mL 1-10 mL As Needed 6/12/2017     Sig - Route: Infuse 1-10 mL into a venous catheter As Needed for Line Care. - Intravenous    vancomycin 500 mg/100 mL 0.9% NS IVPB (mbp) 500 mg 3 Times Weekly 6/16/2017     Sig - Route: Infuse 100 mL into a venous catheter Once per day " on Mon Wed Fri. - Intravenous    vancomycin oral solution 250 mg 250 mg Every 6 Hours Scheduled 6/19/2017     Sig - Route: Take 5 mL by mouth Every 6 (Six) Hours. - Oral    chlorhexidine (PERIDEX) 0.12 % solution 15 mL (Discontinued) 15 mL Every 12 Hours Scheduled 6/14/2017 6/19/2017    Sig - Route: Apply 15 mL to the mouth or throat Every 12 (Twelve) Hours. - Mouth/Throat    Reason for Discontinue: Therapy completed    dexmedetomidine (PRECEDEX) 400 mcg/100 mL (4 mcg/mL) infusion (Discontinued) 0.2-1.5 mcg/kg/hr × 44 kg Titrated 6/18/2017 6/19/2017    Sig - Route: Infuse 8.8-66 mcg/hr into a venous catheter Dose Adjusted By Provider As Needed. - Intravenous    docusate sodium (COLACE) capsule 100 mg (Discontinued) 100 mg 2 Times Daily 6/12/2017 6/19/2017    Sig - Route: Take 1 capsule by mouth 2 (Two) Times a Day. - Oral    mupirocin (BACTROBAN) 2 % ointment (Discontinued)  Every 12 Hours Scheduled 6/13/2017 6/19/2017    Sig - Route: Apply  topically Every 12 (Twelve) Hours. - Topical    Reason for Discontinue: Therapy completed    piperacillin-tazobactam (ZOSYN) 2.25 g/100 mL 0.9% NS IVPB (mbp) (Discontinued) 2.25 g Every 8 Hours 6/14/2017 6/19/2017    Sig - Route: Infuse 100 mL into a venous catheter Every 8 (Eight) Hours. - Intravenous    sennosides-docusate sodium (SENOKOT-S) 8.6-50 MG tablet 2 tablet (Discontinued) 2 tablet Nightly 6/14/2017 6/19/2017    Sig - Route: Take 2 tablets by mouth Every Night. - Oral            Lab Results (last 24 hours)     Procedure Component Value Units Date/Time    AFB Culture [[id number]]  (Normal) Collected:  06/14/17 1057    Specimen:  Body Fluid from Pleural Cavity Updated:  06/19/17 1301     AFB Culture No AFB isolated at less than 1 week     AFB Stain No acid fast bacilli seen on concentrated smear    Fungus Culture [[id number]] Collected:  06/14/17 1057    Specimen:  Body Fluid from Pleural Cavity Updated:  06/19/17 1301     Fungus Culture No fungus isolated at less than 1  week    Fungus Culture [[id number]] Collected:  06/14/17 1058    Specimen:  Tissue from Pleura Updated:  06/19/17 1301     Fungus Culture No fungus isolated at less than 1 week    AFB Culture [[id number]]  (Normal) Collected:  06/14/17 1058    Specimen:  Tissue from Pleura Updated:  06/19/17 1301     AFB Culture No AFB isolated at less than 1 week     AFB Stain No acid fast bacilli seen on concentrated smear    Clostridium Difficile Toxin, PCR [[id number]]  (Abnormal) Collected:  06/19/17 1303    Specimen:  Stool from Per Rectum Updated:  06/19/17 1550     C. Difficile Toxins by PCR Detected (A)      [id number] Presumptive Positive       Narrative:         Performance characteristics of test not established for patients <2 years of age.    CBC & Differential [[id number]] Collected:  06/20/17 0346    Specimen:  Blood Updated:  06/20/17 0440    Narrative:       The following orders were created for panel order CBC & Differential.  Procedure                               Abnormality         Status                     ---------                               -----------         ------                     CBC Auto Differential[[id number]]        Abnormal            Final result                 Please view results for these tests on the individual orders.    CBC Auto Differential [[id number]]  (Abnormal) Collected:  06/20/17 0346    Specimen:  Blood Updated:  06/20/17 0440     WBC 10.36 10*3/mm3      RBC 3.91 10*6/mm3      Hemoglobin 11.1 (L) g/dL      Hematocrit 36.2 %      MCV 92.6 fL      MCH 28.4 pg      MCHC 30.7 (L) g/dL      RDW 17.3 (H) %      RDW-SD 59.0 (H) fl      MPV 8.9 fL      Platelets 240 10*3/mm3      Neutrophil % 57.6 %      Lymphocyte % 15.5 (L) %      Monocyte % 18.2 (H) %      Eosinophil % 6.0 (H) %      Basophil % 0.4 %      Immature Grans % 2.3 (H) %      Neutrophils, Absolute 5.96 10*3/mm3      Lymphocytes, Absolute 1.61 10*3/mm3      Monocytes, Absolute 1.89 (H) 10*3/mm3      Eosinophils, Absolute 0.62  (H) 10*3/mm3      Basophils, Absolute 0.04 10*3/mm3      Immature Grans, Absolute 0.24 (H) 10*3/mm3     Magnesium [[id number]]  (Normal) Collected:  06/20/17 0346    Specimen:  Blood Updated:  06/20/17 0535     Magnesium 2.3 mg/dL     Renal Function Panel [[id number]]  (Abnormal) Collected:  06/20/17 0346    Specimen:  Blood Updated:  06/20/17 0538     Glucose 69 (L) mg/dL      BUN 26 (H) mg/dL      Creatinine 4.60 (H) mg/dL      Sodium 136 mmol/L      Potassium 3.7 mmol/L      Chloride 102 mmol/L      CO2 20.0 mmol/L      Calcium 8.8 mg/dL      Albumin 3.70 g/dL      Phosphorus 5.1 mg/dL      Anion Gap 14.0 (H) mmol/L      BUN/Creatinine Ratio 5.7 (L)     eGFR Non  Amer 12 (L) mL/min/1.73     Narrative:       [employer] Guidelines    Stage     Description        GFR  1         Normal or High     90+  2         Mild decrease      60-89  3         Moderate decrease  30-59  4         Severe decrease    15-29  5         Kidney failure     <15        Imaging Results (last 24 hours)     Procedure Component Value Units Date/Time    XR Chest 1 View [[id number]] Collected:  06/19/17 0915     Updated:  06/19/17 1600    Narrative:       EXAMINATION: XR CHEST 1 VW- 06/19/2017     INDICATION: [id number] effusion, not elsewhere classified      COMPARISON: 06/17/2017     FINDINGS: Portable chest reveals heart to be enlarged. No change seen in  the moderate-sized left pleural effusion. Airspace disease seen within  the right lung which is new. No pneumothorax. The chest tubes have been  removed.           Impression:       No pneumothorax on the right with airspace disease seen  throughout the right lung. Findings are new. There is no change in the  moderate to large left pleural effusion or enlargement to the left  heart.     D:  06/19/2017  E:  06/19/2017     This report was finalized on 6/19/2017 3:58 PM by Dr. Iza Ho MD.       XR Chest 1 View [[id number]] Updated:  06/20/17 0522     "    Ventilator/Non-Invasive Ventilation Settings     Start     Ordered    06/14/17 1655  Ventilator - AC/VC+; (18); 30; 92%; 6; 400  Continuous     Question Answer Comment   Vent Mode AC/VC+    Breath rate  18   FiO2 30    FiO2 titrate for Sp02% =/> 92%    PEEP 6    Tidal Volume 400        06/14/17 1656          Operative/Procedure Notes (last 24 hours) (Notes from 6/19/2017  9:40 AM through 6/20/2017  9:40 AM)     No notes of this type exist for this encounter.           Physician Progress Notes (last 24 hours) (Notes from 6/19/2017  9:40 AM through 6/20/2017  9:40 AM)      Kavin Hightower MD at 6/19/2017 10:02 AM  Version 1 of 1            LOS: 7 days    Patient Care Team:  Neo Gresham DO as PCP - General (Family Medicine)    Reason For Visit:  F/U ESRD. SEEN ON DIALYSIS.  Subjective           Review of Systems:    Pulm: No soa   CV:  No CP      Objective       amitriptyline 10 mg Oral Nightly   amLODIPine 5 mg Oral Q24H   chlorhexidine 15 mL Mouth/Throat Q12H   docusate sodium 100 mg Oral BID   famotidine 20 mg Oral Daily   ipratropium-albuterol 3 mL Nebulization 4x Daily - RT   lidocaine 2 patch Transdermal Q24H   minoxidil 5 mg Oral Daily   mupirocin  Topical Q12H   nicotine 1 patch Transdermal Daily   piperacillin-tazobactam 2.25 g Intravenous Q8H   sennosides-docusate sodium 2 tablet Oral Nightly   vancomycin 500 mg Intravenous Once per day on Mon Wed Fri       dexmedetomidine 0.2-1.5 mcg/kg/hr Last Rate: 1 mcg/kg/hr (06/19/17 0846)   HYDROmorphone     Pharmacy to dose vancomycin           Vital Signs:  Blood pressure 98/43, pulse 85, temperature 97.7 °F (36.5 °C), temperature source Oral, resp. rate 24, height 61\" (154.9 cm), weight 97 lb (44 kg), SpO2 98 %, not currently breastfeeding.    Flowsheet Rows         First Filed Value    Admission Height  61\" (154.9 cm) Documented at 06/13/2017 0525    Admission Weight  97 lb (44 kg) Documented at 06/13/2017 0525          06/18 0701 - 06/19 0700  In: " 993 [I.V.:443]  Out: -     Physical Exam:    General Appearance: NAD, alert and cooperative, Ox3  Eyes: PER, conjunctivae and sclerae normal, no icterus  Lungs: respirations regular and unlabored, no crepitus, clear to auscultation  Heart/CV: regular rhythm & normal rate, no murmur, no gallop, no rub and no edema  Abdomen: not distended, soft, non-tender, no masses,  bowel sounds present  Skin: No rash, Warm and dry. + AVF.    Radiology:            Labs:    Results from last 7 days  Lab Units 06/19/17  0350 06/16/17  0334 06/15/17  0322   WBC 10*3/mm3 15.05* 11.14* 10.34   HEMOGLOBIN g/dL 11.0* 12.1 11.7   HEMATOCRIT % 34.4* 37.8 38.2   PLATELETS 10*3/mm3 212 212 210       Results from last 7 days  Lab Units 06/19/17  0350 06/16/17  0334 06/15/17  2354 06/15/17  1802 06/15/17  0322 06/14/17  0634 06/13/17  0637   SODIUM mmol/L 137 136  --   --  132 137 138   POTASSIUM mmol/L 4.4 4.3 4.3 5.6* 5.6* 4.2 4.4   CHLORIDE mmol/L 102 102  --   --  102 101 99   TOTAL CO2 mmol/L 18.0* 19.0*  --   --  20.0 22.0 24.0   BUN mg/dL 50* 43*  --   --  30* 46* 27*   CREATININE mg/dL 7.20* 6.10*  --   --  4.80* 7.10* 5.80*   CALCIUM mg/dL 7.3* 7.3*  --   --  7.3* 7.7* 8.1*   PHOSPHORUS mg/dL  --  4.8  --   --  4.2  --   --    MAGNESIUM mg/dL  --   --   --   --  1.9  --   --    ALBUMIN g/dL  --  3.40  --   --   --   --  3.70       Results from last 7 days  Lab Units 06/19/17  0350   GLUCOSE mg/dL 90         Results from last 7 days  Lab Units 06/13/17  0637   ALK PHOS U/L 98   BILIRUBIN mg/dL 0.1*   ALT (SGPT) U/L 20   AST (SGOT) U/L 20       Results from last 7 days  Lab Units 06/15/17  0340   PH, ARTERIAL pH units 7.416   PO2 ART mm Hg 119.0*   PCO2, ARTERIAL mm Hg 31.2*   HCO3 ART mmol/L 20.0             Estimated Creatinine Clearance: 8.2 mL/min (by C-G formula based on Cr of 7.2).      Assessment     Principal Problem:    Loculated pleural effusion  Active Problems:    Tobacco use    Bipolar disorder    Depression     "Hyperparathyroidism    Pneumonia of right lung due to infectious organism    ESRD (end stage renal disease) on dialysis    Renovascular hypertension    Hepatitis C antibody positive in blood    IV drug abuse    Anemia of ESRD    COPD exacerbation    Medical non-compliance            Impression: ESRD. ANEMIA.            Recommendations: HD TODAY.      Kavin Hightower MD  06/19/17  10:02 AM           Electronically signed by Kavin Hightower MD at 6/19/2017 10:04 AM      Altaf Diaz MD at 6/19/2017  3:15 PM  Version 1 of 1         INTENSIVIST NOTE     Hospital Day: 7      Ms. Mandy Espino, 26 y.o. female is followed for:   Loculated pleural effusion       SUBJECTIVE     26-year-old woman, current smoker, IV drug abuser with hepatitis C and end-stage renal disease presenting with a loculated parapneumonic effusion. June 14 she underwent thoracotomy with decortication.    Interval history:    Still complaining bitterly of postoperative pain but is more amenable to being mobilized and has been able to ambulate and sit in a chair today.    The patient's relevant past medical, surgical and social history were reviewed and updated in Epic as appropriate.       OBJECTIVE     Vital Sign Min/Max for last 24 hours  Temp  Min: 97.7 °F (36.5 °C)  Max: 98.3 °F (36.8 °C)   BP  Min: 92/45  Max: 227/113   Pulse  Min: 78  Max: 149   Resp  Min: 15  Max: 35   SpO2  Min: 89 %  Max: 100 %   Flow (L/min)  Min: 3  Max: 6   No Data Recorded      Intake/Output Summary (Last 24 hours) at 06/19/17 1515  Last data filed at 06/19/17 1410   Gross per 24 hour   Intake             1445 ml   Output             3270 ml   Net            -1825 ml      Flowsheet Rows         First Filed Value    Admission Height  61\" (154.9 cm) Documented at 06/13/2017 0525    Admission Weight  97 lb (44 kg) Documented at 06/13/2017 0525         Last 3 weights    06/13/17  0525 06/14/17  0958 06/14/17  1704   Weight: 97 lb (44 kg) 97 lb " "(44 kg) 97 lb (44 kg)            Objective:  General Appearance:  In no acute distress.    Vital signs: (most recent): Blood pressure 110/56, pulse 98, temperature 97.9 °F (36.6 °C), temperature source Axillary, resp. rate 20, height 61\" (154.9 cm), weight 97 lb (44 kg), SpO2 98 %, not currently breastfeeding.    HEENT: Normal HEENT exam.    Lungs:  Normal respiratory rate and normal effort.  She is not in respiratory distress.  There are decreased breath sounds.  No wheezes, rales or rhonchi.    Heart: Normal rate.  Regular rhythm.  S1 normal and S2 normal.  No murmur, gallop or friction rub.   Chest: Symmetric chest wall expansion. (Right chest tube ×2.  No drainage or air leak of significance)  Abdomen: Abdomen is soft and non-distended.  Bowel sounds are normal.   There is no abdominal tenderness.   There is no mass. There is no splenomegaly. There is no hepatomegaly.   Extremities: There is no deformity or dependent edema.    Neurological: Patient is alert and oriented to person, place and time.    Pupils:  Pupils are equal, round, and reactive to light.    Skin:  Warm and dry.              I reviewed the patient's new clinical results.  I reviewed the patient's new imaging results/reports including actual images and agree with reports.      Chest X-Ray: Significant left-sided effusion and atelectasis.  No right-sided pneumothorax.    INFUSIONS    dexmedetomidine 0.2-1.5 mcg/kg/hr Last Rate: 0.2 mcg/kg/hr (06/19/17 1410)   HYDROmorphone     Pharmacy to dose vancomycin           Results from last 7 days  Lab Units 06/19/17  0350 06/16/17  0334 06/15/17  0322   WBC 10*3/mm3 15.05* 11.14* 10.34   HEMOGLOBIN g/dL 11.0* 12.1 11.7   HEMATOCRIT % 34.4* 37.8 38.2   PLATELETS 10*3/mm3 212 212 210       Results from last 7 days  Lab Units 06/19/17  0350 06/16/17  0334 06/15/17  2354  06/15/17  0322   SODIUM mmol/L 137 136  --   --  132   POTASSIUM mmol/L 4.4 4.3 4.3  < > 5.6*   CHLORIDE mmol/L 102 102  --   --  102 "   TOTAL CO2 mmol/L 18.0* 19.0*  --   --  20.0   BUN mg/dL 50* 43*  --   --  30*   GLUCOSE mg/dL 90 68*  --   --  89   CREATININE mg/dL 7.20* 6.10*  --   --  4.80*   MAGNESIUM mg/dL  --   --   --   --  1.9   CALCIUM mg/dL 7.3* 7.3*  --   --  7.3*   < > = values in this interval not displayed.        Results from last 7 days  Lab Units 06/15/17  0340 06/14/17  1305   PH, ARTERIAL pH units 7.416 7.262*   PCO2, ARTERIAL mm Hg 31.2* 53.9*   PO2 ART mm Hg 119.0* 294.0*   FIO2 % 30 70         Mech Ventilation:      I reviewed the patient's medications.    Assessment/Plan   ASSESSMENT      Hospital Problem List     * (Principal)Loculated pleural effusion    Pneumonia of right lung due to infectious organism    Tobacco use    Bipolar disorder    Depression    Hyperparathyroidism    ESRD (end stage renal disease) on dialysis    Renovascular hypertension    Hepatitis C antibody positive in blood    Overview Signed 6/12/2017 12:47 PM by REY Zhou     Has not followed up with ID          IV drug abuse    Anemia of ESRD    COPD exacerbation    Medical non-compliance            Making progress with getting her mobilized and doing more pulmonary toilet.  Hopefully her chest x-ray will improve.  Part of what we are seeing is atelectasis but she certainly has a left effusion.  Hopefully this will respond to mobilization and dialysis as she currently is not agreeable to any additional procedures.          PLAN     -Continued efforts at pain control  Currently Dilaudid PCA and by mouth Percocet   -I would like her to remain off Precedex.  -Lidoderm   -Continue antibiotics per ID   -Continue mobilization and pulmonary toilet  -Monitor left pleural effusion.  As above.  -Dialysis Monday Wednesday Friday   -Home when pain reasonably controlled and chest x-ray improving           I discussed the patient's findings and my recommendations with patient and nursing staff     Plan of care and goals reviewed with  mulitdisciplinary team at daily rounds.    Altaf Diaz MD  Pulmonary and Critical Care Medicine  06/19/17 3:15 PM          Electronically signed by Altaf Diaz MD at 6/19/2017  3:18 PM      Fercho Singletary MD at 6/19/2017  4:29 PM  Version 1 of 1         [facility] Progress Note    6/12/2017      Antibiotics:  IV Anti-Infectives     Ordered     Dose/Rate Route Frequency Start Stop    06/19/17 1548  vancomycin oral solution 250 mg     Ordering Provider:  REY Waller    250 mg Oral Every 6 Hours Scheduled 06/19/17 1800      06/16/17 1101  vancomycin 500 mg/100 mL 0.9% NS IVPB (mbp)     Ordering Provider:  Kavin Mayo RPH    500 mg Intravenous Once per day on Mon Wed Fri 06/16/17 1200      06/12/17 2156  vancomycin in dextrose 5% 150 mL (VANCOCIN) IVPB 750 mg     Ordering Provider:  Brock Hooker MD    750 mg  over 60 Minutes Intravenous Once 06/13/17 1200 06/13/17 1244    06/12/17 2007  Pharmacy to dose vancomycin     Ordering Provider:  Jonelle Smith PA-C     Does not apply Continuous PRN 06/12/17 2004            CC:cough/pleurisy    HPI:  Patient is a 26 y.o. 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 difficile treated approximately January 2017, end-stage renal disease possibly from FSGS per past records, and numerous other comorbidity as outlined below. She has a history of medical noncompliance and apparently has left hospital on multiple occasions AGAINST MEDICAL ADVICE per outside notes.  She was apparently admitted mid May with diagnosis pneumonia and treated with empiric antibiotics but no specific microbiologic diagnosis. She is readmitted to [facility] with diagnosis right sided pneumonia and loculated pleural effusion associated with dyspnea/cough and right-sided pleurisy.  "She was transferred to [facility] for consideration of decortication. Of particular note, when she has done being treated at [facility], she prefers referral back to her infectious disease doctor, Dr. Witt.    6/14 decortication for empyema; post op hypercapnic resp failure requiring ventilatory support    6/19 CDiff positive     6/19/17 seen early and sleepy;  Nc O2; per nursing, no pressors, no rash, no new focal pain.  No bleeding, no abdominal pain    ROS:      6/19/17 per nursing, No F/C/S,  No rash, No N/V and resp status stable otherwise    All other systems negative for acute complaints on a 12 system review of systems    PE:   /71  Pulse 105  Temp 97.9 °F (36.6 °C) (Axillary)   Resp 20  Ht 61\" (154.9 cm)  Wt 97 lb (44 kg)  SpO2 100%  Breastfeeding? No  BMI 18.33 kg/m2    GENERAL: nc O2 sleepy  HEENT:  No conjunctival injection. No icterus. Oropharynx clear without evidence of thrush or exudate.     HEART: RRR; No murmur, rubs, gallops.   LUNGS: diminished on right c/t left, scattered rhonchi.    ABDOMEN: Soft, nontender, nondistended. Positive bowel sounds. No rebound or guarding. NO mass or HSM.  EXT:  No cyanosis, clubbing or edema. No cord.  : Genitalia generally unremarkable.  Without Burgos catheter.  SKIN: Warm and dry without cutaneous eruptions on Inspection/palpation.   Neck supple no mass  Lymph--neck and groin negative  Musculoskeletal-- no joint effusions appreciated in the arms and legs.  Free range of motion at shoulders elbows wrists, hips knees and ankles    IV with no redness or purulence    No IE phenomena     Laboratory Data      Results from last 7 days  Lab Units 06/19/17  0350 06/16/17  0334 06/15/17  0322   WBC 10*3/mm3 15.05* 11.14* 10.34   HEMOGLOBIN g/dL 11.0* 12.1 11.7   HEMATOCRIT % 34.4* 37.8 38.2   PLATELETS 10*3/mm3 212 212 210       Results from last 7 days  Lab Units 06/19/17  0350   SODIUM mmol/L 137   POTASSIUM mmol/L 4.4 "   CHLORIDE mmol/L 102   TOTAL CO2 mmol/L 18.0*   BUN mg/dL 50*   CREATININE mg/dL 7.20*   GLUCOSE mg/dL 90   CALCIUM mg/dL 7.3*       Results from last 7 days  Lab Units 06/13/17  0637   ALK PHOS U/L 98   BILIRUBIN mg/dL 0.1*   ALT (SGPT) U/L 20   AST (SGOT) U/L 20               Estimated Creatinine Clearance: 8.2 mL/min (by C-G formula based on Cr of 7.2).      Microbiology:      Radiology:  Imaging Results (last 72 hours)     ** No results found for the last 72 hours. **            Impression:   --Acute hypercapnic respiratory failure.  Remains intubated postop, sedated.  After decortication.  Small right apical pneumothorax with chest tube.    --Acute loculated right pleural effusion/empyema per cardiothoracic surgery  associated with right lower lobe pneumonia. Empiric antibiotics ongoing for community-acquired/healthcare associated pneumonia.  Decortication on June 14.  MRSA so far and recent cultures.  Tapered to IV vancomycin    --Rhinovirus positive    --Acute/recurrent C. difficile colitis.  History C. difficile in the past.  Oral vancomycin initiated.I discussed the risk and complexity of CDiff colitis with the patient/family. They understand the importance of hand hygiene with soap and water.  They know the importance of bleach containing cleaning solutions for solid surfaces and risk to contact.  They know to avoid anti motility agents such as lomotil/immodium and similar agents. They know antibiotics are not a guarantee for cure and there will be a risk for relapse/persistence of disease. I have referred them to CDC.gov for additional information as they see fit.     --History hepatitis C. I do not treat viral hepatitis as a part of my practice. She has seen hepatology at [facility] and she should be referred back there after discharge for ongoing care and evaluation regarding possible treatment options. I discussed with her risks associated with hepatitis C including chronic hepatitis/cirrhosis  "and hepatocellular carcinoma. Follow-up at  is her responsibility. She is aware     --End-stage renal disease associated with FSGS; dialysis ongoing     --History polysubstance abuse which she reports is not active.     --History medical noncompliance with multiple episodes of leaving hospital AGAINST MEDICAL ADVICE per outside records. She understands the implication of her behavior    PLAN:   --IV vancomycin/oral vancomycin     --Highly complex issues with high risk for further serious morbidity and other serious sequela/mortality     --Monitor IV and IV antibiotic with risk for systemic complication and potential drug interaction     --Check/review labs cultures and scans     --Discussed with microbiology. Partial history per nursing staff              Fercho Singletary MD  6/19/2017           Electronically signed by Fercho Singletary MD at 6/19/2017  4:31 PM      Shawn Hicks MD at 6/20/2017  7:04 AM  Version 1 of 1            LOS: 8 days   Patient Care Team:  Neo Gresham DO as PCP - General (Family Medicine)    Subjective resting comfortably    Objective    Vital Sign Min/Max for last 24 hours  Temp  Min: 97.7 °F (36.5 °C)  Max: 98.7 °F (37.1 °C)   BP  Min: 92/45  Max: 216/105   Pulse  Min: 81  Max: 121   Resp  Min: 18  Max: 24   SpO2  Min: 94 %  Max: 100 %   Flow (L/min)  Min: 3  Max: 6   No Data Recorded     Flowsheet Rows         First Filed Value    Admission Height  61\" (154.9 cm) Documented at 06/13/2017 0525    Admission Weight  97 lb (44 kg) Documented at 06/13/2017 0525          Physical Exam:    Wound:    Pulses:     Mediastinal and Chest Tube Drainage:       Results Review: Chest x-ray satisfactory    Results from last 7 days  Lab Units 06/20/17  0346   WBC 10*3/mm3 10.36   HEMOGLOBIN g/dL 11.1*   HEMATOCRIT % 36.2   PLATELETS 10*3/mm3 240       Results from last 7 days  Lab Units 06/20/17  0346   SODIUM mmol/L 136   POTASSIUM mmol/L 3.7   CHLORIDE mmol/L 102   TOTAL CO2 mmol/L 20.0 " "  BUN mg/dL 26*   CREATININE mg/dL 4.60*   GLUCOSE mg/dL 69*   CALCIUM mg/dL 8.8       Results from last 7 days  Lab Units 06/15/17  0340   PH, ARTERIAL pH units 7.416   PO2 ART mm Hg 119.0*   PCO2, ARTERIAL mm Hg 31.2*   HCO3 ART mmol/L 20.0         Assessment    Principal Problem:    Loculated pleural effusion  Active Problems:    Tobacco use    Bipolar disorder    Depression    Hyperparathyroidism    Pneumonia of right lung due to infectious organism    ESRD (end stage renal disease) on dialysis    Renovascular hypertension    Hepatitis C antibody positive in blood    IV drug abuse    Anemia of ESRD    COPD exacerbation    Medical non-compliance      We will see back in the office        Shawn Hicks MD  06/20/17  7:04 AM      Please note that portions of this note were completed with a voice recognition program. Efforts were made to edit the dictations, but words may be mistranscribed     Electronically signed by Shawn Hicks MD at 6/20/2017  7:04 AM      Kavin Hightower MD at 6/20/2017  9:18 AM  Version 1 of 1            LOS: 8 days    Patient Care Team:  Neo Gresham DO as PCP - General (Family Medicine)    Reason For Visit:  F/U ESRD  Subjective           Review of Systems:    Pulm: No soa   CV:  No CP      Objective       amitriptyline 10 mg Oral Nightly   amLODIPine 5 mg Oral Q24H   famotidine 20 mg Oral Daily   ipratropium-albuterol 3 mL Nebulization 4x Daily - RT   lidocaine 2 patch Transdermal Q24H   minoxidil 5 mg Oral Daily   nicotine 1 patch Transdermal Daily   vancomycin 500 mg Intravenous Once per day on Mon Wed Fri   vancomycin 250 mg Oral Q6H       HYDROmorphone    Pharmacy to dose vancomycin          Vital Signs:  Blood pressure (!) 223/117, pulse 118, temperature 98.4 °F (36.9 °C), resp. rate 22, height 61\" (154.9 cm), weight 97 lb (44 kg), SpO2 98 %, not currently breastfeeding.    Flowsheet Rows         First Filed Value    Admission Height  61\" (154.9 cm) Documented at " 06/13/2017 0525    Admission Weight  97 lb (44 kg) Documented at 06/13/2017 0525          06/19 0701 - 06/20 0700  In: 1584.5 [P.O.:1080; I.V.:304.5]  Out: 3295 [Urine:25]    Physical Exam:    General Appearance: MOANING  Eyes: PER, conjunctivae and sclerae normal, no icterus  Lungs: respirations regular and unlabored, no crepitus, clear to auscultation  Heart/CV: regular rhythm & normal rate, no murmur, no gallop, no rub and no edema  Abdomen: not distended, soft, non-tender, no masses,  bowel sounds present  Skin: No rash, Warm and dry. + AVF.    Radiology:            Labs:    Results from last 7 days  Lab Units 06/20/17 0346 06/19/17  0350 06/16/17  0334   WBC 10*3/mm3 10.36 15.05* 11.14*   HEMOGLOBIN g/dL 11.1* 11.0* 12.1   HEMATOCRIT % 36.2 34.4* 37.8   PLATELETS 10*3/mm3 240 212 212       Results from last 7 days  Lab Units 06/20/17  0346 06/19/17  0350 06/16/17  0334 06/15/17  2354  06/15/17  0322   SODIUM mmol/L 136 137 136  --   --  132   POTASSIUM mmol/L 3.7 4.4 4.3 4.3  < > 5.6*   CHLORIDE mmol/L 102 102 102  --   --  102   TOTAL CO2 mmol/L 20.0 18.0* 19.0*  --   --  20.0   BUN mg/dL 26* 50* 43*  --   --  30*   CREATININE mg/dL 4.60* 7.20* 6.10*  --   --  4.80*   CALCIUM mg/dL 8.8 7.3* 7.3*  --   --  7.3*   PHOSPHORUS mg/dL 5.1  --  4.8  --   --  4.2   MAGNESIUM mg/dL 2.3  --   --   --   --  1.9   ALBUMIN g/dL 3.70  --  3.40  --   --   --    < > = values in this interval not displayed.    Results from last 7 days  Lab Units 06/20/17  0346   GLUCOSE mg/dL 69*             Results from last 7 days  Lab Units 06/15/17  0340   PH, ARTERIAL pH units 7.416   PO2 ART mm Hg 119.0*   PCO2, ARTERIAL mm Hg 31.2*   HCO3 ART mmol/L 20.0             Estimated Creatinine Clearance: 12.9 mL/min (by C-G formula based on Cr of 4.6).      Assessment     Principal Problem:    Loculated pleural effusion  Active Problems:    Tobacco use    Bipolar disorder    Depression    Hyperparathyroidism    Pneumonia of right lung due to  infectious organism    ESRD (end stage renal disease) on dialysis    Renovascular hypertension    Hepatitis C antibody positive in blood    IV drug abuse    Anemia of ESRD    COPD exacerbation    Medical non-compliance            Impression: ESRD. PLEURAL EFFUSION.            Recommendations: HD 6/21/17.      Kavin Hightower MD  06/20/17  9:18 AM           Electronically signed by Kavni Hightower MD at 6/20/2017  9:20 AM        Consult Notes (last 24 hours) (Notes from 6/19/2017  9:40 AM through 6/20/2017  9:40 AM)     No notes of this type exist for this encounter.

## 2017-06-20 NOTE — PLAN OF CARE
Problem: Patient Care Overview (Adult)  Goal: Plan of Care Review  Outcome: Ongoing (interventions implemented as appropriate)    06/20/17 1412   Coping/Psychosocial Response Interventions   Plan Of Care Reviewed With patient   Patient Care Overview   Progress improving   Outcome Evaluation   Outcome Summary/Follow up Plan Pt more cooperative with IS and coughing and deep breathing more today. Still complains of constant pain at chest tube and surgery site. Palliative care team consulted today for pain management. Palliative APRN at bedside this afternoon to discuss plan of care, pt denied street drug use.  Increased xanax dose. SBP trending up and staying high despite prn meds. Scheduled metoptolol added . Ordered to tele.  Dialysis tomorrow.        Goal: Adult Individualization and Mutuality  Outcome: Ongoing (interventions implemented as appropriate)  Goal: Discharge Needs Assessment  Outcome: Ongoing (interventions implemented as appropriate)    Problem: Respiratory Insufficiency (Adult)  Goal: Acid/Base Balance  Outcome: Ongoing (interventions implemented as appropriate)  Goal: Effective Ventilation  Outcome: Ongoing (interventions implemented as appropriate)    Problem: Perioperative Period (Adult)  Goal: Signs and Symptoms of Listed Potential Problems Will be Absent or Manageable (Perioperative Period)  Outcome: Ongoing (interventions implemented as appropriate)    Problem: Lung Surgery (via Thoracotomy) (Adult)  Goal: Signs and Symptoms of Listed Potential Problems Will be Absent or Manageable (Lung Surgery)  Outcome: Ongoing (interventions implemented as appropriate)    Problem: Pain, Acute (Adult)  Goal: Acceptable Pain Control/Comfort Level  Outcome: Ongoing (interventions implemented as appropriate)    Problem: Pressure Ulcer Risk (Heri Scale) (Adult,Obstetrics,Pediatric)  Goal: Skin Integrity  Outcome: Ongoing (interventions implemented as appropriate)

## 2017-06-20 NOTE — PLAN OF CARE
Problem: Patient Care Overview (Adult)  Goal: Plan of Care Review  Outcome: Ongoing (interventions implemented as appropriate)    06/20/17 0626   Coping/Psychosocial Response Interventions   Plan Of Care Reviewed With patient   Patient Care Overview   Progress no change   Outcome Evaluation   Outcome Summary/Follow up Plan patient continuously complained of pain throughout night with dilaudid PCA in place, receiving PRN Dilaudid IV push q4 hours, xanax q6 hours, and percocet q4 hours, also requiring prn hydralazine q6 hours for SBP readings reaching 190s-200s, does pulmonary toilet exercises with extensive coaching , up and down to bedside commode approx 8 times during night with only 2 small loose bowel movements, needs to work on coughing and deep breathing, discussed at length with patient         Problem: Respiratory Insufficiency (Adult)  Goal: Acid/Base Balance  Outcome: Ongoing (interventions implemented as appropriate)    06/20/17 0626   Respiratory Insufficiency (Adult)   Acid/Base Balance making progress toward outcome       Goal: Effective Ventilation  Outcome: Ongoing (interventions implemented as appropriate)    06/20/17 0626   Respiratory Insufficiency (Adult)   Effective Ventilation making progress toward outcome         Problem: Perioperative Period (Adult)  Goal: Signs and Symptoms of Listed Potential Problems Will be Absent or Manageable (Perioperative Period)  Outcome: Ongoing (interventions implemented as appropriate)    Problem: Lung Surgery (via Thoracotomy) (Adult)  Goal: Signs and Symptoms of Listed Potential Problems Will be Absent or Manageable (Lung Surgery)  Outcome: Ongoing (interventions implemented as appropriate)    Problem: Pain, Acute (Adult)  Goal: Acceptable Pain Control/Comfort Level  Outcome: Ongoing (interventions implemented as appropriate)    06/20/17 0626   Pain, Acute (Adult)   Acceptable Pain Control/Comfort Level making progress toward outcome         Problem: Pressure  Ulcer Risk (Heri Scale) (Adult,Obstetrics,Pediatric)  Goal: Skin Integrity  Outcome: Ongoing (interventions implemented as appropriate)    06/20/17 0670   Pressure Ulcer Risk (Heri Scale) (Adult,Obstetrics,Pediatric)   Skin Integrity making progress toward outcome

## 2017-06-20 NOTE — PROGRESS NOTES
Nutrition Services    Multidisciplinary Rounds  Time: 20min  Patient Name: Mandy Espino  Date of Encounter: 10:52 AM  MRN: [id number]  Admission date: 6/12/2017      Reason for visit: MDR. RD to continue to follow per protocol.     Additional information obtained during MDR:  + C-diff.  Plan for palliative care consult for pain management.  Transfer out of the ICU.  Pt reports fair appetite, still drinking Boost G.C.      Current diet: Soft, chopped, Renal   Supplement:  Boost G.C. TID       Intervention:  Review menu  Follow treatment plan  Care plan reviewed    Follow up:   Per protocol      Electronically signed by:  Maxine Melgoza RD  06/20/17 10:52 AM

## 2017-06-20 NOTE — PROGRESS NOTES
[facility] Progress Note    6/12/2017      Antibiotics:  IV Anti-Infectives     Ordered     Dose/Rate Route Frequency Start Stop    06/19/17 1548  vancomycin oral solution 250 mg     Ordering Provider:  REY Waller    250 mg Oral Every 6 Hours Scheduled 06/19/17 1800      06/16/17 1101  vancomycin 500 mg/100 mL 0.9% NS IVPB (mbp)     Ordering Provider:  Kavin Mayo RPH    500 mg Intravenous Once per day on Mon Wed Fri 06/16/17 1200      06/12/17 2156  vancomycin in dextrose 5% 150 mL (VANCOCIN) IVPB 750 mg     Ordering Provider:  Brock Hooker MD    750 mg  over 60 Minutes Intravenous Once 06/13/17 1200 06/13/17 1244    06/12/17 2007  Pharmacy to dose vancomycin     Ordering Provider:  Jonelle Smith PA-C     Does not apply Continuous PRN 06/12/17 2004            CC:cough/pleurisy    HPI:  Patient is a 26 y.o. 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 difficile treated approximately January 2017, end-stage renal disease possibly from FSGS per past records, and numerous other comorbidity as outlined below. She has a history of medical noncompliance and apparently has left hospital on multiple occasions AGAINST MEDICAL ADVICE per outside notes.  She was apparently admitted mid May with diagnosis pneumonia and treated with empiric antibiotics but no specific microbiologic diagnosis. She is readmitted to [facility] with diagnosis right sided pneumonia and loculated pleural effusion associated with dyspnea/cough and right-sided pleurisy. She was transferred to [facility] for consideration of decortication. Of particular note, when she has done being treated at [facility], she prefers referral back to her infectious disease doctor, Dr. Granados.    6/14 decortication for empyema; post op  "hypercapnic resp failure requiring ventilatory support    6/19 CDiff positive     6/20/17 seen early and sleepy;  Nc O2; per nursing, no pressors, no rash, no new focal pain.  No bleeding, no abdominal pain    ROS:      6/20/17 per nursing, No F/C/S,  No rash, No N/V and resp status stable otherwise        PE:   /68  Pulse 118  Temp 98.4 °F (36.9 °C)  Resp 20  Ht 61\" (154.9 cm)  Wt 97 lb (44 kg)  SpO2 97%  Breastfeeding? No  BMI 18.33 kg/m2    GENERAL: nc O2 sleepy  HEENT:  No conjunctival injection. No icterus. Oropharynx clear without evidence of thrush or exudate.     HEART: RRR; No murmur, rubs, gallops.   LUNGS: diminished on right c/t left, scattered rhonchi.    ABDOMEN: Soft, nontender, nondistended. Positive bowel sounds. No rebound or guarding. NO mass or HSM.  EXT:  No cyanosis, clubbing or edema. No cord.  : Genitalia generally unremarkable.  Without Burgos catheter.  SKIN: Warm and dry without cutaneous eruptions on Inspection/palpation.   Neck supple no mass  Lymph--neck and groin negative  Musculoskeletal-- no joint effusions appreciated in the arms and legs.  Free range of motion at shoulders elbows wrists, hips knees and ankles    IV with no redness or purulence    No IE phenomena     Laboratory Data      Results from last 7 days  Lab Units 06/20/17  0346 06/19/17  0350 06/16/17  0334   WBC 10*3/mm3 10.36 15.05* 11.14*   HEMOGLOBIN g/dL 11.1* 11.0* 12.1   HEMATOCRIT % 36.2 34.4* 37.8   PLATELETS 10*3/mm3 240 212 212       Results from last 7 days  Lab Units 06/20/17  0346   SODIUM mmol/L 136   POTASSIUM mmol/L 3.7   CHLORIDE mmol/L 102   TOTAL CO2 mmol/L 20.0   BUN mg/dL 26*   CREATININE mg/dL 4.60*   GLUCOSE mg/dL 69*   CALCIUM mg/dL 8.8                   Estimated Creatinine Clearance: 12.9 mL/min (by C-G formula based on Cr of 4.6).      Microbiology:      Radiology:  Imaging Results (last 72 hours)     ** No results found for the last 72 hours. **            Impression:   --Acute " hypercapnic respiratory failure.  Remains intubated postop, sedated.  After decortication.  Small right apical pneumothorax with chest tube.    --Acute loculated right pleural effusion/empyema per cardiothoracic surgery  associated with right lower lobe pneumonia. Empiric antibiotics ongoing for community-acquired/healthcare associated pneumonia.  Decortication on June 14.  MRSA so far in recent cultures.  Tapered to IV vancomycin    --Rhinovirus positive    --Acute/recurrent C. difficile colitis.  History C. difficile in the past.  Oral vancomycin initiated.I      --History hepatitis C. I do not treat viral hepatitis as a part of my practice. She has seen hepatology at [facility] and she should be referred back there after discharge for ongoing care and evaluation regarding possible treatment options. I discussed with her risks associated with hepatitis C including chronic hepatitis/cirrhosis and hepatocellular carcinoma. Follow-up at  is her responsibility. She is aware     --End-stage renal disease associated with FSGS; dialysis ongoing     --History polysubstance abuse which she reports is not active.     --History medical noncompliance with multiple episodes of leaving hospital AGAINST MEDICAL ADVICE per outside records. She understands the implication of her behavior    PLAN:   --IV vancomycin/oral vancomycin     --Highly complex issues with high risk for further serious morbidity and other serious sequela/mortality     --Monitor IV and IV antibiotic with risk for systemic complication and potential drug interaction     --Check/review labs cultures and scans     --Discussed with microbiology. Partial history per nursing staff              Fercho Singletary MD  6/20/2017

## 2017-06-21 ENCOUNTER — APPOINTMENT (OUTPATIENT)
Dept: NEPHROLOGY | Facility: HOSPITAL | Age: 27
End: 2017-06-21
Attending: INTERNAL MEDICINE

## 2017-06-21 LAB
ALBUMIN SERPL-MCNC: 3.6 G/DL (ref 3.2–4.8)
ANION GAP SERPL CALCULATED.3IONS-SCNC: 18 MMOL/L (ref 3–11)
BACTERIA SPEC ANAEROBE CULT: NORMAL
BACTERIA SPEC ANAEROBE CULT: NORMAL
BUN BLD-MCNC: 39 MG/DL (ref 9–23)
BUN/CREAT SERPL: 6.2 (ref 7–25)
CALCIUM SPEC-SCNC: 8.4 MG/DL (ref 8.7–10.4)
CHLORIDE SERPL-SCNC: 98 MMOL/L (ref 99–109)
CO2 SERPL-SCNC: 20 MMOL/L (ref 20–31)
CREAT BLD-MCNC: 6.3 MG/DL (ref 0.6–1.3)
GFR SERPL CREATININE-BSD FRML MDRD: 8 ML/MIN/1.73
GLUCOSE BLD-MCNC: 95 MG/DL (ref 70–100)
PHOSPHATE SERPL-MCNC: 7 MG/DL (ref 2.4–5.1)
POTASSIUM BLD-SCNC: 3.9 MMOL/L (ref 3.5–5.5)
SODIUM BLD-SCNC: 136 MMOL/L (ref 132–146)
VANCOMYCIN SERPL-MCNC: 35.1 MCG/ML (ref 5–40)

## 2017-06-21 PROCEDURE — 25010000002 HYDROMORPHONE PER 4 MG: Performed by: FAMILY MEDICINE

## 2017-06-21 PROCEDURE — 80202 ASSAY OF VANCOMYCIN: CPT

## 2017-06-21 PROCEDURE — 99232 SBSQ HOSP IP/OBS MODERATE 35: CPT | Performed by: FAMILY MEDICINE

## 2017-06-21 PROCEDURE — 94799 UNLISTED PULMONARY SVC/PX: CPT

## 2017-06-21 PROCEDURE — 94760 N-INVAS EAR/PLS OXIMETRY 1: CPT

## 2017-06-21 PROCEDURE — 25010000002 HYDROMORPHONE PER 4 MG: Performed by: NURSE PRACTITIONER

## 2017-06-21 PROCEDURE — 80069 RENAL FUNCTION PANEL: CPT | Performed by: INTERNAL MEDICINE

## 2017-06-21 PROCEDURE — 94640 AIRWAY INHALATION TREATMENT: CPT

## 2017-06-21 RX ORDER — OXYCODONE HYDROCHLORIDE 5 MG/1
10 TABLET ORAL EVERY 4 HOURS PRN
Status: DISCONTINUED | OUTPATIENT
Start: 2017-06-21 | End: 2017-06-22

## 2017-06-21 RX ORDER — SACCHAROMYCES BOULARDII 250 MG
250 CAPSULE ORAL 2 TIMES DAILY
Status: DISCONTINUED | OUTPATIENT
Start: 2017-06-21 | End: 2017-06-23 | Stop reason: HOSPADM

## 2017-06-21 RX ORDER — ALPRAZOLAM 0.5 MG/1
0.5 TABLET ORAL 2 TIMES DAILY PRN
Status: DISCONTINUED | OUTPATIENT
Start: 2017-06-21 | End: 2017-06-22

## 2017-06-21 RX ADMIN — NICOTINE 1 PATCH: 21 PATCH, EXTENDED RELEASE TRANSDERMAL at 10:29

## 2017-06-21 RX ADMIN — Medication 250 MG: at 05:07

## 2017-06-21 RX ADMIN — Medication 5 MG: at 22:34

## 2017-06-21 RX ADMIN — ALPRAZOLAM 1 MG: 1 TABLET ORAL at 05:06

## 2017-06-21 RX ADMIN — OXYCODONE HYDROCHLORIDE AND ACETAMINOPHEN 1 TABLET: 10; 325 TABLET ORAL at 05:14

## 2017-06-21 RX ADMIN — IPRATROPIUM BROMIDE AND ALBUTEROL SULFATE 3 ML: .5; 3 SOLUTION RESPIRATORY (INHALATION) at 07:39

## 2017-06-21 RX ADMIN — IPRATROPIUM BROMIDE AND ALBUTEROL SULFATE 3 ML: .5; 3 SOLUTION RESPIRATORY (INHALATION) at 15:21

## 2017-06-21 RX ADMIN — OXYCODONE HYDROCHLORIDE AND ACETAMINOPHEN 1 TABLET: 10; 325 TABLET ORAL at 00:18

## 2017-06-21 RX ADMIN — HYDROMORPHONE HYDROCHLORIDE 1 MG: 1 INJECTION, SOLUTION INTRAMUSCULAR; INTRAVENOUS; SUBCUTANEOUS at 14:29

## 2017-06-21 RX ADMIN — Medication 250 MG: at 00:51

## 2017-06-21 RX ADMIN — ALPRAZOLAM 0.5 MG: 0.5 TABLET ORAL at 20:55

## 2017-06-21 RX ADMIN — IPRATROPIUM BROMIDE AND ALBUTEROL SULFATE 3 ML: .5; 3 SOLUTION RESPIRATORY (INHALATION) at 11:28

## 2017-06-21 RX ADMIN — Medication: at 00:51

## 2017-06-21 RX ADMIN — OXYCODONE HYDROCHLORIDE AND ACETAMINOPHEN 1 TABLET: 10; 325 TABLET ORAL at 12:33

## 2017-06-21 RX ADMIN — ALPRAZOLAM 1 MG: 1 TABLET ORAL at 12:33

## 2017-06-21 RX ADMIN — HYDROMORPHONE HYDROCHLORIDE 1 MG: 1 INJECTION, SOLUTION INTRAMUSCULAR; INTRAVENOUS; SUBCUTANEOUS at 10:54

## 2017-06-21 RX ADMIN — LIDOCAINE 2 PATCH: 50 PATCH CUTANEOUS at 10:31

## 2017-06-21 RX ADMIN — METOPROLOL SUCCINATE 50 MG: 50 TABLET, EXTENDED RELEASE ORAL at 10:30

## 2017-06-21 RX ADMIN — AMLODIPINE BESYLATE 5 MG: 5 TABLET ORAL at 05:11

## 2017-06-21 RX ADMIN — IPRATROPIUM BROMIDE AND ALBUTEROL SULFATE 3 ML: .5; 3 SOLUTION RESPIRATORY (INHALATION) at 20:14

## 2017-06-21 RX ADMIN — AMITRIPTYLINE HYDROCHLORIDE 10 MG: 10 TABLET, FILM COATED ORAL at 20:41

## 2017-06-21 RX ADMIN — HYDROMORPHONE HYDROCHLORIDE 1 MG: 1 INJECTION, SOLUTION INTRAMUSCULAR; INTRAVENOUS; SUBCUTANEOUS at 00:18

## 2017-06-21 RX ADMIN — Medication 250 MG: at 17:53

## 2017-06-21 RX ADMIN — AMLODIPINE BESYLATE 5 MG: 5 TABLET ORAL at 10:30

## 2017-06-21 RX ADMIN — Medication: at 15:30

## 2017-06-21 RX ADMIN — Medication 250 MG: at 17:54

## 2017-06-21 RX ADMIN — Medication 250 MG: at 12:33

## 2017-06-21 RX ADMIN — FAMOTIDINE 20 MG: 20 TABLET ORAL at 10:30

## 2017-06-21 RX ADMIN — OXYCODONE HYDROCHLORIDE 10 MG: 5 TABLET ORAL at 20:42

## 2017-06-21 RX ADMIN — MINOXIDIL 5 MG: 2.5 TABLET ORAL at 10:54

## 2017-06-21 RX ADMIN — HYDROMORPHONE HYDROCHLORIDE 1 MG: 1 INJECTION, SOLUTION INTRAMUSCULAR; INTRAVENOUS; SUBCUTANEOUS at 20:55

## 2017-06-21 NOTE — PROGRESS NOTES
[facility] Progress Note    6/12/2017      Antibiotics:  IV Anti-Infectives     Ordered     Dose/Rate Route Frequency Start Stop    06/19/17 1548  vancomycin oral solution 250 mg     Ordering Provider:  REY Waller    250 mg Oral Every 6 Hours Scheduled 06/19/17 1800      06/16/17 1101  vancomycin 500 mg/100 mL 0.9% NS IVPB (mbp)     Ordering Provider:  Kavin Mayo RPH    500 mg Intravenous Once per day on Mon Wed Fri 06/16/17 1200      06/12/17 2156  vancomycin in dextrose 5% 150 mL (VANCOCIN) IVPB 750 mg     Ordering Provider:  Brock Hooker MD    750 mg  over 60 Minutes Intravenous Once 06/13/17 1200 06/13/17 1244    06/12/17 2007  Pharmacy to dose vancomycin     Ordering Provider:  Jonelle Smith PA-C     Does not apply Continuous PRN 06/12/17 2004            CC:cough/pleurisy    HPI:  Patient is a 26 y.o. 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 difficile treated approximately January 2017, end-stage renal disease possibly from FSGS per past records, and numerous other comorbidity as outlined below. She has a history of medical noncompliance and apparently has left hospital on multiple occasions AGAINST MEDICAL ADVICE per outside notes.  She was apparently admitted mid May with diagnosis pneumonia and treated with empiric antibiotics but no specific microbiologic diagnosis. She is readmitted to [facility] with diagnosis right sided pneumonia and loculated pleural effusion associated with dyspnea/cough and right-sided pleurisy. She was transferred to [facility] for consideration of decortication. Of particular note, when she has done being treated at [facility], she prefers referral back to her infectious disease doctor, Dr. Granados.    6/14 decortication for empyema; post op  "hypercapnic resp failure requiring ventilatory support    6/19 CDiff positive     6/21/17 Generally feeling better;  Nc O2; per nursing, no pressors, no rash, no new focal pain.  No bleeding, no abdominal pain;  Diarrhea less;  Less SOB/less cough    ROS:      6/21/17 per nursing, No F/C/S,  No rash, No N/V and resp status stable otherwise        PE:   BP (!) 160/104  Pulse 101  Temp 98.2 °F (36.8 °C) (Oral)   Resp 14  Ht 61\" (154.9 cm)  Wt 125 lb 9.6 oz (57 kg)  SpO2 98%  Breastfeeding? No  BMI 23.73 kg/m2    GENERAL: nc O2 awake and NAD  HEENT:  No conjunctival injection. No icterus. Oropharynx clear without evidence of thrush or exudate.     HEART: RRR; No murmur, rubs, gallops.   LUNGS: diminished on right c/t left, scattered rhonchi.    ABDOMEN: Soft, nontender, nondistended. Positive bowel sounds. No rebound or guarding. NO mass or HSM.  EXT:  No cyanosis, clubbing or edema. No cord.  : Genitalia generally unremarkable.  Without Burgos catheter.  SKIN: Warm and dry without cutaneous eruptions on Inspection/palpation.   Neck supple no mass  Lymph--neck and groin negative  Musculoskeletal-- no joint effusions appreciated in the arms and legs.  Free range of motion at shoulders elbows wrists, hips knees and ankles    IV with no redness or purulence    No IE phenomena     Laboratory Data      Results from last 7 days  Lab Units 06/20/17  0346 06/19/17  0350 06/16/17  0334   WBC 10*3/mm3 10.36 15.05* 11.14*   HEMOGLOBIN g/dL 11.1* 11.0* 12.1   HEMATOCRIT % 36.2 34.4* 37.8   PLATELETS 10*3/mm3 240 212 212       Results from last 7 days  Lab Units 06/21/17  0458   SODIUM mmol/L 136   POTASSIUM mmol/L 3.9   CHLORIDE mmol/L 98*   TOTAL CO2 mmol/L 20.0   BUN mg/dL 39*   CREATININE mg/dL 6.30*   GLUCOSE mg/dL 95   CALCIUM mg/dL 8.4*                   Estimated Creatinine Clearance: 10.2 mL/min (by C-G formula based on Cr of 6.3).      Microbiology:      Radiology:  Imaging Results (last 72 hours)     ** No " results found for the last 72 hours. **            Impression:   --Acute hypercapnic respiratory failure.  Remains intubated postop, sedated.  After decortication.  Small right apical pneumothorax with chest tube.    --Acute loculated right pleural effusion/empyema per cardiothoracic surgery  associated with right lower lobe pneumonia. Empiric antibiotics ongoing for community-acquired/healthcare associated pneumonia.  Decortication on June 14.  MRSA so far in recent cultures.  Tapered to IV vancomycin    --Rhinovirus positive    --Acute/recurrent C. difficile colitis.  History C. difficile in the past.  Oral vancomycin initiated.I      --History hepatitis C. I do not treat viral hepatitis as a part of my practice. She has seen hepatology at [facility] and she should be referred back there after discharge for ongoing care and evaluation regarding possible treatment options. I discussed with her risks associated with hepatitis C including chronic hepatitis/cirrhosis and hepatocellular carcinoma. Follow-up at  is her responsibility. She is aware     --End-stage renal disease associated with FSGS; dialysis ongoing     --History polysubstance abuse which she reports is not active.     --History medical noncompliance with multiple episodes of leaving hospital AGAINST MEDICAL ADVICE per outside records. She understands the implication of her behavior    PLAN:   --IV vancomycin/oral vancomycin     --Highly complex issues with high risk for further serious morbidity and other serious sequela/mortality     --Monitor IV and IV antibiotic with risk for systemic complication and potential drug interaction     --Check/review labs cultures and scans     --Discussed with microbiology. Partial history per nursing staff              Fercho Singletary MD  6/21/2017

## 2017-06-21 NOTE — PLAN OF CARE
Problem: Patient Care Overview (Adult)  Goal: Plan of Care Review  Outcome: Ongoing (interventions implemented as appropriate)    06/20/17 1135   Coping/Psychosocial Response Interventions   Plan Of Care Reviewed With patient   Patient Care Overview   Progress no change   Outcome Evaluation   Outcome Summary/Follow up Plan New palliative consult for help with pain management. REY Morocho saw and will continue to see and support. Goal is to get her pain under control with regimen she can use at home

## 2017-06-21 NOTE — CONSULTS
"Palliative Care Progress Note    Patient Name: Mandy Espino   : 1990  Sex: female    Code Status: full  Advance Directive: none  Surrogate decision maker:   Documentation reflects advanced care planning previously discussed.    Patient reported pain was brought to a comfortable level within 48 hours after initial assessment: No    Date of Admission: 2017    Referring Provider: Dr. Diaz  Reason for Consult: pain mgmt    Subjective:    - pt transferred out of ICU to floor yesterday    - had dialysis today    - resting with eyes closed when I arrived; did not awaken to name initially but did awaken to name second time called    - c/o \"horrible pain\". States she can't sleep.     - pt had dialysis today    - continues on Dilaudid PCA - no basal rate  * bolus dose 0.1mg  * lock out 8 mintues  * one hour dose limit 0.75mg    From  at 8am until 0005 on  (16 hours):  Doses given: 19 (for total dosing of 1.9mg)  Attempts: 30    These figures are about the same as the previous day and pt has shown a progressive improvement (a decrease in need) overall (decrease in cumulative dose and attempts)    Pt also receiving PRN Dilaudid for an additional total of 6mg/day and PRN percocet 10mg four to five doses daily.     ROS:  Review of Systems   Constitutional: Positive for activity change, appetite change (I can't eat) and fatigue. Negative for fever.   HENT: Negative for trouble swallowing.    Eyes: Negative for visual disturbance.   Respiratory: Negative for cough and shortness of breath.    Cardiovascular: Negative for chest pain and leg swelling.   Gastrointestinal: Positive for diarrhea. Negative for nausea and vomiting.   Endocrine: Negative for polyuria.   Genitourinary: Negative for urgency.   Musculoskeletal: Positive for arthralgias and myalgias.   Neurological: Positive for weakness.   Psychiatric/Behavioral: Positive for sleep disturbance. The patient is nervous/anxious.  " "      Reviewed current scheduled and prn medications for route, type, dose and frequency.    HYDROmorphone    Pharmacy to dose vancomycin      •  albumin human  •  ALPRAZolam  •  artificial tears  •  benzonatate  •  bisacodyl  •  hydrALAZINE  •  HYDROmorphone  •  melatonin  •  naloxone  •  ondansetron **OR** ondansetron  •  oxyCODONE-acetaminophen  •  Pharmacy to dose vancomycin  •  simethicone  •  sodium chloride    Objective:   BP (!) 170/102  Pulse 108  Temp 98.3 °F (36.8 °C) (Oral)   Resp 18  Ht 61\" (154.9 cm)  Wt 125 lb 9.6 oz (57 kg)  SpO2 98%  Breastfeeding? No  BMI 23.73 kg/m2   Intake & Output (last day)       06/20 0701 - 06/21 0700 06/21 0701 - 06/22 0700    P.O.      I.V. (mL/kg) 11 (0.2)     IV Piggyback      Total Intake(mL/kg) 11 (0.2)     Urine (mL/kg/hr)      Other  2050 (8.8)    Stool      Total Output   2050    Net +11 -2050              Lab Results (last 24 hours)     Procedure Component Value Units Date/Time    Renal Function Panel [[id number]]  (Abnormal) Collected:  06/21/17 0458    Specimen:  Blood Updated:  06/21/17 0655     Glucose 95 mg/dL      BUN 39 (H) mg/dL      Creatinine 6.30 (H) mg/dL      Sodium 136 mmol/L      Potassium 3.9 mmol/L      Chloride 98 (L) mmol/L      CO2 20.0 mmol/L      Calcium 8.4 (L) mg/dL      Albumin 3.60 g/dL      Phosphorus 7.0 (H) mg/dL      Anion Gap 18.0 (H) mmol/L      BUN/Creatinine Ratio 6.2 (L)     eGFR Non  Amer 8 (L) mL/min/1.73     Narrative:       [employer] Guidelines    Stage     Description        GFR  1         Normal or High     90+  2         Mild decrease      60-89  3         Moderate decrease  30-59  4         Severe decrease    15-29  5         Kidney failure     <15    Vancomycin, Random [[id number]]  (Normal) Collected:  06/21/17 0458    Specimen:  Blood Updated:  06/21/17 0700     Vancomycin Random 35.10 mcg/mL         Imaging Results (last 24 hours)     Procedure Component Value Units Date/Time    XR Chest 1 " View [[id number]] Collected:  06/20/17 1136     Updated:  06/21/17 0902    Narrative:       EXAMINATION: XR CHEST 1 VIEW-06/20/2017:      INDICATION: Hypoxia; Q25-Tgwjxns effusion, not elsewhere classified.      COMPARISON: 06/19/2017.     FINDINGS: Portable chest reveals the heart to be enlarged with  small-to-moderate sized left pleural effusion. Calcified granuloma  identified within the right upper lobe. Minimal increased markings at  the right lung base with tiny right pleural effusion. Slight prominence  of the pulmonary vascularity. The bony structures are unremarkable.       Impression:       The heart is enlarged with prominence of the pulmonary  vascularity and small-to-moderate sized left pleural effusion. Tiny  right pleural effusion present.     D:  06/20/2017  E:  06/20/2017     This report was finalized on 6/21/2017 9:00 AM by Dr. Iza Ho MD.             Physical Exam:  Physical Exam   Constitutional: No distress.   Pt looks better today.   HENT:   Head: Head is without right periorbital erythema and without left periorbital erythema.   Eyes: Conjunctivae are normal. No scleral icterus.   Neck: No tracheal deviation present.   Cardiovascular: Regular rhythm.  Exam reveals no gallop and no friction rub.    Pulmonary/Chest: No stridor.   Shallow, even. Sounds better today - pt more relaxed.    Abdominal: Soft. She exhibits no distension. There is no tenderness.   Musculoskeletal: She exhibits no edema.   Neurological: She is alert.   Skin: Skin is dry. She is not diaphoretic. There is pallor.   Psychiatric: Her speech is normal. Her affect is blunt. She is slowed. She is not hyperactive, not actively hallucinating and not combative. Thought content is not delusional. She expresses no suicidal ideation. She expresses no homicidal plans. She exhibits abnormal remote memory. She is attentive.         Principal Problem:    Loculated pleural effusion  Active Problems:    Tobacco use    Bipolar  "disorder    Depression    Hyperparathyroidism    Pneumonia of right lung due to infectious organism    ESRD (end stage renal disease) on dialysis    Renovascular hypertension    Hepatitis C antibody positive in blood    IV drug abuse    Anemia of ESRD    COPD exacerbation    Medical non-compliance      Assessment/Plan:   Pain, Right sided Rib  Depression  Anxiety  Insomnia     Spoke with pt's PCP, REY Torres via phone. Long discussion regarding pt and plan of care. Pt does follow up with this APRN quite frequently, about 15 times a month. Most recent visit was 6/2/17. Will coordinate plan with pt and APRN. Discussed with pt her thoughts on an inpt admission for depression/substance abuse. She laughed and stated, \"I can't leave my  for that long.\" I reminded her of all the time she has spent as an inpt in the hospital. She said she would be willing to see a therapist;  can provide her a list. Pt does see a counselor who is affiliated with APRN's office but does not receive prescribed medications. Discussed with pt the adverse side effects from all the medication changes she has experienced over the past few months (on and off antidepressants for example; pt compliance an ongoing persistent issue). Anxiety is a large underlying component of pt's symptomatology and APRN states pt has done the best when Xanax scheduled.     Pt most fixated on her pain medication. \"This (dilaudid pca) just isn't working as well. Can I have some morphine?\". Informed pt of the overall mgmt plan to best take care of her and get her home, which includes discontinuing her dilaudid pca. Long discussion about IV pain medications. \"Ok, well then percocet works for me.\" Explained that is what is ordered (oxycodone) and she can ask for it when she needs it. Discussed nonpharm approaches and how her symptoms should and are improving, and she is and should be feeling better and therefore needing less pain medication. " "\"Can't I have this pump thing until I leave? Why not?\" Discussed again pain mgmt and goals. Pt has history of taking chronic narcotics, short fills, for a variety of reasons according to patient (not chronic pain). Also states the lidoderm patches are not effective. \"Can you do me just one favor? Can you give me one shot of something so I can sleep tonight?\". Pt aware she will not have anything IV scheduled but can ask for something for sleep - she has melatonin already ordered prn.     - d/c dilaudid gtt  - change IV prn Dilaudid to q8H --- goal is to taper to discontinue  - change percocet 10mg po q4H PRN to oxycodone 10mg q4H prn  - decrease Xanax to 0.5mg to BID prn  - out of bed for all meals  - strict I/O  - incentive spirometer often - encourage with each med admin and when in room  - discontinue lidoderm patches    Total Time with Patient: 45 minutes  Time In/Out: 0530-8020; 1415 - 1450    Time spent reviewing medical record, assessing and examining patient, discussing with family, nursing, answering questions, visiting room/floor, formulating a plan of care, and documentation of care.  > 50% face to face.    REY Morocho  (C) [phone]  (O) [phone]  06/21/17  11:06 AM  "

## 2017-06-21 NOTE — PROGRESS NOTES
Hospitalist Daily Progress Note    Date of Admission: 6/12/2017   LOS: 9 days   PCP: Neo Gresham DO    Chief Complaint:  f/u    Subjective   History of Present Illness  Pt transferred out from ICU today.  Pt seen in dialysis, eating breakfast c/o generalized weakness and malaise,   C/o right sided CP where Chest tube was pulled from yesterday.  No abd pain, no N/V/D.    Review of Systems  General: no fevers, chills  Respiratory: no cough, dyspnea, SOA, wheezing.  Cardiovascular: no chest pain, palpitations  Neurologic: No focal weakness, has generalized weakness  All other systems reviewed and negative.    Objective   Physical Exam:  I have reviewed the vital signs.  Temp:  [98 °F (36.7 °C)-98.6 °F (37 °C)] 98.2 °F (36.8 °C)  Heart Rate:  [102-123] 102  Resp:  [12-24] 12  BP: (138-185)/() 157/94    Objective  General Appearance:    Alert, cooperative, no distress  Head:    Normocephalic, atraumatic  Eyes:    PERRL EOMI, Sclerae anicteric  Neck:   Supple, no mass  Lungs: Clear to auscultation bilaterally, respirations unlabored  Heart:  Tachycardic, Regular rhythm, S1 and S2 normal, no murmur, rub or gallop  Abdomen:  Soft, non-tender, bowel sounds active, nondistended  Extremities: No clubbing, cyanosis, or edema to lower extremities, AVF in RUE  Pulses:  2+ and symmetric in distal lower extremities  Skin: No rashes no jaundice  Neurologic: Oriented x3, CN 2-12 intact, Normal strength to extremities    Results Review:    I have reviewed the labs, radiology results and diagnostic studies.      Results from last 7 days  Lab Units 06/20/17  0346   WBC 10*3/mm3 10.36   HEMOGLOBIN g/dL 11.1*   PLATELETS 10*3/mm3 240       Results from last 7 days  Lab Units 06/21/17  0458   SODIUM mmol/L 136   POTASSIUM mmol/L 3.9   TOTAL CO2 mmol/L 20.0   CREATININE mg/dL 6.30*   GLUCOSE mg/dL 95       I have reviewed the  medications.    ---------------------------------------------------------------------------------------------  Assessment/Plan   Assessment & Plan  Assessment/Problem List    Principal Problem:    Loculated pleural effusion  Active Problems:    Tobacco use    Bipolar disorder    Depression    Hyperparathyroidism    Pneumonia of right lung due to infectious organism    ESRD (end stage renal disease) on dialysis    Renovascular hypertension    Hepatitis C antibody positive in blood    IV drug abuse    Anemia of ESRD    COPD exacerbation    Medical non-compliance    26-year-old woman, current smoker, IV drug abuser with hepatitis C and end-stage renal disease on HD presented to [facility], Pulmonology was consulted concerning right pleural effusion. Patient subsequently underwent ultrasound guided thoracentesis which resulted in 200 mL. Serous fluid was sent to lab for analysis. Thoracentesis also demonstrated multiple loculations. It was determined that patient undergo evaluation per CT surgery for this per Dr. Hicks and was transferred to [facility].  On June 14 she underwent thoracotomy with decortication.  Postoperatively she developed a left-sided effusion and atelectasis on the contralateral side of her surgery due to the fact that she constantly would lay on that side and not do any pulmonary toilet.  Right sided CT placed and pulled out 6/20/17.     Plan  Loculated pleural effusion/empyema a/w CAP/HCAP(was admitted at [facility] recently) s/p thoracotomy with decortication, right CT pulled 6/20/17.   -continue pain management, pallliative consult to assist with pain management, presents challenges with h/o IVDA  -Continue mobilization and pulmonary toilet  -6/11 Blood Cx Negative x 5 days, 6/11 Sputum Cx MRSA, 6/12 pleural fluid cultures NGTD, Rhinovirus positive on NP swab.  -6/14 pleural fluid cx NGTD.  -Continue antibiotics per ID: Vancomycin IV pharm to dose (Start Date: 6/11),  Need duration of abx and options for IV abx in  order to plan for d/c.    Acute/recurrent C. difficile colitis- History C. difficile in the past. C. Diff positive 6/19/17. Oral vancomycin. Probiotic started.  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  IVDA- pt denies IV drug use but discharge records on 4/19 indicate pt found self administering street drugs.  Hep C-has seen hepatology at [facility] and she should be referred back there after discharge for ongoing care and evaluation regarding possible treatment options.  Hx medical noncompliance with multiple episodes of leaving hospital AGAINST MEDICAL ADVICE per outside records.     DVT prophylaxis:  SCDs  Discharge Planning: Need duration of abx and placement options if IV abx are needed prior to d/c. Ann Madison with [facility] in [place] to be called upon discharge [phone].    Raymond Loredo MD 06/21/17 8:37 AM

## 2017-06-21 NOTE — PROGRESS NOTES
Continued Stay Note  [facility]     Patient Name: Mandy Espino  MRN: [id number]  Today's Date: 6/21/2017    Admit Date: 6/12/2017          Discharge Plan       06/21/17 1119    Case Management/Social Work Plan    Additional Comments Call received from Ann Madison with [facility] in [place].  Ann requests that they be notified when patient is discharged at [phone].  Information given to CM on 4G.              Discharge Codes     None        Expected Discharge Date and Time     Expected Discharge Date Expected Discharge Time    Jun 22, 2017             Fabienne Dawn RN

## 2017-06-21 NOTE — PROGRESS NOTES
"   LOS: 9 days    Patient Care Team:  Neo Gresham DO as PCP - General (Family Medicine)    Reason For Visit:  F/U ESRD. SEEN ON DIALYSIS.  Subjective           Review of Systems:    Pulm: No soa   CV:  No CP      Objective       amitriptyline 10 mg Oral Nightly   amLODIPine 5 mg Oral Q24H   famotidine 20 mg Oral Daily   ipratropium-albuterol 3 mL Nebulization 4x Daily - RT   lidocaine 2 patch Transdermal Q24H   metoprolol succinate XL 50 mg Oral Q24H   minoxidil 5 mg Oral Daily   nicotine 1 patch Transdermal Daily   vancomycin 500 mg Intravenous Once per day on Mon Wed Fri   vancomycin 250 mg Oral Q6H       HYDROmorphone    Pharmacy to dose vancomycin          Vital Signs:  Blood pressure 157/94, pulse 102, temperature 98.2 °F (36.8 °C), temperature source Oral, resp. rate 12, height 61\" (154.9 cm), weight 125 lb 9.6 oz (57 kg), SpO2 98 %, not currently breastfeeding.    Flowsheet Rows         First Filed Value    Admission Height  61\" (154.9 cm) Documented at 06/13/2017 0525    Admission Weight  97 lb (44 kg) Documented at 06/13/2017 0525          06/20 0701 - 06/21 0700  In: 11 [I.V.:11]  Out: -     Physical Exam:    General Appearance: NAD, alert and cooperative, Ox3  Eyes: PER, conjunctivae and sclerae normal, no icterus  Lungs: respirations regular and unlabored, no crepitus, clear to auscultation  Heart/CV: regular rhythm & normal rate, no murmur, no gallop, no rub and no edema  Abdomen: not distended, soft, non-tender, no masses,  bowel sounds present  Skin: No rash, Warm and dry. AVF.    Radiology:            Labs:    Results from last 7 days  Lab Units 06/20/17  0346 06/19/17  0350 06/16/17  0334   WBC 10*3/mm3 10.36 15.05* 11.14*   HEMOGLOBIN g/dL 11.1* 11.0* 12.1   HEMATOCRIT % 36.2 34.4* 37.8   PLATELETS 10*3/mm3 240 212 212       Results from last 7 days  Lab Units 06/21/17  0458 06/20/17  0346 06/19/17  0350 06/16/17  0334  06/15/17  0322   SODIUM mmol/L 136 136 137 136  --  132   POTASSIUM mmol/L " 3.9 3.7 4.4 4.3  < > 5.6*   CHLORIDE mmol/L 98* 102 102 102  --  102   TOTAL CO2 mmol/L 20.0 20.0 18.0* 19.0*  --  20.0   BUN mg/dL 39* 26* 50* 43*  --  30*   CREATININE mg/dL 6.30* 4.60* 7.20* 6.10*  --  4.80*   CALCIUM mg/dL 8.4* 8.8 7.3* 7.3*  --  7.3*   PHOSPHORUS mg/dL 7.0* 5.1  --  4.8  --  4.2   MAGNESIUM mg/dL  --  2.3  --   --   --  1.9   ALBUMIN g/dL 3.60 3.70  --  3.40  --   --    < > = values in this interval not displayed.    Results from last 7 days  Lab Units 06/21/17  0458   GLUCOSE mg/dL 95             Results from last 7 days  Lab Units 06/15/17  0340   PH, ARTERIAL pH units 7.416   PO2 ART mm Hg 119.0*   PCO2, ARTERIAL mm Hg 31.2*   HCO3 ART mmol/L 20.0             Estimated Creatinine Clearance: 10.2 mL/min (by C-G formula based on Cr of 6.3).      Assessment     Principal Problem:    Loculated pleural effusion  Active Problems:    Tobacco use    Bipolar disorder    Depression    Hyperparathyroidism    Pneumonia of right lung due to infectious organism    ESRD (end stage renal disease) on dialysis    Renovascular hypertension    Hepatitis C antibody positive in blood    IV drug abuse    Anemia of ESRD    COPD exacerbation    Medical non-compliance            Impression: ESRD. PLEURAL EFFUSION.            Recommendations: HD TODAY.      Kavin Hightower MD  06/21/17  8:45 AM

## 2017-06-21 NOTE — PROGRESS NOTES
Pharmacy Consult--Antimicrobial Dosing  Pt is a 27 yo female with pneumonia of RLL, loculated parapneumonic effusion, Hep C.  History of drug abuse.  Pharmacy consulted to dose vancomycin for pneumonia.  ID consulted    Consulting Provider: Hospitalist    Current Antimicrobial Therapy  1.  Azithromycin (Start Date:  6/11)  2.  Zosyn  (Start Date:  6/12)  3.  Vancomycin  (Start Date:  6/11)    Allergies:  Acetaminophen; Hydrocodone; Ibuprofen; Lortab [hydrocodone-acetaminophen]; and Ciprofloxacin    Labs  Results from last 7 days   Lab Units 06/21/17  0458 06/20/17  0346 06/19/17  0350   SODIUM mmol/L 136 136 137   POTASSIUM mmol/L 3.9 3.7 4.4   CHLORIDE mmol/L 98* 102 102   TOTAL CO2 mmol/L 20.0 20.0 18.0*   BUN mg/dL 39* 26* 50*   CREATININE mg/dL 6.30* 4.60* 7.20*   CALCIUM mg/dL 8.4* 8.8 7.3*   GLUCOSE mg/dL 95 69* 90       Results from last 7 days   Lab Units 06/20/17  0346 06/19/17  0350 06/16/17  0334   WBC 10*3/mm3 10.36 15.05* 11.14*   HEMOGLOBIN g/dL 11.1* 11.0* 12.1   HEMATOCRIT % 36.2 34.4* 37.8   PLATELETS 10*3/mm3 240 212 212     Evaluation of Dosing  Weight:  44 kg  Goal Trough: 15-20    Chronic HD patient    I/O last 3 completed shifts:  In: 492 [P.O.:360; I.V.:132]  Out: 25 [Urine:25]     Microbiology and Radiology  Microbiology Results (last 10 days)       Procedure Component Value - Date/Time      Clostridium Difficile Toxin, PCR [[id number]]  (Abnormal) Collected:  06/19/17 1303    Lab Status:  Final result Specimen:  Stool from Per Rectum Updated:  06/19/17 1550     C. Difficile Toxins by PCR Detected (A)      [id number] Presumptive Positive        Respiratory Culture   Order: [id number]   Status:  Final result   Visible to patient:  No (Not Released)   Specimen Information: Cough; Sputum        Culture   Moderate growth (3+) Staphylococcus aureus, MRSA (C)   STEVE to follow.     Methicillin resistant Staphylococcus aureus, Patient may be an isolation risk.   Stain   Greater than 20 WBCs seen       Less than 10 Epithelial cells seen      Few (2+) Gram positive cocci in pairs, chains and clusters      Few (2+) Gram positive bacilli      Resulting Agency: [facility]   Susceptibility    Staphylococcus aureus, MRSA     STEVE     Amoxicillin + Clavulanate <=4/2 ug/ml Resistant     Ampicillin >8 ug/ml Resistant     Ampicillin + Sulbactam <=8/4 ug/ml Resistant     Cefazolin <=4 ug/ml Resistant     Ciprofloxacin <=1 ug/ml Susceptible     Clindamycin <=0.5 ug/ml Susceptible     Erythromycin >4 ug/ml Resistant     Gentamicin <=4 ug/ml Susceptible     Levofloxacin <=1 ug/ml Susceptible 1     Linezolid 4 ug/ml Susceptible     Moxifloxacin <=0.5 ug/ml Susceptible     Oxacillin >2 ug/ml Resistant     Penicillin G >8 ug/ml Resistant     Quinupristin + Dalfopristin <=1 ug/ml Susceptible     Rifampin <=1 ug/ml Susceptible     Tetracycline <=4 ug/ml Susceptible     Trimethoprim + Sulfamethoxazole <=0.5/9.5 u... Susceptible     Vancomycin 2 ug/ml Susceptible           1 Staphylococcus species may develop resistance during prolonged therapy with quinolones.  Isolates that are initially susceptible may become resistant within three to four days after initiation of therapy. Testing of repeat isolates may be warranted.         Specimen Collected: 06/11/17  5:26 PM Last Resulted: 06/15/17  8:23 AM                      Evaluation of Level  Lab Results   Component Value Date    [place] [address] (C) 05/09/2016    [facility] 35.10 06/21/2017         Vancomycin doses and levels this admission:  Vancomycin 1000 mg IV 6/11 at 1725   6/12 @ 2027 = 20.9  Vancomycin   750 mg IV 6/13 at 1144  6/14 @ 0634 = 38  6/16 @ 0334 = 25.8  Vancomycin   500 mg IV 6/16 at 1148  Vancomycin   500 mg IV 6/19 at 1254  6/21 @ 0458 = 35.1      Assessment/Plan:  Patient has had a few supratherapeutic vancomycin levels as pre-HD levels. Currently, she receives HD on MWF. Likely, she will not require a dose with each HD session, but maybe every other session. Her  Level this AM is ~36 hour level and pre-HD. I will not re-dose her with vancomycin today (Secondary to supratherapeutic level), despite her receiving HD today. Her next HD session will be 6/23, and I will check a random pre-HD level then. Will likely dose if that level is < 25.    Pharmacy will continue to follow closely, and make adjustments as needed. Thank you for this consult.  Brianne Flood, PharmD  6/21/2017  9:25 AM

## 2017-06-21 NOTE — SIGNIFICANT NOTE
1300 Team Meeting  Dr. Orlin Lemus, REY Brennan, Chaplain Viki Field RN, PN  [facility]

## 2017-06-22 LAB
ALBUMIN SERPL-MCNC: 3.5 G/DL (ref 3.2–4.8)
ANION GAP SERPL CALCULATED.3IONS-SCNC: 19 MMOL/L (ref 3–11)
BUN BLD-MCNC: 32 MG/DL (ref 9–23)
BUN/CREAT SERPL: 6.2 (ref 7–25)
CALCIUM SPEC-SCNC: 8.8 MG/DL (ref 8.7–10.4)
CHLORIDE SERPL-SCNC: 96 MMOL/L (ref 99–109)
CO2 SERPL-SCNC: 16 MMOL/L (ref 20–31)
CREAT BLD-MCNC: 5.2 MG/DL (ref 0.6–1.3)
GFR SERPL CREATININE-BSD FRML MDRD: 10 ML/MIN/1.73
GLUCOSE BLD-MCNC: 89 MG/DL (ref 70–100)
GLUCOSE BLDC GLUCOMTR-MCNC: 98 MG/DL (ref 70–130)
PHOSPHATE SERPL-MCNC: 5.6 MG/DL (ref 2.4–5.1)
POTASSIUM BLD-SCNC: 4 MMOL/L (ref 3.5–5.5)
SODIUM BLD-SCNC: 131 MMOL/L (ref 132–146)

## 2017-06-22 PROCEDURE — 25010000002 HYDROMORPHONE PER 4 MG: Performed by: NURSE PRACTITIONER

## 2017-06-22 PROCEDURE — 82962 GLUCOSE BLOOD TEST: CPT

## 2017-06-22 PROCEDURE — 99231 SBSQ HOSP IP/OBS SF/LOW 25: CPT | Performed by: FAMILY MEDICINE

## 2017-06-22 PROCEDURE — 94799 UNLISTED PULMONARY SVC/PX: CPT

## 2017-06-22 PROCEDURE — 94640 AIRWAY INHALATION TREATMENT: CPT

## 2017-06-22 PROCEDURE — 80069 RENAL FUNCTION PANEL: CPT | Performed by: INTERNAL MEDICINE

## 2017-06-22 PROCEDURE — 25010000002 HYDRALAZINE PER 20 MG: Performed by: PHYSICIAN ASSISTANT

## 2017-06-22 RX ORDER — OXYCODONE HYDROCHLORIDE 5 MG/1
5 TABLET ORAL EVERY 4 HOURS PRN
Status: DISCONTINUED | OUTPATIENT
Start: 2017-06-22 | End: 2017-06-23 | Stop reason: HOSPADM

## 2017-06-22 RX ORDER — ALPRAZOLAM 0.5 MG/1
0.5 TABLET ORAL EVERY 12 HOURS SCHEDULED
Status: DISCONTINUED | OUTPATIENT
Start: 2017-06-22 | End: 2017-06-23 | Stop reason: HOSPADM

## 2017-06-22 RX ORDER — ALBUMIN (HUMAN) 12.5 G/50ML
12.5 SOLUTION INTRAVENOUS AS NEEDED
Status: DISCONTINUED | OUTPATIENT
Start: 2017-06-22 | End: 2017-06-23 | Stop reason: HOSPADM

## 2017-06-22 RX ADMIN — AMITRIPTYLINE HYDROCHLORIDE 10 MG: 10 TABLET, FILM COATED ORAL at 23:55

## 2017-06-22 RX ADMIN — Medication 5 MG: at 22:06

## 2017-06-22 RX ADMIN — HYDRALAZINE HYDROCHLORIDE 10 MG: 20 INJECTION INTRAMUSCULAR; INTRAVENOUS at 23:51

## 2017-06-22 RX ADMIN — IPRATROPIUM BROMIDE AND ALBUTEROL SULFATE 3 ML: .5; 3 SOLUTION RESPIRATORY (INHALATION) at 11:21

## 2017-06-22 RX ADMIN — OXYCODONE HYDROCHLORIDE 10 MG: 5 TABLET ORAL at 02:52

## 2017-06-22 RX ADMIN — MINOXIDIL 5 MG: 2.5 TABLET ORAL at 09:11

## 2017-06-22 RX ADMIN — OXYCODONE HYDROCHLORIDE 5 MG: 5 TABLET ORAL at 23:52

## 2017-06-22 RX ADMIN — Medication 250 MG: at 12:44

## 2017-06-22 RX ADMIN — AMLODIPINE BESYLATE 5 MG: 5 TABLET ORAL at 09:11

## 2017-06-22 RX ADMIN — ALPRAZOLAM 0.5 MG: 0.5 TABLET ORAL at 05:45

## 2017-06-22 RX ADMIN — NICOTINE 1 PATCH: 21 PATCH, EXTENDED RELEASE TRANSDERMAL at 09:12

## 2017-06-22 RX ADMIN — IPRATROPIUM BROMIDE AND ALBUTEROL SULFATE 3 ML: .5; 3 SOLUTION RESPIRATORY (INHALATION) at 07:18

## 2017-06-22 RX ADMIN — OXYCODONE HYDROCHLORIDE 10 MG: 5 TABLET ORAL at 12:44

## 2017-06-22 RX ADMIN — HYDROMORPHONE HYDROCHLORIDE 1 MG: 1 INJECTION, SOLUTION INTRAMUSCULAR; INTRAVENOUS; SUBCUTANEOUS at 05:44

## 2017-06-22 RX ADMIN — IPRATROPIUM BROMIDE AND ALBUTEROL SULFATE 3 ML: .5; 3 SOLUTION RESPIRATORY (INHALATION) at 20:03

## 2017-06-22 RX ADMIN — ALPRAZOLAM 0.5 MG: 0.5 TABLET ORAL at 19:53

## 2017-06-22 RX ADMIN — Medication 250 MG: at 05:44

## 2017-06-22 RX ADMIN — Medication 250 MG: at 02:53

## 2017-06-22 RX ADMIN — IPRATROPIUM BROMIDE AND ALBUTEROL SULFATE 3 ML: .5; 3 SOLUTION RESPIRATORY (INHALATION) at 15:09

## 2017-06-22 RX ADMIN — Medication 250 MG: at 09:11

## 2017-06-22 RX ADMIN — Medication 250 MG: at 17:34

## 2017-06-22 RX ADMIN — METOPROLOL SUCCINATE 50 MG: 50 TABLET, EXTENDED RELEASE ORAL at 09:11

## 2017-06-22 RX ADMIN — FAMOTIDINE 20 MG: 20 TABLET ORAL at 09:11

## 2017-06-22 RX ADMIN — OXYCODONE HYDROCHLORIDE 10 MG: 5 TABLET ORAL at 09:11

## 2017-06-22 RX ADMIN — OXYCODONE HYDROCHLORIDE 5 MG: 5 TABLET ORAL at 19:21

## 2017-06-22 NOTE — PROGRESS NOTES
"Hospice Progress Note    Patient Name: Mandy Espino   : 1990  Sex: female    Code Status: full  Advance Directive: none  Surrogate decision maker:   Documentation reflects advanced care planning previously discussed.     Patient reported pain was brought to a comfortable level within 48 hours after initial assessment: No     Date of Admission: 2017    PCP: [phone]  REY Torres APRN    Referring Provider: Dr. Diaz  Reason for Consult: pain mgmt    Subjective:    Pt states she has not slept in three days. According to nursing, pt slept a few hours at a time last night.     Pt requesting pain medication this morning; threw her incentive spirometer at nursing when she was not given IV medication.     This morning when viewing pt from outside room she appears calm, feeding self lunch. Once I entered the room she stops eating, pushes tray away, starts rocking back and forth, and crying. \"I'm not in a good mood.\" When asked why, she stated, \"they say I threw things but I didn't.... Then I get upset and I can't breathe.\" As we talked, she calmed down. States \"it hurts... Bad\"; pain is on the Right side.    - Ate about 40% of lunch    - PRN usage:   2 doses of Dilaudid IV   melatonin  3 doses of Oxycodone    - pt wants to go home    - brother and girlfriend visiting    ROS:  Review of Systems   Constitutional: Positive for fatigue. Negative for activity change, appetite change and fever.   HENT: Negative for trouble swallowing.    Respiratory: Positive for cough and shortness of breath. Negative for choking.    Cardiovascular: Negative for chest pain and leg swelling.   Gastrointestinal: Positive for diarrhea. Negative for nausea and vomiting.        Denies abd pain   Endocrine: Negative for polyuria.   Musculoskeletal: Positive for myalgias. Negative for arthralgias.        States her pain is \"on my right side.\"   Neurological: Positive for weakness. " "  Psychiatric/Behavioral: Positive for agitation. The patient is nervous/anxious.        Reviewed current scheduled and prn medications for route, type, dose and frequency.    Pharmacy to dose vancomycin      •  ALPRAZolam  •  artificial tears  •  benzonatate  •  bisacodyl  •  hydrALAZINE  •  melatonin  •  naloxone  •  ondansetron **OR** ondansetron  •  oxyCODONE  •  Pharmacy to dose vancomycin  •  simethicone  •  sodium chloride    Objective:   /100  Pulse 94  Temp 98.2 °F (36.8 °C) (Oral)   Resp 20  Ht 61\" (154.9 cm)  Wt 124 lb 5 oz (56.4 kg)  SpO2 97%  Breastfeeding? No  BMI 23.49 kg/m2   Intake & Output (last day)       06/21 0701 - 06/22 0700 06/22 0701 - 06/23 0700    I.V. (mL/kg)      Total Intake(mL/kg)      Other 2050     Total Output 2050      Net -2050                Lab Results (last 24 hours)     Procedure Component Value Units Date/Time    Renal Function Panel [[id number]]  (Abnormal) Collected:  06/22/17 0435    Specimen:  Blood Updated:  06/22/17 0614     Glucose 89 mg/dL      BUN 32 (H) mg/dL      Creatinine 5.20 (H) mg/dL      Sodium 131 (L) mmol/L      Potassium 4.0 mmol/L      Chloride 96 (L) mmol/L      CO2 16.0 (L) mmol/L      Calcium 8.8 mg/dL      Albumin 3.50 g/dL      Phosphorus 5.6 (H) mg/dL      Anion Gap 19.0 (H) mmol/L      BUN/Creatinine Ratio 6.2 (L)     eGFR Non African Amer 10 (L) mL/min/1.73     Narrative:       National [employer] Guidelines    Stage     Description        GFR  1         Normal or High     90+  2         Mild decrease      60-89  3         Moderate decrease  30-59  4         Severe decrease    15-29  5         Kidney failure     <15    POC Glucose Fingerstick [[id number]]  (Normal) Collected:  06/22/17 0742    Specimen:  Blood Updated:  06/22/17 0817     Glucose 98 mg/dL     Narrative:       Meter: [id number] : [id number] [place] [employer]        Imaging Results (last 24 hours)     ** No results found for the last 24 hours. **          Physical " Exam:  Physical Exam   Constitutional: No distress.   Frail   HENT:   Head: Normocephalic.   Scratch on nose.   Eyes: Right eye exhibits no discharge. Left eye exhibits no discharge.   Neck: No tracheal deviation present.   Cardiovascular: Regular rhythm.  Tachycardia present.    Pulmonary/Chest: No respiratory distress.   Tracheal secretions, audible. Pt would not cough.   Abdominal: Soft. She exhibits no distension. There is no tenderness.   Musculoskeletal: She exhibits no edema.   Neurological: She is alert.   Skin: Skin is dry. She is not diaphoretic. There is pallor.       Principal Problem:    Loculated pleural effusion  Active Problems:    Tobacco use    Bipolar disorder    Depression    Hyperparathyroidism    Pneumonia of right lung due to infectious organism    ESRD (end stage renal disease) on dialysis    Renovascular hypertension    Hepatitis C antibody positive in blood    IV drug abuse    Anemia of ESRD    COPD exacerbation    Medical non-compliance      Assessment/Plan:     Pt continues to be tearful when provider at bedside but she actually looks better today than she has on previous exams. Her prn usage is less and decreasing (and not at max) which is hopefully evidence of some rest overnight and improvement in pain needs. Will continue to decrease her pain medication - will discontinue the remaining IV pain medication.     Spoke to her PCP REY Torres again today regarding plan of care. She has referred pt to pain clinic previously as the practice will not prescribe pt Oxycodone (pt's contact phone number keeps changing making it difficult for the pain clinic to contact her). Will schedule Xanax as pt will continue to have this prescribed by PCP.     Pt understands the current plan of care is preparing her for discharge home, which she wants to do.     - d/c IV prn Dilaudid   - Decrease prn Oxycodone to 5mg q4 prn  - Schedule Xanax 0.5mg BID   - pt will needs to follow up with pain clinic  upon discharge  - out of bed; participate in ADLs  - PCP has offered to help with the transition of care if they can be of service.     Total Time with Patient: 45 minutes  Time In/Out: 0692-0559; 1804-1318    Time spent reviewing medical record, assessing and examining patient, discussing with family, nursing, answering questions, visiting room/floor, formulating a plan of care, and documentation of care.  > 50% face to face.    REY Morocho  (C) [phone]  (O) [phone]  06/22/17  1:56 PM

## 2017-06-22 NOTE — PLAN OF CARE
Problem: Patient Care Overview (Adult)  Goal: Plan of Care Review  Outcome: Ongoing (interventions implemented as appropriate)    06/22/17 0451   Coping/Psychosocial Response Interventions   Plan Of Care Reviewed With patient   Patient Care Overview   Progress no change   Outcome Evaluation   Outcome Summary/Follow up Plan Pt. is still complaining of poor pain control

## 2017-06-22 NOTE — PROGRESS NOTES
Continued Stay Note  [facility]     Patient Name: Mandy Espino  MRN: [id number]  Today's Date: 6/22/2017    Admit Date: 6/12/2017          Discharge Plan       06/22/17 1553    Case Management/Social Work Plan    Plan HOME    Patient/Family In Agreement With Plan yes    Additional Comments Met with pt to f/u DCP.  Goal remains to return home upon DC.  No immediate needs identified/voiced.  CM will cont to follow and notify CYNTHIA Trujillo of DC date.                Discharge Codes     None        Expected Discharge Date and Time     Expected Discharge Date Expected Discharge Time    Jun 22, 2017             Princess Espino

## 2017-06-22 NOTE — PROGRESS NOTES
Hospitalist Daily Progress Note    Date of Admission: 6/12/2017   LOS: 10 days   PCP: Neo Gresham DO    Chief Complaint:  F/u pleural effusion    Subjective   History of Present Illness  Pt c/o generalized weakness and malaise,   C/o right sided CP where Chest tube was pulled from yesterday.  No abd pain, no N/V/D.    Review of Systems  General: no fevers, chills  Respiratory: no cough, dyspnea, SOA, wheezing.  Cardiovascular: no chest pain, palpitations  Neurologic: No focal weakness, has generalized weakness  All other systems reviewed and negative.    Objective   Physical Exam:  I have reviewed the vital signs.  Temp:  [98.2 °F (36.8 °C)-99.2 °F (37.3 °C)] 98.2 °F (36.8 °C)  Heart Rate:  [] 94  Resp:  [16-20] 20  BP: (142-161)/() 156/100    Objective  General Appearance:   Alert, cooperative, no distress  Head:   Normocephalic, atraumatic  Eyes:   PERRL EOMI, Sclerae anicteric  Neck:  Supple, no mass  Lungs: Clear to auscultation bilaterally, respirations unlabored  Heart: Tachycardic, Regular rhythm, S1 and S2 normal, no murmur, rub or gallop  Abdomen: Soft, non-tender, bowel sounds active, nondistended  Extremities: No clubbing, cyanosis, or edema to lower extremities, AVF in RUE  Pulses: 2+ and symmetric in distal lower extremities  Skin: No rashes no jaundice  Neurologic: Oriented x3, CN 2-12 intact, Normal strength to extremities    Results Review:    I have reviewed the labs, radiology results and diagnostic studies.      Results from last 7 days  Lab Units 06/20/17  0346   WBC 10*3/mm3 10.36   HEMOGLOBIN g/dL 11.1*   PLATELETS 10*3/mm3 240       Results from last 7 days  Lab Units 06/22/17  0435   SODIUM mmol/L 131*   POTASSIUM mmol/L 4.0   TOTAL CO2 mmol/L 16.0*   CREATININE mg/dL 5.20*   GLUCOSE mg/dL 89       I have reviewed the medications.    ---------------------------------------------------------------------------------------------  Assessment/Plan   Assessment &  Plan  Assessment/Problem List    Principal Problem:    Loculated pleural effusion  Active Problems:    Tobacco use    Bipolar disorder    Depression    Hyperparathyroidism    Pneumonia of right lung due to infectious organism    ESRD (end stage renal disease) on dialysis    Renovascular hypertension    Hepatitis C antibody positive in blood    IV drug abuse    Anemia of ESRD    COPD exacerbation    Medical non-compliance    26-year-old woman, current smoker, IV drug abuser with hepatitis C and end-stage renal disease on HD presented to [facility], Pulmonology was consulted concerning right pleural effusion. Patient subsequently underwent ultrasound guided thoracentesis which resulted in 200 mL. Serous fluid was sent to lab for analysis. Thoracentesis also demonstrated multiple loculations. It was determined that patient undergo evaluation per CT surgery for this per Dr. Hicks and was transferred to [facility].  On June 14 she underwent thoracotomy with decortication. Postoperatively she developed a left-sided effusion and atelectasis on the contralateral side of her surgery due to the fact that she constantly would lay on that side and not do any pulmonary toilet. Right sided CT placed and pulled out 6/20/17.     Plan  Loculated pleural effusion/empyema a/w CAP/HCAP(was admitted at [facility] recently) s/p thoracotomy with decortication, right CT pulled 6/20/17.   -continue pain management, pallliative consult to assist with pain management, presents challenges with h/o IVDA  -Continue mobilization and pulmonary toilet  -6/11 Blood Cx Negative x 5 days, 6/11 Sputum Cx MRSA, 6/12 pleural fluid cultures NGTD, Rhinovirus positive on NP swab.  -6/14 pleural fluid cx NGTD.  -Continue antibiotics per ID: Vancomycin IV pharm to dose with HD (Start Date: 6/11), Need duration of abx and options for IV abx in order to plan for d/c.     Acute/recurrent C. difficile colitis- History C. difficile in the past. C. Diff positive 6/19/17. Oral  vancomycin. Probiotic started.  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  IVDA- pt denies IV drug use but discharge records on 4/19 indicate pt found self administering street drugs.  Hep C-has seen hepatology at [facility] and she should be referred back there after discharge for ongoing care and evaluation regarding possible treatment options.  Hx medical noncompliance with multiple episodes of leaving hospital AGAINST MEDICAL ADVICE per outside records.      DVT prophylaxis: SCDs  Discharge Planning: Need duration of abx and placement options if IV abx are needed prior to d/c. Ann Madison with [facility] in [place] to be called upon discharge [phone].    Raymond Loredo MD 06/22/17 1:49 PM

## 2017-06-22 NOTE — PROGRESS NOTES
"   LOS: 10 days    Patient Care Team:  Neo Gresham DO as PCP - General (Family Medicine)    Reason For Visit:  F/U ESRD.  Subjective           Review of Systems:    Pulm: No soa   CV:  No CP      Objective       amitriptyline 10 mg Oral Nightly   amLODIPine 5 mg Oral Q24H   famotidine 20 mg Oral Daily   ipratropium-albuterol 3 mL Nebulization 4x Daily - RT   metoprolol succinate XL 50 mg Oral Q24H   minoxidil 5 mg Oral Daily   nicotine 1 patch Transdermal Daily   saccharomyces boulardii 250 mg Oral BID   vancomycin (dosing per levels)  Does not apply Daily   vancomycin 250 mg Oral Q6H       Pharmacy to dose vancomycin          Vital Signs:  Blood pressure 156/100, pulse 94, temperature 98.2 °F (36.8 °C), temperature source Oral, resp. rate 20, height 61\" (154.9 cm), weight 124 lb 5 oz (56.4 kg), SpO2 97 %, not currently breastfeeding.    Flowsheet Rows         First Filed Value    Admission Height  61\" (154.9 cm) Documented at 06/13/2017 0525    Admission Weight  97 lb (44 kg) Documented at 06/13/2017 0525          06/21 0701 - 06/22 0700  In: -   Out: 2050     Physical Exam:    General Appearance: NAD, alert and cooperative, Ox3  Eyes: PER, conjunctivae and sclerae normal, no icterus  Lungs: respirations regular and unlabored, no crepitus, clear to auscultation  Heart/CV: regular rhythm & normal rate, no murmur, no gallop, no rub and no edema  Abdomen: not distended, soft, non-tender, no masses,  bowel sounds present  Skin: No rash, Warm and dry. AVF.    Radiology:            Labs:    Results from last 7 days  Lab Units 06/20/17  0346 06/19/17  0350 06/16/17  0334   WBC 10*3/mm3 10.36 15.05* 11.14*   HEMOGLOBIN g/dL 11.1* 11.0* 12.1   HEMATOCRIT % 36.2 34.4* 37.8   PLATELETS 10*3/mm3 240 212 212       Results from last 7 days  Lab Units 06/22/17  0435 06/21/17  0458 06/20/17  0346 06/19/17  0350 06/16/17  0334   SODIUM mmol/L 131* 136 136 137 136   POTASSIUM mmol/L 4.0 3.9 3.7 4.4 4.3   CHLORIDE mmol/L 96* 98* " 102 102 102   TOTAL CO2 mmol/L 16.0* 20.0 20.0 18.0* 19.0*   BUN mg/dL 32* 39* 26* 50* 43*   CREATININE mg/dL 5.20* 6.30* 4.60* 7.20* 6.10*   CALCIUM mg/dL 8.8 8.4* 8.8 7.3* 7.3*   PHOSPHORUS mg/dL 5.6* 7.0* 5.1  --  4.8   MAGNESIUM mg/dL  --   --  2.3  --   --    ALBUMIN g/dL 3.50 3.60 3.70  --  3.40       Results from last 7 days  Lab Units 06/22/17  0435   GLUCOSE mg/dL 89                       Estimated Creatinine Clearance: 12.4 mL/min (by C-G formula based on Cr of 5.2).      Assessment     Principal Problem:    Loculated pleural effusion  Active Problems:    Tobacco use    Bipolar disorder    Depression    Hyperparathyroidism    Pneumonia of right lung due to infectious organism    ESRD (end stage renal disease) on dialysis    Renovascular hypertension    Hepatitis C antibody positive in blood    IV drug abuse    Anemia of ESRD    COPD exacerbation    Medical non-compliance            Impression: ESRD. ANEMIA.            Recommendations: HD 6/23/17. HOME SOON OK WITH RENAL.      Kavin Hightower MD  06/22/17  1:34 PM

## 2017-06-22 NOTE — PROGRESS NOTES
"Adult Nutrition  Assessment/PES    Patient Name:  Mandy Espino  YOB: 1990  MRN: [id number]  Admit Date:  6/12/2017    Assessment Date:  6/22/2017        Reason for Assessment       06/22/17 1351    Reason for Assessment    Reason For Assessment/Visit follow up protocol    Time Spent (min) 20    Diagnosis Diagnosis   Per notes this admission              Nutrition/Diet History       06/22/17 1352    Nutrition/Diet History    Reported/Observed By Patient    Appetite Poor    Food Habit/Preferences Uses Supplements    Other Pt reports eating \"ok\" and likes her supplements. Observed 50% of lunch tray to be consumed.             Anthropometrics       06/22/17 1353    Anthropometrics    Height 154.9 cm (61\")    Weight 56.4 kg (124 lb 5 oz)    Ideal Body Weight (IBW)    Ideal Body Weight (IBW), Female 48.55    % Ideal Body Weight 116.4    Body Mass Index (BMI)    BMI (kg/m2) 23.54            Labs/Tests/Procedures/Meds       06/22/17 1353    Labs/Tests/Procedures/Meds    Labs/Tests Review Reviewed              Nutrition Prescription Ordered       06/22/17 1354    Nutrition Prescription PO    Current PO Diet Soft Texture    Texture Chopped    Fluid Consistency Thin    Supplement Boost Glucose Control   strawberry    Supplement Frequency 3 times a day    Common Modifiers Renal            Evaluation of Received Nutrient/Fluid Intake       06/22/17 1355    PO Evaluation    Number of Days PO Intake Evaluated Insufficient Data   No data recorded.         Problem/Interventions:          Problem 2       06/22/17 1357    Nutrition Diagnoses Problem 2    Problem 2 Inadequate Intake/Infusion    Inadequate Intake Type Oral    Etiology (related to) --   clinical condition, poor appetite    Signs/Symptoms (evidenced by) Report of Minimal PO Intake              Intervention Goal       06/22/17 1357    Intervention Goal    General Nutrition support treatment    PO Increase intake            Nutrition Intervention      "  06/22/17 7899    Nutrition Intervention    RD/Tech Action Advise available snack;Encourage intake;Supplement provided;Follow Tx progress;Care plan reviewd;Other (comment)   Brought pt supplement              Education/Evaluation       06/22/17 1880    Monitor/Evaluation    Monitor Per protocol;PO intake;Supplement intake        Electronically signed by:  Mary Roper  06/22/17 1:58 PM

## 2017-06-23 ENCOUNTER — TELEPHONE (OUTPATIENT)
Dept: INTERNAL MEDICINE | Facility: CLINIC | Age: 27
End: 2017-06-23

## 2017-06-23 ENCOUNTER — APPOINTMENT (OUTPATIENT)
Dept: NEPHROLOGY | Facility: HOSPITAL | Age: 27
End: 2017-06-23
Attending: INTERNAL MEDICINE

## 2017-06-23 VITALS
RESPIRATION RATE: 20 BRPM | SYSTOLIC BLOOD PRESSURE: 159 MMHG | HEIGHT: 61 IN | TEMPERATURE: 99.1 F | BODY MASS INDEX: 23.47 KG/M2 | OXYGEN SATURATION: 97 % | WEIGHT: 124.31 LBS | HEART RATE: 100 BPM | DIASTOLIC BLOOD PRESSURE: 103 MMHG

## 2017-06-23 LAB
ALBUMIN SERPL-MCNC: 3.6 G/DL (ref 3.2–4.8)
ANION GAP SERPL CALCULATED.3IONS-SCNC: 18 MMOL/L (ref 3–11)
BUN BLD-MCNC: 46 MG/DL (ref 9–23)
BUN/CREAT SERPL: 7.1 (ref 7–25)
CALCIUM SPEC-SCNC: 8.7 MG/DL (ref 8.7–10.4)
CHLORIDE SERPL-SCNC: 96 MMOL/L (ref 99–109)
CO2 SERPL-SCNC: 18 MMOL/L (ref 20–31)
CREAT BLD-MCNC: 6.5 MG/DL (ref 0.6–1.3)
GFR SERPL CREATININE-BSD FRML MDRD: 8 ML/MIN/1.73
GLUCOSE BLD-MCNC: 92 MG/DL (ref 70–100)
PHOSPHATE SERPL-MCNC: 6.6 MG/DL (ref 2.4–5.1)
POTASSIUM BLD-SCNC: 4.5 MMOL/L (ref 3.5–5.5)
SODIUM BLD-SCNC: 132 MMOL/L (ref 132–146)
VANCOMYCIN SERPL-MCNC: 20.1 MCG/ML (ref 5–40)

## 2017-06-23 PROCEDURE — 80069 RENAL FUNCTION PANEL: CPT | Performed by: INTERNAL MEDICINE

## 2017-06-23 PROCEDURE — 94760 N-INVAS EAR/PLS OXIMETRY 1: CPT

## 2017-06-23 PROCEDURE — 94640 AIRWAY INHALATION TREATMENT: CPT

## 2017-06-23 PROCEDURE — 25010000002 VANCOMYCIN

## 2017-06-23 PROCEDURE — 94799 UNLISTED PULMONARY SVC/PX: CPT

## 2017-06-23 PROCEDURE — 80202 ASSAY OF VANCOMYCIN: CPT

## 2017-06-23 PROCEDURE — 99239 HOSP IP/OBS DSCHRG MGMT >30: CPT | Performed by: FAMILY MEDICINE

## 2017-06-23 RX ORDER — HYDRALAZINE HYDROCHLORIDE 50 MG/1
50 TABLET, FILM COATED ORAL EVERY 8 HOURS SCHEDULED
Status: DISCONTINUED | OUTPATIENT
Start: 2017-06-23 | End: 2017-06-23 | Stop reason: HOSPADM

## 2017-06-23 RX ORDER — FAMOTIDINE 20 MG/1
20 TABLET, FILM COATED ORAL DAILY
Qty: 30 TABLET | Refills: 0 | Status: SHIPPED | OUTPATIENT
Start: 2017-06-23 | End: 2017-07-23

## 2017-06-23 RX ORDER — ALBUTEROL SULFATE 1.25 MG/3ML
0.63 SOLUTION RESPIRATORY (INHALATION) ONCE
Status: DISCONTINUED | OUTPATIENT
Start: 2017-06-23 | End: 2017-06-23 | Stop reason: HOSPADM

## 2017-06-23 RX ORDER — SACCHAROMYCES BOULARDII 250 MG
250 CAPSULE ORAL 2 TIMES DAILY
Qty: 60 CAPSULE | Refills: 0 | Status: SHIPPED | OUTPATIENT
Start: 2017-06-23 | End: 2017-07-23

## 2017-06-23 RX ORDER — AMLODIPINE BESYLATE 5 MG/1
5 TABLET ORAL ONCE
Status: COMPLETED | OUTPATIENT
Start: 2017-06-23 | End: 2017-06-23

## 2017-06-23 RX ORDER — HYDRALAZINE HYDROCHLORIDE 50 MG/1
50 TABLET, FILM COATED ORAL EVERY 8 HOURS SCHEDULED
Qty: 90 TABLET | Refills: 0 | Status: SHIPPED | OUTPATIENT
Start: 2017-06-23 | End: 2017-07-23

## 2017-06-23 RX ORDER — AMLODIPINE BESYLATE 10 MG/1
10 TABLET ORAL
Qty: 30 TABLET | Refills: 0 | Status: SHIPPED | OUTPATIENT
Start: 2017-06-24 | End: 2017-07-24

## 2017-06-23 RX ORDER — BENZONATATE 100 MG/1
100 CAPSULE ORAL 3 TIMES DAILY PRN
Qty: 21 CAPSULE | Refills: 0 | Status: SHIPPED | OUTPATIENT
Start: 2017-06-23 | End: 2017-06-30

## 2017-06-23 RX ORDER — AMLODIPINE BESYLATE 10 MG/1
10 TABLET ORAL
Status: DISCONTINUED | OUTPATIENT
Start: 2017-06-24 | End: 2017-06-23 | Stop reason: HOSPADM

## 2017-06-23 RX ORDER — METOPROLOL SUCCINATE 50 MG/1
50 TABLET, EXTENDED RELEASE ORAL
Qty: 30 TABLET | Refills: 0 | Status: SHIPPED | OUTPATIENT
Start: 2017-06-23 | End: 2017-07-23

## 2017-06-23 RX ADMIN — IPRATROPIUM BROMIDE AND ALBUTEROL SULFATE 3 ML: .5; 3 SOLUTION RESPIRATORY (INHALATION) at 12:02

## 2017-06-23 RX ADMIN — Medication 250 MG: at 11:06

## 2017-06-23 RX ADMIN — NICOTINE 1 PATCH: 21 PATCH, EXTENDED RELEASE TRANSDERMAL at 09:57

## 2017-06-23 RX ADMIN — FAMOTIDINE 20 MG: 20 TABLET ORAL at 09:55

## 2017-06-23 RX ADMIN — VANCOMYCIN HYDROCHLORIDE 500 MG: 500 INJECTION, POWDER, LYOPHILIZED, FOR SOLUTION INTRAVENOUS at 11:05

## 2017-06-23 RX ADMIN — AMLODIPINE BESYLATE 5 MG: 5 TABLET ORAL at 12:00

## 2017-06-23 RX ADMIN — OXYCODONE HYDROCHLORIDE 5 MG: 5 TABLET ORAL at 11:05

## 2017-06-23 RX ADMIN — IPRATROPIUM BROMIDE AND ALBUTEROL SULFATE 3 ML: .5; 3 SOLUTION RESPIRATORY (INHALATION) at 07:11

## 2017-06-23 RX ADMIN — IPRATROPIUM BROMIDE AND ALBUTEROL SULFATE 3 ML: .5; 3 SOLUTION RESPIRATORY (INHALATION) at 16:27

## 2017-06-23 RX ADMIN — ALPRAZOLAM 0.5 MG: 0.5 TABLET ORAL at 09:54

## 2017-06-23 RX ADMIN — OXYCODONE HYDROCHLORIDE 5 MG: 5 TABLET ORAL at 15:27

## 2017-06-23 RX ADMIN — METOPROLOL SUCCINATE 50 MG: 50 TABLET, EXTENDED RELEASE ORAL at 09:55

## 2017-06-23 RX ADMIN — MINOXIDIL 5 MG: 2.5 TABLET ORAL at 11:05

## 2017-06-23 RX ADMIN — AMLODIPINE BESYLATE 5 MG: 5 TABLET ORAL at 09:54

## 2017-06-23 RX ADMIN — Medication 250 MG: at 09:55

## 2017-06-23 RX ADMIN — OXYCODONE HYDROCHLORIDE 5 MG: 5 TABLET ORAL at 06:21

## 2017-06-23 NOTE — PAYOR COMM NOTE
"Jermaine Wilkinson RN Utilization Review [phone]  Fax # [phone]    Ref # [id number]  Jermaine Espino (26 y.o. Female)     Date of Birth Social Security Number Address Home Phone MRN    1990  [id number] [place] [address] [phone] [id number]    Confucianist Marital Status          Denominational        Admission Date Admission Type Admitting Provider Attending Provider Department, Room/Bed    6/12/17 Elective Raymond Loredo MD Tovar, Jesus Victor, MD [address], S[address]/1    Discharge Date Discharge Disposition Discharge Destination                      Attending Provider: Raymond Loredo MD     Allergies:  Acetaminophen, Hydrocodone, Ibuprofen, Lortab [Hydrocodone-acetaminophen], Ciprofloxacin    Isolation:  Spore   Infection:  C.difficile (06/19/17), MRSA (06/15/17), Hepatitis C (04/19/17)   Code Status:  FULL    Ht:  61\" (154.9 cm)   Wt:  124 lb 5 oz (56.4 kg)    Admission Cmt:  None   Principal Problem:  Loculated pleural effusion [J90] More...                 Active Insurance as of 6/12/2017     Primary Coverage     Payor Plan Insurance Group Employer/Plan Group    [employer] [place] [employer] [place]ICA[place]      Payor Plan Address Payor Plan Phone Number Effective From Effective To    [address] [address] [phone] 6/1/2016     [place] [address]       Subscriber Name Subscriber Birth Date Member ID       JERMAINE ESPINO 1990 [id number]                 Emergency Contacts      (Rel.) Home Phone Work Phone Mobile Phone    SrinivasJin (Spouse) [phone] -- --            Hospital Medications (active)       Dose Frequency Start End    albumin human 25 % IV SOLN 12.5 g 12.5 g As Needed 6/22/2017 6/23/2017    Sig - Route: Infuse 50 mL into a venous catheter As Needed (Hypotension During Dialysis). - Intravenous    albuterol (PROVENTIL) nebulizer solution 0.042% 1.25 mg/3mL 0.63 mg Once 6/23/2017     Sig - Route: Take 1.51 mL by nebulization 1 (One) " Time. - Nebulization    ALPRAZolam (XANAX) tablet 0.5 mg 0.5 mg Every 12 Hours Scheduled 6/22/2017     Sig - Route: Take 1 tablet by mouth Every 12 (Twelve) Hours. - Oral    amitriptyline (ELAVIL) tablet 10 mg 10 mg Nightly 6/12/2017     Sig - Route: Take 1 tablet by mouth Every Night. - Oral    amLODIPine (NORVASC) tablet 5 mg 5 mg Every 24 Hours Scheduled 6/16/2017     Sig - Route: Take 1 tablet by mouth Daily. - Oral    artificial tears (LUBRIFRESH P.M.) ophthalmic ointment  Every 1 Hour PRN 6/14/2017     Sig - Route: Apply  topically Every 1 (One) Hour As Needed (dry eyes). - Topical    benzonatate (TESSALON) capsule 100 mg 100 mg 3 Times Daily PRN 6/12/2017     Sig - Route: Take 1 capsule by mouth 3 (Three) Times a Day As Needed for Cough. - Oral    bisacodyl (DULCOLAX) suppository 10 mg 10 mg Daily PRN 6/14/2017     Sig - Route: Insert 1 suppository into the rectum Daily As Needed for Constipation. - Rectal    famotidine (PEPCID) tablet 20 mg 20 mg Daily 6/15/2017     Sig - Route: Take 1 tablet by mouth Daily. - Oral    hydrALAZINE (APRESOLINE) injection 10 mg 10 mg Every 6 Hours PRN 6/12/2017     Sig - Route: Infuse 0.5 mL into a venous catheter Every 6 (Six) Hours As Needed for High Blood Pressure (SBP>160 DBP>100). - Intravenous    ipratropium-albuterol (DUO-NEB) nebulizer solution 3 mL 3 mL 4 Times Daily - RT 6/12/2017     Sig - Route: Take 3 mL by nebulization 4 (Four) Times a Day. - Nebulization    melatonin sublingual tablet 5 mg 5 mg Nightly PRN 6/12/2017     Sig - Route: Place 1 tablet under the tongue At Night As Needed (sleep). - Sublingual    metoprolol succinate XL (TOPROL-XL) 24 hr tablet 50 mg 50 mg Every 24 Hours Scheduled 6/20/2017     Sig - Route: Take 1 tablet by mouth Daily. - Oral    minoxidil (LONITEN) tablet 5 mg 5 mg Daily 6/18/2017     Sig - Route: Take 2 tablets by mouth Daily. - Oral    naloxone (NARCAN) injection 0.1 mg 0.1 mg Every 5 Minutes PRN 6/15/2017     Sig - Route: Infuse  "0.25 mL into a venous catheter Every 5 (Five) Minutes As Needed for Opioid Reversal or Respiratory Depression (see administration instructions). - Intravenous    nicotine (NICODERM CQ) 21 MG/24HR patch 1 patch 1 patch Daily 6/13/2017     Sig - Route: Place 1 patch on the skin Daily. - Transdermal    ondansetron (ZOFRAN) injection 4 mg 4 mg Every 6 Hours PRN 6/12/2017     Sig - Route: Infuse 2 mL into a venous catheter Every 6 (Six) Hours As Needed for Nausea or Vomiting. - Intravenous    Linked Group 1:  \"Or\" Linked Group Details        ondansetron (ZOFRAN) tablet 4 mg 4 mg Every 6 Hours PRN 6/12/2017     Sig - Route: Take 1 tablet by mouth Every 6 (Six) Hours As Needed for Nausea or Vomiting. - Oral    Linked Group 1:  \"Or\" Linked Group Details        oxyCODONE (ROXICODONE) immediate release tablet 5 mg 5 mg Every 4 Hours PRN 6/22/2017 7/1/2017    Sig - Route: Take 1 tablet by mouth Every 4 (Four) Hours As Needed for Severe Pain (7-10). - Oral    Pharmacy to dose vancomycin  Continuous PRN 6/12/2017     Sig - Route: Continuous As Needed for Consult. - Does not apply    saccharomyces boulardii (FLORASTOR) capsule 250 mg 250 mg 2 Times Daily 6/21/2017     Sig - Route: Take 1 capsule by mouth 2 (Two) Times a Day. - Oral    simethicone (MYLICON) chewable tablet 80 mg 80 mg 4 Times Daily PRN 6/16/2017     Sig - Route: Chew 1 tablet 4 (Four) Times a Day As Needed for Flatulence. - Oral    sodium chloride 0.9 % flush 1-10 mL 1-10 mL As Needed 6/12/2017     Sig - Route: Infuse 1-10 mL into a venous catheter As Needed for Line Care. - Intravenous    vancomycin (dosing per levels)  Daily 6/22/2017     Sig - Route: Daily. - Does not apply    vancomycin 500 mg/100 mL 0.9% NS IVPB (mbp) 500 mg Once 6/23/2017     Sig - Route: Infuse 100 mL into a venous catheter 1 (One) Time. - Intravenous    vancomycin oral solution 250 mg 250 mg Every 6 Hours Scheduled 6/19/2017     Sig - Route: Take 5 mL by mouth Every 6 (Six) Hours. - Oral "    ALPRAZolam (XANAX) tablet 0.5 mg (Discontinued) 0.5 mg 2 Times Daily PRN 6/21/2017 6/22/2017    Sig - Route: Take 1 tablet by mouth 2 (Two) Times a Day As Needed for Anxiety. - Oral    HYDROmorphone (DILAUDID) injection 1 mg (Discontinued) 1 mg Every 8 Hours PRN 6/21/2017 6/22/2017    Sig - Route: Infuse 1 mg into a venous catheter Every 8 (Eight) Hours As Needed for Severe Pain (7-10). - Intravenous    oxyCODONE (ROXICODONE) immediate release tablet 10 mg (Discontinued) 10 mg Every 4 Hours PRN 6/21/2017 6/22/2017    Sig - Route: Take 2 tablets by mouth Every 4 (Four) Hours As Needed for Severe Pain (7-10). - Oral             Physician Progress Notes (last 72 hours) (Notes from 6/20/2017  9:36 AM through 6/23/2017  9:36 AM)      Fercho Singletary MD at 6/20/2017 11:30 AM  Version 1 of 1         [facility] Progress Note    6/12/2017      Antibiotics:  IV Anti-Infectives     Ordered     Dose/Rate Route Frequency Start Stop    06/19/17 1548  vancomycin oral solution 250 mg     Ordering Provider:  REY Waller    250 mg Oral Every 6 Hours Scheduled 06/19/17 1800      06/16/17 1101  vancomycin 500 mg/100 mL 0.9% NS IVPB (mbp)     Ordering Provider:  Kavin Mayo RPH    500 mg Intravenous Once per day on Mon Wed Fri 06/16/17 1200      06/12/17 2156  vancomycin in dextrose 5% 150 mL (VANCOCIN) IVPB 750 mg     Ordering Provider:  Brock Hooker MD    750 mg  over 60 Minutes Intravenous Once 06/13/17 1200 06/13/17 1244    06/12/17 2007  Pharmacy to dose vancomycin     Ordering Provider:  Jonelle Smith PA-C     Does not apply Continuous PRN 06/12/17 2004            CC:cough/pleurisy    HPI:  Patient is a 26 y.o. 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 difficile treated approximately January 2017, end-stage renal disease possibly  "from FSGS per past records, and numerous other comorbidity as outlined below. She has a history of medical noncompliance and apparently has left hospital on multiple occasions AGAINST MEDICAL ADVICE per outside notes.  She was apparently admitted mid May with diagnosis pneumonia and treated with empiric antibiotics but no specific microbiologic diagnosis. She is readmitted to [facility] with diagnosis right sided pneumonia and loculated pleural effusion associated with dyspnea/cough and right-sided pleurisy. She was transferred to [facility] for consideration of decortication. Of particular note, when she has done being treated at [facility], she prefers referral back to her infectious disease doctor, Dr. Witt.    6/14 decortication for empyema; post op hypercapnic resp failure requiring ventilatory support    6/19 CDiff positive     6/20/17 seen early and sleepy;  Nc O2; per nursing, no pressors, no rash, no new focal pain.  No bleeding, no abdominal pain    ROS:      6/20/17 per nursing, No F/C/S,  No rash, No N/V and resp status stable otherwise        PE:   /68  Pulse 118  Temp 98.4 °F (36.9 °C)  Resp 20  Ht 61\" (154.9 cm)  Wt 97 lb (44 kg)  SpO2 97%  Breastfeeding? No  BMI 18.33 kg/m2    GENERAL: nc O2 sleepy  HEENT:  No conjunctival injection. No icterus. Oropharynx clear without evidence of thrush or exudate.     HEART: RRR; No murmur, rubs, gallops.   LUNGS: diminished on right c/t left, scattered rhonchi.    ABDOMEN: Soft, nontender, nondistended. Positive bowel sounds. No rebound or guarding. NO mass or HSM.  EXT:  No cyanosis, clubbing or edema. No cord.  : Genitalia generally unremarkable.  Without Burgos catheter.  SKIN: Warm and dry without cutaneous eruptions on Inspection/palpation.   Neck supple no mass  Lymph--neck and groin negative  Musculoskeletal-- no joint effusions appreciated in the arms and legs.  Free range of motion at shoulders " elbows wrists, hips knees and ankles    IV with no redness or purulence    No IE phenomena     Laboratory Data      Results from last 7 days  Lab Units 06/20/17  0346 06/19/17  0350 06/16/17  0334   WBC 10*3/mm3 10.36 15.05* 11.14*   HEMOGLOBIN g/dL 11.1* 11.0* 12.1   HEMATOCRIT % 36.2 34.4* 37.8   PLATELETS 10*3/mm3 240 212 212       Results from last 7 days  Lab Units 06/20/17  0346   SODIUM mmol/L 136   POTASSIUM mmol/L 3.7   CHLORIDE mmol/L 102   TOTAL CO2 mmol/L 20.0   BUN mg/dL 26*   CREATININE mg/dL 4.60*   GLUCOSE mg/dL 69*   CALCIUM mg/dL 8.8                   Estimated Creatinine Clearance: 12.9 mL/min (by C-G formula based on Cr of 4.6).      Microbiology:      Radiology:  Imaging Results (last 72 hours)     ** No results found for the last 72 hours. **            Impression:   --Acute hypercapnic respiratory failure.  Remains intubated postop, sedated.  After decortication.  Small right apical pneumothorax with chest tube.    --Acute loculated right pleural effusion/empyema per cardiothoracic surgery  associated with right lower lobe pneumonia. Empiric antibiotics ongoing for community-acquired/healthcare associated pneumonia.  Decortication on June 14.  MRSA so far in recent cultures.  Tapered to IV vancomycin    --Rhinovirus positive    --Acute/recurrent C. difficile colitis.  History C. difficile in the past.  Oral vancomycin initiated.I      --History hepatitis C. I do not treat viral hepatitis as a part of my practice. She has seen hepatology at [facility] and she should be referred back there after discharge for ongoing care and evaluation regarding possible treatment options. I discussed with her risks associated with hepatitis C including chronic hepatitis/cirrhosis and hepatocellular carcinoma. Follow-up at  is her responsibility. She is aware     --End-stage renal disease associated with FSGS; dialysis ongoing     --History polysubstance abuse which she reports is not  active.     --History medical noncompliance with multiple episodes of leaving hospital AGAINST MEDICAL ADVICE per outside records. She understands the implication of her behavior    PLAN:   --IV vancomycin/oral vancomycin     --Highly complex issues with high risk for further serious morbidity and other serious sequela/mortality     --Monitor IV and IV antibiotic with risk for systemic complication and potential drug interaction     --Check/review labs cultures and scans     --Discussed with microbiology. Partial history per nursing staff              Fercho Singletary MD  6/20/2017           Electronically signed by Fercho Singletary MD at 6/20/2017 11:31 AM      Altaf Diaz MD at 6/20/2017  1:34 PM  Version 1 of 1         INTENSIVIST NOTE     Hospital Day: 8      Ms. Mandy Espino, 26 y.o. female is followed for:   Loculated pleural effusion       SUBJECTIVE     26-year-old woman, current smoker, IV drug abuser with hepatitis C and end-stage renal disease presenting with a loculated parapneumonic effusion. June 14 she underwent thoracotomy with decortication.Postoperatively she developed a left-sided effusion and atelectasis on the contralateral side of her surgery due to the fact that she constantly would lay on that side and not do any pulmonary toilet.    Interval history:    Pain control remains a significant challenge for nursing as nothing that we give relieves her subjective pain and she is constantly crying and demanding pain medications.  This is inhibiting her progress forward.    The patient's relevant past medical, surgical and social history were reviewed and updated in Epic as appropriate.       OBJECTIVE     Vital Sign Min/Max for last 24 hours  Temp  Min: 98.1 °F (36.7 °C)  Max: 98.7 °F (37.1 °C)   BP  Min: 107/48  Max: 223/117   Pulse  Min: 98  Max: 123   Resp  Min: 18  Max: 24   SpO2  Min: 93 %  Max: 100 %   Flow (L/min)  Min: 3  Max: 4   No Data Recorded      Intake/Output  "Summary (Last 24 hours) at 06/20/17 1335  Last data filed at 06/20/17 0800   Gross per 24 hour   Intake            910.5 ml   Output               25 ml   Net            885.5 ml      Flowsheet Rows         First Filed Value    Admission Height  61\" (154.9 cm) Documented at 06/13/2017 0525    Admission Weight  97 lb (44 kg) Documented at 06/13/2017 0525         Last 3 weights    06/14/17  0958 06/14/17  1704   Weight: 97 lb (44 kg) 97 lb (44 kg)            Objective:  General Appearance:  In no acute distress.    Vital signs: (most recent): Blood pressure 178/85, pulse 107, temperature 98.4 °F (36.9 °C), resp. rate 20, height 61\" (154.9 cm), weight 97 lb (44 kg), SpO2 100 %, not currently breastfeeding.    HEENT: Normal HEENT exam.    Lungs:  Normal respiratory rate and normal effort.  She is not in respiratory distress.  There are decreased breath sounds.  No wheezes, rales or rhonchi.    Heart: Normal rate.  Regular rhythm.  S1 normal and S2 normal.  No murmur, gallop or friction rub.   Chest: Symmetric chest wall expansion. (Right chest tube ×2.  No drainage or air leak of significance)  Abdomen: Abdomen is soft and non-distended.  Bowel sounds are normal.   There is no abdominal tenderness.   There is no mass. There is no splenomegaly. There is no hepatomegaly.   Extremities: There is no deformity or dependent edema.    Neurological: Patient is alert and oriented to person, place and time.    Pupils:  Pupils are equal, round, and reactive to light.    Skin:  Warm and dry.              I reviewed the patient's new clinical results.  I reviewed the patient's new imaging results/reports including actual images and agree with reports.      Chest X-Ray: Improvement in her left-sided effusion and atelectasis.  Right side without pneumothorax.    INFUSIONS    HYDROmorphone    Pharmacy to dose vancomycin          Results from last 7 days  Lab Units 06/20/17  0346 06/19/17  0350 06/16/17  0334   WBC 10*3/mm3 10.36 15.05* " 11.14*   HEMOGLOBIN g/dL 11.1* 11.0* 12.1   HEMATOCRIT % 36.2 34.4* 37.8   PLATELETS 10*3/mm3 240 212 212       Results from last 7 days  Lab Units 06/20/17  0346 06/19/17  0350 06/16/17  0334  06/15/17  0322   SODIUM mmol/L 136 137 136  --  132   POTASSIUM mmol/L 3.7 4.4 4.3  < > 5.6*   CHLORIDE mmol/L 102 102 102  --  102   TOTAL CO2 mmol/L 20.0 18.0* 19.0*  --  20.0   BUN mg/dL 26* 50* 43*  --  30*   GLUCOSE mg/dL 69* 90 68*  --  89   CREATININE mg/dL 4.60* 7.20* 6.10*  --  4.80*   MAGNESIUM mg/dL 2.3  --   --   --  1.9   CALCIUM mg/dL 8.8 7.3* 7.3*  --  7.3*   < > = values in this interval not displayed.        Results from last 7 days  Lab Units 06/15/17  0340 06/14/17  1305   PH, ARTERIAL pH units 7.416 7.262*   PCO2, ARTERIAL mm Hg 31.2* 53.9*   PO2 ART mm Hg 119.0* 294.0*   FIO2 % 30 70         Mech Ventilation:      I reviewed the patient's medications.    Assessment/Plan   ASSESSMENT      Hospital Problem List     * (Principal)Loculated pleural effusion    Overview Signed 6/20/2017  1:35 PM by Altaf Diaz MD     Status post decortication         Pneumonia of right lung due to infectious organism    Tobacco use    Bipolar disorder    Depression    Hyperparathyroidism    ESRD (end stage renal disease) on dialysis    Renovascular hypertension    Hepatitis C antibody positive in blood    Overview Signed 6/12/2017 12:47 PM by Maria G Toscano APRN     Has not followed up with ID          IV drug abuse    Anemia of ESRD    COPD exacerbation    Medical non-compliance            Continuing to make some progress with mobilization and pulmonary toilet.  Her chest x-ray has actually improved on the left.  Pain control remains a major challenge for nursing.  I will transfer her to telemetry but am concerned about the ability of the less intense nursing staffing to accommodate her pain issues         PLAN     -Continued efforts at pain control  Currently Dilaudid PCA and by mouth Percocet   -Will ask  for assistance from pain management.  See above.  -Increase Xanax  -Lidoderm   -Continue antibiotics per ID   -Continue mobilization and pulmonary toilet  -Monitor left pleural effusion.  It is improved  -Dialysis Monday Wednesday Friday   -Home when pain reasonably controlled and chest x-ray improving           I discussed the patient's findings and my recommendations with patient and nursing staff     Plan of care and goals reviewed with mulitdisciplinary team at daily rounds.    Altaf Diaz MD  Pulmonary and Critical Care Medicine  06/20/17 1:35 PM          Electronically signed by Altaf Diaz MD at 6/20/2017  1:39 PM      Raymond Loredo MD at 6/21/2017  8:25 AM  Version 1 of 1         Hospitalist Daily Progress Note    Date of Admission: 6/12/2017   LOS: 9 days   PCP: Neo Gresham DO    Chief Complaint:  f/u    Subjective   History of Present Illness  Pt transferred out from ICU today.  Pt seen in dialysis, eating breakfast c/o generalized weakness and malaise,   C/o right sided CP where Chest tube was pulled from yesterday.  No abd pain, no N/V/D.    Review of Systems  General: no fevers, chills  Respiratory: no cough, dyspnea, SOA, wheezing.  Cardiovascular: no chest pain, palpitations  Neurologic: No focal weakness, has generalized weakness  All other systems reviewed and negative.    Objective   Physical Exam:  I have reviewed the vital signs.  Temp:  [98 °F (36.7 °C)-98.6 °F (37 °C)] 98.2 °F (36.8 °C)  Heart Rate:  [102-123] 102  Resp:  [12-24] 12  BP: (138-185)/() 157/94    Objective  General Appearance:    Alert, cooperative, no distress  Head:    Normocephalic, atraumatic  Eyes:    PERRL EOMI, Sclerae anicteric  Neck:   Supple, no mass  Lungs: Clear to auscultation bilaterally, respirations unlabored  Heart:  Tachycardic, Regular rhythm, S1 and S2 normal, no murmur, rub or gallop  Abdomen:  Soft, non-tender, bowel sounds active, nondistended  Extremities: No  clubbing, cyanosis, or edema to lower extremities, AVF in RUE  Pulses:  2+ and symmetric in distal lower extremities  Skin: No rashes no jaundice  Neurologic: Oriented x3, CN 2-12 intact, Normal strength to extremities    Results Review:    I have reviewed the labs, radiology results and diagnostic studies.      Results from last 7 days  Lab Units 06/20/17  0346   WBC 10*3/mm3 10.36   HEMOGLOBIN g/dL 11.1*   PLATELETS 10*3/mm3 240       Results from last 7 days  Lab Units 06/21/17  0458   SODIUM mmol/L 136   POTASSIUM mmol/L 3.9   TOTAL CO2 mmol/L 20.0   CREATININE mg/dL 6.30*   GLUCOSE mg/dL 95       I have reviewed the medications.    ---------------------------------------------------------------------------------------------  Assessment/Plan   Assessment & Plan  Assessment/Problem List    Principal Problem:    Loculated pleural effusion  Active Problems:    Tobacco use    Bipolar disorder    Depression    Hyperparathyroidism    Pneumonia of right lung due to infectious organism    ESRD (end stage renal disease) on dialysis    Renovascular hypertension    Hepatitis C antibody positive in blood    IV drug abuse    Anemia of ESRD    COPD exacerbation    Medical non-compliance    26-year-old woman, current smoker, IV drug abuser with hepatitis C and end-stage renal disease on HD presented to [facility], Pulmonology was consulted concerning right pleural effusion. Patient subsequently underwent ultrasound guided thoracentesis which resulted in 200 mL. Serous fluid was sent to lab for analysis. Thoracentesis also demonstrated multiple loculations. It was determined that patient undergo evaluation per CT surgery for this per Dr. Hicks and was transferred to [facility].  On June 14 she underwent thoracotomy with decortication.  Postoperatively she developed a left-sided effusion and atelectasis on the contralateral side of her surgery due to the fact that she constantly would lay on that side and not do any pulmonary toilet.  Right  sided CT placed and pulled out 6/20/17.     Plan  Loculated pleural effusion/empyema a/w CAP/HCAP(was admitted at [facility] recently) s/p thoracotomy with decortication, right CT pulled 6/20/17.   -continue pain management, pallliative consult to assist with pain management, presents challenges with h/o IVDA  -Continue mobilization and pulmonary toilet  -6/11 Blood Cx Negative x 5 days, 6/11 Sputum Cx MRSA, 6/12 pleural fluid cultures NGTD, Rhinovirus positive on NP swab.  -6/14 pleural fluid cx NGTD.  -Continue antibiotics per ID: Vancomycin IV pharm to dose (Start Date: 6/11),  Need duration of abx and options for IV abx in order to plan for d/c.    Acute/recurrent C. difficile colitis- History C. difficile in the past. C. Diff positive 6/19/17. Oral vancomycin. Probiotic started.  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  IVDA- pt denies IV drug use but discharge records on 4/19 indicate pt found self administering street drugs.  Hep C-has seen hepatology at [facility] and she should be referred back there after discharge for ongoing care and evaluation regarding possible treatment options.  Hx medical noncompliance with multiple episodes of leaving hospital AGAINST MEDICAL ADVICE per outside records.     DVT prophylaxis:  SCDs  Discharge Planning: Need duration of abx and placement options if IV abx are needed prior to d/c. Ann [place] with [facility] in [place] to be called upon discharge [phone].    Raymond Loredo MD 06/21/17 8:37 AM               Electronically signed by Raymond Loredo MD at 6/21/2017 12:53 PM      Kavin Hightower MD at 6/21/2017  8:45 AM  Version 1 of 1            LOS: 9 days    Patient Care Team:  Neo Gresham DO as PCP - General (Family Medicine)    Reason For Visit:  F/U ESRD. SEEN ON DIALYSIS.  Subjective           Review of Systems:    Pulm: No soa   CV:  No CP      Objective       amitriptyline 10 mg Oral Nightly   amLODIPine 5 mg Oral Q24H  "  famotidine 20 mg Oral Daily   ipratropium-albuterol 3 mL Nebulization 4x Daily - RT   lidocaine 2 patch Transdermal Q24H   metoprolol succinate XL 50 mg Oral Q24H   minoxidil 5 mg Oral Daily   nicotine 1 patch Transdermal Daily   vancomycin 500 mg Intravenous Once per day on Mon Wed Fri   vancomycin 250 mg Oral Q6H       HYDROmorphone    Pharmacy to dose vancomycin          Vital Signs:  Blood pressure 157/94, pulse 102, temperature 98.2 °F (36.8 °C), temperature source Oral, resp. rate 12, height 61\" (154.9 cm), weight 125 lb 9.6 oz (57 kg), SpO2 98 %, not currently breastfeeding.    Flowsheet Rows         First Filed Value    Admission Height  61\" (154.9 cm) Documented at 06/13/2017 0525    Admission Weight  97 lb (44 kg) Documented at 06/13/2017 0525          06/20 0701 - 06/21 0700  In: 11 [I.V.:11]  Out: -     Physical Exam:    General Appearance: NAD, alert and cooperative, Ox3  Eyes: PER, conjunctivae and sclerae normal, no icterus  Lungs: respirations regular and unlabored, no crepitus, clear to auscultation  Heart/CV: regular rhythm & normal rate, no murmur, no gallop, no rub and no edema  Abdomen: not distended, soft, non-tender, no masses,  bowel sounds present  Skin: No rash, Warm and dry. AVF.    Radiology:            Labs:    Results from last 7 days  Lab Units 06/20/17  0346 06/19/17  0350 06/16/17  0334   WBC 10*3/mm3 10.36 15.05* 11.14*   HEMOGLOBIN g/dL 11.1* 11.0* 12.1   HEMATOCRIT % 36.2 34.4* 37.8   PLATELETS 10*3/mm3 240 212 212       Results from last 7 days  Lab Units 06/21/17  0458 06/20/17  0346 06/19/17  0350 06/16/17  0334  06/15/17  0322   SODIUM mmol/L 136 136 137 136  --  132   POTASSIUM mmol/L 3.9 3.7 4.4 4.3  < > 5.6*   CHLORIDE mmol/L 98* 102 102 102  --  102   TOTAL CO2 mmol/L 20.0 20.0 18.0* 19.0*  --  20.0   BUN mg/dL 39* 26* 50* 43*  --  30*   CREATININE mg/dL 6.30* 4.60* 7.20* 6.10*  --  4.80*   CALCIUM mg/dL 8.4* 8.8 7.3* 7.3*  --  7.3*   PHOSPHORUS mg/dL 7.0* 5.1  --  4.8  " --  4.2   MAGNESIUM mg/dL  --  2.3  --   --   --  1.9   ALBUMIN g/dL 3.60 3.70  --  3.40  --   --    < > = values in this interval not displayed.    Results from last 7 days  Lab Units 06/21/17  0458   GLUCOSE mg/dL 95             Results from last 7 days  Lab Units 06/15/17  0340   PH, ARTERIAL pH units 7.416   PO2 ART mm Hg 119.0*   PCO2, ARTERIAL mm Hg 31.2*   HCO3 ART mmol/L 20.0             Estimated Creatinine Clearance: 10.2 mL/min (by C-G formula based on Cr of 6.3).      Assessment     Principal Problem:    Loculated pleural effusion  Active Problems:    Tobacco use    Bipolar disorder    Depression    Hyperparathyroidism    Pneumonia of right lung due to infectious organism    ESRD (end stage renal disease) on dialysis    Renovascular hypertension    Hepatitis C antibody positive in blood    IV drug abuse    Anemia of ESRD    COPD exacerbation    Medical non-compliance            Impression: ESRD. PLEURAL EFFUSION.            Recommendations: HD TODAY.      Kavin Hightower MD  06/21/17  8:45 AM           Electronically signed by Kavin Hightower MD at 6/21/2017  8:47 AM      Fercho Singletary MD at 6/21/2017  9:06 AM  Version 1 of 1         [facility] Progress Note    6/12/2017      Antibiotics:  IV Anti-Infectives     Ordered     Dose/Rate Route Frequency Start Stop    06/19/17 1548  vancomycin oral solution 250 mg     Ordering Provider:  REY Waller    250 mg Oral Every 6 Hours Scheduled 06/19/17 1800      06/16/17 1101  vancomycin 500 mg/100 mL 0.9% NS IVPB (mbp)     Ordering Provider:  Kavin Mayo RPH    500 mg Intravenous Once per day on Mon Wed Fri 06/16/17 1200      06/12/17 2156  vancomycin in dextrose 5% 150 mL (VANCOCIN) IVPB 750 mg     Ordering Provider:  Brock Hooker MD    750 mg  over 60 Minutes Intravenous Once 06/13/17 1200 06/13/17 1244    06/12/17 2007  Pharmacy to dose vancomycin     Ordering Provider:  Jonelle Smith PA-C     Does not apply  "Continuous PRN 06/12/17 2004            CC:cough/pleurisy    HPI:  Patient is a 26 y.o. 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 difficile treated approximately January 2017, end-stage renal disease possibly from FSGS per past records, and numerous other comorbidity as outlined below. She has a history of medical noncompliance and apparently has left hospital on multiple occasions AGAINST MEDICAL ADVICE per outside notes.  She was apparently admitted mid May with diagnosis pneumonia and treated with empiric antibiotics but no specific microbiologic diagnosis. She is readmitted to [facility] with diagnosis right sided pneumonia and loculated pleural effusion associated with dyspnea/cough and right-sided pleurisy. She was transferred to [facility] for consideration of decortication. Of particular note, when she has done being treated at [facility], she prefers referral back to her infectious disease doctor, Dr. Granados.    6/14 decortication for empyema; post op hypercapnic resp failure requiring ventilatory support    6/19 CDiff positive     6/21/17 Generally feeling better;  Nc O2; per nursing, no pressors, no rash, no new focal pain.  No bleeding, no abdominal pain;  Diarrhea less;  Less SOB/less cough    ROS:      6/21/17 per nursing, No F/C/S,  No rash, No N/V and resp status stable otherwise        PE:   BP (!) 160/104  Pulse 101  Temp 98.2 °F (36.8 °C) (Oral)   Resp 14  Ht 61\" (154.9 cm)  Wt 125 lb 9.6 oz (57 kg)  SpO2 98%  Breastfeeding? No  BMI 23.73 kg/m2    GENERAL: nc O2 awake and NAD  HEENT:  No conjunctival injection. No icterus. Oropharynx clear without evidence of thrush or exudate.     HEART: RRR; No murmur, rubs, gallops.   LUNGS: diminished on right c/t left, scattered rhonchi.  "   ABDOMEN: Soft, nontender, nondistended. Positive bowel sounds. No rebound or guarding. NO mass or HSM.  EXT:  No cyanosis, clubbing or edema. No cord.  : Genitalia generally unremarkable.  Without Burgos catheter.  SKIN: Warm and dry without cutaneous eruptions on Inspection/palpation.   Neck supple no mass  Lymph--neck and groin negative  Musculoskeletal-- no joint effusions appreciated in the arms and legs.  Free range of motion at shoulders elbows wrists, hips knees and ankles    IV with no redness or purulence    No IE phenomena     Laboratory Data      Results from last 7 days  Lab Units 06/20/17  0346 06/19/17  0350 06/16/17  0334   WBC 10*3/mm3 10.36 15.05* 11.14*   HEMOGLOBIN g/dL 11.1* 11.0* 12.1   HEMATOCRIT % 36.2 34.4* 37.8   PLATELETS 10*3/mm3 240 212 212       Results from last 7 days  Lab Units 06/21/17  0458   SODIUM mmol/L 136   POTASSIUM mmol/L 3.9   CHLORIDE mmol/L 98*   TOTAL CO2 mmol/L 20.0   BUN mg/dL 39*   CREATININE mg/dL 6.30*   GLUCOSE mg/dL 95   CALCIUM mg/dL 8.4*                   Estimated Creatinine Clearance: 10.2 mL/min (by C-G formula based on Cr of 6.3).      Microbiology:      Radiology:  Imaging Results (last 72 hours)     ** No results found for the last 72 hours. **            Impression:   --Acute hypercapnic respiratory failure.  Remains intubated postop, sedated.  After decortication.  Small right apical pneumothorax with chest tube.    --Acute loculated right pleural effusion/empyema per cardiothoracic surgery  associated with right lower lobe pneumonia. Empiric antibiotics ongoing for community-acquired/healthcare associated pneumonia.  Decortication on June 14.  MRSA so far in recent cultures.  Tapered to IV vancomycin    --Rhinovirus positive    --Acute/recurrent C. difficile colitis.  History C. difficile in the past.  Oral vancomycin initiated.I      --History hepatitis C. I do not treat viral hepatitis as a part of my practice. She has seen hepatology at [facility]  [place] and she should be referred back there after discharge for ongoing care and evaluation regarding possible treatment options. I discussed with her risks associated with hepatitis C including chronic hepatitis/cirrhosis and hepatocellular carcinoma. Follow-up at  is her responsibility. She is aware     --End-stage renal disease associated with FSGS; dialysis ongoing     --History polysubstance abuse which she reports is not active.     --History medical noncompliance with multiple episodes of leaving hospital AGAINST MEDICAL ADVICE per outside records. She understands the implication of her behavior    PLAN:   --IV vancomycin/oral vancomycin     --Highly complex issues with high risk for further serious morbidity and other serious sequela/mortality     --Monitor IV and IV antibiotic with risk for systemic complication and potential drug interaction     --Check/review labs cultures and scans     --Discussed with microbiology. Partial history per nursing staff              Fercho Singletary MD  6/21/2017           Electronically signed by Fercho Singletary MD at 6/21/2017  9:07 AM      Fercho Singletary MD at 6/22/2017  8:50 AM  Version 1 of 1         [facility] Progress Note    6/12/2017      Antibiotics:  IV Anti-Infectives     Ordered     Dose/Rate Route Frequency Start Stop    06/21/17 0922  vancomycin (dosing per levels)     Ordering Provider:  Brianne Flood RPH     Does not apply Daily 06/22/17 0900      06/19/17 1548  vancomycin oral solution 250 mg     Ordering Provider:  REY Waller    250 mg Oral Every 6 Hours Scheduled 06/19/17 1800      06/12/17 2156  vancomycin in dextrose 5% 150 mL (VANCOCIN) IVPB 750 mg     Ordering Provider:  Brock Hooker MD    750 mg  over 60 Minutes Intravenous Once 06/13/17 1200 06/13/17 1244    06/12/17 2007  Pharmacy to dose vancomycin     Ordering Provider:  Jonelle Smith PA-C     Does not apply Continuous PRN 06/12/17 2004       "      CC:cough/pleurisy    HPI:  Patient is a 26 y.o. 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 difficile treated approximately January 2017, end-stage renal disease possibly from FSGS per past records, and numerous other comorbidity as outlined below. She has a history of medical noncompliance and apparently has left hospital on multiple occasions AGAINST MEDICAL ADVICE per outside notes.  She was apparently admitted mid May with diagnosis pneumonia and treated with empiric antibiotics but no specific microbiologic diagnosis. She is readmitted to [facility] with diagnosis right sided pneumonia and loculated pleural effusion associated with dyspnea/cough and right-sided pleurisy. She was transferred to [facility] for consideration of decortication. Of particular note, when she has done being treated at [facility], she prefers referral back to her infectious disease doctor, Dr. Granados.    6/14 decortication for empyema; post op hypercapnic resp failure requiring ventilatory support    6/19 CDiff positive     6/22/17 Generally feeling better;  Nc O2; per nursing, no pressors, no rash, no new focal pain.  No bleeding, no abdominal pain;  Diarrhea less;  Less SOB/less cough    ROS:      6/22/17 per nursing, No F/C/S,  No rash, No N/V and resp status stable otherwise        PE:   BP (!) 161/102 (BP Location: Left arm, Patient Position: Lying)  Pulse 93  Temp 98.2 °F (36.8 °C) (Oral)   Resp 20  Ht 61\" (154.9 cm)  Wt 124 lb 5 oz (56.4 kg)  SpO2 94%  Breastfeeding? No  BMI 23.49 kg/m2    GENERAL: nc O2 awake and NAD  HEENT:  No conjunctival injection. No icterus. Oropharynx clear without evidence of thrush or exudate.     HEART: RRR; No murmur, rubs, gallops.   LUNGS: diminished on right c/t left, scattered " rhonchi.    ABDOMEN: Soft, nontender, nondistended. Positive bowel sounds. No rebound or guarding. NO mass or HSM.  EXT:  No cyanosis, clubbing or edema. No cord.  : Genitalia generally unremarkable.  Without Burgos catheter.  SKIN: Warm and dry without cutaneous eruptions on Inspection/palpation.   Neck supple no mass  Lymph--neck and groin negative  Musculoskeletal-- no joint effusions appreciated in the arms and legs.  Free range of motion at shoulders elbows wrists, hips knees and ankles    IV with no redness or purulence    No IE phenomena     Laboratory Data      Results from last 7 days  Lab Units 06/20/17  0346 06/19/17  0350 06/16/17  0334   WBC 10*3/mm3 10.36 15.05* 11.14*   HEMOGLOBIN g/dL 11.1* 11.0* 12.1   HEMATOCRIT % 36.2 34.4* 37.8   PLATELETS 10*3/mm3 240 212 212       Results from last 7 days  Lab Units 06/22/17  0435   SODIUM mmol/L 131*   POTASSIUM mmol/L 4.0   CHLORIDE mmol/L 96*   TOTAL CO2 mmol/L 16.0*   BUN mg/dL 32*   CREATININE mg/dL 5.20*   GLUCOSE mg/dL 89   CALCIUM mg/dL 8.8                   Estimated Creatinine Clearance: 12.4 mL/min (by C-G formula based on Cr of 5.2).      Microbiology:      Radiology:  Imaging Results (last 72 hours)     ** No results found for the last 72 hours. **            Impression:   --Acute hypercapnic respiratory failure.  Remains intubated postop, sedated.  After decortication.  Small right apical pneumothorax with chest tube.    --Acute loculated right pleural effusion/empyema per cardiothoracic surgery  associated with right lower lobe pneumonia. Empiric antibiotics ongoing for community-acquired/healthcare associated pneumonia.  Decortication on June 14.  MRSA so far in recent cultures.  Tapered to IV vancomycin    --Rhinovirus positive    --Acute/recurrent C. difficile colitis.  History C. difficile in the past.  Oral vancomycin initiated.I      --History hepatitis C. I do not treat viral hepatitis as a part of my practice. She has seen hepatology at  [facility] and she should be referred back there after discharge for ongoing care and evaluation regarding possible treatment options. I discussed with her risks associated with hepatitis C including chronic hepatitis/cirrhosis and hepatocellular carcinoma. Follow-up at  is her responsibility. She is aware     --End-stage renal disease associated with FSGS; dialysis ongoing     --History polysubstance abuse which she reports is not active.     --History medical noncompliance with multiple episodes of leaving hospital AGAINST MEDICAL ADVICE per outside records. She understands the implication of her behavior    PLAN:   --IV vancomycin/oral vancomycin     --Highly complex issues with high risk for further serious morbidity and other serious sequela/mortality     --Monitor IV and IV antibiotic with risk for systemic complication and potential drug interaction     --Check/review labs cultures and scans;  D/w Dr Loredo     --Discussed with microbiology. Partial history per nursing staff              Fercho Singletary MD  6/22/2017           Electronically signed by Fercho Singletary MD at 6/22/2017  8:51 AM      Kavin Hightower MD at 6/22/2017  1:34 PM  Version 1 of 1            LOS: 10 days    Patient Care Team:  Neo Gresham DO as PCP - General (Family Medicine)    Reason For Visit:  F/U ESRD.  Subjective           Review of Systems:    Pulm: No soa   CV:  No CP      Objective       amitriptyline 10 mg Oral Nightly   amLODIPine 5 mg Oral Q24H   famotidine 20 mg Oral Daily   ipratropium-albuterol 3 mL Nebulization 4x Daily - RT   metoprolol succinate XL 50 mg Oral Q24H   minoxidil 5 mg Oral Daily   nicotine 1 patch Transdermal Daily   saccharomyces boulardii 250 mg Oral BID   vancomycin (dosing per levels)  Does not apply Daily   vancomycin 250 mg Oral Q6H       Pharmacy to dose vancomycin          Vital Signs:  Blood pressure 156/100, pulse 94, temperature 98.2 °F (36.8 °C), temperature source  "Oral, resp. rate 20, height 61\" (154.9 cm), weight 124 lb 5 oz (56.4 kg), SpO2 97 %, not currently breastfeeding.    Flowsheet Rows         First Filed Value    Admission Height  61\" (154.9 cm) Documented at 06/13/2017 0525    Admission Weight  97 lb (44 kg) Documented at 06/13/2017 0525          06/21 0701 - 06/22 0700  In: -   Out: 2050     Physical Exam:    General Appearance: NAD, alert and cooperative, Ox3  Eyes: PER, conjunctivae and sclerae normal, no icterus  Lungs: respirations regular and unlabored, no crepitus, clear to auscultation  Heart/CV: regular rhythm & normal rate, no murmur, no gallop, no rub and no edema  Abdomen: not distended, soft, non-tender, no masses,  bowel sounds present  Skin: No rash, Warm and dry. AVF.    Radiology:            Labs:    Results from last 7 days  Lab Units 06/20/17  0346 06/19/17  0350 06/16/17  0334   WBC 10*3/mm3 10.36 15.05* 11.14*   HEMOGLOBIN g/dL 11.1* 11.0* 12.1   HEMATOCRIT % 36.2 34.4* 37.8   PLATELETS 10*3/mm3 240 212 212       Results from last 7 days  Lab Units 06/22/17  0435 06/21/17  0458 06/20/17  0346 06/19/17  0350 06/16/17  0334   SODIUM mmol/L 131* 136 136 137 136   POTASSIUM mmol/L 4.0 3.9 3.7 4.4 4.3   CHLORIDE mmol/L 96* 98* 102 102 102   TOTAL CO2 mmol/L 16.0* 20.0 20.0 18.0* 19.0*   BUN mg/dL 32* 39* 26* 50* 43*   CREATININE mg/dL 5.20* 6.30* 4.60* 7.20* 6.10*   CALCIUM mg/dL 8.8 8.4* 8.8 7.3* 7.3*   PHOSPHORUS mg/dL 5.6* 7.0* 5.1  --  4.8   MAGNESIUM mg/dL  --   --  2.3  --   --    ALBUMIN g/dL 3.50 3.60 3.70  --  3.40       Results from last 7 days  Lab Units 06/22/17  0435   GLUCOSE mg/dL 89                       Estimated Creatinine Clearance: 12.4 mL/min (by C-G formula based on Cr of 5.2).      Assessment     Principal Problem:    Loculated pleural effusion  Active Problems:    Tobacco use    Bipolar disorder    Depression    Hyperparathyroidism    Pneumonia of right lung due to infectious organism    ESRD (end stage renal disease) on " dialysis    Renovascular hypertension    Hepatitis C antibody positive in blood    IV drug abuse    Anemia of ESRD    COPD exacerbation    Medical non-compliance            Impression: ESRD. ANEMIA.            Recommendations: HD 6/23/17. HOME SOON OK WITH RENAL.      Kavin Hightower MD  06/22/17  1:34 PM           Electronically signed by Kavin Hightower MD at 6/22/2017  1:35 PM      Raymond Loredo MD at 6/22/2017  1:49 PM  Version 1 of 1         Hospitalist Daily Progress Note    Date of Admission: 6/12/2017   LOS: 10 days   PCP: Neo Gresham DO    Chief Complaint:  F/u pleural effusion    Subjective   History of Present Illness  Pt c/o generalized weakness and malaise,   C/o right sided CP where Chest tube was pulled from yesterday.  No abd pain, no N/V/D.    Review of Systems  General: no fevers, chills  Respiratory: no cough, dyspnea, SOA, wheezing.  Cardiovascular: no chest pain, palpitations  Neurologic: No focal weakness, has generalized weakness  All other systems reviewed and negative.    Objective   Physical Exam:  I have reviewed the vital signs.  Temp:  [98.2 °F (36.8 °C)-99.2 °F (37.3 °C)] 98.2 °F (36.8 °C)  Heart Rate:  [] 94  Resp:  [16-20] 20  BP: (142-161)/() 156/100    Objective  General Appearance:   Alert, cooperative, no distress  Head:   Normocephalic, atraumatic  Eyes:   PERRL EOMI, Sclerae anicteric  Neck:  Supple, no mass  Lungs: Clear to auscultation bilaterally, respirations unlabored  Heart: Tachycardic, Regular rhythm, S1 and S2 normal, no murmur, rub or gallop  Abdomen: Soft, non-tender, bowel sounds active, nondistended  Extremities: No clubbing, cyanosis, or edema to lower extremities, AVF in RUE  Pulses: 2+ and symmetric in distal lower extremities  Skin: No rashes no jaundice  Neurologic: Oriented x3, CN 2-12 intact, Normal strength to extremities    Results Review:    I have reviewed the labs, radiology results and diagnostic  studies.      Results from last 7 days  Lab Units 06/20/17  0346   WBC 10*3/mm3 10.36   HEMOGLOBIN g/dL 11.1*   PLATELETS 10*3/mm3 240       Results from last 7 days  Lab Units 06/22/17  0435   SODIUM mmol/L 131*   POTASSIUM mmol/L 4.0   TOTAL CO2 mmol/L 16.0*   CREATININE mg/dL 5.20*   GLUCOSE mg/dL 89       I have reviewed the medications.    ---------------------------------------------------------------------------------------------  Assessment/Plan   Assessment & Plan  Assessment/Problem List    Principal Problem:    Loculated pleural effusion  Active Problems:    Tobacco use    Bipolar disorder    Depression    Hyperparathyroidism    Pneumonia of right lung due to infectious organism    ESRD (end stage renal disease) on dialysis    Renovascular hypertension    Hepatitis C antibody positive in blood    IV drug abuse    Anemia of ESRD    COPD exacerbation    Medical non-compliance    26-year-old woman, current smoker, IV drug abuser with hepatitis C and end-stage renal disease on HD presented to [facility], Pulmonology was consulted concerning right pleural effusion. Patient subsequently underwent ultrasound guided thoracentesis which resulted in 200 mL. Serous fluid was sent to lab for analysis. Thoracentesis also demonstrated multiple loculations. It was determined that patient undergo evaluation per CT surgery for this per Dr. Hicks and was transferred to [facility].  On June 14 she underwent thoracotomy with decortication. Postoperatively she developed a left-sided effusion and atelectasis on the contralateral side of her surgery due to the fact that she constantly would lay on that side and not do any pulmonary toilet. Right sided CT placed and pulled out 6/20/17.     Plan  Loculated pleural effusion/empyema a/w CAP/HCAP(was admitted at [facility] recently) s/p thoracotomy with decortication, right CT pulled 6/20/17.   -continue pain management, pallliative consult to assist with pain management, presents challenges with h/o  IVDA  -Continue mobilization and pulmonary toilet  - Blood Cx Negative x 5 days,  Sputum Cx MRSA,  pleural fluid cultures NGTD, Rhinovirus positive on NP swab.  - pleural fluid cx NGTD.  -Continue antibiotics per ID: Vancomycin IV pharm to dose with HD (Start Date: ), Need duration of abx and options for IV abx in order to plan for d/c.     Acute/recurrent C. difficile colitis- History C. difficile in the past. C. Diff positive 17. Oral vancomycin. Probiotic started.  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  IVDA- pt denies IV drug use but discharge records on  indicate pt found self administering street drugs.  Hep C-has seen hepatology at [facility] and she should be referred back there after discharge for ongoing care and evaluation regarding possible treatment options.  Hx medical noncompliance with multiple episodes of leaving hospital AGAINST MEDICAL ADVICE per outside records.      DVT prophylaxis: SCDs  Discharge Planning: Need duration of abx and placement options if IV abx are needed prior to d/c. Ann Madison with [facility] in [place] to be called upon discharge [phone].    Raymond Loredo MD 17 1:49 PM               Electronically signed by Raymond Loredo MD at 2017  1:56 PM      REY Morocho at 2017  1:56 PM  Version 1 of 1         Hospice Progress Note    Patient Name: Mandy Espino   : 1990  Sex: female    Code Status: full  Advance Directive: none  Surrogate decision maker:   Documentation reflects advanced care planning previously discussed.     Patient reported pain was brought to a comfortable level within 48 hours after initial assessment: No     Date of Admission: 2017    PCP: [phone]  REY Torres APRN    Referring Provider: Dr. Diaz  Reason for Consult: pain mgmt    Subjective:    Pt states she has not slept in three days. According to nursing, pt  "slept a few hours at a time last night.     Pt requesting pain medication this morning; threw her incentive spirometer at nursing when she was not given IV medication.     This morning when viewing pt from outside room she appears calm, feeding self lunch. Once I entered the room she stops eating, pushes tray away, starts rocking back and forth, and crying. \"I'm not in a good mood.\" When asked why, she stated, \"they say I threw things but I didn't.... Then I get upset and I can't breathe.\" As we talked, she calmed down. States \"it hurts... Bad\"; pain is on the Right side.    - Ate about 40% of lunch    - PRN usage:   2 doses of Dilaudid IV   melatonin  3 doses of Oxycodone    - pt wants to go home    - brother and girlfriend visiting    ROS:  Review of Systems   Constitutional: Positive for fatigue. Negative for activity change, appetite change and fever.   HENT: Negative for trouble swallowing.    Respiratory: Positive for cough and shortness of breath. Negative for choking.    Cardiovascular: Negative for chest pain and leg swelling.   Gastrointestinal: Positive for diarrhea. Negative for nausea and vomiting.        Denies abd pain   Endocrine: Negative for polyuria.   Musculoskeletal: Positive for myalgias. Negative for arthralgias.        States her pain is \"on my right side.\"   Neurological: Positive for weakness.   Psychiatric/Behavioral: Positive for agitation. The patient is nervous/anxious.        Reviewed current scheduled and prn medications for route, type, dose and frequency.    Pharmacy to dose vancomycin      •  ALPRAZolam  •  artificial tears  •  benzonatate  •  bisacodyl  •  hydrALAZINE  •  melatonin  •  naloxone  •  ondansetron **OR** ondansetron  •  oxyCODONE  •  Pharmacy to dose vancomycin  •  simethicone  •  sodium chloride    Objective:   /100  Pulse 94  Temp 98.2 °F (36.8 °C) (Oral)   Resp 20  Ht 61\" (154.9 cm)  Wt 124 lb 5 oz (56.4 kg)  SpO2 97%  Breastfeeding? No  BMI 23.49 " kg/m2   Intake & Output (last day)       06/21 0701 - 06/22 0700 06/22 0701 - 06/23 0700    I.V. (mL/kg)      Total Intake(mL/kg)      Other 2050     Total Output 2050      Net -2050                Lab Results (last 24 hours)     Procedure Component Value Units Date/Time    Renal Function Panel [[id number]]  (Abnormal) Collected:  06/22/17 0435    Specimen:  Blood Updated:  06/22/17 0614     Glucose 89 mg/dL      BUN 32 (H) mg/dL      Creatinine 5.20 (H) mg/dL      Sodium 131 (L) mmol/L      Potassium 4.0 mmol/L      Chloride 96 (L) mmol/L      CO2 16.0 (L) mmol/L      Calcium 8.8 mg/dL      Albumin 3.50 g/dL      Phosphorus 5.6 (H) mg/dL      Anion Gap 19.0 (H) mmol/L      BUN/Creatinine Ratio 6.2 (L)     eGFR Non African Amer 10 (L) mL/min/1.73     Narrative:       [employer] Guidelines    Stage     Description        GFR  1         Normal or High     90+  2         Mild decrease      60-89  3         Moderate decrease  30-59  4         Severe decrease    15-29  5         Kidney failure     <15    POC Glucose Fingerstick [[id number]]  (Normal) Collected:  06/22/17 0742    Specimen:  Blood Updated:  06/22/17 0817     Glucose 98 mg/dL     Narrative:       Meter: [id number] : [id number]        Imaging Results (last 24 hours)     ** No results found for the last 24 hours. **          Physical Exam:  Physical Exam   Constitutional: No distress.   Frail   HENT:   Head: Normocephalic.   Scratch on nose.   Eyes: Right eye exhibits no discharge. Left eye exhibits no discharge.   Neck: No tracheal deviation present.   Cardiovascular: Regular rhythm.  Tachycardia present.    Pulmonary/Chest: No respiratory distress.   Tracheal secretions, audible. Pt would not cough.   Abdominal: Soft. She exhibits no distension. There is no tenderness.   Musculoskeletal: She exhibits no edema.   Neurological: She is alert.   Skin: Skin is dry. She is not diaphoretic. There is pallor.       Principal  Problem:    Loculated pleural effusion  Active Problems:    Tobacco use    Bipolar disorder    Depression    Hyperparathyroidism    Pneumonia of right lung due to infectious organism    ESRD (end stage renal disease) on dialysis    Renovascular hypertension    Hepatitis C antibody positive in blood    IV drug abuse    Anemia of ESRD    COPD exacerbation    Medical non-compliance      Assessment/Plan:     Pt continues to be tearful when provider at bedside but she actually looks better today than she has on previous exams. Her prn usage is less and decreasing (and not at max) which is hopefully evidence of some rest overnight and improvement in pain needs. Will continue to decrease her pain medication - will discontinue the remaining IV pain medication.     Spoke to her PCP REY Torres again today regarding plan of care. She has referred pt to pain clinic previously as the practice will not prescribe pt Oxycodone (pt's contact phone number keeps changing making it difficult for the pain clinic to contact her). Will schedule Xanax as pt will continue to have this prescribed by PCP.     Pt understands the current plan of care is preparing her for discharge home, which she wants to do.     - d/c IV prn Dilaudid   - Decrease prn Oxycodone to 5mg q4 prn  - Schedule Xanax 0.5mg BID   - pt will needs to follow up with pain clinic upon discharge  - out of bed; participate in ADLs  - PCP has offered to help with the transition of care if they can be of service.     Total Time with Patient: 45 minutes  Time In/Out: [id number]; [id number]    Time spent reviewing medical record, assessing and examining patient, discussing with family, nursing, answering questions, visiting room/floor, formulating a plan of care, and documentation of care.  > 50% face to face.    REY Morocho  (C) [phone]  (O) [phone]  06/22/17  1:56 PM       Electronically signed by REY Morocho at 6/22/2017  4:11 PM     "  Kavin Hightower MD at 6/23/2017  8:40 AM  Version 1 of 1            LOS: 11 days    Patient Care Team:  Noe Gresham DO as PCP - General (Family Medicine)    Reason For Visit:  F/U ESRD. SEEN ON DIALYSIS.  Subjective           Review of Systems:    Pulm: No soa   CV:  No CP      Objective       albuterol 0.63 mg Nebulization Once   ALPRAZolam 0.5 mg Oral Q12H   amitriptyline 10 mg Oral Nightly   amLODIPine 5 mg Oral Q24H   famotidine 20 mg Oral Daily   ipratropium-albuterol 3 mL Nebulization 4x Daily - RT   metoprolol succinate XL 50 mg Oral Q24H   minoxidil 5 mg Oral Daily   nicotine 1 patch Transdermal Daily   saccharomyces boulardii 250 mg Oral BID   vancomycin (dosing per levels)  Does not apply Daily   vancomycin 250 mg Oral Q6H       Pharmacy to dose vancomycin          Vital Signs:  Blood pressure (!) 179/110, pulse 103, temperature 98.2 °F (36.8 °C), temperature source Tympanic, resp. rate 14, height 61\" (154.9 cm), weight 124 lb 5 oz (56.4 kg), SpO2 95 %, not currently breastfeeding.    Flowsheet Rows         First Filed Value    Admission Height  61\" (154.9 cm) Documented at 06/13/2017 0525    Admission Weight  97 lb (44 kg) Documented at 06/13/2017 0525          06/22 0701 - 06/23 0700  In: 2040 [P.O.:2040]  Out: 1     Physical Exam:    General Appearance: NAD, alert and cooperative, Ox3  Eyes: PER, conjunctivae and sclerae normal, no icterus  Lungs: respirations regular and unlabored, no crepitus, clear to auscultation  Heart/CV: regular rhythm & normal rate, no murmur, no gallop, no rub and no edema  Abdomen: not distended, soft, non-tender, no masses,  bowel sounds present  Skin: No rash, Warm and dry. + AVF.    Radiology:            Labs:    Results from last 7 days  Lab Units 06/20/17  0346 06/19/17  0350   WBC 10*3/mm3 10.36 15.05*   HEMOGLOBIN g/dL 11.1* 11.0*   HEMATOCRIT % 36.2 34.4*   PLATELETS 10*3/mm3 240 212       Results from last 7 days  Lab Units 06/22/17  0435 06/21/17  0458 " 06/20/17  0346 06/19/17  0350   SODIUM mmol/L 131* 136 136 137   POTASSIUM mmol/L 4.0 3.9 3.7 4.4   CHLORIDE mmol/L 96* 98* 102 102   TOTAL CO2 mmol/L 16.0* 20.0 20.0 18.0*   BUN mg/dL 32* 39* 26* 50*   CREATININE mg/dL 5.20* 6.30* 4.60* 7.20*   CALCIUM mg/dL 8.8 8.4* 8.8 7.3*   PHOSPHORUS mg/dL 5.6* 7.0* 5.1  --    MAGNESIUM mg/dL  --   --  2.3  --    ALBUMIN g/dL 3.50 3.60 3.70  --        Results from last 7 days  Lab Units 06/22/17  0435   GLUCOSE mg/dL 89                       Estimated Creatinine Clearance: 12.4 mL/min (by C-G formula based on Cr of 5.2).      Assessment     Principal Problem:    Loculated pleural effusion  Active Problems:    Tobacco use    Bipolar disorder    Depression    Hyperparathyroidism    Pneumonia of right lung due to infectious organism    ESRD (end stage renal disease) on dialysis    Renovascular hypertension    Hepatitis C antibody positive in blood    IV drug abuse    Anemia of ESRD    COPD exacerbation    Medical non-compliance            Impression: ESRD. ANEMIA.            Recommendations: HD TODAY.    Kavin Hightower MD  06/23/17  8:41 AM           Electronically signed by Kavin Hightower MD at 6/23/2017  8:43 AM

## 2017-06-23 NOTE — PROGRESS NOTES
"   LOS: 11 days    Patient Care Team:  Neo Gresham DO as PCP - General (Family Medicine)    Reason For Visit:  F/U ESRD. SEEN ON DIALYSIS.  Subjective           Review of Systems:    Pulm: No soa   CV:  No CP      Objective       albuterol 0.63 mg Nebulization Once   ALPRAZolam 0.5 mg Oral Q12H   amitriptyline 10 mg Oral Nightly   amLODIPine 5 mg Oral Q24H   famotidine 20 mg Oral Daily   ipratropium-albuterol 3 mL Nebulization 4x Daily - RT   metoprolol succinate XL 50 mg Oral Q24H   minoxidil 5 mg Oral Daily   nicotine 1 patch Transdermal Daily   saccharomyces boulardii 250 mg Oral BID   vancomycin (dosing per levels)  Does not apply Daily   vancomycin 250 mg Oral Q6H       Pharmacy to dose vancomycin          Vital Signs:  Blood pressure (!) 179/110, pulse 103, temperature 98.2 °F (36.8 °C), temperature source Tympanic, resp. rate 14, height 61\" (154.9 cm), weight 124 lb 5 oz (56.4 kg), SpO2 95 %, not currently breastfeeding.    Flowsheet Rows         First Filed Value    Admission Height  61\" (154.9 cm) Documented at 06/13/2017 0525    Admission Weight  97 lb (44 kg) Documented at 06/13/2017 0525          06/22 0701 - 06/23 0700  In: 2040 [P.O.:2040]  Out: 1     Physical Exam:    General Appearance: NAD, alert and cooperative, Ox3  Eyes: PER, conjunctivae and sclerae normal, no icterus  Lungs: respirations regular and unlabored, no crepitus, clear to auscultation  Heart/CV: regular rhythm & normal rate, no murmur, no gallop, no rub and no edema  Abdomen: not distended, soft, non-tender, no masses,  bowel sounds present  Skin: No rash, Warm and dry. + AVF.    Radiology:            Labs:    Results from last 7 days  Lab Units 06/20/17  0346 06/19/17  0350   WBC 10*3/mm3 10.36 15.05*   HEMOGLOBIN g/dL 11.1* 11.0*   HEMATOCRIT % 36.2 34.4*   PLATELETS 10*3/mm3 240 212       Results from last 7 days  Lab Units 06/22/17  0435 06/21/17  0458 06/20/17  0346 06/19/17  0350   SODIUM mmol/L 131* 136 136 137   POTASSIUM " mmol/L 4.0 3.9 3.7 4.4   CHLORIDE mmol/L 96* 98* 102 102   TOTAL CO2 mmol/L 16.0* 20.0 20.0 18.0*   BUN mg/dL 32* 39* 26* 50*   CREATININE mg/dL 5.20* 6.30* 4.60* 7.20*   CALCIUM mg/dL 8.8 8.4* 8.8 7.3*   PHOSPHORUS mg/dL 5.6* 7.0* 5.1  --    MAGNESIUM mg/dL  --   --  2.3  --    ALBUMIN g/dL 3.50 3.60 3.70  --        Results from last 7 days  Lab Units 06/22/17  0435   GLUCOSE mg/dL 89                       Estimated Creatinine Clearance: 12.4 mL/min (by C-G formula based on Cr of 5.2).      Assessment     Principal Problem:    Loculated pleural effusion  Active Problems:    Tobacco use    Bipolar disorder    Depression    Hyperparathyroidism    Pneumonia of right lung due to infectious organism    ESRD (end stage renal disease) on dialysis    Renovascular hypertension    Hepatitis C antibody positive in blood    IV drug abuse    Anemia of ESRD    COPD exacerbation    Medical non-compliance            Impression: ESRD. ANEMIA.            Recommendations: HD TODAY.    Kavin Hightower MD  06/23/17  8:41 AM

## 2017-06-23 NOTE — PROGRESS NOTES
Pharmacy Consult--Antimicrobial Dosing  Pt is a 27 yo female with pneumonia of RLL, loculated parapneumonic effusion, Hep C.  History of drug abuse.  Pharmacy consulted to dose vancomycin for pneumonia.  ID consulted. Pt has ESRD and is on chronic HD MWF. ID is seeing the patient.    Allergies:  Acetaminophen; Hydrocodone; Ibuprofen; Lortab [hydrocodone-acetaminophen]; and Ciprofloxacin    Labs  Results from last 7 days   Lab Units 06/23/17  0512 06/22/17  0435 06/21/17  0458   SODIUM mmol/L 132 131* 136   POTASSIUM mmol/L 4.5 4.0 3.9   CHLORIDE mmol/L 96* 96* 98*   TOTAL CO2 mmol/L 18.0* 16.0* 20.0   BUN mg/dL 46* 32* 39*   CREATININE mg/dL 6.50* 5.20* 6.30*   CALCIUM mg/dL 8.7 8.8 8.4*   GLUCOSE mg/dL 92 89 95       Results from last 7 days   Lab Units 06/20/17  0346 06/19/17  0350   WBC 10*3/mm3 10.36 15.05*   HEMOGLOBIN g/dL 11.1* 11.0*   HEMATOCRIT % 36.2 34.4*   PLATELETS 10*3/mm3 240 212     Evaluation of Dosing  Weight:  44 kg  Goal Trough: 15-20    Chronic HD patient    I/O last 3 completed shifts:  In: 2040 [P.O.:2040]  Out: 1 [Stool:1]     Microbiology and Radiology  Microbiology Results (last 10 days)       Procedure Component Value - Date/Time      Clostridium Difficile Toxin, PCR [[id number]]  (Abnormal) Collected:  06/19/17 1303    Lab Status:  Final result Specimen:  Stool from Per Rectum Updated:  06/19/17 1550     C. Difficile Toxins by PCR Detected (A)      [id number] Presumptive Positive        Respiratory Culture   Order: [id number]   Status:  Final result   Visible to patient:  No (Not Released)   Specimen Information: Cough; Sputum        Culture   Moderate growth (3+) Staphylococcus aureus, MRSA (C)   STEVE to follow.     Methicillin resistant Staphylococcus aureus, Patient may be an isolation risk.   Stain   Greater than 20 WBCs seen      Less than 10 Epithelial cells seen      Few (2+) Gram positive cocci in pairs, chains and clusters      Few (2+) Gram positive bacilli      Resulting Agency:  [facility]   Susceptibility    Staphylococcus aureus, MRSA     STEVE     Amoxicillin + Clavulanate <=4/2 ug/ml Resistant     Ampicillin >8 ug/ml Resistant     Ampicillin + Sulbactam <=8/4 ug/ml Resistant     Cefazolin <=4 ug/ml Resistant     Ciprofloxacin <=1 ug/ml Susceptible     Clindamycin <=0.5 ug/ml Susceptible     Erythromycin >4 ug/ml Resistant     Gentamicin <=4 ug/ml Susceptible     Levofloxacin <=1 ug/ml Susceptible 1     Linezolid 4 ug/ml Susceptible     Moxifloxacin <=0.5 ug/ml Susceptible     Oxacillin >2 ug/ml Resistant     Penicillin G >8 ug/ml Resistant     Quinupristin + Dalfopristin <=1 ug/ml Susceptible     Rifampin <=1 ug/ml Susceptible     Tetracycline <=4 ug/ml Susceptible     Trimethoprim + Sulfamethoxazole <=0.5/9.5 u... Susceptible     Vancomycin 2 ug/ml Susceptible           1 Staphylococcus species may develop resistance during prolonged therapy with quinolones.  Isolates that are initially susceptible may become resistant within three to four days after initiation of therapy. Testing of repeat isolates may be warranted.         Specimen Collected: 06/11/17  5:26 PM Last Resulted: 06/15/17  8:23 AM                      Evaluation of Level  Lab Results   Component Value Date    MIRTA 17 (C) 05/09/2016    VANCORANDOM 20.10 06/23/2017         Vancomycin doses and levels this admission:  Vancomycin 1000 mg IV 6/11 at 1725   6/12 @ 2027 = 20.9  Vancomycin   750 mg IV 6/13 at 1144  6/14 @ 0634 = 38  6/16 @ 0334 = 25.8  Vancomycin   500 mg IV 6/16 at 1148  Vancomycin   500 mg IV 6/19 at 1254  6/21 @ 0458 = 35.1  623 @ 0512 -= 20.1  Vancomycin 500 mg IV x 1 on 6/23 post-HD is planned      Assessment/Plan:  Patient has had a few supratherapeutic vancomycin levels as pre-HD levels. Currently, she receives HD on MWF. Likely, she will not require a dose with each HD session, but maybe every other session. Her level of 20.1 this AM is ~ 84 hour level from last dose. She was not re-dosed with  vancomycin on 6/21 (Secondary to supratherapeutic level), despite her receiving HD that day. She will receive HD today on 6/23 and I will dose her with 500 mg IV x 1 after HD. Suspect she will only require a vancomycin IV dose every other HD session. Can check pre-HD level Monday, 6/26 to be sure. Will need to check sooner if for some reason patient is dialyzed over the weekend.    Pharmacy will continue to follow closely, and make adjustments as needed. Thank you for this consult.  Brianne Flood, PharmD  6/23/2017  9:06 AM

## 2017-06-23 NOTE — SIGNIFICANT NOTE
300 Palliative Nurse  Dr. Orlin Weeks, APRMARLA Brennan, REY Woodall, dafne Frost, RN, PN  Doris Conteh, [facility]

## 2017-06-23 NOTE — PROGRESS NOTES
Script for Xanax not provided as pt should have 4 days left according to Wayne report. Pt agrees.

## 2017-06-23 NOTE — DISCHARGE INSTR - LAB
Please resume your regular scheduled appointments for dialysis on Monday, Wednesday, and Friday at [facility] in [place], [phone].

## 2017-06-23 NOTE — DISCHARGE SUMMARY
[facility] Medicine Services  DISCHARGE SUMMARY       Date of Admission: 6/12/2017  Date of Discharge:  6/23/2017  Primary Care Physician: Neo Gresham DO  Consulting Physician(s)     Provider Relationship    Marlo Lay MD Consulting Physician    Fercho Singletary MD Consulting Physician    Orlin Ramirez DO Consulting Physician          Discharge Diagnoses:  Active Hospital Problems (** Indicates Principal Problem)    Diagnosis Date Noted   • **Loculated pleural effusion [J90] 06/12/2017     Status post decortication     • Medical non-compliance [Z91.19] 06/13/2017   • ESRD (end stage renal disease) on dialysis [N18.6, Z99.2] 06/12/2017   • IV drug abuse [F19.10] 06/12/2017   • Hepatitis C antibody positive in blood [R76.8] 06/12/2017     Has not followed up with ID      • Anemia of ESRD [D63.8] 06/12/2017   • Renovascular hypertension [I15.0] 06/12/2017   • COPD exacerbation [J44.1] 06/12/2017   • Pneumonia of right lung due to infectious organism [J18.9] 05/02/2017   • Hyperparathyroidism [E21.3] 11/17/2016   • Tobacco use [Z72.0] 11/17/2016   • Depression [F32.9] 11/17/2016   • Bipolar disorder [F31.9] 11/17/2016      Resolved Hospital Problems    Diagnosis Date Noted Date Resolved   No resolved problems to display.       Presenting Problem/History of Present Illness  Pleural effusion [J90]  Pleural effusion [J90]     Hospital Course  Patient is a 26 y.o. female current smoker, with PMHx of IV drug abuser with hepatitis C and end-stage renal disease on HD presented to [facility], Pulmonology was consulted there concerning right pleural effusion. Patient subsequently underwent ultrasound guided thoracentesis which resulted in 200 mL. Serous fluid was sent to lab for analysis. Thoracentesis also demonstrated multiple loculations. It was determined that patient undergo evaluation per CT surgery for this per Dr. Hicks and was transferred to [facility].  On June 14 she  underwent thoracotomy with decortication. Postoperatively she developed a left-sided effusion and atelectasis on the contralateral side of her surgery due to the fact that she constantly would lay on that side and not do any pulmonary toilet. Right sided CT placed and pulled out 6/20/17.      Problem based Hospital course:  Loculated pleural effusion/empyema a/w CAP/HCAP(was admitted at [facility] recently) s/p thoracotomy with decortication, right CT pulled 6/20/17.   -continued pain management, pallliative consulted to assist with pain management, presented challenges with h/o IVDA  -Continued mobilization and pulmonary toilet  -6/11 Blood Cx Negative x 5 days, 6/11 Sputum Cx MRSA, 6/12 pleural fluid cultures NGTD, Rhinovirus positive on NP swab.  -6/14 pleural fluid cx NGTD.  -Continue antibiotics per ID: Vancomycin IV pharm to dose with HD (Start Date: 6/11), ID recommends IV vancomycin after each dialysis for at least 4 weeks from decortication (June 14).    Uncontrolled HTN-increased amlodipine to 10 mg daily, continue Metoprolol XL, minoxidil, started hydralazine TID.  F/u with PCP in 1 wk.    Acute/recurrent C. difficile colitis- History C. difficile in the past. C. Diff positive 6/19/17. Oral vancomycin. Probiotic started.  Per ID will need long taper.  F/U with ID in 2 wks.    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  IVDA- pt denies IV drug use but discharge records on 4/19 indicate pt found self administering street drugs.  Hep C-has seen hepatology at [facility] and she should be referred back there after discharge for ongoing care and evaluation regarding possible treatment options.  PCP to arrange.  Hx medical noncompliance with multiple episodes of leaving hospital AGAINST MEDICAL ADVICE per outside records.       Ann Madison with [facility] in [place] to be called upon discharge [phone].    Procedures Performed  Procedure(s):  BRONCHOSCOPY RIGHT THORACOTOMY WITH LUNG  "DECORTICATION        Consults:   Consults     Date and Time Order Name Status Description    6/20/2017 0950 Inpatient Consult to Palliative Care MD Completed     6/12/2017 2152 Inpatient Consult to Nephrology Completed     6/12/2017 2152 Inpatient Consult to Infectious Diseases Completed     6/12/2017 2152 Inpatient Consult to Cardiothoracic Surgery Completed     6/11/2017 2153 Inpatient Consult to Nephrology Completed     6/11/2017 2153 Inpatient Consult to Pulmonology Completed           Pertinent Test Results:  See above    Condition on Discharge:  Stable.    Physical Exam on Discharge:BP (!) 182/102 (BP Location: Left arm, Patient Position: Lying)  Pulse 98  Temp 98.3 °F (36.8 °C) (Oral)   Resp 22  Ht 61\" (154.9 cm)  Wt 124 lb 5 oz (56.4 kg)  SpO2 97%  Breastfeeding? No  BMI 23.49 kg/m2  Physical Exam  General Appearance: Alert, cooperative, no distress  Head: Normocephalic, atraumatic  Eyes: PERRL EOMI, Sclerae anicteric  Neck: Supple, no mass  Lungs: Clear to auscultation bilaterally, respirations unlabored  Heart: mildly tachycardic, regular rhythm, S1 and S2 normal, no murmur, rub or gallop  Abdomen: Soft, non-tender, bowel sounds active, nondistended  Extremities: No clubbing, cyanosis, or edema to lower extremities, AVF in RUE  Pulses: 2+ and symmetric in distal lower extremities  Skin: No rashes, no jaundice  Neurologic: Oriented x3, CN 2-12 intact, Normal strength to extremities    Discharge Disposition  Home or Self Care    Discharge Medications   Mandy Espino   Home Medication Instructions XOCHILT:[id number]    Printed on:06/23/17 1314   Medication Information                      ALPRAZolam (XANAX) 0.5 MG tablet  Take 0.5 mg by mouth At Night As Needed for Anxiety or Sleep.             amitriptyline (ELAVIL) 10 MG tablet  Take 10 mg by mouth Every Night.             amLODIPine (NORVASC) 10 MG tablet  Take 1 tablet by mouth Daily for 30 days.             benzonatate (TESSALON) 100 MG " capsule  Take 1 capsule by mouth 3 (Three) Times a Day As Needed for Cough for up to 7 days.             famotidine (PEPCID) 20 MG tablet  Take 1 tablet by mouth Daily for 30 days.             hydrALAZINE (APRESOLINE) 50 MG tablet  Take 1 tablet by mouth Every 8 (Eight) Hours for 30 days.             metoprolol succinate XL (TOPROL-XL) 50 MG 24 hr tablet  Take 1 tablet by mouth Daily for 30 days.             minoxidil (LONITEN) 10 MG tablet  Take 10 mg by mouth Daily.             saccharomyces boulardii (FLORASTOR) 250 MG capsule  Take 1 capsule by mouth 2 (Two) Times a Day for 30 days.                 Discharge Diet:   Diet Instructions     Diet: Renal, Regular; Thin Liquids, No Restrictions       Discharge Diet:   Renal  Regular      Fluid Consistency:  Thin Liquids, No Restrictions                 Discharge Care Plan / Instructions:    Activity at Discharge:   Activity Instructions     Activity as Tolerated                     Follow-up Appointments  Future Appointments  Date Time Provider Department Center   7/24/2017 1:30 PM Shawn Hicks MD [facility] None     Additional Instructions for the Follow-ups that You Need to Schedule     Discharge Follow-up with PCP    As directed    Follow Up Details:  f/u with PCP 1 wk       Discharge Follow-up with Specialty    As directed    Specialty:  Infectious Disease   Follow Up:  2 Weeks   Follow Up Details:  f/u sola Singletary                 Test Results Pending at Discharge   Order Current Status    AFB Culture Preliminary result    AFB Culture Preliminary result    Fungus Culture Preliminary result    Fungus Culture Preliminary result           Raymond Loredo MD 06/23/17 1:19 PM    Time: Discharge 40 min    Please note that portions of this note may have been completed with a voice recognition program. Efforts were made to edit the dictations, but occasionally words are mistranscribed.

## 2017-06-23 NOTE — PROGRESS NOTES
[facility] Progress Note    6/12/2017      Antibiotics:  IV Anti-Infectives     Ordered     Dose/Rate Route Frequency Start Stop    06/23/17 0904  vancomycin 500 mg/100 mL 0.9% NS IVPB (mbp)     Ordering Provider:  Brianne Flood RPH    500 mg  over 60 Minutes Intravenous Once 06/23/17 1400 06/23/17 1205    06/21/17 0922  vancomycin (dosing per levels)     Ordering Provider:  Brianne Flood RPH     Does not apply Daily 06/22/17 0900      06/19/17 1548  vancomycin oral solution 250 mg     Ordering Provider:  REY Waller    250 mg Oral Every 6 Hours Scheduled 06/19/17 1800      06/12/17 2156  vancomycin in dextrose 5% 150 mL (VANCOCIN) IVPB 750 mg     Ordering Provider:  Brock Hooker MD    750 mg  over 60 Minutes Intravenous Once 06/13/17 1200 06/13/17 1244    06/12/17 2007  Pharmacy to dose vancomycin     Ordering Provider:  Jonelle Smith PA-C     Does not apply Continuous PRN 06/12/17 2004            CC:cough/pleurisy    HPI:  Patient is a 26 y.o. 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 difficile treated approximately January 2017, end-stage renal disease possibly from FSGS per past records, and numerous other comorbidity as outlined below. She has a history of medical noncompliance and apparently has left hospital on multiple occasions AGAINST MEDICAL ADVICE per outside notes.  She was apparently admitted mid May with diagnosis pneumonia and treated with empiric antibiotics but no specific microbiologic diagnosis. She is readmitted to [facility] with diagnosis right sided pneumonia and loculated pleural effusion associated with dyspnea/cough and right-sided pleurisy. She was transferred to [facility] for consideration of decortication. Of particular note, when she has done being treated at  "[facility], she prefers referral back to her infectious disease doctor, Dr. Witt.    6/14 decortication for empyema; post op hypercapnic resp failure requiring ventilatory support    6/19 CDiff positive     6/23/17 Generally feeling better;  Nc O2; per nursing, no pressors, no rash, no new focal pain.  No bleeding, no abdominal pain;  Diarrhea less;  Less SOB/less cough    ROS:      6/23/17 per nursing, No F/C/S,  No rash, No N/V and resp status stable otherwise        PE:   BP (!) 182/102 (BP Location: Left arm, Patient Position: Lying)  Pulse 98  Temp 98.3 °F (36.8 °C) (Oral)   Resp 22  Ht 61\" (154.9 cm)  Wt 124 lb 5 oz (56.4 kg)  SpO2 97%  Breastfeeding? No  BMI 23.49 kg/m2    GENERAL: nc O2 awake and NAD  HEENT:  No conjunctival injection. No icterus. Oropharynx clear without evidence of thrush or exudate.     HEART: RRR; No murmur, rubs, gallops.   LUNGS: diminished on right c/t left, scattered rhonchi.    ABDOMEN: Soft, nontender, nondistended. Positive bowel sounds. No rebound or guarding. NO mass or HSM.  EXT:  No cyanosis, clubbing or edema. No cord.  : Genitalia generally unremarkable.  Without Burgos catheter.  SKIN: Warm and dry without cutaneous eruptions on Inspection/palpation.   Neck supple no mass  Lymph--neck and groin negative  Musculoskeletal-- no joint effusions appreciated in the arms and legs.  Free range of motion at shoulders elbows wrists, hips knees and ankles    IV with no redness or purulence    No IE phenomena     Laboratory Data      Results from last 7 days  Lab Units 06/20/17  0346 06/19/17  0350   WBC 10*3/mm3 10.36 15.05*   HEMOGLOBIN g/dL 11.1* 11.0*   HEMATOCRIT % 36.2 34.4*   PLATELETS 10*3/mm3 240 212       Results from last 7 days  Lab Units 06/23/17  0512   SODIUM mmol/L 132   POTASSIUM mmol/L 4.5   CHLORIDE mmol/L 96*   TOTAL CO2 mmol/L 18.0*   BUN mg/dL 46*   CREATININE mg/dL 6.50*   GLUCOSE mg/dL 92   CALCIUM mg/dL 8.7                   Estimated " Creatinine Clearance: 9.9 mL/min (by C-G formula based on Cr of 6.5).      Microbiology:      Radiology:  Imaging Results (last 72 hours)     ** No results found for the last 72 hours. **            Impression:   --Acute hypercapnic respiratory failure.  Remains intubated postop, sedated.  After decortication.  Small right apical pneumothorax with chest tube.    --Acute loculated right pleural effusion/empyema per cardiothoracic surgery  associated with right lower lobe pneumonia. Empiric antibiotics ongoing for community-acquired/healthcare associated pneumonia.  Decortication on June 14.  MRSA so far in recent cultures.  Tapered to IV vancomycin    --Rhinovirus positive    --Acute/recurrent C. difficile colitis.  History C. difficile in the past.  Oral vancomycin initiated.I      --History hepatitis C. I do not treat viral hepatitis as a part of my practice. She has seen hepatology at [facility] and she should be referred back there after discharge for ongoing care and evaluation regarding possible treatment options. I discussed with her risks associated with hepatitis C including chronic hepatitis/cirrhosis and hepatocellular carcinoma. Follow-up at  is her responsibility. She is aware     --End-stage renal disease associated with FSGS; dialysis ongoing     --History polysubstance abuse which she reports is not active.     --History medical noncompliance with multiple episodes of leaving hospital AGAINST MEDICAL ADVICE per outside records. She understands the implication of her behavior    PLAN:   --IV vancomycin/oral vancomycin;  I anticipate IV vancomycin after each dialysis for at least 4 weeks from decortication;  Likely lengthy oral vancomycin taper     --Highly complex issues with high risk for further serious morbidity and other serious sequela/mortality     --Monitor IV and IV antibiotic with risk for systemic complication and potential drug interaction     --Check/review labs cultures and scans;   D/w Dr Loredo     --Discussed with microbiology. Partial history per nursing staff              Fercho Singletary MD  6/23/2017

## 2017-06-23 NOTE — PROGRESS NOTES
"Palliative Care Progress Note    Patient Name: Mandy Espino   : 1990  Sex: female    Code Status: full  Advance Directive: none  Surrogate decision maker:   Documentation reflects advanced care planning previously discussed.     Date of Admission: 2017     Referring Provider: Dr. Diaz  Reason for Consult: pain mgmt    Subjective:    Pt resting this afternoon in bed; had dialysis this morning    Ate more for lunch than yesterday    PRN usage: has received oxycodone 5mg twice (0620, 1100)    Pt wants to go home -- is discharging home this afternoon    ROS:  Review of Systems   Constitutional: Negative for chills.        Per nursing, medical records   HENT: Negative for trouble swallowing.    Respiratory: Negative for cough, choking and shortness of breath.    Cardiovascular: Negative for chest pain and leg swelling.   Gastrointestinal: Negative for abdominal distention, abdominal pain, nausea and vomiting.       Reviewed current scheduled and prn medications for route, type, dose and frequency.    Pharmacy to dose vancomycin      •  albumin human  •  artificial tears  •  benzonatate  •  bisacodyl  •  hydrALAZINE  •  melatonin  •  naloxone  •  ondansetron **OR** ondansetron  •  oxyCODONE  •  Pharmacy to dose vancomycin  •  simethicone  •  sodium chloride    Objective:   BP (!) 182/102 (BP Location: Left arm, Patient Position: Lying)  Pulse 98  Temp 98.3 °F (36.8 °C) (Oral)   Resp 22  Ht 61\" (154.9 cm)  Wt 124 lb 5 oz (56.4 kg)  SpO2 97%  Breastfeeding? No  BMI 23.49 kg/m2   Intake & Output (last day)        0701 -  0700  0701 -  0700    P.O. 2040 100    Total Intake(mL/kg) 2040 (36.2) 100 (1.8)    Other  2350    Stool 1     Total Output 1 2350    Net + -          Unmeasured Urine Occurrence 2 x     Unmeasured Stool Occurrence 1 x         Lab Results (last 24 hours)     Procedure Component Value Units Date/Time    Renal Function Panel [[id number]]  " (Abnormal) Collected:  06/23/17 0512    Specimen:  Blood Updated:  06/23/17 0845     Glucose 92 mg/dL      BUN 46 (H) mg/dL      Creatinine 6.50 (H) mg/dL      Sodium 132 mmol/L      Potassium 4.5 mmol/L      Chloride 96 (L) mmol/L      CO2 18.0 (L) mmol/L      Calcium 8.7 mg/dL      Albumin 3.60 g/dL      Phosphorus 6.6 (H) mg/dL      Anion Gap 18.0 (H) mmol/L      BUN/Creatinine Ratio 7.1     eGFR Non African Amer 8 (L) mL/min/1.73     Narrative:       [employer] Guidelines    Stage     Description        GFR  1         Normal or High     90+  2         Mild decrease      60-89  3         Moderate decrease  30-59  4         Severe decrease    15-29  5         Kidney failure     <15    Vancomycin, Random [[id number]]  (Normal) Collected:  06/23/17 0512    Specimen:  Blood Updated:  06/23/17 0845     Vancomycin Random 20.10 mcg/mL         Imaging Results (last 24 hours)     ** No results found for the last 24 hours. **          Physical Exam:  Physical Exam   Constitutional: She is sleeping. No distress.   Resting; looks better than she did yesterday   Cardiovascular: Normal rate and regular rhythm.    Pulmonary/Chest: No stridor.   Even; no audible congestion  Symmetric expansion   Musculoskeletal: She exhibits no edema.   Skin: She is not diaphoretic.       Principal Problem:    Loculated pleural effusion  Active Problems:    Tobacco use    Bipolar disorder    Depression    Hyperparathyroidism    Pneumonia of right lung due to infectious organism    ESRD (end stage renal disease) on dialysis    Renovascular hypertension    Hepatitis C antibody positive in blood    IV drug abuse    Anemia of ESRD    COPD exacerbation    Medical non-compliance      Assessment/Plan:     Pt is fine to discharge from a palliative perspective.   Script for oxycodone placed on chart, tapering dose reduction as follows:    Oxycodone 5mg PRN  Friday one tablet prn  Saturday q 8H prn  Sunday BID prn  Monday one tablet po  prn  Tuesday HALF tablet po prn  Wednesday HALF tablet po prn    Dispense Qty: 8    Time spent reviewing medical record, assessing and examining patient, discussing with family, nursing, answering questions, visiting room/floor, formulating a plan of care, and documentation of care.    Autumn Brennan, REY  (C) [phone]  (O) [phone]  06/23/17  3:09 PM

## 2017-06-23 NOTE — PROGRESS NOTES
Continued Stay Note  [facility]     Patient Name: Mandy Espino  MRN: [id number]  Today's Date: 6/23/2017    Admit Date: 6/12/2017          Discharge Plan       06/23/17 1429    Case Management/Social Work Plan    Plan HOME    Patient/Family In Agreement With Plan yes    Additional Comments Spoke with Ann at [facility] and informed her of pt's DC today with next HD on Mon, 6/26.  Arranged IV Vancomycin with HD treatments on 6/28, 7/3,  and 7/7.  Pt to f/u with Dr. Drummond in 1 week.  Oral vancomycin will be supplied per [facility] and delivered to pt's room prior to DC today.                Discharge Codes     None        Expected Discharge Date and Time     Expected Discharge Date Expected Discharge Time    Jun 23, 2017             Princess Espino

## 2017-06-23 NOTE — PROGRESS NOTES
Hospitalist Daily Progress Note    Date of Admission: 6/12/2017   LOS: 11 days   PCP: Neo Gresham DO    Chief Complaint: F/u pleural effusion    Subjective   History of Present Illness  Pt c/o generalized weakness and malaise,   C/o right sided CP where Chest tube was pulled from.  No abd pain, no N/V/D.    Review of Systems  General: no fevers, chills  Respiratory: no cough, dyspnea, SOA, wheezing.  Cardiovascular: no chest pain, palpitations  Neurologic: No focal weakness, has generalized weakness  All other systems reviewed and negative.    Objective   Physical Exam:  I have reviewed the vital signs.  Temp:  [98.2 °F (36.8 °C)-98.7 °F (37.1 °C)] 98.2 °F (36.8 °C)  Heart Rate:  [] 102  Resp:  [14-26] 14  BP: (161-184)/() 169/107    Objective  General Appearance: Alert, cooperative, no distress  Head: Normocephalic, atraumatic  Eyes: PERRL EOMI, Sclerae anicteric  Neck: Supple, no mass  Lungs: Clear to auscultation bilaterally, respirations unlabored  Heart: Tachycardic, Regular rhythm, S1 and S2 normal, no murmur, rub or gallop  Abdomen: Soft, non-tender, bowel sounds active, nondistended  Extremities: No clubbing, cyanosis, or edema to lower extremities, AVF in RUE  Pulses: 2+ and symmetric in distal lower extremities  Skin: No rashes no jaundice  Neurologic: Oriented x3, CN 2-12 intact, Normal strength to extremities    Results Review:    I have reviewed the labs, radiology results and diagnostic studies.      Results from last 7 days  Lab Units 06/20/17  0346   WBC 10*3/mm3 10.36   HEMOGLOBIN g/dL 11.1*   PLATELETS 10*3/mm3 240       Results from last 7 days  Lab Units 06/23/17  0512   SODIUM mmol/L 132   POTASSIUM mmol/L 4.5   TOTAL CO2 mmol/L 18.0*   CREATININE mg/dL 6.50*   GLUCOSE mg/dL 92       I have reviewed the medications.    ---------------------------------------------------------------------------------------------  Assessment/Plan   Assessment & Plan  Assessment/Problem  List    Principal Problem:    Loculated pleural effusion  Active Problems:    Tobacco use    Bipolar disorder    Depression    Hyperparathyroidism    Pneumonia of right lung due to infectious organism    ESRD (end stage renal disease) on dialysis    Renovascular hypertension    Hepatitis C antibody positive in blood    IV drug abuse    Anemia of ESRD    COPD exacerbation    Medical non-compliance    26-year-old woman, current smoker, IV drug abuser with hepatitis C and end-stage renal disease on HD presented to [facility], Pulmonology was consulted concerning right pleural effusion. Patient subsequently underwent ultrasound guided thoracentesis which resulted in 200 mL. Serous fluid was sent to lab for analysis. Thoracentesis also demonstrated multiple loculations. It was determined that patient undergo evaluation per CT surgery for this per Dr. Hicks and was transferred to [facility].  On June 14 she underwent thoracotomy with decortication. Postoperatively she developed a left-sided effusion and atelectasis on the contralateral side of her surgery due to the fact that she constantly would lay on that side and not do any pulmonary toilet. Right sided CT placed and pulled out 6/20/17.      Plan  Loculated pleural effusion/empyema a/w CAP/HCAP(was admitted at [facility] recently) s/p thoracotomy with decortication, right CT pulled 6/20/17.   -continue pain management, pallliative consult to assist with pain management, presents challenges with h/o IVDA  -Continue mobilization and pulmonary toilet  -6/11 Blood Cx Negative x 5 days, 6/11 Sputum Cx MRSA, 6/12 pleural fluid cultures NGTD, Rhinovirus positive on NP swab.  -6/14 pleural fluid cx NGTD.  -Continue antibiotics per ID: Vancomycin IV pharm to dose with HD (Start Date: 6/11), Need duration of abx and options for IV abx in order to plan for d/c.  Uncontrolled HTN-increased amlodipine to 10 mg daily, continue Metoprolol XL, minoxidil, if still high consider  hydralazine.    Acute/recurrent C. difficile colitis- History C. difficile in the past. C. Diff positive 6/19/17. Oral vancomycin. Probiotic started.  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  IVDA- pt denies IV drug use but discharge records on 4/19 indicate pt found self administering street drugs.  Hep C-has seen hepatology at [facility] and she should be referred back there after discharge for ongoing care and evaluation regarding possible treatment options.  Hx medical noncompliance with multiple episodes of leaving hospital AGAINST MEDICAL ADVICE per outside records.       DVT prophylaxis: SCDs  Discharge Planning: Need duration of abx and placement options if IV abx are needed prior to d/c. Ann Madison with [facility] in [place] to be called upon discharge [phone].       Raymond Loredo MD 06/23/17 9:35 AM

## 2017-06-23 NOTE — PLAN OF CARE
Problem: Patient Care Overview (Adult)  Goal: Plan of Care Review  Outcome: Ongoing (interventions implemented as appropriate)    06/22/17 1145   Coping/Psychosocial Response Interventions   Plan Of Care Reviewed With patient   Patient Care Overview   Progress no change   Outcome Evaluation   Outcome Summary/Follow up Plan Patient asking for more pain medication and anxiety medication, eating and drinking, up in room, had bm, goal is to transition off iv pain medication to oral to control pain and get her ready for discharge when medically ready

## 2017-06-26 ENCOUNTER — TRANSITIONAL CARE MANAGEMENT TELEPHONE ENCOUNTER (OUTPATIENT)
Dept: INTERNAL MEDICINE | Facility: CLINIC | Age: 27
End: 2017-06-26

## 2017-07-06 ENCOUNTER — HOSPITAL ENCOUNTER (EMERGENCY)
Facility: HOSPITAL | Age: 27
Discharge: ANOTHER HOSPITAL | End: 2017-07-06
Attending: EMERGENCY MEDICINE | Admitting: EMERGENCY MEDICINE

## 2017-07-06 ENCOUNTER — HOSPITAL ENCOUNTER (INPATIENT)
Facility: HOSPITAL | Age: 27
LOS: 3 days | Discharge: HOME | End: 2017-07-09
Attending: THORACIC SURGERY (CARDIOTHORACIC VASCULAR SURGERY) | Admitting: FAMILY MEDICINE

## 2017-07-06 ENCOUNTER — APPOINTMENT (OUTPATIENT)
Dept: GENERAL RADIOLOGY | Facility: HOSPITAL | Age: 27
End: 2017-07-06

## 2017-07-06 VITALS
SYSTOLIC BLOOD PRESSURE: 141 MMHG | RESPIRATION RATE: 20 BRPM | BODY MASS INDEX: 18.4 KG/M2 | HEART RATE: 98 BPM | TEMPERATURE: 98 F | OXYGEN SATURATION: 96 % | HEIGHT: 62 IN | WEIGHT: 100 LBS | DIASTOLIC BLOOD PRESSURE: 102 MMHG

## 2017-07-06 DIAGNOSIS — J44.1 CHRONIC OBSTRUCTIVE PULMONARY DISEASE WITH ACUTE EXACERBATION (HCC): ICD-10-CM

## 2017-07-06 DIAGNOSIS — J40 BRONCHITIS: ICD-10-CM

## 2017-07-06 DIAGNOSIS — N18.9 CHRONIC RENAL FAILURE, UNSPECIFIED STAGE: ICD-10-CM

## 2017-07-06 DIAGNOSIS — J90 PLEURAL EFFUSION, LEFT: Primary | ICD-10-CM

## 2017-07-06 LAB
ALBUMIN SERPL-MCNC: 3.5 G/DL (ref 3.5–5)
ALBUMIN/GLOB SERPL: 1.1 G/DL (ref 1–2)
ALP SERPL-CCNC: 158 U/L (ref 38–126)
ALT SERPL W P-5'-P-CCNC: 47 U/L (ref 13–69)
ANION GAP SERPL CALCULATED.3IONS-SCNC: 16.8 MMOL/L
AST SERPL-CCNC: 45 U/L (ref 15–46)
BASOPHILS # BLD AUTO: 0.05 10*3/MM3 (ref 0–0.2)
BASOPHILS NFR BLD AUTO: 0.6 % (ref 0–2.5)
BILIRUB SERPL-MCNC: 0.6 MG/DL (ref 0.2–1.3)
BUN BLD-MCNC: 31 MG/DL (ref 7–20)
BUN/CREAT SERPL: 5.7 (ref 7.1–23.5)
CALCIUM SPEC-SCNC: 8.9 MG/DL (ref 8.4–10.2)
CHLORIDE SERPL-SCNC: 93 MMOL/L (ref 98–107)
CO2 SERPL-SCNC: 30 MMOL/L (ref 26–30)
CREAT BLD-MCNC: 5.4 MG/DL (ref 0.6–1.3)
DEPRECATED RDW RBC AUTO: 50.4 FL (ref 37–54)
EOSINOPHIL # BLD AUTO: 0.05 10*3/MM3 (ref 0–0.7)
EOSINOPHIL NFR BLD AUTO: 0.6 % (ref 0–7)
ERYTHROCYTE [DISTWIDTH] IN BLOOD BY AUTOMATED COUNT: 15.1 % (ref 11.5–14.5)
GFR SERPL CREATININE-BSD FRML MDRD: 10 ML/MIN/1.73
GLOBULIN UR ELPH-MCNC: 3.2 GM/DL
GLUCOSE BLD-MCNC: 129 MG/DL (ref 74–98)
HCT VFR BLD AUTO: 31.8 % (ref 37–47)
HGB BLD-MCNC: 10 G/DL (ref 12–16)
HOLD SPECIMEN: NORMAL
HOLD SPECIMEN: NORMAL
IMM GRANULOCYTES # BLD: 0.06 10*3/MM3 (ref 0–0.06)
IMM GRANULOCYTES NFR BLD: 0.7 % (ref 0–0.6)
LYMPHOCYTES # BLD AUTO: 0.95 10*3/MM3 (ref 0.6–3.4)
LYMPHOCYTES NFR BLD AUTO: 11 % (ref 10–50)
MCH RBC QN AUTO: 28.7 PG (ref 27–31)
MCHC RBC AUTO-ENTMCNC: 31.4 G/DL (ref 30–37)
MCV RBC AUTO: 91.4 FL (ref 81–99)
MONOCYTES # BLD AUTO: 1.06 10*3/MM3 (ref 0–0.9)
MONOCYTES NFR BLD AUTO: 12.3 % (ref 0–12)
NEUTROPHILS # BLD AUTO: 6.47 10*3/MM3 (ref 2–6.9)
NEUTROPHILS NFR BLD AUTO: 74.8 % (ref 37–80)
NRBC BLD MANUAL-RTO: 0 /100 WBC (ref 0–0)
NT-PROBNP SERPL-MCNC: ABNORMAL PG/ML (ref 0–125)
PLATELET # BLD AUTO: 331 10*3/MM3 (ref 130–400)
PMV BLD AUTO: 9.3 FL (ref 6–12)
POTASSIUM BLD-SCNC: 3.8 MMOL/L (ref 3.5–5.1)
PROT SERPL-MCNC: 6.7 G/DL (ref 6.3–8.2)
RBC # BLD AUTO: 3.48 10*6/MM3 (ref 4.2–5.4)
SODIUM BLD-SCNC: 136 MMOL/L (ref 137–145)
TROPONIN I SERPL-MCNC: <0.012 NG/ML (ref 0–0.03)
WBC NRBC COR # BLD: 8.64 10*3/MM3 (ref 4.8–10.8)
WHOLE BLOOD HOLD SPECIMEN: NORMAL
WHOLE BLOOD HOLD SPECIMEN: NORMAL

## 2017-07-06 PROCEDURE — 25010000002 ONDANSETRON PER 1 MG: Performed by: EMERGENCY MEDICINE

## 2017-07-06 PROCEDURE — 96375 TX/PRO/DX INJ NEW DRUG ADDON: CPT

## 2017-07-06 PROCEDURE — 96374 THER/PROPH/DIAG INJ IV PUSH: CPT

## 2017-07-06 PROCEDURE — 94640 AIRWAY INHALATION TREATMENT: CPT

## 2017-07-06 PROCEDURE — 71020 HC CHEST PA AND LATERAL: CPT

## 2017-07-06 PROCEDURE — 84484 ASSAY OF TROPONIN QUANT: CPT

## 2017-07-06 PROCEDURE — 99285 EMERGENCY DEPT VISIT HI MDM: CPT

## 2017-07-06 PROCEDURE — 80053 COMPREHEN METABOLIC PANEL: CPT

## 2017-07-06 PROCEDURE — 85025 COMPLETE CBC W/AUTO DIFF WBC: CPT

## 2017-07-06 PROCEDURE — 96376 TX/PRO/DX INJ SAME DRUG ADON: CPT

## 2017-07-06 PROCEDURE — 25010000002 MORPHINE PER 10 MG: Performed by: EMERGENCY MEDICINE

## 2017-07-06 PROCEDURE — 94799 UNLISTED PULMONARY SVC/PX: CPT

## 2017-07-06 PROCEDURE — 93005 ELECTROCARDIOGRAM TRACING: CPT

## 2017-07-06 PROCEDURE — 25010000002 HYDROMORPHONE PER 4 MG: Performed by: THORACIC SURGERY (CARDIOTHORACIC VASCULAR SURGERY)

## 2017-07-06 PROCEDURE — 83880 ASSAY OF NATRIURETIC PEPTIDE: CPT

## 2017-07-06 RX ORDER — HYDRALAZINE HYDROCHLORIDE 50 MG/1
50 TABLET, FILM COATED ORAL EVERY 8 HOURS SCHEDULED
Status: DISCONTINUED | OUTPATIENT
Start: 2017-07-06 | End: 2017-07-09 | Stop reason: HOSPADM

## 2017-07-06 RX ORDER — MORPHINE SULFATE 4 MG/ML
4 INJECTION, SOLUTION INTRAMUSCULAR; INTRAVENOUS ONCE
Status: COMPLETED | OUTPATIENT
Start: 2017-07-06 | End: 2017-07-06

## 2017-07-06 RX ORDER — SODIUM CHLORIDE 0.9 % (FLUSH) 0.9 %
10 SYRINGE (ML) INJECTION AS NEEDED
Status: DISCONTINUED | OUTPATIENT
Start: 2017-07-06 | End: 2017-07-09 | Stop reason: HOSPADM

## 2017-07-06 RX ORDER — FAMOTIDINE 20 MG/1
20 TABLET, FILM COATED ORAL DAILY
Status: DISCONTINUED | OUTPATIENT
Start: 2017-07-07 | End: 2017-07-09 | Stop reason: HOSPADM

## 2017-07-06 RX ORDER — MINOXIDIL 10 MG/1
10 TABLET ORAL DAILY
Status: DISCONTINUED | OUTPATIENT
Start: 2017-07-07 | End: 2017-07-09 | Stop reason: HOSPADM

## 2017-07-06 RX ORDER — OXYCODONE HYDROCHLORIDE AND ACETAMINOPHEN 5; 325 MG/1; MG/1
1 TABLET ORAL ONCE
Status: COMPLETED | OUTPATIENT
Start: 2017-07-06 | End: 2017-07-06

## 2017-07-06 RX ORDER — METOPROLOL SUCCINATE 50 MG/1
50 TABLET, EXTENDED RELEASE ORAL
Status: DISCONTINUED | OUTPATIENT
Start: 2017-07-07 | End: 2017-07-09 | Stop reason: HOSPADM

## 2017-07-06 RX ORDER — SODIUM CHLORIDE 0.9 % (FLUSH) 0.9 %
10 SYRINGE (ML) INJECTION AS NEEDED
Status: DISCONTINUED | OUTPATIENT
Start: 2017-07-06 | End: 2017-07-06 | Stop reason: HOSPADM

## 2017-07-06 RX ORDER — HYDROMORPHONE HYDROCHLORIDE 1 MG/ML
0.5 INJECTION, SOLUTION INTRAMUSCULAR; INTRAVENOUS; SUBCUTANEOUS
Status: DISCONTINUED | OUTPATIENT
Start: 2017-07-06 | End: 2017-07-08

## 2017-07-06 RX ORDER — AMLODIPINE BESYLATE 10 MG/1
10 TABLET ORAL
Status: DISCONTINUED | OUTPATIENT
Start: 2017-07-07 | End: 2017-07-09 | Stop reason: HOSPADM

## 2017-07-06 RX ORDER — ALPRAZOLAM 0.5 MG/1
0.5 TABLET ORAL NIGHTLY PRN
Status: DISCONTINUED | OUTPATIENT
Start: 2017-07-06 | End: 2017-07-09 | Stop reason: HOSPADM

## 2017-07-06 RX ORDER — IPRATROPIUM BROMIDE AND ALBUTEROL SULFATE 2.5; .5 MG/3ML; MG/3ML
3 SOLUTION RESPIRATORY (INHALATION) ONCE
Status: COMPLETED | OUTPATIENT
Start: 2017-07-06 | End: 2017-07-06

## 2017-07-06 RX ORDER — ONDANSETRON 2 MG/ML
4 INJECTION INTRAMUSCULAR; INTRAVENOUS ONCE
Status: COMPLETED | OUTPATIENT
Start: 2017-07-06 | End: 2017-07-06

## 2017-07-06 RX ORDER — AMITRIPTYLINE HYDROCHLORIDE 10 MG/1
10 TABLET, FILM COATED ORAL NIGHTLY
Status: DISCONTINUED | OUTPATIENT
Start: 2017-07-06 | End: 2017-07-09 | Stop reason: HOSPADM

## 2017-07-06 RX ORDER — SACCHAROMYCES BOULARDII 250 MG
250 CAPSULE ORAL 2 TIMES DAILY
Status: DISCONTINUED | OUTPATIENT
Start: 2017-07-07 | End: 2017-07-09 | Stop reason: HOSPADM

## 2017-07-06 RX ADMIN — Medication 10 ML: at 16:38

## 2017-07-06 RX ADMIN — HYDRALAZINE HYDROCHLORIDE 50 MG: 50 TABLET, FILM COATED ORAL at 23:21

## 2017-07-06 RX ADMIN — MORPHINE SULFATE 4 MG: 4 INJECTION, SOLUTION INTRAMUSCULAR; INTRAVENOUS at 16:38

## 2017-07-06 RX ADMIN — AMITRIPTYLINE HYDROCHLORIDE 10 MG: 10 TABLET, FILM COATED ORAL at 23:21

## 2017-07-06 RX ADMIN — ONDANSETRON 4 MG: 2 INJECTION INTRAMUSCULAR; INTRAVENOUS at 16:37

## 2017-07-06 RX ADMIN — Medication 250 MG: at 23:21

## 2017-07-06 RX ADMIN — IPRATROPIUM BROMIDE AND ALBUTEROL SULFATE 3 ML: .5; 3 SOLUTION RESPIRATORY (INHALATION) at 15:41

## 2017-07-06 RX ADMIN — ALPRAZOLAM 0.5 MG: 0.5 TABLET ORAL at 23:21

## 2017-07-06 RX ADMIN — OXYCODONE AND ACETAMINOPHEN 1 TABLET: 5; 325 TABLET ORAL at 15:27

## 2017-07-06 RX ADMIN — MORPHINE SULFATE 4 MG: 4 INJECTION, SOLUTION INTRAMUSCULAR; INTRAVENOUS at 19:52

## 2017-07-06 RX ADMIN — HYDROMORPHONE HYDROCHLORIDE 0.5 MG: 1 INJECTION, SOLUTION INTRAMUSCULAR; INTRAVENOUS; SUBCUTANEOUS at 23:21

## 2017-07-06 NOTE — ED PROVIDER NOTES
Subjective   HPI Comments: 26-year-old female, dialysis patient that has COPD and wears 4 L of oxygen at home.  She apparently had a right-sided thoracotomy approximately 7 days ago by Dr. Hicks at [facility] for some sort of lung infection, according to the patient report.  Was sent home with vancomycin and oxycodone.  She is here with tight, pressure-like discomfort to her right scapula radiating through to her right chest that is pleuritic in nature associated with a productive cough of green sputum, shortness of breath, wheezing and a fever to 101°.  This pain has been present since the surgery but worse for the past 4 days.  She has not smoked in 18 days.  Aggravating factors: Deep breathing or coughing.  Alleviating factors: None.  Treatment prior to arrival: Breathing treatments, 4 L of oxygen, inhalers.  She is not taking the vancomycin or oxicodone, according to her report.    She attends dialysis on Monday, Wednesday and Friday and has not missed dialysis.      History provided by:  Patient  History limited by: nothing.   used: No        Review of Systems   Constitutional: Positive for fever.   HENT: Negative.    Eyes: Negative.    Respiratory: Positive for cough, shortness of breath and wheezing.    Cardiovascular: Positive for chest pain.   Gastrointestinal: Negative.  Negative for abdominal pain.   Endocrine: Negative.    Genitourinary: Negative for dysuria.   Musculoskeletal: Negative.    Skin: Negative.    Allergic/Immunologic: Negative.    Neurological: Negative.    Hematological: Negative.    Psychiatric/Behavioral: Negative.    All other systems reviewed and are negative.      Past Medical History:   Diagnosis Date   • Abdominal pain    • Anemia    • Anxiety    • Asthma    • Bipolar disorder    • CKD (chronic kidney disease), stage IV    • Constipation    • COPD (chronic obstructive pulmonary disease)    • Depression    • End stage renal disease on dialysis    •  Esophageal reflux     reflux   • Esophagitis determined by biopsy 2014   • Fahr's syndrome    • Hepatitis C antibody test positive    • Hyperparathyroidism    • Hypertension    • Hypocalcemia    • Nausea and vomiting    • Non-traumatic rhabdomyolysis 5/2/2017   • Pancreatitis    • Pneumonia of right lung due to infectious organism 5/2/2017   • Recurrent urinary tract infection    • Renal insufficiency    • Upper gastrointestinal bleeding        Allergies   Allergen Reactions   • Acetaminophen Itching   • Hydrocodone Itching   • Ibuprofen    • Lortab [Hydrocodone-Acetaminophen]      Lortab TABS   • Ciprofloxacin Rash       Past Surgical History:   Procedure Laterality Date   • BRONCHOSCOPY THORACOTOMY Right 6/14/2017    Procedure: BRONCHOSCOPY RIGHT THORACOTOMY WITH LUNG DECORTICATION ;  Surgeon: Shawn Hicks MD;  Location: [facility];  Service:    • DIALYSIS FISTULA CREATION      port   • DILATATION AND CURETTAGE     • ESOPHAGOSCOPY / EGD  2014    esophagitis   • EXPLORATORY LAPAROTOMY      For uterine and ovarian issues   • KIDNEY SURGERY Left 2013    Biopsy       Family History   Problem Relation Age of Onset   • Arthritis Mother    • Hypertension Mother    • Kidney disease Father      Chronic renal failure syndrome   • Pancreatic cancer Father    • Hypertension Brother    • Kidney disease Other    • Diabetes Other      Uncle   • Lung cancer Other      Grandmother   • Hyperlipidemia Other      High cholesterol - Uncle       Social History     Social History   • Marital status:      Spouse name: N/A   • Number of children: N/A   • Years of education: N/A     Social History Main Topics   • Smoking status: Current Every Day Smoker     Packs/day: 1.50   • Smokeless tobacco: None   • Alcohol use No   • Drug use: Yes     Special: Marijuana   • Sexual activity: Not Currently     Other Topics Concern   • None     Social History Narrative    She is  and not working. She denies alcohol or drug use. She  denies recent opiate or benzo use. She does smoke and has used marijuana.            Objective   Physical Exam   Constitutional: She is oriented to person, place, and time. She appears well-developed and well-nourished. No distress.   HENT:   Head: Normocephalic and atraumatic.   Right Ear: External ear normal.   Left Ear: External ear normal.   Eyes: EOM are normal. Pupils are equal, round, and reactive to light.   Neck: Normal range of motion. Neck supple.   Cardiovascular: Normal rate, regular rhythm and normal heart sounds.    Pulmonary/Chest: Effort normal. No stridor. She has wheezes. She exhibits tenderness (the right chest and right scapular region are tender to palpation.).   The right posterior chest wall has 2 large, well-healing incisions with overlying Dermabond noted.  No signs of infection.   Abdominal: Soft. She exhibits no distension. There is no tenderness. There is no rebound and no guarding.   Musculoskeletal: Normal range of motion. She exhibits no edema.   Neurological: She is alert and oriented to person, place, and time.   Skin: Skin is warm and dry. No rash noted. She is not diaphoretic.   Psychiatric: She has a normal mood and affect. Her behavior is normal. Judgment and thought content normal.   Nursing note and vitals reviewed.      ECG 12 Lead    Date/Time: 7/6/2017 3:33 PM  Performed by: JUJU CHEEMA  Authorized by: EMERGENCY, [employer]   Comments: EKG: Sinus tachycardia at a rate of 115.  Left atrial enlargement.  Nonspecific ST and T-wave changes.  No ectopy.  No infarction.               ED Course  ED Course   Comment By Time   Case discussed with Dr. Hutchinson.  We are concerned that this is just postoperative pain in her right chest and right scapula.  Doubt pulmonary embolus.  She has seen Dr. Aiken, pulmonologist in the past.  She refuses to go back to [place] for drainage of the left pleural effusion.  Agrees to admission here. Juju Cheema PA-C 07/06 0845   :   prefers she go to [facility].  He can perform a thoracentesis on the left if needed but prefers that she sees a CT surgeon related to the persistent small right effusion. Josie Viera PA-C 07/06 1643   Dr Jacques (covering for dr Hicks): Josie Viera PA-C 07/06 1659   Dr. Cross is scrubbed in the operating room.  His physician assistant will get a hold of him and call us back. Josie Viera PA-C 07/06 1738   Dr. Jacques: Accepts the patient to telemetry bed.  Will call back with bed assignment. Josie Viera PA-C 07/06 1941                  MDM  Number of Diagnoses or Management Options  Bronchitis: new and requires workup  Chronic obstructive pulmonary disease with acute exacerbation: new and requires workup  Chronic renal failure, unspecified stage: new and requires workup  Pleural effusion, left: new and requires workup     Amount and/or Complexity of Data Reviewed  Clinical lab tests: ordered and reviewed  Tests in the radiology section of CPT®: ordered and reviewed  Tests in the medicine section of CPT®: ordered and reviewed  Decide to obtain previous medical records or to obtain history from someone other than the patient: yes  Discuss the patient with other providers: yes    Risk of Complications, Morbidity, and/or Mortality  Presenting problems: moderate  Diagnostic procedures: low  Management options: moderate    Patient Progress  Patient progress: stable      Final diagnoses:   Pleural effusion, left   Chronic renal failure, unspecified stage   Bronchitis   Chronic obstructive pulmonary disease with acute exacerbation            Josie Viera PA-C  07/06/17 1943

## 2017-07-06 NOTE — ED NOTES
Dr. Urbina was called at 1637 and transferred to [place] at this time.      Enrico Davis  07/06/17 4103

## 2017-07-06 NOTE — NURSING NOTE
ACC REVIEW REPORT: [facility]        PATIENT NAME: Mandy Espino    PATIENT ID: [id number]    BED:  S457    BED TYPE:  TELE    BED GIVEN TO:  RADHA BECKER RN     TIME BED GIVEN:  2013    YOB: 1990    AGE: 26 YRS    GENDER:  FEMALE    PREVIOUS ADMIT TO [facility]:  YES    PREVIOUS ADMISSION DATE:   06/12/17    DISCHARGED ON 06/23/2017    PATIENT CLASS:    TODAY'S DATE: 7/6/2017    TRANSFER DATE:   07/06/17    ETA:    TRANSFERRING FACILITY:  [facility]    TRANSFERRING FACILITY PHONE # :  [phone]    TRANSFERRING MD:    DATE/TIME REQUEST RECEIVED:  07/06/17 @ 1942    [facility] RN:  ASHER DOWNING     REPORT FROM:  RADHA BECKER RN     TIME REPORT TAKEN:  2000    DIAGNOSIS:  PLEURAL EFFUSION    REASON FOR TRANSFER TO [facility]:  HIGHER LEVEL CARE    TRANSPORTATION: GROUND    CLINICAL REASON FOR TRANSFER TO [facility]:  PRESENTED TO THE ER WITH C/O SOA. PATIENT WAS 97% ON 4LNC, WHICH SHE WEARS ALL THE TIME.      CLINICAL INFORMATION    HEIGHT:     WEIGHT:  100 LB    ALLERGIES:  ACETAMINOPHEN, HYDROCODONE, IBUPROFEN, LORTAB, CIPROFLOXACIN    LIM:  NO    INFECTIOUS DISEASE:    ISOLATION:    1ST VITAL SIGNS:   TIME: 1946  TEMP:  98.0  PULSE:  96  B/P:  141/102  RESP: 20      LAB INFORMATION:   SODIUM-136, POTASSIUM-3.8, CHLORIDE-93, BUN-31, CREAT-5.5, BUN/CREAT RATIO=5.7, H&H-10.0/31/8    CULTURE INFORMATION:    MEDS/IV FLUIDS:  # 20 G LAC/ SL      CARDIAC SYSTEM:    CHEST PAIN:  NO    RATE:    SCALE:    RHYTHM:  NSR    Is patient taking or has patient been given any drugs that could increase bleeding?  NONE, PER REPORT  (Plavix, Brilinta, Effient, Eliquis, Xarelto, Warfarin, Integrilin, Angiomax)      CARDIAC NOTES:      RESPIRATORY SYSTEM:    LUNG SOUNDS:      CLEAR:  CRACKLES:  WHEEZES:  RHONCHI:  DIMINISHED:  YES      OXYGEN:  4LNC    O2 SAT:  96 ON 4LNC   PATIENT WEARS 02 @ 4LNC ALL THE TIME    RADIOLOGY RESULTS:  SMALL RIGHT PLEURAL EFFUSION IMPROVED & A LARGE PLEURAL EFFUSION THAT HAS WORSENED.      RESPIRATORY  STATUS:   PATIENT WAS RECENTLY ADMITTED ON 06/12/2017 @ [facility] AND DISCHARGED ON 06/23/2017.  DURING THE COURSE OF HER HOSPITAL STAY, PULMONOLOGY  WAS CONSULTED FOR A RIGHT PLEURAL EFFUSION.  THE PATIENT UNDERWENT ULTRASOUND GUIDED THORACENTESIS WHICH RESULTED  ML.  THORACENTESIS ALSO DEMONSTRATED MULTIPLE LOCULATIONS.  IT WAS DETERMINED THAT PATIENT UNDERGO EVALUATION PER CT SURGERY FOR THIS PER DR ADAMS AND WAS TRANSFERRED TO [facility].  ON June 14TH SHE UNDERWENT THORACOTOMY WITH DECORTICATION.  POSTOPERATIVELY SHE DEVELOPED A LIFT-SIDED EFFUSION AND ATELECTASIS ON THE CONTRALATERAL SIDE OF HER SURGERY DUE TO THE FACT THAT SHE CONSTANTLY WOULD LAY ON THAT SIDE AND NOT DO ANY PULMONARY TOILET.  RIGHT SIDED CT PLACED AND PULLED OUT 06/20/17.  CURRENTLY, PATIENT HAS TWO INCISIONS; RIGHT CHEST INCISION /LOBECTOMY AND A RIGHT LATERAL CHEST INCISION  / FROM THE CHEST TUBE.  THEY ARE OPEN TO AIR AND SCABBED OVER.  THE TOP INCISION IS EDEMATOUS, PER REPORT.      CNS/MUSCULOSKELETAL    ALERT AND ORIENTED:  YES X 4    PERSON:  YES  PLACE:  YES  TIME: YES    VISION CHANGE:         GI//GY      ABDOMINAL PAIN:    VOMITING:    DIARRHEA:    NAUSEA:        GI//GY NOTES:  PATIENT IS ON DIALYSIS MWF SCHEDULE.  SHE HAS A FISTULA IN HER RIGHT UPPER ARM.  SHE LAST HAD DIALYSIS YESTERDAY.      PAST MEDICAL HISTORY:  LOCULATED PLEURAL EFFUSION, TOBACCO USE, BIPOLAR DISORDER, DEPRESSION, PARATHYROID HORMONE EXCESS, PNEUMONIA OF RIGHT LUNG DUE TO INFECTIOUS ORGANISM, ESRD, RENOVASCULAR HTN., HEP C, IV DRUG ABUSE, ANEMIA OF ESRD, CHRONIC BRONCHITIS, MEDICAL NON-COMPLIANCE, FAHR'S SYNDROME, CKD, COUGHING BLOOD, COPD.      OTHER SYMPTOM NOTES:    ADDITIONAL NOTES:          Romina Elliott RN  7/6/2017  7:58 PM

## 2017-07-06 NOTE — ED NOTES
Dr. Jacques was paged by [facility] at [phone]. He answered the page at [phone] and the call was transferred to [place] at this time.      Enrico Davis  07/06/17 3878

## 2017-07-07 ENCOUNTER — APPOINTMENT (OUTPATIENT)
Dept: CT IMAGING | Facility: HOSPITAL | Age: 27
End: 2017-07-07

## 2017-07-07 ENCOUNTER — APPOINTMENT (OUTPATIENT)
Dept: GENERAL RADIOLOGY | Facility: HOSPITAL | Age: 27
End: 2017-07-07

## 2017-07-07 ENCOUNTER — APPOINTMENT (OUTPATIENT)
Dept: NEPHROLOGY | Facility: HOSPITAL | Age: 27
End: 2017-07-07
Attending: INTERNAL MEDICINE

## 2017-07-07 PROBLEM — I10 ESSENTIAL HYPERTENSION: Status: ACTIVE | Noted: 2017-07-07

## 2017-07-07 PROBLEM — IMO0001 HISTORY OF CLOSTRIDIUM DIFFICILE: Status: ACTIVE | Noted: 2017-07-07

## 2017-07-07 LAB
ALBUMIN SERPL-MCNC: 3.2 G/DL (ref 3.2–4.8)
ALBUMIN/GLOB SERPL: 1 G/DL (ref 1.5–2.5)
ALP SERPL-CCNC: 150 U/L (ref 25–100)
ALT SERPL W P-5'-P-CCNC: 24 U/L (ref 7–40)
ANION GAP SERPL CALCULATED.3IONS-SCNC: 15 MMOL/L (ref 3–11)
APTT PPP: 38.3 SECONDS (ref 24–31)
AST SERPL-CCNC: 26 U/L (ref 0–33)
BASOPHILS # BLD AUTO: 0.03 10*3/MM3 (ref 0–0.2)
BASOPHILS NFR BLD AUTO: 0.3 % (ref 0–1)
BILIRUB SERPL-MCNC: 0.2 MG/DL (ref 0.3–1.2)
BUN BLD-MCNC: 33 MG/DL (ref 9–23)
BUN/CREAT SERPL: 5.4 (ref 7–25)
CALCIUM SPEC-SCNC: 8.9 MG/DL (ref 8.7–10.4)
CHLORIDE SERPL-SCNC: 92 MMOL/L (ref 99–109)
CO2 SERPL-SCNC: 26 MMOL/L (ref 20–31)
CREAT BLD-MCNC: 6.1 MG/DL (ref 0.6–1.3)
DEPRECATED RDW RBC AUTO: 53.8 FL (ref 37–54)
EOSINOPHIL # BLD AUTO: 0.12 10*3/MM3 (ref 0–0.3)
EOSINOPHIL NFR BLD AUTO: 1.4 % (ref 0–3)
ERYTHROCYTE [DISTWIDTH] IN BLOOD BY AUTOMATED COUNT: 15.7 % (ref 11.3–14.5)
GFR SERPL CREATININE-BSD FRML MDRD: 8 ML/MIN/1.73
GLOBULIN UR ELPH-MCNC: 3.1 GM/DL
GLUCOSE BLD-MCNC: 119 MG/DL (ref 70–100)
HCG INTACT+B SERPL-ACNC: <5 MIU/ML
HCT VFR BLD AUTO: 30.7 % (ref 34.5–44)
HGB BLD-MCNC: 9.4 G/DL (ref 11.5–15.5)
IMM GRANULOCYTES # BLD: 0.04 10*3/MM3 (ref 0–0.03)
IMM GRANULOCYTES NFR BLD: 0.5 % (ref 0–0.6)
INR PPP: 1.31
LYMPHOCYTES # BLD AUTO: 1.43 10*3/MM3 (ref 0.6–4.8)
LYMPHOCYTES NFR BLD AUTO: 16.7 % (ref 24–44)
MCH RBC QN AUTO: 28.7 PG (ref 27–31)
MCHC RBC AUTO-ENTMCNC: 30.6 G/DL (ref 32–36)
MCV RBC AUTO: 93.6 FL (ref 80–99)
MONOCYTES # BLD AUTO: 1.29 10*3/MM3 (ref 0–1)
MONOCYTES NFR BLD AUTO: 15 % (ref 0–12)
NEUTROPHILS # BLD AUTO: 5.67 10*3/MM3 (ref 1.5–8.3)
NEUTROPHILS NFR BLD AUTO: 66.1 % (ref 41–71)
PLATELET # BLD AUTO: 293 10*3/MM3 (ref 150–450)
PMV BLD AUTO: 8.6 FL (ref 6–12)
POTASSIUM BLD-SCNC: 4.2 MMOL/L (ref 3.5–5.5)
PROT SERPL-MCNC: 6.3 G/DL (ref 5.7–8.2)
PROTHROMBIN TIME: 14.4 SECONDS (ref 9.6–11.5)
RBC # BLD AUTO: 3.28 10*6/MM3 (ref 3.89–5.14)
SODIUM BLD-SCNC: 133 MMOL/L (ref 132–146)
WBC NRBC COR # BLD: 8.58 10*3/MM3 (ref 3.5–10.8)

## 2017-07-07 PROCEDURE — 99232 SBSQ HOSP IP/OBS MODERATE 35: CPT | Performed by: INTERNAL MEDICINE

## 2017-07-07 PROCEDURE — 94799 UNLISTED PULMONARY SVC/PX: CPT

## 2017-07-07 PROCEDURE — 25010000002 HYDROMORPHONE PER 4 MG: Performed by: THORACIC SURGERY (CARDIOTHORACIC VASCULAR SURGERY)

## 2017-07-07 PROCEDURE — 85610 PROTHROMBIN TIME: CPT | Performed by: PHYSICIAN ASSISTANT

## 2017-07-07 PROCEDURE — 84702 CHORIONIC GONADOTROPIN TEST: CPT | Performed by: THORACIC SURGERY (CARDIOTHORACIC VASCULAR SURGERY)

## 2017-07-07 PROCEDURE — 25010000002 HYDROMORPHONE PER 4 MG: Performed by: INTERNAL MEDICINE

## 2017-07-07 PROCEDURE — 85730 THROMBOPLASTIN TIME PARTIAL: CPT | Performed by: PHYSICIAN ASSISTANT

## 2017-07-07 PROCEDURE — 85025 COMPLETE CBC W/AUTO DIFF WBC: CPT | Performed by: PHYSICIAN ASSISTANT

## 2017-07-07 PROCEDURE — 0W9B30Z DRAINAGE OF LEFT PLEURAL CAVITY WITH DRAINAGE DEVICE, PERCUTANEOUS APPROACH: ICD-10-PCS | Performed by: RADIOLOGY

## 2017-07-07 PROCEDURE — 63410000001 EPOETIN ALFA PER 1000 UNITS: Performed by: INTERNAL MEDICINE

## 2017-07-07 PROCEDURE — 99024 POSTOP FOLLOW-UP VISIT: CPT | Performed by: THORACIC SURGERY (CARDIOTHORACIC VASCULAR SURGERY)

## 2017-07-07 PROCEDURE — 80053 COMPREHEN METABOLIC PANEL: CPT | Performed by: PHYSICIAN ASSISTANT

## 2017-07-07 PROCEDURE — 71010 HC CHEST PA OR AP: CPT

## 2017-07-07 PROCEDURE — 5A1D60Z PERFORMANCE OF URINARY FILTRATION, MULTIPLE: ICD-10-PCS | Performed by: INTERNAL MEDICINE

## 2017-07-07 PROCEDURE — 94640 AIRWAY INHALATION TREATMENT: CPT

## 2017-07-07 PROCEDURE — 75989 ABSCESS DRAINAGE UNDER X-RAY: CPT

## 2017-07-07 RX ORDER — LIDOCAINE HYDROCHLORIDE 10 MG/ML
10 INJECTION, SOLUTION INFILTRATION; PERINEURAL ONCE
Status: COMPLETED | OUTPATIENT
Start: 2017-07-07 | End: 2017-07-07

## 2017-07-07 RX ORDER — HYDROMORPHONE HYDROCHLORIDE 1 MG/ML
0.5 INJECTION, SOLUTION INTRAMUSCULAR; INTRAVENOUS; SUBCUTANEOUS ONCE
Status: COMPLETED | OUTPATIENT
Start: 2017-07-07 | End: 2017-07-07

## 2017-07-07 RX ORDER — LORAZEPAM 1 MG/1
1 TABLET ORAL ONCE
Status: COMPLETED | OUTPATIENT
Start: 2017-07-07 | End: 2017-07-07

## 2017-07-07 RX ADMIN — ALPRAZOLAM 0.5 MG: 0.5 TABLET ORAL at 22:08

## 2017-07-07 RX ADMIN — METOPROLOL SUCCINATE 50 MG: 50 TABLET, EXTENDED RELEASE ORAL at 08:12

## 2017-07-07 RX ADMIN — MINOXIDIL 10 MG: 10 TABLET ORAL at 08:12

## 2017-07-07 RX ADMIN — HYDROMORPHONE HYDROCHLORIDE 0.5 MG: 1 INJECTION, SOLUTION INTRAMUSCULAR; INTRAVENOUS; SUBCUTANEOUS at 04:43

## 2017-07-07 RX ADMIN — EPOETIN ALFA 6000 UNITS: 3000 SOLUTION INTRAVENOUS; SUBCUTANEOUS at 12:12

## 2017-07-07 RX ADMIN — LIDOCAINE HYDROCHLORIDE 10 ML: 10 INJECTION, SOLUTION INFILTRATION; PERINEURAL at 14:50

## 2017-07-07 RX ADMIN — HYDROMORPHONE HYDROCHLORIDE 0.5 MG: 1 INJECTION, SOLUTION INTRAMUSCULAR; INTRAVENOUS; SUBCUTANEOUS at 22:04

## 2017-07-07 RX ADMIN — Medication 250 MG: at 08:12

## 2017-07-07 RX ADMIN — HYDRALAZINE HYDROCHLORIDE 50 MG: 50 TABLET, FILM COATED ORAL at 22:07

## 2017-07-07 RX ADMIN — ALBUTEROL SULFATE 2.5 MG: 2.5 SOLUTION RESPIRATORY (INHALATION) at 20:19

## 2017-07-07 RX ADMIN — HYDRALAZINE HYDROCHLORIDE 50 MG: 50 TABLET, FILM COATED ORAL at 14:20

## 2017-07-07 RX ADMIN — HYDROMORPHONE HYDROCHLORIDE 0.5 MG: 1 INJECTION, SOLUTION INTRAMUSCULAR; INTRAVENOUS; SUBCUTANEOUS at 08:13

## 2017-07-07 RX ADMIN — Medication 250 MG: at 13:05

## 2017-07-07 RX ADMIN — HYDROMORPHONE HYDROCHLORIDE 0.5 MG: 1 INJECTION, SOLUTION INTRAMUSCULAR; INTRAVENOUS; SUBCUTANEOUS at 15:27

## 2017-07-07 RX ADMIN — HYDRALAZINE HYDROCHLORIDE 50 MG: 50 TABLET, FILM COATED ORAL at 04:44

## 2017-07-07 RX ADMIN — LORAZEPAM 1 MG: 1 TABLET ORAL at 17:14

## 2017-07-07 RX ADMIN — FAMOTIDINE 20 MG: 20 TABLET ORAL at 08:12

## 2017-07-07 RX ADMIN — AMLODIPINE BESYLATE 10 MG: 10 TABLET ORAL at 08:12

## 2017-07-07 RX ADMIN — HYDROMORPHONE HYDROCHLORIDE 0.5 MG: 1 INJECTION, SOLUTION INTRAMUSCULAR; INTRAVENOUS; SUBCUTANEOUS at 13:06

## 2017-07-07 RX ADMIN — Medication 250 MG: at 22:09

## 2017-07-07 RX ADMIN — AMITRIPTYLINE HYDROCHLORIDE 10 MG: 10 TABLET, FILM COATED ORAL at 22:07

## 2017-07-07 RX ADMIN — HYDROMORPHONE HYDROCHLORIDE 0.5 MG: 1 INJECTION, SOLUTION INTRAMUSCULAR; INTRAVENOUS; SUBCUTANEOUS at 16:07

## 2017-07-07 RX ADMIN — Medication 250 MG: at 06:42

## 2017-07-07 RX ADMIN — Medication 250 MG: at 17:14

## 2017-07-07 RX ADMIN — HYDROMORPHONE HYDROCHLORIDE 0.5 MG: 1 INJECTION, SOLUTION INTRAMUSCULAR; INTRAVENOUS; SUBCUTANEOUS at 19:36

## 2017-07-07 NOTE — CONSULTS
CTS Consult    Patient Care Team:  Neo Gresham DO as PCP - General (Family Medicine)      Reason for Consult:  Large L Pleural Effusion    HPI  Patient is a 26 y.o. female well known to our service presented to [facility] ED 7/6/17 complaining of worsening pressure-like pain in right shoulder radiating to right chest over past 4 days. States she also experiencing productive cough, SOB, and fever tmax 101. She has tried inhalers, 4L oxygen without relief.   She is well known to our service, admitted on 6/12/17 for right pleural effusion. She underwent R thoracentesis, which revealed multiple loculations. CT surgery performed R thoracotomy with decortication. Postop she developed small left effusion secondary to laying on left side and refusing pulmonary toilet.   Transferred to [facility] for management of L pleural effusion.       Review of Systems    Ocular: Denies blurred vision denies retinal disease denies glaucoma  Nose and throat: Denies thyroid disease denies known tracheal malignancy  Pulmonary: denies tuberculosis history denies recent bronchitis  Endocrine: Denies thyroid disease, does have a history of hyperlipidemia  Gastrointestinal: Denies chronic constipation denies chronic diarrhea denies a history of ulcer surgery  Neurologic: Denies a history of strokes denies a history of seizures  Integument: denies a history of psoriasis or malignant melanoma  Psychiatric: Denies a history of psychosis denies a diagnosis of neurolysis  Cardiac: Denies a history of rheumatic fever or palpitations  Urologic: Denies a history of nephrolithiasis or hematuria  Hematologic: Denies known blood dyscrasias also denies any hemophilia  Immunologic: Denies any known immunologic deficiency denies recent steroid usage      All other systems were reviewed and are negative.    History  Past Medical History:   Diagnosis Date   • Abdominal pain    • Anemia    • Anxiety    • Asthma    • Bipolar disorder    • CKD  (chronic kidney disease), stage IV    • Constipation    • COPD (chronic obstructive pulmonary disease)    • Depression    • End stage renal disease on dialysis    • Esophageal reflux     reflux   • Esophagitis determined by biopsy 2014   • Fahr's syndrome    • Hepatitis C antibody test positive    • Hyperparathyroidism    • Hypertension    • Hypocalcemia    • Nausea and vomiting    • Non-traumatic rhabdomyolysis 5/2/2017   • Pancreatitis    • Pneumonia of right lung due to infectious organism 5/2/2017   • Recurrent urinary tract infection    • Renal insufficiency    • Upper gastrointestinal bleeding      Past Surgical History:   Procedure Laterality Date   • BRONCHOSCOPY THORACOTOMY Right 6/14/2017    Procedure: BRONCHOSCOPY RIGHT THORACOTOMY WITH LUNG DECORTICATION ;  Surgeon: Shawn Hicks MD;  Location: [facility];  Service:    • DIALYSIS FISTULA CREATION      port   • DILATATION AND CURETTAGE     • ESOPHAGOSCOPY / EGD  2014    esophagitis   • EXPLORATORY LAPAROTOMY      For uterine and ovarian issues   • KIDNEY SURGERY Left 2013    Biopsy     Family History   Problem Relation Age of Onset   • Arthritis Mother    • Hypertension Mother    • Kidney disease Father      Chronic renal failure syndrome   • Pancreatic cancer Father    • Hypertension Brother    • Kidney disease Other    • Diabetes Other      Uncle   • Lung cancer Other      Grandmother   • Hyperlipidemia Other      High cholesterol - Uncle     Social History   Substance Use Topics   • Smoking status: Former Smoker     Quit date: 6/18/2017   • Smokeless tobacco: None   • Alcohol use No     Prescriptions Prior to Admission   Medication Sig Dispense Refill Last Dose   • ALPRAZolam (XANAX) 0.5 MG tablet Take 0.5 mg by mouth At Night As Needed for Anxiety or Sleep.   7/5/2017 at Unknown time   • amitriptyline (ELAVIL) 10 MG tablet Take 10 mg by mouth Every Night.   7/5/2017 at Unknown time   • amLODIPine (NORVASC) 10 MG tablet Take 1 tablet by mouth Daily  for 30 days. 30 tablet 0 7/5/2017 at Unknown time   • famotidine (PEPCID) 20 MG tablet Take 1 tablet by mouth Daily for 30 days. 30 tablet 0 7/5/2017 at Unknown time   • hydrALAZINE (APRESOLINE) 50 MG tablet Take 1 tablet by mouth Every 8 (Eight) Hours for 30 days. 90 tablet 0 7/5/2017 at Unknown time   • metoprolol succinate XL (TOPROL-XL) 50 MG 24 hr tablet Take 1 tablet by mouth Daily for 30 days. 30 tablet 0 7/5/2017 at Unknown time   • minoxidil (LONITEN) 10 MG tablet Take 10 mg by mouth Daily.   7/5/2017 at Unknown time   • saccharomyces boulardii (FLORASTOR) 250 MG capsule Take 1 capsule by mouth 2 (Two) Times a Day for 30 days. 60 capsule 0 7/5/2017 at Unknown time   • vancomycin 50 MG/ML solution oral solution Take 5 mL by mouth Every 6 (Six) Hours for 30 days. Indications: Clostridium Difficile Infection 600 mL 0 7/6/2017 at Unknown time     Allergies:  Acetaminophen; Hydrocodone; Ibuprofen; Lortab [hydrocodone-acetaminophen]; Ultram [tramadol hcl]; and Ciprofloxacin    Objective    Vital Signs  Temp:  [98 °F (36.7 °C)-98.8 °F (37.1 °C)] 98.4 °F (36.9 °C)  Heart Rate:  [] 96  Resp:  [18-32] 18  BP: (140-172)/() 166/106    Physical Exam:  General Appearance: alert, appears older than stated age  Head: normocephalic, without obvious abnormality and atraumatic  Throat: gums healthy and no oral lesions  Neck: no adenopathy, suppple, trachea midline, no thyromegaly, no carotid bruit and no JVD  Lungs: normal effort, wheezes noted. R sided tenderness.   Heart: regular rhythm & normal rate, normal S1, S2, no murmur, no yong, no rub and no click  Abdomen: normal bowel sounds, no masses and soft non-tender  Extremities: moves extremities well and no edema  Pulses: Pulses palpable and equal bilaterally  Skin: R chest wall incisions healing well.   Neurologic: Mental Status orientated to person, place, time and situation      Data Review:    Results from last 7 days  Lab Units 07/07/17  0443   WBC  10*3/mm3 8.58   HEMOGLOBIN g/dL 9.4*   HEMATOCRIT % 30.7*   PLATELETS 10*3/mm3 293       Results from last 7 days  Lab Units 07/07/17  0443   SODIUM mmol/L 133   POTASSIUM mmol/L 4.2   CHLORIDE mmol/L 92*   CO2 mmol/L 26.0   BUN mg/dL 33*   CREATININE mg/dL 6.10*   GLUCOSE mg/dL 119*   CALCIUM mg/dL 8.9     Coagulation:   Lab Results   Component Value Date    INR 1.31 07/07/2017       Imaging Results (last 72 hours)     Procedure Component Value Units Date/Time    XR Chest 1 View [[id number]] Updated:  07/07/17 0700          Assessment:      Principal Problem:    Pleural effusion, left  Active Problems:    Fahr's syndrome    Hepatitis C antibody test positive    Bipolar disorder    Depression    Pneumonia of right lung due to infectious organism    Loculated pleural effusion    ESRD (end stage renal disease) on dialysis    IV drug abuse    Medical non-compliance    Essential hypertension    History of Clostridium difficile      Plan:  Nephrology consulted for MWF dialysis  Hospitalist - managing complex medical issues  Per Dr Hicks - recommend L thoracentesis    MENDEL Brooks  07/07/17  8:00 AM

## 2017-07-07 NOTE — PLAN OF CARE
Problem: Patient Care Overview (Adult)  Goal: Plan of Care Review  Outcome: Ongoing (interventions implemented as appropriate)    07/07/17 0603   Coping/Psychosocial Response Interventions   Plan Of Care Reviewed With patient   Patient Care Overview   Progress progress toward functional goals as expected         Problem: Pain, Acute (Adult)  Goal: Identify Related Risk Factors and Signs and Symptoms  Outcome: Ongoing (interventions implemented as appropriate)    07/07/17 0603   Pain, Acute   Related Risk Factors (Acute Pain) persistent pain   Signs and Symptoms (Acute Pain) verbalization of pain descriptors       Goal: Acceptable Pain Control/Comfort Level  Outcome: Ongoing (interventions implemented as appropriate)    07/07/17 0603   Pain, Acute (Adult)   Acceptable Pain Control/Comfort Level making progress toward outcome

## 2017-07-07 NOTE — PROGRESS NOTES
Discharge Planning Assessment  [facility]     Patient Name: Mandy Espino  MRN: [id number]  Today's Date: 7/7/2017    Admit Date: 7/6/2017          Discharge Needs Assessment       07/07/17 1602    Living Environment    Lives With spouse    Living Arrangements apartment    Transportation Available car;family or friend will provide    Living Environment    Provides Primary Care For no one    Quality Of Family Relationships supportive    Able to Return to Prior Living Arrangements yes    Discharge Needs Assessment    Concerns To Be Addressed other (see comments)   cost of BOOST    Readmission Within The Last 30 Days previous discharge plan unsuccessful    Anticipated Changes Related to Illness none    Equipment Currently Used at Home oxygen   O2 per [employer]    Equipment Needed After Discharge none            Discharge Plan       07/07/17 1603    Case Management/Social Work Plan    Plan HOME    Patient/Family In Agreement With Plan yes    Additional Comments Met with pt at bedside.  She resides in [place] with .  Independent with ADLs.  No current HH.  Home O2 per [employer].  She dialyzes at [facility] on MWF ([phone]).  Confirmed she has [facility] Medicaid with Rx coverage.  Goal is to return home upon DC.  CM will cont to follow.          Discharge Placement     No information found                Demographic Summary       07/07/17 1601    Referral Information    Admission Type inpatient    Referral Source admission list    Reason For Consult discharge planning    Record Reviewed history and physical;medical record    Contact Information    Permission Granted to Share Information With             Functional Status       07/07/17 1602    Functional Status Prior    Ambulation 0-->independent    Transferring 0-->independent    Toileting 0-->independent    Bathing 0-->independent    Dressing 0-->independent    Eating 0-->independent    Communication 0-->understands/communicates without  difficulty    IADL    Medications independent    Meal Preparation independent    Housekeeping independent    Laundry independent    Shopping independent    Oral Care independent    Activity Tolerance    Usual Activity Tolerance good            Psychosocial     None            Abuse/Neglect     None            Legal     None            Substance Abuse     None            Patient Forms     None          Princess Espino

## 2017-07-07 NOTE — CONSULTS
Referring Provider:  Reason for Consultation: Left Pleural effusion    Patient Care Team:  Neo Gresham DO as PCP - General (Family Medicine)    Chief complaint left pleural effusion    .     History of present illness: I have been asked see this patient at the request of Dr. holbrook.  Patient was found to have a large left pleural effusion which is new.  Patient's been having severe pain and shortness of breath for the last 4-5 days.  Patient has been transferred here and I been asked to see the patient guards to this.    Review of Systems    The following systems were reviewed and negative;  eyes, ENT, cardiovascular, gastrointestinal, genitourinary, integument, breast, hematologic / lymphatic, musculoskeletal, neurological, behavioral/psych, endocrine and allergies / immunologic    History  Past Medical History:   Diagnosis Date   • Abdominal pain    • Anemia    • Anxiety    • Asthma    • Bipolar disorder    • CKD (chronic kidney disease), stage IV    • Constipation    • COPD (chronic obstructive pulmonary disease)    • Depression    • End stage renal disease on dialysis    • Esophageal reflux     reflux   • Esophagitis determined by biopsy 2014   • Fahr's syndrome    • Hepatitis C antibody test positive    • Hyperparathyroidism    • Hypertension    • Hypocalcemia    • Nausea and vomiting    • Non-traumatic rhabdomyolysis 5/2/2017   • Pancreatitis    • Pneumonia of right lung due to infectious organism 5/2/2017   • Recurrent urinary tract infection    • Renal insufficiency    • Upper gastrointestinal bleeding    , Past Surgical History:   Procedure Laterality Date   • BRONCHOSCOPY THORACOTOMY Right 6/14/2017    Procedure: BRONCHOSCOPY RIGHT THORACOTOMY WITH LUNG DECORTICATION ;  Surgeon: Shawn Hicks MD;  Location: [facility];  Service:    • DIALYSIS FISTULA CREATION      port   • DILATATION AND CURETTAGE     • ESOPHAGOSCOPY / EGD  2014    esophagitis   • EXPLORATORY LAPAROTOMY      For uterine and  ovarian issues   • KIDNEY SURGERY Left 2013    Biopsy   , Family History   Problem Relation Age of Onset   • Arthritis Mother    • Hypertension Mother    • Kidney disease Father      Chronic renal failure syndrome   • Pancreatic cancer Father    • Hypertension Brother    • Kidney disease Other    • Diabetes Other      Uncle   • Lung cancer Other      Grandmother   • Hyperlipidemia Other      High cholesterol - Uncle   , Social History   Substance Use Topics   • Smoking status: Former Smoker     Quit date: 6/18/2017   • Smokeless tobacco: None   • Alcohol use No   , Prescriptions Prior to Admission   Medication Sig Dispense Refill Last Dose   • ALPRAZolam (XANAX) 0.5 MG tablet Take 0.5 mg by mouth At Night As Needed for Anxiety or Sleep.   7/5/2017 at Unknown time   • amitriptyline (ELAVIL) 10 MG tablet Take 10 mg by mouth Every Night.   7/5/2017 at Unknown time   • amLODIPine (NORVASC) 10 MG tablet Take 1 tablet by mouth Daily for 30 days. 30 tablet 0 7/5/2017 at Unknown time   • famotidine (PEPCID) 20 MG tablet Take 1 tablet by mouth Daily for 30 days. 30 tablet 0 7/5/2017 at Unknown time   • hydrALAZINE (APRESOLINE) 50 MG tablet Take 1 tablet by mouth Every 8 (Eight) Hours for 30 days. 90 tablet 0 7/5/2017 at Unknown time   • metoprolol succinate XL (TOPROL-XL) 50 MG 24 hr tablet Take 1 tablet by mouth Daily for 30 days. 30 tablet 0 7/5/2017 at Unknown time   • minoxidil (LONITEN) 10 MG tablet Take 10 mg by mouth Daily.   7/5/2017 at Unknown time   • saccharomyces boulardii (FLORASTOR) 250 MG capsule Take 1 capsule by mouth 2 (Two) Times a Day for 30 days. 60 capsule 0 7/5/2017 at Unknown time   • vancomycin 50 MG/ML solution oral solution Take 5 mL by mouth Every 6 (Six) Hours for 30 days. Indications: Clostridium Difficile Infection 600 mL 0 7/6/2017 at Unknown time   , Scheduled Meds:    amitriptyline 10 mg Oral Nightly   amLODIPine 10 mg Oral Q24H   epoetin kika 6,000 Units Subcutaneous Once per day on Mon Wed  "Fri   famotidine 20 mg Oral Daily   hydrALAZINE 50 mg Oral Q8H   metoprolol succinate XL 50 mg Oral Q24H   minoxidil 10 mg Oral Daily   saccharomyces boulardii 250 mg Oral BID   vancomycin 250 mg Oral Q6H   , Continuous Infusions:   , PRN Meds:  •  ALPRAZolam  •  HYDROmorphone  •  sodium chloride and Allergies:  Acetaminophen; Hydrocodone; Ibuprofen; Lortab [hydrocodone-acetaminophen]; Ultram [tramadol hcl]; and Ciprofloxacin    Objective     Vital Sign Min/Max for last 24 hours  Temp  Min: 98 °F (36.7 °C)  Max: 98.8 °F (37.1 °C)   BP  Min: 122/72  Max: 172/122   Pulse  Min: 91  Max: 121   Resp  Min: 18  Max: 32   SpO2  Min: 93 %  Max: 97 %   Flow (L/min)  Min: 2  Max: 5   Weight  Min: 97 lb 8 oz (44.2 kg)  Max: 100 lb (45.4 kg)     Flowsheet Rows         First Filed Value    Admission Height  62\" (157.5 cm) Documented at 07/06/2017 2140    Admission Weight  97 lb 8 oz (44.2 kg) Documented at 07/06/2017 2140               Physical Exam:     General Appearance:    Alert, cooperative, in no acute distress   Head:    Normocephalic, without obvious abnormality, atraumatic   Eyes:            Lids and lashes normal, conjunctivae and sclerae normal, no   icterus, no pallor, corneas clear, PERRLA   Ears:    Ears appear intact with no abnormalities noted   Throat:   No oral lesions, no thrush, oral mucosa moist   Neck:   No adenopathy, supple, trachea midline, no thyromegaly, no   carotid bruit, no JVD   Back:     No kyphosis present, no scoliosis present, no skin lesions,      erythema or scars, no tenderness to percussion or                   palpation,   range of motion normal   Lungs:     Decreased in the left base,respirations regular, even and                  unlabored    Heart:    Regular rhythm and normal rate, normal S1 and S2, no            murmur, no gallop, no rub, no click   Chest Wall:    No abnormalities observed   Abdomen:     Normal bowel sounds, no masses, no organomegaly, soft        non-tender, " non-distended, no guarding, no rebound                tenderness   Rectal:     Deferred   Extremities:   Moves all extremities well, no edema, no cyanosis, no             redness   Pulses:   Pulses palpable and equal bilaterally   Skin:   No bleeding, bruising or rash   Lymph nodes:   No palpable adenopathy   Neurologic:   Cranial nerves 2 - 12 grossly intact, sensation intact, DTR       present and equal bilaterally             Assessment/Plan     Principal Problem:    Pleural effusion, left  Active Problems:    Fahr's syndrome    Hepatitis C antibody test positive    Bipolar disorder    Depression    Pneumonia of right lung due to infectious organism    Loculated pleural effusion    ESRD (end stage renal disease) on dialysis    IV drug abuse    Medical non-compliance    Essential hypertension    History of Clostridium difficile      Patient presents at this time with a large left pleural effusion which is occluding most of the left lung.  I reviewed the CT scan and films in detail.  I feel that a CT directed pigtail catheter probably is the treatment of choice.  If this does not satisfactorily drain this and she will require a decortication.  I long discussion with the patient and answered all of her questions.  She appears to be doing well from her previous decortication on the right.  Elective thank you for this consultation.    I discussed the patients findings and my recommendations with patient      Please note that portions of this note were completed with a voice recognition program. Efforts were made to edit the dictations, but words may be mistranscribed    Shawn Hicks MD  07/07/17  11:00 AM

## 2017-07-07 NOTE — CONSULTS
[facility] Consult Note    Mandy Espino  1990  [id number]    Date of Admit:  7/6/2017    Date of Consult: 7/7/2017        Requesting Provider: Neo Gresham DO  Evaluating Physician: Spencer Montenegro MD        Reason for Consultation:    History of present illness:    Patient is a 26 y.o.  Yr old female  well known to [facility] as followed up in [facility] dialysis unit presented to [facility] ED 7/6/17 complaining of worsening pressure-like pain in right shoulder radiating to right chest over past 4 days. States she also experiencing productive cough, SOB, and fever tmax 101. She has tried inhalers, 4L oxygen without relief.   She is well known to our service, admitted on 6/12/17 for right pleural effusion. She underwent R thoracentesis, which revealed multiple loculations. CT surgery performed R thoracotomy with decortication. Postop she developed small left effusion secondary to laying on left side and refusing pulmonary toilet.   Transferred to [facility] for management of L pleural effusion.     Past Medical History:   Diagnosis Date   • Abdominal pain    • Anemia    • Anxiety    • Asthma    • Bipolar disorder    • CKD (chronic kidney disease), stage IV    • Constipation    • COPD (chronic obstructive pulmonary disease)    • Depression    • End stage renal disease on dialysis    • Esophageal reflux     reflux   • Esophagitis determined by biopsy 2014   • Fahr's syndrome    • Hepatitis C antibody test positive    • Hyperparathyroidism    • Hypertension    • Hypocalcemia    • Nausea and vomiting    • Non-traumatic rhabdomyolysis 5/2/2017   • Pancreatitis    • Pneumonia of right lung due to infectious organism 5/2/2017   • Recurrent urinary tract infection    • Renal insufficiency    • Upper gastrointestinal bleeding        Past Surgical History:   Procedure Laterality Date   • BRONCHOSCOPY THORACOTOMY Right 6/14/2017    Procedure: BRONCHOSCOPY RIGHT THORACOTOMY WITH LUNG DECORTICATION ;  Surgeon:  "Shawn Hicks MD;  Location: [facility];  Service:    • DIALYSIS FISTULA CREATION      port   • DILATATION AND CURETTAGE     • ESOPHAGOSCOPY / EGD  2014    esophagitis   • EXPLORATORY LAPAROTOMY      For uterine and ovarian issues   • KIDNEY SURGERY Left 2013    Biopsy       Social History     Social History   • Marital status:      Spouse name: N/A   • Number of children: N/A   • Years of education: N/A     Social History Main Topics   • Smoking status: Former Smoker     Quit date: 6/18/2017   • Smokeless tobacco: None   • Alcohol use No   • Drug use: Yes     Special: Marijuana   • Sexual activity: Defer     Other Topics Concern   • None     Social History Narrative    She is  and not working. She denies alcohol or drug use. She denies recent opiate or benzo use. She does smoke and has used marijuana.        family history includes Arthritis in her mother; Diabetes in her other; Hyperlipidemia in her other; Hypertension in her brother and mother; Kidney disease in her father and other; Lung cancer in her other; Pancreatic cancer in her father.    Allergies   Allergen Reactions   • Acetaminophen Itching   • Hydrocodone Itching   • Ibuprofen    • Lortab [Hydrocodone-Acetaminophen]      Lortab TABS   • Ultram [Tramadol Hcl]      \"kidney doctor said no\"   • Ciprofloxacin Rash       Medication:    Current Facility-Administered Medications:   •  ALPRAZolam (XANAX) tablet 0.5 mg, 0.5 mg, Oral, Nightly PRN, Shawn Hicks MD, 0.5 mg at 07/06/17 2321  •  amitriptyline (ELAVIL) tablet 10 mg, 10 mg, Oral, Nightly, Tere Queen PA-C, 10 mg at 07/06/17 2321  •  amLODIPine (NORVASC) tablet 10 mg, 10 mg, Oral, Q24H, Tere Queen PA-C, 10 mg at 07/07/17 0812  •  famotidine (PEPCID) tablet 20 mg, 20 mg, Oral, Daily, Tere Queen PA-C, 20 mg at 07/07/17 0812  •  hydrALAZINE (APRESOLINE) tablet 50 mg, 50 mg, Oral, Q8H, Tere Queen PA-C, 50 mg at 07/07/17 0444  •  HYDROmorphone (DILAUDID) injection 0.5 " mg, 0.5 mg, Intravenous, Q3H PRN, Shawn Hicks MD, 0.5 mg at 07/07/17 0813  •  metoprolol succinate XL (TOPROL-XL) 24 hr tablet 50 mg, 50 mg, Oral, Q24H, Tere Bret, PA-C, 50 mg at 07/07/17 0812  •  minoxidil (LONITEN) tablet 10 mg, 10 mg, Oral, Daily, Tere Bret, PA-C, 10 mg at 07/07/17 0812  •  saccharomyces boulardii (FLORASTOR) capsule 250 mg, 250 mg, Oral, BID, Tere Bret, PA-C, 250 mg at 07/07/17 0812  •  sodium chloride 0.9 % flush 10 mL, 10 mL, Intravenous, PRN, Tere Bret PA-C  •  vancomycin oral solution 250 mg, 250 mg, Oral, Q6H, Tere Gurdon, PA-C, 250 mg at 07/07/17 0642    Prescriptions Prior to Admission   Medication Sig Dispense Refill Last Dose   • ALPRAZolam (XANAX) 0.5 MG tablet Take 0.5 mg by mouth At Night As Needed for Anxiety or Sleep.   7/5/2017 at Unknown time   • amitriptyline (ELAVIL) 10 MG tablet Take 10 mg by mouth Every Night.   7/5/2017 at Unknown time   • amLODIPine (NORVASC) 10 MG tablet Take 1 tablet by mouth Daily for 30 days. 30 tablet 0 7/5/2017 at Unknown time   • famotidine (PEPCID) 20 MG tablet Take 1 tablet by mouth Daily for 30 days. 30 tablet 0 7/5/2017 at Unknown time   • hydrALAZINE (APRESOLINE) 50 MG tablet Take 1 tablet by mouth Every 8 (Eight) Hours for 30 days. 90 tablet 0 7/5/2017 at Unknown time   • metoprolol succinate XL (TOPROL-XL) 50 MG 24 hr tablet Take 1 tablet by mouth Daily for 30 days. 30 tablet 0 7/5/2017 at Unknown time   • minoxidil (LONITEN) 10 MG tablet Take 10 mg by mouth Daily.   7/5/2017 at Unknown time   • saccharomyces boulardii (FLORASTOR) 250 MG capsule Take 1 capsule by mouth 2 (Two) Times a Day for 30 days. 60 capsule 0 7/5/2017 at Unknown time   • vancomycin 50 MG/ML solution oral solution Take 5 mL by mouth Every 6 (Six) Hours for 30 days. Indications: Clostridium Difficile Infection 600 mL 0 7/6/2017 at Unknown time       Review of Systems:    Constitutional-- subjective Fever, no chills or sweats. No significant  "change in weight  Eye-- no diplopia, no conjunctivitis  ENT-- No new hearing or throat complaints.  No epistaxis or oral sores. No odynophagia or dysphagia. No headache, photophobia or neck stiffness.  CV-- No chest pain, palpitations, soa, or edema  Resp-- some SOB/cough/ no Hemoptysis  GI- No nausea, vomiting, or diarrhea.  No hematochezia, melena, or hematemesis.  -- No dysuria, hematuria, or flank pain. No lower tract obstructive symptoms  Skin-- No rash, warm and dry  Lymph- no painful or swollen lymph nodes in neck/axilla or groin.   Heme- No active bruising or bleeding; no Hx of DVT or PE.  MS-- no swelling or pain in the joints  Neuro-- No acute focal weakness or numbness in the arms or legs.  No seizures.  Psych--No anxiety or depression  Endo--No cold or heat intolerance.  No polyuria, polydipsia, or polyphagia    Full review of systems reviewed and negative otherwise for acute complaints    Physical Exam:   Vital Signs   Blood pressure (!) 163/116, pulse 100, temperature 98.2 °F (36.8 °C), temperature source Oral, resp. rate 22, height 62\" (157.5 cm), weight 97 lb 8 oz (44.2 kg), SpO2 97 %, not currently breastfeeding.     GENERAL: Awake and alert, no acute distress  HEENT: Normocephalic, atraumatic.  PER.  No conjunctivitis. No icterus. Oropharynx clear without evidence of thrush or exudate. No evidence of peridontal disease.    NECK: Supple without nuchal rigidity. No mass.  LYMPH: No painful cervical, axillary or inguinal lymphadenopathy.  HEART: RRR; ++ murmur, rubs, no gallops. No bruits in neck, abdomen, or groins, no edema  LUNGS: + Rhales and wheezing  ABDOMEN: Soft, nontender, nondistended. Positive bowel sounds. No rebound or guarding. NO mass or HSM.  JOINTS:  Full range of motion, no redness or tenderness.  EXT:  No cyanosis, clubbing or edema.  :  External genitalia without swelling or lesion  SKIN: Warm and dry without rash  NEURO: Oriented to PPT. No focal neurological deficits. " Strength equal bilateral  PSYCHIATRIC: Normal insight and judgement. Cooperative with PE    Laboratory Data    Results from last 7 days  Lab Units 07/07/17  0443 07/06/17  1446   HEMOGLOBIN g/dL 9.4* 10.0*   HEMATOCRIT % 30.7* 31.8*       Results from last 7 days  Lab Units 07/07/17  0443 07/06/17  1446   SODIUM mmol/L 133 136*   POTASSIUM mmol/L 4.2 3.8   CHLORIDE mmol/L 92* 93*   CO2 mmol/L 26.0 30.0   BUN mg/dL 33* 31*   CREATININE mg/dL 6.10* 5.40*   CALCIUM mg/dL 8.9 8.9   ALBUMIN g/dL 3.20 3.50       Results from last 7 days  Lab Units 07/07/17  0443   GLUCOSE mg/dL 119*           Results from last 7 days  Lab Units 07/07/17  0443   ALK PHOS U/L 150*   BILIRUBIN mg/dL 0.2*   ALT (SGPT) U/L 24   AST (SGOT) U/L 26     Estimated Creatinine Clearance: 9.8 mL/min (by C-G formula based on Cr of 6.1).    Radiology:      Renal Imaging:    I personally read  the radiographic studies    Impression:   ESKD  HTN  LEFT PLEURAL EFFUSION/PNEUMONIA  ANEMIA  MEDICAL NON COMPLIANCE    PLAN: Thank you for asking us to see Mandy Espino, I recommend the following:   Dialysis on schedule  Thoracocentesis/decort as per surgery plan  epogen for anemia  We will follow    Spencer Montenegro MD  7/7/2017  9:35 AM

## 2017-07-07 NOTE — CONSULTS
"Adult Nutrition  Assessment/PES    Patient Name:  Mandy Espino  YOB: 1990  MRN: [id number]  Admit Date:  7/6/2017    Assessment Date:  7/7/2017        Reason for Assessment       07/07/17 1443    Reason for Assessment    Reason For Assessment/Visit identified at risk by screening criteria;nurse/nurse practitioner consult    Identified At Risk By Screening Criteria unintentional loss of 10 lbs or more in the past 2 mos    Time Spent (min) 20    Diagnosis Diagnosis    Cardiac HTN    Endocrine Hyperparathyroid    Gastrointestinal Gastrointestinal bleed;GERD/Reflux;Constipation    Hepatic Hepatitis   hep C    Infectious Disease C. diff   History of    Psychosocial Other (comment)   bipolar disorder    Pulmonary/Critical Care Asthma;COPD;Thoracotomy   L pleural effusion; R thoracotomy w/ Lung decortication (6/2017); COPD on 4L oxygen at home    Renal CKD   IV on dialysis MWF    Skin Other (comment)   blisters on face, incision on chest, incisions from chest tubes    Substance Use Drug/illicit/recreational   IVDA    Other diagnosis Fahrs syndrome; history of noncompliance      07/07/17 1436    Reason for Assessment    Reason For Assessment/Visit --              Nutrition/Diet History       07/07/17 1447    Nutrition/Diet History    Reported/Observed By Patient    Other Patient estimates UBW ~130lbs six months ago. States she has gradually lost weight over the past 6 months. Reports her appetite has been poor during this time. States she is not eating that much, eats about a \"snack sized bag of chips and that's all for the day.\" Observed patient had lunch tray in room but had not eaten anything. Patient requested strawberry boost supplement. Will discuss with dietitian d/t patients current diet order/restrictions            Anthropometrics       07/07/17 1449    Anthropometrics (Special Considerations)    Height Used for Calculations 1.575 m (5' 2\")    Weight Used for Calculations 44.2 kg (97 lb 7.1 " oz)    Usual Body Weight (UBW)    Usual Body Weight 53.5 kg (118 lb)   per patients previous admission, ED note from 2/23/17 weight stated    Weight Loss 9.526 kg (21 lb)    % Weight Loss  17.8 %    Weight Loss Time Frame ~ 5 months            Labs/Tests/Procedures/Meds       07/07/17 1451    Labs/Tests/Procedures/Meds    Labs/Tests Review Reviewed                Nutrition Prescription Ordered       07/07/17 1451    Nutrition Prescription PO    Current PO Diet Regular    Common Modifiers Cardiac;Renal            Evaluation of Received Nutrient/Fluid Intake       07/07/17 1451    PO Evaluation    Number of Meals 1    % PO Intake 100%              Problem/Interventions:        Problem 1       07/07/17 1451    Nutrition Diagnoses Problem 1    Problem 1 Unintended Weight Loss    Etiology (related to) --   clinical condition/poor appetite    Signs/Symptoms (evidenced by) Unintended Weight Change    Unintended Weight Change Loss    Number of Pounds Lost 21 lbs    Weight loss time period ~ 5 months                    Intervention Goal       07/07/17 1451    Intervention Goal    General Nutrition support treatment    PO Increase intake            Nutrition Intervention       07/07/17 1451    Nutrition Intervention    RD/Tech Action Encourage intake;Follow Tx progress;Care plan reviewd;Advise alternate selection              Education/Evaluation       07/07/17 1451    Monitor/Evaluation    Monitor Per protocol;PO intake;Weight          Electronically signed by:  Anamika Barrett  07/07/17 2:52 PM

## 2017-07-07 NOTE — H&P
[facility] Medicine Services  HISTORY AND PHYSICAL    Primary Care Physician: Neo Gresham DO    Subjective     Chief Complaint:  Left pleural effusion    History of Present Illness:   Patient is a 26-year-old  female with past medical history significant for COPD on 4 L oxygen at home, C. 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  She presented to [facility] ED on 7/6/17 complaining of worsening pressure-like pain in right shoulder radiating to right chest over past several days.  She is also experiencing productive cough, shortness of breath, and fever with temperature max 101.4.  She had a recent admission at [facility] 6/12-6/23/17 for right pleural effusion.  She underwent right thoracentesis which revealed multiple loculations.  CT surgery performed right thoracotomy with decortication.  Postop she developed small left pleural effusion secondary to laying on left side and refusing pulmonary toilet.  Patient was discharged home on vancomycin and oxycodone.  She reports worsening shortness of breath and pain prompting ED visit yesterday.  Chest x-ray showed near complete consolidation of left lung, significantly worsened from 6/20/17 exam.  Patient was transferred to [facility] for CT surgery consultation.  Patient being admitted by the hospital medicine service for further evaluation and management.  CT surgery has seen patient and recommended CT directed pigtail catheter at this time.    Review of Systems   Constitutional: Positive for activity change and fever. Negative for chills.   HENT: Negative.    Eyes: Negative.    Respiratory: Positive for cough, shortness of breath and wheezing.    Cardiovascular: Negative for chest pain, palpitations and leg swelling.   Gastrointestinal: Positive for diarrhea. Negative for abdominal pain, nausea and vomiting.    Endocrine: Negative.    Genitourinary: Negative.    Musculoskeletal: Positive for back pain.   Skin:        Surgical incision right posterior back   Neurological: Positive for weakness. Negative for dizziness and speech difficulty.   Hematological: Negative.    Psychiatric/Behavioral: Negative.       Otherwise complete ROS performed and negative except as mentioned in the HPI.    Past Medical History:   Diagnosis Date   • Abdominal pain    • Anemia    • Anxiety    • Asthma    • Bipolar disorder    • CKD (chronic kidney disease), stage IV    • Constipation    • COPD (chronic obstructive pulmonary disease)    • Depression    • End stage renal disease on dialysis    • Esophageal reflux     reflux   • Esophagitis determined by biopsy 2014   • Fahr's syndrome    • Hepatitis C antibody test positive    • Hyperparathyroidism    • Hypertension    • Hypocalcemia    • Nausea and vomiting    • Non-traumatic rhabdomyolysis 5/2/2017   • Pancreatitis    • Pneumonia of right lung due to infectious organism 5/2/2017   • Recurrent urinary tract infection    • Renal insufficiency    • Upper gastrointestinal bleeding        Past Surgical History:   Procedure Laterality Date   • BRONCHOSCOPY THORACOTOMY Right 6/14/2017    Procedure: BRONCHOSCOPY RIGHT THORACOTOMY WITH LUNG DECORTICATION ;  Surgeon: Shawn Hicks MD;  Location: [facility];  Service:    • DIALYSIS FISTULA CREATION      port   • DILATATION AND CURETTAGE     • ESOPHAGOSCOPY / EGD  2014    esophagitis   • EXPLORATORY LAPAROTOMY      For uterine and ovarian issues   • KIDNEY SURGERY Left 2013    Biopsy       Family History   Problem Relation Age of Onset   • Arthritis Mother    • Hypertension Mother    • Kidney disease Father      Chronic renal failure syndrome   • Pancreatic cancer Father    • Hypertension Brother    • Kidney disease Other    • Diabetes Other      Uncle   • Lung cancer Other      Grandmother   • Hyperlipidemia Other      High cholesterol - Uncle        Social History     Social History   • Marital status:      Spouse name: N/A   • Number of children: N/A   • Years of education: N/A     Occupational History   • Not on file.     Social History Main Topics   • Smoking status: Former Smoker     Quit date: 6/18/2017   • Smokeless tobacco: Not on file   • Alcohol use No   • Drug use: Yes     Special: Marijuana   • Sexual activity: Defer     Other Topics Concern   • Not on file     Social History Narrative    She is  and not working. She denies alcohol or drug use. She denies recent opiate or benzo use. She does smoke and has used marijuana.        Medications:  Prescriptions Prior to Admission   Medication Sig Dispense Refill Last Dose   • ALPRAZolam (XANAX) 0.5 MG tablet Take 0.5 mg by mouth At Night As Needed for Anxiety or Sleep.   7/5/2017 at Unknown time   • amitriptyline (ELAVIL) 10 MG tablet Take 10 mg by mouth Every Night.   7/5/2017 at Unknown time   • amLODIPine (NORVASC) 10 MG tablet Take 1 tablet by mouth Daily for 30 days. 30 tablet 0 7/5/2017 at Unknown time   • famotidine (PEPCID) 20 MG tablet Take 1 tablet by mouth Daily for 30 days. 30 tablet 0 7/5/2017 at Unknown time   • hydrALAZINE (APRESOLINE) 50 MG tablet Take 1 tablet by mouth Every 8 (Eight) Hours for 30 days. 90 tablet 0 7/5/2017 at Unknown time   • metoprolol succinate XL (TOPROL-XL) 50 MG 24 hr tablet Take 1 tablet by mouth Daily for 30 days. 30 tablet 0 7/5/2017 at Unknown time   • minoxidil (LONITEN) 10 MG tablet Take 10 mg by mouth Daily.   7/5/2017 at Unknown time   • saccharomyces boulardii (FLORASTOR) 250 MG capsule Take 1 capsule by mouth 2 (Two) Times a Day for 30 days. 60 capsule 0 7/5/2017 at Unknown time   • vancomycin 50 MG/ML solution oral solution Take 5 mL by mouth Every 6 (Six) Hours for 30 days. Indications: Clostridium Difficile Infection 600 mL 0 7/6/2017 at Unknown time       Allergies:  Allergies   Allergen Reactions   • Acetaminophen Itching   •  "Hydrocodone Itching   • Ibuprofen    • Lortab [Hydrocodone-Acetaminophen]      Lortab TABS   • Ultram [Tramadol Hcl]      \"kidney doctor said no\"   • Ciprofloxacin Rash         Objective     Physical Exam:  Vital Signs: /80 (BP Location: Left arm, Patient Position: Lying)  Pulse 100  Temp 98.8 °F (37.1 °C) (Oral)   Resp 24  Ht 62\" (157.5 cm)  Wt 97 lb 8 oz (44.2 kg)  SpO2 97%  BMI 17.83 kg/m2  Physical Exam  Gen-lying in bed with mild distress due to pain and shortness of breath  CV-RRR, S1 S2 normal, no m/r/g  Resp-wheezes throughout and decreased breath sounds on left side  Abd-soft, NT, ND, +BS  Ext-no edema  Neuro-A&Ox3, no focal deficits  Psych-tearful, nervous about getting chest tube    Results Reviewed:    Results from last 7 days  Lab Units 07/07/17  0443   WBC 10*3/mm3 8.58   HEMOGLOBIN g/dL 9.4*   PLATELETS 10*3/mm3 293       Results from last 7 days  Lab Units 07/07/17  0443   SODIUM mmol/L 133   POTASSIUM mmol/L 4.2   CO2 mmol/L 26.0   CREATININE mg/dL 6.10*   GLUCOSE mg/dL 119*   CALCIUM mg/dL 8.9       I have personally reviewed and interpreted available lab data, radiology studies and ECG obtained at time of admission.     Assessment / Plan     Assessment/Problem List:   Hospital Problem List     * (Principal)Pleural effusion, left    Fahr's syndrome    Hepatitis C antibody test positive    Bipolar disorder    Depression    Pneumonia of right lung due to infectious organism    Loculated pleural effusion    Overview Signed 6/20/2017  1:35 PM by Altaf Diaz MD     Status post decortication         ESRD (end stage renal disease) on dialysis    IV drug abuse    Medical non-compliance    Essential hypertension    History of Clostridium difficile          Plan:    Left pleural effusion  --CT directed pigtail catheter today-if does not satisfactorily drain effusion then she will require decortication  --CT surgery following  --oxygen as needed  --dilaudid prn for " pain    ESRD  --nephrology following  --continue MWF dialysis    History of C. Difficile  --continue oral vancomycin  --probiotic daily    Hypertension  --continue home meds    Chronic pain and narcotic dependency   --talked to the patient and discussed the plan with her. She is aware that pain medication will not be escalated.    Anxiety     DVT prophylaxis:  Hold due to anticipated CT placement, TEDs/SCDs  Code Status: Full  Admission Status: Patient will be admitted to [facility]     Kailey Lindo, APRN 07/07/17 1:50 PM     Patient seen and examined at the bedside.  I have reviewed and edited the above to reflect my medical opinion.   is a 26-year-old  female with an copy for the past mental history including IV drug abuse, loculated pleural effusion, end-stage renal disease on hemodialysis dialysis, chronic pain issues.  Patient was admitted by cardiothoracic surgery because of breathing issues and pleural effusion but hospitalist medicine is assuming the role of attending as patient has multiple medical problems.  Today patient went for thoracentesis which was done by interventional radiology with no complication.  Currently patient is sitting in bed and complaining about severe pain.  She received IV Dilaudid extra dose after she came from the procedure.  I had a long discussion with her at the bedside about the pain management.  Currently she is not complaining about chest pain or shortness of breath.  No fever or chills or nausea or vomiting.    Assessment and plan:  Left pleural effusion effusion, status post thoracentesis by interventional radiology.  Continue the current management at this point.  Please see the above for more details.

## 2017-07-07 NOTE — PAYOR COMM NOTE
"Jermaine Wilkinson RN Utilization Review [phone]  Fax # [phone]    Status is inpatient 07/06/17 @ 2243.  Notification of admission and initial clinicals  Jermaine Espino (26 y.o. Female)     Date of Birth Social Security Number Address Home Phone MRN    1990  [id number] [place] [address]  [id number]    [facility] [facility]          [place]        Admission Date Admission Type Admitting Provider Attending Provider Department, Room/Bed    7/6/17 Urgent Adal Espana MD Kalantar, Masoud, MD [address], [address]1    Discharge Date Discharge Disposition Discharge Destination                      Attending Provider: Adal Espana MD     Allergies:  Acetaminophen, Hydrocodone, Ibuprofen, Lortab [Hydrocodone-acetaminophen], Ultram [Tramadol Hcl], Ciprofloxacin    Isolation:  Spore   Infection:  C.difficile (06/19/17), MRSA (06/15/17), Hepatitis C (04/19/17)   Code Status:  Prior    Ht:  62\" (157.5 cm)   Wt:  97 lb 8 oz (44.2 kg)    Admission Cmt:  None   Principal Problem:  Pleural effusion, left [J90]                 Active Insurance as of 7/6/2017     Primary Coverage     Payor Plan Insurance Group Employer/Plan Group    [employer] [place] [employer] MEDICAID      Payor Plan Address Payor Plan Phone Number Effective From Effective To    [address] [address] [phone] 6/1/2016     [place] [address]       Subscriber Name Subscriber Birth Date Member ID       JERMAINE ESPINO 1990 [id number]                 Emergency Contacts      (Rel.) Home Phone Work Phone Mobile Phone    Jin sEpino (Spouse) [phone] -- --            History & Physical     No notes of this type exist for this encounter.        Emergency Department Notes     No notes of this type exist for this encounter.        Hospital Medications (active)       Dose Frequency Start End    ALPRAZolam (XANAX) tablet 0.5 mg 0.5 mg Nightly PRN 7/6/2017 7/16/2017    Sig - Route: Take " 1 tablet by mouth At Night As Needed for Anxiety. - Oral    amitriptyline (ELAVIL) tablet 10 mg 10 mg Nightly 7/6/2017     Sig - Route: Take 1 tablet by mouth Every Night. - Oral    amLODIPine (NORVASC) tablet 10 mg 10 mg Every 24 Hours Scheduled 7/7/2017 7/25/2017    Sig - Route: Take 1 tablet by mouth Daily. - Oral    epoetin kika (EPOGEN,PROCRIT) injection 6,000 Units 6,000 Units 3 Times Weekly 7/7/2017     Sig - Route: Inject 2 mL under the skin Once per day on Mon Wed Fri. - Subcutaneous    famotidine (PEPCID) tablet 20 mg 20 mg Daily 7/7/2017 7/24/2017    Sig - Route: Take 1 tablet by mouth Daily. - Oral    hydrALAZINE (APRESOLINE) tablet 50 mg 50 mg Every 8 Hours Scheduled 7/6/2017 7/23/2017    Sig - Route: Take 1 tablet by mouth Every 8 (Eight) Hours. - Oral    HYDROmorphone (DILAUDID) injection 0.5 mg 0.5 mg Every 3 Hours PRN 7/6/2017 7/16/2017    Sig - Route: Infuse 0.5 mg into a venous catheter Every 3 (Three) Hours As Needed for Severe Pain (7-10). - Intravenous    ipratropium-albuterol (DUO-NEB) nebulizer solution 3 mL 3 mL Once 7/6/2017 7/6/2017    Sig - Route: Take 3 mL by nebulization 1 (One) Time. - Nebulization    metoprolol succinate XL (TOPROL-XL) 24 hr tablet 50 mg 50 mg Every 24 Hours Scheduled 7/7/2017 7/24/2017    Sig - Route: Take 1 tablet by mouth Daily. - Oral    minoxidil (LONITEN) tablet 10 mg 10 mg Daily 7/7/2017     Sig - Route: Take 1 tablet by mouth Daily. - Oral    Morphine injection 4 mg 4 mg Once 7/6/2017 7/6/2017    Sig - Route: Infuse 1 mL into a venous catheter 1 (One) Time. - Intravenous    Morphine injection 4 mg 4 mg Once 7/6/2017 7/6/2017    Sig - Route: Infuse 1 mL into a venous catheter 1 (One) Time. - Intravenous    ondansetron (ZOFRAN) injection 4 mg 4 mg Once 7/6/2017 7/6/2017    Sig - Route: Infuse 2 mL into a venous catheter 1 (One) Time. - Intravenous    oxyCODONE-acetaminophen (PERCOCET) 5-325 MG per tablet 1 tablet 1 tablet Once 7/6/2017 7/6/2017    Sig - Route:  Take 1 tablet by mouth 1 (One) Time. - Oral    saccharomyces boulardii (FLORASTOR) capsule 250 mg 250 mg 2 Times Daily 7/7/2017 7/24/2017    Sig - Route: Take 1 capsule by mouth 2 (Two) Times a Day. - Oral    sodium chloride 0.9 % flush 10 mL 10 mL As Needed 7/6/2017     Sig - Route: Infuse 10 mL into a venous catheter As Needed for Line Care. - Intravenous    vancomycin oral solution 250 mg 250 mg Every 6 Hours Scheduled 7/7/2017 7/23/2017    Sig - Route: Take 5 mL by mouth Every 6 (Six) Hours. - Oral    sodium chloride 0.9 % flush 10 mL (Discontinued) 10 mL As Needed 7/6/2017 7/6/2017    Sig - Route: Infuse 10 mL into a venous catheter As Needed for Line Care. - Intravenous    Reason for Discontinue: Patient Discharge    Cosign for Ordering: Accepted by Fercho Hutchinson MD on 7/6/2017  9:20 PM          Operative/Procedure Notes (all)     No notes of this type exist for this encounter.        Physician Progress Notes (all)     No notes of this type exist for this encounter.           Consult Notes (all)      MENDEL Mcgarry at 7/7/2017  7:34 AM  Version 1 of 1    Attestation signed by Shawn Hicks MD at 7/7/2017  8:30 AM        I have reviewed the documentation above and agree.                                 CTS Consult    Patient Care Team:  Neo Gresham DO as PCP - General (Family Medicine)      Reason for Consult:  Large L Pleural Effusion    HPI  Patient is a 26 y.o. female well known to our service presented to [facility] ED 7/6/17 complaining of worsening pressure-like pain in right shoulder radiating to right chest over past 4 days. States she also experiencing productive cough, SOB, and fever tmax 101. She has tried inhalers, 4L oxygen without relief.   She is well known to our service, admitted on 6/12/17 for right pleural effusion. She underwent R thoracentesis, which revealed multiple loculations. CT surgery performed R thoracotomy with decortication. Postop she developed small  left effusion secondary to laying on left side and refusing pulmonary toilet.   Transferred to [facility] for management of L pleural effusion.       Review of Systems    Ocular: Denies blurred vision denies retinal disease denies glaucoma  Nose and throat: Denies thyroid disease denies known tracheal malignancy  Pulmonary: denies tuberculosis history denies recent bronchitis  Endocrine: Denies thyroid disease, does have a history of hyperlipidemia  Gastrointestinal: Denies chronic constipation denies chronic diarrhea denies a history of ulcer surgery  Neurologic: Denies a history of strokes denies a history of seizures  Integument: denies a history of psoriasis or malignant melanoma  Psychiatric: Denies a history of psychosis denies a diagnosis of neurolysis  Cardiac: Denies a history of rheumatic fever or palpitations  Urologic: Denies a history of nephrolithiasis or hematuria  Hematologic: Denies known blood dyscrasias also denies any hemophilia  Immunologic: Denies any known immunologic deficiency denies recent steroid usage      All other systems were reviewed and are negative.    History  Past Medical History:   Diagnosis Date   • Abdominal pain    • Anemia    • Anxiety    • Asthma    • Bipolar disorder    • CKD (chronic kidney disease), stage IV    • Constipation    • COPD (chronic obstructive pulmonary disease)    • Depression    • End stage renal disease on dialysis    • Esophageal reflux     reflux   • Esophagitis determined by biopsy 2014   • Fahr's syndrome    • Hepatitis C antibody test positive    • Hyperparathyroidism    • Hypertension    • Hypocalcemia    • Nausea and vomiting    • Non-traumatic rhabdomyolysis 5/2/2017   • Pancreatitis    • Pneumonia of right lung due to infectious organism 5/2/2017   • Recurrent urinary tract infection    • Renal insufficiency    • Upper gastrointestinal bleeding      Past Surgical History:   Procedure Laterality Date   • BRONCHOSCOPY THORACOTOMY Right  6/14/2017    Procedure: BRONCHOSCOPY RIGHT THORACOTOMY WITH LUNG DECORTICATION ;  Surgeon: Shawn Hicks MD;  Location: [facility];  Service:    • DIALYSIS FISTULA CREATION      port   • DILATATION AND CURETTAGE     • ESOPHAGOSCOPY / EGD  2014    esophagitis   • EXPLORATORY LAPAROTOMY      For uterine and ovarian issues   • KIDNEY SURGERY Left 2013    Biopsy     Family History   Problem Relation Age of Onset   • Arthritis Mother    • Hypertension Mother    • Kidney disease Father      Chronic renal failure syndrome   • Pancreatic cancer Father    • Hypertension Brother    • Kidney disease Other    • Diabetes Other      Uncle   • Lung cancer Other      Grandmother   • Hyperlipidemia Other      High cholesterol - Uncle     Social History   Substance Use Topics   • Smoking status: Former Smoker     Quit date: 6/18/2017   • Smokeless tobacco: None   • Alcohol use No     Prescriptions Prior to Admission   Medication Sig Dispense Refill Last Dose   • ALPRAZolam (XANAX) 0.5 MG tablet Take 0.5 mg by mouth At Night As Needed for Anxiety or Sleep.   7/5/2017 at Unknown time   • amitriptyline (ELAVIL) 10 MG tablet Take 10 mg by mouth Every Night.   7/5/2017 at Unknown time   • amLODIPine (NORVASC) 10 MG tablet Take 1 tablet by mouth Daily for 30 days. 30 tablet 0 7/5/2017 at Unknown time   • famotidine (PEPCID) 20 MG tablet Take 1 tablet by mouth Daily for 30 days. 30 tablet 0 7/5/2017 at Unknown time   • hydrALAZINE (APRESOLINE) 50 MG tablet Take 1 tablet by mouth Every 8 (Eight) Hours for 30 days. 90 tablet 0 7/5/2017 at Unknown time   • metoprolol succinate XL (TOPROL-XL) 50 MG 24 hr tablet Take 1 tablet by mouth Daily for 30 days. 30 tablet 0 7/5/2017 at Unknown time   • minoxidil (LONITEN) 10 MG tablet Take 10 mg by mouth Daily.   7/5/2017 at Unknown time   • saccharomyces boulardii (FLORASTOR) 250 MG capsule Take 1 capsule by mouth 2 (Two) Times a Day for 30 days. 60 capsule 0 7/5/2017 at Unknown time   • vancomycin  50 MG/ML solution oral solution Take 5 mL by mouth Every 6 (Six) Hours for 30 days. Indications: Clostridium Difficile Infection 600 mL 0 7/6/2017 at Unknown time     Allergies:  Acetaminophen; Hydrocodone; Ibuprofen; Lortab [hydrocodone-acetaminophen]; Ultram [tramadol hcl]; and Ciprofloxacin    Objective    Vital Signs  Temp:  [98 °F (36.7 °C)-98.8 °F (37.1 °C)] 98.4 °F (36.9 °C)  Heart Rate:  [] 96  Resp:  [18-32] 18  BP: (140-172)/() 166/106    Physical Exam:  General Appearance: alert, appears older than stated age  Head: normocephalic, without obvious abnormality and atraumatic  Throat: gums healthy and no oral lesions  Neck: no adenopathy, suppple, trachea midline, no thyromegaly, no carotid bruit and no JVD  Lungs: normal effort, wheezes noted. R sided tenderness.   Heart: regular rhythm & normal rate, normal S1, S2, no murmur, no yong, no rub and no click  Abdomen: normal bowel sounds, no masses and soft non-tender  Extremities: moves extremities well and no edema  Pulses: Pulses palpable and equal bilaterally  Skin: R chest wall incisions healing well.   Neurologic: Mental Status orientated to person, place, time and situation      Data Review:    Results from last 7 days  Lab Units 07/07/17  0443   WBC 10*3/mm3 8.58   HEMOGLOBIN g/dL 9.4*   HEMATOCRIT % 30.7*   PLATELETS 10*3/mm3 293       Results from last 7 days  Lab Units 07/07/17  0443   SODIUM mmol/L 133   POTASSIUM mmol/L 4.2   CHLORIDE mmol/L 92*   CO2 mmol/L 26.0   BUN mg/dL 33*   CREATININE mg/dL 6.10*   GLUCOSE mg/dL 119*   CALCIUM mg/dL 8.9     Coagulation:   Lab Results   Component Value Date    INR 1.31 07/07/2017       Imaging Results (last 72 hours)     Procedure Component Value Units Date/Time    XR Chest 1 View [[id number]] Updated:  07/07/17 0700          Assessment:      Principal Problem:    Pleural effusion, left  Active Problems:    Fahr's syndrome    Hepatitis C antibody test positive    Bipolar disorder    Depression     Pneumonia of right lung due to infectious organism    Loculated pleural effusion    ESRD (end stage renal disease) on dialysis    IV drug abuse    Medical non-compliance    Essential hypertension    History of Clostridium difficile      Plan:  Nephrology consulted for MWF dialysis  Hospitalist - managing complex medical issues  Per Dr Hicks - recommend L thoracentesis    MENDEL Brooks  07/07/17  8:00 AM         Electronically signed by Shawn Hicks MD at 7/7/2017  8:30 AM      Spencer Montenegro MD at 7/7/2017  9:35 AM  Version 1 of 1     Consult Orders:    1. Inpatient Consult to Nephrology [[id number]] ordered by Shawn Hicks MD at 07/07/17 0736                [facility] Consult Note    Mandy Espino  1990  [id number]    Date of Admit:  7/6/2017    Date of Consult: 7/7/2017        Requesting Provider: Neo Gresham DO  Evaluating Physician: Spencer Montenegro MD        Reason for Consultation:    History of present illness:    Patient is a 26 y.o.  Yr old female  well known to [facility] as followed up in [facility] dialysis unit presented to [facility] ED 7/6/17 complaining of worsening pressure-like pain in right shoulder radiating to right chest over past 4 days. States she also experiencing productive cough, SOB, and fever tmax 101. She has tried inhalers, 4L oxygen without relief.   She is well known to our service, admitted on 6/12/17 for right pleural effusion. She underwent R thoracentesis, which revealed multiple loculations. CT surgery performed R thoracotomy with decortication. Postop she developed small left effusion secondary to laying on left side and refusing pulmonary toilet.   Transferred to [facility] for management of L pleural effusion.     Past Medical History:   Diagnosis Date   • Abdominal pain    • Anemia    • Anxiety    • Asthma    • Bipolar disorder    • CKD (chronic kidney disease), stage IV    • Constipation    • COPD (chronic obstructive pulmonary disease)     • Depression    • End stage renal disease on dialysis    • Esophageal reflux     reflux   • Esophagitis determined by biopsy 2014   • Fahr's syndrome    • Hepatitis C antibody test positive    • Hyperparathyroidism    • Hypertension    • Hypocalcemia    • Nausea and vomiting    • Non-traumatic rhabdomyolysis 5/2/2017   • Pancreatitis    • Pneumonia of right lung due to infectious organism 5/2/2017   • Recurrent urinary tract infection    • Renal insufficiency    • Upper gastrointestinal bleeding        Past Surgical History:   Procedure Laterality Date   • BRONCHOSCOPY THORACOTOMY Right 6/14/2017    Procedure: BRONCHOSCOPY RIGHT THORACOTOMY WITH LUNG DECORTICATION ;  Surgeon: Shawn Hicks MD;  Location: [facility];  Service:    • DIALYSIS FISTULA CREATION      port   • DILATATION AND CURETTAGE     • ESOPHAGOSCOPY / EGD  2014    esophagitis   • EXPLORATORY LAPAROTOMY      For uterine and ovarian issues   • KIDNEY SURGERY Left 2013    Biopsy       Social History     Social History   • Marital status:      Spouse name: N/A   • Number of children: N/A   • Years of education: N/A     Social History Main Topics   • Smoking status: Former Smoker     Quit date: 6/18/2017   • Smokeless tobacco: None   • Alcohol use No   • Drug use: Yes     Special: Marijuana   • Sexual activity: Defer     Other Topics Concern   • None     Social History Narrative    She is  and not working. She denies alcohol or drug use. She denies recent opiate or benzo use. She does smoke and has used marijuana.        family history includes Arthritis in her mother; Diabetes in her other; Hyperlipidemia in her other; Hypertension in her brother and mother; Kidney disease in her father and other; Lung cancer in her other; Pancreatic cancer in her father.    Allergies   Allergen Reactions   • Acetaminophen Itching   • Hydrocodone Itching   • Ibuprofen    • Lortab [Hydrocodone-Acetaminophen]      Lortab TABS   • Ultram [Tramadol Hcl]   "    \"kidney doctor said no\"   • Ciprofloxacin Rash       Medication:    Current Facility-Administered Medications:   •  ALPRAZolam (XANAX) tablet 0.5 mg, 0.5 mg, Oral, Nightly PRN, Shawn Hicks MD, 0.5 mg at 07/06/17 2321  •  amitriptyline (ELAVIL) tablet 10 mg, 10 mg, Oral, Nightly, Tere Queen PA-C, 10 mg at 07/06/17 2321  •  amLODIPine (NORVASC) tablet 10 mg, 10 mg, Oral, Q24H, Tere Queen PA-C, 10 mg at 07/07/17 0812  •  famotidine (PEPCID) tablet 20 mg, 20 mg, Oral, Daily, MENDEL Duron-C, 20 mg at 07/07/17 0812  •  hydrALAZINE (APRESOLINE) tablet 50 mg, 50 mg, Oral, Q8H, MENDEL Duron-C, 50 mg at 07/07/17 0444  •  HYDROmorphone (DILAUDID) injection 0.5 mg, 0.5 mg, Intravenous, Q3H PRN, Shawn Hicks MD, 0.5 mg at 07/07/17 0813  •  metoprolol succinate XL (TOPROL-XL) 24 hr tablet 50 mg, 50 mg, Oral, Q24H, Tere Queen PA-C, 50 mg at 07/07/17 0812  •  minoxidil (LONITEN) tablet 10 mg, 10 mg, Oral, Daily, Tere Queen PA-C, 10 mg at 07/07/17 0812  •  saccharomyces boulardii (FLORASTOR) capsule 250 mg, 250 mg, Oral, BID, Tere Queen PA-C, 250 mg at 07/07/17 0812  •  sodium chloride 0.9 % flush 10 mL, 10 mL, Intravenous, PRN, SUHAIL DuronC  •  vancomycin oral solution 250 mg, 250 mg, Oral, Q6H, Tere Queen PA-C, 250 mg at 07/07/17 0642    Prescriptions Prior to Admission   Medication Sig Dispense Refill Last Dose   • ALPRAZolam (XANAX) 0.5 MG tablet Take 0.5 mg by mouth At Night As Needed for Anxiety or Sleep.   7/5/2017 at Unknown time   • amitriptyline (ELAVIL) 10 MG tablet Take 10 mg by mouth Every Night.   7/5/2017 at Unknown time   • amLODIPine (NORVASC) 10 MG tablet Take 1 tablet by mouth Daily for 30 days. 30 tablet 0 7/5/2017 at Unknown time   • famotidine (PEPCID) 20 MG tablet Take 1 tablet by mouth Daily for 30 days. 30 tablet 0 7/5/2017 at Unknown time   • hydrALAZINE (APRESOLINE) 50 MG tablet Take 1 tablet by mouth Every 8 (Eight) Hours for 30 days. 90 " "tablet 0 7/5/2017 at Unknown time   • metoprolol succinate XL (TOPROL-XL) 50 MG 24 hr tablet Take 1 tablet by mouth Daily for 30 days. 30 tablet 0 7/5/2017 at Unknown time   • minoxidil (LONITEN) 10 MG tablet Take 10 mg by mouth Daily.   7/5/2017 at Unknown time   • saccharomyces boulardii (FLORASTOR) 250 MG capsule Take 1 capsule by mouth 2 (Two) Times a Day for 30 days. 60 capsule 0 7/5/2017 at Unknown time   • vancomycin 50 MG/ML solution oral solution Take 5 mL by mouth Every 6 (Six) Hours for 30 days. Indications: Clostridium Difficile Infection 600 mL 0 7/6/2017 at Unknown time       Review of Systems:    Constitutional-- subjective Fever, no chills or sweats. No significant change in weight  Eye-- no diplopia, no conjunctivitis  ENT-- No new hearing or throat complaints.  No epistaxis or oral sores. No odynophagia or dysphagia. No headache, photophobia or neck stiffness.  CV-- No chest pain, palpitations, soa, or edema  Resp-- some SOB/cough/ no Hemoptysis  GI- No nausea, vomiting, or diarrhea.  No hematochezia, melena, or hematemesis.  -- No dysuria, hematuria, or flank pain. No lower tract obstructive symptoms  Skin-- No rash, warm and dry  Lymph- no painful or swollen lymph nodes in neck/axilla or groin.   Heme- No active bruising or bleeding; no Hx of DVT or PE.  MS-- no swelling or pain in the joints  Neuro-- No acute focal weakness or numbness in the arms or legs.  No seizures.  Psych--No anxiety or depression  Endo--No cold or heat intolerance.  No polyuria, polydipsia, or polyphagia    Full review of systems reviewed and negative otherwise for acute complaints    Physical Exam:   Vital Signs   Blood pressure (!) 163/116, pulse 100, temperature 98.2 °F (36.8 °C), temperature source Oral, resp. rate 22, height 62\" (157.5 cm), weight 97 lb 8 oz (44.2 kg), SpO2 97 %, not currently breastfeeding.     GENERAL: Awake and alert, no acute distress  HEENT: Normocephalic, atraumatic.  PER.  No conjunctivitis. " No icterus. Oropharynx clear without evidence of thrush or exudate. No evidence of peridontal disease.    NECK: Supple without nuchal rigidity. No mass.  LYMPH: No painful cervical, axillary or inguinal lymphadenopathy.  HEART: RRR; ++ murmur, rubs, no gallops. No bruits in neck, abdomen, or groins, no edema  LUNGS: + Rhales and wheezing  ABDOMEN: Soft, nontender, nondistended. Positive bowel sounds. No rebound or guarding. NO mass or HSM.  JOINTS:  Full range of motion, no redness or tenderness.  EXT:  No cyanosis, clubbing or edema.  :  External genitalia without swelling or lesion  SKIN: Warm and dry without rash  NEURO: Oriented to PPT. No focal neurological deficits. Strength equal bilateral  PSYCHIATRIC: Normal insight and judgement. Cooperative with PE    Laboratory Data    Results from last 7 days  Lab Units 07/07/17  0443 07/06/17  1446   HEMOGLOBIN g/dL 9.4* 10.0*   HEMATOCRIT % 30.7* 31.8*       Results from last 7 days  Lab Units 07/07/17  0443 07/06/17  1446   SODIUM mmol/L 133 136*   POTASSIUM mmol/L 4.2 3.8   CHLORIDE mmol/L 92* 93*   CO2 mmol/L 26.0 30.0   BUN mg/dL 33* 31*   CREATININE mg/dL 6.10* 5.40*   CALCIUM mg/dL 8.9 8.9   ALBUMIN g/dL 3.20 3.50       Results from last 7 days  Lab Units 07/07/17  0443   GLUCOSE mg/dL 119*           Results from last 7 days  Lab Units 07/07/17  0443   ALK PHOS U/L 150*   BILIRUBIN mg/dL 0.2*   ALT (SGPT) U/L 24   AST (SGOT) U/L 26     Estimated Creatinine Clearance: 9.8 mL/min (by C-G formula based on Cr of 6.1).    Radiology:      Renal Imaging:    I personally read  the radiographic studies    Impression:   ESKD  HTN  LEFT PLEURAL EFFUSION/PNEUMONIA  ANEMIA  MEDICAL NON COMPLIANCE    PLAN: Thank you for asking us to see Mandy Espino, I recommend the following:   Dialysis on schedule  Thoracocentesis/decort as per surgery plan  epogen for anemia  We will follow    Spencer Montenegro MD  7/7/2017  9:35 AM                     Electronically signed by Spencer  TORIBIO Montenegro MD at 7/7/2017  9:39 AM      Shawn Hicks MD at 7/7/2017 11:00 AM  Version 1 of 1           Referring Provider:  Reason for Consultation: Left Pleural effusion    Patient Care Team:  Neo Gresham DO as PCP - General (Family Medicine)    Chief complaint left pleural effusion    .     History of present illness: I have been asked see this patient at the request of Dr. holbrook.  Patient was found to have a large left pleural effusion which is new.  Patient's been having severe pain and shortness of breath for the last 4-5 days.  Patient has been transferred here and I been asked to see the patient guards to this.    Review of Systems    The following systems were reviewed and negative;  eyes, ENT, cardiovascular, gastrointestinal, genitourinary, integument, breast, hematologic / lymphatic, musculoskeletal, neurological, behavioral/psych, endocrine and allergies / immunologic    History  Past Medical History:   Diagnosis Date   • Abdominal pain    • Anemia    • Anxiety    • Asthma    • Bipolar disorder    • CKD (chronic kidney disease), stage IV    • Constipation    • COPD (chronic obstructive pulmonary disease)    • Depression    • End stage renal disease on dialysis    • Esophageal reflux     reflux   • Esophagitis determined by biopsy 2014   • Fahr's syndrome    • Hepatitis C antibody test positive    • Hyperparathyroidism    • Hypertension    • Hypocalcemia    • Nausea and vomiting    • Non-traumatic rhabdomyolysis 5/2/2017   • Pancreatitis    • Pneumonia of right lung due to infectious organism 5/2/2017   • Recurrent urinary tract infection    • Renal insufficiency    • Upper gastrointestinal bleeding    , Past Surgical History:   Procedure Laterality Date   • BRONCHOSCOPY THORACOTOMY Right 6/14/2017    Procedure: BRONCHOSCOPY RIGHT THORACOTOMY WITH LUNG DECORTICATION ;  Surgeon: Shawn Hicks MD;  Location: [facility];  Service:    • DIALYSIS FISTULA CREATION      port   • DILATATION AND  CURETTAGE     • ESOPHAGOSCOPY / EGD  2014    esophagitis   • EXPLORATORY LAPAROTOMY      For uterine and ovarian issues   • KIDNEY SURGERY Left 2013    Biopsy   , Family History   Problem Relation Age of Onset   • Arthritis Mother    • Hypertension Mother    • Kidney disease Father      Chronic renal failure syndrome   • Pancreatic cancer Father    • Hypertension Brother    • Kidney disease Other    • Diabetes Other      Uncle   • Lung cancer Other      Grandmother   • Hyperlipidemia Other      High cholesterol - Uncle   , Social History   Substance Use Topics   • Smoking status: Former Smoker     Quit date: 6/18/2017   • Smokeless tobacco: None   • Alcohol use No   , Prescriptions Prior to Admission   Medication Sig Dispense Refill Last Dose   • ALPRAZolam (XANAX) 0.5 MG tablet Take 0.5 mg by mouth At Night As Needed for Anxiety or Sleep.   7/5/2017 at Unknown time   • amitriptyline (ELAVIL) 10 MG tablet Take 10 mg by mouth Every Night.   7/5/2017 at Unknown time   • amLODIPine (NORVASC) 10 MG tablet Take 1 tablet by mouth Daily for 30 days. 30 tablet 0 7/5/2017 at Unknown time   • famotidine (PEPCID) 20 MG tablet Take 1 tablet by mouth Daily for 30 days. 30 tablet 0 7/5/2017 at Unknown time   • hydrALAZINE (APRESOLINE) 50 MG tablet Take 1 tablet by mouth Every 8 (Eight) Hours for 30 days. 90 tablet 0 7/5/2017 at Unknown time   • metoprolol succinate XL (TOPROL-XL) 50 MG 24 hr tablet Take 1 tablet by mouth Daily for 30 days. 30 tablet 0 7/5/2017 at Unknown time   • minoxidil (LONITEN) 10 MG tablet Take 10 mg by mouth Daily.   7/5/2017 at Unknown time   • saccharomyces boulardii (FLORASTOR) 250 MG capsule Take 1 capsule by mouth 2 (Two) Times a Day for 30 days. 60 capsule 0 7/5/2017 at Unknown time   • vancomycin 50 MG/ML solution oral solution Take 5 mL by mouth Every 6 (Six) Hours for 30 days. Indications: Clostridium Difficile Infection 600 mL 0 7/6/2017 at Unknown time   , Scheduled Meds:    amitriptyline 10 mg  "Oral Nightly   amLODIPine 10 mg Oral Q24H   epoetin kika 6,000 Units Subcutaneous Once per day on Mon Wed Fri   famotidine 20 mg Oral Daily   hydrALAZINE 50 mg Oral Q8H   metoprolol succinate XL 50 mg Oral Q24H   minoxidil 10 mg Oral Daily   saccharomyces boulardii 250 mg Oral BID   vancomycin 250 mg Oral Q6H   , Continuous Infusions:   , PRN Meds:  •  ALPRAZolam  •  HYDROmorphone  •  sodium chloride and Allergies:  Acetaminophen; Hydrocodone; Ibuprofen; Lortab [hydrocodone-acetaminophen]; Ultram [tramadol hcl]; and Ciprofloxacin    Objective     Vital Sign Min/Max for last 24 hours  Temp  Min: 98 °F (36.7 °C)  Max: 98.8 °F (37.1 °C)   BP  Min: 122/72  Max: 172/122   Pulse  Min: 91  Max: 121   Resp  Min: 18  Max: 32   SpO2  Min: 93 %  Max: 97 %   Flow (L/min)  Min: 2  Max: 5   Weight  Min: 97 lb 8 oz (44.2 kg)  Max: 100 lb (45.4 kg)     Flowsheet Rows         First Filed Value    Admission Height  62\" (157.5 cm) Documented at 07/06/2017 2140    Admission Weight  97 lb 8 oz (44.2 kg) Documented at 07/06/2017 2140               Physical Exam:     General Appearance:    Alert, cooperative, in no acute distress   Head:    Normocephalic, without obvious abnormality, atraumatic   Eyes:            Lids and lashes normal, conjunctivae and sclerae normal, no   icterus, no pallor, corneas clear, PERRLA   Ears:    Ears appear intact with no abnormalities noted   Throat:   No oral lesions, no thrush, oral mucosa moist   Neck:   No adenopathy, supple, trachea midline, no thyromegaly, no   carotid bruit, no JVD   Back:     No kyphosis present, no scoliosis present, no skin lesions,      erythema or scars, no tenderness to percussion or                   palpation,   range of motion normal   Lungs:     Decreased in the left base,respirations regular, even and                  unlabored    Heart:    Regular rhythm and normal rate, normal S1 and S2, no            murmur, no gallop, no rub, no click   Chest Wall:    No abnormalities " observed   Abdomen:     Normal bowel sounds, no masses, no organomegaly, soft        non-tender, non-distended, no guarding, no rebound                tenderness   Rectal:     Deferred   Extremities:   Moves all extremities well, no edema, no cyanosis, no             redness   Pulses:   Pulses palpable and equal bilaterally   Skin:   No bleeding, bruising or rash   Lymph nodes:   No palpable adenopathy   Neurologic:   Cranial nerves 2 - 12 grossly intact, sensation intact, DTR       present and equal bilaterally             Assessment/Plan     Principal Problem:    Pleural effusion, left  Active Problems:    Fahr's syndrome    Hepatitis C antibody test positive    Bipolar disorder    Depression    Pneumonia of right lung due to infectious organism    Loculated pleural effusion    ESRD (end stage renal disease) on dialysis    IV drug abuse    Medical non-compliance    Essential hypertension    History of Clostridium difficile      Patient presents at this time with a large left pleural effusion which is occluding most of the left lung.  I reviewed the CT scan and films in detail.  I feel that a CT directed pigtail catheter probably is the treatment of choice.  If this does not satisfactorily drain this and she will require a decortication.  I long discussion with the patient and answered all of her questions.  She appears to be doing well from her previous decortication on the right.  Elective thank you for this consultation.    I discussed the patients findings and my recommendations with patient      Please note that portions of this note were completed with a voice recognition program. Efforts were made to edit the dictations, but words may be mistranscribed    Shawn Hicks MD  07/07/17  11:00 AM                 Electronically signed by Shawn Hicks MD at 7/7/2017 11:03 AM

## 2017-07-08 ENCOUNTER — APPOINTMENT (OUTPATIENT)
Dept: CARDIOLOGY | Facility: HOSPITAL | Age: 27
End: 2017-07-08

## 2017-07-08 ENCOUNTER — APPOINTMENT (OUTPATIENT)
Dept: GENERAL RADIOLOGY | Facility: HOSPITAL | Age: 27
End: 2017-07-08

## 2017-07-08 VITALS
HEIGHT: 62 IN | DIASTOLIC BLOOD PRESSURE: 73 MMHG | HEART RATE: 108 BPM | TEMPERATURE: 99.2 F | WEIGHT: 97.5 LBS | OXYGEN SATURATION: 96 % | SYSTOLIC BLOOD PRESSURE: 125 MMHG | BODY MASS INDEX: 17.94 KG/M2 | RESPIRATION RATE: 18 BRPM

## 2017-07-08 PROCEDURE — 99231 SBSQ HOSP IP/OBS SF/LOW 25: CPT | Performed by: THORACIC SURGERY (CARDIOTHORACIC VASCULAR SURGERY)

## 2017-07-08 PROCEDURE — 25010000002 HYDROMORPHONE PER 4 MG: Performed by: THORACIC SURGERY (CARDIOTHORACIC VASCULAR SURGERY)

## 2017-07-08 PROCEDURE — 71010 HC CHEST PA OR AP: CPT

## 2017-07-08 PROCEDURE — 93306 TTE W/DOPPLER COMPLETE: CPT

## 2017-07-08 PROCEDURE — 94640 AIRWAY INHALATION TREATMENT: CPT

## 2017-07-08 PROCEDURE — 99232 SBSQ HOSP IP/OBS MODERATE 35: CPT | Performed by: FAMILY MEDICINE

## 2017-07-08 PROCEDURE — 94799 UNLISTED PULMONARY SVC/PX: CPT

## 2017-07-08 RX ORDER — CLONAZEPAM 0.5 MG/1
0.25 TABLET ORAL 2 TIMES DAILY PRN
Status: DISCONTINUED | OUTPATIENT
Start: 2017-07-08 | End: 2017-07-09 | Stop reason: HOSPADM

## 2017-07-08 RX ORDER — HYDROMORPHONE HYDROCHLORIDE 2 MG/1
2 TABLET ORAL EVERY 4 HOURS PRN
Status: DISCONTINUED | OUTPATIENT
Start: 2017-07-08 | End: 2017-07-09 | Stop reason: HOSPADM

## 2017-07-08 RX ADMIN — HYDROMORPHONE HYDROCHLORIDE 0.5 MG: 1 INJECTION, SOLUTION INTRAMUSCULAR; INTRAVENOUS; SUBCUTANEOUS at 08:27

## 2017-07-08 RX ADMIN — Medication 250 MG: at 19:03

## 2017-07-08 RX ADMIN — CLONAZEPAM 0.25 MG: 0.5 TABLET ORAL at 15:31

## 2017-07-08 RX ADMIN — MINOXIDIL 10 MG: 10 TABLET ORAL at 08:26

## 2017-07-08 RX ADMIN — HYDRALAZINE HYDROCHLORIDE 50 MG: 50 TABLET, FILM COATED ORAL at 15:28

## 2017-07-08 RX ADMIN — HYDROMORPHONE HYDROCHLORIDE 2 MG: 2 TABLET ORAL at 19:23

## 2017-07-08 RX ADMIN — METOPROLOL SUCCINATE 50 MG: 50 TABLET, EXTENDED RELEASE ORAL at 08:27

## 2017-07-08 RX ADMIN — HYDRALAZINE HYDROCHLORIDE 50 MG: 50 TABLET, FILM COATED ORAL at 05:19

## 2017-07-08 RX ADMIN — AMLODIPINE BESYLATE 10 MG: 10 TABLET ORAL at 08:26

## 2017-07-08 RX ADMIN — HYDRALAZINE HYDROCHLORIDE 50 MG: 50 TABLET, FILM COATED ORAL at 21:47

## 2017-07-08 RX ADMIN — Medication 250 MG: at 05:18

## 2017-07-08 RX ADMIN — ALBUTEROL SULFATE 2.5 MG: 2.5 SOLUTION RESPIRATORY (INHALATION) at 15:51

## 2017-07-08 RX ADMIN — FAMOTIDINE 20 MG: 20 TABLET ORAL at 08:27

## 2017-07-08 RX ADMIN — HYDROMORPHONE HYDROCHLORIDE 2 MG: 2 TABLET ORAL at 11:44

## 2017-07-08 RX ADMIN — Medication 250 MG: at 11:44

## 2017-07-08 RX ADMIN — AMITRIPTYLINE HYDROCHLORIDE 10 MG: 10 TABLET, FILM COATED ORAL at 21:47

## 2017-07-08 RX ADMIN — HYDROMORPHONE HYDROCHLORIDE 0.5 MG: 1 INJECTION, SOLUTION INTRAMUSCULAR; INTRAVENOUS; SUBCUTANEOUS at 05:18

## 2017-07-08 RX ADMIN — Medication 250 MG: at 08:27

## 2017-07-08 RX ADMIN — HYDROMORPHONE HYDROCHLORIDE 2 MG: 2 TABLET ORAL at 15:28

## 2017-07-08 RX ADMIN — HYDROMORPHONE HYDROCHLORIDE 0.5 MG: 1 INJECTION, SOLUTION INTRAMUSCULAR; INTRAVENOUS; SUBCUTANEOUS at 02:03

## 2017-07-08 RX ADMIN — ALPRAZOLAM 0.5 MG: 0.5 TABLET ORAL at 21:47

## 2017-07-08 NOTE — NURSING NOTE
Patient is in the hospital with chest tube on right side for pleural effusion. Patient complained of pain and started becoming belligerent when I explained to patient it was not yet time for her pain medication. Offered patient an ice pack to help with the pain and she refused. Patient yelling at the top of her lungs, using vulgar language towards staff and demanding a new doctor. Asked patient to quit yelling and using vulgar language and to not disrespect staff. Patient called me a B____ and requested a new nurse. Patient still continues to yell out and use vulgar language.

## 2017-07-08 NOTE — PROGRESS NOTES
"      [facility] HOSPITALIST    PROGRESS NOTE    Name:  Mandy Espino   Age:  26 y.o.  Sex:  female  :  1990  MRN:  [id number]   Visit Number:  [id number]  Admission Date:  2017  Date Of Service:  17  Primary Care Physician:  Neo Gresham DO     LOS: 2 days :      Chief Complaint:  \"help me\"  \" it hurts bad\". Follow up left pleural effusion        Subjective / Interval History: The patient is well known to me from [facility], where I have known and cared for her multiple times. The minute I entered the room, she had increased anxiety and started moaning stating she needed something for pain in her IV.     She was resting comfortably in bed prior to my arrival. She complains of intermittent 10/10 left chest pain pleuritic, improved on dilaudid.     She denies chest pain or pain otherwise, cough, or shortness of breath now. She is not eating much today. She is very anxious and complaining she cannot sleep. She has threatened to sign out AMA but after reassurance has agreed to stay.     CT drained 1200mL      Review of Systems:     General ROS: Patient denies any fevers, chills or loss of consciousness.  Ophthalmic ROS: Denies any diplopia or transient loss of vision.  ENT ROS: Denies sore throat, nasal congestion or ear pain.   Respiratory ROS: Denies cough or shortness of breath. Positive for pleuritic left sided chest pain at CT tube site  Cardiovascular ROS: Denies chest pain or palpitations. No history of exertional chest pain.   Gastrointestinal ROS: Denies nausea and vomiting. Denies any abdominal pain. No diarrhea.  Genito-Urinary ROS: Denies dysuria or hematuria.  Musculoskeletal ROS: Complains of chronic back pain. No muscle pain. No calf pain.  Neurological ROS: Denies any focal weakness. No loss of consciousness. Denies any numbness. Denies neck pain.   Dermatological ROS: Denies any redness or pruritis.    Vital Signs:    Temp:  [97.8 °F (36.6 °C)-102.5 °F " (39.2 °C)] 97.8 °F (36.6 °C)  Heart Rate:  [102-122] 110  Resp:  [16-36] 36  BP: (107-143)/(67-99) 107/67    Intake and output:    I/O last 3 completed shifts:  In: 1320 [P.O.:1320]  Out: 5090 [Other:5090]  I/O this shift:  In: 240 [P.O.:240]  Out: -     Physical Examination:    General Appearance:    Alert and cooperative, not in any acute distress on arrival but starts moaning and crying in pain with tears on my arrival. She recognizes me    Head:    Atraumatic and normocephalic, without obvious abnormality.   Eyes:            PERRLA, conjunctivae and sclerae normal, no Icterus. No pallor. Extra-occular movements are within normal limits.   Throat:   No oral lesions, no thrush, oral mucosa moist.   Neck:   Supple, trachea midline, no thyromegaly, no carotid bruit.   Lungs:     Chest shape is normal. Breath sounds heard bilaterally e  Reduced on left.No crackles or wheezing. No Pleural rub or bronchial breathing. Pain on palpation anywhere subjectively to posterior rib cage, resolved with stethoscope    Heart:    Normal S1 and S2, no murmur, no gallop, no rub. No JVD   Abdomen:     Normal bowel sounds, no masses, no organomegaly. Soft        non-tender, non-distended, no guarding, no rebound                tenderness   Extremities:   Moves all extremities well, no edema, no cyanosis, no             clubbing   Skin:   No bleeding, bruising or rash. Unkept appearing with diffuse neurotic dermatoses per usual   Neurologic:   Cranial nerves 2 - 12 grossly intact, sensation intact, Motor power is normal and equal bilaterally and she has atrophy to bilateral legs   Laboratory results:    Lab Results (last 24 hours)     ** No results found for the last 24 hours. **          I have reviewed the patient's laboratory results.    Radiology results:    Imaging Results (last 24 hours)     Procedure Component Value Units Date/Time    CT Guided Chest Tube [[id number]] Collected:  07/07/17 2024     Updated:  07/07/17 2024     Narrative:       EXAMINATION: CT GUIDED CHEST TUBE PLACEMENT - 07/07/2017     INDICATION: Left pleural effusion.     TECHNIQUE: CT-guided placement of a left chest tube for large left  pleural effusion.     The radiation dose reduction device was turned on for each scan per the  ALARA (As Low as Reasonably Achievable) protocol.     COMPARISON: None.     FINDINGS: Patient referred for CT-guided placement of a left chest tube.  The procedure and risks were explained to the patient. Informed consent  was obtained and signed. Patient was placed in the supine position on  the table. The left lateral chest wall was prepped and draped in a  sterile fashion. 1% lidocaine used as a local anesthetic. Under CT  fluoroscopic guidance an 8 English curved catheter was advanced to the  left chest wall and into the pleural fluid. Approximately 20 mL of the  straw-colored fluid was removed. The catheter was sutured in place. No  immediate complication occurred.     Upon further review of the images there is a large pleural effusion  identified on the left with collapse of the left lung. Minimal  atelectatic change is seen within the anterior aspect of the right upper  lobe. There is evidence of a large pericardial effusion. Examination  results were given to Dr. Hopper at time of the procedure.       Impression:       CT-guided placement of a left chest tube with no immediate  complication     DICTATED:     07/07/2017  EDITED:         07/07/2017          XR Chest 1 View [[id number]] Collected:  07/07/17 0854     Updated:  07/08/17 0806    Narrative:       EXAMINATION: XR CHEST 1 VW- 07/07/2017     INDICATION: postop      COMPARISON: 06/20/2017     FINDINGS: There is further opacification of the left hemithorax with  only a small area of the left apex remaining aerated. There is  persistent patchy disease in the medial right base, mildly increased.  Right lung elsewhere appears grossly clear. No pneumothorax is seen.            Impression:       Near-complete consolidation of the left lung, significantly  worsened from 06/20/2017 exam. Mildly increased right basilar  atelectasis     D:  07/07/2017  E:  07/07/2017     This report was finalized on 7/8/2017 8:04 AM by DR. Bryce Monaco MD.             I have reviewed the patient's radiology reports.    Medication Review:     I have reviewed the patients active and prn medications.     Assessment:    Principal Problem:    Pleural effusion, left  Active Problems:    Fahr's syndrome    Hepatitis C antibody test positive    Bipolar disorder    Depression    Pneumonia of right lung due to infectious organism    Loculated pleural effusion    ESRD (end stage renal disease) on dialysis    IV drug abuse    Medical non-compliance    Essential hypertension    History of Clostridium difficile          Plan:  She is post thoracentesis placement for large left pleural effusion and continue Chest tube. CT surgery has been following. Hospitalist attending due to multiple medical problems. She is due for dialysis on Monday. Nephrology following. Chest xray pending.     Patient does seem to have acute on chronic pain, but still displays chronic drug seeking behavior. She is extremely agitated, anxious per her usual and usually improves with short course of increased benzodiazepine which assists in calming her and allowing her to rest and relax some, while treating acute medical issues. I did discuss with her that she will NOT be receiving IV only medications, that she can swallow them and that continued iv medication use only promotes drug dependency. She reported understanding. I did add clonazepam, that has worked for her in the past and she can continue the regularly scheduled alprazolam at night.     She will continue to oral dilaudid and over time this also will need weaned down further. Medication risks and benefits discussed.       Medication risks and benefits were discussed in detail. Patient reported  satisfaction with care delivered and treatment plan.     Anticipate discharge home in the next 2-3 days possibly.       Devora Singh,   07/08/17  4:14 PM

## 2017-07-08 NOTE — PROGRESS NOTES
Cardiothoracic Surgery Progress Note      PPD # 1 s/p chest tube        LOS: 2 days      Chief Complaint: Left flank pain    Subjective:  Pain at left chest tube site. Wants pain medications. Denies shortness of breath    Objective:  Vital Signs  Temp:  [98 °F (36.7 °C)-102.5 °F (39.2 °C)] 99.4 °F (37.4 °C)  Heart Rate:  [] 108  Resp:  [16-28] 16  BP: (112-143)/(66-99) 124/79    Physical Exam:   General Appearance: alert, appears stated age and cooperative   Lungs: clear to auscultation, respirations regular, respirations even and respirations unlabored   Heart: regular rhythm & normal rate, normal S1, S2, no murmur, no yong, no rub and no click   Skin: Incision c/d/i     Results:    Results from last 7 days  Lab Units 07/07/17  0443   WBC 10*3/mm3 8.58   HEMOGLOBIN g/dL 9.4*   HEMATOCRIT % 30.7*   PLATELETS 10*3/mm3 293       Results from last 7 days  Lab Units 07/07/17  0443   SODIUM mmol/L 133   POTASSIUM mmol/L 4.2   CHLORIDE mmol/L 92*   CO2 mmol/L 26.0   BUN mg/dL 33*   CREATININE mg/dL 6.10*   GLUCOSE mg/dL 119*   CALCIUM mg/dL 8.9         Assessment:  Pericardial effusion  Status post left chest tube placement via CT-guided chest tube.  She has had 1200 mL of fluid out, cytology and pathology are pending    Plan:  Continue chest tube  Echocardiogram for pericardial effusion  Dialysis as per renal

## 2017-07-08 NOTE — PROGRESS NOTES
"NAL Consult Note    Mandy Espino  1990  [id number]    Date of Admit:  7/6/2017    Date of Consult: 7/8/2017        Requesting Provider: Neo Gresham DO  Evaluating Physician: Spencer Montenegro MD        Reason for Consultation: ESKD    Intermittent hx- + Pain at ct surgery site    Review of Systems:    No soa or cp, + pain in general    Physical Exam:   Vital Signs   Blood pressure 124/79, pulse 108, temperature 99.4 °F (37.4 °C), temperature source Oral, resp. rate 16, height 62\" (157.5 cm), weight 97 lb 8 oz (44.2 kg), SpO2 96 %, not currently breastfeeding.     GENERAL: Awake and alert, no acute distress  HEENT: Normocephalic, atraumatic.  PER.  No conjunctivitis. No icterus. Oropharynx clear without evidence of thrush or exudate. No evidence of peridontal disease.    NECK: Supple without nuchal rigidity. No mass.  LYMPH: No painful cervical, axillary or inguinal lymphadenopathy.  HEART: RRR; ++ murmur, rubs, no gallops. No bruits in neck, abdomen, or groins, no edema  LUNGS: + Rhales and wheezing  ABDOMEN: Soft, nontender, nondistended. Positive bowel sounds. No rebound or guarding. NO mass or HSM.  JOINTS:  Full range of motion, no redness or tenderness.  EXT:  No cyanosis, clubbing or edema.  :  External genitalia without swelling or lesion  SKIN: Warm and dry without rash  NEURO: Oriented to PPT. No focal neurological deficits. Strength equal bilateral  PSYCHIATRIC: Normal insight and judgement. Cooperative with PE    Laboratory Data    Results from last 7 days  Lab Units 07/07/17  0443 07/06/17  1446   HEMOGLOBIN g/dL 9.4* 10.0*   HEMATOCRIT % 30.7* 31.8*       Results from last 7 days  Lab Units 07/07/17  0443 07/06/17  1446   SODIUM mmol/L 133 136*   POTASSIUM mmol/L 4.2 3.8   CHLORIDE mmol/L 92* 93*   CO2 mmol/L 26.0 30.0   BUN mg/dL 33* 31*   CREATININE mg/dL 6.10* 5.40*   CALCIUM mg/dL 8.9 8.9   ALBUMIN g/dL 3.20 3.50       Results from last 7 days  Lab Units 07/07/17  0443   GLUCOSE " mg/dL 119*           Results from last 7 days  Lab Units 07/07/17  0443   ALK PHOS U/L 150*   BILIRUBIN mg/dL 0.2*   ALT (SGPT) U/L 24   AST (SGOT) U/L 26     Estimated Creatinine Clearance: 9.8 mL/min (by C-G formula based on Cr of 6.1).    Radiology:      Renal Imaging:    I personally read  the radiographic studies    Impression:   ESKD  HTN  LEFT PLEURAL EFFUSION/PNEUMONIA  ANEMIA  MEDICAL NON COMPLIANCE    PLAN: Thank you for asking us to see Mandy Espino, I recommend the following:  Dialysis Monday on schedule  Limit pain meds as much as can  epogen on dialysis days    Spencer Montenegro MD  7/8/2017  9:44 AM

## 2017-07-08 NOTE — PLAN OF CARE
Problem: Patient Care Overview (Adult)  Goal: Plan of Care Review  Outcome: Ongoing (interventions implemented as appropriate)    07/08/17 0552   Coping/Psychosocial Response Interventions   Plan Of Care Reviewed With patient   Patient Care Overview   Progress progress toward functional goals as expected         Problem: Pain, Acute (Adult)  Goal: Acceptable Pain Control/Comfort Level  Outcome: Ongoing (interventions implemented as appropriate)    07/08/17 0552   Pain, Acute (Adult)   Acceptable Pain Control/Comfort Level making progress toward outcome

## 2017-07-09 LAB
ALBUMIN SERPL-MCNC: 3.7 G/DL (ref 3.2–4.8)
ANION GAP SERPL CALCULATED.3IONS-SCNC: 14 MMOL/L (ref 3–11)
BH CV ECHO MEAS - AO ROOT AREA (BSA CORRECTED): 1.6
BH CV ECHO MEAS - AO ROOT AREA: 3.9 CM^2
BH CV ECHO MEAS - AO ROOT DIAM: 2.2 CM
BH CV ECHO MEAS - BSA(HAYCOCK): 1.4 M^2
BH CV ECHO MEAS - BSA: 1.4 M^2
BH CV ECHO MEAS - BZI_BMI: 17.7 KILOGRAMS/M^2
BH CV ECHO MEAS - BZI_METRIC_HEIGHT: 157.5 CM
BH CV ECHO MEAS - BZI_METRIC_WEIGHT: 44 KG
BH CV ECHO MEAS - CONTRAST EF (2CH): 52.2 ML/M^2
BH CV ECHO MEAS - CONTRAST EF 4CH: 53.4 ML/M^2
BH CV ECHO MEAS - EDV(CUBED): 51 ML
BH CV ECHO MEAS - EDV(MOD-SP2): 69 ML
BH CV ECHO MEAS - EDV(MOD-SP4): 58 ML
BH CV ECHO MEAS - EDV(TEICH): 58.4 ML
BH CV ECHO MEAS - EF(CUBED): 55.7 %
BH CV ECHO MEAS - EF(MOD-SP2): 52.2 %
BH CV ECHO MEAS - EF(MOD-SP4): 53.4 %
BH CV ECHO MEAS - EF(TEICH): 48.2 %
BH CV ECHO MEAS - ESV(CUBED): 22.6 ML
BH CV ECHO MEAS - ESV(MOD-SP2): 33 ML
BH CV ECHO MEAS - ESV(MOD-SP4): 27 ML
BH CV ECHO MEAS - ESV(TEICH): 30.3 ML
BH CV ECHO MEAS - FS: 23.8 %
BH CV ECHO MEAS - IVS/LVPW: 0.79
BH CV ECHO MEAS - IVSD: 1.4 CM
BH CV ECHO MEAS - LA DIMENSION: 3.1 CM
BH CV ECHO MEAS - LA/AO: 1.4
BH CV ECHO MEAS - LAT PEAK E' VEL: 6 CM/SEC
BH CV ECHO MEAS - LV DIASTOLIC VOL/BSA (35-75): 41.3 ML/M^2
BH CV ECHO MEAS - LV MASS(C)D: 217.1 GRAMS
BH CV ECHO MEAS - LV MASS(C)DI: 154.5 GRAMS/M^2
BH CV ECHO MEAS - LV MAX PG: 8 MMHG
BH CV ECHO MEAS - LV MEAN PG: 3.2 MMHG
BH CV ECHO MEAS - LV SYSTOLIC VOL/BSA (12-30): 19.2 ML/M^2
BH CV ECHO MEAS - LV V1 MAX: 141.2 CM/SEC
BH CV ECHO MEAS - LV V1 MEAN: 81.6 CM/SEC
BH CV ECHO MEAS - LV V1 VTI: 19.6 CM
BH CV ECHO MEAS - LVIDD: 3.7 CM
BH CV ECHO MEAS - LVIDS: 2.8 CM
BH CV ECHO MEAS - LVLD AP2: 7.1 CM
BH CV ECHO MEAS - LVLD AP4: 8.3 CM
BH CV ECHO MEAS - LVLS AP2: 5.6 CM
BH CV ECHO MEAS - LVLS AP4: 6.4 CM
BH CV ECHO MEAS - LVOT AREA (M): 2 CM^2
BH CV ECHO MEAS - LVOT AREA: 2.1 CM^2
BH CV ECHO MEAS - LVOT DIAM: 1.6 CM
BH CV ECHO MEAS - LVPWD: 1.7 CM
BH CV ECHO MEAS - MED PEAK E' VEL: 10.3 CM/SEC
BH CV ECHO MEAS - MV A MAX VEL: 97.7 CM/SEC
BH CV ECHO MEAS - MV E MAX VEL: 107.1 CM/SEC
BH CV ECHO MEAS - MV E/A: 1.1
BH CV ECHO MEAS - PA ACC SLOPE: 1368 CM/SEC^2
BH CV ECHO MEAS - PA ACC TIME: 0.07 SEC
BH CV ECHO MEAS - PA PR(ACCEL): 45.7 MMHG
BH CV ECHO MEAS - RVDD: 2.2 CM
BH CV ECHO MEAS - SI(CUBED): 20.2 ML/M^2
BH CV ECHO MEAS - SI(LVOT): 29 ML/M^2
BH CV ECHO MEAS - SI(MOD-SP2): 25.6 ML/M^2
BH CV ECHO MEAS - SI(MOD-SP4): 22.1 ML/M^2
BH CV ECHO MEAS - SI(TEICH): 20 ML/M^2
BH CV ECHO MEAS - SV(CUBED): 28.4 ML
BH CV ECHO MEAS - SV(LVOT): 40.8 ML
BH CV ECHO MEAS - SV(MOD-SP2): 36 ML
BH CV ECHO MEAS - SV(MOD-SP4): 31 ML
BH CV ECHO MEAS - SV(TEICH): 28.2 ML
BH CV ECHO MEAS - TAPSE (>1.6): 2 CM2
BH CV VAS BP RIGHT ARM: NORMAL MMHG
BH CV XLRA - RV BASE: 3.2 CM
BH CV XLRA - RV LENGTH: 6.5 CM
BH CV XLRA - RV MID: 3.1 CM
BH CV XLRA - TDI S': 12.3 CM/SEC
BUN BLD-MCNC: 29 MG/DL (ref 9–23)
BUN/CREAT SERPL: 4.7 (ref 7–25)
CALCIUM SPEC-SCNC: 9.2 MG/DL (ref 8.7–10.4)
CHLORIDE SERPL-SCNC: 96 MMOL/L (ref 99–109)
CO2 SERPL-SCNC: 25 MMOL/L (ref 20–31)
CREAT BLD-MCNC: 6.2 MG/DL (ref 0.6–1.3)
DEPRECATED RDW RBC AUTO: 51.9 FL (ref 37–54)
E/E' RATIO: 15
ERYTHROCYTE [DISTWIDTH] IN BLOOD BY AUTOMATED COUNT: 15.3 % (ref 11.3–14.5)
GFR SERPL CREATININE-BSD FRML MDRD: 8 ML/MIN/1.73
GLUCOSE BLD-MCNC: 94 MG/DL (ref 70–100)
HCT VFR BLD AUTO: 33.1 % (ref 34.5–44)
HGB BLD-MCNC: 10.2 G/DL (ref 11.5–15.5)
MCH RBC QN AUTO: 28.2 PG (ref 27–31)
MCHC RBC AUTO-ENTMCNC: 30.8 G/DL (ref 32–36)
MCV RBC AUTO: 91.4 FL (ref 80–99)
PHOSPHATE SERPL-MCNC: 3.5 MG/DL (ref 2.4–5.1)
PLATELET # BLD AUTO: 418 10*3/MM3 (ref 150–450)
PMV BLD AUTO: 8.6 FL (ref 6–12)
POTASSIUM BLD-SCNC: 4.2 MMOL/L (ref 3.5–5.5)
RBC # BLD AUTO: 3.62 10*6/MM3 (ref 3.89–5.14)
SODIUM BLD-SCNC: 135 MMOL/L (ref 132–146)
WBC NRBC COR # BLD: 12.91 10*3/MM3 (ref 3.5–10.8)

## 2017-07-09 PROCEDURE — 99024 POSTOP FOLLOW-UP VISIT: CPT | Performed by: PHYSICIAN ASSISTANT

## 2017-07-09 PROCEDURE — 99238 HOSP IP/OBS DSCHRG MGMT 30/<: CPT | Performed by: FAMILY MEDICINE

## 2017-07-09 PROCEDURE — 85027 COMPLETE CBC AUTOMATED: CPT | Performed by: FAMILY MEDICINE

## 2017-07-09 PROCEDURE — 80069 RENAL FUNCTION PANEL: CPT | Performed by: FAMILY MEDICINE

## 2017-07-09 PROCEDURE — 99231 SBSQ HOSP IP/OBS SF/LOW 25: CPT | Performed by: THORACIC SURGERY (CARDIOTHORACIC VASCULAR SURGERY)

## 2017-07-09 RX ORDER — OXYCODONE HYDROCHLORIDE AND ACETAMINOPHEN 5; 325 MG/1; MG/1
1 TABLET ORAL EVERY 8 HOURS PRN
Qty: 6 TABLET | Refills: 0 | Status: SHIPPED | OUTPATIENT
Start: 2017-07-09 | End: 2017-09-20 | Stop reason: HOSPADM

## 2017-07-09 RX ADMIN — HYDROMORPHONE HYDROCHLORIDE 2 MG: 2 TABLET ORAL at 04:25

## 2017-07-09 RX ADMIN — Medication 250 MG: at 08:54

## 2017-07-09 RX ADMIN — AMLODIPINE BESYLATE 10 MG: 10 TABLET ORAL at 08:55

## 2017-07-09 RX ADMIN — HYDROMORPHONE HYDROCHLORIDE 2 MG: 2 TABLET ORAL at 00:03

## 2017-07-09 RX ADMIN — FAMOTIDINE 20 MG: 20 TABLET ORAL at 08:55

## 2017-07-09 RX ADMIN — METOPROLOL SUCCINATE 50 MG: 50 TABLET, EXTENDED RELEASE ORAL at 08:55

## 2017-07-09 RX ADMIN — Medication 250 MG: at 00:04

## 2017-07-09 NOTE — PLAN OF CARE
Problem: Patient Care Overview (Adult)  Goal: Plan of Care Review  Outcome: Ongoing (interventions implemented as appropriate)    07/09/17 0524   Coping/Psychosocial Response Interventions   Plan Of Care Reviewed With patient   Patient Care Overview   Progress progress toward functional goals as expected         Problem: Pain, Acute (Adult)  Goal: Acceptable Pain Control/Comfort Level  Outcome: Ongoing (interventions implemented as appropriate)    07/09/17 0524   Pain, Acute (Adult)   Acceptable Pain Control/Comfort Level making progress toward outcome

## 2017-07-09 NOTE — PROGRESS NOTES
"NAL Consult Note    Mandy Espino  1990  [id number]    Date of Admit:  7/6/2017    Date of Consult: 7/9/2017        Requesting Provider: Neo Gresham DO  Evaluating Physician: Spencer Montenegro MD        Reason for Consultation: ESKD    Intermittent hx- + Pain at ct surgery site    Review of Systems:    No soa or cp, + pain in general    Physical Exam:   Vital Signs   Blood pressure 125/73, pulse 108, temperature 99.2 °F (37.3 °C), temperature source Oral, resp. rate 18, height 62\" (157.5 cm), weight 97 lb 8 oz (44.2 kg), SpO2 96 %, not currently breastfeeding.     GENERAL: Awake and alert, no acute distress  HEENT: Normocephalic, atraumatic.  PER.  No conjunctivitis. No icterus. Oropharynx clear without evidence of thrush or exudate. No evidence of peridontal disease.    NECK: Supple without nuchal rigidity. No mass.  LYMPH: No painful cervical, axillary or inguinal lymphadenopathy.  HEART: RRR; ++ murmur, rubs, no gallops. No bruits in neck, abdomen, or groins, no edema  LUNGS: + Rhales and wheezing  ABDOMEN: Soft, nontender, nondistended. Positive bowel sounds. No rebound or guarding. NO mass or HSM.  JOINTS:  Full range of motion, no redness or tenderness.  EXT:  No cyanosis, clubbing or edema.  :  External genitalia without swelling or lesion  SKIN: Warm and dry without rash  NEURO: Oriented to PPT. No focal neurological deficits. Strength equal bilateral  PSYCHIATRIC: Normal insight and judgement. Cooperative with PE    Laboratory Data    Results from last 7 days  Lab Units 07/09/17  0433 07/07/17  0443 07/06/17  1446   HEMOGLOBIN g/dL 10.2* 9.4* 10.0*   HEMATOCRIT % 33.1* 30.7* 31.8*       Results from last 7 days  Lab Units 07/09/17  0433 07/07/17  0443 07/06/17  1446   SODIUM mmol/L 135 133 136*   POTASSIUM mmol/L 4.2 4.2 3.8   CHLORIDE mmol/L 96* 92* 93*   CO2 mmol/L 25.0 26.0 30.0   BUN mg/dL 29* 33* 31*   CREATININE mg/dL 6.20* 6.10* 5.40*   CALCIUM mg/dL 9.2 8.9 8.9   PHOSPHORUS mg/dL 3.5 "  --   --    ALBUMIN g/dL 3.70 3.20 3.50       Results from last 7 days  Lab Units 07/09/17  0433   GLUCOSE mg/dL 94           Results from last 7 days  Lab Units 07/07/17  0443   ALK PHOS U/L 150*   BILIRUBIN mg/dL 0.2*   ALT (SGPT) U/L 24   AST (SGOT) U/L 26     Estimated Creatinine Clearance: 9.6 mL/min (by C-G formula based on Cr of 6.2).    Radiology:      Renal Imaging:    I personally read  the radiographic studies    Impression:   ESKD  HTN  LEFT PLEURAL EFFUSION/PNEUMONIA  ANEMIA  MEDICAL NON COMPLIANCE    PLAN: Thank you for asking us to see Mandy Espino, I recommend the following:  Dialysis Monday   Limit pain meds as much as can  epogen on dialysis days    Spencer Montenegro MD  7/9/2017  11:52 AM

## 2017-07-09 NOTE — PROGRESS NOTES
Cardiothoracic Surgery Progress Note      PPD # 2 s/p chest tube    Chief complaint: Left flank pain     LOS: 3 days      Subjective:  Sore on left flank, anxious  Threatening to leave AMA      Objective:  Vital Signs  Temp:  [97.8 °F (36.6 °C)-99.4 °F (37.4 °C)] 99.2 °F (37.3 °C)  Heart Rate:  [108-110] 108  Resp:  [16-36] 18  BP: (107-125)/(67-79) 125/73    Physical Exam:  General Appearance: alert, appears stated age and cooperative  Lungs: clear to auscultation, respirations regular, respirations even and respirations unlabored  Heart: regular rhythm & normal rate, normal S1, S2, no murmur, no yong, no rub and no click      Results:    Results from last 7 days  Lab Units 07/07/17  0443   WBC 10*3/mm3 8.58   HEMOGLOBIN g/dL 9.4*   HEMATOCRIT % 30.7*   PLATELETS 10*3/mm3 293       Results from last 7 days  Lab Units 07/09/17  0433   SODIUM mmol/L 135   POTASSIUM mmol/L 4.2   CHLORIDE mmol/L 96*   CO2 mmol/L 25.0   BUN mg/dL 29*   CREATININE mg/dL 6.20*   GLUCOSE mg/dL 94   CALCIUM mg/dL 9.2         Assessment:  Pericardial effusion, no evidence of tamponade  Status post left chest tube placement via CT-guided chest tube. She has had 1200 mL of fluid out, cytology and pathology are pending    Plan:  Echocardiogram for pericardial effusion  Dialysis as per renal  Wants to leave AMA, CT only 25cc last 24 hours  Will D/C chest tube  No need for pericardial drainage

## 2017-07-09 NOTE — DISCHARGE SUMMARY
[facility] HOSPITALIST   DISCHARGE SUMMARY      Name:  Mandy Espino   Age:  26 y.o.  Sex:  female  :  1990  MRN:  [id number]   Visit Number:  [id number]  Primary Care Physician:  Neo Gresham DO  Date of Discharge:  2017  Admission Date:  2017    Discharge Diagnosis:   1. Left sided pleural effusion, drained and improved post chest tube placement  2. Atypical chest pain, improved, after treatment of #1  3. Chronic pain with chronic opiate dependency  4. History of noncompliance and history of polysubstance drug use, denies current  5. ESRD on hemodialysis  6. Anxiety  7. Insomnia    History of Present Illness/Hospital Course:  The patient is a 26 yr old lady, well known to me from many former hospital admissions at [facility], with chronic pain, chronic opiate dependency, and history of iv drug abuse with many noncompliant visits and signed out AMA. The patient unfortunately has ESRD on dialysis and COPD on home oxygen prn, with history of recent Cdiff continued on current vancomycin.     Chest x-ray showed near complete consolidation of left lung, significantly worsened from 17 exam. Patient was transferred to [facility] for CT surgery consultation. Patient being admitted by the hospital medicine service for further evaluation and management. CT surgery has seen patient and recommended CT directed pigtail catheter. The patient presented with acute severe chest pain, found to have left sided large pleural effusion and CT surgery consulted. Patient admitted to [facility] hospitalist service. CT guided chest tube placed with over 1200 mL of fluid out, cytology and pathology are pending. Nephrology contacted for dialysis per normal schedule.     For two days, now progressively more often she is threatening to sign out AMA. Dr Jacques with CT surgery saw patient today and removed chest tube, with patient threatening to leave AMA. Patient's xray did show improvement as well  yesterday. Patient wants to be discharged home and was okay with CT surgery. Medication risks and benefits discussed. ARVIND reviewed. Dilaudid orally was prescribed by previous physician during this hospital stay which will be transitioned and weaned to percocet for discharge. She has improved pain. Percocet few doses prescribed, as this will be short term problem only. She agreed to continue dialysis as scheduled and will see PCP in the next 1-2 days for chronic pain.         Consults:     Consults     Date and Time Order Name Status Description    7/7/2017 0736 Inpatient Consult to Nephrology Completed     6/20/2017 0950 Inpatient Consult to Palliative Care MD Completed     6/12/2017 2152 Inpatient Consult to Nephrology Completed     6/12/2017 2152 Inpatient Consult to Infectious Diseases Completed     6/12/2017 2152 Inpatient Consult to Cardiothoracic Surgery Completed     6/11/2017 2153 Inpatient Consult to Nephrology Completed     6/11/2017 2153 Inpatient Consult to Pulmonology Completed           Procedures Performed: Fluid pathology pending             Vital Signs:    Temp:  [99.2 °F (37.3 °C)] 99.2 °F (37.3 °C)  Resp:  [18] 18  BP: (125)/(73) 125/73    Physical Exam:      General Appearance:    Alert, cooperative, in no acute distress. Chronically ill appearing, unkept    Head:    Normocephalic, without obvious abnormality, atraumatic   Eyes:            Lids and lashes normal, conjunctivae and sclerae normal, no   icterus, no pallor, corneas clear, PERRLA   Ears:    Ears appear intact with no abnormalities noted   Throat:   No oral lesions, no thrush, oral mucosa moist   Neck:   No adenopathy, supple, trachea midline, no thyromegaly, no     carotid bruit, no JVD   Back:     No kyphosis present, no scoliosis present, no skin lesions,       erythema or scars, no tenderness to percussion or                   palpation,   range of motion normal   Lungs:     Clear to auscultation but reduced bibasilar  sounds,respirations regular, even and unlabored    Heart:    Regular rhythm and normal rate, normal S1 and S2, no            murmur, no gallop, no rub, no click   Breast Exam:    Deferred   Abdomen:     Normal bowel sounds, no masses, no organomegaly, soft        non-tender, non-distended, no guarding, no rebound                 tenderness   Genitalia:    Deferred   Extremities:   Moves all extremities well, no edema, no cyanosis, no              redness   Pulses:   Pulses palpable and equal bilaterally   Skin:   No bleeding, bruising or rash, with diffuse facial and arm neurotic dermatoses   Lymph nodes:   No palpable adenopathy   Neurologic:   Cranial nerves 2 - 12 grossly intact, sensation intact, DTR        present and equal bilaterally         Pertinent Lab Results:     Lab Results (all)     Procedure Component Value Units Date/Time    aPTT [[id number]]  (Abnormal) Collected:  07/07/17 0443    Specimen:  Blood Updated:  07/07/17 0520     PTT 38.3 (H) seconds     Narrative:       PTT = The equivalent PTT values for the therapeutic range of heparin levels at 0.3 to 0.5 U/ml are 45 to 60 seconds.    Protime-INR [[id number]]  (Abnormal) Collected:  07/07/17 0443    Specimen:  Blood Updated:  07/07/17 0520     Protime 14.4 (H) Seconds      INR 1.31    Narrative:       Therapeutic Ranges for INR: 2.0-3.0 (PT 20-30)                              2.5-3.5 (PT 25-34)    CBC & Differential [[id number]] Collected:  07/07/17 0443    Specimen:  Blood Updated:  07/07/17 0620    Narrative:       The following orders were created for panel order CBC & Differential.  Procedure                               Abnormality         Status                     ---------                               -----------         ------                     CBC Auto Differential[[id number]]        Abnormal            Final result                 Please view results for these tests on the individual orders.    CBC Auto Differential [[id number]]   (Abnormal) Collected:  07/07/17 0443    Specimen:  Blood Updated:  07/07/17 0620     WBC 8.58 10*3/mm3      RBC 3.28 (L) 10*6/mm3      Hemoglobin 9.4 (L) g/dL      Hematocrit 30.7 (L) %      MCV 93.6 fL      MCH 28.7 pg      MCHC 30.6 (L) g/dL      RDW 15.7 (H) %      RDW-SD 53.8 fl      MPV 8.6 fL      Platelets 293 10*3/mm3      Neutrophil % 66.1 %      Lymphocyte % 16.7 (L) %      Monocyte % 15.0 (H) %      Eosinophil % 1.4 %      Basophil % 0.3 %      Immature Grans % 0.5 %      Neutrophils, Absolute 5.67 10*3/mm3      Lymphocytes, Absolute 1.43 10*3/mm3      Monocytes, Absolute 1.29 (H) 10*3/mm3      Eosinophils, Absolute 0.12 10*3/mm3      Basophils, Absolute 0.03 10*3/mm3      Immature Grans, Absolute 0.04 (H) 10*3/mm3     Comprehensive Metabolic Panel [[id number]]  (Abnormal) Collected:  07/07/17 0443    Specimen:  Blood Updated:  07/07/17 0622     Glucose 119 (H) mg/dL      BUN 33 (H) mg/dL      Creatinine 6.10 (H) mg/dL      Sodium 133 mmol/L      Potassium 4.2 mmol/L      Chloride 92 (L) mmol/L      CO2 26.0 mmol/L      Calcium 8.9 mg/dL      Total Protein 6.3 g/dL      Albumin 3.20 g/dL      ALT (SGPT) 24 U/L      AST (SGOT) 26 U/L      Alkaline Phosphatase 150 (H) U/L      Total Bilirubin 0.2 (L) mg/dL      eGFR Non African Amer 8 (L) mL/min/1.73      Globulin 3.1 gm/dL      A/G Ratio 1.0 (L) g/dL      BUN/Creatinine Ratio 5.4 (L)     Anion Gap 15.0 (H) mmol/L     Narrative:       National [employer] Guidelines    Stage     Description        GFR  1         Normal or High     90+  2         Mild decrease      60-89  3         Moderate decrease  30-59  4         Severe decrease    15-29  5         Kidney failure     <15    hCG, Quantitative, Pregnancy [[id number]] Collected:  07/07/17 0443    Specimen:  Blood Updated:  07/07/17 1359     HCG Quantitative <5.00 mIU/mL     Narrative:       HCG Expected Values:     Nonpregnant females:               less than 5 mIU/mL     Males:                              less than 5 mIU/mL    Pregnant females:   0-1 Weeks Gestation                5-50   1-2 Weeks Gestation                   2-3 Weeks Gestation                100-5000   3-4 Weeks Gestation                [phone]  4-5 Weeks Gestation                1000-37113   5-6 Weeks Gestation                84244-020261   6-8 Weeks Gestation                37884-801355   2-3 Months Gestation               [id number]      Note:  If a value is between 5-25 mIU/mL recommend            repeat testing and clinical correlation.    Renal Function Panel [[id number]]  (Abnormal) Collected:  07/09/17 0433    Specimen:  Blood Updated:  07/09/17 0724     Glucose 94 mg/dL      BUN 29 (H) mg/dL      Creatinine 6.20 (H) mg/dL      Sodium 135 mmol/L      Potassium 4.2 mmol/L      Chloride 96 (L) mmol/L      CO2 25.0 mmol/L      Calcium 9.2 mg/dL      Albumin 3.70 g/dL      Phosphorus 3.5 mg/dL      Anion Gap 14.0 (H) mmol/L      BUN/Creatinine Ratio 4.7 (L)     eGFR Non  Amer 8 (L) mL/min/1.73     Narrative:       National Kid[employer] Guidelines    Stage     Description        GFR  1         Normal or High     90+  2         Mild decrease      60-89  3         Moderate decrease  30-59  4         Severe decrease    15-29  5         Kidney failure     <15    CBC (No Diff) [[id number]]  (Abnormal) Collected:  07/09/17 0433    Specimen:  Blood Updated:  07/09/17 0739     WBC 12.91 (H) 10*3/mm3      RBC 3.62 (L) 10*6/mm3      Hemoglobin 10.2 (L) g/dL      Hematocrit 33.1 (L) %      MCV 91.4 fL      MCH 28.2 pg      MCHC 30.8 (L) g/dL      RDW 15.3 (H) %      RDW-SD 51.9 fl      MPV 8.6 fL      Platelets 418 10*3/mm3           Pertinent Radiology Results:    Imaging Results (all)     Procedure Component Value Units Date/Time    CT Guided Chest Tube [[id number]] Collected:  07/07/17 2024     Updated:  07/07/17 2024    Narrative:       EXAMINATION: CT GUIDED CHEST TUBE PLACEMENT - 07/07/2017     INDICATION: Left pleural  effusion.     TECHNIQUE: CT-guided placement of a left chest tube for large left  pleural effusion.     The radiation dose reduction device was turned on for each scan per the  ALARA (As Low as Reasonably Achievable) protocol.     COMPARISON: None.     FINDINGS: Patient referred for CT-guided placement of a left chest tube.  The procedure and risks were explained to the patient. Informed consent  was obtained and signed. Patient was placed in the supine position on  the table. The left lateral chest wall was prepped and draped in a  sterile fashion. 1% lidocaine used as a local anesthetic. Under CT  fluoroscopic guidance an 8 Turkish curved catheter was advanced to the  left chest wall and into the pleural fluid. Approximately 20 mL of the  straw-colored fluid was removed. The catheter was sutured in place. No  immediate complication occurred.     Upon further review of the images there is a large pleural effusion  identified on the left with collapse of the left lung. Minimal  atelectatic change is seen within the anterior aspect of the right upper  lobe. There is evidence of a large pericardial effusion. Examination  results were given to Dr. Hopper at time of the procedure.       Impression:       CT-guided placement of a left chest tube with no immediate  complication     DICTATED:     07/07/2017  EDITED:         07/07/2017          XR Chest 1 View [[id number]] Collected:  07/07/17 0854     Updated:  07/08/17 0806    Narrative:       EXAMINATION: XR CHEST 1 VW- 07/07/2017     INDICATION: postop      COMPARISON: 06/20/2017     FINDINGS: There is further opacification of the left hemithorax with  only a small area of the left apex remaining aerated. There is  persistent patchy disease in the medial right base, mildly increased.  Right lung elsewhere appears grossly clear. No pneumothorax is seen.           Impression:       Near-complete consolidation of the left lung, significantly  worsened from 06/20/2017 exam.  Mildly increased right basilar  atelectasis     D:  07/07/2017  E:  07/07/2017     This report was finalized on 7/8/2017 8:04 AM by DR. Bryce Monaco MD.       XR Chest 1 View [[id number]] Collected:  07/08/17 1838     Updated:  07/08/17 1921    Narrative:          EXAMINATION: XR CHEST, SINGLE VIEW - 07/08/2017     INDICATION: Chest pain, pleural effusion, post chest tube.      COMPARISON: None.     FINDINGS:   1. Left chest catheter has been placed in the left lower hemithorax.  There is a decrease in the amount of fluid in the left hemithorax since  studies of yesterday although there is still a substantial amount of  airspace consolidation at the left base with air bronchograms and  pleural effusion.     2. There is no pneumothorax.     3. There is cardiomegaly.     4. There is airspace opacity and fluid at the right base.           Impression:       Compared to yesterday, there is a substantial decrease in  the amount of opacity and fluid in the left hemithorax secondary to the  placement of a left lower chest catheter. There is still airspace  consolidation with air bronchograms and a sizable effusion in the left  mid and lower chest although improved. The left upper lobe has cleared.  There is no pneumothorax. There is cardiomegaly with airspace opacity  and pleural effusion at the contralateral right base which appears to be  slightly worse when compared to yesterday.     DICTATED:     07/08/2017  EDITED:         07/08/2017     This report was finalized on 7/8/2017 7:19 PM by Dr. Vu Mccormack MD.             Condition on Discharge:  Stable        Code status during the hospital stay: Full code       Discharge Disposition:    Home or Self Care    Discharge Medication:     Mandy Espino   Home Medication Instructions XOCHILT:[id number]    Printed on:07/09/17 1609   Medication Information                      ALPRAZolam (XANAX) 0.5 MG tablet  Take 0.5 mg by mouth At Night As Needed for Anxiety or Sleep.              amitriptyline (ELAVIL) 10 MG tablet  Take 10 mg by mouth Every Night.             amLODIPine (NORVASC) 10 MG tablet  Take 1 tablet by mouth Daily for 30 days.             famotidine (PEPCID) 20 MG tablet  Take 1 tablet by mouth Daily for 30 days.             hydrALAZINE (APRESOLINE) 50 MG tablet  Take 1 tablet by mouth Every 8 (Eight) Hours for 30 days.             metoprolol succinate XL (TOPROL-XL) 50 MG 24 hr tablet  Take 1 tablet by mouth Daily for 30 days.             minoxidil (LONITEN) 10 MG tablet  Take 10 mg by mouth Daily.             oxyCODONE-acetaminophen (PERCOCET) 5-325 MG per tablet  Take 1 tablet by mouth Every 8 (Eight) Hours As Needed for Severe Pain (7-10).             saccharomyces boulardii (FLORASTOR) 250 MG capsule  Take 1 capsule by mouth 2 (Two) Times a Day for 30 days.             vancomycin 50 MG/ML solution oral solution  Take 5 mL by mouth Every 6 (Six) Hours for 30 days. Indications: Clostridium Difficile Infection                 Discharge Diet:     Diet Instructions     Diet: Renal; Thin Liquids, No Restrictions       Discharge Diet:  Renal   Fluid Consistency:  Thin Liquids, No Restrictions                 Activity at Discharge:     Activity Instructions     Activity as Tolerated                     Follow-up Appointments:    Future Appointments  Date Time Provider Department Center   7/24/2017 1:30 PM Shawn Hicks MD [facility] None     Additional Instructions for the Follow-ups that You Need to Schedule     Discharge Follow-up with PCP    As directed    Follow Up Details:  1-2 DAYS       Discharge Follow-up with Specialty    As directed    Specialty:  DIALYSIS TOMORROW AS SCHEDULED                 Test Results Pending at Discharge: Pleural fluid cytology           Devora Singh DO  07/09/17  4:09 PM      Medication risks and benefits were discussed in great detail. The patient reported being satisfied with the current treatment plan and the care delivered while  hospitalized.     Time:  I spent 25 minutes preparing discharge counseling and teaching.

## 2017-07-10 NOTE — PAYOR COMM NOTE
"Jermaine Espino (26 y.o. Female)     Date of Birth Social Security Number Address Home Phone MRN    1990  [address] [place] [address]  [id number]    Restorationist Marital Status          Yazdanism        Admission Date Admission Type Admitting Provider Attending Provider Department, Room/Bed    17 Urgent Devora Singh DO  [facility] 4G, S457/1    Discharge Date Discharge Disposition Discharge Destination        2017 Home or Self Care             Attending Provider: (none)    Allergies:  Acetaminophen, Hydrocodone, Ibuprofen, Lortab [Hydrocodone-acetaminophen], Ultram [Tramadol Hcl], Ciprofloxacin    Isolation:  Spore   Infection:  C.difficile (17), MRSA (06/15/17), Hepatitis C (17)   Code Status:  Prior    Ht:  62\" (157.5 cm)   Wt:  97 lb 8 oz (44.2 kg)    Admission Cmt:  None   Principal Problem:  Pleural effusion, left [J90]                 Active Insurance as of 2017     Primary Coverage     Payor Plan Insurance Group Employer/Plan Group    [employer] [place] [employer] [place]ICA[place]      Payor Plan Address Payor Plan Phone Number Effective From Effective To    [address] [phone] [address]     [place] [address]       Subscriber Name Subscriber Birth Date Member ID       JERMAINE ESPINO 1990 [id number]                 Emergency Contacts      (Rel.) Home Phone Work Phone Mobile Phone    Jin Espino (Spouse) [phone] -- --               Discharge Summary      Devora Singh DO at 2017  8:51 AM              [facility] HOSPITALIST   DISCHARGE SUMMARY      Name:  Jermaine Espino   Age:  26 y.o.  Sex:  female  :  1990  MRN:  [id number]   Visit Number:  [id number]  Primary Care Physician:  Neo Gresham DO  Date of Discharge:  2017  Admission Date:  2017    Discharge Diagnosis:   1. Left sided pleural effusion, drained and improved post chest tube placement  2. Atypical chest pain, " improved, after treatment of #1  3. Chronic pain with chronic opiate dependency  4. History of noncompliance and history of polysubstance drug use, denies current  5. ESRD on hemodialysis  6. Anxiety  7. Insomnia    History of Present Illness/Hospital Course:  The patient is a 26 yr old lady, well known to me from many former hospital admissions at [facility], with chronic pain, chronic opiate dependency, and history of iv drug abuse with many noncompliant visits and signed out AMA. The patient unfortunately has ESRD on dialysis and COPD on home oxygen prn, with history of recent Cdiff continued on current vancomycin.     Chest x-ray showed near complete consolidation of left lung, significantly worsened from 6/20/17 exam. Patient was transferred to [facility] for CT surgery consultation. Patient being admitted by the hospital medicine service for further evaluation and management. CT surgery has seen patient and recommended CT directed pigtail catheter. The patient presented with acute severe chest pain, found to have left sided large pleural effusion and CT surgery consulted. Patient admitted to [facility] hospitalist service. CT guided chest tube placed with over 1200 mL of fluid out, cytology and pathology are pending. Nephrology contacted for dialysis per normal schedule.     For two days, now progressively more often she is threatening to sign out AMA. Dr Jacques with CT surgery saw patient today and removed chest tube, with patient threatening to leave AMA. Patient's xray did show improvement as well yesterday. Patient wants to be discharged home and was okay with CT surgery. Medication risks and benefits discussed. ARVIND reviewed. Dilaudid orally was prescribed by previous physician during this hospital stay which will be transitioned and weaned to percocet for discharge. She has improved pain. Percocet few doses prescribed, as this will be short term problem only. She agreed to continue dialysis as scheduled and  will see PCP in the next 1-2 days for chronic pain.         Consults:     Consults     Date and Time Order Name Status Description    7/7/2017 0736 Inpatient Consult to Nephrology Completed     6/20/2017 0950 Inpatient Consult to Palliative Care MD Completed     6/12/2017 2152 Inpatient Consult to Nephrology Completed     6/12/2017 2152 Inpatient Consult to Infectious Diseases Completed     6/12/2017 2152 Inpatient Consult to Cardiothoracic Surgery Completed     6/11/2017 2153 Inpatient Consult to Nephrology Completed     6/11/2017 2153 Inpatient Consult to Pulmonology Completed           Procedures Performed: Fluid pathology pending             Vital Signs:    Temp:  [99.2 °F (37.3 °C)] 99.2 °F (37.3 °C)  Resp:  [18] 18  BP: (125)/(73) 125/73    Physical Exam:      General Appearance:    Alert, cooperative, in no acute distress. Chronically ill appearing, unkept    Head:    Normocephalic, without obvious abnormality, atraumatic   Eyes:            Lids and lashes normal, conjunctivae and sclerae normal, no   icterus, no pallor, corneas clear, PERRLA   Ears:    Ears appear intact with no abnormalities noted   Throat:   No oral lesions, no thrush, oral mucosa moist   Neck:   No adenopathy, supple, trachea midline, no thyromegaly, no     carotid bruit, no JVD   Back:     No kyphosis present, no scoliosis present, no skin lesions,       erythema or scars, no tenderness to percussion or                   palpation,   range of motion normal   Lungs:     Clear to auscultation but reduced bibasilar sounds,respirations regular, even and unlabored    Heart:    Regular rhythm and normal rate, normal S1 and S2, no            murmur, no gallop, no rub, no click   Breast Exam:    Deferred   Abdomen:     Normal bowel sounds, no masses, no organomegaly, soft        non-tender, non-distended, no guarding, no rebound                 tenderness   Genitalia:    Deferred   Extremities:   Moves all extremities well, no edema, no  cyanosis, no              redness   Pulses:   Pulses palpable and equal bilaterally   Skin:   No bleeding, bruising or rash, with diffuse facial and arm neurotic dermatoses   Lymph nodes:   No palpable adenopathy   Neurologic:   Cranial nerves 2 - 12 grossly intact, sensation intact, DTR        present and equal bilaterally         Pertinent Lab Results:     Lab Results (all)     Procedure Component Value Units Date/Time    aPTT [[id number]]  (Abnormal) Collected:  07/07/17 0443    Specimen:  Blood Updated:  07/07/17 0520     PTT 38.3 (H) seconds     Narrative:       PTT = The equivalent PTT values for the therapeutic range of heparin levels at 0.3 to 0.5 U/ml are 45 to 60 seconds.    Protime-INR [[id number]]  (Abnormal) Collected:  07/07/17 0443    Specimen:  Blood Updated:  07/07/17 0520     Protime 14.4 (H) Seconds      INR 1.31    Narrative:       Therapeutic Ranges for INR: 2.0-3.0 (PT 20-30)                              2.5-3.5 (PT 25-34)    CBC & Differential [[id number]] Collected:  07/07/17 0443    Specimen:  Blood Updated:  07/07/17 0620    Narrative:       The following orders were created for panel order CBC & Differential.  Procedure                               Abnormality         Status                     ---------                               -----------         ------                     CBC Auto Differential[[id number]]        Abnormal            Final result                 Please view results for these tests on the individual orders.    CBC Auto Differential [[id number]]  (Abnormal) Collected:  07/07/17 0443    Specimen:  Blood Updated:  07/07/17 0620     WBC 8.58 10*3/mm3      RBC 3.28 (L) 10*6/mm3      Hemoglobin 9.4 (L) g/dL      Hematocrit 30.7 (L) %      MCV 93.6 fL      MCH 28.7 pg      MCHC 30.6 (L) g/dL      RDW 15.7 (H) %      RDW-SD 53.8 fl      MPV 8.6 fL      Platelets 293 10*3/mm3      Neutrophil % 66.1 %      Lymphocyte % 16.7 (L) %      Monocyte % 15.0 (H) %      Eosinophil % 1.4 %       Basophil % 0.3 %      Immature Grans % 0.5 %      Neutrophils, Absolute 5.67 10*3/mm3      Lymphocytes, Absolute 1.43 10*3/mm3      Monocytes, Absolute 1.29 (H) 10*3/mm3      Eosinophils, Absolute 0.12 10*3/mm3      Basophils, Absolute 0.03 10*3/mm3      Immature Grans, Absolute 0.04 (H) 10*3/mm3     Comprehensive Metabolic Panel [[id number]]  (Abnormal) Collected:  07/07/17 0443    Specimen:  Blood Updated:  07/07/17 0622     Glucose 119 (H) mg/dL      BUN 33 (H) mg/dL      Creatinine 6.10 (H) mg/dL      Sodium 133 mmol/L      Potassium 4.2 mmol/L      Chloride 92 (L) mmol/L      CO2 26.0 mmol/L      Calcium 8.9 mg/dL      Total Protein 6.3 g/dL      Albumin 3.20 g/dL      ALT (SGPT) 24 U/L      AST (SGOT) 26 U/L      Alkaline Phosphatase 150 (H) U/L      Total Bilirubin 0.2 (L) mg/dL      eGFR Non African Amer 8 (L) mL/min/1.73      Globulin 3.1 gm/dL      A/G Ratio 1.0 (L) g/dL      BUN/Creatinine Ratio 5.4 (L)     Anion Gap 15.0 (H) mmol/L     Narrative:       National Kidney Foundation Guidelines    Stage     Description        GFR  1         Normal or High     90+  2         Mild decrease      60-89  3         Moderate decrease  30-59  4         Severe decrease    15-29  5         Kidney failure     <15    hCG, Quantitative, Pregnancy [[id number]] Collected:  07/07/17 0443    Specimen:  Blood Updated:  07/07/17 1359     HCG Quantitative <5.00 mIU/mL     Narrative:       HCG Expected Values:     Nonpregnant females:               less than 5 mIU/mL     Males:                             less than 5 mIU/mL    Pregnant females:   0-1 Weeks Gestation                5-50   1-2 Weeks Gestation                   2-3 Weeks Gestation                100-5000   3-4 Weeks Gestation                500-22098  4-5 Weeks Gestation                1000-98182   5-6 Weeks Gestation                46491-188231   6-8 Weeks Gestation                80635-078416   2-3 Months Gestation               13903-390498      Note:  If  a value is between 5-25 mIU/mL recommend            repeat testing and clinical correlation.    Renal Function Panel [[id number]]  (Abnormal) Collected:  07/09/17 0433    Specimen:  Blood Updated:  07/09/17 0724     Glucose 94 mg/dL      BUN 29 (H) mg/dL      Creatinine 6.20 (H) mg/dL      Sodium 135 mmol/L      Potassium 4.2 mmol/L      Chloride 96 (L) mmol/L      CO2 25.0 mmol/L      Calcium 9.2 mg/dL      Albumin 3.70 g/dL      Phosphorus 3.5 mg/dL      Anion Gap 14.0 (H) mmol/L      BUN/Creatinine Ratio 4.7 (L)     eGFR Non  Amer 8 (L) mL/min/1.73     Narrative:       National Kidney Foundation Guidelines    Stage     Description        GFR  1         Normal or High     90+  2         Mild decrease      60-89  3         Moderate decrease  30-59  4         Severe decrease    15-29  5         Kidney failure     <15    CBC (No Diff) [[id number]]  (Abnormal) Collected:  07/09/17 0433    Specimen:  Blood Updated:  07/09/17 0739     WBC 12.91 (H) 10*3/mm3      RBC 3.62 (L) 10*6/mm3      Hemoglobin 10.2 (L) g/dL      Hematocrit 33.1 (L) %      MCV 91.4 fL      MCH 28.2 pg      MCHC 30.8 (L) g/dL      RDW 15.3 (H) %      RDW-SD 51.9 fl      MPV 8.6 fL      Platelets 418 10*3/mm3           Pertinent Radiology Results:    Imaging Results (all)     Procedure Component Value Units Date/Time    CT Guided Chest Tube [[id number]] Collected:  07/07/17 2024     Updated:  07/07/17 2024    Narrative:       EXAMINATION: CT GUIDED CHEST TUBE PLACEMENT - 07/07/2017     INDICATION: Left pleural effusion.     TECHNIQUE: CT-guided placement of a left chest tube for large left  pleural effusion.     The radiation dose reduction device was turned on for each scan per the  ALARA (As Low as Reasonably Achievable) protocol.     COMPARISON: None.     FINDINGS: Patient referred for CT-guided placement of a left chest tube.  The procedure and risks were explained to the patient. Informed consent  was obtained and signed. Patient was placed  in the supine position on  the table. The left lateral chest wall was prepped and draped in a  sterile fashion. 1% lidocaine used as a local anesthetic. Under CT  fluoroscopic guidance an 8 Bahamian curved catheter was advanced to the  left chest wall and into the pleural fluid. Approximately 20 mL of the  straw-colored fluid was removed. The catheter was sutured in place. No  immediate complication occurred.     Upon further review of the images there is a large pleural effusion  identified on the left with collapse of the left lung. Minimal  atelectatic change is seen within the anterior aspect of the right upper  lobe. There is evidence of a large pericardial effusion. Examination  results were given to Dr. Hopper at time of the procedure.       Impression:       CT-guided placement of a left chest tube with no immediate  complication     DICTATED:     07/07/2017  EDITED:         07/07/2017          XR Chest 1 View [[id number]] Collected:  07/07/17 0854     Updated:  07/08/17 0806    Narrative:       EXAMINATION: XR CHEST 1 VW- 07/07/2017     INDICATION: postop      COMPARISON: 06/20/2017     FINDINGS: There is further opacification of the left hemithorax with  only a small area of the left apex remaining aerated. There is  persistent patchy disease in the medial right base, mildly increased.  Right lung elsewhere appears grossly clear. No pneumothorax is seen.           Impression:       Near-complete consolidation of the left lung, significantly  worsened from 06/20/2017 exam. Mildly increased right basilar  atelectasis     D:  07/07/2017  E:  07/07/2017     This report was finalized on 7/8/2017 8:04 AM by DR. Bryce Monaco MD.       XR Chest 1 View [[id number]] Collected:  07/08/17 1838     Updated:  07/08/17 1921    Narrative:          EXAMINATION: XR CHEST, SINGLE VIEW - 07/08/2017     INDICATION: Chest pain, pleural effusion, post chest tube.      COMPARISON: None.     FINDINGS:   1. Left chest catheter has been  placed in the left lower hemithorax.  There is a decrease in the amount of fluid in the left hemithorax since  studies of yesterday although there is still a substantial amount of  airspace consolidation at the left base with air bronchograms and  pleural effusion.     2. There is no pneumothorax.     3. There is cardiomegaly.     4. There is airspace opacity and fluid at the right base.           Impression:       Compared to yesterday, there is a substantial decrease in  the amount of opacity and fluid in the left hemithorax secondary to the  placement of a left lower chest catheter. There is still airspace  consolidation with air bronchograms and a sizable effusion in the left  mid and lower chest although improved. The left upper lobe has cleared.  There is no pneumothorax. There is cardiomegaly with airspace opacity  and pleural effusion at the contralateral right base which appears to be  slightly worse when compared to yesterday.     DICTATED:     07/08/2017  EDITED:         07/08/2017     This report was finalized on 7/8/2017 7:19 PM by Dr. Vu Mccormack MD.             Condition on Discharge:  Stable        Code status during the hospital stay: Full code       Discharge Disposition:    Home or Self Care    Discharge Medication:     Mandy Espino   Home Medication Instructions XOCHILT:[id number]    Printed on:07/09/17 0297   Medication Information                      ALPRAZolam (XANAX) 0.5 MG tablet  Take 0.5 mg by mouth At Night As Needed for Anxiety or Sleep.             amitriptyline (ELAVIL) 10 MG tablet  Take 10 mg by mouth Every Night.             amLODIPine (NORVASC) 10 MG tablet  Take 1 tablet by mouth Daily for 30 days.             famotidine (PEPCID) 20 MG tablet  Take 1 tablet by mouth Daily for 30 days.             hydrALAZINE (APRESOLINE) 50 MG tablet  Take 1 tablet by mouth Every 8 (Eight) Hours for 30 days.             metoprolol succinate XL (TOPROL-XL) 50 MG 24 hr tablet  Take 1  tablet by mouth Daily for 30 days.             minoxidil (LONITEN) 10 MG tablet  Take 10 mg by mouth Daily.             oxyCODONE-acetaminophen (PERCOCET) 5-325 MG per tablet  Take 1 tablet by mouth Every 8 (Eight) Hours As Needed for Severe Pain (7-10).             saccharomyces boulardii (FLORASTOR) 250 MG capsule  Take 1 capsule by mouth 2 (Two) Times a Day for 30 days.             vancomycin 50 MG/ML solution oral solution  Take 5 mL by mouth Every 6 (Six) Hours for 30 days. Indications: Clostridium Difficile Infection                 Discharge Diet:     Diet Instructions     Diet: Renal; Thin Liquids, No Restrictions       Discharge Diet:  Renal   Fluid Consistency:  Thin Liquids, No Restrictions                 Activity at Discharge:     Activity Instructions     Activity as Tolerated                     Follow-up Appointments:    Future Appointments  Date Time Provider Department Center   7/24/2017 1:30 PM Shawn Hicks MD [facility] None     Additional Instructions for the Follow-ups that You Need to Schedule     Discharge Follow-up with PCP    As directed    Follow Up Details:  1-2 DAYS       Discharge Follow-up with Specialty    As directed    Specialty:  DIALYSIS TOMORROW AS SCHEDULED                 Test Results Pending at Discharge: Pleural fluid cytology           Devora Singh DO  07/09/17  4:09 PM      Medication risks and benefits were discussed in great detail. The patient reported being satisfied with the current treatment plan and the care delivered while hospitalized.     Time:  I spent 25 minutes preparing discharge counseling and teaching.             Electronically signed by Devora Singh DO at 7/9/2017  4:10 PM

## 2017-07-11 LAB
BACTERIA SPEC AEROBE CULT: NORMAL
FUNGUS SPEC CULT: NORMAL
FUNGUS SPEC CULT: NORMAL
FUNGUS SPEC FUNGUS STN: NORMAL

## 2017-07-26 LAB
ACID FAST STN SPEC: NEGATIVE
BACTERIA SPEC AEROBE CULT: NEGATIVE
FUNGUS WND CULT: NORMAL
FUNGUS WND CULT: NORMAL
MYCOBACTERIUM SPEC CULT: NORMAL
MYCOBACTERIUM SPEC CULT: NORMAL
NIGHT BLUE STAIN TISS: NORMAL
NIGHT BLUE STAIN TISS: NORMAL
SPECIMEN PREPARATION: NORMAL

## 2017-08-08 DIAGNOSIS — Z51.81 THERAPEUTIC DRUG MONITORING: Primary | ICD-10-CM

## 2017-08-10 ENCOUNTER — OFFICE VISIT (OUTPATIENT)
Dept: PALLIATIVE CARE | Facility: CLINIC | Age: 27
End: 2017-08-10

## 2017-08-10 VITALS
SYSTOLIC BLOOD PRESSURE: 180 MMHG | BODY MASS INDEX: 18.03 KG/M2 | WEIGHT: 98 LBS | HEIGHT: 62 IN | HEART RATE: 100 BPM | DIASTOLIC BLOOD PRESSURE: 110 MMHG

## 2017-08-10 DIAGNOSIS — G57.93 NEUROPATHIC PAIN OF BOTH LEGS: Chronic | ICD-10-CM

## 2017-08-10 DIAGNOSIS — Z99.2 ESRD (END STAGE RENAL DISEASE) ON DIALYSIS (HCC): Primary | ICD-10-CM

## 2017-08-10 DIAGNOSIS — N18.6 ESRD (END STAGE RENAL DISEASE) ON DIALYSIS (HCC): Primary | ICD-10-CM

## 2017-08-10 DIAGNOSIS — G89.12 POST-THORACOTOMY PAIN: ICD-10-CM

## 2017-08-10 DIAGNOSIS — F41.0 SEVERE ANXIETY WITH PANIC: ICD-10-CM

## 2017-08-10 DIAGNOSIS — F19.10: ICD-10-CM

## 2017-08-10 PROCEDURE — 99203 OFFICE O/P NEW LOW 30 MIN: CPT | Performed by: INTERNAL MEDICINE

## 2017-08-10 RX ORDER — OMEPRAZOLE 20 MG/1
CAPSULE, DELAYED RELEASE ORAL
Refills: 2 | COMMUNITY
Start: 2017-07-21 | End: 2017-09-20 | Stop reason: HOSPADM

## 2017-08-10 RX ORDER — HYDROCODONE BITARTRATE AND ACETAMINOPHEN 7.5; 325 MG/1; MG/1
TABLET ORAL
Refills: 0 | COMMUNITY
Start: 2017-07-21 | End: 2017-08-12

## 2017-08-10 RX ORDER — GABAPENTIN 800 MG/1
TABLET ORAL
Refills: 0 | COMMUNITY
Start: 2017-07-24 | End: 2017-09-20 | Stop reason: HOSPADM

## 2017-08-10 RX ORDER — METRONIDAZOLE 500 MG/1
TABLET ORAL
Refills: 0 | COMMUNITY
Start: 2017-06-23 | End: 2017-08-10

## 2017-08-10 RX ORDER — NIFEDIPINE 30 MG/1
TABLET, FILM COATED, EXTENDED RELEASE ORAL
Refills: 2 | COMMUNITY
Start: 2017-06-10 | End: 2017-09-20 | Stop reason: HOSPADM

## 2017-08-10 RX ORDER — ALBUTEROL SULFATE 2.5 MG/3ML
SOLUTION RESPIRATORY (INHALATION)
Refills: 2 | COMMUNITY
Start: 2017-07-26 | End: 2017-09-20 | Stop reason: HOSPADM

## 2017-08-10 RX ORDER — MIRTAZAPINE 15 MG/1
TABLET, FILM COATED ORAL
Refills: 2 | COMMUNITY
Start: 2017-06-30 | End: 2017-09-20 | Stop reason: HOSPADM

## 2017-08-12 PROBLEM — G89.12 POST-THORACOTOMY PAIN: Status: ACTIVE | Noted: 2017-08-12

## 2017-08-12 PROBLEM — G57.93 NEUROPATHIC PAIN OF BOTH LEGS: Chronic | Status: ACTIVE | Noted: 2017-08-12

## 2017-08-12 PROBLEM — F19.10 EPISODIC DRUG ABUSE (HCC): Status: ACTIVE | Noted: 2017-08-12

## 2017-08-12 NOTE — PROGRESS NOTES
"Subjective   Mandy Espino is a 26 y.o. female.     History of Present Illness   26yowf with PMHx drug abuse, HCV, ESRD on HD MWF.  Referred by PCP REY Torres for pain management.  Noted she was seen by inpatient palliative team during hospitalization 6/12-6/23 for loculated pleural effusion s/p thoracotomy and decortication, medical non-compliance, COPD exacerbation.    During that hospitalization, was treated for acute pain related to thoracotomy and infection with opioid therapy which was tapered off and she was not discharged home with opioid.  Hospitalized 7/6-7/9 for large left pleural effusion treated with chest tube drainage.  Acute pain treated with Dilaudid inpatient and discharged with short fill Percocet.    Pt with main c/o pain.  Requesting \"manage my pain\".  Says last opioid taken 2 days ago.    Opioid history:  Volunteers, after questioning documented history of IVDA, that she abused oral Oxycontin as a teenager, after being introduced to it by her stepfather.  Denies IVDA.  Admits to addiction and being caught for theft.  Participated in forced rehab program at the [facility].  Denies relapse.  Denies ongoing addiction support.  I do note EHR documentation of a staff member witnessing IVDA during a hospitalization in April.      Pain:  Today, describes continuous aching burning numbness pain, worse at nighttime, of both legs.  Walking makes pain worse.  Lying down sometimes helps.  Limits mobility.  States APAP or any formulation with APAP causes her to break out in blisters.    Anxiety:  Noted high level of anxiety and fidgeting during visit.  States has been on Xanax for years.  PCP recently decreasing doses, and she has higher levels of general anxiety and panic.  Awakens in middle of night in sweats and short of air, feeling like panic attack.  Sees counselor twice weekly at primary care.  Noted Clonazepam tried, not effective per patient.    EHR notes from 2017 reviewed    The " following portions of the patient's history were reviewed and updated as appropriate: allergies, current medications, past family history, past medical history, past social history, past surgical history and problem list.    Review of Systems  Otherwise negative except as below and as already detailed in HPI.    ARVIND/Medication Counts:  Reviewed.  See flowsheets and scanned forms.  Noted opioid fills (3-15 days at a time) from 9/2016-11/2016.  Then short fills (less than 4 days) Feb, March, May 2017.  !4 day fills in June and July.  These prescriptions correlate to hospitalizations for pneumonia and pleural effusions with pleuritic chest pain c/o and post thoracotomy c/o.  According to last Lortab fill, she is out of medication at least 7 days early.      Opioid Risk Tool high risk    UDS: patient is anuric, on HD    PPS/ESAS:  I reviewed.  See flowsheets.  Pain, Tired, Anxiety all 10.  SOA 7.    CHRISTINE-7:  I reviewed. See scanned form.  Severe 20/21  PHQ-9:  I reviewed.  See flowsheet.  Severe 22/27.  Denies SI    KPS: 70 - Cares for self; unable to carry on normal activity or do normal work     ECOG: (2) Ambulatory and capable of self care, unable to carry out work activity, up and about > 50% or waking hours      Objective   Physical Exam   Constitutional: She is oriented to person, place, and time. She appears ill.   Eyes: Conjunctivae and EOM are normal. No scleral icterus.   Neck: Normal range of motion. Neck supple.   Cardiovascular: Regular rhythm.  Tachycardia present.    Pulmonary/Chest: Effort normal and breath sounds normal. No respiratory distress. She has no wheezes. She has no rales. She exhibits no tenderness.   Abdominal: Soft. Bowel sounds are normal.   Musculoskeletal: Normal range of motion.   Neurological: She is alert and oriented to person, place, and time. She exhibits normal muscle tone. Coordination normal.   Skin: Skin is warm, dry and intact. Rash noted. Rash is pustular. No cyanosis.    Multiple large acne lesions over face.  Numerous venous punctures of arms without streaking, erythema, purulence   Psychiatric: Thought content normal. Her mood appears anxious. Her speech is rapid and/or pressured. She is agitated and hyperactive. She is not actively hallucinating. She expresses impulsivity and inappropriate judgment. She is attentive.   Vitals reviewed.        Sodium   Date Value Ref Range Status   07/09/2017 135 132 - 146 mmol/L Final     Potassium   Date Value Ref Range Status   07/09/2017 4.2 3.5 - 5.5 mmol/L Final     Chloride   Date Value Ref Range Status   07/09/2017 96 (L) 99 - 109 mmol/L Final     CO2   Date Value Ref Range Status   07/09/2017 25.0 20.0 - 31.0 mmol/L Final     BUN   Date Value Ref Range Status   07/09/2017 29 (H) 9 - 23 mg/dL Final     Creatinine   Date Value Ref Range Status   07/09/2017 6.20 (H) 0.60 - 1.30 mg/dL Final     Glucose   Date Value Ref Range Status   07/09/2017 94 70 - 100 mg/dL Final     Calcium   Date Value Ref Range Status   07/09/2017 9.2 8.7 - 10.4 mg/dL Final       Lab Results   Component Value Date    TRICYCLICSCN Negative 06/28/2016    LABBENZSCN PRESUMPTIVE POSITIVE 06/28/2016    AMPHETSCREEN PRESUMPTIVE POSITIVE 06/28/2016    THCURSCR PRESUMPTIVE POSITIVE 06/28/2016       Assessment/Plan   Mandy was seen today for pain, fatigue, anxiety, poor appetite and shortness of breath.    Diagnoses and all orders for this visit:    ESRD (end stage renal disease) on dialysis    Severe anxiety with panic    Episodic drug abuse    Neuropathic pain of both legs, chronic    Post-thoracotomy pain, resolved    Discussion:  Today, patient describes chronic neuropathic pain of legs, for which she is on appropriate medical therapy with Gabapentin and Elavil.  Perhaps she has restless legs as worse at night, for which Gabapentin helps.  May also benefit from Requip trial if truly impairing sleep.      I discussed with her periodic c/o joint aches and low back pain,  for which she feels she needs opioid.  She has ESRD but is independent of all ADLs and I observed that she mobilizes easily.  Discussed she is chronically ill and has had acute illnesses recently so she should not expect her body to tolerate levels of activity other health 20 year olds would.  She does not live a healthy lifestyle and her MSK system is likely akin to an elderly person, but a very functional and independent elderly person.  Her personal pain goal is not realistic.    The past opioid prescriptions were appropriate for ACUTE illness and related ACUTE pain.  If she has acute issues in future, then she may receive short opioid therapy for that.  She does not have a chronic pain that warrants chronic opioid therapy.  She does not volunteer symptoms related to prior thoracotomy, though she likely would if this were suggested.      Discussed palliative care.  She does have serious illness, ESRD.  Her goals are not clearly defined, but she accepts medicalization.  Her  accompanies her today, but spent entire visit looking at his phone with earphones on.  She is not aware of her longterm treatment plan other than dialysis (no transplant discussed that she recalls), which could go on for decades or her life could end abruptly with acute illness.      She was focused on Xanax and opioid today.  She does exhibit high anxiety and fidgeting today.  Without drug screen, I cannot r/o methamphetamine use, which her skin may be indicator of.  It is plausible that she would do better with higher dose Xanax.  Last year, she was on Xanax 1mg TID.  Unclear if misuse is reason for taper or just practice policy.    Her history is consistent with drug abuse disorder.  Discussed Addiction Medicine, but she states she does not have a drug problem.  She sees a behavioral health specialist, but states it is more counseling, no medications.    I RECOMMEND REFERRAL TO PSYCHIATRY FOR PANIC/ANXIETY AND PAIN WITH OPIOID USE  DISORDER.    I offered palliative f/u for discussions related to ESRD.  She declines.    No follow up planned.           Lula Phipps APRN, is Behavioral Health APRN here in [facility], who MAY be able to assist with medication management for panic disorder.    Recommend Addiction Medicine Psychiatry referral, probably  or [facility], if they accept [facility].  I am not familiar with resources in [place].

## 2017-08-15 ENCOUNTER — TELEPHONE (OUTPATIENT)
Dept: PALLIATIVE CARE | Facility: CLINIC | Age: 27
End: 2017-08-15

## 2017-08-15 NOTE — TELEPHONE ENCOUNTER
----- Message from Herberth Dc sent at 8/15/2017 11:17 AM EDT -----  Regarding: REFERRAL CALLED  Contact: [phone]  Pt called asking about zanax! Said Dr Israel was to call her other  To ask about getting her zanax refills......She is staying motel in [place] phone number listed about room number is [phone]

## 2017-08-15 NOTE — TELEPHONE ENCOUNTER
I spoke with Mandy and advised her that Dr. Israel had been in contact with Gladys LEDESMA who is pts PCP. Advised that PCP would manage Zanax rx. Pt stated she had apt w/ PCP this afternoon.

## 2017-08-16 RX ORDER — ALBUTEROL SULFATE 90 UG/1
AEROSOL, METERED RESPIRATORY (INHALATION)
Qty: 18 G | Refills: 1 | OUTPATIENT
Start: 2017-08-16

## 2017-08-26 ENCOUNTER — HOSPITAL ENCOUNTER (EMERGENCY)
Facility: HOSPITAL | Age: 27
Discharge: HOME | End: 2017-08-26
Attending: STUDENT IN AN ORGANIZED HEALTH CARE EDUCATION/TRAINING PROGRAM | Admitting: STUDENT IN AN ORGANIZED HEALTH CARE EDUCATION/TRAINING PROGRAM

## 2017-08-26 VITALS
RESPIRATION RATE: 20 BRPM | WEIGHT: 96 LBS | DIASTOLIC BLOOD PRESSURE: 95 MMHG | BODY MASS INDEX: 16.39 KG/M2 | HEIGHT: 64 IN | HEART RATE: 117 BPM | OXYGEN SATURATION: 92 % | TEMPERATURE: 98.1 F | SYSTOLIC BLOOD PRESSURE: 160 MMHG

## 2017-08-26 DIAGNOSIS — F41.0 SEVERE ANXIETY WITH PANIC: Primary | ICD-10-CM

## 2017-08-26 PROCEDURE — 99283 EMERGENCY DEPT VISIT LOW MDM: CPT

## 2017-08-26 RX ORDER — ALPRAZOLAM 0.5 MG/1
1 TABLET ORAL ONCE
Status: COMPLETED | OUTPATIENT
Start: 2017-08-26 | End: 2017-08-26

## 2017-08-26 RX ORDER — ALPRAZOLAM 0.5 MG/1
1 TABLET ORAL NIGHTLY PRN
Qty: 2 TABLET | Refills: 0 | Status: SHIPPED | OUTPATIENT
Start: 2017-08-26 | End: 2017-08-26

## 2017-08-26 RX ORDER — ALPRAZOLAM 1 MG/1
1 TABLET ORAL NIGHTLY PRN
Qty: 2 TABLET | Refills: 0 | Status: SHIPPED | OUTPATIENT
Start: 2017-08-26 | End: 2017-09-20 | Stop reason: HOSPADM

## 2017-08-26 RX ADMIN — ALPRAZOLAM 1 MG: 0.5 TABLET ORAL at 21:59

## 2017-09-16 ENCOUNTER — APPOINTMENT (OUTPATIENT)
Dept: CT IMAGING | Facility: HOSPITAL | Age: 27
End: 2017-09-16

## 2017-09-16 ENCOUNTER — HOSPITAL ENCOUNTER (INPATIENT)
Facility: HOSPITAL | Age: 27
LOS: 3 days | Discharge: ANOTHER HOSPITAL | End: 2017-09-20
Attending: EMERGENCY MEDICINE | Admitting: INTERNAL MEDICINE

## 2017-09-16 ENCOUNTER — APPOINTMENT (OUTPATIENT)
Dept: GENERAL RADIOLOGY | Facility: HOSPITAL | Age: 27
End: 2017-09-16

## 2017-09-16 ENCOUNTER — APPOINTMENT (OUTPATIENT)
Dept: ULTRASOUND IMAGING | Facility: HOSPITAL | Age: 27
End: 2017-09-16

## 2017-09-16 DIAGNOSIS — K85.90 ACUTE PANCREATITIS, UNSPECIFIED COMPLICATION STATUS, UNSPECIFIED PANCREATITIS TYPE: ICD-10-CM

## 2017-09-16 DIAGNOSIS — Z91.199 NONCOMPLIANCE: ICD-10-CM

## 2017-09-16 DIAGNOSIS — I16.9 HYPERTENSIVE CRISIS, UNSPECIFIED: Primary | ICD-10-CM

## 2017-09-16 DIAGNOSIS — R74.8 ELEVATED LIVER ENZYMES: ICD-10-CM

## 2017-09-16 DIAGNOSIS — E87.70 HYPERVOLEMIA, UNSPECIFIED HYPERVOLEMIA TYPE: ICD-10-CM

## 2017-09-16 LAB
ALBUMIN SERPL-MCNC: 4.1 G/DL (ref 3.5–5)
ALBUMIN/GLOB SERPL: 1.6 G/DL (ref 1–2)
ALP SERPL-CCNC: 183 U/L (ref 38–126)
ALT SERPL W P-5'-P-CCNC: 506 U/L (ref 13–69)
ANION GAP SERPL CALCULATED.3IONS-SCNC: 21.7 MMOL/L
ARTERIAL PATENCY WRIST A: POSITIVE
ARTERIAL PATENCY WRIST A: POSITIVE
AST SERPL-CCNC: 227 U/L (ref 15–46)
BASE EXCESS BLDA CALC-SCNC: -0.7 MMOL/L
BASE EXCESS BLDA CALC-SCNC: -3.8 MMOL/L
BASOPHILS # BLD AUTO: 0.07 10*3/MM3 (ref 0–0.2)
BASOPHILS NFR BLD AUTO: 0.5 % (ref 0–2.5)
BDY SITE: ABNORMAL
BDY SITE: ABNORMAL
BILIRUB SERPL-MCNC: 0.9 MG/DL (ref 0.2–1.3)
BUN BLD-MCNC: 47 MG/DL (ref 7–20)
BUN/CREAT SERPL: 8.1 (ref 7.1–23.5)
CALCIUM SPEC-SCNC: 7.3 MG/DL (ref 8.4–10.2)
CHLORIDE SERPL-SCNC: 101 MMOL/L (ref 98–107)
CO2 SERPL-SCNC: 23 MMOL/L (ref 26–30)
COHGB MFR BLD: 2.2 %
COHGB MFR BLD: 2.3 %
CREAT BLD-MCNC: 5.8 MG/DL (ref 0.6–1.3)
D-LACTATE SERPL-SCNC: 1.3 MMOL/L (ref 0.5–2)
DEPRECATED RDW RBC AUTO: 75 FL (ref 37–54)
EOSINOPHIL # BLD AUTO: 0.17 10*3/MM3 (ref 0–0.7)
EOSINOPHIL NFR BLD AUTO: 1.1 % (ref 0–7)
ERYTHROCYTE [DISTWIDTH] IN BLOOD BY AUTOMATED COUNT: 19.9 % (ref 11.5–14.5)
GFR SERPL CREATININE-BSD FRML MDRD: 9 ML/MIN/1.73
GLOBULIN UR ELPH-MCNC: 2.5 GM/DL
GLUCOSE BLD-MCNC: 101 MG/DL (ref 74–98)
GLUCOSE BLDC GLUCOMTR-MCNC: 131 MG/DL (ref 70–130)
GLUCOSE BLDC GLUCOMTR-MCNC: 148 MG/DL (ref 70–130)
HCO3 BLDA-SCNC: 23 MMOL/L (ref 22–28)
HCO3 BLDA-SCNC: 24.2 MMOL/L (ref 22–28)
HCT VFR BLD AUTO: 28.5 % (ref 37–47)
HEMOCCULT STL QL: NEGATIVE
HGB BLD-MCNC: 8.8 G/DL (ref 12–16)
HGB BLDA-MCNC: 8.5 G/DL (ref 12–18)
HGB BLDA-MCNC: 9.5 G/DL (ref 12–18)
HOROWITZ INDEX BLD+IHG-RTO: 100 %
HOROWITZ INDEX BLD+IHG-RTO: 28 %
IMM GRANULOCYTES # BLD: 0.12 10*3/MM3 (ref 0–0.06)
IMM GRANULOCYTES NFR BLD: 0.8 % (ref 0–0.6)
LIPASE SERPL-CCNC: 403 U/L (ref 23–300)
LYMPHOCYTES # BLD AUTO: 1.1 10*3/MM3 (ref 0.6–3.4)
LYMPHOCYTES NFR BLD AUTO: 7.3 % (ref 10–50)
MAGNESIUM SERPL-MCNC: 2.2 MG/DL (ref 1.6–2.3)
MCH RBC QN AUTO: 31.9 PG (ref 27–31)
MCHC RBC AUTO-ENTMCNC: 30.9 G/DL (ref 30–37)
MCV RBC AUTO: 103.3 FL (ref 81–99)
METHGB BLD QL: 0.6 %
METHGB BLD QL: 1.1 %
MODALITY: ABNORMAL
MODALITY: ABNORMAL
MONOCYTES # BLD AUTO: 0.71 10*3/MM3 (ref 0–0.9)
MONOCYTES NFR BLD AUTO: 4.7 % (ref 0–12)
NEUTROPHILS # BLD AUTO: 12.93 10*3/MM3 (ref 2–6.9)
NEUTROPHILS NFR BLD AUTO: 85.6 % (ref 37–80)
NRBC BLD MANUAL-RTO: 0.3 /100 WBC (ref 0–0)
NT-PROBNP SERPL-MCNC: NORMAL PG/ML (ref 0–125)
OXYHGB MFR BLDV: 89.5 % (ref 94–99)
OXYHGB MFR BLDV: 97.1 % (ref 94–99)
PCO2 BLDA: 40 MM HG (ref 35–45)
PCO2 BLDA: 48.8 MM HG (ref 35–45)
PH BLDA: 7.28 PH UNITS
PH BLDA: 7.39 PH UNITS
PHOSPHATE SERPL-MCNC: 6.9 MG/DL (ref 2.5–4.5)
PLATELET # BLD AUTO: 184 10*3/MM3 (ref 130–400)
PMV BLD AUTO: 10.1 FL (ref 6–12)
PO2 BLDA: 485 MM HG (ref 75–100)
PO2 BLDA: 66.4 MM HG (ref 75–100)
POTASSIUM BLD-SCNC: 4.7 MMOL/L (ref 3.5–5.1)
PROT SERPL-MCNC: 6.6 G/DL (ref 6.3–8.2)
RBC # BLD AUTO: 2.76 10*6/MM3 (ref 4.2–5.4)
SAO2 % BLDCOA: 92.6 %
SAO2 % BLDCOA: 99.9 %
SODIUM BLD-SCNC: 141 MMOL/L (ref 137–145)
TROPONIN I SERPL-MCNC: 0.03 NG/ML (ref 0–0.03)
WBC NRBC COR # BLD: 15.1 10*3/MM3 (ref 4.8–10.8)

## 2017-09-16 PROCEDURE — 93005 ELECTROCARDIOGRAM TRACING: CPT | Performed by: EMERGENCY MEDICINE

## 2017-09-16 PROCEDURE — 36600 WITHDRAWAL OF ARTERIAL BLOOD: CPT

## 2017-09-16 PROCEDURE — 83050 HGB METHEMOGLOBIN QUAN: CPT

## 2017-09-16 PROCEDURE — 84100 ASSAY OF PHOSPHORUS: CPT | Performed by: NURSE PRACTITIONER

## 2017-09-16 PROCEDURE — 80053 COMPREHEN METABOLIC PANEL: CPT | Performed by: NURSE PRACTITIONER

## 2017-09-16 PROCEDURE — 83735 ASSAY OF MAGNESIUM: CPT | Performed by: NURSE PRACTITIONER

## 2017-09-16 PROCEDURE — 87040 BLOOD CULTURE FOR BACTERIA: CPT | Performed by: INTERNAL MEDICINE

## 2017-09-16 PROCEDURE — 83605 ASSAY OF LACTIC ACID: CPT | Performed by: NURSE PRACTITIONER

## 2017-09-16 PROCEDURE — 25010000002 MORPHINE PER 10 MG: Performed by: NURSE PRACTITIONER

## 2017-09-16 PROCEDURE — 83880 ASSAY OF NATRIURETIC PEPTIDE: CPT | Performed by: NURSE PRACTITIONER

## 2017-09-16 PROCEDURE — 82375 ASSAY CARBOXYHB QUANT: CPT

## 2017-09-16 PROCEDURE — 82805 BLOOD GASES W/O2 SATURATION: CPT

## 2017-09-16 PROCEDURE — 94799 UNLISTED PULMONARY SVC/PX: CPT

## 2017-09-16 PROCEDURE — 99285 EMERGENCY DEPT VISIT HI MDM: CPT

## 2017-09-16 PROCEDURE — 82962 GLUCOSE BLOOD TEST: CPT

## 2017-09-16 PROCEDURE — 74176 CT ABD & PELVIS W/O CONTRAST: CPT

## 2017-09-16 PROCEDURE — 84484 ASSAY OF TROPONIN QUANT: CPT | Performed by: NURSE PRACTITIONER

## 2017-09-16 PROCEDURE — 82272 OCCULT BLD FECES 1-3 TESTS: CPT | Performed by: NURSE PRACTITIONER

## 2017-09-16 PROCEDURE — 76705 ECHO EXAM OF ABDOMEN: CPT

## 2017-09-16 PROCEDURE — 85025 COMPLETE CBC W/AUTO DIFF WBC: CPT | Performed by: NURSE PRACTITIONER

## 2017-09-16 PROCEDURE — 83690 ASSAY OF LIPASE: CPT | Performed by: NURSE PRACTITIONER

## 2017-09-16 PROCEDURE — 94660 CPAP INITIATION&MGMT: CPT

## 2017-09-16 PROCEDURE — 71010 HC CHEST PA OR AP: CPT

## 2017-09-16 PROCEDURE — 25010000002 LORAZEPAM PER 2 MG: Performed by: NURSE PRACTITIONER

## 2017-09-16 RX ORDER — NITROGLYCERIN 20 MG/100ML
5-200 INJECTION INTRAVENOUS
Status: DISCONTINUED | OUTPATIENT
Start: 2017-09-16 | End: 2017-09-19

## 2017-09-16 RX ORDER — LORAZEPAM 2 MG/ML
0.5 INJECTION INTRAMUSCULAR ONCE
Status: COMPLETED | OUTPATIENT
Start: 2017-09-16 | End: 2017-09-16

## 2017-09-16 RX ORDER — MORPHINE SULFATE 2 MG/ML
2 INJECTION, SOLUTION INTRAMUSCULAR; INTRAVENOUS ONCE
Status: COMPLETED | OUTPATIENT
Start: 2017-09-16 | End: 2017-09-16

## 2017-09-16 RX ORDER — SODIUM CHLORIDE 0.9 % (FLUSH) 0.9 %
10 SYRINGE (ML) INJECTION AS NEEDED
Status: DISCONTINUED | OUTPATIENT
Start: 2017-09-16 | End: 2017-09-21 | Stop reason: HOSPADM

## 2017-09-16 RX ORDER — LABETALOL HYDROCHLORIDE 5 MG/ML
20 INJECTION, SOLUTION INTRAVENOUS ONCE
Status: COMPLETED | OUTPATIENT
Start: 2017-09-16 | End: 2017-09-16

## 2017-09-16 RX ADMIN — LORAZEPAM 0.5 MG: 2 INJECTION INTRAMUSCULAR; INTRAVENOUS at 21:01

## 2017-09-16 RX ADMIN — MORPHINE SULFATE 2 MG: 2 INJECTION, SOLUTION INTRAMUSCULAR; INTRAVENOUS at 19:47

## 2017-09-16 RX ADMIN — NITROGLYCERIN 5 MCG/MIN: 20 INJECTION INTRAVENOUS at 19:41

## 2017-09-16 RX ADMIN — LORAZEPAM 0.5 MG: 2 INJECTION INTRAMUSCULAR; INTRAVENOUS at 21:45

## 2017-09-16 RX ADMIN — LABETALOL HYDROCHLORIDE 20 MG: 5 INJECTION, SOLUTION INTRAVENOUS at 21:47

## 2017-09-16 NOTE — ED PROVIDER NOTES
"Subjective   History of Present Illness  This is a 26-year-old female who is well-known to our facility come in today Brought in by EMS with respiratory distress.  When she was brought and she is brought in on BiPAP.  EMS reports that she had hemodialysis yesterday and today she developed sudden onset of shortness of breath and diarrhea she states that she took 10 nebulized treatments prior to her EMSs arrival.  Here she complains of increasing shortness of breath and generalized pain and diarrhea.  Review of Systems   Constitutional: Positive for chills and fatigue.   Respiratory: Positive for shortness of breath.    Cardiovascular: Positive for chest pain.   Gastrointestinal: Positive for diarrhea.   Neurological: Positive for weakness.       Past Medical History:   Diagnosis Date   • Abdominal pain    • Anemia    • Anxiety    • Asthma    • Bipolar disorder    • CKD (chronic kidney disease), stage IV    • Constipation    • COPD (chronic obstructive pulmonary disease)    • Depression    • End stage renal disease on dialysis    • Episodic drug abuse 8/12/2017   • Esophageal reflux     reflux   • Esophagitis determined by biopsy 2014   • Fahr's syndrome    • Hepatitis C antibody test positive    • Hyperparathyroidism    • Hypertension    • Hypocalcemia    • Nausea and vomiting    • Non-traumatic rhabdomyolysis 5/2/2017   • Pancreatitis    • Pneumonia of right lung due to infectious organism 5/2/2017   • 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   • Recurrent urinary tract infection    • Renal insufficiency    • Upper gastrointestinal bleeding        Allergies   Allergen Reactions   • Acetaminophen Itching   • Hydrocodone Itching   • Ibuprofen    • Lortab [Hydrocodone-Acetaminophen]      Lortab TABS   • Ultram [Tramadol Hcl]      \"kidney doctor said no\"   • Ciprofloxacin Rash       Past Surgical History:   Procedure Laterality Date   • BRONCHOSCOPY THORACOTOMY Right 6/14/2017    Procedure: BRONCHOSCOPY RIGHT THORACOTOMY WITH " LUNG DECORTICATION ;  Surgeon: Shawn Hicks MD;  Location: [facility];  Service:    • DIALYSIS FISTULA CREATION      port   • DILATATION AND CURETTAGE     • ESOPHAGOSCOPY / EGD  2014    esophagitis   • EXPLORATORY LAPAROTOMY      For uterine and ovarian issues   • KIDNEY SURGERY Left 2013    Biopsy       Family History   Problem Relation Age of Onset   • Arthritis Mother    • Hypertension Mother    • Kidney disease Father      Chronic renal failure syndrome   • Pancreatic cancer Father    • Hypertension Brother    • Kidney disease Other    • Diabetes Other      Uncle   • Lung cancer Other      Grandmother   • Hyperlipidemia Other      High cholesterol - Uncle       Social History     Social History   • Marital status:      Spouse name: N/A   • Number of children: N/A   • Years of education: N/A     Social History Main Topics   • Smoking status: Former Smoker     Quit date: 6/18/2017   • Smokeless tobacco: None   • Alcohol use No   • Drug use: Yes     Special: Marijuana   • Sexual activity: Defer     Other Topics Concern   • None     Social History Narrative    She is  and not working. She denies alcohol or drug use. She denies recent opiate or benzo use. She does smoke and has used marijuana.            Objective   Physical Exam   Constitutional: She appears distressed.   Nursing note and vitals reviewed.  GEN: up on bed in tripod position. bipap in place. resp 44 times per minute.  Head: Normocephalic, atraumatic  Eyes: Pupils equal round reactive to light  ENT: unable to assess  Chest: Nontender to palpation  Cardiovascular: tachycardia  Lungs: crackles in bases  Abdomen: Soft, tender throughout  Extremities: edema bilat lower extremities. +1 pulses  Neuro: GCS 15  Psych: Mood and affect are aggitated      Procedures         ED Course  ED Course   Comment By Time   Complaining of increase in anxiety. States if we don't give her something she wants to go to another hospital. Madeleine Contreras,  APRN 09/16 2029   Patient did not tolerate removal of BiPAP became extremely anxious and stated worsening shortness of breath.  We put her back on BiPAP and the PO2 of 35% Madeleine Contreras, APRN 09/16 2100   Blood pressure remains high, continued tachycardia, also still having stools.  Continued to complain of generalized abdominal pain worse to the left lower quadrant.  CT scan ordered, Hemoccult ordered. Madeleine Contreras, APRN 09/16 2110   Heart rate improved. Blood pressure improving. Bpap removed and placed on nasal canula. Will repeat abg. Madeleine Contreras, APRN 09/16 2236      ABG appears to be metabolic acidosis pH is 7.281 PCO2 is 48.8 PO2 is 485 at this time RT decreased PO2 down to 60% patient tolerating well.  Point-of-care glucose 148.    20:42 after reviewing chest x-ray we will go ahead and try to wean off of the BiPAP and we will repeat her ABG 30 minutes after she is weaned off.  This is metabolic acidosis.  I suspect this may be secondary to the multiple albuterol neb treatment she had prior to arrival we are still waiting on the rest of her labs to return.  We will also go ahead and give her Ativan 0.5 mg IV for her increased anxiety.    21:19 will go ahead with another dose of ativan. I am not sure if she is having withdraws from her valium because she states she has been out of them for 2 days. Given her heart rate and her blood pressure this may be the case. We will also order CT of abdomen and us of her gallbladder after her labs revealed elevated lipase and liver enzymes. She is also reports diarrhea and LLQ pain for two days.         MDM  Number of Diagnoses or Management Options  Acute pancreatitis, unspecified complication status, unspecified pancreatitis type:   Elevated liver enzymes:   Hypertensive crisis, unspecified:   Hypervolemia, unspecified hypervolemia type:   Noncompliance:      Amount and/or Complexity of Data Reviewed  Clinical lab tests: ordered and reviewed  Tests in the  radiology section of CPT®: ordered and reviewed  Tests in the medicine section of CPT®: ordered and reviewed  Discussion of test results with the performing providers: yes  Decide to obtain previous medical records or to obtain history from someone other than the patient: yes  Obtain history from someone other than the patient: yes  Review and summarize past medical records: yes  Discuss the patient with other providers: yes  Independent visualization of images, tracings, or specimens: yes    Risk of Complications, Morbidity, and/or Mortality  Presenting problems: high  Diagnostic procedures: high  Management options: high    Critical Care  Total time providing critical care: > 105 minutes    Patient Progress  Patient progress: stable      Final diagnoses:   Hypervolemia, unspecified hypervolemia type   Elevated liver enzymes   Acute pancreatitis, unspecified complication status, unspecified pancreatitis type   Noncompliance   Hypertensive crisis, unspecified            REY Ellsworth  09/17/17 0028       REY Ellsworth  09/17/17 0029

## 2017-09-17 ENCOUNTER — APPOINTMENT (OUTPATIENT)
Dept: GENERAL RADIOLOGY | Facility: HOSPITAL | Age: 27
End: 2017-09-17

## 2017-09-17 ENCOUNTER — APPOINTMENT (OUTPATIENT)
Dept: CT IMAGING | Facility: HOSPITAL | Age: 27
End: 2017-09-17

## 2017-09-17 PROBLEM — I16.0 HYPERTENSIVE URGENCY, MALIGNANT: Status: ACTIVE | Noted: 2017-09-17

## 2017-09-17 PROBLEM — I15.9 SECONDARY HYPERTENSION: Status: ACTIVE | Noted: 2017-09-17

## 2017-09-17 LAB
ALBUMIN SERPL-MCNC: 3 G/DL (ref 3.5–5)
ALBUMIN SERPL-MCNC: 3.6 G/DL (ref 3.5–5)
ALBUMIN SERPL-MCNC: 3.7 G/DL (ref 3.5–5)
ALBUMIN/GLOB SERPL: 1.6 G/DL (ref 1–2)
ALP SERPL-CCNC: 192 U/L (ref 38–126)
ALT SERPL W P-5'-P-CCNC: 562 U/L (ref 13–69)
ANION GAP SERPL CALCULATED.3IONS-SCNC: 17.9 MMOL/L
ANION GAP SERPL CALCULATED.3IONS-SCNC: 31.1 MMOL/L
ANION GAP SERPL CALCULATED.3IONS-SCNC: 33.1 MMOL/L
ANISOCYTOSIS BLD QL: NORMAL
APAP SERPL-MCNC: <10 MCG/ML
APTT PPP: 27.5 SECONDS (ref 25–36)
APTT PPP: 43 SECONDS (ref 25–36)
ARTERIAL PATENCY WRIST A: POSITIVE
AST SERPL-CCNC: 546 U/L (ref 15–46)
ATMOSPHERIC PRESS: 737 MMHG
BASE EXCESS BLDA CALC-SCNC: -10.1 MMOL/L
BASOPHILS # BLD AUTO: 0.03 10*3/MM3 (ref 0–0.2)
BASOPHILS # BLD AUTO: 0.04 10*3/MM3 (ref 0–0.2)
BASOPHILS NFR BLD AUTO: 0.2 % (ref 0–2.5)
BASOPHILS NFR BLD AUTO: 0.3 % (ref 0–2.5)
BDY SITE: ABNORMAL
BILIRUB SERPL-MCNC: 3 MG/DL (ref 0.2–1.3)
BUN BLD-MCNC: 27 MG/DL (ref 7–20)
BUN BLD-MCNC: 51 MG/DL (ref 7–20)
BUN BLD-MCNC: 52 MG/DL (ref 7–20)
BUN/CREAT SERPL: 8.3 (ref 7.1–23.5)
BUN/CREAT SERPL: 8.4 (ref 7.1–23.5)
BUN/CREAT SERPL: 8.4 (ref 7.1–23.5)
CALCIUM SPEC-SCNC: 6.6 MG/DL (ref 8.4–10.2)
CALCIUM SPEC-SCNC: 6.9 MG/DL (ref 8.4–10.2)
CALCIUM SPEC-SCNC: 7.4 MG/DL (ref 8.4–10.2)
CHLORIDE SERPL-SCNC: 102 MMOL/L (ref 98–107)
CHLORIDE SERPL-SCNC: 96 MMOL/L (ref 98–107)
CHLORIDE SERPL-SCNC: 99 MMOL/L (ref 98–107)
CO2 SERPL-SCNC: 11 MMOL/L (ref 26–30)
CO2 SERPL-SCNC: 18 MMOL/L (ref 26–30)
CO2 SERPL-SCNC: 29 MMOL/L (ref 26–30)
CREAT BLD-MCNC: 3.2 MG/DL (ref 0.6–1.3)
CREAT BLD-MCNC: 6.1 MG/DL (ref 0.6–1.3)
CREAT BLD-MCNC: 6.3 MG/DL (ref 0.6–1.3)
DEPRECATED RDW RBC AUTO: 71.3 FL (ref 37–54)
DEPRECATED RDW RBC AUTO: 77.9 FL (ref 37–54)
EOSINOPHIL # BLD AUTO: 0.01 10*3/MM3 (ref 0–0.7)
EOSINOPHIL # BLD AUTO: 0.02 10*3/MM3 (ref 0–0.7)
EOSINOPHIL NFR BLD AUTO: 0.1 % (ref 0–7)
EOSINOPHIL NFR BLD AUTO: 0.1 % (ref 0–7)
ERYTHROCYTE [DISTWIDTH] IN BLOOD BY AUTOMATED COUNT: 19.2 % (ref 11.5–14.5)
ERYTHROCYTE [DISTWIDTH] IN BLOOD BY AUTOMATED COUNT: 19.6 % (ref 11.5–14.5)
GFR SERPL CREATININE-BSD FRML MDRD: 18 ML/MIN/1.73
GFR SERPL CREATININE-BSD FRML MDRD: 8 ML/MIN/1.73
GFR SERPL CREATININE-BSD FRML MDRD: 8 ML/MIN/1.73
GLOBULIN UR ELPH-MCNC: 2.3 GM/DL
GLUCOSE BLD-MCNC: 158 MG/DL (ref 74–98)
GLUCOSE BLD-MCNC: 212 MG/DL (ref 74–98)
GLUCOSE BLD-MCNC: 99 MG/DL (ref 74–98)
GLUCOSE BLDC GLUCOMTR-MCNC: 101 MG/DL (ref 70–130)
GLUCOSE BLDC GLUCOMTR-MCNC: 102 MG/DL (ref 70–130)
GLUCOSE BLDC GLUCOMTR-MCNC: 116 MG/DL (ref 70–130)
GLUCOSE BLDC GLUCOMTR-MCNC: 117 MG/DL (ref 70–130)
GLUCOSE BLDC GLUCOMTR-MCNC: 119 MG/DL (ref 70–130)
GLUCOSE BLDC GLUCOMTR-MCNC: 134 MG/DL (ref 70–130)
GLUCOSE BLDC GLUCOMTR-MCNC: 146 MG/DL (ref 70–130)
GLUCOSE BLDC GLUCOMTR-MCNC: 151 MG/DL (ref 70–130)
GLUCOSE BLDC GLUCOMTR-MCNC: 201 MG/DL (ref 70–130)
GLUCOSE BLDC GLUCOMTR-MCNC: 206 MG/DL (ref 70–130)
GLUCOSE BLDC GLUCOMTR-MCNC: 33 MG/DL (ref 70–130)
GLUCOSE BLDC GLUCOMTR-MCNC: 54 MG/DL (ref 70–130)
GLUCOSE BLDC GLUCOMTR-MCNC: 72 MG/DL (ref 70–130)
GLUCOSE BLDC GLUCOMTR-MCNC: 86 MG/DL (ref 70–130)
GLUCOSE BLDC GLUCOMTR-MCNC: 88 MG/DL (ref 70–130)
GLUCOSE BLDC GLUCOMTR-MCNC: 89 MG/DL (ref 70–130)
HCO3 BLDA-SCNC: 15.7 MMOL/L (ref 22–28)
HCT VFR BLD AUTO: 30 % (ref 37–47)
HCT VFR BLD AUTO: 32.6 % (ref 37–47)
HGB BLD-MCNC: 9.3 G/DL (ref 12–16)
HGB BLD-MCNC: 9.6 G/DL (ref 12–16)
HGB BLDA-MCNC: 9.2 G/DL (ref 12–18)
HOROWITZ INDEX BLD+IHG-RTO: 100 %
IMM GRANULOCYTES # BLD: 0.09 10*3/MM3 (ref 0–0.06)
IMM GRANULOCYTES # BLD: 0.13 10*3/MM3 (ref 0–0.06)
IMM GRANULOCYTES NFR BLD: 0.7 % (ref 0–0.6)
IMM GRANULOCYTES NFR BLD: 0.8 % (ref 0–0.6)
INR PPP: 1.97 (ref 0.9–1.1)
LARGE PLATELETS: NORMAL
LIPASE SERPL-CCNC: 320 U/L (ref 23–300)
LYMPHOCYTES # BLD AUTO: 0.44 10*3/MM3 (ref 0.6–3.4)
LYMPHOCYTES # BLD AUTO: 1.63 10*3/MM3 (ref 0.6–3.4)
LYMPHOCYTES NFR BLD AUTO: 14.5 % (ref 10–50)
LYMPHOCYTES NFR BLD AUTO: 2.4 % (ref 10–50)
MACROCYTES BLD QL SMEAR: NORMAL
MCH RBC QN AUTO: 31.6 PG (ref 27–31)
MCH RBC QN AUTO: 31.9 PG (ref 27–31)
MCHC RBC AUTO-ENTMCNC: 29.4 G/DL (ref 30–37)
MCHC RBC AUTO-ENTMCNC: 31 G/DL (ref 30–37)
MCV RBC AUTO: 102 FL (ref 81–99)
MCV RBC AUTO: 108.3 FL (ref 81–99)
MODALITY: ABNORMAL
MONOCYTES # BLD AUTO: 0.55 10*3/MM3 (ref 0–0.9)
MONOCYTES # BLD AUTO: 0.85 10*3/MM3 (ref 0–0.9)
MONOCYTES NFR BLD AUTO: 4.6 % (ref 0–12)
MONOCYTES NFR BLD AUTO: 4.9 % (ref 0–12)
NEUTROPHILS # BLD AUTO: 16.99 10*3/MM3 (ref 2–6.9)
NEUTROPHILS # BLD AUTO: 8.94 10*3/MM3 (ref 2–6.9)
NEUTROPHILS NFR BLD AUTO: 79.4 % (ref 37–80)
NEUTROPHILS NFR BLD AUTO: 92 % (ref 37–80)
NRBC BLD MANUAL-RTO: 0.4 /100 WBC (ref 0–0)
NRBC BLD MANUAL-RTO: 0.7 /100 WBC (ref 0–0)
PCO2 BLDA: 29.3 MM HG (ref 35–45)
PH BLDA: 7.34 PH UNITS
PHOSPHATE SERPL-MCNC: 10.3 MG/DL (ref 2.5–4.5)
PHOSPHATE SERPL-MCNC: 5.7 MG/DL (ref 2.5–4.5)
PLATELET # BLD AUTO: 122 10*3/MM3 (ref 130–400)
PLATELET # BLD AUTO: 149 10*3/MM3 (ref 130–400)
PMV BLD AUTO: 10.1 FL (ref 6–12)
PMV BLD AUTO: 10.4 FL (ref 6–12)
PO2 BLDA: 491 MM HG (ref 75–100)
POLYCHROMASIA BLD QL SMEAR: NORMAL
POTASSIUM BLD-SCNC: 4.3 MMOL/L (ref 3.5–5.1)
POTASSIUM BLD-SCNC: 4.9 MMOL/L (ref 3.5–5.1)
POTASSIUM BLD-SCNC: 8.1 MMOL/L (ref 3.5–5.1)
POTASSIUM BLD-SCNC: 9.1 MMOL/L (ref 3.5–5.1)
PROT SERPL-MCNC: 5.9 G/DL (ref 6.3–8.2)
PROTHROMBIN TIME: 21.6 SECONDS (ref 9.3–12.1)
RBC # BLD AUTO: 2.94 10*6/MM3 (ref 4.2–5.4)
RBC # BLD AUTO: 3.01 10*6/MM3 (ref 4.2–5.4)
SALICYLATES SERPL-MCNC: <1 MG/DL (ref 2.8–20)
SMALL PLATELETS BLD QL SMEAR: NORMAL
SODIUM BLD-SCNC: 137 MMOL/L (ref 137–145)
SODIUM BLD-SCNC: 138 MMOL/L (ref 137–145)
SODIUM BLD-SCNC: 140 MMOL/L (ref 137–145)
TROPONIN I SERPL-MCNC: 0.05 NG/ML (ref 0–0.03)
TROPONIN I SERPL-MCNC: 0.07 NG/ML (ref 0–0.03)
TROPONIN I SERPL-MCNC: 0.1 NG/ML (ref 0–0.03)
WBC MORPH BLD: NORMAL
WBC NRBC COR # BLD: 11.25 10*3/MM3 (ref 4.8–10.8)
WBC NRBC COR # BLD: 18.47 10*3/MM3 (ref 4.8–10.8)

## 2017-09-17 PROCEDURE — 94799 UNLISTED PULMONARY SVC/PX: CPT

## 2017-09-17 PROCEDURE — 83690 ASSAY OF LIPASE: CPT | Performed by: INTERNAL MEDICINE

## 2017-09-17 PROCEDURE — 94770: CPT

## 2017-09-17 PROCEDURE — 85730 THROMBOPLASTIN TIME PARTIAL: CPT | Performed by: INTERNAL MEDICINE

## 2017-09-17 PROCEDURE — 90935 HEMODIALYSIS ONE EVALUATION: CPT

## 2017-09-17 PROCEDURE — 86706 HEP B SURFACE ANTIBODY: CPT | Performed by: INTERNAL MEDICINE

## 2017-09-17 PROCEDURE — 84484 ASSAY OF TROPONIN QUANT: CPT | Performed by: INTERNAL MEDICINE

## 2017-09-17 PROCEDURE — G0480 DRUG TEST DEF 1-7 CLASSES: HCPCS | Performed by: INTERNAL MEDICINE

## 2017-09-17 PROCEDURE — 80074 ACUTE HEPATITIS PANEL: CPT | Performed by: INTERNAL MEDICINE

## 2017-09-17 PROCEDURE — 99291 CRITICAL CARE FIRST HOUR: CPT | Performed by: INTERNAL MEDICINE

## 2017-09-17 PROCEDURE — 80307 DRUG TEST PRSMV CHEM ANLYZR: CPT | Performed by: INTERNAL MEDICINE

## 2017-09-17 PROCEDURE — 87081 CULTURE SCREEN ONLY: CPT | Performed by: INTERNAL MEDICINE

## 2017-09-17 PROCEDURE — 99223 1ST HOSP IP/OBS HIGH 75: CPT | Performed by: INTERNAL MEDICINE

## 2017-09-17 PROCEDURE — 87040 BLOOD CULTURE FOR BACTERIA: CPT | Performed by: INTERNAL MEDICINE

## 2017-09-17 PROCEDURE — 36591 DRAW BLOOD OFF VENOUS DEVICE: CPT

## 2017-09-17 PROCEDURE — 85025 COMPLETE CBC W/AUTO DIFF WBC: CPT | Performed by: INTERNAL MEDICINE

## 2017-09-17 PROCEDURE — 25010000002 CEFTRIAXONE: Performed by: INTERNAL MEDICINE

## 2017-09-17 PROCEDURE — 25010000002 HEPARIN (PORCINE) PER 1000 UNITS: Performed by: INTERNAL MEDICINE

## 2017-09-17 PROCEDURE — 25010000002 FENTANYL CITRATE (PF) 100 MCG/2ML SOLUTION 20 ML VIAL: Performed by: INTERNAL MEDICINE

## 2017-09-17 PROCEDURE — 74000 HC ABDOMEN KUB: CPT

## 2017-09-17 PROCEDURE — 25010000002 AZITHROMYCIN: Performed by: INTERNAL MEDICINE

## 2017-09-17 PROCEDURE — 25010000002 VANCOMYCIN PER 500 MG: Performed by: INTERNAL MEDICINE

## 2017-09-17 PROCEDURE — 80053 COMPREHEN METABOLIC PANEL: CPT | Performed by: INTERNAL MEDICINE

## 2017-09-17 PROCEDURE — 94002 VENT MGMT INPAT INIT DAY: CPT

## 2017-09-17 PROCEDURE — 82805 BLOOD GASES W/O2 SATURATION: CPT

## 2017-09-17 PROCEDURE — 25010000002 PROPOFOL 1000 MG/ML EMULSION: Performed by: INTERNAL MEDICINE

## 2017-09-17 PROCEDURE — 93005 ELECTROCARDIOGRAM TRACING: CPT | Performed by: INTERNAL MEDICINE

## 2017-09-17 PROCEDURE — 85007 BL SMEAR W/DIFF WBC COUNT: CPT | Performed by: INTERNAL MEDICINE

## 2017-09-17 PROCEDURE — 71010 HC CHEST PA OR AP: CPT

## 2017-09-17 PROCEDURE — 25010000002 ONDANSETRON PER 1 MG: Performed by: INTERNAL MEDICINE

## 2017-09-17 PROCEDURE — 25010000002 MORPHINE PER 10 MG: Performed by: INTERNAL MEDICINE

## 2017-09-17 PROCEDURE — 5A1935Z RESPIRATORY VENTILATION, LESS THAN 24 CONSECUTIVE HOURS: ICD-10-PCS | Performed by: INTERNAL MEDICINE

## 2017-09-17 PROCEDURE — 94640 AIRWAY INHALATION TREATMENT: CPT

## 2017-09-17 PROCEDURE — 94003 VENT MGMT INPAT SUBQ DAY: CPT

## 2017-09-17 PROCEDURE — 25010000002 SUCCINYLCHOLINE PER 20 MG: Performed by: EMERGENCY MEDICINE

## 2017-09-17 PROCEDURE — 82962 GLUCOSE BLOOD TEST: CPT

## 2017-09-17 PROCEDURE — 0 IOPAMIDOL 61 % SOLUTION: Performed by: INTERNAL MEDICINE

## 2017-09-17 PROCEDURE — 0BH17EZ INSERTION OF ENDOTRACHEAL AIRWAY INTO TRACHEA, VIA NATURAL OR ARTIFICIAL OPENING: ICD-10-PCS | Performed by: INTERNAL MEDICINE

## 2017-09-17 PROCEDURE — 25010000002 EPINEPHRINE 0.1 MG/ML SOLUTION PREFILLED SYRINGE: Performed by: EMERGENCY MEDICINE

## 2017-09-17 PROCEDURE — 25010000002 METHYLPREDNISOLONE PER 40 MG: Performed by: INTERNAL MEDICINE

## 2017-09-17 PROCEDURE — 80069 RENAL FUNCTION PANEL: CPT | Performed by: INTERNAL MEDICINE

## 2017-09-17 PROCEDURE — 92950 HEART/LUNG RESUSCITATION CPR: CPT

## 2017-09-17 PROCEDURE — 25010000002 PIPERACILLIN SOD-TAZOBACTAM PER 1 G: Performed by: INTERNAL MEDICINE

## 2017-09-17 PROCEDURE — 25010000002 AMIODARONE PER 30 MG: Performed by: EMERGENCY MEDICINE

## 2017-09-17 PROCEDURE — 84132 ASSAY OF SERUM POTASSIUM: CPT | Performed by: INTERNAL MEDICINE

## 2017-09-17 PROCEDURE — 25010000002 CALCIUM GLUCONATE PER 10 ML

## 2017-09-17 PROCEDURE — 25010000002 METHYLPREDNISOLONE PER 125 MG: Performed by: INTERNAL MEDICINE

## 2017-09-17 PROCEDURE — 36600 WITHDRAWAL OF ARTERIAL BLOOD: CPT

## 2017-09-17 PROCEDURE — 25010000002 EPINEPHRINE PER 0.1 MG: Performed by: INTERNAL MEDICINE

## 2017-09-17 PROCEDURE — 71275 CT ANGIOGRAPHY CHEST: CPT

## 2017-09-17 PROCEDURE — 85610 PROTHROMBIN TIME: CPT | Performed by: INTERNAL MEDICINE

## 2017-09-17 RX ORDER — ATROPINE SULFATE 1 MG/ML
INJECTION, SOLUTION INTRAMUSCULAR; INTRAVENOUS; SUBCUTANEOUS
Status: COMPLETED | OUTPATIENT
Start: 2017-09-17 | End: 2017-09-17

## 2017-09-17 RX ORDER — CHLORHEXIDINE GLUCONATE 0.12 MG/ML
15 RINSE ORAL EVERY 12 HOURS SCHEDULED
Status: DISCONTINUED | OUTPATIENT
Start: 2017-09-17 | End: 2017-09-19

## 2017-09-17 RX ORDER — HEPARIN SODIUM 1000 [USP'U]/ML
60 INJECTION, SOLUTION INTRAVENOUS; SUBCUTANEOUS ONCE
Status: COMPLETED | OUTPATIENT
Start: 2017-09-17 | End: 2017-09-17

## 2017-09-17 RX ORDER — ETOMIDATE 2 MG/ML
INJECTION INTRAVENOUS
Status: COMPLETED | OUTPATIENT
Start: 2017-09-17 | End: 2017-09-17

## 2017-09-17 RX ORDER — IPRATROPIUM BROMIDE AND ALBUTEROL SULFATE 2.5; .5 MG/3ML; MG/3ML
3 SOLUTION RESPIRATORY (INHALATION)
Status: DISCONTINUED | OUTPATIENT
Start: 2017-09-17 | End: 2017-09-21 | Stop reason: HOSPADM

## 2017-09-17 RX ORDER — LABETALOL HYDROCHLORIDE 5 MG/ML
20 INJECTION, SOLUTION INTRAVENOUS
Status: DISCONTINUED | OUTPATIENT
Start: 2017-09-17 | End: 2017-09-19

## 2017-09-17 RX ORDER — DEXTROSE MONOHYDRATE 25 G/50ML
INJECTION, SOLUTION INTRAVENOUS
Status: COMPLETED
Start: 2017-09-17 | End: 2017-09-17

## 2017-09-17 RX ORDER — CALCIUM GLUCONATE 94 MG/ML
INJECTION, SOLUTION INTRAVENOUS
Status: COMPLETED
Start: 2017-09-17 | End: 2017-09-17

## 2017-09-17 RX ORDER — DEXTROSE MONOHYDRATE 100 MG/ML
75 INJECTION, SOLUTION INTRAVENOUS CONTINUOUS
Status: DISCONTINUED | OUTPATIENT
Start: 2017-09-17 | End: 2017-09-19 | Stop reason: CLARIF

## 2017-09-17 RX ORDER — GABAPENTIN 100 MG/1
100 CAPSULE ORAL EVERY 8 HOURS SCHEDULED
Status: DISCONTINUED | OUTPATIENT
Start: 2017-09-17 | End: 2017-09-17

## 2017-09-17 RX ORDER — SODIUM CHLORIDE 0.9 % (FLUSH) 0.9 %
1-10 SYRINGE (ML) INJECTION AS NEEDED
Status: DISCONTINUED | OUTPATIENT
Start: 2017-09-17 | End: 2017-09-21 | Stop reason: HOSPADM

## 2017-09-17 RX ORDER — AMITRIPTYLINE HYDROCHLORIDE 10 MG/1
10 TABLET, FILM COATED ORAL NIGHTLY
Status: DISCONTINUED | OUTPATIENT
Start: 2017-09-17 | End: 2017-09-17

## 2017-09-17 RX ORDER — OXYCODONE HYDROCHLORIDE AND ACETAMINOPHEN 5; 325 MG/1; MG/1
1 TABLET ORAL EVERY 8 HOURS PRN
Status: DISCONTINUED | OUTPATIENT
Start: 2017-09-17 | End: 2017-09-17

## 2017-09-17 RX ORDER — MORPHINE SULFATE 2 MG/ML
2 INJECTION, SOLUTION INTRAMUSCULAR; INTRAVENOUS
Status: DISCONTINUED | OUTPATIENT
Start: 2017-09-17 | End: 2017-09-21 | Stop reason: HOSPADM

## 2017-09-17 RX ORDER — METHYLPREDNISOLONE SODIUM SUCCINATE 40 MG/ML
40 INJECTION, POWDER, LYOPHILIZED, FOR SOLUTION INTRAMUSCULAR; INTRAVENOUS EVERY 6 HOURS
Status: DISCONTINUED | OUTPATIENT
Start: 2017-09-17 | End: 2017-09-19

## 2017-09-17 RX ORDER — SUCCINYLCHOLINE CHLORIDE 20 MG/ML
INJECTION INTRAMUSCULAR; INTRAVENOUS
Status: COMPLETED | OUTPATIENT
Start: 2017-09-17 | End: 2017-09-17

## 2017-09-17 RX ORDER — LORAZEPAM 0.5 MG/1
1 TABLET ORAL EVERY 6 HOURS PRN
Status: DISCONTINUED | OUTPATIENT
Start: 2017-09-17 | End: 2017-09-17

## 2017-09-17 RX ORDER — MINOXIDIL 2.5 MG/1
10 TABLET ORAL DAILY
Status: DISCONTINUED | OUTPATIENT
Start: 2017-09-17 | End: 2017-09-17

## 2017-09-17 RX ORDER — NALOXONE HCL 0.4 MG/ML
0.4 VIAL (ML) INJECTION
Status: DISCONTINUED | OUTPATIENT
Start: 2017-09-17 | End: 2017-09-21 | Stop reason: HOSPADM

## 2017-09-17 RX ORDER — HEPARIN SODIUM 1000 [USP'U]/ML
60 INJECTION INTRAVENOUS; SUBCUTANEOUS AS NEEDED
Status: DISCONTINUED | OUTPATIENT
Start: 2017-09-17 | End: 2017-09-19

## 2017-09-17 RX ORDER — SODIUM CHLORIDE 9 MG/ML
50 INJECTION, SOLUTION INTRAVENOUS CONTINUOUS
Status: DISCONTINUED | OUTPATIENT
Start: 2017-09-17 | End: 2017-09-20

## 2017-09-17 RX ORDER — ALPRAZOLAM 0.5 MG/1
1 TABLET ORAL NIGHTLY PRN
Status: DISCONTINUED | OUTPATIENT
Start: 2017-09-17 | End: 2017-09-17

## 2017-09-17 RX ORDER — HEPARIN SODIUM 1000 [USP'U]/ML
30 INJECTION INTRAVENOUS; SUBCUTANEOUS AS NEEDED
Status: DISCONTINUED | OUTPATIENT
Start: 2017-09-17 | End: 2017-09-19

## 2017-09-17 RX ORDER — DEXTROSE MONOHYDRATE 25 G/50ML
INJECTION, SOLUTION INTRAVENOUS
Status: COMPLETED | OUTPATIENT
Start: 2017-09-17 | End: 2017-09-17

## 2017-09-17 RX ORDER — METHYLPREDNISOLONE SODIUM SUCCINATE 125 MG/2ML
125 INJECTION, POWDER, LYOPHILIZED, FOR SOLUTION INTRAMUSCULAR; INTRAVENOUS ONCE
Status: COMPLETED | OUTPATIENT
Start: 2017-09-17 | End: 2017-09-17

## 2017-09-17 RX ORDER — AMIODARONE HYDROCHLORIDE 50 MG/ML
INJECTION, SOLUTION INTRAVENOUS
Status: COMPLETED | OUTPATIENT
Start: 2017-09-17 | End: 2017-09-17

## 2017-09-17 RX ORDER — ACETAMINOPHEN 325 MG/1
650 TABLET ORAL EVERY 4 HOURS PRN
Status: DISCONTINUED | OUTPATIENT
Start: 2017-09-17 | End: 2017-09-17

## 2017-09-17 RX ORDER — MIRTAZAPINE 15 MG/1
15 TABLET, FILM COATED ORAL NIGHTLY
Status: DISCONTINUED | OUTPATIENT
Start: 2017-09-17 | End: 2017-09-17

## 2017-09-17 RX ORDER — LABETALOL HYDROCHLORIDE 5 MG/ML
20 INJECTION, SOLUTION INTRAVENOUS
Status: DISCONTINUED | OUTPATIENT
Start: 2017-09-17 | End: 2017-09-17

## 2017-09-17 RX ORDER — ONDANSETRON 2 MG/ML
4 INJECTION INTRAMUSCULAR; INTRAVENOUS EVERY 6 HOURS PRN
Status: DISCONTINUED | OUTPATIENT
Start: 2017-09-17 | End: 2017-09-21 | Stop reason: HOSPADM

## 2017-09-17 RX ORDER — NIFEDIPINE 30 MG/1
60 TABLET, EXTENDED RELEASE ORAL
Status: DISCONTINUED | OUTPATIENT
Start: 2017-09-17 | End: 2017-09-17

## 2017-09-17 RX ORDER — CALCIUM CARBONATE 200(500)MG
2 TABLET,CHEWABLE ORAL 3 TIMES DAILY PRN
Status: DISCONTINUED | OUTPATIENT
Start: 2017-09-17 | End: 2017-09-17

## 2017-09-17 RX ORDER — FAMOTIDINE 10 MG/ML
20 INJECTION, SOLUTION INTRAVENOUS DAILY
Status: DISCONTINUED | OUTPATIENT
Start: 2017-09-17 | End: 2017-09-21 | Stop reason: HOSPADM

## 2017-09-17 RX ORDER — PANTOPRAZOLE SODIUM 40 MG/1
40 TABLET, DELAYED RELEASE ORAL EVERY MORNING
Status: DISCONTINUED | OUTPATIENT
Start: 2017-09-17 | End: 2017-09-17

## 2017-09-17 RX ORDER — DEXTROSE MONOHYDRATE 25 G/50ML
50 INJECTION, SOLUTION INTRAVENOUS ONCE
Status: COMPLETED | OUTPATIENT
Start: 2017-09-17 | End: 2017-09-17

## 2017-09-17 RX ADMIN — DEXTROSE MONOHYDRATE 50 ML/HR: 100 INJECTION, SOLUTION INTRAVENOUS at 21:42

## 2017-09-17 RX ADMIN — MIRTAZAPINE 15 MG: 15 TABLET, FILM COATED ORAL at 01:48

## 2017-09-17 RX ADMIN — SUCCINYLCHOLINE CHLORIDE 75 MG: 20 INJECTION, SOLUTION INTRAMUSCULAR; INTRAVENOUS at 05:20

## 2017-09-17 RX ADMIN — AMIODARONE HYDROCHLORIDE 300 MG: 50 INJECTION, SOLUTION INTRAVENOUS at 05:35

## 2017-09-17 RX ADMIN — METHYLPREDNISOLONE SODIUM SUCCINATE 40 MG: 40 INJECTION, POWDER, FOR SOLUTION INTRAMUSCULAR; INTRAVENOUS at 12:05

## 2017-09-17 RX ADMIN — DEXTROSE MONOHYDRATE 50 ML: 25 INJECTION, SOLUTION INTRAVENOUS at 09:10

## 2017-09-17 RX ADMIN — SODIUM BICARBONATE 50 MEQ: 84 INJECTION, SOLUTION INTRAVENOUS at 05:22

## 2017-09-17 RX ADMIN — DEXTROSE MONOHYDRATE 25 ML: 25 INJECTION, SOLUTION INTRAVENOUS at 05:09

## 2017-09-17 RX ADMIN — SODIUM CHLORIDE 50 ML/HR: 9 INJECTION, SOLUTION INTRAVENOUS at 16:15

## 2017-09-17 RX ADMIN — HEPARIN SODIUM 2790 UNITS: 1000 INJECTION, SOLUTION INTRAVENOUS; SUBCUTANEOUS at 12:57

## 2017-09-17 RX ADMIN — EPINEPHRINE 1 MG: 0.1 INJECTION, SOLUTION ENDOTRACHEAL; INTRACARDIAC; INTRAVENOUS at 05:49

## 2017-09-17 RX ADMIN — ONDANSETRON 4 MG: 2 INJECTION INTRAMUSCULAR; INTRAVENOUS at 01:49

## 2017-09-17 RX ADMIN — SODIUM BICARBONATE 50 MEQ: 84 INJECTION, SOLUTION INTRAVENOUS at 05:51

## 2017-09-17 RX ADMIN — SODIUM BICARBONATE 150 MEQ: 84 INJECTION, SOLUTION INTRAVENOUS at 08:34

## 2017-09-17 RX ADMIN — IPRATROPIUM BROMIDE AND ALBUTEROL SULFATE 3 ML: .5; 3 SOLUTION RESPIRATORY (INHALATION) at 13:15

## 2017-09-17 RX ADMIN — MORPHINE SULFATE 2 MG: 2 INJECTION, SOLUTION INTRAMUSCULAR; INTRAVENOUS at 01:48

## 2017-09-17 RX ADMIN — IPRATROPIUM BROMIDE AND ALBUTEROL SULFATE 3 ML: .5; 3 SOLUTION RESPIRATORY (INHALATION) at 16:39

## 2017-09-17 RX ADMIN — IPRATROPIUM BROMIDE AND ALBUTEROL SULFATE 3 ML: .5; 3 SOLUTION RESPIRATORY (INHALATION) at 19:20

## 2017-09-17 RX ADMIN — ATROPINE SULFATE 1 MG: 1 INJECTION, SOLUTION INTRAMUSCULAR; INTRAVENOUS; SUBCUTANEOUS at 05:46

## 2017-09-17 RX ADMIN — METHYLPREDNISOLONE SODIUM SUCCINATE 125 MG: 125 INJECTION, POWDER, FOR SOLUTION INTRAMUSCULAR; INTRAVENOUS at 01:49

## 2017-09-17 RX ADMIN — DEXTROSE MONOHYDRATE 50 ML: 25 INJECTION, SOLUTION INTRAVENOUS at 05:11

## 2017-09-17 RX ADMIN — AMITRIPTYLINE HYDROCHLORIDE 10 MG: 10 TABLET, FILM COATED ORAL at 01:48

## 2017-09-17 RX ADMIN — PROPOFOL 50 MCG/KG/MIN: 10 INJECTION, EMULSION INTRAVENOUS at 15:25

## 2017-09-17 RX ADMIN — DEXTROSE MONOHYDRATE 50 ML: 25 INJECTION, SOLUTION INTRAVENOUS at 14:05

## 2017-09-17 RX ADMIN — FAMOTIDINE 20 MG: 10 INJECTION, SOLUTION INTRAVENOUS at 12:05

## 2017-09-17 RX ADMIN — DEXTROSE MONOHYDRATE 50 ML/HR: 100 INJECTION, SOLUTION INTRAVENOUS at 12:19

## 2017-09-17 RX ADMIN — HEPARIN SODIUM 1400 UNITS: 1000 INJECTION, SOLUTION INTRAVENOUS; SUBCUTANEOUS at 21:08

## 2017-09-17 RX ADMIN — CHLORHEXIDINE GLUCONATE 15 ML: 1.2 RINSE ORAL at 12:04

## 2017-09-17 RX ADMIN — ETOMIDATE 20 MG: 2 INJECTION, SOLUTION INTRAVENOUS at 05:20

## 2017-09-17 RX ADMIN — EPINEPHRINE 0.02 MCG/KG/MIN: 1 INJECTION PARENTERAL at 06:20

## 2017-09-17 RX ADMIN — NOREPINEPHRINE BITARTRATE 0.02 MCG/KG/MIN: 1 INJECTION INTRAVENOUS at 05:38

## 2017-09-17 RX ADMIN — CHLORHEXIDINE GLUCONATE 15 ML: 1.2 RINSE ORAL at 20:59

## 2017-09-17 RX ADMIN — NITROGLYCERIN 10 MCG/MIN: 20 INJECTION INTRAVENOUS at 15:42

## 2017-09-17 RX ADMIN — EPINEPHRINE 1 MG: 0.1 INJECTION, SOLUTION ENDOTRACHEAL; INTRACARDIAC; INTRAVENOUS at 05:55

## 2017-09-17 RX ADMIN — TAZOBACTAM SODIUM AND PIPERACILLIN SODIUM 2.25 G: 250; 2 INJECTION, SOLUTION INTRAVENOUS at 20:59

## 2017-09-17 RX ADMIN — METHYLPREDNISOLONE SODIUM SUCCINATE 40 MG: 40 INJECTION, POWDER, FOR SOLUTION INTRAMUSCULAR; INTRAVENOUS at 18:34

## 2017-09-17 RX ADMIN — IOPAMIDOL 100 ML: 612 INJECTION, SOLUTION INTRAVENOUS at 10:45

## 2017-09-17 RX ADMIN — CALCIUM GLUCONATE 2 G: 94 INJECTION, SOLUTION INTRAVENOUS at 06:57

## 2017-09-17 RX ADMIN — VANCOMYCIN HYDROCHLORIDE 1000 MG: 1 INJECTION, POWDER, LYOPHILIZED, FOR SOLUTION INTRAVENOUS at 15:50

## 2017-09-17 RX ADMIN — MORPHINE SULFATE 2 MG: 2 INJECTION, SOLUTION INTRAMUSCULAR; INTRAVENOUS at 03:55

## 2017-09-17 RX ADMIN — TAZOBACTAM SODIUM AND PIPERACILLIN SODIUM 2.25 G: 250; 2 INJECTION, SOLUTION INTRAVENOUS at 12:18

## 2017-09-17 RX ADMIN — CEFTRIAXONE 1 G: 1 INJECTION, POWDER, FOR SOLUTION INTRAMUSCULAR; INTRAVENOUS at 02:34

## 2017-09-17 RX ADMIN — EPINEPHRINE 1 MG: 0.1 INJECTION, SOLUTION ENDOTRACHEAL; INTRACARDIAC; INTRAVENOUS at 05:13

## 2017-09-17 RX ADMIN — IPRATROPIUM BROMIDE AND ALBUTEROL SULFATE 3 ML: .5; 3 SOLUTION RESPIRATORY (INHALATION) at 07:25

## 2017-09-17 RX ADMIN — EPINEPHRINE 1 MG: 0.1 INJECTION, SOLUTION ENDOTRACHEAL; INTRACARDIAC; INTRAVENOUS at 05:19

## 2017-09-17 RX ADMIN — PROPOFOL 50 MCG/KG/MIN: 10 INJECTION, EMULSION INTRAVENOUS at 19:51

## 2017-09-17 RX ADMIN — FENTANYL CITRATE 50 MCG/HR: 50 INJECTION INTRAVENOUS at 06:40

## 2017-09-17 RX ADMIN — MORPHINE SULFATE 2 MG: 2 INJECTION, SOLUTION INTRAMUSCULAR; INTRAVENOUS at 15:25

## 2017-09-17 RX ADMIN — HEPARIN SODIUM 12 UNITS/KG/HR: 10000 INJECTION, SOLUTION INTRAVENOUS at 13:11

## 2017-09-17 RX ADMIN — AZITHROMYCIN 500 MG: 500 INJECTION, POWDER, LYOPHILIZED, FOR SOLUTION INTRAVENOUS at 04:36

## 2017-09-17 RX ADMIN — MORPHINE SULFATE 2 MG: 2 INJECTION, SOLUTION INTRAMUSCULAR; INTRAVENOUS at 10:30

## 2017-09-17 NOTE — PROGRESS NOTES
[facility]    Name:  Mandy Espino   Age:  26 y.o.  Sex:  female  :  1990  MRN:  [id number]   Visit Number:  [id number]  Admission Date:  2017  Date Of Service:  17  Primary Care Physician:  Neo Gresham DO     LOS: 0 days   Patient Care Team:  Neo Gresham DO as PCP - General (Family Medicine):    History taken from:     chart    Chief Complaint:      Unresponsiveness  Subjective     Interval History:     Patient seen today.  Discussed patient with the night physician as well as nursing staff.  Reviewed history and physical, lab work, x-rays.  Patient is unresponsive on the ventilator.  She does have a history of IV drug abuse.  Drug testing was not done on admission, we will do this now.  She apparently had cardiopulmonary arrest requiring defibrillation, treated with epinephrine, and ventilatory management.  CTA has been done which shows bilateral groundglass opacities.  She also was noted to be hyperkalemic with a potassium above 9.  She is to get urgent dialysis today.  She has long-standing history of drug abuse as well as noncompliance with her medications.  She apparently has been getting dialysis on a regular basis though.  She appears to have had shortness of breath before coming and requiring about 10 nebulizer treatments.  She was placed on BiPAP and brought into the emergency room and then admitted to the ICU.  While in the ICU she had a cardiac arrest.  She was noted to be in ventricular tachycardia and was defibrillated.  She is on vancomycin, Rocephin and Zithromax.  Also on IV Solu-Medrol.  She did have high blood pressure she was given labetalol during the course of the hospital stay last night.  So was given IV bicarbonate as she was acidotic.  CT abdomen and pelvis showed congestive heart failure last evening.  It is unknown whether she used any drugs of abuse.    Review of Systems:     Unable to obtain due to  patient's condition.    Objective     Vital Signs:    Temp:  [97.4 °F (36.3 °C)-97.7 °F (36.5 °C)] 97.7 °F (36.5 °C)  Heart Rate:  [] 69  Resp:  [22-46] 23  BP: ()/() 177/97  FiO2 (%):  [50 %-100 %] 50 %    Physical Exam:      General Appearance:    Patient is obtunded, sedated on the ventilator.     Head:    Normocephalic, without obvious abnormality, atraumatic   Eyes:            Lids and lashes normal, conjunctivae and sclerae normal, no   icterus, no pallor, corneas clear, PERRLA   Ears:    Ears appear intact with no abnormalities noted   Throat:   No oral lesions, no thrush, oral mucosa moist   Neck:   No adenopathy, supple, trachea midline, no thyromegaly, no     carotid bruit, no JVD   Back:     No kyphosis present, no scoliosis present, no skin lesions,       erythema or scars, no tenderness to percussion or                   palpation,   range of motion normal   Lungs:     Coarse rhonchi bilaterally without use of accessory muscles respiration.      Heart:    Regular rhythm and normal rate, normal S1 and S2, no            murmur, no gallop, no rub, no click   Breast Exam:    Deferred   Abdomen:     Normal bowel sounds, no masses, no organomegaly, soft        non-tender, non-distended, no guarding, no rebound                 tenderness   Genitalia:    Deferred   Extremities:   Sedated, not moving extremities at present.     Pulses:   Pulses palpable and equal bilaterally   Skin:   No bleeding, bruising or rash   Lymph nodes:   No palpable adenopathy   Neurologic:   Cranial nerves 2 - 12 grossly intact, sensation intact, DTR        present and equal bilaterally          Results Review:      I reviewed the patient's new clinical results.    Labs:  Lab Results (last 24 hours)     Procedure Component Value Units Date/Time    Blood Gas, Arterial With Co-Ox [[id number]]  (Abnormal) Collected:  09/16/17 1913    Specimen:  Arterial Blood Updated:  09/16/17 1912     Site Arterial: left radial      Reynaldo's Test Positive     pH, Arterial 7.281 pH units      pCO2, Arterial 48.8 (H) mm Hg      pO2, Arterial 485.0 (H) mm Hg      HCO3, Arterial 23.0 mmol/L      Base Excess, Arterial -3.8 mmol/L      O2 Saturation, Arterial 99.9 %      Hemoglobin, Blood Gas 9.5 (L) g/dL      Oxyhemoglobin 97.1 %      Methemoglobin 0.60 %      Carboxyhemoglobin 2.2 %      Modality BiPap     FIO2 100 %     POC Glucose Fingerstick [[id number]]  (Abnormal) Collected:  09/16/17 1917    Specimen:  Blood Updated:  09/16/17 1920     Glucose 148 (H) mg/dL       Serial Number: [id number]:  [id number]       CBC & Differential [[id number]] Collected:  09/16/17 2010    Specimen:  Blood Updated:  09/16/17 2029    Narrative:       The following orders were created for panel order CBC & Differential.  Procedure                               Abnormality         Status                     ---------                               -----------         ------                     CBC Auto Differential[[id number]]        Abnormal            Final result                 Please view results for these tests on the individual orders.    CBC Auto Differential [[id number]]  (Abnormal) Collected:  09/16/17 2010    Specimen:  Blood from Hand, Left Updated:  09/16/17 2029     WBC 15.10 (H) 10*3/mm3      RBC 2.76 (L) 10*6/mm3      Hemoglobin 8.8 (L) g/dL      Hematocrit 28.5 (L) %      .3 (H) fL      MCH 31.9 (H) pg      MCHC 30.9 g/dL      RDW 19.9 (H) %      RDW-SD 75.0 (H) fl      MPV 10.1 fL      Platelets 184 10*3/mm3      Neutrophil % 85.6 (H) %      Lymphocyte % 7.3 (L) %      Monocyte % 4.7 %      Eosinophil % 1.1 %      Basophil % 0.5 %      Immature Grans % 0.8 (H) %      Neutrophils, Absolute 12.93 (H) 10*3/mm3      Lymphocytes, Absolute 1.10 10*3/mm3      Monocytes, Absolute 0.71 10*3/mm3      Eosinophils, Absolute 0.17 10*3/mm3      Basophils, Absolute 0.07 10*3/mm3      Immature Grans, Absolute 0.12 (H) 10*3/mm3      nRBC 0.3 (H) /100 WBC      Lactic Acid, Plasma [[id number]]  (Normal) Collected:  09/16/17 2010    Specimen:  Blood from Hand, Left Updated:  09/16/17 2034     Lactate 1.3 mmol/L     Magnesium [[id number]]  (Normal) Collected:  09/16/17 2010    Specimen:  Blood from Hand, Left Updated:  09/16/17 2045     Magnesium 2.2 mg/dL     Troponin [[id number]]  (Normal) Collected:  09/16/17 2010    Specimen:  Blood from Hand, Left Updated:  09/16/17 2045     Troponin I 0.030 ng/mL     Narrative:       Normal Patient Upper Reference Limit (URL) (99th Percentile)=0.03 ng/mL   Non-AMI Illness Reference Limit=0.03-0.11 ng/mL   AMI Confirmation=0.12 ng/mL and above    Lipase [[id number]]  (Abnormal) Collected:  09/16/17 2010    Specimen:  Blood from Hand, Left Updated:  09/16/17 2045     Lipase 403 (H) U/L     Comprehensive Metabolic Panel [[id number]]  (Abnormal) Collected:  09/16/17 2010    Specimen:  Blood from Hand, Left Updated:  09/16/17 2050     Glucose 101 (H) mg/dL      BUN 47 (H) mg/dL      Creatinine 5.80 (H) mg/dL      Sodium 141 mmol/L      Potassium 4.7 mmol/L      Chloride 101 mmol/L      CO2 23.0 (L) mmol/L      Calcium 7.3 (L) mg/dL      Total Protein 6.6 g/dL      Albumin 4.10 g/dL      ALT (SGPT) 506 (C) U/L      AST (SGOT) 227 (H) U/L      Alkaline Phosphatase 183 (H) U/L      Total Bilirubin 0.9 mg/dL      eGFR Non African Amer 9 (L) mL/min/1.73      Globulin 2.5 gm/dL      A/G Ratio 1.6 g/dL      BUN/Creatinine Ratio 8.1     Anion Gap 21.7 mmol/L     Narrative:       Abnormal estimated GFR should be followed by more specific studies to confirm end stage chronic renal disease. The equation used for calculation may not be accurate for patients less than 19 years old, greater than 70 years old, patients at extremes of weight, malnutrition, or with acute renal dysfunction.    Phosphorus [[id number]]  (Abnormal) Collected:  09/16/17 2010    Specimen:  Blood from Hand, Left Updated:  09/16/17 2050     Phosphorus 6.9 (C) mg/dL     Occult Blood X 1,  Stool [[id number]]  (Normal) Collected:  09/16/17 2122    Specimen:  Stool from Per Rectum Updated:  09/16/17 2134     Fecal Occult Blood Negative    BNP [[id number]] Collected:  09/16/17 2010    Specimen:  Blood from Hand, Left Updated:  09/16/17 2147     proBNP -- pg/mL       >656,000 pg/ml       Narrative:       >656,000 pg/ml    Blood Gas, Arterial With Co-Ox [[id number]]  (Abnormal) Collected:  09/16/17 2242    Specimen:  Arterial Blood Updated:  09/16/17 2241     Site Arterial: left radial     Reynaldo's Test Positive     pH, Arterial 7.391 pH units      pCO2, Arterial 40.0 mm Hg      pO2, Arterial 66.4 (L) mm Hg      HCO3, Arterial 24.2 mmol/L      Base Excess, Arterial -0.7 mmol/L      O2 Saturation, Arterial 92.6 %      Hemoglobin, Blood Gas 8.5 (L) g/dL      Oxyhemoglobin 89.5 (L) %      Methemoglobin 1.10 %      Carboxyhemoglobin 2.3 %      Modality Cannula - Nasal     FIO2 28 %     POC Glucose Fingerstick [[id number]]  (Abnormal) Collected:  09/16/17 2345    Specimen:  Blood Updated:  09/16/17 2350     Glucose 131 (H) mg/dL       Serial Number: [id number]:  [id number]       Blood Culture [[id number]] Collected:  09/16/17 2010    Specimen:  Blood from Hand, Left Updated:  09/17/17 0155    Acinetobacter Screen [[id number]] Collected:  09/17/17 0129    Specimen:  Swab from Axilla, Left Updated:  09/17/17 0209    MRSA Screen Culture [[id number]] Collected:  09/17/17 0129    Specimen:  Swab from Nares Updated:  09/17/17 0209    VRE Culture [[id number]] Collected:  09/17/17 0129    Specimen:  Swab from Per Rectum Updated:  09/17/17 0209    Blood Culture [[id number]] Collected:  09/17/17 0206    Specimen:  Blood from Hand, Left Updated:  09/17/17 0211    Blood Gas, Arterial [[id number]]  (Abnormal) Collected:  09/17/17 0603    Specimen:  Arterial Blood Updated:  09/17/17 0603     Site Arterial: left radial     Reynaldo's Test Positive     pH, Arterial 7.338 pH units      pCO2, Arterial 29.3 (L) mm Hg      pO2, Arterial  491.0 (H) mm Hg      HCO3, Arterial 15.7 (L) mmol/L      Base Excess, Arterial -10.1 mmol/L      Hemoglobin, Blood Gas 9.2 (L) g/dL      Barometric Pressure for Blood Gas 737 mmHg      Modality Ambu bag     FIO2 100 %     Lipase [[id number]]  (Abnormal) Collected:  09/17/17 0555    Specimen:  Blood Updated:  09/17/17 0632     Lipase 320 (H) U/L     Troponin [[id number]]  (Abnormal) Collected:  09/17/17 0555    Specimen:  Blood Updated:  09/17/17 0637     Troponin I 0.047 (H) ng/mL     Narrative:       Normal Patient Upper Reference Limit (URL) (99th Percentile)=0.03 ng/mL   Non-AMI Illness Reference Limit=0.03-0.11 ng/mL   AMI Confirmation=0.12 ng/mL and above    CBC Auto Differential [[id number]]  (Abnormal) Collected:  09/17/17 0555    Specimen:  Blood Updated:  09/17/17 0638     WBC 11.25 (H) 10*3/mm3      RBC 3.01 (L) 10*6/mm3      Hemoglobin 9.6 (L) g/dL      Hematocrit 32.6 (L) %      .3 (H) fL      MCH 31.9 (H) pg      MCHC 29.4 (L) g/dL      RDW 19.6 (H) %      RDW-SD 77.9 (H) fl      MPV 10.4 fL      Platelets 122 (L) 10*3/mm3      Neutrophil % 79.4 %      Lymphocyte % 14.5 %      Monocyte % 4.9 %      Eosinophil % 0.1 %      Basophil % 0.3 %      Immature Grans % 0.8 (H) %      Neutrophils, Absolute 8.94 (H) 10*3/mm3      Lymphocytes, Absolute 1.63 10*3/mm3      Monocytes, Absolute 0.55 10*3/mm3      Eosinophils, Absolute 0.01 10*3/mm3      Basophils, Absolute 0.03 10*3/mm3      Immature Grans, Absolute 0.09 (H) 10*3/mm3      nRBC 0.7 (H) /100 WBC     CBC & Differential [[id number]] Collected:  09/17/17 0555    Specimen:  Blood Updated:  09/17/17 0638    Narrative:       The following orders were created for panel order CBC & Differential.  Procedure                               Abnormality         Status                     ---------                               -----------         ------                     Scan Slide[[id number]]                                       Final result               CBC  Auto Differential[[id number]]        Abnormal            Final result                 Please view results for these tests on the individual orders.    Scan Slide [[id number]] Collected:  09/17/17 0555    Specimen:  Blood Updated:  09/17/17 0638     Anisocytosis Slight/1+     Macrocytes Slight/1+     Polychromasia Slight/1+     WBC Morphology Normal     Platelet Estimate Decreased     Large Platelets Slight/1+    Comprehensive Metabolic Panel [[id number]]  (Abnormal) Collected:  09/17/17 0555    Specimen:  Blood Updated:  09/17/17 0639     Glucose 158 (H) mg/dL      BUN 51 (H) mg/dL      Creatinine 6.10 (H) mg/dL      Sodium 137 mmol/L      Potassium 9.1 (C) mmol/L      Chloride 102 mmol/L      CO2 11.0 (L) mmol/L      Calcium 6.6 (C) mg/dL      Total Protein 5.9 (L) g/dL      Albumin 3.60 g/dL      ALT (SGPT) 562 (C) U/L      AST (SGOT) 546 (C) U/L      Alkaline Phosphatase 192 (H) U/L      Total Bilirubin 3.0 (H) mg/dL      eGFR Non African Amer 8 (L) mL/min/1.73      Globulin 2.3 gm/dL      A/G Ratio 1.6 g/dL      BUN/Creatinine Ratio 8.4     Anion Gap 33.1 mmol/L     Narrative:       Abnormal estimated GFR should be followed by more specific studies to confirm end stage chronic renal disease. The equation used for calculation may not be accurate for patients less than 19 years old, greater than 70 years old, patients at extremes of weight, malnutrition, or with acute renal dysfunction.    POC Glucose Fingerstick [[id number]]  (Abnormal) Collected:  09/17/17 0508    Specimen:  Blood Updated:  09/17/17 0710     Glucose 54 (L) mg/dL       Serial Number: [id number]:  [id number]       Renal Function Panel [[id number]]  (Abnormal) Collected:  09/17/17 0647    Specimen:  Blood Updated:  09/17/17 0830     Glucose 99 (H) mg/dL      BUN 52 (H) mg/dL      Creatinine 6.30 (H) mg/dL      Sodium 140 mmol/L      Potassium 8.1 (C) mmol/L      Chloride 99 mmol/L      CO2 18.0 (L) mmol/L      Calcium 6.9 (C) mg/dL      Albumin 3.70  g/dL      Phosphorus 10.3 (C) mg/dL      Anion Gap 31.1 mmol/L      BUN/Creatinine Ratio 8.3     eGFR Non African Amer 8 (L) mL/min/1.73     Narrative:       Abnormal estimated GFR should be followed by more specific studies to confirm end stage chronic renal disease. The equation used for calculation may not be accurate for patients less than 19 years old, greater than 70 years old, patients at extremes of weight, malnutrition, or with acute renal dysfunction.    Acetaminophen Level [[id number]]  (Normal) Collected:  09/17/17 0647    Specimen:  Blood Updated:  09/17/17 0845     Acetaminophen <10.0 mcg/mL     Narrative:       Toxic = Greater than 150 mcg/mL    Salicylate Level [[id number]]  (Abnormal) Collected:  09/17/17 0647    Specimen:  Blood Updated:  09/17/17 0845     Salicylate <1.0 (L) mg/dL     Drug Screen (10), Serum [[id number]] Collected:  09/17/17 0647    Specimen:  Blood Updated:  09/17/17 0854    POC Glucose Fingerstick [[id number]]  (Abnormal) Collected:  09/17/17 0855    Specimen:  Blood Updated:  09/17/17 0900     Glucose 33 (C) mg/dL       Serial Number: [id number]:  [id number]       POC Glucose Fingerstick [[id number]]  (Abnormal) Collected:  09/17/17 0916    Specimen:  Blood Updated:  09/17/17 0935     Glucose 146 (H) mg/dL       Serial Number: [id number]:  [id number]       POC Glucose Fingerstick [[id number]]  (Abnormal) Collected:  09/17/17 0959    Specimen:  Blood Updated:  09/17/17 1030     Glucose 134 (H) mg/dL       Serial Number: [id number]:  [id number]       Hepatitis Panel, Acute [[id number]] Collected:  09/17/17 1107    Specimen:  Blood Updated:  09/17/17 1128    Potassium [[id number]] Collected:  09/17/17 1107    Specimen:  Blood Updated:  09/17/17 1128    Hepatitis B Surface Antibody [[id number]] Collected:  09/17/17 1107    Specimen:  Blood Updated:  09/17/17 1128    Protime-INR [[id number]] Collected:  09/17/17 1107    Specimen:  Blood Updated:  09/17/17 1128    aPTT [[id number]]  Collected:  09/17/17 1107    Specimen:  Blood Updated:  09/17/17 1128    CBC & Differential [[id number]] Collected:  09/17/17 1107    Specimen:  Blood Updated:  09/17/17 1129    Narrative:       The following orders were created for panel order CBC & Differential.  Procedure                               Abnormality         Status                     ---------                               -----------         ------                     CBC Auto Differential[[id number]]                            In process                   Please view results for these tests on the individual orders.    CBC Auto Differential [[id number]] Collected:  09/17/17 1107    Specimen:  Blood Updated:  09/17/17 1129           Radiology:    Imaging Results (last 24 hours)     Procedure Component Value Units Date/Time    US Gallbladder [[id number]] Collected:  09/16/17 2215     Updated:  09/16/17 2216    Narrative:       FINAL REPORT    TECHNIQUE:  Sonographic images of the right upper quadrant were obtained.    CLINICAL HISTORY:  ABNORMAL LABS. ABD PAIN.  END STAGE RENAL FAILURE.    FINDINGS:  The gallbladder is unremarkable. There is no intra-or extrahepatic biliary ductal dilation. There is no ascites. There is a small right pleural effusion.      Impression:       Small right pleural effusion. No hepatobiliary abnormality identified.    Authenticated by Dominik Hood M.D. on 09/16/2017 10:15:25 PM    CT Abdomen Pelvis Without Contrast [[id number]] Collected:  09/17/17 0000     Updated:  09/17/17 0002    Narrative:       FINAL REPORT    TECHNIQUE:  Axial images through the abdomen and pelvis were obtained by computed tomography.  IV contrast was withheld.    CLINICAL HISTORY:  LLQ ABD PAIN    FINDINGS:  Abdomen: There is cardiomegaly, small bilateral pleural effusions and pulmonary edema, consistent with CHF. The gallbladder and solid abdominal organs are normal, except for marked bilateral renal atrophy. No abnormality of the GI tract is  seen.    Pelvis:  The uterus, ovaries and urinary bladder show no abnormality. There is a small amount of ascites in the abdomen or pelvis. There is increased lung density, consistent with renal osteodystrophy.      Impression:       Congestive heart failure. Small amount of ascites in the abdomen and pelvis.    Authenticated by Dominik Hood M.D. on 09/17/2017 12:00:52 AM    CT Chest Pulmonary Embolism With Contrast [[id number]] Collected:  09/17/17 1023     Updated:  09/17/17 1024    Narrative:       FINAL REPORT    TECHNIQUE:  Postcontrast axial images of the chest were performed in a CTA protocol.This study was performed with technique to keep radiation doses as low as reasonably achievable, (ALARA). Individualized dose reduction techniques using automated exposure control or   adjustment of the MA and/or KV according to the patient's size were employed.    CLINICAL HISTORY:  SP CODE X2 POSS PE    FINDINGS:  The heart is normal in size.  There is axillary adenopathy measuring up to 20 mm.  ETT and NG tube are in good position.  There are small bilateral pleural effusions.  No pericardial effusion is identified.  There is no evidence of PE or dissection.    There is severe bilateral renal atrophy.  Anasarca and edema is seen in the mesentery.  There is reflux of contrast into the hepatic veins of uncertain significance.  There is diffuse paratracheal and mediastinal edema.  There are severe diffuse   groundglass opacities and consolidation in the upper lobes bilaterally.  There is patchy right lower lobe consolidation.  There is no evidence of aneurysm.      Impression:       No dissection or pulmonary embolism.    Diffuse edema and anasarca.    Severe bilateral renal atrophy.    Ground glass opacities and areas of consolidation which may be due to edema or pneumonia.    Authenticated by Wayne Torre MD on 09/17/2017 10:23:56 AM    XR Chest 1 View [[id number]] Collected:  09/17/17 1102     Updated:  09/17/17 1105     Narrative:       PROCEDURE: XR CHEST 1 VW-     INDICATION:  Confirm ET Tube Placement; I16.9-Hypertensive crisis,  unspecified; E87.70-Fluid overload, unspecified; R74.8-Abnormal levels  of other serum enzymes; K85.90-Acute pancreatitis without necrosis or  infection, unspecified; Z91.19-Patient's noncompliance with other  medical treatment and regimen     FINDINGS:  A portable view of the chest was obtained.  Comparison is  made to a prior exam dated 9/16/2017.   There is been interval placement  of an endotracheal tube with its tip in the mid thoracic trachea. A new  NG tube courses below the diaphragm. The heart is enlarged. There has  been interval improvement in bilateral lung opacities. No pneumothorax  is visualized. There are likely small pleural effusions.       Impression:       ET tube in appropriate position. Improved bilateral lung  opacities with persistent cardiomegaly.     This report was finalized on 9/17/2017 11:03 AM by Morena Askew MD.    XR Chest 1 View [[id number]] Collected:  09/17/17 1103     Updated:  09/17/17 1106    Narrative:       PROCEDURE: XR CHEST 1 VW-     INDICATION:  shortness of breath     FINDINGS:  A portable view of the chest was obtained.  Comparison is  made to a prior exam dated 7/6/2017.   The heart is enlarged. There are  bilateral mixed interstitial and alveolar opacities with bilateral  pleural effusions. Differential considerations include pulmonary edema  or pneumonia. There is no pneumothorax.       Impression:       Cardiomegaly with bilateral mixed interstitial and alveolar  opacities. This may represent pulmonary edema or bilateral pneumonia.  Recommend follow-up to resolution.     This report was finalized on 9/17/2017 11:04 AM by Morena Askew MD.          Medication Review:       azithromycin 500 mg Intravenous Q24H   chlorhexidine 15 mL Mouth/Throat Q12H   famotidine 20 mg Intravenous Daily   heparin (porcine) 60 Units/kg Intravenous Once    ipratropium-albuterol 3 mL Nebulization 4x Daily - RT   methylPREDNISolone sodium succinate 40 mg Intravenous Q6H   piperacillin-tazobactam 2.25 g Intravenous Once   piperacillin-tazobactam 2.25 g Intravenous Q8H   vancomycin 1,000 mg Intravenous Once         dextrose 50 mL/hr    EPINEPHrine 0.02-0.3 mcg/kg/min Last Rate: 0.02 mcg/kg/min (09/17/17 0705)   fentaNYL (SUBLIMAZE) infusion 10 mcg/ml 100 mL  mcg/hr Last Rate: 50 mcg/hr (09/17/17 0640)   heparin (porcine) 12 Units/kg/hr    nitroglycerin 5-200 mcg/min Last Rate: Stopped (09/17/17 0508)   norepinephrine 0.02-0.3 mcg/kg/min Last Rate: Stopped (09/17/17 0559)   Pharmacy to dose vancomycin     propofol 5-50 mcg/kg/min        Assessment/Plan     Problem List Items Addressed This Visit     None      Visit Diagnoses     Hypertensive crisis, unspecified    -  Primary    Hypervolemia, unspecified hypervolemia type        Elevated liver enzymes        Acute pancreatitis, unspecified complication status, unspecified pancreatitis type        Noncompliance              1.  Acute hypoxic respiratory failure requiring mechanical ventilation  2.  Cardiopulmonary arrest with V. Tach.  3.  Bilateral pneumonia, present on admission  4.  Sepsis  5.  COPD exacerbation  6.  Metabolic acidosis  7.  Shock liver  8.  Hyperkalemia  9.  End-stage renal disease on hemodialysis  10.  IV drug abuse  11.  Severe cardiomyopathy with ejection fraction of 10-15%/acute systolic CHF  12.  Hypoglycemia  13.  Metabolic encephalopathy    Plan:    Ordered CTA PE protocol which just showed groundglass opacities with no PE.  We'll place on heparin drip at the present time.  Check serial cardiac enzymes.  Propofol for sedation.  Check lab work in the a.m.  Consulted Dr. Urbina as well as Dr. Carcamo.  Discussed case with both of them.  Check serum drug screen, aspirin, Tylenol level.  Aspirin and Tylenol were negative.  Continue with vasopressors as needed.  We'll place the patient on D10 W at  the present time at 50 cc per hour.  Monitor blood sugar on an hourly basis.  Change Rocephin over to Zosyn.  Continue with vancomycin and Zosyn and Zithromax.  Patient is also on Solu-Medrol.  Patient continues to have poor prognosis, is critical at this point.  Communicated this with her aunts.  If patient continues not to respond, would consult neurology for possible EEG and opinion on encephalopathy.  Further recommendations will depend on clinical course.  Critical care time spent 35 minutes.    Joseluis Pedersen DO  09/17/17  11:45 AM

## 2017-09-17 NOTE — PROGRESS NOTES
Continued Stay Note  [facility]     Patient Name: Mandy Espino  MRN: [id number]  Today's Date: 9/17/2017    Admit Date: 9/16/2017          Discharge Plan       09/17/17 0836    Case Management/Social Work Plan    Additional Comments pt intubated  family not available   CM will continue to follow for discharge needs              Discharge Codes     None            Evelyn Wayne RN

## 2017-09-17 NOTE — PROGRESS NOTES
"   LOS: 0 days    Patient Care Team:  Neo Gresham DO as PCP - General (Family Medicine)    Chief Complaint:    Chief Complaint   Patient presents with   • Shortness of Breath       Subjective  Follow-up the end-stage renal disease    Interval History:   The patient was admitted yesterday with shortness of breath early this morning she had a cardiac arrest she was resuscitated initially on admission her potassium was within the normal range but after the cold her potassium was up to 9.1 she was dialyzed emergently potassium postdialysis was down to 4.3.  Patient is currently on the ventilator and she has a warming line..  Unresponsive.  Pupils reported to be nonreactive and the patient has been hypertensive neurology evaluation is pending    Review of Systems:   Not obtainable    Objective     Vital Signs  Temp:  [97.4 °F (36.3 °C)-97.7 °F (36.5 °C)] 97.7 °F (36.5 °C)  Heart Rate:  [] 88  Resp:  [17-46] 18  BP: ()/() 177/97  FiO2 (%):  [50 %-100 %] 50 %    Flowsheet Rows         First Filed Value    Admission Height  58\" (147.3 cm) Documented at 09/16/2017 1849    Admission Weight  90 lb (40.8 kg) Documented at 09/16/2017 1849          I/O this shift:  In: -   Out: 1500 [Other:1500]       Intake/Output Summary (Last 24 hours) at 09/17/17 1522  Last data filed at 09/17/17 1405   Gross per 24 hour   Intake                0 ml   Output             1500 ml   Net            -1500 ml       Physical Exam:  General Appearance: Unresponsive, orally intubated, no acute distress,   Skin: warm and dry  HEENT: Orally intubated, pupils round but nonreactive to light  Neck: supple, no JVD, trachea midline  Lungs: Bilateral rhonchi, unlabored breathing effort  Heart: RRR, normal S1 and S2, no S3, no rub  Abdomen: soft, not distended, very weak bowel sounds by auscultation  Extremities: no edema, cyanosis or clubbing, she has functional AV fistula in the left upper arm  Neuro: Unable to assess patient is " unresponsive      Results Review:      Results from last 7 days  Lab Units 09/17/17  1107 09/17/17  0647 09/17/17  0555 09/16/17 2010   SODIUM mmol/L  --  140 137 141   POTASSIUM mmol/L 4.3 8.1* 9.1* 4.7   CHLORIDE mmol/L  --  99 102 101   CO2 mmol/L  --  18.0* 11.0* 23.0*   BUN mg/dL  --  52* 51* 47*   CREATININE mg/dL  --  6.30* 6.10* 5.80*   CALCIUM mg/dL  --  6.9* 6.6* 7.3*   BILIRUBIN mg/dL  --   --  3.0* 0.9   ALK PHOS U/L  --   --  192* 183*   ALT (SGPT) U/L  --   --  562* 506*   AST (SGOT) U/L  --   --  546* 227*   GLUCOSE mg/dL  --  99* 158* 101*           Results from last 7 days  Lab Units 09/17/17  0647 09/16/17 2010   MAGNESIUM mg/dL  --  2.2   PHOSPHORUS mg/dL 10.3* 6.9*               Results from last 7 days  Lab Units 09/17/17  1107 09/17/17  0555 09/16/17 2010   WBC 10*3/mm3 18.47* 11.25* 15.10*   HEMOGLOBIN g/dL 9.3* 9.6* 8.8*   PLATELETS 10*3/mm3 149 122* 184         Results from last 7 days  Lab Units 09/17/17  1107   INR  1.97*         Imaging Results (last 24 hours)     Procedure Component Value Units Date/Time    US Gallbladder [[id number]] Collected:  09/16/17 2215     Updated:  09/16/17 2216    Narrative:       FINAL REPORT    TECHNIQUE:  Sonographic images of the right upper quadrant were obtained.    CLINICAL HISTORY:  ABNORMAL LABS. ABD PAIN.  END STAGE RENAL FAILURE.    FINDINGS:  The gallbladder is unremarkable. There is no intra-or extrahepatic biliary ductal dilation. There is no ascites. There is a small right pleural effusion.      Impression:       Small right pleural effusion. No hepatobiliary abnormality identified.    Authenticated by Dominik Hood M.D. on 09/16/2017 10:15:25 PM    CT Abdomen Pelvis Without Contrast [[id number]] Collected:  09/17/17 0000     Updated:  09/17/17 0002    Narrative:       FINAL REPORT    TECHNIQUE:  Axial images through the abdomen and pelvis were obtained by computed tomography.  IV contrast was withheld.    CLINICAL HISTORY:  LLQ ABD  PAIN    FINDINGS:  Abdomen: There is cardiomegaly, small bilateral pleural effusions and pulmonary edema, consistent with CHF. The gallbladder and solid abdominal organs are normal, except for marked bilateral renal atrophy. No abnormality of the GI tract is seen.    Pelvis:  The uterus, ovaries and urinary bladder show no abnormality. There is a small amount of ascites in the abdomen or pelvis. There is increased lung density, consistent with renal osteodystrophy.      Impression:       Congestive heart failure. Small amount of ascites in the abdomen and pelvis.    Authenticated by Dominik Hood M.D. on 09/17/2017 12:00:52 AM    CT Chest Pulmonary Embolism With Contrast [[id number]] Collected:  09/17/17 1023     Updated:  09/17/17 1024    Narrative:       FINAL REPORT    TECHNIQUE:  Postcontrast axial images of the chest were performed in a CTA protocol.This study was performed with technique to keep radiation doses as low as reasonably achievable, (ALARA). Individualized dose reduction techniques using automated exposure control or   adjustment of the MA and/or KV according to the patient's size were employed.    CLINICAL HISTORY:  SP CODE X2 POSS PE    FINDINGS:  The heart is normal in size.  There is axillary adenopathy measuring up to 20 mm.  ETT and NG tube are in good position.  There are small bilateral pleural effusions.  No pericardial effusion is identified.  There is no evidence of PE or dissection.    There is severe bilateral renal atrophy.  Anasarca and edema is seen in the mesentery.  There is reflux of contrast into the hepatic veins of uncertain significance.  There is diffuse paratracheal and mediastinal edema.  There are severe diffuse   groundglass opacities and consolidation in the upper lobes bilaterally.  There is patchy right lower lobe consolidation.  There is no evidence of aneurysm.      Impression:       No dissection or pulmonary embolism.    Diffuse edema and anasarca.    Severe bilateral  renal atrophy.    Ground glass opacities and areas of consolidation which may be due to edema or pneumonia.    Authenticated by Wayne Torre MD on 09/17/2017 10:23:56 AM    XR Chest 1 View [[id number]] Collected:  09/17/17 1102     Updated:  09/17/17 1105    Narrative:       PROCEDURE: XR CHEST 1 VW-     INDICATION:  Confirm ET Tube Placement; I16.9-Hypertensive crisis,  unspecified; E87.70-Fluid overload, unspecified; R74.8-Abnormal levels  of other serum enzymes; K85.90-Acute pancreatitis without necrosis or  infection, unspecified; Z91.19-Patient's noncompliance with other  medical treatment and regimen     FINDINGS:  A portable view of the chest was obtained.  Comparison is  made to a prior exam dated 9/16/2017.   There is been interval placement  of an endotracheal tube with its tip in the mid thoracic trachea. A new  NG tube courses below the diaphragm. The heart is enlarged. There has  been interval improvement in bilateral lung opacities. No pneumothorax  is visualized. There are likely small pleural effusions.       Impression:       ET tube in appropriate position. Improved bilateral lung  opacities with persistent cardiomegaly.     This report was finalized on 9/17/2017 11:03 AM by Morena Askew MD.    XR Chest 1 View [[id number]] Collected:  09/17/17 1103     Updated:  09/17/17 1106    Narrative:       PROCEDURE: XR CHEST 1 VW-     INDICATION:  shortness of breath     FINDINGS:  A portable view of the chest was obtained.  Comparison is  made to a prior exam dated 7/6/2017.   The heart is enlarged. There are  bilateral mixed interstitial and alveolar opacities with bilateral  pleural effusions. Differential considerations include pulmonary edema  or pneumonia. There is no pneumothorax.       Impression:       Cardiomegaly with bilateral mixed interstitial and alveolar  opacities. This may represent pulmonary edema or bilateral pneumonia.  Recommend follow-up to resolution.     This report was  finalized on 9/17/2017 11:04 AM by Morena Askew MD.          azithromycin 500 mg Intravenous Q24H   chlorhexidine 15 mL Mouth/Throat Q12H   famotidine 20 mg Intravenous Daily   ipratropium-albuterol 3 mL Nebulization 4x Daily - RT   methylPREDNISolone sodium succinate 40 mg Intravenous Q6H   piperacillin-tazobactam 2.25 g Intravenous Q8H   vancomycin 1,000 mg Intravenous Once       dextrose 50 mL/hr Last Rate: 50 mL/hr (09/17/17 1219)   fentaNYL (SUBLIMAZE) infusion 10 mcg/ml 100 mL  mcg/hr Last Rate: 50 mcg/hr (09/17/17 0640)   heparin (porcine) 12 Units/kg/hr Last Rate: 12 Units/kg/hr (09/17/17 1311)   nitroglycerin 5-200 mcg/min Last Rate: Stopped (09/17/17 0508)   Pharmacy to dose vancomycin     propofol 5-50 mcg/kg/min        Medication Review:   Current Facility-Administered Medications   Medication Dose Route Frequency Provider Last Rate Last Dose   • AZITHROMYCIN 500 MG/250 ML 0.9% NS IVPB (vial-mate)  500 mg Intravenous Q24H Madhu Mota MD   500 mg at 09/17/17 0436   • chlorhexidine (PERIDEX) 0.12 % solution 15 mL  15 mL Mouth/Throat Q12H Madhu Mota MD   15 mL at 09/17/17 1204   • dextrose 10 % infusion  50 mL/hr Intravenous Continuous Joseluis Pedersen DO 50 mL/hr at 09/17/17 1219 50 mL/hr at 09/17/17 1219   • famotidine (PEPCID) injection 20 mg  20 mg Intravenous Daily Madhu Mota MD   20 mg at 09/17/17 1205   • fentaNYL (SUBLIMAZE) infusion 10 mcg/ml 100 mL   mcg/hr Intravenous Continuous Madhu Mota MD 5 mL/hr at 09/17/17 0640 50 mcg/hr at 09/17/17 0640   • heparin (porcine) 1000 units/mL injection 1,400 Units  30 Units/kg Intravenous PRN Joseluis Pedersen, DO       • heparin (porcine) 1000 units/mL injection 2,790 Units  60 Units/kg Intravenous PRN Joseluis Pedersen, DO       • heparin 03652 units/250 mL (100 units/mL) in 0.45 % NaCl infusion  12 Units/kg/hr Intravenous Titrated Joseluis Pedersen,  5.58 mL/hr at 09/17/17 1311 12 Units/kg/hr at 09/17/17 1311   •  ipratropium-albuterol (DUO-NEB) nebulizer solution 3 mL  3 mL Nebulization Q1H PRN Madhu Mota MD       • ipratropium-albuterol (DUO-NEB) nebulizer solution 3 mL  3 mL Nebulization 4x Daily - RT Madhu Mota MD   3 mL at 09/17/17 1315   • methylPREDNISolone sodium succinate (SOLU-Medrol) injection 40 mg  40 mg Intravenous Q6H Madhu Mota MD   40 mg at 09/17/17 1205   • morphine injection 2 mg  2 mg Intravenous Q2H PRN Madhu Mota MD   2 mg at 09/17/17 1030    And   • naloxone (NARCAN) injection 0.4 mg  0.4 mg Intravenous Q5 Min PRN Madhu Mota MD       • nitroglycerin 50 mg/250 mL (0.2 mg/mL) infusion  5-200 mcg/min Intravenous Titrated Joseluis Pedersen DO   Stopped at 09/17/17 0508   • ondansetron (ZOFRAN) injection 4 mg  4 mg Intravenous Q6H PRN Madhu Mota MD   4 mg at 09/17/17 0149   • Pharmacy to dose vancomycin   Does not apply Continuous PRN Madhu Mota MD       • piperacillin-tazobactam (ZOSYN) in iso-osmotic dextrose IVPB 2.25 g (premix)  2.25 g Intravenous Q8H Joseluis Pedersen DO       • propofol (DIPRIVAN) infusion 10 mg/mL 100 mL  5-50 mcg/kg/min Intravenous Titrated Joseluis Pedersen DO       • sodium chloride 0.9 % bolus 1,000 mL  1,000 mL Intravenous PRN Madhu Mota MD       • sodium chloride 0.9 % flush 1-10 mL  1-10 mL Intravenous PRN Madhu Mota MD       • sodium chloride 0.9 % flush 10 mL  10 mL Intravenous PRN REY Ellsworth       • [START ON 9/18/2017] vancomycin (VANCOCIN) IVPB 500 mg in 100 mL NS  500 mg Intravenous Q48H PRN Madhu Mota MD       • vancomycin 1000 mg in sodium chloride 0.9% 250 mL IVPB  1,000 mg Intravenous Once Madhu Mota MD           Assessment/Plan   1.  End-stage renal disease on maintenance hemodialysis every Tuesday Thursday and Saturday she had her dialysis yesterday apparently and then she presented with increasing shortness of air and she had cardiac arrest early this morning and she was hyperkalemic the cause for her  cardiac status is not very clear at this time and she underwent dialysis earlier today without any difficulties.  She had severe hyperkalemia earlier this morning which improved after dialysis potassium down to 4.3  2.  Acute respiratory failure currently on the ventilator  3.  Severe hypertension  4.  History of substance abuse  5.  Bipolar disorder  6.  Anemia of chronic kidney disease  7.  Current hypoglycemia currently on the infusion of D10%      Plan:  1.  To continue the same treatment  2.  Treat the hypertension with the labetalol IV as needed  3.  Surveillance labs  4.  Prognosis appears to be poor at this time            Joseph Mojica MD  09/17/17  3:22 PM

## 2017-09-17 NOTE — H&P
I was called at 5:07 AM by the nursing staff from the ICU, but the time I got the patient she had a cardiopulmonary arrest and was being coordinated, ER physician and rest of the code team was present.  For details see the code sheet, initially patient responded to the dose of epinephrine as well as to other measures she was intubated through the process.  She received 3 doses of epinephrine she was finally stabilizing and went into V. tach she was shocked at that time.  She was started on Levophed drip blood pressure was becoming more stable but at the same time heart rate was dropping she was given atropine and switched from Levophed to epinephrine drip and became more stable.    Finally the  was informed and he came in was able to communicate to him about she is not doing well and he is a American-speaking,  has been consulted and daytime physician will have further discussion with him about her.    She has long-standing history of noncompliance with medications are fairly compliant with dialysis recently has been involved in drug abuse.  She does have poor prognosis.

## 2017-09-17 NOTE — PLAN OF CARE
Problem: Patient Care Overview (Adult)  Goal: Plan of Care Review  Outcome: Ongoing (interventions implemented as appropriate)    09/17/17 0916   Coping/Psychosocial Response Interventions   Plan Of Care Reviewed With spouse   Patient Care Overview   Progress no change         Problem: Mechanical Ventilation, Invasive (Adult)  Goal: Signs and Symptoms of Listed Potential Problems Will be Absent or Manageable (Mechanical Ventilation, Invasive)  Outcome: Ongoing (interventions implemented as appropriate)    09/17/17 0916   Mechanical Ventilation, Invasive   Problems Assessed (Mechanical Ventilation, Invasive) all   Problems Present (Mechanical Ventilation, Invasive) none

## 2017-09-17 NOTE — PAYOR COMM NOTE
"Please review for inpat auth   ICd 10 I16.0  [facility] provider ID [id number]  Dr Mota NPI  [id number]  Jermaine Espino (26 y.o. Female)     Date of Birth Social Security Number Address Home Phone MRN    1990  [address] [place] [address] [phone] [id number]    Sikh Marital Status          Synagogue        Admission Date Admission Type Admitting Provider Attending Provider Department, Room/Bed    9/16/17 Emergency Madhu Mota MD Akhtar, Rizwan, MD [facility] INTENSIVE CARE, I02/1    Discharge Date Discharge Disposition Discharge Destination                      Attending Provider: Madhu Mota MD     Allergies:  Acetaminophen, Hydrocodone, Ibuprofen, Lortab [Hydrocodone-acetaminophen], Ultram [Tramadol Hcl], Ciprofloxacin    Isolation:  Spore, Contact   Infection:  C.difficile (06/19/17), MRSA (06/15/17), Hepatitis C (04/19/17)   Code Status:  FULL    Ht:  62\" (157.5 cm)   Wt:  102 lb 9.6 oz (46.5 kg)    Admission Cmt:  None   Principal Problem:  Hypertensive urgency, malignant [I16.0]                 Active Insurance as of 9/16/2017     Primary Coverage     Payor Plan Insurance Group Employer/Plan Group    [employer] [place] [employer] [place]ICA[place]      Payor Plan Address Payor Plan Phone Number Effective From Effective To    [address] [address] [phone] 6/1/2016     [place] [address]       Subscriber Name Subscriber Birth Date Member ID       JERMAINE ESPINO 1990 [id number]                 Emergency Contacts      (Rel.) Home Phone Work Phone Mobile Phone    Jin Espino (Spouse) [phone] -- --               History & Physical      Madhu Mota MD at 9/17/2017  1:07 AM              [facility] Medicine Services  HISTORY AND PHYSICAL    Primary Care Physician: Neo Gresham DO    Subjective     Chief Complaint:    Chief Complaint   Patient presents with   • Shortness of Breath       History of Present Illness: "   Patient is 26-year-old unfortunate white female with multiple medical problems as below in the past medical history.  She called EMS and told them that she's been short of breath has taken about 10 nebulizer treatments with no significant improvement she was placed on BiPAP and brought into the emergency room.  She was doing fairly well and was finally weaned off the BiPAP with a 4 L nasal oxygen with good ABG.  By the time I went in to see her she was begging to be placed back on the BiPAP.  She has a long-standing history of noncompliance with medications usually has been very compliant with dialysis in the past.  She continues to smoke and has been noted to have IV drug abuse and use even while she was in the hospital once.  She has been referred to palliative care which she has refused to follow up with.  I have reviewed labs/imaging/records from this hospitalization, including ER staff to establish a comprehensive understanding of this patient's clinical issues, as well as to establish plan of care appropriately.     Review of Systems   1. Constitutional: Negative for fever. Negative for chills, diaphoresis, positive fatigue and unexpected weight change.   2. HENT: Negative for congestion and hearing loss.   3. Eyes: Negative for redness and visual disturbance.   4. Respiratory: Positive for shortness of breath. Negative for chest pain .  Positive for cough and chest tightness.   5. Cardiovascular: Negative for chest pain and palpitations.   6. Gastrointestinal: Negative for abdominal distention, abdominal pain and blood in stool.   7. Endocrine: Negative for cold intolerance and heat intolerance.   8. Genitourinary: Negative for difficulty urinating, dysuria and frequency.   9. Musculoskeletal: Negative for arthralgias, back pain and myalgias.   10. Skin: Negative for color change, rash and wound.   11. Neurological: Negative for syncope, weakness and headaches.   12. Hematological: Negative for adenopathy.  Does not bruise/bleed easily.   13. Psychiatric/Behavioral: Negative for confusion. The patient is not nervous/anxious.     Past Medical History:   Past Medical History:   Diagnosis Date   • Abdominal pain    • Anemia    • Anxiety    • Asthma    • Bipolar disorder    • CKD (chronic kidney disease), stage IV    • Constipation    • COPD (chronic obstructive pulmonary disease)    • Depression    • End stage renal disease on dialysis    • Episodic drug abuse 8/12/2017   • Esophageal reflux     reflux   • Esophagitis determined by biopsy 2014   • Fahr's syndrome    • Hepatitis C antibody test positive    • Hyperparathyroidism    • Hypertension    • Hypocalcemia    • Nausea and vomiting    • Non-traumatic rhabdomyolysis 5/2/2017   • Pancreatitis    • Pneumonia of right lung due to infectious organism 5/2/2017   • 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   • Recurrent urinary tract infection    • Renal insufficiency    • Upper gastrointestinal bleeding        Past Surgical History:  Past Surgical History:   Procedure Laterality Date   • BRONCHOSCOPY THORACOTOMY Right 6/14/2017    Procedure: BRONCHOSCOPY RIGHT THORACOTOMY WITH LUNG DECORTICATION ;  Surgeon: Shawn Hicks MD;  Location: [facility];  Service:    • DIALYSIS FISTULA CREATION      port   • DILATATION AND CURETTAGE     • ESOPHAGOSCOPY / EGD  2014    esophagitis   • EXPLORATORY LAPAROTOMY      For uterine and ovarian issues   • KIDNEY SURGERY Left 2013    Biopsy       Family History: family history includes Arthritis in her mother; Diabetes in her other; Hyperlipidemia in her other; Hypertension in her brother and mother; Kidney disease in her father and other; Lung cancer in her other; Pancreatic cancer in her father.    Social History:  reports that she has been smoking Cigarettes.  She has been smoking about 0.50 packs per day. She does not have any smokeless tobacco history on file. She reports that she uses illicit drugs, including Marijuana. She reports that she  "does not drink alcohol.    Medications:    (Not in a hospital admission)    Allergies:  Allergies   Allergen Reactions   • Acetaminophen Itching   • Hydrocodone Itching   • Ibuprofen    • Lortab [Hydrocodone-Acetaminophen]      Lortab TABS   • Ultram [Tramadol Hcl]      \"kidney doctor said no\"   • Ciprofloxacin Rash         Objective     Physical Exam:  Vital Signs: BP (!) 171/121  Pulse 90  Temp 97.4 °F (36.3 °C) (Oral)   Resp (!) 46  Ht 58\" (147.3 cm)  Wt 90 lb (40.8 kg)  SpO2 100%  BMI 18.81 kg/m2     Physical Exam:     General Appearance:   Alert, cooperative, in no acute distress.     Head:   Normocephalic, without obvious abnormality, atraumatic.     Eyes:       Normal, conjunctivae and sclerae, no icterus, no pallor, corneas clear, PERRLA        Throat:   Oral mucosa dry      Neck:  No adenopathy, supple, trachea midline, no thyromegaly, no carotid bruit, no JVD      Back:   No CVA tenderness on Percussion.     Lungs:    Clear to auscultation and fair air movement noted.      Heart:   Regular rhythm and normal rate, normal S1 and S2.       Abdomen:   Normal bowel sounds, no masses, no organomegaly, soft non-tender, non-distended, no guarding, no rebound tenderness        Extremities:  Moves all extremities, no edema, no cyanosis, no redness.     Pulses:  Pulses palpable and equal bilaterally but weak.     Skin:  No bleeding, bruising or rash        Neurologic:  Cranial nerves grossly intact, move all extremities         Results Review:  Lab Results (last 7 days)     Procedure Component Value Units Date/Time    Blood Gas, Arterial With Co-Ox [[id number]]  (Abnormal) Collected:  09/16/17 1913    Specimen:  Arterial Blood Updated:  09/16/17 1912     Site Arterial: left radial     Reynaldo's Test Positive     pH, Arterial 7.281 pH units      pCO2, Arterial 48.8 (H) mm Hg      pO2, Arterial 485.0 (H) mm Hg      HCO3, Arterial 23.0 mmol/L      Base Excess, Arterial -3.8 mmol/L      O2 Saturation, Arterial 99.9 % "      Hemoglobin, Blood Gas 9.5 (L) g/dL      Oxyhemoglobin 97.1 %      Methemoglobin 0.60 %      Carboxyhemoglobin 2.2 %      Modality BiPap     FIO2 100 %     POC Glucose Fingerstick [[id number]]  (Abnormal) Collected:  09/16/17 1917    Specimen:  Blood Updated:  09/16/17 1920     Glucose 148 (H) mg/dL       Serial Number: [id number]:  [id number]       CBC & Differential [[id number]] Collected:  09/16/17 2010    Specimen:  Blood Updated:  09/16/17 2029    Narrative:       The following orders were created for panel order CBC & Differential.  Procedure                               Abnormality         Status                     ---------                               -----------         ------                     CBC Auto Differential[[id number]]        Abnormal            Final result                 Please view results for these tests on the individual orders.    CBC Auto Differential [[id number]]  (Abnormal) Collected:  09/16/17 2010    Specimen:  Blood from Hand, Left Updated:  09/16/17 2029     WBC 15.10 (H) 10*3/mm3      RBC 2.76 (L) 10*6/mm3      Hemoglobin 8.8 (L) g/dL      Hematocrit 28.5 (L) %      .3 (H) fL      MCH 31.9 (H) pg      MCHC 30.9 g/dL      RDW 19.9 (H) %      RDW-SD 75.0 (H) fl      MPV 10.1 fL      Platelets 184 10*3/mm3      Neutrophil % 85.6 (H) %      Lymphocyte % 7.3 (L) %      Monocyte % 4.7 %      Eosinophil % 1.1 %      Basophil % 0.5 %      Immature Grans % 0.8 (H) %      Neutrophils, Absolute 12.93 (H) 10*3/mm3      Lymphocytes, Absolute 1.10 10*3/mm3      Monocytes, Absolute 0.71 10*3/mm3      Eosinophils, Absolute 0.17 10*3/mm3      Basophils, Absolute 0.07 10*3/mm3      Immature Grans, Absolute 0.12 (H) 10*3/mm3      nRBC 0.3 (H) /100 WBC     Lactic Acid, Plasma [[id number]]  (Normal) Collected:  09/16/17 2010    Specimen:  Blood from Hand, Left Updated:  09/16/17 2034     Lactate 1.3 mmol/L     Magnesium [[id number]]  (Normal) Collected:  09/16/17 2010    Specimen:  Blood  from Hand, Left Updated:  09/16/17 2045     Magnesium 2.2 mg/dL     Troponin [[id number]]  (Normal) Collected:  09/16/17 2010    Specimen:  Blood from Hand, Left Updated:  09/16/17 2045     Troponin I 0.030 ng/mL     Narrative:       Normal Patient Upper Reference Limit (URL) (99th Percentile)=0.03 ng/mL   Non-AMI Illness Reference Limit=0.03-0.11 ng/mL   AMI Confirmation=0.12 ng/mL and above    Lipase [[id number]]  (Abnormal) Collected:  09/16/17 2010    Specimen:  Blood from Hand, Left Updated:  09/16/17 2045     Lipase 403 (H) U/L     Comprehensive Metabolic Panel [[id number]]  (Abnormal) Collected:  09/16/17 2010    Specimen:  Blood from Hand, Left Updated:  09/16/17 2050     Glucose 101 (H) mg/dL      BUN 47 (H) mg/dL      Creatinine 5.80 (H) mg/dL      Sodium 141 mmol/L      Potassium 4.7 mmol/L      Chloride 101 mmol/L      CO2 23.0 (L) mmol/L      Calcium 7.3 (L) mg/dL      Total Protein 6.6 g/dL      Albumin 4.10 g/dL      ALT (SGPT) 506 (C) U/L      AST (SGOT) 227 (H) U/L      Alkaline Phosphatase 183 (H) U/L      Total Bilirubin 0.9 mg/dL      eGFR Non African Amer 9 (L) mL/min/1.73      Globulin 2.5 gm/dL      A/G Ratio 1.6 g/dL      BUN/Creatinine Ratio 8.1     Anion Gap 21.7 mmol/L     Narrative:       Abnormal estimated GFR should be followed by more specific studies to confirm end stage chronic renal disease. The equation used for calculation may not be accurate for patients less than 19 years old, greater than 70 years old, patients at extremes of weight, malnutrition, or with acute renal dysfunction.    Phosphorus [[id number]]  (Abnormal) Collected:  09/16/17 2010    Specimen:  Blood from Hand, Left Updated:  09/16/17 2050     Phosphorus 6.9 (C) mg/dL     Occult Blood X 1, Stool [[id number]]  (Normal) Collected:  09/16/17 2122    Specimen:  Stool from Per Rectum Updated:  09/16/17 2134     Fecal Occult Blood Negative    BNP [[id number]] Collected:  09/16/17 2010    Specimen:  Blood from Hand, Left  Updated:  09/16/17 2147     proBNP -- pg/mL       >656,000 pg/ml       Narrative:       >656,000 pg/ml    Blood Gas, Arterial With Co-Ox [[id number]]  (Abnormal) Collected:  09/16/17 2242    Specimen:  Arterial Blood Updated:  09/16/17 2241     Site Arterial: left radial     Reynaldo's Test Positive     pH, Arterial 7.391 pH units      pCO2, Arterial 40.0 mm Hg      pO2, Arterial 66.4 (L) mm Hg      HCO3, Arterial 24.2 mmol/L      Base Excess, Arterial -0.7 mmol/L      O2 Saturation, Arterial 92.6 %      Hemoglobin, Blood Gas 8.5 (L) g/dL      Oxyhemoglobin 89.5 (L) %      Methemoglobin 1.10 %      Carboxyhemoglobin 2.3 %      Modality Cannula - Nasal     FIO2 28 %     POC Glucose Fingerstick [[id number]]  (Abnormal) Collected:  09/16/17 2345    Specimen:  Blood Updated:  09/16/17 2350     Glucose 131 (H) mg/dL       Serial Number: [id number]:  [id number]           Imaging Results (last 72 hours)     Procedure Component Value Units Date/Time    XR Chest 1 View [[id number]] Updated:  09/16/17 2039    US Gallbladder [[id number]] Collected:  09/16/17 2215     Updated:  09/16/17 2216    Narrative:       FINAL REPORT    TECHNIQUE:  Sonographic images of the right upper quadrant were obtained.    CLINICAL HISTORY:  ABNORMAL LABS. ABD PAIN.  END STAGE RENAL FAILURE.    FINDINGS:  The gallbladder is unremarkable. There is no intra-or extrahepatic biliary ductal dilation. There is no ascites. There is a small right pleural effusion.      Impression:       Small right pleural effusion. No hepatobiliary abnormality identified.    Authenticated by Dominik Hood M.D. on 09/16/2017 10:15:25 PM    CT Abdomen Pelvis Without Contrast [[id number]] Collected:  09/17/17 0000     Updated:  09/17/17 0002    Narrative:       FINAL REPORT    TECHNIQUE:  Axial images through the abdomen and pelvis were obtained by computed tomography.  IV contrast was withheld.    CLINICAL HISTORY:  LLQ ABD PAIN    FINDINGS:  Abdomen: There is cardiomegaly, small  bilateral pleural effusions and pulmonary edema, consistent with CHF. The gallbladder and solid abdominal organs are normal, except for marked bilateral renal atrophy. No abnormality of the GI tract is seen.    Pelvis:  The uterus, ovaries and urinary bladder show no abnormality. There is a small amount of ascites in the abdomen or pelvis. There is increased lung density, consistent with renal osteodystrophy.      Impression:       Congestive heart failure. Small amount of ascites in the abdomen and pelvis.    Authenticated by Dominik Hood M.D. on 09/17/2017 12:00:52 AM              I have personally reviewed and interpreted available lab data, radiology studies and ECG obtained at time of admission.     Assessment / Plan     Assessment/Problem List:   Principal Problem:    Hypertensive urgency, malignant  Active Problems:    Severe anxiety with panic    COPD exacerbation    End stage renal disease on dialysis    Tobacco use    Headache    Bipolar disorder    Depression    Esophageal reflux    Hypocalcemia    Chronic pain    COPD (chronic obstructive pulmonary disease)    Hepatitis C antibody positive in blood    IV drug abuse    Anemia of ESRD    Medical non-compliance    Neuropathic pain of both legs      Plan:  26-year-old unfortunate white female presented with what appears to be a COPD exacerbation noted to have increased blood pressure, responded to CPAP as well as oxygen.  She was started on IV nitroglycerin and a dose of labetalol given with some improvement in blood pressure.    She'll be admitted to ICU will continue IV nitroglycerin, start with IV Rocephin and Zithromax as well as steroids and nebulizer treatments.    Hypertensive emergency is being controlled with nitroglycerin and oral medications will be restarted which are her home medicine she usually responds well to these medications.    Increased liver enzymes noted will continue to follow daily labs.  See if they improve.  She does have a history of  hepatitis C positive.  Lipase was also elevated but she denies any abdominal pain nausea or vomiting at this point.  Right upper quadrant ultrasound was negative as well.    Details were discussed with the patient as well as family in the room.   Further recommendations will depend on clinical course of the patient during the current hospitalization.      I also discussed the details with the nursing staff.    Rest as ordered.    Anticipated stay is  greater than 2 midnights.    Madhu Mota MD 09/17/17 1:20 AM    EMR Dragon/Transcription disclaimer:   Much of this encounter note is an electronic transcription/translation of spoken language to printed text. The electronic translation of spoken language may permit erroneous, or at times, nonsensical words or phrases to be inadvertently transcribed; Although I have reviewed the note for such errors, some may still exist.      Electronically signed by Madhu Mota MD at 9/17/2017  6:07 AM      Madhu Mota MD at 9/17/2017  6:56 AM          I was called at 5:07 AM by the nursing staff from the ICU, but the time I got the patient she had a cardiopulmonary arrest and was being coordinated, ER physician and rest of the code team was present.  For details see the code sheet, initially patient responded to the dose of epinephrine as well as to other measures she was intubated through the process.  She received 3 doses of epinephrine she was finally stabilizing and went into V. tach she was shocked at that time.  She was started on Levophed drip blood pressure was becoming more stable but at the same time heart rate was dropping she was given atropine and switched from Levophed to epinephrine drip and became more stable.    Finally the  was informed and he came in was able to communicate to him about she is not doing well and he is a Nepali-speaking,  has been consulted and daytime physician will have further discussion with him about her.    She has  long-standing history of noncompliance with medications are fairly compliant with dialysis recently has been involved in drug abuse.  She does have poor prognosis.     Electronically signed by Madhu Mota MD at 9/17/2017  7:38 AM           Emergency Department Notes      REY Ellsworth at 9/16/2017  7:22 PM     Attestation signed by Archie Parsons MD at 9/17/2017  1:15 AM              For this patient encounter, I reviewed the NP or PA documentation, treatment plan, and medical decision making. Archie Parsons MD 9/17/2017 1:15 AM                               Subjective   History of Present Illness  This is a 26-year-old female who is well-known to our facility come in today Brought in by EMS with respiratory distress.  When she was brought and she is brought in on BiPAP.  EMS reports that she had hemodialysis yesterday and today she developed sudden onset of shortness of breath and diarrhea she states that she took 10 nebulized treatments prior to her EMSs arrival.  Here she complains of increasing shortness of breath and generalized pain and diarrhea.  Review of Systems   Constitutional: Positive for chills and fatigue.   Respiratory: Positive for shortness of breath.    Cardiovascular: Positive for chest pain.   Gastrointestinal: Positive for diarrhea.   Neurological: Positive for weakness.       Past Medical History:   Diagnosis Date   • Abdominal pain    • Anemia    • Anxiety    • Asthma    • Bipolar disorder    • CKD (chronic kidney disease), stage IV    • Constipation    • COPD (chronic obstructive pulmonary disease)    • Depression    • End stage renal disease on dialysis    • Episodic drug abuse 8/12/2017   • Esophageal reflux     reflux   • Esophagitis determined by biopsy 2014   • Fahr's syndrome    • Hepatitis C antibody test positive    • Hyperparathyroidism    • Hypertension    • Hypocalcemia    • Nausea and vomiting    • Non-traumatic rhabdomyolysis 5/2/2017   • Pancreatitis    •  "Pneumonia of right lung due to infectious organism 5/2/2017   • 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   • Recurrent urinary tract infection    • Renal insufficiency    • Upper gastrointestinal bleeding        Allergies   Allergen Reactions   • Acetaminophen Itching   • Hydrocodone Itching   • Ibuprofen    • Lortab [Hydrocodone-Acetaminophen]      Lortab TABS   • Ultram [Tramadol Hcl]      \"kidney doctor said no\"   • Ciprofloxacin Rash       Past Surgical History:   Procedure Laterality Date   • BRONCHOSCOPY THORACOTOMY Right 6/14/2017    Procedure: BRONCHOSCOPY RIGHT THORACOTOMY WITH LUNG DECORTICATION ;  Surgeon: Shawn Hicks MD;  Location: [facility];  Service:    • DIALYSIS FISTULA CREATION      port   • DILATATION AND CURETTAGE     • ESOPHAGOSCOPY / EGD  2014    esophagitis   • EXPLORATORY LAPAROTOMY      For uterine and ovarian issues   • KIDNEY SURGERY Left 2013    Biopsy       Family History   Problem Relation Age of Onset   • Arthritis Mother    • Hypertension Mother    • Kidney disease Father      Chronic renal failure syndrome   • Pancreatic cancer Father    • Hypertension Brother    • Kidney disease Other    • Diabetes Other      Uncle   • Lung cancer Other      Grandmother   • Hyperlipidemia Other      High cholesterol - Uncle       Social History     Social History   • Marital status:      Spouse name: N/A   • Number of children: N/A   • Years of education: N/A     Social History Main Topics   • Smoking status: Former Smoker     Quit date: 6/18/2017   • Smokeless tobacco: None   • Alcohol use No   • Drug use: Yes     Special: Marijuana   • Sexual activity: Defer     Other Topics Concern   • None     Social History Narrative    She is  and not working. She denies alcohol or drug use. She denies recent opiate or benzo use. She does smoke and has used marijuana.            Objective   Physical Exam   Constitutional: She appears distressed.   Nursing note and vitals reviewed.  GEN: up on bed in " tripod position. bipap in place. resp 44 times per minute.  Head: Normocephalic, atraumatic  Eyes: Pupils equal round reactive to light  ENT: unable to assess  Chest: Nontender to palpation  Cardiovascular: tachycardia  Lungs: crackles in bases  Abdomen: Soft, tender throughout  Extremities: edema bilat lower extremities. +1 pulses  Neuro: GCS 15  Psych: Mood and affect are aggitated      Procedures        ED Course  ED Course   Comment By Time   Complaining of increase in anxiety. States if we don't give her something she wants to go to another hospital. Madeleine Contreras, APRN 09/16 2029   Patient did not tolerate removal of BiPAP became extremely anxious and stated worsening shortness of breath.  We put her back on BiPAP and the PO2 of 35% Madeleine Contreras, APRN 09/16 2100   Blood pressure remains high, continued tachycardia, also still having stools.  Continued to complain of generalized abdominal pain worse to the left lower quadrant.  CT scan ordered, Hemoccult ordered. Madeleine Contreras, APRN 09/16 2110   Heart rate improved. Blood pressure improving. Bpap removed and placed on nasal canula. Will repeat abg. Madeleine Contreras, APRN 09/16 2236      ABG appears to be metabolic acidosis pH is 7.281 PCO2 is 48.8 PO2 is 485 at this time RT decreased PO2 down to 60% patient tolerating well.  Point-of-care glucose 148.    20:42 after reviewing chest x-ray we will go ahead and try to wean off of the BiPAP and we will repeat her ABG 30 minutes after she is weaned off.  This is metabolic acidosis.  I suspect this may be secondary to the multiple albuterol neb treatment she had prior to arrival we are still waiting on the rest of her labs to return.  We will also go ahead and give her Ativan 0.5 mg IV for her increased anxiety.    21:19 will go ahead with another dose of ativan. I am not sure if she is having withdraws from her valium because she states she has been out of them for 2 days. Given her heart rate and her  blood pressure this may be the case. We will also order CT of abdomen and us of her gallbladder after her labs revealed elevated lipase and liver enzymes. She is also reports diarrhea and LLQ pain for two days.         MDM  Number of Diagnoses or Management Options  Acute pancreatitis, unspecified complication status, unspecified pancreatitis type:   Elevated liver enzymes:   Hypertensive crisis, unspecified:   Hypervolemia, unspecified hypervolemia type:   Noncompliance:      Amount and/or Complexity of Data Reviewed  Clinical lab tests: ordered and reviewed  Tests in the radiology section of CPT®:  ordered and reviewed  Tests in the medicine section of CPT®:  ordered and reviewed  Discussion of test results with the performing providers: yes  Decide to obtain previous medical records or to obtain history from someone other than the patient: yes  Obtain history from someone other than the patient: yes  Review and summarize past medical records: yes  Discuss the patient with other providers: yes  Independent visualization of images, tracings, or specimens: yes    Risk of Complications, Morbidity, and/or Mortality  Presenting problems: high  Diagnostic procedures: high  Management options: high    Critical Care  Total time providing critical care: > 105 minutes    Patient Progress  Patient progress: stable      Final diagnoses:   Hypervolemia, unspecified hypervolemia type   Elevated liver enzymes   Acute pancreatitis, unspecified complication status, unspecified pancreatitis type   Noncompliance   Hypertensive crisis, unspecified            REY Ellsworth  09/17/17 0028       REY Ellsworth  09/17/17 0029       Electronically signed by Archie Parsons MD at 9/17/2017  1:15 AM      Mandy Kan at 9/17/2017 12:07 AM          Dr. Mota called for REY Richard with answer.     Mandy Kan  09/17/17 0008       Electronically signed by Mandy Kan at 9/17/2017 12:08 AM         Hospital Medications (active)       Dose Frequency Start End    ALPRAZolam (XANAX) tablet 1 mg 1 mg Nightly PRN 9/17/2017     Sig - Route: Take 2 tablets by mouth At Night As Needed for Anxiety or Sleep. - Oral    amiodarone (CORDARONE) injection  Code / Trauma / Sedation Medication 9/17/2017 9/17/2017    Sig - Route: Infuse  into a venous catheter Emergency Use. - Intravenous    amitriptyline (ELAVIL) tablet 10 mg 10 mg Nightly 9/17/2017     Sig - Route: Take 1 tablet by mouth Every Night. - Oral    atropine injection  Code / Trauma / Sedation Medication 9/17/2017 9/17/2017    Sig: Emergency Use.    AZITHROMYCIN 500 MG/250 ML 0.9% NS IVPB (vial-mate) 500 mg Every 24 Hours 9/17/2017     Sig - Route: Infuse 500 mg into a venous catheter Daily. - Intravenous    calcium carbonate (TUMS) chewable tablet 500 mg (200 mg elemental) 2 tablet 3 Times Daily PRN 9/17/2017     Sig - Route: Chew 1,000 mg 3 (Three) Times a Day As Needed for Heartburn. - Oral    calcium gluconate 10 % injection  - ADS Override Pull   9/17/2017 9/17/2017    Notes to Pharmacy: Created by cabinet override    cefTRIAXone (ROCEPHIN) 1 g/100 mL 0.9% NS VTB (mbp) 1 g Every 24 Hours 9/17/2017     Sig - Route: Infuse 100 mL into a venous catheter Daily. - Intravenous    chlorhexidine (PERIDEX) 0.12 % solution 15 mL 15 mL Every 12 Hours Scheduled 9/17/2017     Sig - Route: Apply 15 mL to the mouth or throat Every 12 (Twelve) Hours. - Mouth/Throat    dextrose (D50W) solution  Code / Trauma / Sedation Medication 9/17/2017 9/17/2017    Sig - Route: Infuse  into a venous catheter Emergency Use. - Intravenous    enoxaparin (LOVENOX) syringe 30 mg 30 mg Daily 9/17/2017     Sig - Route: Inject 0.3 mL under the skin Daily. - Subcutaneous    EPINEPHrine (ADRENALIN) 5 mg in sodium chloride 0.9 % 250 mL (0.02 mg/mL) infusion 0.02-0.3 mcg/kg/min × 46.5 kg Titrated 9/17/2017     Sig - Route: Infuse 0.0009-0.014 mg/min into a venous catheter Dose Adjusted By  Provider As Needed. - Intravenous    EPINEPHrine (ADRENALIN) injection  Code / Trauma / Sedation Medication 9/17/2017 9/17/2017    Sig: Emergency Use.    EPINEPHrine (ADRENALIN) injection  Code / Trauma / Sedation Medication 9/17/2017 9/17/2017    Sig: Emergency Use.    EPINEPHrine (ADRENALIN) injection  Code / Trauma / Sedation Medication 9/17/2017 9/17/2017    Sig: Emergency Use.    etomidate (AMIDATE) injection  Code / Trauma / Sedation Medication 9/17/2017 9/17/2017    Sig - Route: Infuse  into a venous catheter Emergency Use. - Intravenous    famotidine (PEPCID) injection 20 mg 20 mg Daily 9/17/2017     Sig - Route: Infuse 2 mL into a venous catheter Daily. - Intravenous    fentaNYL (SUBLIMAZE) infusion 10 mcg/ml 100 mL  mcg/hr Continuous 9/17/2017 9/27/2017    Sig - Route: Infuse  mcg/hr into a venous catheter Continuous. - Intravenous    ipratropium-albuterol (DUO-NEB) nebulizer solution 3 mL 3 mL Every 1 Hour PRN 9/17/2017     Sig - Route: Take 3 mL by nebulization Every 1 (One) Hour As Needed for Shortness of Air. - Nebulization    ipratropium-albuterol (DUO-NEB) nebulizer solution 3 mL 3 mL 4 Times Daily - RT 9/17/2017     Sig - Route: Take 3 mL by nebulization 4 (Four) Times a Day. - Nebulization    labetalol (NORMODYNE,TRANDATE) injection 20 mg 20 mg Once 9/16/2017 9/16/2017    Sig - Route: Infuse 4 mL into a venous catheter 1 (One) Time. - Intravenous    Cosign for Ordering: Accepted by Archie Parsons MD on 9/16/2017 10:07 PM    LORazepam (ATIVAN) injection 0.5 mg 0.5 mg Once 9/16/2017 9/16/2017    Sig - Route: Infuse 0.25 mL into a venous catheter 1 (One) Time. - Intravenous    Cosign for Ordering: Accepted by Archie Parsons MD on 9/16/2017 10:07 PM    LORazepam (ATIVAN) injection 0.5 mg 0.5 mg Once 9/16/2017 9/16/2017    Sig - Route: Infuse 0.25 mL into a venous catheter 1 (One) Time. - Intravenous    Cosign for Ordering: Accepted by Archie Parsons MD on 9/16/2017 10:07 PM     "LORazepam (ATIVAN) tablet 1 mg 1 mg Every 6 Hours PRN 9/17/2017 9/27/2017    Sig - Route: Take 2 tablets by mouth Every 6 (Six) Hours As Needed for Anxiety. - Oral    methylPREDNISolone sodium succinate (SOLU-Medrol) injection 125 mg 125 mg Once 9/17/2017 9/17/2017    Sig - Route: Infuse 2 mL into a venous catheter 1 (One) Time. - Intravenous    methylPREDNISolone sodium succinate (SOLU-Medrol) injection 40 mg 40 mg Every 6 Hours 9/17/2017     Sig - Route: Infuse 1 mL into a venous catheter Every 6 (Six) Hours. - Intravenous    morphine injection 2 mg 2 mg Once 9/16/2017 9/16/2017    Sig - Route: Infuse 1 mL into a venous catheter 1 (One) Time. - Intravenous    Cosign for Ordering: Required by Javier Rosas MD    morphine injection 2 mg 2 mg Every 2 Hours PRN 9/17/2017 9/27/2017    Sig - Route: Infuse 1 mL into a venous catheter Every 2 (Two) Hours As Needed for Moderate Pain . - Intravenous    Linked Group 1:  \"And\" Linked Group Details        naloxone (NARCAN) injection 0.4 mg 0.4 mg Every 5 Minutes PRN 9/17/2017     Sig - Route: Infuse 1 mL into a venous catheter Every 5 (Five) Minutes As Needed for Respiratory Depression. - Intravenous    Linked Group 1:  \"And\" Linked Group Details        nitroglycerin 50 mg/250 mL (0.2 mg/mL) infusion 5-200 mcg/min Titrated 9/16/2017     Sig - Route: Infuse 5-200 mcg/min into a venous catheter Dose Adjusted By Provider As Needed. - Intravenous    Cosign for Ordering: Required by Javier Rosas MD    norepinephrine (LEVOPHED) 8,000 mcg in sodium chloride 0.9 % 250 mL (32 mcg/mL) infusion 0.02-0.3 mcg/kg/min × 46.5 kg Titrated 9/17/2017     Sig - Route: Infuse 0.93-13.95 mcg/min into a venous catheter Dose Adjusted By Provider As Needed. - Intravenous    ondansetron (ZOFRAN) injection 4 mg 4 mg Every 6 Hours PRN 9/17/2017     Sig - Route: Infuse 2 mL into a venous catheter Every 6 (Six) Hours As Needed for Nausea or Vomiting. - Intravenous    " "oxyCODONE-acetaminophen (PERCOCET) 5-325 MG per tablet 1 tablet 1 tablet Every 8 Hours PRN 9/17/2017     Sig - Route: Take 1 tablet by mouth Every 8 (Eight) Hours As Needed for Severe Pain . - Oral    Pharmacy to dose vancomycin  Continuous PRN 9/17/2017     Sig - Route: Continuous As Needed for Consult. - Does not apply    sodium bicarbonate injection 8.4% 150 mEq 150 mEq Once 9/17/2017     Sig - Route: Infuse 150 mL into a venous catheter 1 (One) Time. - Intravenous    sodium bicarbonate injection 8.4%  Code / Trauma / Sedation Medication 9/17/2017 9/17/2017    Sig - Route: Infuse  into a venous catheter Emergency Use. - Intravenous    sodium bicarbonate injection 8.4%  Code / Trauma / Sedation Medication 9/17/2017 9/17/2017    Sig - Route: Infuse  into a venous catheter Emergency Use. - Intravenous    sodium chloride 0.9 % flush 1-10 mL 1-10 mL As Needed 9/17/2017     Sig - Route: Infuse 1-10 mL into a venous catheter As Needed for Line Care. - Intravenous    sodium chloride 0.9 % flush 10 mL 10 mL As Needed 9/16/2017     Sig - Route: Infuse 10 mL into a venous catheter As Needed for Line Care. - Intravenous    Cosign for Ordering: Required by Javier Rosas MD    Linked Group 2:  \"And\" Linked Group Details        succinylcholine (ANECTINE) injection  Code / Trauma / Sedation Medication 9/17/2017 9/17/2017    Sig - Route: Infuse  into a venous catheter Emergency Use. - Intravenous    vancomycin (VANCOCIN) IVPB 500 mg in 100 mL  mg Every 48 Hours PRN 9/18/2017     Sig - Route: Infuse 500 mg into a venous catheter Every Other Day As Needed (pharmacy dosing per levels). - Intravenous    vancomycin 1000 mg in sodium chloride 0.9% 250 mL IVPB 1,000 mg Once 9/17/2017     Sig - Route: Infuse 1,000 mg into a venous catheter 1 (One) Time. - Intravenous    acetaminophen (TYLENOL) tablet 650 mg (Discontinued) 650 mg Every 4 Hours PRN 9/17/2017 9/17/2017    Sig - Route: Take 2 tablets by mouth Every 4 (Four) " Hours As Needed for Mild Pain . - Oral    gabapentin (NEURONTIN) capsule 100 mg (Discontinued) 100 mg Every 8 Hours Scheduled 9/17/2017 9/17/2017    Sig - Route: Take 1 capsule by mouth Every 8 (Eight) Hours. - Oral    minoxidil (LONITEN) tablet 10 mg (Discontinued) 10 mg Daily 9/17/2017 9/17/2017    Sig - Route: Take 4 tablets by mouth Daily. - Oral    mirtazapine (REMERON) tablet 15 mg (Discontinued) 15 mg Nightly 9/17/2017 9/17/2017    Sig - Route: Take 1 tablet by mouth Every Night. - Oral    NIFEdipine XL (PROCARDIA XL) 24 hr tablet 60 mg (Discontinued) 60 mg Every 24 Hours Scheduled 9/17/2017 9/17/2017    Sig - Route: Take 2 tablets by mouth Daily. - Oral    pantoprazole (PROTONIX) EC tablet 40 mg (Discontinued) 40 mg Every Morning 9/17/2017 9/17/2017    Sig - Route: Take 1 tablet by mouth Every Morning. - Oral          Ventilator/Non-Invasive Ventilation Settings     Start     Ordered    09/17/17 0531  Ventilator - AC/VC; 12; 50; 92%; 5; 450; 7  Continuous     Question Answer Comment   Vent Mode AC/VC    Breath rate 12    FiO2 50    FiO2 titrate for Sp02% =/> 92%    PEEP 5    Tidal Volume 450    Tidal Volume ML/Kg 7        09/17/17 0533    09/16/17 2042  NIPPV CPAP OR BIPAP  Until Discontinued     Comments:  Wean to off    09/16/17 2041    09/16/17 1845  . BIPAP (per protocol); IPAP: 14; EPAP: 5; Breath Rate: 14; Titrate for SPO2: Greater Than or Equal To, 92%  Until Discontinued     Question Answer Comment   . BIPAP per protocol   IPAP 14    EPAP 5    Breath Rate 14    Titrate for SPO2 Greater Than or Equal To    Titrate for SPO2 92%        09/16/17 1902        Continued Stay Note  YOLA Trujillo     Patient Name: Mandy Espino  MRN: [id number]  Today's Date: 9/17/2017    Admit Date: 9/16/2017          Discharge Plan     None              Discharge Codes     None            Evelyn Wayne RN    Physician Progress Notes (last 24 hours) (Notes from 9/16/2017  8:33 AM through 9/17/2017  8:33 AM)     No  notes of this type exist for this encounter.

## 2017-09-17 NOTE — CONSULTS
Date of consultation:   September 17, 2017     Requested by:   Hospitalist Service.   PCP: Neo Gresham DO      Reason:  PNA/Pl Effusion.    History of Present Illness:  26-year-old female with multiple recent admissions and discharges AGAINST MEDICAL ADVICE who was admitted with shortness of breath.  During the course of her stay she became somewhat unresponsive and underwent cardiac arrest with a full ACLS protocol performed including cardioversion.    The patient is currently on mechanical ventilation receiving hemodialysis.  No history is available.    Review of System: Could not be obtained as the patient is on mechanical ventilation.    Past Medical History:  Past Medical History:   Diagnosis Date   • Abdominal pain    • Anemia    • Anxiety    • Asthma    • Bipolar disorder    • CKD (chronic kidney disease), stage IV    • Constipation    • COPD (chronic obstructive pulmonary disease)    • Depression    • End stage renal disease on dialysis    • Episodic drug abuse 8/12/2017   • Esophageal reflux     reflux   • Esophagitis determined by biopsy 2014   • Fahr's syndrome    • Hepatitis C antibody test positive    • Hyperparathyroidism    • Hypertension    • Hypocalcemia    • Nausea and vomiting    • Non-traumatic rhabdomyolysis 5/2/2017   • Pancreatitis    • Pneumonia of right lung due to infectious organism 5/2/2017   • 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   • Recurrent urinary tract infection    • Renal insufficiency    • Upper gastrointestinal bleeding          Past Surgical History:  Past Surgical History:   Procedure Laterality Date   • BRONCHOSCOPY THORACOTOMY Right 6/14/2017    Procedure: BRONCHOSCOPY RIGHT THORACOTOMY WITH LUNG DECORTICATION ;  Surgeon: Shawn Hicks MD;  [facility]  Service:    • DIALYSIS FISTULA CREATION      port   • DILATATION AND CURETTAGE     • ESOPHAGOSCOPY / EGD  2014    esophagitis   • EXPLORATORY LAPAROTOMY      For uterine and ovarian issues   • KIDNEY SURGERY Left  "2013    Biopsy         Family History:  Family History   Problem Relation Age of Onset   • Arthritis Mother    • Hypertension Mother    • Kidney disease Father      Chronic renal failure syndrome   • Pancreatic cancer Father    • Hypertension Brother    • Kidney disease Other    • Diabetes Other      Uncle   • Lung cancer Other      Grandmother   • Hyperlipidemia Other      High cholesterol - Uncle         Social History:  Social History     Social History   • Marital status:      Spouse name: N/A   • Number of children: N/A   • Years of education: N/A     Social History Main Topics   • Smoking status: Current Every Day Smoker     Packs/day: 0.50     Types: Cigarettes     Last attempt to quit: 6/18/2017   • Smokeless tobacco: None   • Alcohol use No   • Drug use: Yes     Special: Marijuana   • Sexual activity: Defer     Other Topics Concern   • None     Social History Narrative    She is  and not working. She denies alcohol or drug use. She denies recent opiate or benzo use. She does smoke and has used marijuana.          Physical Exam:  /97  Pulse 69  Temp 97.7 °F (36.5 °C) (Oral)   Resp 23  Ht 62\" (157.5 cm)  Wt 102 lb 9.6 oz (46.5 kg)  SpO2 100%  BMI 18.77 kg/m2    Constitutional:            Vital signs reviewed            Patient is intubated and sedated    Head/Face/Eyes:            Pupils appeared equal and reactive to light. Gaze was discordant.      ENT:             Patient was intubated with endotracheal tube in place.     Neck:             Supple. ? JVD noted.     Cardiovascular:              S1 + S2. Regular.              RUE AV fistula noted.    Respiratory:            Transmitted breath sounds bilaterally with good air entry.             Percussion could not be performed at this time.    Abdomen:            Soft.  Bowel sounds sluggishly positive. No obvious organomegaly noted.    Musculoskeletal/Extremities:             Gait could not be assessed at this time.              No " clubbing, cyanosis noted in the upper extremities.             No edema noted in the lower extremities bilaterally.    Neurologic/Psychiatric:             Intubated and sedated    Skin:             No rash noted.             Warm and dry.          Labs:   Reviewed. Pertinent labs were noted.     Lab Results   Component Value Date    WBC 18.47 (H) 09/17/2017    HGB 9.3 (L) 09/17/2017    HCT 30.0 (L) 09/17/2017    .0 (H) 09/17/2017     09/17/2017       Lab Results   Component Value Date    GLUCOSE 99 (H) 09/17/2017    CALCIUM 6.9 (C) 09/17/2017     09/17/2017    K 4.3 09/17/2017    CO2 18.0 (L) 09/17/2017    CL 99 09/17/2017    BUN 52 (H) 09/17/2017    CREATININE 6.30 (H) 09/17/2017    EGFRIFNONA 8 (L) 09/17/2017    BCR 8.3 09/17/2017    ANIONGAP 31.1 09/17/2017         ABG:  Lab Results   Component Value Date    PHART 7.338 09/17/2017    NFN8YZI 29.3 (L) 09/17/2017    PO2ART 491.0 (H) 09/17/2017    SO2 95.3 01/01/2017    FCOHB 1.7 01/01/2017    HGBBG 9.2 (L) 09/17/2017    H4QOZNNC 92.6 09/16/2017    CARBOXYHGB 2.3 09/16/2017    FMETHB 0.8 01/01/2017           Imaging Study: Images reviewed personally and discussed with patient  Imaging Results (last 24 hours)     Procedure Component Value Units Date/Time    US Gallbladder [[id number]] Collected:  09/16/17 2215     Updated:  09/16/17 2216    Narrative:       FINAL REPORT    TECHNIQUE:  Sonographic images of the right upper quadrant were obtained.    CLINICAL HISTORY:  ABNORMAL LABS. ABD PAIN.  END STAGE RENAL FAILURE.    FINDINGS:  The gallbladder is unremarkable. There is no intra-or extrahepatic biliary ductal dilation. There is no ascites. There is a small right pleural effusion.      Impression:       Small right pleural effusion. No hepatobiliary abnormality identified.    Authenticated by Dominik Hood M.D. on 09/16/2017 10:15:25 PM    CT Abdomen Pelvis Without Contrast [[id number]] Collected:  09/17/17 0000     Updated:  09/17/17 0002     Narrative:       FINAL REPORT    TECHNIQUE:  Axial images through the abdomen and pelvis were obtained by computed tomography.  IV contrast was withheld.    CLINICAL HISTORY:  LLQ ABD PAIN    FINDINGS:  Abdomen: There is cardiomegaly, small bilateral pleural effusions and pulmonary edema, consistent with CHF. The gallbladder and solid abdominal organs are normal, except for marked bilateral renal atrophy. No abnormality of the GI tract is seen.    Pelvis:  The uterus, ovaries and urinary bladder show no abnormality. There is a small amount of ascites in the abdomen or pelvis. There is increased lung density, consistent with renal osteodystrophy.      Impression:       Congestive heart failure. Small amount of ascites in the abdomen and pelvis.    Authenticated by Dominik Hood M.D. on 09/17/2017 12:00:52 AM    CT Chest Pulmonary Embolism With Contrast [[id number]] Collected:  09/17/17 1023     Updated:  09/17/17 1024    Narrative:       FINAL REPORT    TECHNIQUE:  Postcontrast axial images of the chest were performed in a CTA protocol.This study was performed with technique to keep radiation doses as low as reasonably achievable, (ALARA). Individualized dose reduction techniques using automated exposure control or   adjustment of the MA and/or KV according to the patient's size were employed.    CLINICAL HISTORY:  SP CODE X2 POSS PE    FINDINGS:  The heart is normal in size.  There is axillary adenopathy measuring up to 20 mm.  ETT and NG tube are in good position.  There are small bilateral pleural effusions.  No pericardial effusion is identified.  There is no evidence of PE or dissection.    There is severe bilateral renal atrophy.  Anasarca and edema is seen in the mesentery.  There is reflux of contrast into the hepatic veins of uncertain significance.  There is diffuse paratracheal and mediastinal edema.  There are severe diffuse   groundglass opacities and consolidation in the upper lobes bilaterally.  There is  patchy right lower lobe consolidation.  There is no evidence of aneurysm.      Impression:       No dissection or pulmonary embolism.    Diffuse edema and anasarca.    Severe bilateral renal atrophy.    Ground glass opacities and areas of consolidation which may be due to edema or pneumonia.    Authenticated by Wayne Torre MD on 09/17/2017 10:23:56 AM    XR Chest 1 View [[id number]] Collected:  09/17/17 1102     Updated:  09/17/17 1105    Narrative:       PROCEDURE: XR CHEST 1 VW-     INDICATION:  Confirm ET Tube Placement; I16.9-Hypertensive crisis,  unspecified; E87.70-Fluid overload, unspecified; R74.8-Abnormal levels  of other serum enzymes; K85.90-Acute pancreatitis without necrosis or  infection, unspecified; Z91.19-Patient's noncompliance with other  medical treatment and regimen     FINDINGS:  A portable view of the chest was obtained.  Comparison is  made to a prior exam dated 9/16/2017.   There is been interval placement  of an endotracheal tube with its tip in the mid thoracic trachea. A new  NG tube courses below the diaphragm. The heart is enlarged. There has  been interval improvement in bilateral lung opacities. No pneumothorax  is visualized. There are likely small pleural effusions.       Impression:       ET tube in appropriate position. Improved bilateral lung  opacities with persistent cardiomegaly.     This report was finalized on 9/17/2017 11:03 AM by Morena Askew MD.    XR Chest 1 View [[id number]] Collected:  09/17/17 1103     Updated:  09/17/17 1106    Narrative:       PROCEDURE: XR CHEST 1 VW-     INDICATION:  shortness of breath     FINDINGS:  A portable view of the chest was obtained.  Comparison is  made to a prior exam dated 7/6/2017.   The heart is enlarged. There are  bilateral mixed interstitial and alveolar opacities with bilateral  pleural effusions. Differential considerations include pulmonary edema  or pneumonia. There is no pneumothorax.       Impression:        Cardiomegaly with bilateral mixed interstitial and alveolar  opacities. This may represent pulmonary edema or bilateral pneumonia.  Recommend follow-up to resolution.     This report was finalized on 9/17/2017 11:04 AM by Morena Askew MD.              Assessment:  1.  Acute Respiratory Failure.   2.  S/P Cardiac Arrest.  3.  Smoking  4.  ESRD  5.  ? Drug seeking behavior.  6.  Minimal B/L Effusion.  7.  Noncompliance      Discussion/Recommendations:   The cause of her cardiac arrest is not entirely clear to me although it did appear that she was hypoglycemic either during cardiac arrest or immediately afterwards?.  The CT scan does not show any evidence of pneumonia.    Our cardiology colleagues also following this patient.    Her ABG post cardiac arrest shows a PO2 of 491 which goes against any significant pulmonary etiology causing the cardiac arrest.    We will mostly perform supportive care.  I will obtain a chest x-ray and ABG tomorrow morning.    Her prognosis should be considered poor and condition critical.     We will follow her closely and make further recommendations based on her clinical course.      Haja Urbina MD  09/17/17  12:16 PM      Please note that portions of this note may have been completed with a voice recognition software. Every effort was made to edit the dictation, but occasionally words are mistranscribed.

## 2017-09-17 NOTE — PROGRESS NOTES
"Pharmacokinetic Initial Note - Vancomycin    Pharmacy was consulted to dose vancomycin for  Mandy Espino, a 26 y.o. female  62\" (157.5 cm) 102 lb 9.6 oz (46.5 kg)    Indication for use: bacteremia      Results from last 7 days     Lab Units 09/17/17  0555 09/16/17 2010   WBC 10*3/mm3 11.25* 15.10*   CREATININE mg/dL 6.10* 5.80*      Estimated Creatinine Clearance: 10.3 mL/min (by C-G formula based on Cr of 6.1).  Temp Readings from Last 1 Encounters:   09/17/17 97.7 °F (36.5 °C) (Oral)       Other Antimicrobials  Azithromycin 500 mg IV every 24 hours  Ceftriaxone 1 gram IV every 24 hours    Assessment/Plan  Initiated Vancomycin 1000 mg (21.5 mg/kg) IV once, followed by 500 mg (11 mg/kg) IV as needed post-dialysis to target a trough of <20 mcg/mL. Pharmacy will monitor levels and dose accordingly.    Jyoti Mendez, PharmD  09/17/17 7:09 AM    "

## 2017-09-17 NOTE — H&P
[facility]  HISTORY AND PHYSICAL    Primary Care Physician: Neo Gresham DO    Subjective     Chief Complaint:    Chief Complaint   Patient presents with   • Shortness of Breath       History of Present Illness:   Patient is 26-year-old unfortunate white female with multiple medical problems as below in the past medical history.  She called EMS and told them that she's been short of breath has taken about 10 nebulizer treatments with no significant improvement she was placed on BiPAP and brought into the emergency room.  She was doing fairly well and was finally weaned off the BiPAP with a 4 L nasal oxygen with good ABG.  By the time I went in to see her she was begging to be placed back on the BiPAP.  She has a long-standing history of noncompliance with medications usually has been very compliant with dialysis in the past.  She continues to smoke and has been noted to have IV drug abuse and use even while she was in the hospital once.  She has been referred to palliative care which she has refused to follow up with.  I have reviewed labs/imaging/records from this hospitalization, including ER staff to establish a comprehensive understanding of this patient's clinical issues, as well as to establish plan of care appropriately.     Review of Systems   1. Constitutional: Negative for fever. Negative for chills, diaphoresis, positive fatigue and unexpected weight change.   2. HENT: Negative for congestion and hearing loss.   3. Eyes: Negative for redness and visual disturbance.   4. Respiratory: Positive for shortness of breath. Negative for chest pain .  Positive for cough and chest tightness.   5. Cardiovascular: Negative for chest pain and palpitations.   6. Gastrointestinal: Negative for abdominal distention, abdominal pain and blood in stool.   7. Endocrine: Negative for cold intolerance and heat intolerance.   8. Genitourinary: Negative for difficulty urinating,  dysuria and frequency.   9. Musculoskeletal: Negative for arthralgias, back pain and myalgias.   10. Skin: Negative for color change, rash and wound.   11. Neurological: Negative for syncope, weakness and headaches.   12. Hematological: Negative for adenopathy. Does not bruise/bleed easily.   13. Psychiatric/Behavioral: Negative for confusion. The patient is not nervous/anxious.     Past Medical History:   Past Medical History:   Diagnosis Date   • Abdominal pain    • Anemia    • Anxiety    • Asthma    • Bipolar disorder    • CKD (chronic kidney disease), stage IV    • Constipation    • COPD (chronic obstructive pulmonary disease)    • Depression    • End stage renal disease on dialysis    • Episodic drug abuse 8/12/2017   • Esophageal reflux     reflux   • Esophagitis determined by biopsy 2014   • Fahr's syndrome    • Hepatitis C antibody test positive    • Hyperparathyroidism    • Hypertension    • Hypocalcemia    • Nausea and vomiting    • Non-traumatic rhabdomyolysis 5/2/2017   • Pancreatitis    • Pneumonia of right lung due to infectious organism 5/2/2017   • 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   • Recurrent urinary tract infection    • Renal insufficiency    • Upper gastrointestinal bleeding        Past Surgical History:  Past Surgical History:   Procedure Laterality Date   • BRONCHOSCOPY THORACOTOMY Right 6/14/2017    Procedure: BRONCHOSCOPY RIGHT THORACOTOMY WITH LUNG DECORTICATION ;  Surgeon: Shawn Hicks MD;  Location: [facility];  Service:    • DIALYSIS FISTULA CREATION      port   • DILATATION AND CURETTAGE     • ESOPHAGOSCOPY / EGD  2014    esophagitis   • EXPLORATORY LAPAROTOMY      For uterine and ovarian issues   • KIDNEY SURGERY Left 2013    Biopsy       Family History: family history includes Arthritis in her mother; Diabetes in her other; Hyperlipidemia in her other; Hypertension in her brother and mother; Kidney disease in her father and other; Lung cancer in her other; Pancreatic cancer in her  "father.    Social History:  reports that she has been smoking Cigarettes.  She has been smoking about 0.50 packs per day. She does not have any smokeless tobacco history on file. She reports that she uses illicit drugs, including Marijuana. She reports that she does not drink alcohol.    Medications:    (Not in a hospital admission)    Allergies:  Allergies   Allergen Reactions   • Acetaminophen Itching   • Hydrocodone Itching   • Ibuprofen    • Lortab [Hydrocodone-Acetaminophen]      Lortab TABS   • Ultram [Tramadol Hcl]      \"kidney doctor said no\"   • Ciprofloxacin Rash         Objective     Physical Exam:  Vital Signs: BP (!) 171/121  Pulse 90  Temp 97.4 °F (36.3 °C) (Oral)   Resp (!) 46  Ht 58\" (147.3 cm)  Wt 90 lb (40.8 kg)  SpO2 100%  BMI 18.81 kg/m2     Physical Exam:     General Appearance:   Alert, cooperative, in no acute distress.     Head:   Normocephalic, without obvious abnormality, atraumatic.     Eyes:       Normal, conjunctivae and sclerae, no icterus, no pallor, corneas clear, PERRLA        Throat:   Oral mucosa dry      Neck:  No adenopathy, supple, trachea midline, no thyromegaly, no carotid bruit, no JVD      Back:   No CVA tenderness on Percussion.     Lungs:    Clear to auscultation and fair air movement noted.      Heart:   Regular rhythm and normal rate, normal S1 and S2.       Abdomen:   Normal bowel sounds, no masses, no organomegaly, soft non-tender, non-distended, no guarding, no rebound tenderness        Extremities:  Moves all extremities, no edema, no cyanosis, no redness.     Pulses:  Pulses palpable and equal bilaterally but weak.     Skin:  No bleeding, bruising or rash        Neurologic:  Cranial nerves grossly intact, move all extremities         Results Review:  Lab Results (last 7 days)     Procedure Component Value Units Date/Time    Blood Gas, Arterial With Co-Ox [[id number]]  (Abnormal) Collected:  09/16/17 1913    Specimen:  Arterial Blood Updated:  09/16/17 1912     " Site Arterial: left radial     Reynaldo's Test Positive     pH, Arterial 7.281 pH units      pCO2, Arterial 48.8 (H) mm Hg      pO2, Arterial 485.0 (H) mm Hg      HCO3, Arterial 23.0 mmol/L      Base Excess, Arterial -3.8 mmol/L      O2 Saturation, Arterial 99.9 %      Hemoglobin, Blood Gas 9.5 (L) g/dL      Oxyhemoglobin 97.1 %      Methemoglobin 0.60 %      Carboxyhemoglobin 2.2 %      Modality BiPap     FIO2 100 %     POC Glucose Fingerstick [[id number]]  (Abnormal) Collected:  09/16/17 1917    Specimen:  Blood Updated:  09/16/17 1920     Glucose 148 (H) mg/dL       Serial Number: [id number]:  [id number]       CBC & Differential [[id number]] Collected:  09/16/17 2010    Specimen:  Blood Updated:  09/16/17 2029    Narrative:       The following orders were created for panel order CBC & Differential.  Procedure                               Abnormality         Status                     ---------                               -----------         ------                     CBC Auto Differential[[id number]]        Abnormal            Final result                 Please view results for these tests on the individual orders.    CBC Auto Differential [[id number]]  (Abnormal) Collected:  09/16/17 2010    Specimen:  Blood from Hand, Left Updated:  09/16/17 2029     WBC 15.10 (H) 10*3/mm3      RBC 2.76 (L) 10*6/mm3      Hemoglobin 8.8 (L) g/dL      Hematocrit 28.5 (L) %      .3 (H) fL      MCH 31.9 (H) pg      MCHC 30.9 g/dL      RDW 19.9 (H) %      RDW-SD 75.0 (H) fl      MPV 10.1 fL      Platelets 184 10*3/mm3      Neutrophil % 85.6 (H) %      Lymphocyte % 7.3 (L) %      Monocyte % 4.7 %      Eosinophil % 1.1 %      Basophil % 0.5 %      Immature Grans % 0.8 (H) %      Neutrophils, Absolute 12.93 (H) 10*3/mm3      Lymphocytes, Absolute 1.10 10*3/mm3      Monocytes, Absolute 0.71 10*3/mm3      Eosinophils, Absolute 0.17 10*3/mm3      Basophils, Absolute 0.07 10*3/mm3      Immature Grans, Absolute 0.12 (H) 10*3/mm3       nRBC 0.3 (H) /100 WBC     Lactic Acid, Plasma [[id number]]  (Normal) Collected:  09/16/17 2010    Specimen:  Blood from Hand, Left Updated:  09/16/17 2034     Lactate 1.3 mmol/L     Magnesium [[id number]]  (Normal) Collected:  09/16/17 2010    Specimen:  Blood from Hand, Left Updated:  09/16/17 2045     Magnesium 2.2 mg/dL     Troponin [[id number]]  (Normal) Collected:  09/16/17 2010    Specimen:  Blood from Hand, Left Updated:  09/16/17 2045     Troponin I 0.030 ng/mL     Narrative:       Normal Patient Upper Reference Limit (URL) (99th Percentile)=0.03 ng/mL   Non-AMI Illness Reference Limit=0.03-0.11 ng/mL   AMI Confirmation=0.12 ng/mL and above    Lipase [[id number]]  (Abnormal) Collected:  09/16/17 2010    Specimen:  Blood from Hand, Left Updated:  09/16/17 2045     Lipase 403 (H) U/L     Comprehensive Metabolic Panel [[id number]]  (Abnormal) Collected:  09/16/17 2010    Specimen:  Blood from Hand, Left Updated:  09/16/17 2050     Glucose 101 (H) mg/dL      BUN 47 (H) mg/dL      Creatinine 5.80 (H) mg/dL      Sodium 141 mmol/L      Potassium 4.7 mmol/L      Chloride 101 mmol/L      CO2 23.0 (L) mmol/L      Calcium 7.3 (L) mg/dL      Total Protein 6.6 g/dL      Albumin 4.10 g/dL      ALT (SGPT) 506 (C) U/L      AST (SGOT) 227 (H) U/L      Alkaline Phosphatase 183 (H) U/L      Total Bilirubin 0.9 mg/dL      eGFR Non African Amer 9 (L) mL/min/1.73      Globulin 2.5 gm/dL      A/G Ratio 1.6 g/dL      BUN/Creatinine Ratio 8.1     Anion Gap 21.7 mmol/L     Narrative:       Abnormal estimated GFR should be followed by more specific studies to confirm end stage chronic renal disease. The equation used for calculation may not be accurate for patients less than 19 years old, greater than 70 years old, patients at extremes of weight, malnutrition, or with acute renal dysfunction.    Phosphorus [[id number]]  (Abnormal) Collected:  09/16/17 2010    Specimen:  Blood from Hand, Left Updated:  09/16/17 2050     Phosphorus 6.9 (C)  mg/dL     Occult Blood X 1, Stool [[id number]]  (Normal) Collected:  09/16/17 2122    Specimen:  Stool from Per Rectum Updated:  09/16/17 2134     Fecal Occult Blood Negative    BNP [[id number]] Collected:  09/16/17 2010    Specimen:  Blood from Hand, Left Updated:  09/16/17 2147     proBNP -- pg/mL       >656,000 pg/ml       Narrative:       >656,000 pg/ml    Blood Gas, Arterial With Co-Ox [[id number]]  (Abnormal) Collected:  09/16/17 2242    Specimen:  Arterial Blood Updated:  09/16/17 2241     Site Arterial: left radial     Reynaldo's Test Positive     pH, Arterial 7.391 pH units      pCO2, Arterial 40.0 mm Hg      pO2, Arterial 66.4 (L) mm Hg      HCO3, Arterial 24.2 mmol/L      Base Excess, Arterial -0.7 mmol/L      O2 Saturation, Arterial 92.6 %      Hemoglobin, Blood Gas 8.5 (L) g/dL      Oxyhemoglobin 89.5 (L) %      Methemoglobin 1.10 %      Carboxyhemoglobin 2.3 %      Modality Cannula - Nasal     FIO2 28 %     POC Glucose Fingerstick [[id number]]  (Abnormal) Collected:  09/16/17 2345    Specimen:  Blood Updated:  09/16/17 2350     Glucose 131 (H) mg/dL       Serial Number: [id number]:  [id number]           Imaging Results (last 72 hours)     Procedure Component Value Units Date/Time    XR Chest 1 View [[id number]] Updated:  09/16/17 2039    US Gallbladder [[id number]] Collected:  09/16/17 2215     Updated:  09/16/17 2216    Narrative:       FINAL REPORT    TECHNIQUE:  Sonographic images of the right upper quadrant were obtained.    CLINICAL HISTORY:  ABNORMAL LABS. ABD PAIN.  END STAGE RENAL FAILURE.    FINDINGS:  The gallbladder is unremarkable. There is no intra-or extrahepatic biliary ductal dilation. There is no ascites. There is a small right pleural effusion.      Impression:       Small right pleural effusion. No hepatobiliary abnormality identified.    Authenticated by Dominik Hood M.D. on 09/16/2017 10:15:25 PM    CT Abdomen Pelvis Without Contrast [[id number]] Collected:  09/17/17 0000     Updated:   09/17/17 0002    Narrative:       FINAL REPORT    TECHNIQUE:  Axial images through the abdomen and pelvis were obtained by computed tomography.  IV contrast was withheld.    CLINICAL HISTORY:  LLQ ABD PAIN    FINDINGS:  Abdomen: There is cardiomegaly, small bilateral pleural effusions and pulmonary edema, consistent with CHF. The gallbladder and solid abdominal organs are normal, except for marked bilateral renal atrophy. No abnormality of the GI tract is seen.    Pelvis:  The uterus, ovaries and urinary bladder show no abnormality. There is a small amount of ascites in the abdomen or pelvis. There is increased lung density, consistent with renal osteodystrophy.      Impression:       Congestive heart failure. Small amount of ascites in the abdomen and pelvis.    Authenticated by Dominik Hood M.D. on 09/17/2017 12:00:52 AM              I have personally reviewed and interpreted available lab data, radiology studies and ECG obtained at time of admission.     Assessment / Plan     Assessment/Problem List:   Principal Problem:    Hypertensive urgency, malignant  Active Problems:    Severe anxiety with panic    COPD exacerbation    End stage renal disease on dialysis    Tobacco use    Headache    Bipolar disorder    Depression    Esophageal reflux    Hypocalcemia    Chronic pain    COPD (chronic obstructive pulmonary disease)    Hepatitis C antibody positive in blood    IV drug abuse    Anemia of ESRD    Medical non-compliance    Neuropathic pain of both legs      Plan:  26-year-old unfortunate white female presented with what appears to be a COPD exacerbation noted to have increased blood pressure, responded to CPAP as well as oxygen.  She was started on IV nitroglycerin and a dose of labetalol given with some improvement in blood pressure.    She'll be admitted to ICU will continue IV nitroglycerin, start with IV Rocephin and Zithromax as well as steroids and nebulizer treatments.    Hypertensive emergency is being  controlled with nitroglycerin and oral medications will be restarted which are her home medicine she usually responds well to these medications.    Increased liver enzymes noted will continue to follow daily labs.  See if they improve.  She does have a history of hepatitis C positive.  Lipase was also elevated but she denies any abdominal pain nausea or vomiting at this point.  Right upper quadrant ultrasound was negative as well.    Details were discussed with the patient as well as family in the room.   Further recommendations will depend on clinical course of the patient during the current hospitalization.      I also discussed the details with the nursing staff.    Rest as ordered.    Anticipated stay is  greater than 2 midnights.    Madhu Mota MD 09/17/17 1:20 AM    EMR Dragon/Transcription disclaimer:   Much of this encounter note is an electronic transcription/translation of spoken language to printed text. The electronic translation of spoken language may permit erroneous, or at times, nonsensical words or phrases to be inadvertently transcribed; Although I have reviewed the note for such errors, some may still exist.

## 2017-09-17 NOTE — NURSING NOTE
0504:  At patient's bedside.  Patient awake, on 4L NC and NTG gtt at 40mcg/min for high BPs.  States to feeling hot, temperature decreased in room.  Oral temp retaken to check for fever, 97.7F.  Patient requesting to use bedside commode to have a bowel movement.  Patient assisted to bedside commode with RN and tech assistance.  Pt c/o feeling hot still, requesting a fan for her bedside.  Sitting on commode hunched over with head on bed.  Remains NSR per tele.  , NTG gtt turned off as this is the lowest BP she has had since admit.  Patient then became clammy and diaphoretic.  RN and tech assisted patient back into the bed with almost total support, patient appeared to be losing consciousness and becoming bradycardic per telemetry.  Patient with secretions in mouth, oral cavity suctioned.  Skin clammy, fingerstick glucose checked, 54.  Dr. Mota paged to come to bedside.  Unable to arouse patient.  Bradycardic in 40's, appears junctional per tele, unable to read a NIBP.  No pulse palpable, PEA per tele.  Chest compressions started - see code flowsheet for further documentation.  1 amp of D50 given IVP for low glucose at time of initiation of chest compressions.     Stephania Ramires, SALINA

## 2017-09-17 NOTE — CONSULTS
Referring Provider:  Dr Pedersen   Reason for Consultation:  Code blue   Patient Care Team:  Neo Gresham DO as PCP - General (Family Medicine)      History of present illness:  January who has had a history of total noncompliance with recurrent hospital visits came in yesterday with severe elevation in blood pressures with severe shortness of breath.  Was being monitored in ICU overnight min this morning abruptly complained of feeling bad and had severe bradycardia with subsequent pulseless electric activity went through a CODE BLUE session for about an hour presently is intubated And Requiring Pressors for Blood Pressures.  Patient Did Have One Episode of Ventricular Tachycardia for Which Didn't Get Shock Therapy.    Review of Systems   Pertinent items are noted in HPI  Review of Systems      History    End-stage renal disease.    Drug abuse.    Noncompliance but    Large effusion status post decortication and recurrent pleural taps.    Bipolar disorder.    COPD with emphysema.    Nicotine abuse.    Personal history family history review of symptoms not available.  Past Surgical History:   Procedure Laterality Date   • BRONCHOSCOPY THORACOTOMY Right 6/14/2017    Procedure: BRONCHOSCOPY RIGHT THORACOTOMY WITH LUNG DECORTICATION ;  Surgeon: Shawn Hicks MD;  Location: [facility];  Service:    • DIALYSIS FISTULA CREATION      port   • DILATATION AND CURETTAGE     • ESOPHAGOSCOPY / EGD  2014    esophagitis   • EXPLORATORY LAPAROTOMY      For uterine and ovarian issues   • KIDNEY SURGERY Left 2013    Biopsy   , Family History   Problem Relation Age of Onset   • Arthritis Mother    • Hypertension Mother    • Kidney disease Father      Chronic renal failure syndrome   • Pancreatic cancer Father    • Hypertension Brother    • Kidney disease Other    • Diabetes Other      Uncle   • Lung cancer Other      Grandmother   • Hyperlipidemia Other      High cholesterol - Uncle   , Social History   Substance Use Topics    • Smoking status: Current Every Day Smoker     Packs/day: 0.50     Types: Cigarettes     Last attempt to quit: 6/18/2017   • Smokeless tobacco: None   • Alcohol use No   , Prescriptions Prior to Admission   Medication Sig Dispense Refill Last Dose   • albuterol (PROVENTIL) (2.5 MG/3ML) 0.083% nebulizer solution INHALE 1 VIAL EVERY 4 TO 6 HOURS AS NEEDED  2 Taking   • ALPRAZolam (XANAX) 1 MG tablet Take 1 tablet by mouth At Night As Needed for Anxiety or Sleep. 2 tablet 0    • amitriptyline (ELAVIL) 10 MG tablet Take 10 mg by mouth Every Night.   Taking   • gabapentin (NEURONTIN) 800 MG tablet TAKE ONE TABLET BY MOUTH THREE TIMES A DAY  0 Taking   • minoxidil (LONITEN) 10 MG tablet Take 10 mg by mouth Daily.   Taking   • mirtazapine (REMERON) 15 MG tablet TAKE ONE TABLET BY MOUTH AT BEDTIME  2 Taking   • NIFEdipine CC (ADALAT CC) 30 MG 24 hr tablet   2 Taking   • omeprazole (priLOSEC) 20 MG capsule 1 CAPSULE BY MOUTH DAILY  2 Taking   • oxyCODONE-acetaminophen (PERCOCET) 5-325 MG per tablet Take 1 tablet by mouth Every 8 (Eight) Hours As Needed for Severe Pain (7-10). 6 tablet 0 Not Taking   , Scheduled Meds:    amitriptyline 10 mg Oral Nightly   azithromycin 500 mg Intravenous Q24H   ceftriaxone 1 g Intravenous Q24H   chlorhexidine 15 mL Mouth/Throat Q12H   enoxaparin 30 mg Subcutaneous Daily   famotidine 20 mg Intravenous Daily   ipratropium-albuterol 3 mL Nebulization 4x Daily - RT   methylPREDNISolone sodium succinate 40 mg Intravenous Q6H   vancomycin 1,000 mg Intravenous Once   , Continuous Infusions:    EPINEPHrine 0.02-0.3 mcg/kg/min Last Rate: 0.02 mcg/kg/min (09/17/17 0705)   fentaNYL (SUBLIMAZE) infusion 10 mcg/ml 100 mL  mcg/hr Last Rate: 50 mcg/hr (09/17/17 0640)   nitroglycerin 5-200 mcg/min Last Rate: Stopped (09/17/17 0508)   norepinephrine 0.02-0.3 mcg/kg/min Last Rate: Stopped (09/17/17 0559)   Pharmacy to dose vancomycin     , PRN Meds:  •  ALPRAZolam  •  calcium carbonate  •   "ipratropium-albuterol  •  LORazepam  •  Morphine **AND** naloxone  •  ondansetron  •  oxyCODONE-acetaminophen  •  Pharmacy to dose vancomycin  •  sodium chloride  •  Insert peripheral IV **AND** sodium chloride  •  [START ON 9/18/2017] vancomycin, Allergies:  Acetaminophen; Hydrocodone; Ibuprofen; Lortab [hydrocodone-acetaminophen]; Ultram [tramadol hcl]; and Ciprofloxacin     Objective     Vital Sign Min/Max for last 24 hours  Temp  Min: 97.4 °F (36.3 °C)  Max: 97.7 °F (36.5 °C)   BP  Min: 95/72  Max: 215/150   Pulse  Min: 40  Max: 126   Resp  Min: 22  Max: 46   SpO2  Min: 55 %  Max: 100 %   Flow (L/min)  Min: 2  Max: 4   Weight  Min: 90 lb (40.8 kg)  Max: 102 lb 9.6 oz (46.5 kg)     Flowsheet Rows         First Filed Value    Admission Height  58\" (147.3 cm) Documented at 09/16/2017 1849    Admission Weight  90 lb (40.8 kg) Documented at 09/16/2017 1849             Physical Exam:     General Appearance:    Intubated and unresponsive    Head:    Normocephalic, without obvious abnormality, atraumatic   Eyes:            Lids and lashes normal, conjunctivae and sclerae normal, no   icterus, no pallor, corneas clear, PERRLA   Ears:    Ears appear intact with no abnormalities noted   Throat:   No oral lesions, no thrush, oral mucosa moist   Neck:   No adenopathy, supple, trachea midline, no thyromegaly, no     carotid bruit, no JVD   Back:     No kyphosis present, no scoliosis present, no skin lesions,       erythema or scars, no tenderness to percussion or                   palpation,   range of motion normal   Lungs:     Clear to auscultation,respirations regular, even and                   unlabored    Heart:    Regular rhythm and normal rate, normal S1 and S2, no            murmur, no gallop, no rub, no click   Breast Exam:    Deferred   Abdomen:     Normal bowel sounds, no masses, no organomegaly, soft        non-tender, non-distended, no guarding, no rebound                 tenderness   Genitalia:    Deferred "   Extremities:   Moves all extremities well, no edema, no cyanosis, no              redness   Pulses:   Pulses palpable and equal bilaterally   Skin:   No bleeding, bruising or rash   Lymph nodes:   No palpable adenopathy   Neurologic:   Cranial nerves 2 - 12 grossly intact, sensation intact, DTR        present and equal bilaterally         Results Review:   I reviewed the patient's new clinical results.    EKG: Sinus rhythm loss of R waves V1 to V6 peaking T waves in anterior and anterolateral leads.  Cannot rule out is elevation.    LAB DATA :         WBC   Date Value Ref Range Status   09/17/2017 11.25 (H) 4.80 - 10.80 10*3/mm3 Final     RBC   Date Value Ref Range Status   09/17/2017 3.01 (L) 4.20 - 5.40 10*6/mm3 Final     Hemoglobin   Date Value Ref Range Status   09/17/2017 9.6 (L) 12.0 - 16.0 g/dL Final     Hematocrit   Date Value Ref Range Status   09/17/2017 32.6 (L) 37.0 - 47.0 % Final     MCV   Date Value Ref Range Status   09/17/2017 108.3 (H) 81.0 - 99.0 fL Final     MCH   Date Value Ref Range Status   09/17/2017 31.9 (H) 27.0 - 31.0 pg Final     MCHC   Date Value Ref Range Status   09/17/2017 29.4 (L) 30.0 - 37.0 g/dL Final     RDW   Date Value Ref Range Status   09/17/2017 19.6 (H) 11.5 - 14.5 % Final     RDW-SD   Date Value Ref Range Status   09/17/2017 77.9 (H) 37.0 - 54.0 fl Final     MPV   Date Value Ref Range Status   09/17/2017 10.4 6.0 - 12.0 fL Final     Platelets   Date Value Ref Range Status   09/17/2017 122 (L) 130 - 400 10*3/mm3 Final     Neutrophil %   Date Value Ref Range Status   09/17/2017 79.4 37.0 - 80.0 % Final     Lymphocyte %   Date Value Ref Range Status   09/17/2017 14.5 10.0 - 50.0 % Final     Monocyte %   Date Value Ref Range Status   09/17/2017 4.9 0.0 - 12.0 % Final     Eosinophil %   Date Value Ref Range Status   09/17/2017 0.1 0.0 - 7.0 % Final     Basophil %   Date Value Ref Range Status   09/17/2017 0.3 0.0 - 2.5 % Final     Immature Grans %   Date Value Ref Range Status    09/17/2017 0.8 (H) 0.0 - 0.6 % Final     Neutrophils, Absolute   Date Value Ref Range Status   09/17/2017 8.94 (H) 2.00 - 6.90 10*3/mm3 Final     Lymphocytes, Absolute   Date Value Ref Range Status   09/17/2017 1.63 0.60 - 3.40 10*3/mm3 Final     Monocytes, Absolute   Date Value Ref Range Status   09/17/2017 0.55 0.00 - 0.90 10*3/mm3 Final     Eosinophils, Absolute   Date Value Ref Range Status   09/17/2017 0.01 0.00 - 0.70 10*3/mm3 Final     Basophils, Absolute   Date Value Ref Range Status   09/17/2017 0.03 0.00 - 0.20 10*3/mm3 Final     Immature Grans, Absolute   Date Value Ref Range Status   09/17/2017 0.09 (H) 0.00 - 0.06 10*3/mm3 Final     nRBC   Date Value Ref Range Status   09/17/2017 0.7 (H) 0.0 - 0.0 /100 WBC Final       Lab Results   Component Value Date    GLUCOSE 99 (H) 09/17/2017    BUN 52 (H) 09/17/2017    CREATININE 6.30 (H) 09/17/2017    EGFRIFNONA 8 (L) 09/17/2017    BCR 8.3 09/17/2017    CO2 18.0 (L) 09/17/2017    CALCIUM 6.9 (C) 09/17/2017    ALBUMIN 3.70 09/17/2017    LABIL2 1.6 09/17/2017     (C) 09/17/2017     (C) 09/17/2017       Lab Results   Component Value Date    CKTOTAL 777 (H) 05/03/2017    CKMB 10.80 (C) 05/03/2017    CKMBINDEX 1.4 05/03/2017    TROPONINI 0.047 (H) 09/17/2017       Lab Results   Component Value Date    DDIMER 1597 (C) 06/04/2016       Site   Date Value Ref Range Status   09/17/2017 Arterial: left radial  Final     Reynaldo's Test   Date Value Ref Range Status   09/17/2017 Positive  Final     pH, Arterial   Date Value Ref Range Status   09/17/2017 7.338 pH units Final     pCO2, Arterial   Date Value Ref Range Status   09/17/2017 29.3 (L) 35.0 - 45.0 mm Hg Final     pO2, Arterial   Date Value Ref Range Status   09/17/2017 491.0 (H) 75.0 - 100.0 mm Hg Final     HCO3, Arterial   Date Value Ref Range Status   09/17/2017 15.7 (L) 22.0 - 28.0 mmol/L Final     Base Excess, Arterial   Date Value Ref Range Status   09/17/2017 -10.1 mmol/L Final     O2 Saturation,  Arterial   Date Value Ref Range Status   09/16/2017 92.6 % Final     Hemoglobin, Blood Gas   Date Value Ref Range Status   09/17/2017 9.2 (L) 12 - 18 g/dL Final     Oxyhemoglobin   Date Value Ref Range Status   09/16/2017 89.5 (L) 94 - 99 % Final     Methemoglobin   Date Value Ref Range Status   09/16/2017 1.10 % Final     Carboxyhemoglobin   Date Value Ref Range Status   09/16/2017 2.3 % Final     Barometric Pressure for Blood Gas   Date Value Ref Range Status   09/17/2017 737 mmHg Final     Modality   Date Value Ref Range Status   09/17/2017 Ambu bag  Final     FIO2   Date Value Ref Range Status   09/17/2017 100 % Final     Lab Results   Component Value Date    HGBA1C 5.5 06/03/2014         Lab Results   Component Value Date    LIPASE 320 (H) 09/17/2017       IMAGING DATA:     Ct Abdomen Pelvis Without Contrast    Result Date: 9/17/2017  Narrative: FINAL REPORT TECHNIQUE: Axial images through the abdomen and pelvis were obtained by computed tomography.  IV contrast was withheld. CLINICAL HISTORY: LLQ ABD PAIN FINDINGS: Abdomen: There is cardiomegaly, small bilateral pleural effusions and pulmonary edema, consistent with CHF. The gallbladder and solid abdominal organs are normal, except for marked bilateral renal atrophy. No abnormality of the GI tract is seen. Pelvis:  The uterus, ovaries and urinary bladder show no abnormality. There is a small amount of ascites in the abdomen or pelvis. There is increased lung density, consistent with renal osteodystrophy.     Impression: Congestive heart failure. Small amount of ascites in the abdomen and pelvis. Authenticated by Dominik Hood M.D. on 09/17/2017 12:00:52 AM    Us Gallbladder    Result Date: 9/16/2017  Narrative: FINAL REPORT TECHNIQUE: Sonographic images of the right upper quadrant were obtained. CLINICAL HISTORY: ABNORMAL LABS. ABD PAIN.  END STAGE RENAL FAILURE. FINDINGS: The gallbladder is unremarkable. There is no intra-or extrahepatic biliary ductal dilation.  There is no ascites. There is a small right pleural effusion.     Impression: Small right pleural effusion. No hepatobiliary abnormality identified. Authenticated by Dominik Hood M.D. on 09/16/2017 10:15:25 PM      Bedside echo: Severe diminishment in LV systolic function EF about 10-15% global hypokinesis seen.  One of the septum seems to be moving.  The right ventricle is mild to moderately dilated.  Severe tricuspid insufficiency identified.     DIAGNOSIS  #1 cardiac pulmonary arrest: Patient has had an episode of cardiopulmonary arrest.  The exact etiology is obscure.  Presently he is dependent on the pressors and is not responding much.  Overall prognosis seems to be extremely poor.  This has been discussed with the mom.    The etiology for these events is not very clear.  She does have severe tricuspid insufficiency on echocardiography.  Evaluation for possible pulmonary thromboembolism might be warranted given the circumstances.  Will obtain a CT PE protocol.    The abrupt change in her LV systolic function is also very concerning.  Whether this is been shocked myocardium after her CPR for about an hour or indeed if she had a significant ischemia is not very clear.  It's unlikely given her age that she has had an ischemic event.  We will continue to monitor this.    Her overall prognosis seems to be extremely poor given the past history of noncompliance drug abuse end-stage renal disease and recurrent hospital admissions.  This is been discussed with her mom too     Electrolyte  of normality so severe.  She is due for dialysis morning.  Would pursue this once the status of the pulmonary thromboembolic is a certain.    Patient's hand looks mottled.  On Doppler there is a monophasic flow in the brachial artery.  The radial artery has a very soft monophasic Doppler noted on it.  Will keep on anti-graduation therapy for now.  Will offer possible transfer to tertiary care center if the family wishes.        Principal  Problem:    Hypertensive urgency, malignant  Active Problems:    Tobacco use    Headache    Bipolar disorder    Depression    Esophageal reflux    Hypocalcemia    Severe anxiety with panic    Chronic pain    COPD (chronic obstructive pulmonary disease)    End stage renal disease on dialysis    Hepatitis C antibody positive in blood    IV drug abuse    Anemia of ESRD    COPD exacerbation    Medical non-compliance    Neuropathic pain of both legs          I discussed the patients findings and my recommendations with patient    Nic Carcamo MD  09/17/17  8:57 AM      Please note that portions of this note may have been completed with a voice recognition program. Efforts were made to edit the dictations, but occasionally words are mistranscribed.

## 2017-09-17 NOTE — PLAN OF CARE
Problem: Patient Care Overview (Adult)  Goal: Plan of Care Review  Outcome: Ongoing (interventions implemented as appropriate)    09/17/17 0225   Coping/Psychosocial Response Interventions   Plan Of Care Reviewed With patient   Patient Care Overview   Progress no change   Outcome Evaluation   Outcome Summary/Follow up Plan admitted for shortness of air on/off BiPAP, hypertension on NTG gtt, ESRD       Goal: Adult Individualization and Mutuality  Outcome: Ongoing (interventions implemented as appropriate)  Goal: Discharge Needs Assessment  Outcome: Ongoing (interventions implemented as appropriate)    09/17/17 0225   Discharge Needs Assessment   Concerns To Be Addressed compliance issue concerns   Current Health   Anticipated Changes Related to Illness none         Problem: COPD, Chronic Bronchitis/Emphysema (Adult)  Goal: Signs and Symptoms of Listed Potential Problems Will be Absent or Manageable (COPD, Chronic Bronchitis/Emphysema)  Outcome: Ongoing (interventions implemented as appropriate)    09/17/17 0225   COPD, Chronic Bronchitis/Emphysema   Problems Assessed (COPD, Chronic Bronchitis/Emphysema) all   Problems Present (COPD, Chronic Bronchitis/Emphysema) dyspnea;hypoxia/hypoxemia         Problem: Chronic Kidney Disease/End Stage Renal Disease (Adult)  Goal: Signs and Symptoms of Listed Potential Problems Will be Absent or Manageable (Chronic Kidney Disease/End Stage Renal Disease)  Outcome: Ongoing (interventions implemented as appropriate)    09/17/17 0225   Chronic Kidney Disease/End Stage Renal Disease   Problems Assessed (Chronic Kidney Disease/ESRD) all   Problems Present (Chronic Kidney Disease/ESRD) cardiovascular disease;fluid imbalance;hypertension;pulmonary edema         Problem: Pain, Chronic (Adult)  Goal: Identify Related Risk Factors and Signs and Symptoms  Outcome: Ongoing (interventions implemented as appropriate)    09/17/17 0225   Pain, Chronic   Related Risk Factors (Chronic Pain) disease  process;procedures/treatments   Signs and Symptoms (Chronic Pain) verbalization of pain descriptors;verbalization of pain/discomfort for a prolonged time period       Goal: Acceptable Pain Control/Comfort Level  Outcome: Ongoing (interventions implemented as appropriate)    09/17/17 0225   Pain, Chronic (Adult)   Acceptable Pain Control/Comfort Level unable to achieve outcome

## 2017-09-18 ENCOUNTER — APPOINTMENT (OUTPATIENT)
Dept: ULTRASOUND IMAGING | Facility: HOSPITAL | Age: 27
End: 2017-09-18

## 2017-09-18 ENCOUNTER — APPOINTMENT (OUTPATIENT)
Dept: GENERAL RADIOLOGY | Facility: HOSPITAL | Age: 27
End: 2017-09-18

## 2017-09-18 ENCOUNTER — APPOINTMENT (OUTPATIENT)
Dept: CARDIOLOGY | Facility: HOSPITAL | Age: 27
End: 2017-09-18
Attending: INTERNAL MEDICINE

## 2017-09-18 LAB
ABO GROUP BLD: NORMAL
ABO GROUP BLD: NORMAL
ALBUMIN SERPL-MCNC: 2.8 G/DL (ref 3.5–5)
ALBUMIN/GLOB SERPL: 1.4 G/DL (ref 1–2)
ALP SERPL-CCNC: 166 U/L (ref 38–126)
ALT SERPL W P-5'-P-CCNC: 3065 U/L (ref 13–69)
ANION GAP SERPL CALCULATED.3IONS-SCNC: 17.5 MMOL/L
APTT PPP: 60.2 SECONDS (ref 25–36)
APTT PPP: 62.7 SECONDS (ref 25–36)
ARTERIAL PATENCY WRIST A: ABNORMAL
ARTERIAL PATENCY WRIST A: POSITIVE
AST SERPL-CCNC: 5182 U/L (ref 15–46)
ATMOSPHERIC PRESS: 735 MMHG
ATMOSPHERIC PRESS: 735 MMHG
BASE EXCESS BLDA CALC-SCNC: 3 MMOL/L
BASE EXCESS BLDA CALC-SCNC: 4.5 MMOL/L
BASOPHILS # BLD AUTO: 0 10*3/MM3 (ref 0–0.2)
BASOPHILS NFR BLD AUTO: 0 % (ref 0–2.5)
BDY SITE: ABNORMAL
BDY SITE: ABNORMAL
BILIRUB SERPL-MCNC: 4 MG/DL (ref 0.2–1.3)
BLD GP AB SCN SERPL QL: NEGATIVE
BUN BLD-MCNC: 43 MG/DL (ref 7–20)
BUN/CREAT SERPL: 10.5 (ref 7.1–23.5)
CALCIUM SPEC-SCNC: 6.8 MG/DL (ref 8.4–10.2)
CHLORIDE SERPL-SCNC: 96 MMOL/L (ref 98–107)
CO2 SERPL-SCNC: 27 MMOL/L (ref 26–30)
CREAT BLD-MCNC: 4.1 MG/DL (ref 0.6–1.3)
DEPRECATED RDW RBC AUTO: 66.6 FL (ref 37–54)
EOSINOPHIL # BLD AUTO: 0 10*3/MM3 (ref 0–0.7)
EOSINOPHIL NFR BLD AUTO: 0 % (ref 0–7)
ERYTHROCYTE [DISTWIDTH] IN BLOOD BY AUTOMATED COUNT: 18.4 % (ref 11.5–14.5)
GFR SERPL CREATININE-BSD FRML MDRD: 13 ML/MIN/1.73
GLOBULIN UR ELPH-MCNC: 2 GM/DL
GLUCOSE BLD-MCNC: 161 MG/DL (ref 74–98)
GLUCOSE BLDC GLUCOMTR-MCNC: 105 MG/DL (ref 70–130)
GLUCOSE BLDC GLUCOMTR-MCNC: 111 MG/DL (ref 70–130)
GLUCOSE BLDC GLUCOMTR-MCNC: 119 MG/DL (ref 70–130)
GLUCOSE BLDC GLUCOMTR-MCNC: 131 MG/DL (ref 70–130)
GLUCOSE BLDC GLUCOMTR-MCNC: 131 MG/DL (ref 70–130)
GLUCOSE BLDC GLUCOMTR-MCNC: 134 MG/DL (ref 70–130)
GLUCOSE BLDC GLUCOMTR-MCNC: 137 MG/DL (ref 70–130)
GLUCOSE BLDC GLUCOMTR-MCNC: 139 MG/DL (ref 70–130)
GLUCOSE BLDC GLUCOMTR-MCNC: 144 MG/DL (ref 70–130)
GLUCOSE BLDC GLUCOMTR-MCNC: 144 MG/DL (ref 70–130)
GLUCOSE BLDC GLUCOMTR-MCNC: 148 MG/DL (ref 70–130)
GLUCOSE BLDC GLUCOMTR-MCNC: 149 MG/DL (ref 70–130)
GLUCOSE BLDC GLUCOMTR-MCNC: 150 MG/DL (ref 70–130)
GLUCOSE BLDC GLUCOMTR-MCNC: 152 MG/DL (ref 70–130)
GLUCOSE BLDC GLUCOMTR-MCNC: 162 MG/DL (ref 70–130)
GLUCOSE BLDC GLUCOMTR-MCNC: 165 MG/DL (ref 70–130)
GLUCOSE BLDC GLUCOMTR-MCNC: 170 MG/DL (ref 70–130)
GLUCOSE BLDC GLUCOMTR-MCNC: 171 MG/DL (ref 70–130)
GLUCOSE BLDC GLUCOMTR-MCNC: 172 MG/DL (ref 70–130)
GLUCOSE BLDC GLUCOMTR-MCNC: 182 MG/DL (ref 70–130)
GLUCOSE BLDC GLUCOMTR-MCNC: 87 MG/DL (ref 70–130)
HCO3 BLDA-SCNC: 28.4 MMOL/L (ref 22–28)
HCO3 BLDA-SCNC: 29.1 MMOL/L (ref 22–28)
HCT VFR BLD AUTO: 22.8 % (ref 37–47)
HGB BLD-MCNC: 7.2 G/DL (ref 12–16)
HGB BLDA-MCNC: 7.2 G/DL (ref 12–18)
HGB BLDA-MCNC: 7.8 G/DL (ref 12–18)
HOROWITZ INDEX BLD+IHG-RTO: 50 %
HOROWITZ INDEX BLD+IHG-RTO: 50 %
IMM GRANULOCYTES # BLD: 0.15 10*3/MM3 (ref 0–0.06)
IMM GRANULOCYTES NFR BLD: 1.4 % (ref 0–0.6)
LYMPHOCYTES # BLD AUTO: 0.65 10*3/MM3 (ref 0.6–3.4)
LYMPHOCYTES NFR BLD AUTO: 6.1 % (ref 10–50)
MAGNESIUM SERPL-MCNC: 1.9 MG/DL (ref 1.6–2.3)
MCH RBC QN AUTO: 31.7 PG (ref 27–31)
MCHC RBC AUTO-ENTMCNC: 31.6 G/DL (ref 30–37)
MCV RBC AUTO: 100.4 FL (ref 81–99)
MODALITY: ABNORMAL
MODALITY: ABNORMAL
MONOCYTES # BLD AUTO: 0.32 10*3/MM3 (ref 0–0.9)
MONOCYTES NFR BLD AUTO: 3 % (ref 0–12)
NEUTROPHILS # BLD AUTO: 9.54 10*3/MM3 (ref 2–6.9)
NEUTROPHILS NFR BLD AUTO: 89.5 % (ref 37–80)
NRBC BLD MANUAL-RTO: 0.4 /100 WBC (ref 0–0)
PCO2 BLDA: 40.1 MM HG (ref 35–45)
PCO2 BLDA: 56.7 MM HG (ref 35–45)
PH BLDA: 7.32 PH UNITS
PH BLDA: 7.46 PH UNITS
PHOSPHATE SERPL-MCNC: 5.3 MG/DL (ref 2.5–4.5)
PLATELET # BLD AUTO: 97 10*3/MM3 (ref 130–400)
PMV BLD AUTO: 11.8 FL (ref 6–12)
PO2 BLDA: 161 MM HG (ref 75–100)
PO2 BLDA: 31.3 MM HG (ref 75–100)
POTASSIUM BLD-SCNC: 4.5 MMOL/L (ref 3.5–5.1)
PROT SERPL-MCNC: 4.8 G/DL (ref 6.3–8.2)
RBC # BLD AUTO: 2.27 10*6/MM3 (ref 4.2–5.4)
RH BLD: POSITIVE
RH BLD: POSITIVE
SAO2 % BLDCOA: 42.2 %
SODIUM BLD-SCNC: 136 MMOL/L (ref 137–145)
TROPONIN I SERPL-MCNC: 0.11 NG/ML (ref 0–0.03)
TROPONIN I SERPL-MCNC: 0.12 NG/ML (ref 0–0.03)
VANCOMYCIN SERPL-MCNC: 23.51 MCG/ML (ref 5–40)
VRE SPEC QL CULT: NORMAL
WBC NRBC COR # BLD: 10.66 10*3/MM3 (ref 4.8–10.8)

## 2017-09-18 PROCEDURE — 86920 COMPATIBILITY TEST SPIN: CPT

## 2017-09-18 PROCEDURE — 36430 TRANSFUSION BLD/BLD COMPNT: CPT

## 2017-09-18 PROCEDURE — 99233 SBSQ HOSP IP/OBS HIGH 50: CPT | Performed by: INTERNAL MEDICINE

## 2017-09-18 PROCEDURE — 94799 UNLISTED PULMONARY SVC/PX: CPT

## 2017-09-18 PROCEDURE — 93005 ELECTROCARDIOGRAM TRACING: CPT | Performed by: INTERNAL MEDICINE

## 2017-09-18 PROCEDURE — 84100 ASSAY OF PHOSPHORUS: CPT | Performed by: INTERNAL MEDICINE

## 2017-09-18 PROCEDURE — 25010000002 METHYLPREDNISOLONE PER 40 MG: Performed by: INTERNAL MEDICINE

## 2017-09-18 PROCEDURE — 94003 VENT MGMT INPAT SUBQ DAY: CPT

## 2017-09-18 PROCEDURE — 84484 ASSAY OF TROPONIN QUANT: CPT | Performed by: INTERNAL MEDICINE

## 2017-09-18 PROCEDURE — 82805 BLOOD GASES W/O2 SATURATION: CPT

## 2017-09-18 PROCEDURE — 86901 BLOOD TYPING SEROLOGIC RH(D): CPT | Performed by: INTERNAL MEDICINE

## 2017-09-18 PROCEDURE — 86900 BLOOD TYPING SEROLOGIC ABO: CPT

## 2017-09-18 PROCEDURE — 36600 WITHDRAWAL OF ARTERIAL BLOOD: CPT

## 2017-09-18 PROCEDURE — 80202 ASSAY OF VANCOMYCIN: CPT | Performed by: INTERNAL MEDICINE

## 2017-09-18 PROCEDURE — 25010000002 PROPOFOL 1000 MG/ML EMULSION: Performed by: INTERNAL MEDICINE

## 2017-09-18 PROCEDURE — 83735 ASSAY OF MAGNESIUM: CPT | Performed by: INTERNAL MEDICINE

## 2017-09-18 PROCEDURE — 25010000002 PIPERACILLIN SOD-TAZOBACTAM PER 1 G: Performed by: INTERNAL MEDICINE

## 2017-09-18 PROCEDURE — 86900 BLOOD TYPING SEROLOGIC ABO: CPT | Performed by: INTERNAL MEDICINE

## 2017-09-18 PROCEDURE — 25010000002 AZITHROMYCIN: Performed by: INTERNAL MEDICINE

## 2017-09-18 PROCEDURE — 71010 HC CHEST PA OR AP: CPT

## 2017-09-18 PROCEDURE — 86901 BLOOD TYPING SEROLOGIC RH(D): CPT

## 2017-09-18 PROCEDURE — P9016 RBC LEUKOCYTES REDUCED: HCPCS

## 2017-09-18 PROCEDURE — 80053 COMPREHEN METABOLIC PANEL: CPT | Performed by: INTERNAL MEDICINE

## 2017-09-18 PROCEDURE — 82962 GLUCOSE BLOOD TEST: CPT

## 2017-09-18 PROCEDURE — 93306 TTE W/DOPPLER COMPLETE: CPT

## 2017-09-18 PROCEDURE — 99232 SBSQ HOSP IP/OBS MODERATE 35: CPT | Performed by: INTERNAL MEDICINE

## 2017-09-18 PROCEDURE — 86850 RBC ANTIBODY SCREEN: CPT | Performed by: INTERNAL MEDICINE

## 2017-09-18 PROCEDURE — 25010000002 MORPHINE PER 10 MG: Performed by: INTERNAL MEDICINE

## 2017-09-18 PROCEDURE — 85025 COMPLETE CBC W/AUTO DIFF WBC: CPT | Performed by: INTERNAL MEDICINE

## 2017-09-18 PROCEDURE — 85730 THROMBOPLASTIN TIME PARTIAL: CPT | Performed by: INTERNAL MEDICINE

## 2017-09-18 PROCEDURE — 93931 UPPER EXTREMITY STUDY: CPT

## 2017-09-18 RX ORDER — LORAZEPAM 0.5 MG/1
2 TABLET ORAL ONCE
Status: DISCONTINUED | OUTPATIENT
Start: 2017-09-18 | End: 2017-09-21 | Stop reason: HOSPADM

## 2017-09-18 RX ORDER — LORAZEPAM 0.5 MG/1
2 TABLET ORAL ONCE
Status: COMPLETED | OUTPATIENT
Start: 2017-09-18 | End: 2017-09-18

## 2017-09-18 RX ORDER — FOLIC ACID/VIT B COMPLEX AND C 0.8 MG
1 TABLET ORAL 2 TIMES DAILY
Status: DISCONTINUED | OUTPATIENT
Start: 2017-09-18 | End: 2017-09-21 | Stop reason: HOSPADM

## 2017-09-18 RX ADMIN — METHYLPREDNISOLONE SODIUM SUCCINATE 40 MG: 40 INJECTION, POWDER, FOR SOLUTION INTRAMUSCULAR; INTRAVENOUS at 06:40

## 2017-09-18 RX ADMIN — METHYLPREDNISOLONE SODIUM SUCCINATE 40 MG: 40 INJECTION, POWDER, FOR SOLUTION INTRAMUSCULAR; INTRAVENOUS at 19:28

## 2017-09-18 RX ADMIN — LABETALOL HYDROCHLORIDE 20 MG: 5 INJECTION, SOLUTION INTRAVENOUS at 21:09

## 2017-09-18 RX ADMIN — LORAZEPAM 2 MG: 0.5 TABLET ORAL at 19:28

## 2017-09-18 RX ADMIN — MORPHINE SULFATE 2 MG: 2 INJECTION, SOLUTION INTRAMUSCULAR; INTRAVENOUS at 19:30

## 2017-09-18 RX ADMIN — NITROGLYCERIN 5 MCG/MIN: 20 INJECTION INTRAVENOUS at 22:00

## 2017-09-18 RX ADMIN — PROPOFOL 20 MCG/KG/MIN: 10 INJECTION, EMULSION INTRAVENOUS at 05:09

## 2017-09-18 RX ADMIN — TAZOBACTAM SODIUM AND PIPERACILLIN SODIUM 2.25 G: 250; 2 INJECTION, SOLUTION INTRAVENOUS at 12:45

## 2017-09-18 RX ADMIN — TAZOBACTAM SODIUM AND PIPERACILLIN SODIUM 2.25 G: 250; 2 INJECTION, SOLUTION INTRAVENOUS at 19:28

## 2017-09-18 RX ADMIN — METHYLPREDNISOLONE SODIUM SUCCINATE 40 MG: 40 INJECTION, POWDER, FOR SOLUTION INTRAMUSCULAR; INTRAVENOUS at 00:34

## 2017-09-18 RX ADMIN — FAMOTIDINE 20 MG: 10 INJECTION, SOLUTION INTRAVENOUS at 09:19

## 2017-09-18 RX ADMIN — MORPHINE SULFATE 2 MG: 2 INJECTION, SOLUTION INTRAMUSCULAR; INTRAVENOUS at 14:30

## 2017-09-18 RX ADMIN — MORPHINE SULFATE 2 MG: 2 INJECTION, SOLUTION INTRAMUSCULAR; INTRAVENOUS at 09:19

## 2017-09-18 RX ADMIN — MORPHINE SULFATE 2 MG: 2 INJECTION, SOLUTION INTRAMUSCULAR; INTRAVENOUS at 17:42

## 2017-09-18 RX ADMIN — METHYLPREDNISOLONE SODIUM SUCCINATE 40 MG: 40 INJECTION, POWDER, FOR SOLUTION INTRAMUSCULAR; INTRAVENOUS at 14:31

## 2017-09-18 RX ADMIN — TAZOBACTAM SODIUM AND PIPERACILLIN SODIUM 2.25 G: 250; 2 INJECTION, SOLUTION INTRAVENOUS at 05:03

## 2017-09-18 RX ADMIN — CHLORHEXIDINE GLUCONATE 15 ML: 1.2 RINSE ORAL at 09:19

## 2017-09-18 RX ADMIN — IPRATROPIUM BROMIDE AND ALBUTEROL SULFATE 3 ML: .5; 3 SOLUTION RESPIRATORY (INHALATION) at 12:51

## 2017-09-18 RX ADMIN — LABETALOL HYDROCHLORIDE 20 MG: 5 INJECTION, SOLUTION INTRAVENOUS at 17:42

## 2017-09-18 RX ADMIN — AZITHROMYCIN 500 MG: 500 INJECTION, POWDER, LYOPHILIZED, FOR SOLUTION INTRAVENOUS at 00:35

## 2017-09-18 RX ADMIN — IPRATROPIUM BROMIDE AND ALBUTEROL SULFATE 3 ML: .5; 3 SOLUTION RESPIRATORY (INHALATION) at 07:02

## 2017-09-18 NOTE — PROGRESS NOTES
"   LOS: 1 day   Patient Care Team:  Neo Gresham DO as PCP - General (Family Medicine)      Interval History: Patient is awake and alert moving all her extremities responding to verbal commands is not complaining of any acute symptoms at this time.     Review of Systems:   Pertinent items are noted in HPI.      Objective     Vital Sign Min/Max for last 24 hours  No Data Recorded   BP  Min: 99/73  Max: 218/110   Pulse  Min: 75  Max: 90   Resp  Min: 17  Max: 18   SpO2  Min: 97 %  Max: 100 %   Flow (L/min)  Min: 40  Max: 40   Weight  Min: 103 lb 8 oz (46.9 kg)  Max: 103 lb 8 oz (46.9 kg)     Flowsheet Rows         First Filed Value    Admission Height  58\" (147.3 cm) Documented at 09/16/2017 1849    Admission Weight  90 lb (40.8 kg) Documented at 09/16/2017 1849          Physical Exam:     General Appearance:    Alert, cooperative, in no acute distress   Head:    Normocephalic, without obvious abnormality, atraumatic   Eyes:            Lids and lashes normal, conjunctivae and sclerae normal, no   icterus, no pallor, corneas clear, PERRLA   Ears:    Ears appear intact with no abnormalities noted   Throat:   No oral lesions, no thrush, oral mucosa moist   Neck:   No adenopathy, supple, trachea midline, no thyromegaly, no     carotid bruit, no JVD   Back:     No kyphosis present, no scoliosis present, no skin lesions,       erythema or scars, no tenderness to percussion or                   palpation,   range of motion normal   Lungs:     Clear to auscultation,respirations regular, even and                   unlabored    Heart:    Regular rhythm and normal rate, normal S1 and S2, no            murmur, no gallop, no rub, no click   Breast Exam:    Deferred   Abdomen:     Normal bowel sounds, no masses, no organomegaly, soft        non-tender, non-distended, no guarding, no rebound                 tenderness   Genitalia:    Deferred   Extremities:  Left upper arm has got mottled appearance to it.  There is a faint " dopplerable radial artery present.  Moves all extremities well, no edema, no cyanosis, no              redness   Pulses:   Pulses palpable and equal bilaterally   Skin:   No bleeding, bruising or rash   Lymph nodes:   No palpable adenopathy   Neurologic:   Cranial nerves 2 - 12 grossly intact, sensation intact, DTR        present and equal bilaterally          Results Review:     I reviewed the patient's new clinical results.    Results Review:   I reviewed the patient's new clinical results.        LAB DATA :         Laboratory results:      Results from last 7 days  Lab Units 09/18/17  0304 09/17/17  1457 09/17/17  1107 09/17/17  0647 09/17/17  0555 09/16/17 2010   SODIUM mmol/L 136* 138  --  140 137 141   POTASSIUM mmol/L 4.5 4.9 4.3 8.1* 9.1* 4.7   CHLORIDE mmol/L 96* 96*  --  99 102 101   CO2 mmol/L 27.0 29.0  --  18.0* 11.0* 23.0*   BUN mg/dL 43* 27*  --  52* 51* 47*   CREATININE mg/dL 4.10* 3.20*  --  6.30* 6.10* 5.80*   CALCIUM mg/dL 6.8* 7.4*  --  6.9* 6.6* 7.3*   BILIRUBIN mg/dL 4.0*  --   --   --  3.0* 0.9   ALK PHOS U/L 166*  --   --   --  192* 183*   ALT (SGPT) U/L 3065*  --   --   --  562* 506*   AST (SGOT) U/L 5182*  --   --   --  546* 227*   GLUCOSE mg/dL 161* 212*  --  99* 158* 101*       Results from last 7 days  Lab Units 09/18/17  0304 09/17/17  1107 09/17/17  0555 09/16/17 2010   WBC 10*3/mm3 10.66 18.47* 11.25* 15.10*   HEMOGLOBIN g/dL 7.2* 9.3* 9.6* 8.8*   HEMATOCRIT % 22.8* 30.0* 32.6* 28.5*   PLATELETS 10*3/mm3 97* 149 122* 184       Results from last 7 days  Lab Units 09/17/17  1107   INR  1.97*       Results from last 7 days  Lab Units 09/17/17  0206 09/16/17 2010   BLOODCX  No growth at 24 hours No growth at 24 hours           Results from last 7 days  Lab Units 09/17/17  2348   TROPONIN I ng/mL 0.114*               Results from last 7 days  Lab Units 09/18/17  0536 09/18/17  0526  09/16/17  2242   PH, ARTERIAL pH units 7.458 7.319  < > 7.391   PCO2, ARTERIAL mm Hg 40.1 56.7*  < > 40.0    PO2 ART mm Hg 161.0* 31.3*  < > 66.4*   HCO3 ART mmol/L 28.4* 29.1*  < > 24.2   BASE EXCESS ART mmol/L 4.5 3.0  < > -0.7   O2 SATURATION ART %  --  42.2  --  92.6   HEMOGLOBIN, ARTERIAL g/dL 7.2* 7.8*  < > 8.5*   OXYHEMOGLOBIN %  --   --   --  89.5*   METHEMOGLOBIN %  --   --   --  1.10   CARBOXYHEMOGLOBIN %  --   --   --  2.3   BAROMETRIC PRESSURE FOR BLOOD GAS mmHg 735 735  < >  --    MODALITY  Ventilator Ventilator  < > Cannula - Nasal   FIO2 % 50 50  < > 28   < > = values in this interval not displayed.  Lab Results   Component Value Date    HGBA1C 5.5 06/03/2014           Results from last 7 days  Lab Units 09/17/17  0555 09/16/17 2010   LIPASE U/L 320* 403*       IMAGING DATA:     Ct Abdomen Pelvis Without Contrast    Result Date: 9/17/2017  Narrative: FINAL REPORT TECHNIQUE: Axial images through the abdomen and pelvis were obtained by computed tomography.  IV contrast was withheld. CLINICAL HISTORY: LLQ ABD PAIN FINDINGS: Abdomen: There is cardiomegaly, small bilateral pleural effusions and pulmonary edema, consistent with CHF. The gallbladder and solid abdominal organs are normal, except for marked bilateral renal atrophy. No abnormality of the GI tract is seen. Pelvis:  The uterus, ovaries and urinary bladder show no abnormality. There is a small amount of ascites in the abdomen or pelvis. There is increased lung density, consistent with renal osteodystrophy.     Impression: Congestive heart failure. Small amount of ascites in the abdomen and pelvis. Authenticated by Dominik Hood M.D. on 09/17/2017 12:00:52 AM    Us Gallbladder    Result Date: 9/16/2017  Narrative: FINAL REPORT TECHNIQUE: Sonographic images of the right upper quadrant were obtained. CLINICAL HISTORY: ABNORMAL LABS. ABD PAIN.  END STAGE RENAL FAILURE. FINDINGS: The gallbladder is unremarkable. There is no intra-or extrahepatic biliary ductal dilation. There is no ascites. There is a small right pleural effusion.     Impression: Small right  pleural effusion. No hepatobiliary abnormality identified. Authenticated by Dominik Hood M.D. on 09/16/2017 10:15:25 PM    Xr Chest 1 View    Result Date: 9/18/2017  Narrative: PROCEDURE: XR CHEST 1 VW-  HISTORY: Acute Respiratory Failure.; I16.9-Hypertensive crisis, unspecified; E87.70-Fluid overload, unspecified; R74.8-Abnormal levels of other serum enzymes; K85.90-Acute pancreatitis without necrosis or infection, unspecified; Z91.19-Patient's noncompliance with other medical treatment and regimen  COMPARISON: September 17, 2017.  FINDINGS: The heart is enlarged. The mediastinum is unremarkable. Probable small left pleural effusion. There is no pneumothorax.  There are no acute osseous abnormalities. Endotracheal tube and nasogastric tube in stable. Electrodes overlie the chest.      Impression: No significant change from prior.  Continued followup is recommended.  This report was finalized on 9/18/2017 9:54 AM by Trav Burgess DO.    Xr Chest 1 View    Result Date: 9/17/2017  Narrative: PROCEDURE: XR CHEST 1 VW-  INDICATION:  shortness of breath  FINDINGS:  A portable view of the chest was obtained.  Comparison is made to a prior exam dated 7/6/2017.   The heart is enlarged. There are bilateral mixed interstitial and alveolar opacities with bilateral pleural effusions. Differential considerations include pulmonary edema or pneumonia. There is no pneumothorax.      Impression: Cardiomegaly with bilateral mixed interstitial and alveolar opacities. This may represent pulmonary edema or bilateral pneumonia. Recommend follow-up to resolution.  This report was finalized on 9/17/2017 11:04 AM by Morena Askew MD.    Xr Chest 1 View    Result Date: 9/17/2017  Narrative: PROCEDURE: XR CHEST 1 VW-  INDICATION:  Confirm ET Tube Placement; I16.9-Hypertensive crisis, unspecified; E87.70-Fluid overload, unspecified; R74.8-Abnormal levels of other serum enzymes; K85.90-Acute pancreatitis without necrosis or infection, unspecified;  Z91.19-Patient's noncompliance with other medical treatment and regimen  FINDINGS:  A portable view of the chest was obtained.  Comparison is made to a prior exam dated 9/16/2017.   There is been interval placement of an endotracheal tube with its tip in the mid thoracic trachea. A new NG tube courses below the diaphragm. The heart is enlarged. There has been interval improvement in bilateral lung opacities. No pneumothorax is visualized. There are likely small pleural effusions.      Impression: ET tube in appropriate position. Improved bilateral lung opacities with persistent cardiomegaly.  This report was finalized on 9/17/2017 11:03 AM by Morena Askew MD.    Ct Chest Pulmonary Embolism With Contrast    Result Date: 9/17/2017  Narrative: FINAL REPORT TECHNIQUE: Postcontrast axial images of the chest were performed in a CTA protocol.This study was performed with technique to keep radiation doses as low as reasonably achievable, (ALARA). Individualized dose reduction techniques using automated exposure control or  adjustment of the MA and/or KV according to the patient's size were employed. CLINICAL HISTORY: SP CODE X2 POSS PE FINDINGS: The heart is normal in size.  There is axillary adenopathy measuring up to 20 mm.  ETT and NG tube are in good position.  There are small bilateral pleural effusions.  No pericardial effusion is identified.  There is no evidence of PE or dissection.  There is severe bilateral renal atrophy.  Anasarca and edema is seen in the mesentery.  There is reflux of contrast into the hepatic veins of uncertain significance.  There is diffuse paratracheal and mediastinal edema.  There are severe diffuse groundglass opacities and consolidation in the upper lobes bilaterally.  There is patchy right lower lobe consolidation.  There is no evidence of aneurysm.     Impression: No dissection or pulmonary embolism. Diffuse edema and anasarca. Severe bilateral renal atrophy. Ground glass opacities  and areas of consolidation which may be due to edema or pneumonia. Authenticated by Wayne Torre MD on 09/17/2017 10:23:56 AM    Xr Abdomen Kub    Result Date: 9/17/2017  Narrative: FINAL REPORT TECHNIQUE: A single view of the abdomen was obtained. CLINICAL HISTORY: CHECKING OG PLACEMENT FINDINGS: The NG tube ends in the proximal-mid stomach. The bowel gas pattern is unremarkable. A right femoral line is in place. There is no osseous abnormality.     Impression: NG tube in proximal-mid stomach. Authenticated by Dominik Hood M.D. on 09/17/2017 05:55:15 PM            Assessment/Plan    #1 cardiopulmonary arrest: Patient is status post cardiac pulmonary arrest suggest this morning is convalescing and moving all extremities and doing perfectly fine.  This is just amazing.    #2 LV dysfunction: Her LV function was noted to be markedly diminished when I did a bedside echo yesterday.  Will document this with the official echo later today.    #3 coronary event very low probability of this being a reason for her symptomatology.  Will need blood work to document the same.  Will obtain a troponin and EKG to complete the workup.    #4 mottled left upper extremity: Patient does have a left forearm which is appearing mottled nevertheless she is moving this appropriately.  There are no gangrenous changes there is a monophasic Doppler the radial artery and the left arm.  We will continue to monitor this at this time is been kept on heparin will continue the same for now.  By description.  The family this appears to be most a chronic rather than acute.  When she is extubated might have to consider further workup for this.  Will obtain arterial Dopplers in the interim.    #5 noncompliance with multiple other medical issues: Is being addressed as per the hospitalist team.    #6 hypertension: We will start reinitiating her calcium channel blocker therapy if the blood pressures continued to be persistently high.    Principal  Problem:    Hypertensive urgency, malignant  Active Problems:    Tobacco use    Headache    Bipolar disorder    Depression    Esophageal reflux    Hypocalcemia    Severe anxiety with panic    Chronic pain    COPD (chronic obstructive pulmonary disease)    End stage renal disease on dialysis    Hepatitis C antibody positive in blood    IV drug abuse    Anemia of ESRD    COPD exacerbation    Medical non-compliance    Neuropathic pain of both legs            Nic Carcamo MD  09/18/17  1:14 PM

## 2017-09-18 NOTE — PLAN OF CARE
Problem: Mechanical Ventilation, Invasive (Adult)  Goal: Signs and Symptoms of Listed Potential Problems Will be Absent or Manageable (Mechanical Ventilation, Invasive)  Outcome: Outcome(s) achieved Date Met:  09/18/17

## 2017-09-18 NOTE — NURSING NOTE
0410:  Pt Hgb/Hct 7.2/22.8.  Dr. Hernandez notified.  1 unit PRBC ordered.  Currently no consent in chart,  not present.   is Japanese speaking but does understand some English.  Dr. Hernandez ok with waiting until  comes in later this morning to obtain consent face to face.  Dr. Hernandez also notified of elevated AST/ALT and Calcium level per morning labs.  No new orders.    Stephania Ramires RN.

## 2017-09-18 NOTE — PROGRESS NOTES
"  CC: Acute Respiratory Failure. S/P Cardiac Arrest.     S: Intubated. Awake. Able to follow commands.     O:Vital signs reviewed. O2Sat: 98 % on 40%. Vent Day # 2  /84  Pulse 79  Temp 97.7 °F (36.5 °C) (Oral)   Resp 18  Ht 62\" (157.5 cm)  Wt 103 lb 8 oz (46.9 kg)  SpO2 100%  BMI 18.93 kg/m2      I & Os reviewed.   Intake/Output       09/17/17 0700 - 09/18/17 0659    Intake (ml) 2079    Output (ml) 1500    Net (ml) 579    Last Weight 103 lb 8 oz (46.9 kg)          General: No acute distress noted.  Eyes: PERRL.   Neck: Supple without JVD  Cardiovascular: S1 + S2. Reg.   Respiratory: No respiratory distress noted. No wheezing heard. Min crackles noted  Abdomen: Soft. Bowel sounds hypoactive   Extremities: No edema noted.  Neurologic: Was able to follow commands.  Detailed exam couldn't be performed due to intubation.  Skin: Appeared somewhat dry     Labs: Reviewed.     Results from last 7 days  Lab Units 09/18/17  0304 09/17/17  1457 09/17/17  1107 09/17/17  0647 09/17/17  0555 09/16/17 2010   SODIUM mmol/L 136* 138  --  140 137 141   POTASSIUM mmol/L 4.5 4.9 4.3 8.1* 9.1* 4.7   CHLORIDE mmol/L 96* 96*  --  99 102 101   CO2 mmol/L 27.0 29.0  --  18.0* 11.0* 23.0*   BUN mg/dL 43* 27*  --  52* 51* 47*   CREATININE mg/dL 4.10* 3.20*  --  6.30* 6.10* 5.80*   CALCIUM mg/dL 6.8* 7.4*  --  6.9* 6.6* 7.3*   BILIRUBIN mg/dL 4.0*  --   --   --  3.0* 0.9   ALK PHOS U/L 166*  --   --   --  192* 183*   ALT (SGPT) U/L 3065*  --   --   --  562* 506*   AST (SGOT) U/L 5182*  --   --   --  546* 227*   GLUCOSE mg/dL 161* 212*  --  99* 158* 101*         Results from last 7 days  Lab Units 09/18/17  0304 09/17/17  1457 09/17/17  0647 09/16/17 2010   MAGNESIUM mg/dL 1.9  --   --  2.2   PHOSPHORUS mg/dL 5.3* 5.7* 10.3* 6.9*           Results from last 7 days  Lab Units 09/18/17  0304 09/17/17  1107 09/17/17  0555 09/16/17 2010   WBC 10*3/mm3 10.66 18.47* 11.25* 15.10*   HEMOGLOBIN g/dL 7.2* 9.3* 9.6* 8.8*   PLATELETS " 10*3/mm3 97* 149 122* 184         Results from last 7 days  Lab Units 09/17/17  1107   INR  1.97*           dextrose 50 mL/hr Last Rate: 50 mL/hr (09/17/17 2142)   fentaNYL (SUBLIMAZE) infusion 10 mcg/ml  mcg/hr Last Rate: 50 mcg/hr (09/17/17 0640)   heparin (porcine) 12 Units/kg/hr Last Rate: 14 Units/kg/hr (09/17/17 2109)   nitroglycerin 5-200 mcg/min Last Rate: 5 mcg/min (09/18/17 0140)   Pharmacy to dose vancomycin     propofol 5-50 mcg/kg/min Last Rate: 20 mcg/kg/min (09/18/17 0509)   sodium chloride 50 mL/hr Last Rate: Stopped (09/17/17 2142)         ABG: Reviewed  Lab Results   Component Value Date    PHART 7.458 09/18/2017    ZOD5JVA 40.1 09/18/2017    PO2ART 161.0 (H) 09/18/2017    HGBBG 7.2 (L) 09/18/2017    D3QVMRYN 42.2 09/18/2017    FCOHB 1.7 01/01/2017    CARBOXYHGB 2.3 09/16/2017    FMETHB 0.8 01/01/2017         CXRay: Reviewed  Imaging Results (last 24 hours)     Procedure Component Value Units Date/Time    XR Abdomen KUB [[id number]] Collected:  09/17/17 1755     Updated:  09/17/17 1757    Narrative:       FINAL REPORT    TECHNIQUE:  A single view of the abdomen was obtained.    CLINICAL HISTORY:  CHECKING OG PLACEMENT    FINDINGS:  The NG tube ends in the proximal-mid stomach. The bowel gas pattern is unremarkable. A right femoral line is in place. There is no osseous abnormality.      Impression:       NG tube in proximal-mid stomach.    Authenticated by Dominik Hood M.D. on 09/17/2017 05:55:15 PM    XR Chest 1 View [[id number]] Collected:  09/18/17 0951     Updated:  09/18/17 0957    Narrative:       PROCEDURE: XR CHEST 1 VW-     HISTORY: Acute Respiratory Failure.; I16.9-Hypertensive crisis,  unspecified; E87.70-Fluid overload, unspecified; R74.8-Abnormal levels  of other serum enzymes; K85.90-Acute pancreatitis without necrosis or  infection, unspecified; Z91.19-Patient's noncompliance with other  medical treatment and regimen     COMPARISON: September 17, 2017.     FINDINGS: The heart is  enlarged. The mediastinum is unremarkable.  Probable small left pleural effusion. There is no pneumothorax.  There  are no acute osseous abnormalities. Endotracheal tube and nasogastric  tube in stable. Electrodes overlie the chest.       Impression:       No significant change from prior.     Continued followup is recommended.     This report was finalized on 9/18/2017 9:54 AM by Trav Burgess DO.    US Arterial Doppler Upper Extremity Left [[id number]] Updated:  09/18/17 1649            Assessment & Recommendations/Plan:   1.  Acute Respiratory Failure.   2.  S/P Cardiac Arrest.  3.  Smoking  4.  ESRD  5.  ? Drug seeking behavior.  6.  Minimal B/L Effusion.  7.  Noncompliance    Will proceed with SBT and likely extubation. She is awake and should be able to be extubated.     We have reviewed patient's current orders and changes, if any, have been suggested to primary care team. Plan was also discussed with nursing staff, as necessary.       Haja Urbina MD  09/18/17  4:55 PM    Dictated utilizing Dragon dictation.

## 2017-09-18 NOTE — PLAN OF CARE
Problem: Patient Care Overview (Adult)  Goal: Plan of Care Review  Outcome: Ongoing (interventions implemented as appropriate)    09/18/17 0536   Coping/Psychosocial Response Interventions   Plan Of Care Reviewed With spouse   Patient Care Overview   Progress progress toward functional goals is gradual   Outcome Evaluation   Outcome Summary/Follow up Plan s/p PEA arrest early morning, now intubated, hemodynamics improved, remains critically ill       Goal: Adult Individualization and Mutuality  Outcome: Ongoing (interventions implemented as appropriate)    Problem: COPD, Chronic Bronchitis/Emphysema (Adult)  Goal: Signs and Symptoms of Listed Potential Problems Will be Absent or Manageable (COPD, Chronic Bronchitis/Emphysema)  Outcome: Ongoing (interventions implemented as appropriate)    09/18/17 0536   COPD, Chronic Bronchitis/Emphysema   Problems Assessed (COPD, Chronic Bronchitis/Emphysema) all   Problems Present (COPD, Chronic Bronchitis/Emphysema) dyspnea;hypoxia/hypoxemia         Problem: Chronic Kidney Disease/End Stage Renal Disease (Adult)  Goal: Signs and Symptoms of Listed Potential Problems Will be Absent or Manageable (Chronic Kidney Disease/End Stage Renal Disease)  Outcome: Ongoing (interventions implemented as appropriate)    09/18/17 0536   Chronic Kidney Disease/End Stage Renal Disease   Problems Assessed (Chronic Kidney Disease/ESRD) all   Problems Present (Chronic Kidney Disease/ESRD) cardiovascular disease;fluid imbalance         Problem: Pain, Chronic (Adult)  Goal: Identify Related Risk Factors and Signs and Symptoms  Outcome: Ongoing (interventions implemented as appropriate)    09/18/17 0536   Pain, Chronic   Related Risk Factors (Chronic Pain) disease process;procedures/treatments   Signs and Symptoms (Chronic Pain) verbalization of pain/discomfort for a prolonged time period;verbalization of pain descriptors       Goal: Acceptable Pain Control/Comfort Level  Outcome: Ongoing  (interventions implemented as appropriate)    09/18/17 0536   Pain, Chronic (Adult)   Acceptable Pain Control/Comfort Level making progress toward outcome         Problem: Mechanical Ventilation, Invasive (Adult)  Goal: Signs and Symptoms of Listed Potential Problems Will be Absent or Manageable (Mechanical Ventilation, Invasive)  Outcome: Ongoing (interventions implemented as appropriate)    09/18/17 0536   Mechanical Ventilation, Invasive   Problems Assessed (Mechanical Ventilation, Invasive) all   Problems Present (Mechanical Ventilation, Invasive) inability to wean

## 2017-09-18 NOTE — PLAN OF CARE
Problem: Pressure Ulcer Risk (Heri Scale) (Adult,Obstetrics,Pediatric)  Goal: Identify Related Risk Factors and Signs and Symptoms  Outcome: Ongoing (interventions implemented as appropriate)    09/18/17 0602   Pressure Ulcer Risk (Heri Scale)   Related Risk Factors (Pressure Ulcer Risk (Heri Scale)) critical care admission;medical devices;mobility impaired       Goal: Skin Integrity  Outcome: Ongoing (interventions implemented as appropriate)    09/18/17 0602   Pressure Ulcer Risk (Heri Scale) (Adult,Obstetrics,Pediatric)   Skin Integrity making progress toward outcome

## 2017-09-18 NOTE — PROGRESS NOTES
[facility]    [facility]    Name:  Mandy Espino   Age:  27 y.o.  Sex:  female  :  1990  MRN:  [id number]   Visit Number:  [id number]  Admission Date:  2017  Date Of Service:  17  Primary Care Physician:  Neo Gresham DO     LOS: 1 day :  Patient Care Team:  Neo Gresham DO as PCP - General (Family Medicine):    Chief Complaint:      Intubated    Subjective / Interval History:     Patient currently on the ventilator.  Patient with a history of IV drug abuse and noncompliance with her medicines/dialysis who apparently had cardiopulmonary arrest requiring defibrillation, treated with epinephrine, and ventilatory management. It is unknown whether she used any drugs of abuse as a drug screen had not been acquired on admission (HD patient). CTA showed bilateral groundglass opacities, no PE or dissection.  She also was noted to be hyperkalemic with a potassium above 9 requiring urgent dialysis.  She is currently on IV Vancomycin, Zosyn and Zithromax, nebs, and IV Solu-Medrol.  CT abdomen/pelvis showed congestive heart failure with small amount of ascites in the abdomen and pelvis.  Patient if for HD today.  Will consult neurology to further evaluate underlying anoxic brain injury from the cardiac arrest.       I have reviewed labs/imaging/records from this hospitalization, including ER staff and admitting/attending physicians H/P's and progress notes to establish a comprehensive understanding of this patient's clinical hospital course, as well as to establish a transition of care appropriately.    Vital Signs:    Heart Rate:  [68-90] 80  Resp:  [17-18] 18  BP: ()/() 153/85  FiO2 (%):  [40 %-50 %] 40 %    Intake and output:    Intake/Output       17 0700 - 17 0659    Intake (ml) 2079    Output (ml) 1500    Net (ml) 579    Last Weight 103 lb 8 oz (46.9 kg)          Physical Examination:    General Appearance:  Intubated   Head:   Atraumatic and normocephalic, without obvious abnormality.   Eyes:      PERRLA, Extra-occular movements are within normal limits.   Lungs:   Scattered diffuse expiratory wheezing   Heart:  Normal S1 and S2, no murmur, no gallop, no rub.   Abdomen:   Normal bowel sounds,  Soft non-tender, non-distended, no guarding, no rebound tenderness   Extremities: No edema                     Laboratory results:      Results from last 7 days  Lab Units 09/18/17  0304 09/17/17  1457 09/17/17  1107 09/17/17  0647 09/17/17  0555 09/16/17 2010   SODIUM mmol/L 136* 138  --  140 137 141   POTASSIUM mmol/L 4.5 4.9 4.3 8.1* 9.1* 4.7   CHLORIDE mmol/L 96* 96*  --  99 102 101   CO2 mmol/L 27.0 29.0  --  18.0* 11.0* 23.0*   BUN mg/dL 43* 27*  --  52* 51* 47*   CREATININE mg/dL 4.10* 3.20*  --  6.30* 6.10* 5.80*   CALCIUM mg/dL 6.8* 7.4*  --  6.9* 6.6* 7.3*   BILIRUBIN mg/dL 4.0*  --   --   --  3.0* 0.9   ALK PHOS U/L 166*  --   --   --  192* 183*   ALT (SGPT) U/L 3065*  --   --   --  562* 506*   AST (SGOT) U/L 5182*  --   --   --  546* 227*   GLUCOSE mg/dL 161* 212*  --  99* 158* 101*       Results from last 7 days  Lab Units 09/18/17  0304 09/17/17  1107 09/17/17  0555   WBC 10*3/mm3 10.66 18.47* 11.25*   HEMOGLOBIN g/dL 7.2* 9.3* 9.6*   HEMATOCRIT % 22.8* 30.0* 32.6*   PLATELETS 10*3/mm3 97* 149 122*       Results from last 7 days  Lab Units 09/17/17  2348 09/17/17  1929 09/17/17  1107   TROPONIN I ng/mL 0.114* 0.096* 0.065*       Results from last 7 days  Lab Units 09/18/17  0304 09/17/17  1929 09/17/17  1107   INR   --   --  1.97*   APTT seconds 62.7* 43.0* 27.5       I have reviewed the patient's laboratory results.    Radiology results:    Imaging Results (last 24 hours)     Procedure Component Value Units Date/Time    CT Chest Pulmonary Embolism With Contrast [[id number]] Collected:  09/17/17 1023     Updated:  09/17/17 1024    Narrative:       FINAL REPORT    TECHNIQUE:  Postcontrast axial images of the chest were performed in a  CTA protocol.This study was performed with technique to keep radiation doses as low as reasonably achievable, (ALARA). Individualized dose reduction techniques using automated exposure control or   adjustment of the MA and/or KV according to the patient's size were employed.    CLINICAL HISTORY:  SP CODE X2 POSS PE    FINDINGS:  The heart is normal in size.  There is axillary adenopathy measuring up to 20 mm.  ETT and NG tube are in good position.  There are small bilateral pleural effusions.  No pericardial effusion is identified.  There is no evidence of PE or dissection.    There is severe bilateral renal atrophy.  Anasarca and edema is seen in the mesentery.  There is reflux of contrast into the hepatic veins of uncertain significance.  There is diffuse paratracheal and mediastinal edema.  There are severe diffuse   groundglass opacities and consolidation in the upper lobes bilaterally.  There is patchy right lower lobe consolidation.  There is no evidence of aneurysm.      Impression:       No dissection or pulmonary embolism.    Diffuse edema and anasarca.    Severe bilateral renal atrophy.    Ground glass opacities and areas of consolidation which may be due to edema or pneumonia.    Authenticated by Wayne Torre MD on 09/17/2017 10:23:56 AM    XR Chest 1 View [[id number]] Collected:  09/17/17 1102     Updated:  09/17/17 1105    Narrative:       PROCEDURE: XR CHEST 1 VW-     INDICATION:  Confirm ET Tube Placement; I16.9-Hypertensive crisis,  unspecified; E87.70-Fluid overload, unspecified; R74.8-Abnormal levels  of other serum enzymes; K85.90-Acute pancreatitis without necrosis or  infection, unspecified; Z91.19-Patient's noncompliance with other  medical treatment and regimen     FINDINGS:  A portable view of the chest was obtained.  Comparison is  made to a prior exam dated 9/16/2017.   There is been interval placement  of an endotracheal tube with its tip in the mid thoracic trachea. A new  NG tube  courses below the diaphragm. The heart is enlarged. There has  been interval improvement in bilateral lung opacities. No pneumothorax  is visualized. There are likely small pleural effusions.       Impression:       ET tube in appropriate position. Improved bilateral lung  opacities with persistent cardiomegaly.     This report was finalized on 9/17/2017 11:03 AM by Morena Askew MD.    XR Chest 1 View [[id number]] Collected:  09/17/17 1103     Updated:  09/17/17 1106    Narrative:       PROCEDURE: XR CHEST 1 VW-     INDICATION:  shortness of breath     FINDINGS:  A portable view of the chest was obtained.  Comparison is  made to a prior exam dated 7/6/2017.   The heart is enlarged. There are  bilateral mixed interstitial and alveolar opacities with bilateral  pleural effusions. Differential considerations include pulmonary edema  or pneumonia. There is no pneumothorax.       Impression:       Cardiomegaly with bilateral mixed interstitial and alveolar  opacities. This may represent pulmonary edema or bilateral pneumonia.  Recommend follow-up to resolution.     This report was finalized on 9/17/2017 11:04 AM by Morena Askew MD.    XR Abdomen KUB [[id number]] Collected:  09/17/17 1755     Updated:  09/17/17 1757    Narrative:       FINAL REPORT    TECHNIQUE:  A single view of the abdomen was obtained.    CLINICAL HISTORY:  CHECKING OG PLACEMENT    FINDINGS:  The NG tube ends in the proximal-mid stomach. The bowel gas pattern is unremarkable. A right femoral line is in place. There is no osseous abnormality.      Impression:       NG tube in proximal-mid stomach.    Authenticated by Dominik Hood M.D. on 09/17/2017 05:55:15 PM    XR Chest 1 View [[id number]] Updated:  09/18/17 0634          I have reviewed the patient's radiology reports.    Medication Review:     I have reviewed the patients active and prn medications.       Assessment/Plan:  1.  Acute hypoxic respiratory failure requiring mechanical ventilation  2.   Cardiopulmonary arrest with V. Tach.  3.  Bilateral pneumonia, present on admission  4.  Sepsis  5.  COPD exacerbation  6.  Metabolic acidosis  7.  Shock liver  8.  Hyperkalemia  9.  End-stage renal disease on hemodialysis  10.  IV drug abuse  11.  Severe cardiomyopathy with ejection fraction of 10-15%/acute systolic CHF  12.  Hypoglycemia  13.  Metabolic encephalopathy     Plan:     -Currently intubated, continue on IV Vancomycin, Zosyn and Zithromax, nebs, and IV Solu-Medrol. CTA showed bilateral groundglass opacities, no PE or dissection.  CT abdomen/pelvis showed congestive heart failure with small amount of ascites in the abdomen and pelvis.  Patient if for HD today.  Will consult neurology to further evaluate underlying anoxic brain injury from the cardiac arrest.  Pulmonary Dr. Urbina and Cardiology, Dr. Carcamo following and appreciate recommendations.   Patient with hx of IV drug use with medical noncompliance.  May benefit from Bmed evaluation prior to DC.     Guido King MD  09/18/17  9:26 AM

## 2017-09-18 NOTE — NURSING NOTE
During patient's cardiac arrest, patient had limited IV availability, multiple attempts made previously in shift for addition PIV placement with no success.  During code, #20 left forearm PIV placed per ED attending via ultrasound guidance.  2 doses of epinephrine given through IV.  L arm noted to have a mottling color to it, blood return from PIV bright red, concern that PIV placed was actually arterial.  PIV removed, no bleeding noted from site.  Pressure held for ~2 minutes to ensure no bleeding.  Dressing applied to site.      Stephania Ramires RN

## 2017-09-18 NOTE — CONSULTS
"Nephrology Progress Note.    LOS: 1 day    Patient Care Team:  Neo Gresham DO as PCP - General (Family Medicine)    Chief Complaint:    Chief Complaint   Patient presents with   • Shortness of Breath       Subjective     I have reviewed labs/imaging/records from this hospitalization, including ER staff and admitting/attending physicians H/P's and progress notes to establish a comprehensive understanding of this patient's clinical hospital course, as well as to establish plan of care appropriately.     Patient seen and examined this morning.  Events from last night noted.  Patient Is currently intubated, does not appear to be having any fevers chills.  No nausea or vomiting no abdominal pain.  Denies any chest pain shortness of breath cough or sputum production.  There is no significant edema.   Patient also denies having new onset weakness of numbness of either extremity.      Review of Systems:    The pertinent  ROS was done and it is noted above, rest  was negative.    Objective     Vital Signs  /85  Pulse 80  Temp 97.7 °F (36.5 °C) (Oral)   Resp 18  Ht 62\" (157.5 cm)  Wt 103 lb 8 oz (46.9 kg)  SpO2 100%  BMI 18.93 kg/m2           Intake/Output Summary (Last 24 hours) at 09/18/17 0843  Last data filed at 09/18/17 0530   Gross per 24 hour   Intake             2039 ml   Output             1500 ml   Net              539 ml       Physical Exam:    General Appearance: Intubated and sedated in no acute distress  HEENT: pupils round and reactive to light, oral mucosa dry, extra occular movements intact.  Neck: supple, no JVD, trachea midline  Lungs: Unlabored breathing effort, coarse breath sounds all over the chest.  Heart: RRR, normal S1 and S2, no S3, no rub  Abdomen: soft, non-tender, no palpable bladder, present bowel sounds to auscultation  Extremities: no edema, cyanosis or clubbing.   Neuro: normal speech and mental status, grossly non focal.     Results Review:      Results from last 7 days  Lab " Units 09/18/17  0304 09/17/17  1457 09/17/17  1107 09/17/17  0647 09/17/17  0555 09/16/17 2010   SODIUM mmol/L 136* 138  --  140 137 141   POTASSIUM mmol/L 4.5 4.9 4.3 8.1* 9.1* 4.7   CHLORIDE mmol/L 96* 96*  --  99 102 101   CO2 mmol/L 27.0 29.0  --  18.0* 11.0* 23.0*   BUN mg/dL 43* 27*  --  52* 51* 47*   CREATININE mg/dL 4.10* 3.20*  --  6.30* 6.10* 5.80*   CALCIUM mg/dL 6.8* 7.4*  --  6.9* 6.6* 7.3*   BILIRUBIN mg/dL 4.0*  --   --   --  3.0* 0.9   ALK PHOS U/L 166*  --   --   --  192* 183*   ALT (SGPT) U/L 3065*  --   --   --  562* 506*   AST (SGOT) U/L 5182*  --   --   --  546* 227*   GLUCOSE mg/dL 161* 212*  --  99* 158* 101*       Estimated Creatinine Clearance: 15.3 mL/min (by C-G formula based on Cr of 4.1).      Results from last 7 days  Lab Units 09/18/17  0304 09/17/17  1457 09/17/17  0647 09/16/17 2010   MAGNESIUM mg/dL 1.9  --   --  2.2   PHOSPHORUS mg/dL 5.3* 5.7* 10.3* 6.9*               Results from last 7 days  Lab Units 09/18/17  0304 09/17/17  1107 09/17/17  0555 09/16/17 2010   WBC 10*3/mm3 10.66 18.47* 11.25* 15.10*   HEMOGLOBIN g/dL 7.2* 9.3* 9.6* 8.8*   PLATELETS 10*3/mm3 97* 149 122* 184         Results from last 7 days  Lab Units 09/17/17  1107   INR  1.97*         Imaging Results (last 24 hours)     Procedure Component Value Units Date/Time    CT Chest Pulmonary Embolism With Contrast [[id number]] Collected:  09/17/17 1023     Updated:  09/17/17 1024    Narrative:       FINAL REPORT    TECHNIQUE:  Postcontrast axial images of the chest were performed in a CTA protocol.This study was performed with technique to keep radiation doses as low as reasonably achievable, (ALARA). Individualized dose reduction techniques using automated exposure control or   adjustment of the MA and/or KV according to the patient's size were employed.    CLINICAL HISTORY:  SP CODE X2 POSS PE    FINDINGS:  The heart is normal in size.  There is axillary adenopathy measuring up to 20 mm.  ETT and NG tube are in  good position.  There are small bilateral pleural effusions.  No pericardial effusion is identified.  There is no evidence of PE or dissection.    There is severe bilateral renal atrophy.  Anasarca and edema is seen in the mesentery.  There is reflux of contrast into the hepatic veins of uncertain significance.  There is diffuse paratracheal and mediastinal edema.  There are severe diffuse   groundglass opacities and consolidation in the upper lobes bilaterally.  There is patchy right lower lobe consolidation.  There is no evidence of aneurysm.      Impression:       No dissection or pulmonary embolism.    Diffuse edema and anasarca.    Severe bilateral renal atrophy.    Ground glass opacities and areas of consolidation which may be due to edema or pneumonia.    Authenticated by Wayne Torre MD on 09/17/2017 10:23:56 AM    XR Chest 1 View [[id number]] Collected:  09/17/17 1102     Updated:  09/17/17 1105    Narrative:       PROCEDURE: XR CHEST 1 VW-     INDICATION:  Confirm ET Tube Placement; I16.9-Hypertensive crisis,  unspecified; E87.70-Fluid overload, unspecified; R74.8-Abnormal levels  of other serum enzymes; K85.90-Acute pancreatitis without necrosis or  infection, unspecified; Z91.19-Patient's noncompliance with other  medical treatment and regimen     FINDINGS:  A portable view of the chest was obtained.  Comparison is  made to a prior exam dated 9/16/2017.   There is been interval placement  of an endotracheal tube with its tip in the mid thoracic trachea. A new  NG tube courses below the diaphragm. The heart is enlarged. There has  been interval improvement in bilateral lung opacities. No pneumothorax  is visualized. There are likely small pleural effusions.       Impression:       ET tube in appropriate position. Improved bilateral lung  opacities with persistent cardiomegaly.     This report was finalized on 9/17/2017 11:03 AM by Morena Askew MD.    XR Chest 1 View [[id number]] Collected:  09/17/17  1103     Updated:  09/17/17 1106    Narrative:       PROCEDURE: XR CHEST 1 VW-     INDICATION:  shortness of breath     FINDINGS:  A portable view of the chest was obtained.  Comparison is  made to a prior exam dated 7/6/2017.   The heart is enlarged. There are  bilateral mixed interstitial and alveolar opacities with bilateral  pleural effusions. Differential considerations include pulmonary edema  or pneumonia. There is no pneumothorax.       Impression:       Cardiomegaly with bilateral mixed interstitial and alveolar  opacities. This may represent pulmonary edema or bilateral pneumonia.  Recommend follow-up to resolution.     This report was finalized on 9/17/2017 11:04 AM by Morena Askew MD.    XR Abdomen KUB [[id number]] Collected:  09/17/17 1755     Updated:  09/17/17 1757    Narrative:       FINAL REPORT    TECHNIQUE:  A single view of the abdomen was obtained.    CLINICAL HISTORY:  CHECKING OG PLACEMENT    FINDINGS:  The NG tube ends in the proximal-mid stomach. The bowel gas pattern is unremarkable. A right femoral line is in place. There is no osseous abnormality.      Impression:       NG tube in proximal-mid stomach.    Authenticated by Dominik Hood M.D. on 09/17/2017 05:55:15 PM    XR Chest 1 View [[id number]] Updated:  09/18/17 0634          azithromycin 500 mg Intravenous Q24H   chlorhexidine 15 mL Mouth/Throat Q12H   famotidine 20 mg Intravenous Daily   ipratropium-albuterol 3 mL Nebulization 4x Daily - RT   methylPREDNISolone sodium succinate 40 mg Intravenous Q6H   piperacillin-tazobactam 2.25 g Intravenous Q8H       dextrose 50 mL/hr Last Rate: 50 mL/hr (09/17/17 2142)   fentaNYL (SUBLIMAZE) infusion 10 mcg/ml 100 mL  mcg/hr Last Rate: 50 mcg/hr (09/17/17 0640)   heparin (porcine) 12 Units/kg/hr Last Rate: 14 Units/kg/hr (09/17/17 2109)   nitroglycerin 5-200 mcg/min Last Rate: 5 mcg/min (09/18/17 0140)   Pharmacy to dose vancomycin     propofol 5-50 mcg/kg/min Last Rate: 20 mcg/kg/min  (09/18/17 0509)   sodium chloride 50 mL/hr Last Rate: Stopped (09/17/17 2142)       Medication Review:   Current Facility-Administered Medications   Medication Dose Route Frequency Provider Last Rate Last Dose   • AZITHROMYCIN 500 MG/250 ML 0.9% NS IVPB (vial-mate)  500 mg Intravenous Q24H Madhu Mota MD   500 mg at 09/18/17 0035   • chlorhexidine (PERIDEX) 0.12 % solution 15 mL  15 mL Mouth/Throat Q12H Madhu Mota MD   15 mL at 09/17/17 2059   • dextrose 10 % infusion  50 mL/hr Intravenous Continuous Joseluis Pedersen DO 50 mL/hr at 09/17/17 2142 50 mL/hr at 09/17/17 2142   • famotidine (PEPCID) injection 20 mg  20 mg Intravenous Daily Madhu Mota MD   20 mg at 09/17/17 1205   • fentaNYL (SUBLIMAZE) infusion 10 mcg/ml 100 mL   mcg/hr Intravenous Continuous Madhu Mota MD 5 mL/hr at 09/17/17 0640 50 mcg/hr at 09/17/17 0640   • heparin (porcine) 1000 units/mL injection 1,400 Units  30 Units/kg Intravenous PRN Joseluis Pedersen DO   1,400 Units at 09/17/17 2108   • heparin (porcine) 1000 units/mL injection 2,790 Units  60 Units/kg Intravenous PRN Joseluis Pedersen, DO       • heparin 38896 units/250 mL (100 units/mL) in 0.45 % NaCl infusion  12 Units/kg/hr Intravenous Titrated Joseluis Pedersen DO 6.51 mL/hr at 09/17/17 2109 14 Units/kg/hr at 09/17/17 2109   • ipratropium-albuterol (DUO-NEB) nebulizer solution 3 mL  3 mL Nebulization Q1H PRN Madhu Mota MD       • ipratropium-albuterol (DUO-NEB) nebulizer solution 3 mL  3 mL Nebulization 4x Daily - RT Madhu Mota MD   3 mL at 09/18/17 0702   • labetalol (NORMODYNE,TRANDATE) injection 20 mg  20 mg Intravenous Q2H PRN Joseph Mojica MD       • methylPREDNISolone sodium succinate (SOLU-Medrol) injection 40 mg  40 mg Intravenous Q6H Madhu Mota MD   40 mg at 09/18/17 0640   • morphine injection 2 mg  2 mg Intravenous Q2H PRN Madhu Mota MD   2 mg at 09/17/17 1525    And   • naloxone (NARCAN) injection 0.4 mg  0.4 mg Intravenous  Q5 Min PRN Madhu Mota MD       • nitroglycerin 50 mg/250 mL (0.2 mg/mL) infusion  5-200 mcg/min Intravenous Titrated Joseluis Pedersen DO 1.5 mL/hr at 09/18/17 0140 5 mcg/min at 09/18/17 0140   • ondansetron (ZOFRAN) injection 4 mg  4 mg Intravenous Q6H PRN Madhu Mota MD   4 mg at 09/17/17 0149   • Pharmacy to dose vancomycin   Does not apply Continuous PRN Madhu Mota MD       • piperacillin-tazobactam (ZOSYN) in iso-osmotic dextrose IVPB 2.25 g (premix)  2.25 g Intravenous Q8H Joseluis Pedersen, DO   2.25 g at 09/18/17 0503   • propofol (DIPRIVAN) infusion 10 mg/mL 100 mL  5-50 mcg/kg/min Intravenous Titrated Joseluis Pedersen DO 5.58 mL/hr at 09/18/17 0509 20 mcg/kg/min at 09/18/17 0509   • sodium chloride 0.9 % bolus 1,000 mL  1,000 mL Intravenous PRN Madhu Mota MD       • sodium chloride 0.9 % flush 1-10 mL  1-10 mL Intravenous PRN Madhu Mota MD       • sodium chloride 0.9 % flush 10 mL  10 mL Intravenous PRN REY Ellsworth       • sodium chloride 0.9 % infusion  50 mL/hr Intravenous Continuous Joseluis Pedersen DO   Stopped at 09/17/17 2142   • vancomycin (VANCOCIN) IVPB 500 mg in 100 mL NS  500 mg Intravenous Q48H PRN Madhu Mota MD           Assessment/Plan         End stage renal disease on dialysis    Respiratory failure status post intubation    Status post cardiac arrest and code 99 for 45 minutes    Hypocalcemia    Tobacco use    Headache    Bipolar disorder    Depression    Esophageal reflux    Chronic pain    COPD (chronic obstructive pulmonary disease)    Hepatitis C antibody positive in blood    IV drug abuse    Anemia of ESRD    Medical non-compliance    Neuropathic pain of both legs    Plan:  We'll plan on on doing the dialysis later today using 3.5 calcium bath as she is persistently hypocalcemic.  We'll try to take some extra fluid off as well.  She is anemic we'll transfuse at least 1 unit of blood that might help but mobilize some of the third spaced  fluid.    She is a poor prognosis because of multiple issues and problems all through her life.  Continue current treatment plan and reassess on day-to-day basis.    Details were discussed with the patient as well as family in the room.  Further recommendations will depend on clinical course of the patient during the current hospitalization.      I also discussed the details with the nursing staff.    Rest as ordered.    Madhu Mota MD  09/18/17  8:43 AM      EMR Dragon/Transcription disclaimer:   Much of this encounter note is an electronic transcription/translation of spoken language to printed text. The electronic translation of spoken language may permit erroneous, or at times, nonsensical words or phrases to be inadvertently transcribed; Although I have reviewed the note for such errors, some may still exist.

## 2017-09-18 NOTE — NURSING NOTE
2235:  Propofol placed on hold for sedation vacation.  2255:  Patient restless in bed, trying to sit up, could not get patient to open eyes or follow commands but did localize pain.  Due to patient agitation and trying to sit straight up in bed, propofol restarted, restarted at half dose 20mcg/kg/min.    Stephania Ramires RN

## 2017-09-19 ENCOUNTER — APPOINTMENT (OUTPATIENT)
Dept: GENERAL RADIOLOGY | Facility: HOSPITAL | Age: 27
End: 2017-09-19

## 2017-09-19 LAB
ACINETOBACTER SCREEN CX: NORMAL
ALBUMIN SERPL-MCNC: 2.8 G/DL (ref 3.5–5)
ALBUMIN SERPL-MCNC: 3.2 G/DL (ref 3.5–5)
ALBUMIN/GLOB SERPL: 1.5 G/DL (ref 1–2)
ALP SERPL-CCNC: 158 U/L (ref 38–126)
ALT SERPL W P-5'-P-CCNC: 3657 U/L (ref 13–69)
ANION GAP SERPL CALCULATED.3IONS-SCNC: 18.3 MMOL/L
ANION GAP SERPL CALCULATED.3IONS-SCNC: 18.6 MMOL/L
ANISOCYTOSIS BLD QL: ABNORMAL
APTT PPP: 27.2 SECONDS (ref 25–36)
ARTERIAL PATENCY WRIST A: ABNORMAL
AST SERPL-CCNC: 3286 U/L (ref 15–46)
ATMOSPHERIC PRESS: 733 MMHG
BASE EXCESS BLDA CALC-SCNC: -12 MMOL/L
BASOPHILS # BLD AUTO: 0.01 10*3/MM3 (ref 0–0.2)
BASOPHILS NFR BLD AUTO: 0.1 % (ref 0–2.5)
BDY SITE: ABNORMAL
BILIRUB SERPL-MCNC: 4.7 MG/DL (ref 0.2–1.3)
BUN BLD-MCNC: 30 MG/DL (ref 7–20)
BUN BLD-MCNC: 37 MG/DL (ref 7–20)
BUN/CREAT SERPL: 10.6 (ref 7.1–23.5)
BUN/CREAT SERPL: 9.7 (ref 7.1–23.5)
BURR CELLS BLD QL SMEAR: ABNORMAL
CALCIUM SPEC-SCNC: 8 MG/DL (ref 8.4–10.2)
CALCIUM SPEC-SCNC: 8.6 MG/DL (ref 8.4–10.2)
CHLORIDE SERPL-SCNC: 96 MMOL/L (ref 98–107)
CHLORIDE SERPL-SCNC: 99 MMOL/L (ref 98–107)
CO2 SERPL-SCNC: 22 MMOL/L (ref 26–30)
CO2 SERPL-SCNC: 23 MMOL/L (ref 26–30)
CREAT BLD-MCNC: 3.1 MG/DL (ref 0.6–1.3)
CREAT BLD-MCNC: 3.5 MG/DL (ref 0.6–1.3)
DACRYOCYTES BLD QL SMEAR: ABNORMAL
DEPRECATED RDW RBC AUTO: 68 FL (ref 37–54)
DEPRECATED RDW RBC AUTO: 75.3 FL (ref 37–54)
EOSINOPHIL # BLD AUTO: 0 10*3/MM3 (ref 0–0.7)
EOSINOPHIL NFR BLD AUTO: 0 % (ref 0–7)
ERYTHROCYTE [DISTWIDTH] IN BLOOD BY AUTOMATED COUNT: 19.2 % (ref 11.5–14.5)
ERYTHROCYTE [DISTWIDTH] IN BLOOD BY AUTOMATED COUNT: 20.2 % (ref 11.5–14.5)
ERYTHROCYTE [SEDIMENTATION RATE] IN BLOOD: 2 MM/HR (ref 0–20)
GFR SERPL CREATININE-BSD FRML MDRD: 16 ML/MIN/1.73
GFR SERPL CREATININE-BSD FRML MDRD: 18 ML/MIN/1.73
GLOBULIN UR ELPH-MCNC: 2.2 GM/DL
GLUCOSE BLD-MCNC: 156 MG/DL (ref 74–98)
GLUCOSE BLD-MCNC: 195 MG/DL (ref 74–98)
GLUCOSE BLDC GLUCOMTR-MCNC: 110 MG/DL (ref 70–130)
GLUCOSE BLDC GLUCOMTR-MCNC: 119 MG/DL (ref 70–130)
GLUCOSE BLDC GLUCOMTR-MCNC: 121 MG/DL (ref 70–130)
GLUCOSE BLDC GLUCOMTR-MCNC: 125 MG/DL (ref 70–130)
GLUCOSE BLDC GLUCOMTR-MCNC: 133 MG/DL (ref 70–130)
GLUCOSE BLDC GLUCOMTR-MCNC: 144 MG/DL (ref 70–130)
GLUCOSE BLDC GLUCOMTR-MCNC: 149 MG/DL (ref 70–130)
GLUCOSE BLDC GLUCOMTR-MCNC: 151 MG/DL (ref 70–130)
GLUCOSE BLDC GLUCOMTR-MCNC: 153 MG/DL (ref 70–130)
GLUCOSE BLDC GLUCOMTR-MCNC: 156 MG/DL (ref 70–130)
GLUCOSE BLDC GLUCOMTR-MCNC: 165 MG/DL (ref 70–130)
GLUCOSE BLDC GLUCOMTR-MCNC: 173 MG/DL (ref 70–130)
GLUCOSE BLDC GLUCOMTR-MCNC: 198 MG/DL (ref 70–130)
GLUCOSE BLDC GLUCOMTR-MCNC: 209 MG/DL (ref 70–130)
GLUCOSE BLDC GLUCOMTR-MCNC: 54 MG/DL (ref 70–130)
GLUCOSE BLDC GLUCOMTR-MCNC: 76 MG/DL (ref 70–130)
GLUCOSE BLDC GLUCOMTR-MCNC: 77 MG/DL (ref 70–130)
GLUCOSE BLDC GLUCOMTR-MCNC: 79 MG/DL (ref 70–130)
GLUCOSE BLDC GLUCOMTR-MCNC: 80 MG/DL (ref 70–130)
GLUCOSE BLDC GLUCOMTR-MCNC: 88 MG/DL (ref 70–130)
GLUCOSE BLDC GLUCOMTR-MCNC: 92 MG/DL (ref 70–130)
GLUCOSE BLDC GLUCOMTR-MCNC: 97 MG/DL (ref 70–130)
HAV IGM SERPL QL IA: NEGATIVE
HBV CORE IGM SERPL QL IA: NEGATIVE
HBV SURFACE AB SER QL: REACTIVE
HBV SURFACE AG SERPL QL IA: NEGATIVE
HCO3 BLDA-SCNC: 14 MMOL/L (ref 22–28)
HCT VFR BLD AUTO: 31.5 % (ref 37–47)
HCT VFR BLD AUTO: 33.4 % (ref 37–47)
HCV AB S/CO SERPL IA: >11 S/CO RATIO (ref 0–0.9)
HGB BLD-MCNC: 10 G/DL (ref 12–16)
HGB BLD-MCNC: 10.2 G/DL (ref 12–16)
HGB BLDA-MCNC: 10.2 G/DL (ref 12–18)
HOROWITZ INDEX BLD+IHG-RTO: 50 %
HYPOCHROMIA BLD QL: ABNORMAL
IMM GRANULOCYTES # BLD: 0.19 10*3/MM3 (ref 0–0.06)
IMM GRANULOCYTES NFR BLD: 1.1 % (ref 0–0.6)
INR PPP: 2.33 (ref 0.9–1.1)
LARGE PLATELETS: ABNORMAL
LYMPHOCYTES # BLD AUTO: 1.95 10*3/MM3 (ref 0.6–3.4)
LYMPHOCYTES # BLD MANUAL: 0.29 10*3/MM3 (ref 0.6–3.4)
LYMPHOCYTES NFR BLD AUTO: 11.5 % (ref 10–50)
LYMPHOCYTES NFR BLD MANUAL: 2 % (ref 10–50)
LYMPHOCYTES NFR BLD MANUAL: 8 % (ref 0–12)
MAGNESIUM SERPL-MCNC: 2.2 MG/DL (ref 1.6–2.3)
MCH RBC QN AUTO: 31.2 PG (ref 27–31)
MCH RBC QN AUTO: 31.4 PG (ref 27–31)
MCHC RBC AUTO-ENTMCNC: 30.5 G/DL (ref 30–37)
MCHC RBC AUTO-ENTMCNC: 31.7 G/DL (ref 30–37)
MCV RBC AUTO: 102.8 FL (ref 81–99)
MCV RBC AUTO: 98.1 FL (ref 81–99)
METAMYELOCYTES NFR BLD MANUAL: 1 % (ref 0–0)
MODALITY: ABNORMAL
MONOCYTES # BLD AUTO: 1.17 10*3/MM3 (ref 0–0.9)
MONOCYTES # BLD AUTO: 1.48 10*3/MM3 (ref 0–0.9)
MONOCYTES NFR BLD AUTO: 8.8 % (ref 0–12)
MRSA SPEC QL CULT: NORMAL
NEUTROPHILS # BLD AUTO: 12.92 10*3/MM3 (ref 2–6.9)
NEUTROPHILS # BLD AUTO: 13.28 10*3/MM3 (ref 2–6.9)
NEUTROPHILS NFR BLD AUTO: 78.5 % (ref 37–80)
NEUTROPHILS NFR BLD MANUAL: 75 % (ref 37–80)
NEUTS BAND NFR BLD MANUAL: 13 % (ref 0–6)
NRBC BLD MANUAL-RTO: 1.8 /100 WBC (ref 0–0)
NRBC SPEC MANUAL: 9 /100 WBC (ref 0–0)
OVALOCYTES BLD QL SMEAR: ABNORMAL
PCO2 BLDA: 26.7 MM HG (ref 35–45)
PH BLDA: 7.33 PH UNITS
PHOSPHATE SERPL-MCNC: 6.4 MG/DL (ref 2.5–4.5)
PHOSPHATE SERPL-MCNC: 6.5 MG/DL (ref 2.5–4.5)
PLATELET # BLD AUTO: 101 10*3/MM3 (ref 130–400)
PLATELET # BLD AUTO: 97 10*3/MM3 (ref 130–400)
PMV BLD AUTO: 11.8 FL (ref 6–12)
PMV BLD AUTO: 12.1 FL (ref 6–12)
PO2 BLDA: 227 MM HG (ref 75–100)
POIKILOCYTOSIS BLD QL SMEAR: ABNORMAL
POTASSIUM BLD-SCNC: 4.6 MMOL/L (ref 3.5–5.1)
POTASSIUM BLD-SCNC: 5.3 MMOL/L (ref 3.5–5.1)
PROT SERPL-MCNC: 5.4 G/DL (ref 6.3–8.2)
PROTHROMBIN TIME: 25.5 SECONDS (ref 9.3–12.1)
RBC # BLD AUTO: 3.21 10*6/MM3 (ref 4.2–5.4)
RBC # BLD AUTO: 3.25 10*6/MM3 (ref 4.2–5.4)
SMALL PLATELETS BLD QL SMEAR: ABNORMAL
SODIUM BLD-SCNC: 132 MMOL/L (ref 137–145)
SODIUM BLD-SCNC: 135 MMOL/L (ref 137–145)
VARIANT LYMPHS NFR BLD MANUAL: 1 % (ref 0–0)
WBC MORPH BLD: NORMAL
WBC NRBC COR # BLD: 14.68 10*3/MM3 (ref 4.8–10.8)
WBC NRBC COR # BLD: 16.91 10*3/MM3 (ref 4.8–10.8)

## 2017-09-19 PROCEDURE — 80053 COMPREHEN METABOLIC PANEL: CPT | Performed by: INTERNAL MEDICINE

## 2017-09-19 PROCEDURE — 36600 WITHDRAWAL OF ARTERIAL BLOOD: CPT

## 2017-09-19 PROCEDURE — 5A1935Z RESPIRATORY VENTILATION, LESS THAN 24 CONSECUTIVE HOURS: ICD-10-PCS | Performed by: EMERGENCY MEDICINE

## 2017-09-19 PROCEDURE — 85730 THROMBOPLASTIN TIME PARTIAL: CPT | Performed by: INTERNAL MEDICINE

## 2017-09-19 PROCEDURE — 85025 COMPLETE CBC W/AUTO DIFF WBC: CPT | Performed by: INTERNAL MEDICINE

## 2017-09-19 PROCEDURE — 94799 UNLISTED PULMONARY SVC/PX: CPT

## 2017-09-19 PROCEDURE — 92950 HEART/LUNG RESUSCITATION CPR: CPT

## 2017-09-19 PROCEDURE — 82962 GLUCOSE BLOOD TEST: CPT

## 2017-09-19 PROCEDURE — 25010000002 HEPARIN (PORCINE) PER 1000 UNITS: Performed by: INTERNAL MEDICINE

## 2017-09-19 PROCEDURE — 25010000002 EPINEPHRINE 0.1 MG/ML SOLUTION PREFILLED SYRINGE: Performed by: INTERNAL MEDICINE

## 2017-09-19 PROCEDURE — 99233 SBSQ HOSP IP/OBS HIGH 50: CPT | Performed by: INTERNAL MEDICINE

## 2017-09-19 PROCEDURE — 25010000002 PIPERACILLIN SOD-TAZOBACTAM PER 1 G: Performed by: INTERNAL MEDICINE

## 2017-09-19 PROCEDURE — 5A12012 PERFORMANCE OF CARDIAC OUTPUT, SINGLE, MANUAL: ICD-10-PCS | Performed by: EMERGENCY MEDICINE

## 2017-09-19 PROCEDURE — 25010000002 AZITHROMYCIN: Performed by: INTERNAL MEDICINE

## 2017-09-19 PROCEDURE — 0BH17EZ INSERTION OF ENDOTRACHEAL AIRWAY INTO TRACHEA, VIA NATURAL OR ARTIFICIAL OPENING: ICD-10-PCS | Performed by: EMERGENCY MEDICINE

## 2017-09-19 PROCEDURE — 90935 HEMODIALYSIS ONE EVALUATION: CPT

## 2017-09-19 PROCEDURE — 85651 RBC SED RATE NONAUTOMATED: CPT | Performed by: INTERNAL MEDICINE

## 2017-09-19 PROCEDURE — 25010000002 MORPHINE PER 10 MG: Performed by: INTERNAL MEDICINE

## 2017-09-19 PROCEDURE — 94002 VENT MGMT INPAT INIT DAY: CPT

## 2017-09-19 PROCEDURE — 93005 ELECTROCARDIOGRAM TRACING: CPT | Performed by: INTERNAL MEDICINE

## 2017-09-19 PROCEDURE — 25010000002 METHYLPREDNISOLONE PER 40 MG: Performed by: INTERNAL MEDICINE

## 2017-09-19 PROCEDURE — 99232 SBSQ HOSP IP/OBS MODERATE 35: CPT | Performed by: INTERNAL MEDICINE

## 2017-09-19 PROCEDURE — 80069 RENAL FUNCTION PANEL: CPT | Performed by: INTERNAL MEDICINE

## 2017-09-19 PROCEDURE — 85610 PROTHROMBIN TIME: CPT | Performed by: INTERNAL MEDICINE

## 2017-09-19 PROCEDURE — 84100 ASSAY OF PHOSPHORUS: CPT | Performed by: INTERNAL MEDICINE

## 2017-09-19 PROCEDURE — 71010 HC CHEST PA OR AP: CPT

## 2017-09-19 PROCEDURE — 25010000002 HYDRALAZINE PER 20 MG: Performed by: INTERNAL MEDICINE

## 2017-09-19 PROCEDURE — 85007 BL SMEAR W/DIFF WBC COUNT: CPT | Performed by: INTERNAL MEDICINE

## 2017-09-19 PROCEDURE — 83735 ASSAY OF MAGNESIUM: CPT | Performed by: INTERNAL MEDICINE

## 2017-09-19 PROCEDURE — 25010000002 PROPOFOL 1000 MG/ML EMULSION

## 2017-09-19 PROCEDURE — 82805 BLOOD GASES W/O2 SATURATION: CPT

## 2017-09-19 RX ORDER — DEXTROSE MONOHYDRATE 25 G/50ML
50 INJECTION, SOLUTION INTRAVENOUS ONCE
Status: COMPLETED | OUTPATIENT
Start: 2017-09-19 | End: 2017-09-19

## 2017-09-19 RX ORDER — DEXTROSE MONOHYDRATE 25 G/50ML
50 INJECTION, SOLUTION INTRAVENOUS
Status: DISCONTINUED | OUTPATIENT
Start: 2017-09-19 | End: 2017-09-21 | Stop reason: HOSPADM

## 2017-09-19 RX ORDER — HYDRALAZINE HYDROCHLORIDE 20 MG/ML
10 INJECTION INTRAMUSCULAR; INTRAVENOUS EVERY 6 HOURS PRN
Status: DISCONTINUED | OUTPATIENT
Start: 2017-09-19 | End: 2017-09-20 | Stop reason: SDUPTHER

## 2017-09-19 RX ORDER — DEXTROSE MONOHYDRATE 100 MG/ML
75 INJECTION, SOLUTION INTRAVENOUS CONTINUOUS
Status: DISCONTINUED | OUTPATIENT
Start: 2017-09-19 | End: 2017-09-21 | Stop reason: HOSPADM

## 2017-09-19 RX ORDER — DEXTROSE MONOHYDRATE 25 G/50ML
INJECTION, SOLUTION INTRAVENOUS
Status: COMPLETED | OUTPATIENT
Start: 2017-09-19 | End: 2017-09-19

## 2017-09-19 RX ORDER — HYDRALAZINE HYDROCHLORIDE 20 MG/ML
10 INJECTION INTRAMUSCULAR; INTRAVENOUS ONCE
Status: COMPLETED | OUTPATIENT
Start: 2017-09-19 | End: 2017-09-19

## 2017-09-19 RX ORDER — METHYLPREDNISOLONE SODIUM SUCCINATE 40 MG/ML
40 INJECTION, POWDER, LYOPHILIZED, FOR SOLUTION INTRAMUSCULAR; INTRAVENOUS EVERY 8 HOURS
Status: DISCONTINUED | OUTPATIENT
Start: 2017-09-19 | End: 2017-09-20

## 2017-09-19 RX ORDER — DEXTROSE MONOHYDRATE 25 G/50ML
INJECTION, SOLUTION INTRAVENOUS
Status: COMPLETED
Start: 2017-09-19 | End: 2017-09-19

## 2017-09-19 RX ADMIN — TAZOBACTAM SODIUM AND PIPERACILLIN SODIUM 2.25 G: 250; 2 INJECTION, SOLUTION INTRAVENOUS at 04:27

## 2017-09-19 RX ADMIN — FAMOTIDINE 20 MG: 10 INJECTION, SOLUTION INTRAVENOUS at 09:53

## 2017-09-19 RX ADMIN — DEXTROSE MONOHYDRATE 50 G: 25 INJECTION, SOLUTION INTRAVENOUS at 04:05

## 2017-09-19 RX ADMIN — HEPARIN SODIUM 14 UNITS/KG/HR: 10000 INJECTION, SOLUTION INTRAVENOUS at 02:07

## 2017-09-19 RX ADMIN — DEXTROSE MONOHYDRATE 25 ML: 25 INJECTION, SOLUTION INTRAVENOUS at 03:26

## 2017-09-19 RX ADMIN — MORPHINE SULFATE 2 MG: 2 INJECTION, SOLUTION INTRAMUSCULAR; INTRAVENOUS at 17:38

## 2017-09-19 RX ADMIN — METHYLPREDNISOLONE SODIUM SUCCINATE 40 MG: 40 INJECTION, POWDER, FOR SOLUTION INTRAMUSCULAR; INTRAVENOUS at 02:06

## 2017-09-19 RX ADMIN — Medication 1 TABLET: at 09:53

## 2017-09-19 RX ADMIN — METHYLPREDNISOLONE SODIUM SUCCINATE 40 MG: 40 INJECTION, POWDER, FOR SOLUTION INTRAMUSCULAR; INTRAVENOUS at 22:16

## 2017-09-19 RX ADMIN — SODIUM BICARBONATE 50 MEQ: 84 INJECTION, SOLUTION INTRAVENOUS at 03:30

## 2017-09-19 RX ADMIN — METHYLPREDNISOLONE SODIUM SUCCINATE 40 MG: 40 INJECTION, POWDER, FOR SOLUTION INTRAMUSCULAR; INTRAVENOUS at 14:32

## 2017-09-19 RX ADMIN — HYDRALAZINE HYDROCHLORIDE 10 MG: 20 INJECTION INTRAMUSCULAR; INTRAVENOUS at 14:32

## 2017-09-19 RX ADMIN — IPRATROPIUM BROMIDE AND ALBUTEROL SULFATE 3 ML: .5; 3 SOLUTION RESPIRATORY (INHALATION) at 16:26

## 2017-09-19 RX ADMIN — MORPHINE SULFATE 2 MG: 2 INJECTION, SOLUTION INTRAMUSCULAR; INTRAVENOUS at 20:41

## 2017-09-19 RX ADMIN — DEXTROSE MONOHYDRATE 75 ML/HR: 100 INJECTION, SOLUTION INTRAVENOUS at 15:10

## 2017-09-19 RX ADMIN — EPINEPHRINE 1 MG: 0.1 INJECTION, SOLUTION ENDOTRACHEAL; INTRACARDIAC; INTRAVENOUS at 03:24

## 2017-09-19 RX ADMIN — DEXTROSE MONOHYDRATE 50 ML: 25 INJECTION, SOLUTION INTRAVENOUS at 21:32

## 2017-09-19 RX ADMIN — METHYLPREDNISOLONE SODIUM SUCCINATE 40 MG: 40 INJECTION, POWDER, FOR SOLUTION INTRAMUSCULAR; INTRAVENOUS at 06:23

## 2017-09-19 RX ADMIN — CHLORHEXIDINE GLUCONATE 15 ML: 1.2 RINSE ORAL at 09:53

## 2017-09-19 RX ADMIN — TAZOBACTAM SODIUM AND PIPERACILLIN SODIUM 2.25 G: 250; 2 INJECTION, SOLUTION INTRAVENOUS at 20:40

## 2017-09-19 RX ADMIN — SODIUM CHLORIDE 250 ML: 9 INJECTION, SOLUTION INTRAVENOUS at 03:44

## 2017-09-19 RX ADMIN — TAZOBACTAM SODIUM AND PIPERACILLIN SODIUM 2.25 G: 250; 2 INJECTION, SOLUTION INTRAVENOUS at 11:20

## 2017-09-19 RX ADMIN — IPRATROPIUM BROMIDE AND ALBUTEROL SULFATE 3 ML: .5; 3 SOLUTION RESPIRATORY (INHALATION) at 12:31

## 2017-09-19 RX ADMIN — EPINEPHRINE 1 MG: 0.1 INJECTION, SOLUTION ENDOTRACHEAL; INTRACARDIAC; INTRAVENOUS at 03:28

## 2017-09-19 RX ADMIN — LABETALOL HYDROCHLORIDE 20 MG: 5 INJECTION, SOLUTION INTRAVENOUS at 00:43

## 2017-09-19 RX ADMIN — PROPOFOL 10 MCG/KG/MIN: 10 INJECTION, EMULSION INTRAVENOUS at 04:00

## 2017-09-19 RX ADMIN — Medication 1 TABLET: at 20:40

## 2017-09-19 RX ADMIN — IPRATROPIUM BROMIDE AND ALBUTEROL SULFATE 3 ML: .5; 3 SOLUTION RESPIRATORY (INHALATION) at 20:17

## 2017-09-19 RX ADMIN — HYDRALAZINE HYDROCHLORIDE 10 MG: 20 INJECTION INTRAMUSCULAR; INTRAVENOUS at 18:01

## 2017-09-19 RX ADMIN — METOPROLOL TARTRATE 12.5 MG: 25 TABLET ORAL at 20:40

## 2017-09-19 RX ADMIN — AZITHROMYCIN 500 MG: 500 INJECTION, POWDER, LYOPHILIZED, FOR SOLUTION INTRAVENOUS at 02:06

## 2017-09-19 RX ADMIN — IPRATROPIUM BROMIDE AND ALBUTEROL SULFATE 3 ML: .5; 3 SOLUTION RESPIRATORY (INHALATION) at 06:49

## 2017-09-19 NOTE — PROGRESS NOTES
Called earlier in the night at beginning of shift by RN that patient is agitated after being extubated only 2 hours prior. She was trying to climb out of the bed while on dialysis. I ordered 2 mg of IV ativan which did not help much. She continued to remain agitated during dialysis, driving up her blood pressure to 200s/100s for which she received prn labetalol ordered. This did not improve her blood pressure and subsequently was placed back on nitroglycerin drip. At this point she was noted to have her trip lumen half way out but did flush and give easy venous return. I asked RN to continue using it for the time being as she had no other access for additional medications.     She was monitored on the drip and during reevaluation later in the night patient was noted to be hypotensive. NTG gtt was stopped and BP continued to remain low. I was called again regarding low BP. I asked to give her 250cc NS bolus as she had 2.2 L taken during dialysis. A few minutes later I was again notified that the patient was very lethargic, barely breathing. I evaluated the patient at bedside immediately and noted the patient to be breathing agonally. She was placed on bag mask. She subsequently lost pulse in PEA arrest. CPR was started immediately.     She received 3 doses of epinephrine. Finger stick was 80 for which she received 1 amp of D50. She was intubated by Dr. Shah who also performed bedside US of heart which showed good squeeze of the left ventricle. Once ROSC was obtained after about 8 minutes of CPR, patient was normotensive and did not require vasopressors. I did order for 250cc of NS bolus.     I was called later in the shift regarding patient's persistent hypoglycemia. Advised RN to place her back on 1hr finger sticks and to continue her on D10 drip.     Patient will need new access today.

## 2017-09-19 NOTE — PROGRESS NOTES
"   LOS: 2 days   Patient Care Team:  Neo Gresham DO as PCP - General (Family Medicine)    Interval History: Patient had another episode of pulseless electrical activity last night.  On talking to the nurse was here last night patient apparently had a 7 drop in her blood pressures though there was a consistent rhythm with the lowest heart rate in this 50s and 60s.  CPR was done with complete recovery on the part of the patient.        Review of Systems:   Pertinent items are noted in HPI.      Objective     Vital Sign Min/Max for last 24 hours  Temp  Min: 97.5 °F (36.4 °C)  Max: 97.8 °F (36.6 °C)   BP  Min: 76/44  Max: 220/114   Pulse  Min: 67  Max: 106   Resp  Min: 10  Max: 21   SpO2  Min: 74 %  Max: 100 %   Flow (L/min)  Min: 5  Max: 5   Weight  Min: 103 lb 6.3 oz (46.9 kg)  Max: 103 lb 6.3 oz (46.9 kg)     Flowsheet Rows         First Filed Value    Admission Height  58\" (147.3 cm) Documented at 09/16/2017 1849    Admission Weight  90 lb (40.8 kg) Documented at 09/16/2017 1849          Physical Exam:     General Appearance:    Alert, cooperative, in no acute distress   Head:    Normocephalic, without obvious abnormality, atraumatic   Eyes:            Lids and lashes normal, conjunctivae and sclerae normal, no   icterus, no pallor, corneas clear, PERRLA   Ears:    Ears appear intact with no abnormalities noted   Throat:   No oral lesions, no thrush, oral mucosa moist   Neck:   No adenopathy, supple, trachea midline, no thyromegaly, no     carotid bruit, no JVD   Back:     No kyphosis present, no scoliosis present, no skin lesions,       erythema or scars, no tenderness to percussion or                   palpation,   range of motion normal   Lungs:     Clear to auscultation,respirations regular, even and                   unlabored    Heart:    Regular rhythm and normal rate, normal S1 and S2, no            murmur, no gallop, no rub, no click   Breast Exam:    Deferred   Abdomen:     Normal bowel sounds, no " masses, no organomegaly, soft        non-tender, non-distended, no guarding, no rebound                 tenderness   Genitalia:    Deferred   Extremities:   Moves all extremities well, no edema, no cyanosis, no              redness   Pulses:   Pulses palpable and equal bilaterally   Skin:   No bleeding, bruising or rash   Lymph nodes:   No palpable adenopathy   Neurologic:   Cranial nerves 2 - 12 grossly intact, sensation intact, DTR        present and equal bilaterally     Orthostasis is negative.     Results Review:     I reviewed the patient's new clinical results.    Results Review:   I reviewed the patient's new clinical results.        LAB DATA :         Laboratory results:      Results from last 7 days  Lab Units 09/19/17  0903 09/19/17  0337 09/18/17  0304 09/17/17  1457 09/17/17  1107 09/17/17  0647 09/17/17  0555 09/16/17 2010   SODIUM mmol/L 135* 132* 136* 138  --  140 137 141   POTASSIUM mmol/L 4.6 5.3* 4.5 4.9 4.3 8.1* 9.1* 4.7   CHLORIDE mmol/L 99 96* 96* 96*  --  99 102 101   CO2 mmol/L 22.0* 23.0* 27.0 29.0  --  18.0* 11.0* 23.0*   BUN mg/dL 37* 30* 43* 27*  --  52* 51* 47*   CREATININE mg/dL 3.50* 3.10* 4.10* 3.20*  --  6.30* 6.10* 5.80*   CALCIUM mg/dL 8.0* 8.6 6.8* 7.4*  --  6.9* 6.6* 7.3*   BILIRUBIN mg/dL  --  4.7* 4.0*  --   --   --  3.0* 0.9   ALK PHOS U/L  --  158* 166*  --   --   --  192* 183*   ALT (SGPT) U/L  --  3657* 3065*  --   --   --  562* 506*   AST (SGOT) U/L  --  3286* 5182*  --   --   --  546* 227*   GLUCOSE mg/dL 156* 195* 161* 212*  --  99* 158* 101*       Results from last 7 days  Lab Units 09/19/17  0337 09/18/17  0304 09/17/17  1107 09/17/17  0555 09/16/17 2010   WBC 10*3/mm3 16.91* 10.66 18.47* 11.25* 15.10*   HEMOGLOBIN g/dL 10.2* 7.2* 9.3* 9.6* 8.8*   HEMATOCRIT % 33.4* 22.8* 30.0* 32.6* 28.5*   PLATELETS 10*3/mm3 97* 97* 149 122* 184       Results from last 7 days  Lab Units 09/17/17  1107   INR  1.97*       Results from last 7 days  Lab Units 09/17/17  0206  09/17/17  0129 09/16/17 2010   BLOODCX  No growth at 2 days  --  No growth at 2 days   MRSA SCREEN CX   --  No Methicillin Resistant Staphylococcus aureus isolated  --            Results from last 7 days  Lab Units 09/18/17  0304   TROPONIN I ng/mL 0.118*               Results from last 7 days  Lab Units 09/19/17  0532  09/18/17  0526  09/16/17  2242   PH, ARTERIAL pH units 7.327  < > 7.319  < > 7.391   PCO2, ARTERIAL mm Hg 26.7*  < > 56.7*  < > 40.0   PO2 ART mm Hg 227.0*  < > 31.3*  < > 66.4*   HCO3 ART mmol/L 14.0*  < > 29.1*  < > 24.2   BASE EXCESS ART mmol/L -12.0  < > 3.0  < > -0.7   O2 SATURATION ART %  --   --  42.2  --  92.6   HEMOGLOBIN, ARTERIAL g/dL 10.2*  < > 7.8*  < > 8.5*   OXYHEMOGLOBIN %  --   --   --   --  89.5*   METHEMOGLOBIN %  --   --   --   --  1.10   CARBOXYHEMOGLOBIN %  --   --   --   --  2.3   BAROMETRIC PRESSURE FOR BLOOD GAS mmHg 733  < > 735  < >  --    MODALITY  Ventilator  < > Ventilator  < > Cannula - Nasal   FIO2 % 50  < > 50  < > 28   < > = values in this interval not displayed.  Lab Results   Component Value Date    HGBA1C 5.5 06/03/2014           Results from last 7 days  Lab Units 09/17/17  0555 09/16/17 2010   LIPASE U/L 320* 403*       IMAGING DATA:     Ct Abdomen Pelvis Without Contrast    Result Date: 9/17/2017  Narrative: FINAL REPORT TECHNIQUE: Axial images through the abdomen and pelvis were obtained by computed tomography.  IV contrast was withheld. CLINICAL HISTORY: LLQ ABD PAIN FINDINGS: Abdomen: There is cardiomegaly, small bilateral pleural effusions and pulmonary edema, consistent with CHF. The gallbladder and solid abdominal organs are normal, except for marked bilateral renal atrophy. No abnormality of the GI tract is seen. Pelvis:  The uterus, ovaries and urinary bladder show no abnormality. There is a small amount of ascites in the abdomen or pelvis. There is increased lung density, consistent with renal osteodystrophy.     Impression: Congestive heart failure.  Small amount of ascites in the abdomen and pelvis. Authenticated by Dominik Hood M.D. on 09/17/2017 12:00:52 AM    Us Arterial Doppler Upper Extremity Left    Result Date: 9/19/2017  Narrative: PROCEDURE: US ARTERIAL DOPPLER UPPER EXTREMITY  LEFT-  HISTORY: mottled left forearm; I16.9-Hypertensive crisis, unspecified; E87.70-Fluid overload, unspecified; R74.8-Abnormal levels of other serum enzymes; K85.90-Acute pancreatitis without necrosis or infection, unspecified; Z91.19-Patient's noncompliance with other medical treatment and regimen  PROCEDURE: Segmental pressures with Doppler waveform evaluation of the left upper extremity was performed.  FINDINGS:  Normal arterial flow is seen within the left upper extremity arterial branches. There is no distinct fluid collection identified. No pseudoaneurysm is appreciated.      Impression: No distinct fluid collection or pseudoaneurysm is appreciated in the area of interest.  Images were reviewed, interpreted, and dictated by Dr. Burgess. Transcribed by Marizol Sinclair PA-C.  This report was finalized on 9/19/2017 11:30 AM by Trav Burgess DO.    Us Gallbladder    Result Date: 9/16/2017  Narrative: FINAL REPORT TECHNIQUE: Sonographic images of the right upper quadrant were obtained. CLINICAL HISTORY: ABNORMAL LABS. ABD PAIN.  END STAGE RENAL FAILURE. FINDINGS: The gallbladder is unremarkable. There is no intra-or extrahepatic biliary ductal dilation. There is no ascites. There is a small right pleural effusion.     Impression: Small right pleural effusion. No hepatobiliary abnormality identified. Authenticated by Dominik Hood M.D. on 09/16/2017 10:15:25 PM    Xr Chest 1 View    Result Date: 9/19/2017  Narrative: PROCEDURE: XR CHEST 1 VW-  HISTORY: s/p placement of ETT and code blue; I16.9-Hypertensive crisis, unspecified; E87.70-Fluid overload, unspecified; R74.8-Abnormal levels of other serum enzymes; K85.90-Acute pancreatitis without necrosis or infection, unspecified;  Z91.19-Patient's noncompliance with other medical treatment and regimen  COMPARISON: September 18, 2017.  FINDINGS: Endotracheal tube is 4 cm above the reyes. Nasogastric tube is below the diaphragm.The heart is enlarged. The mediastinum is unremarkable. Small left basilar opacity is again noted. There is no pneumothorax.  There are no acute osseous abnormalities.         Impression: Endotracheal and nasogastric tubes in appropriate position. 2. Small left basilar opacity. 3. Cardiomegaly.  Continued followup is recommended.  This report was finalized on 9/19/2017 8:15 AM by Trav Burgess DO.    Xr Chest 1 View    Result Date: 9/18/2017  Narrative: PROCEDURE: XR CHEST 1 VW-  HISTORY: Acute Respiratory Failure.; I16.9-Hypertensive crisis, unspecified; E87.70-Fluid overload, unspecified; R74.8-Abnormal levels of other serum enzymes; K85.90-Acute pancreatitis without necrosis or infection, unspecified; Z91.19-Patient's noncompliance with other medical treatment and regimen  COMPARISON: September 17, 2017.  FINDINGS: The heart is enlarged. The mediastinum is unremarkable. Probable small left pleural effusion. There is no pneumothorax.  There are no acute osseous abnormalities. Endotracheal tube and nasogastric tube in stable. Electrodes overlie the chest.      Impression: No significant change from prior.  Continued followup is recommended.  This report was finalized on 9/18/2017 9:54 AM by Trav Burgess DO.    Xr Chest 1 View    Result Date: 9/17/2017  Narrative: PROCEDURE: XR CHEST 1 VW-  INDICATION:  shortness of breath  FINDINGS:  A portable view of the chest was obtained.  Comparison is made to a prior exam dated 7/6/2017.   The heart is enlarged. There are bilateral mixed interstitial and alveolar opacities with bilateral pleural effusions. Differential considerations include pulmonary edema or pneumonia. There is no pneumothorax.      Impression: Cardiomegaly with bilateral mixed interstitial and alveolar  opacities. This may represent pulmonary edema or bilateral pneumonia. Recommend follow-up to resolution.  This report was finalized on 9/17/2017 11:04 AM by Morena Askew MD.    Xr Chest 1 View    Result Date: 9/17/2017  Narrative: PROCEDURE: XR CHEST 1 VW-  INDICATION:  Confirm ET Tube Placement; I16.9-Hypertensive crisis, unspecified; E87.70-Fluid overload, unspecified; R74.8-Abnormal levels of other serum enzymes; K85.90-Acute pancreatitis without necrosis or infection, unspecified; Z91.19-Patient's noncompliance with other medical treatment and regimen  FINDINGS:  A portable view of the chest was obtained.  Comparison is made to a prior exam dated 9/16/2017.   There is been interval placement of an endotracheal tube with its tip in the mid thoracic trachea. A new NG tube courses below the diaphragm. The heart is enlarged. There has been interval improvement in bilateral lung opacities. No pneumothorax is visualized. There are likely small pleural effusions.      Impression: ET tube in appropriate position. Improved bilateral lung opacities with persistent cardiomegaly.  This report was finalized on 9/17/2017 11:03 AM by Morena Askew MD.    Ct Chest Pulmonary Embolism With Contrast    Result Date: 9/17/2017  Narrative: FINAL REPORT TECHNIQUE: Postcontrast axial images of the chest were performed in a CTA protocol.This study was performed with technique to keep radiation doses as low as reasonably achievable, (ALARA). Individualized dose reduction techniques using automated exposure control or  adjustment of the MA and/or KV according to the patient's size were employed. CLINICAL HISTORY: SP CODE X2 POSS PE FINDINGS: The heart is normal in size.  There is axillary adenopathy measuring up to 20 mm.  ETT and NG tube are in good position.  There are small bilateral pleural effusions.  No pericardial effusion is identified.  There is no evidence of PE or dissection.  There is severe bilateral renal atrophy.   Anasarca and edema is seen in the mesentery.  There is reflux of contrast into the hepatic veins of uncertain significance.  There is diffuse paratracheal and mediastinal edema.  There are severe diffuse groundglass opacities and consolidation in the upper lobes bilaterally.  There is patchy right lower lobe consolidation.  There is no evidence of aneurysm.     Impression: No dissection or pulmonary embolism. Diffuse edema and anasarca. Severe bilateral renal atrophy. Ground glass opacities and areas of consolidation which may be due to edema or pneumonia. Authenticated by Wayne Torre MD on 09/17/2017 10:23:56 AM    Xr Abdomen Kub    Result Date: 9/17/2017  Narrative: FINAL REPORT TECHNIQUE: A single view of the abdomen was obtained. CLINICAL HISTORY: CHECKING OG PLACEMENT FINDINGS: The NG tube ends in the proximal-mid stomach. The bowel gas pattern is unremarkable. A right femoral line is in place. There is no osseous abnormality.     Impression: NG tube in proximal-mid stomach. Authenticated by Dominik Hood M.D. on 09/17/2017 05:55:15 PM            Assessment/Plan    #1 recurrent hypotensive episodes: Patient has been having recurrent hypotensive episodes.  Exact etiology of this is obscure though in a patient with renal failure and volume appears to be the first possible culprit however each of these episodes are occurring when she tries to have a bowel movement and straining and has pain in the lower belly bringing up the issue of possible vasodepressive events.  The rhythm has stayed stable as per the nurses who have been taking care of her during these episodes.  I do not feel there is any need for electrophysiology study at this time.  Agree with Dr. Urbina in extubating her and continue to monitor her.    #2 hypertension: She appears to be a quite labile hypertensive patient.  Would let permissive hypertension at this time until she stabilizes.  She does not have orthostasis on evaluation by the bedside  here today.    #3 LV dysfunction: Has significant LV dysfunction which most probably stress related.  There is no significant enzyme leak to suggest any ischemic event.  However Would Benefit with Low-Dose Beta Blocker Therapy.  Would Initiate This in View of the Vasovagal Symptoms As Described above 2.    #4 Left Upper Extremity: Has a Palpable Good Radial Pulse in the Left Upper Extremity.  The Arterial Doppler Has Been Negative.  She Does Have Edema in This Arm Which Was Noted 3 Days Ago Also by Visual Inspection This Arm Appears Be Better Than before.  Able to Get Good Pulse Ox Signals Both from the Little Finger As Well As the Pointing Finger.  Do Not Feel She Needs Any Further Workup with Respect to the Same Would Continue to Monitor This.    #5 End-Stage Renal Disease: Patient Is Going to Go through Dialysis As Scheduled Earlier.    #6 Sepsis: Is Being Treated with Appropriate Antibiotics but    #7 Drug Abuse: Patient Keeps Breaking for More and More Pain Pills Has Been Clearly Informed That She Would Not Be Getting Excessive Amount of Pain Pills during the Hospital Stay.    #8 hypoglycemia: This is intriguing.  Patient is also on steroids in spite of future sugars keep dropping.  In the morning would attempt stopping her dextrose infusion few hours prior to blood draw and when the blood is being drawn would also get an insulin level to correlate for any high insulin levels along with this low glucose at which point she may need evaluation for insulinoma.    Principal Problem:    Hypertensive urgency, malignant  Active Problems:    Tobacco use    Headache    Bipolar disorder    Depression    Esophageal reflux    Hypocalcemia    Severe anxiety with panic    Chronic pain    COPD (chronic obstructive pulmonary disease)    End stage renal disease on dialysis    Hepatitis C antibody positive in blood    IV drug abuse    Anemia of ESRD    COPD exacerbation    Medical non-compliance    Neuropathic pain of both  legs            Nic Carcamo MD  09/19/17  7:41 PM

## 2017-09-19 NOTE — NURSING NOTE
2000:  Patient extremely agitated, trying to get out of bed.  Cussing at staff.  Per other nurses who have taken care of her, this is not unusual behavior for her.  One time ativan given per previous RN which only provided minimal relaxation.  0000: Shortly after dialysis completed, patient becoming again extremely agitated, yelling, asking for supervisor and security.  Supervisor, Princess, at bedside to talk with patient.  Patient threatening to leave the hospital.  Continuing to try and get out of bed.  Bed alarm remains on entire time, patient in direct vision of nurses station.    0200:  Behavior unchanged from above.    Stephania Ramires, RN

## 2017-09-19 NOTE — PROGRESS NOTES
"Nephrology Progress Note.    LOS: 2 days    Patient Care Team:  Neo Gresham DO as PCP - General (Family Medicine)    Chief Complaint:    Chief Complaint   Patient presents with   • Shortness of Breath       Subjective         Patient seen and examined this morning.  Events from last night noted. She coded again early this morning. She is intubated and sedated, she was extubated last evening and was trying to get up to bed side commode when had this episode, almost similar situation as before.  She does not have any fevers chills.  No nausea or vomiting no abdominal pain.  Denies any chest pain shortness of breath cough or sputum production.  There is no significant edema.       Review of Systems:    The pertinent  ROS was done and it is noted above, rest  was negative. She is still intubated and sedated    Objective     Vital Signs  BP (!) 178/105  Pulse 78  Temp 97.7 °F (36.5 °C) (Rectal)   Resp 18  Ht 62\" (157.5 cm)  Wt 103 lb 6.3 oz (46.9 kg)  SpO2 100%  BMI 18.91 kg/m2           Intake/Output Summary (Last 24 hours) at 09/19/17 1025  Last data filed at 09/19/17 0021   Gross per 24 hour   Intake             1528 ml   Output             2200 ml   Net             -672 ml       Physical Exam:    General Appearance:intubated and sedated does not appear in any acute distress,   HEENT: pupils round and reactive to light, oral mucosa dry, extra occular movements intact.  Neck: supple, no JVD, trachea midline  Lungs: Clear to Auscultation, unlabored breathing effort  Heart: RRR, normal S1 and S2, no S3, no rub  Abdomen: soft, non-tender, no palpable bladder, present bowel sounds to auscultation  Extremities: no edema, cyanosis or clubbing.   Neuro: intubated and sedated no   Results Review:      Results from last 7 days  Lab Units 09/19/17  0903 09/19/17  0337 09/18/17  0304  09/17/17  0555   SODIUM mmol/L 135* 132* 136*  < > 137   POTASSIUM mmol/L 4.6 5.3* 4.5  < > 9.1*   CHLORIDE mmol/L 99 96* 96*  < > 102 "   CO2 mmol/L 22.0* 23.0* 27.0  < > 11.0*   BUN mg/dL 37* 30* 43*  < > 51*   CREATININE mg/dL 3.50* 3.10* 4.10*  < > 6.10*   CALCIUM mg/dL 8.0* 8.6 6.8*  < > 6.6*   BILIRUBIN mg/dL  --  4.7* 4.0*  --  3.0*   ALK PHOS U/L  --  158* 166*  --  192*   ALT (SGPT) U/L  --  3657* 3065*  --  562*   AST (SGOT) U/L  --  3286* 5182*  --  546*   GLUCOSE mg/dL 156* 195* 161*  < > 158*   < > = values in this interval not displayed.    Estimated Creatinine Clearance: 17.9 mL/min (by C-G formula based on Cr of 3.5).      Results from last 7 days  Lab Units 09/19/17  0903 09/19/17  0337 09/18/17  0304  09/16/17 2010   MAGNESIUM mg/dL  --  2.2 1.9  --  2.2   PHOSPHORUS mg/dL 6.5* 6.4* 5.3*  < > 6.9*   < > = values in this interval not displayed.            Results from last 7 days  Lab Units 09/19/17  0337 09/18/17  0304 09/17/17  1107 09/17/17  0555 09/16/17 2010   WBC 10*3/mm3 16.91* 10.66 18.47* 11.25* 15.10*   HEMOGLOBIN g/dL 10.2* 7.2* 9.3* 9.6* 8.8*   PLATELETS 10*3/mm3 97* 97* 149 122* 184         Results from last 7 days  Lab Units 09/17/17  1107   INR  1.97*         Imaging Results (last 24 hours)     Procedure Component Value Units Date/Time    US Arterial Doppler Upper Extremity Left [[id number]] Updated:  09/18/17 1649    XR Chest 1 View [[id number]] Collected:  09/19/17 0811     Updated:  09/19/17 0817    Narrative:       PROCEDURE: XR CHEST 1 VW-     HISTORY: s/p placement of ETT and code blue; I16.9-Hypertensive crisis,  unspecified; E87.70-Fluid overload, unspecified; R74.8-Abnormal levels  of other serum enzymes; K85.90-Acute pancreatitis without necrosis or  infection, unspecified; Z91.19-Patient's noncompliance with other  medical treatment and regimen     COMPARISON: September 18, 2017.     FINDINGS: Endotracheal tube is 4 cm above the reyes. Nasogastric tube  is below the diaphragm.The heart is enlarged. The mediastinum is  unremarkable. Small left basilar opacity is again noted. There is no  pneumothorax.  There  are no acute osseous abnormalities.           Impression:       Endotracheal and nasogastric tubes in appropriate position.  2. Small left basilar opacity.  3. Cardiomegaly.     Continued followup is recommended.     This report was finalized on 9/19/2017 8:15 AM by Trav Burgess DO.          azithromycin 500 mg Intravenous Q24H   b complex-vitamin c-folic acid 1 tablet Oral BID   chlorhexidine 15 mL Mouth/Throat Q12H   famotidine 20 mg Intravenous Daily   ipratropium-albuterol 3 mL Nebulization 4x Daily - RT   LORazepam 2 mg Oral Once   methylPREDNISolone sodium succinate 40 mg Intravenous Q8H   piperacillin-tazobactam 2.25 g Intravenous Q8H       dextrose 50 mL/hr Last Rate: 50 mL/hr (09/19/17 0423)   heparin (porcine) 12 Units/kg/hr Last Rate: Stopped (09/19/17 0320)   nitroglycerin 5-200 mcg/min Last Rate: Stopped (09/19/17 0258)   Pharmacy to dose vancomycin     propofol 5-50 mcg/kg/min Last Rate: 20 mcg/kg/min (09/19/17 0410)   sodium chloride 50 mL/hr Last Rate: Stopped (09/17/17 2142)       Medication Review:   Current Facility-Administered Medications   Medication Dose Route Frequency Provider Last Rate Last Dose   • AZITHROMYCIN 500 MG/250 ML 0.9% NS IVPB (vial-mate)  500 mg Intravenous Q24H Madhu Mota MD   500 mg at 09/19/17 0206   • b complex-vitamin c-folic acid (NEPHRO-KERI) tablet 1 tablet  1 tablet Oral BID Madhu Mota MD   1 tablet at 09/19/17 0953   • chlorhexidine (PERIDEX) 0.12 % solution 15 mL  15 mL Mouth/Throat Q12H Madhu Mota MD   15 mL at 09/19/17 0953   • dextrose 10 % infusion  50 mL/hr Intravenous Continuous Joseluis Pedersen DO 50 mL/hr at 09/19/17 0423 50 mL/hr at 09/19/17 0423   • famotidine (PEPCID) injection 20 mg  20 mg Intravenous Daily Madhu Mota MD   20 mg at 09/19/17 0953   • heparin (porcine) 1000 units/mL injection 1,400 Units  30 Units/kg Intravenous PRN Joseluis Pedersen DO   1,400 Units at 09/17/17 210   • heparin (porcine) 1000 units/mL injection 2,790  Units  60 Units/kg Intravenous PRN Joseluis Pedersen DO       • heparin 18250 units/250 mL (100 units/mL) in 0.45 % NaCl infusion  12 Units/kg/hr Intravenous Titrated Joseluis Pedersen DO   Stopped at 09/19/17 0320   • ipratropium-albuterol (DUO-NEB) nebulizer solution 3 mL  3 mL Nebulization Q1H PRN Madhu Mota MD       • ipratropium-albuterol (DUO-NEB) nebulizer solution 3 mL  3 mL Nebulization 4x Daily - RT Madhu Mota MD   3 mL at 09/19/17 0649   • labetalol (NORMODYNE,TRANDATE) injection 20 mg  20 mg Intravenous Q2H PRN Joseph Mojica MD   20 mg at 09/19/17 0043   • LORazepam (ATIVAN) tablet 2 mg  2 mg Oral Once Guido King MD       • methylPREDNISolone sodium succinate (SOLU-Medrol) injection 40 mg  40 mg Intravenous Q8H Haja Urbina MD       • morphine injection 2 mg  2 mg Intravenous Q2H PRN Madhu Mota MD   2 mg at 09/18/17 1930    And   • naloxone (NARCAN) injection 0.4 mg  0.4 mg Intravenous Q5 Min PRN Madhu Mota MD       • nitroglycerin 50 mg/250 mL (0.2 mg/mL) infusion  5-200 mcg/min Intravenous Titrated Joseluis Pedersen,    Stopped at 09/19/17 0258   • ondansetron (ZOFRAN) injection 4 mg  4 mg Intravenous Q6H PRN Madhu Mota MD   4 mg at 09/17/17 0149   • Pharmacy to dose vancomycin   Does not apply Continuous PRN Madhu Mota MD       • piperacillin-tazobactam (ZOSYN) in iso-osmotic dextrose IVPB 2.25 g (premix)  2.25 g Intravenous Q8H Joseluis Pedersen, DO   2.25 g at 09/19/17 0427   • propofol (DIPRIVAN) infusion 10 mg/mL 100 mL  5-50 mcg/kg/min Intravenous Titrated Dana Arriola MD 5.63 mL/hr at 09/19/17 0410 20 mcg/kg/min at 09/19/17 0410   • sodium chloride 0.9 % flush 1-10 mL  1-10 mL Intravenous PRN Madhu Mota MD       • sodium chloride 0.9 % flush 10 mL  10 mL Intravenous PRN REY Ellsworth       • sodium chloride 0.9 % infusion  50 mL/hr Intravenous Continuous Joseluis Pedersen DO   Stopped at 09/17/17 2143   • vancomycin  (VANCOCIN) IVPB 500 mg in 100 mL NS  500 mg Intravenous Q48H PRN Madhu Mota MD           Assessment/Plan       End stage renal disease on dialysis    Respiratory failure status post intubation    Status post cardiac arrest and code 99 for 45 minutes    Hypocalcemia    Tobacco use    Headache    Bipolar disorder    Depression    Esophageal reflux    Chronic pain    COPD (chronic obstructive pulmonary disease)    Hepatitis C antibody positive in blood    IV drug abuse    Anemia of ESRD    Medical non-compliance    Neuropathic pain of both legs    Plan:   It appears patient is having recurrent episodes of cardiac arrest, the story is fairly similar to the one that happened initially. Labs were reviewed and disappeared they were done during the code process.  I review have it repeated, if there is a persistent problem we will plan on dialyzing her later  Today. Otherwise will continue 3 times a week  Dialysis. Next dialysis is due tomorrow.  Cardiac and pulmonary notes were reviewed and noted.    She is a poor prognosis because of multiple issues and problems all through her life.  Continue current treatment plan and reassess on day-to-day basis.    Details were discussed with the patient's family.  Further recommendations will depend on clinical course of the patient during the current hospitalization.      I also discussed the details with the nursing staff.    Rest as ordered.    Madhu Mota MD  09/19/17  10:25 AM      EMR Dragon/Transcription disclaimer:   Much of this encounter note is an electronic transcription/translation of spoken language to printed text. The electronic translation of spoken language may permit erroneous, or at times, nonsensical words or phrases to be inadvertently transcribed; Although I have reviewed the note for such errors, some may still exist.

## 2017-09-19 NOTE — PROGRESS NOTES
"Adult Nutrition  Assessment/PES    Patient Name:  Mandy Espino  YOB: 1990  MRN: [id number]  Admit Date:  9/16/2017    Assessment Date:  9/19/2017        Reason for Assessment       09/19/17 1037    Reason for Assessment    Reason For Assessment/Visit diagnosis/disease state    Diagnosis Diagnosis    Cardiac HTN    Gastrointestinal Pancreatitis, chronic    Hepatic Hepatitis   C    Pulmonary/Critical Care Ventilator    Renal ESRD;Hemodialysis    Substance Use Tobacco;Drug/illicit/recreational                Anthropometrics       09/19/17 1039    Anthropometrics    Height Method Stated    Height 157.5 cm (62\")    Weight Method Bed scale    Weight 46.9 kg (103 lb 6.3 oz)    Ideal Body Weight (IBW)    Ideal Body Weight (IBW), Female 50.83    % Ideal Body Weight 92.46    Body Mass Index (BMI)    BMI (kg/m2) 18.95    BMI Grade 17 - 19 low grade I            Labs/Tests/Procedures/Meds       09/19/17 1040    Labs/Tests/Procedures/Meds    Labs/Tests Review Reviewed;Na+;Alb;Platlets   High: BUN, Creat, Phos, ALT, Tot Bilirubin    Medication Review Reviewed, pertinent;Antibiotic;Other (comment);Steroid   Vitamin B, C, Folic acid, Propofol            Physical Findings       09/19/17 1052    Physical Findings/Assessment    Additional Documentation Physical Appearance (Group)    Physical Appearance    Tubes nasogastric tube            Estimated/Assessed Needs       09/19/17 1042    Calculation Measurements    Weight Used For Calculations 46.9 kg (103 lb 6.3 oz)    Height Used for Calculations 1.575 m (5' 2\")    Estimated/Assessed Energy Needs    Energy Need Method Kcal/kg    kcal/kg 30    30 Kcal/Kg (kcal) 1407    Age 27    RMR ([place]-St. Jeor Equation) 1157.25    Activity Factors ([place] St Jeor)  Confined to bed  1.2    Estimated Kcal Range  ~3382-3255    Estimated/Assessed Protein Needs    Weight Used for Protein Calculation 46.9 kg (103 lb 6.3 oz)    Protein (gm/kg) 1.5    1.5 Gm Protein (gm) 70.35    " Estimated Protein Range ~56-70    Estimated/Assessed Fluid Needs    Fluid Need Method RDA method   Output + 1000 mL            Nutrition Prescription Ordered       09/19/17 1049    Nutrition Prescription PO    Current PO Diet NPO    Propofol Considerations    Propofol (mL/hr) 5.63 mL/hr    Propofol (Kcal/day) 148.6 Kcal/day            Evaluation of Received Nutrient/Fluid Intake       09/19/17 1042    PO Evaluation    Number of Days PO Intake Evaluated Insufficient Data   Pt NPOx2 days              Problem/Interventions:        Problem 1       09/19/17 1050    Nutrition Diagnoses Problem 1    Problem 1 Increased Nutrient Needs    Macronutrient Kcal;Fluid;Protein    Micronutrient Vitamin;Mineral    Etiology (related to) Medical Diagnosis    Pulmonary/Critical Care Ventilator    Renal ESRD;Hemodialysis    Signs/Symptoms (evidenced by) Other (comment)   increased nutrient needs r/t pulmonary dysfunction and dialysis            Problem 2       09/19/17 1052    Nutrition Diagnoses Problem 2    Problem 2 Inadequate Intake/Infusion    Inadequate Intake Type Oral    Macronutrient Kcal;Fluid;Protein    Micronutrient Vitamin;Mineral    Etiology (related to) MNT for Treatment/Condition    Signs/Symptoms (evidenced by) NPO                  Intervention Goal       09/19/17 1055    Intervention Goal    General Meet nutritional needs for age/condition    PO --   Pt to remain NPO    Weight Maintain weight            Nutrition Intervention       09/19/17 1056    Nutrition Intervention    RD/Tech Action Care plan reviewd;Follow Tx progress;Recommend/ordered    Recommended/Ordered EN            Nutrition Prescription       09/19/17 1056    Nutrition Prescription PO    PO Prescription Other (comment)   Pt to remain NPO    New PO Prescription Ordered? No, recommended    Other Orders    Obtain Weight Daily    Obtain Weight Ordered? No, recommended    Supplement Vitamin mineral supplement    Supplement Ordered? No, recommended             Education/Evaluation       09/19/17 1057    Education    Education Education not appropriate at this time    Please explain Patient intubated    Monitor/Evaluation    Monitor Per protocol;I&O;Pertinent labs;Weight        Comments:  RD aware of possible transfer of pt to [place]. Rec #1: If pt to remain NPO >3 days, consider nutrition support.  Pt receiving ~149 kcal from Propofol. Rec #2: Consider MVI with minerals daily. Rec #3: Continue to monitor/replace electrolytes PRN. RD to follow pt. Consult RD PRN.       Electronically signed by:  Molly Day RD  09/19/17 10:59 AM

## 2017-09-19 NOTE — NURSING NOTE
0248:Patient's BP's dropped acutely on NTG gtt, whereas she had been extremely hypertensive on gtt.  /65.  NTG decreased - see MAR.  0257:  BP 87/54.  NTG stopped and BP rechecked.  0302:  BP 92/56, remains off NTG gtt.  Resting in bed.  0312:  BP 76/44, HR 77, O2 sat 100% on room air.  Dr. Arriola notified via phone and 250cc NS bolus ordered.  This RN at bedside to assess patient.  Patient not arousable to voice, patient sternal rubbed with no response though initially had weakly palpable femoral pulse.  Appeared to be agonally breathing.  Bag mask ventilation began at 100% O2.  BP recycled 150/94 at 0318.  Dr. Arriola called to bedside.  Patient remained unarousable.  Patient then lost pulse, PEA per monitor and ACLS protocol initiated.  Dr. Arriola at bedside at initial time of arrest.  See code flowsheet for further documentation.      Stephania Ramires, RN

## 2017-09-19 NOTE — NURSING NOTE
0400:  Patient waking up on vent.  Sitting up in bed, reaching for ETT.  Patient placed in bilateral soft wrist restraints.  Per verbal order from Dr. Arriola, to place patient on propofol gtt.  Fingerstick BG checked, 54.  0405:  1amp D50 given IVP.  0410:  Patient remains agitated on vent.  Propofol gtt increased to 20mcg/kg/min.    0422:  BG rechecked, 77.  D10 infusion restarted at 50cc/hr per order.  Patient now sedated on propofol infusion, RASS -2.  0445:   at bedside, Romanian speaking tech able to translate to .  All questions answered at this time.  0448:  Attempted to obtain oral and axillary temp - unable to read.  Rectal probe placed.  Initial temp 93.2F.  Marie hugger placed on high.  Will continue with continuous rectal probe temp monitoring.  0452:  BG 88.  Will continue with q 1 hr BG checks.  0520:  Dr. Arriola updated on above (BG, temps) and recent lab results sent during code.  No additional orders at this time.  Will continue to monitor.    Stephania Ramires RN

## 2017-09-19 NOTE — PLAN OF CARE
Problem: Patient Care Overview (Adult)  Goal: Plan of Care Review  Outcome: Ongoing (interventions implemented as appropriate)    09/19/17 0607   Coping/Psychosocial Response Interventions   Plan Of Care Reviewed With spouse   Patient Care Overview   Progress unable to show any progress toward functional goals   Outcome Evaluation   Outcome Summary/Follow up Plan patient had another PEA arrest this morning, back on ventilator, remains critically ill         Problem: COPD, Chronic Bronchitis/Emphysema (Adult)  Goal: Signs and Symptoms of Listed Potential Problems Will be Absent or Manageable (COPD, Chronic Bronchitis/Emphysema)  Outcome: Ongoing (interventions implemented as appropriate)    09/19/17 0607   COPD, Chronic Bronchitis/Emphysema   Problems Assessed (COPD, Chronic Bronchitis/Emphysema) all   Problems Present (COPD, Chronic Bronchitis/Emphysema) dyspnea         Problem: Chronic Kidney Disease/End Stage Renal Disease (Adult)  Goal: Signs and Symptoms of Listed Potential Problems Will be Absent or Manageable (Chronic Kidney Disease/End Stage Renal Disease)  Outcome: Ongoing (interventions implemented as appropriate)    09/19/17 0607   Chronic Kidney Disease/End Stage Renal Disease   Problems Assessed (Chronic Kidney Disease/ESRD) all   Problems Present (Chronic Kidney Disease/ESRD) cardiovascular disease;fluid imbalance         Problem: Pain, Chronic (Adult)  Goal: Identify Related Risk Factors and Signs and Symptoms  Outcome: Ongoing (interventions implemented as appropriate)    09/19/17 0607   Pain, Chronic   Related Risk Factors (Chronic Pain) disease process;coping ineffective;procedures/treatments   Signs and Symptoms (Chronic Pain) verbalization of pain descriptors;verbalization of pain/discomfort for a prolonged time period       Goal: Acceptable Pain Control/Comfort Level  Outcome: Ongoing (interventions implemented as appropriate)    09/19/17 0607   Pain, Chronic (Adult)   Acceptable Pain  Control/Comfort Level making progress toward outcome         Problem: Mechanical Ventilation, Invasive (Adult)  Goal: Signs and Symptoms of Listed Potential Problems Will be Absent or Manageable (Mechanical Ventilation, Invasive)  Outcome: Ongoing (interventions implemented as appropriate)    09/19/17 0607   Mechanical Ventilation, Invasive   Problems Assessed (Mechanical Ventilation, Invasive) all   Problems Present (Mechanical Ventilation, Invasive) inability to wean         Problem: Pressure Ulcer Risk (Heri Scale) (Adult,Obstetrics,Pediatric)  Goal: Identify Related Risk Factors and Signs and Symptoms  Outcome: Ongoing (interventions implemented as appropriate)    09/19/17 0607   Pressure Ulcer Risk (Heri Scale)   Related Risk Factors (Pressure Ulcer Risk (Heri Scale)) critical care admission;medical devices       Goal: Skin Integrity  Outcome: Ongoing (interventions implemented as appropriate)    09/19/17 0607   Pressure Ulcer Risk (Heri Scale) (Adult,Obstetrics,Pediatric)   Skin Integrity making progress toward outcome

## 2017-09-19 NOTE — NURSING NOTE
2109:  Patient hypertensive with dialysis.  Per dialysis tech, she always runs extremely hypertensive during her sessions.  PRN 20mg labetolol given IVP.    2200:  Blood pressures increasing despite labetolol given.  Attempted to start NTG gtt through L FA #20 PIV.  PIV infiltrated and removed.  Went to administer NTG gtt through TLC in R groin, catheter noted to be [facility] out.  Patient had been making many attempts to get out of bed, bending leg, thrashing in bed prior to dialysis.  Dr. Arriola notified via phone that TLC partially out.  Patient is a very difficult IV access.  She stated to attempt to get blood return from TLC and if possible, could use for the time being, but eventually will need removed.  Able to obtain blood return easily from catheter, flushes easily.  NTG gtt started.  Will make additional attempts at IV access.  2245:  Patient remains extremely hypertensive, titrating NTG gtt up.  Patient sleeping at this time.  Will continue to monitor closely.    Stephania Ramires RN

## 2017-09-19 NOTE — PROGRESS NOTES
Continued Stay Note   [place]     Patient Name: Mandy Espino  MRN: [id number]  Today's Date: 9/19/2017    Admit Date: 9/16/2017          Discharge Plan       09/19/17 1544    Case Management/Social Work Plan    Plan Attempted to speak to patient again, but she is still lethargic.  present and is her next of kin. Will follow as needed for discharge planning.               Discharge Codes     None        Expected Discharge Date and Time     Expected Discharge Date Expected Discharge Time    Sep 20, 2017             Erna Clemente

## 2017-09-19 NOTE — PROGRESS NOTES
[facility]    Name:  Mandy Espino   Age:  27 y.o.  Sex:  female  :  1990  MRN:  [id number]   Visit Number:  [id number]  Admission Date:  2017  Date Of Service:  17  Primary Care Physician:  Neo Gresham DO     LOS: 2 days :  Patient Care Team:  Neo Gresham DO as PCP - General (Family Medicine):    Chief Complaint:      Intubated    Subjective / Interval History:     Patient yesterday around 5 pm was extubated.  She subsequently later HD then shortly after dialysis became extrermely agitated during the middle of the night becoming extremely hypertensive.  Patient was given Labetalol 20 mg IV x 1 with nitroglycerin added by the night physician.  Patient subsequently became hypotensive, code called for PEA with patient stabilized and intubated.  Dr. Urbina to attempt extubation today. Vitals currently stable.     I have reviewed labs/imaging/records from this hospitalization, including ER staff and admitting/attending physicians H/P's and progress notes to establish a comprehensive understanding of this patient's clinical hospital course, as well as to establish a transition of care appropriately.    Vital Signs:    Temp:  [97.5 °F (36.4 °C)-97.8 °F (36.6 °C)] 97.7 °F (36.5 °C)  Heart Rate:  [] 78  Resp:  [10-20] 18  BP: ()/() 178/105  FiO2 (%):  [30 %-50 %] 45 %    Intake and output:    Intake/Output       17 0700 - 17 0659    Intake (ml) 1528    Output (ml) 2200    Net (ml) -672    Last Weight 103 lb 6.3 oz (46.9 kg)          Physical Examination:    General Appearance:  Intubated on diprivan   Lungs:   Chest shape is normal. Breath sounds heard bilaterally equally.  No crackles or wheezing.   Heart:  Normal S1 and S2, no murmur, no gallop, no rub.   Abdomen:   Normal bowel sounds,  Soft non-tender, non-distended, no guarding, no rebound tenderness   Extremities: Left upper ext mottled                    Laboratory results:      Results from last 7 days  Lab Units 09/19/17  0903 09/19/17  0337 09/18/17  0304  09/17/17  0555   SODIUM mmol/L 135* 132* 136*  < > 137   POTASSIUM mmol/L 4.6 5.3* 4.5  < > 9.1*   CHLORIDE mmol/L 99 96* 96*  < > 102   CO2 mmol/L 22.0* 23.0* 27.0  < > 11.0*   BUN mg/dL 37* 30* 43*  < > 51*   CREATININE mg/dL 3.50* 3.10* 4.10*  < > 6.10*   CALCIUM mg/dL 8.0* 8.6 6.8*  < > 6.6*   BILIRUBIN mg/dL  --  4.7* 4.0*  --  3.0*   ALK PHOS U/L  --  158* 166*  --  192*   ALT (SGPT) U/L  --  3657* 3065*  --  562*   AST (SGOT) U/L  --  3286* 5182*  --  546*   GLUCOSE mg/dL 156* 195* 161*  < > 158*   < > = values in this interval not displayed.    Results from last 7 days  Lab Units 09/19/17  0337 09/18/17  0304 09/17/17  1107   WBC 10*3/mm3 16.91* 10.66 18.47*   HEMOGLOBIN g/dL 10.2* 7.2* 9.3*   HEMATOCRIT % 33.4* 22.8* 30.0*   PLATELETS 10*3/mm3 97* 97* 149       Results from last 7 days  Lab Units 09/18/17  0304 09/17/17  2348 09/17/17 1929   TROPONIN I ng/mL 0.118* 0.114* 0.096*       Results from last 7 days  Lab Units 09/18/17  0856 09/18/17  0304 09/17/17  1929 09/17/17  1107   INR   --   --   --  1.97*   APTT seconds 60.2* 62.7* 43.0* 27.5       I have reviewed the patient's laboratory results.    Radiology results:    Imaging Results (last 24 hours)     Procedure Component Value Units Date/Time    XR Chest 1 View [[id number]] Collected:  09/19/17 0811     Updated:  09/19/17 0817    Narrative:       PROCEDURE: XR CHEST 1 VW-     HISTORY: s/p placement of ETT and code blue; I16.9-Hypertensive crisis,  unspecified; E87.70-Fluid overload, unspecified; R74.8-Abnormal levels  of other serum enzymes; K85.90-Acute pancreatitis without necrosis or  infection, unspecified; Z91.19-Patient's noncompliance with other  medical treatment and regimen     COMPARISON: September 18, 2017.     FINDINGS: Endotracheal tube is 4 cm above the reyes. Nasogastric tube  is below the diaphragm.The heart is enlarged. The  mediastinum is  unremarkable. Small left basilar opacity is again noted. There is no  pneumothorax.  There are no acute osseous abnormalities.           Impression:       Endotracheal and nasogastric tubes in appropriate position.  2. Small left basilar opacity.  3. Cardiomegaly.     Continued followup is recommended.     This report was finalized on 9/19/2017 8:15 AM by Trav Burgess DO.    US Arterial Doppler Upper Extremity Left [[id number]] Collected:  09/19/17 1126     Updated:  09/19/17 1132    Narrative:       PROCEDURE: US ARTERIAL DOPPLER UPPER EXTREMITY  LEFT-     HISTORY: mottled left forearm; I16.9-Hypertensive crisis, unspecified;  E87.70-Fluid overload, unspecified; R74.8-Abnormal levels of other serum  enzymes; K85.90-Acute pancreatitis without necrosis or infection,  unspecified; Z91.19-Patient's noncompliance with other medical treatment  and regimen     PROCEDURE: Segmental pressures with Doppler waveform evaluation of the  left upper extremity was performed.     FINDINGS:  Normal arterial flow is seen within the left upper extremity  arterial branches. There is no distinct fluid collection identified. No  pseudoaneurysm is appreciated.       Impression:       No distinct fluid collection or pseudoaneurysm is  appreciated in the area of interest.     Images were reviewed, interpreted, and dictated by Dr. Burgess.  Transcribed by Marizol Sinclair PA-C.     This report was finalized on 9/19/2017 11:30 AM by Trav Burgess DO.          I have reviewed the patient's radiology reports.    Medication Review:     I have reviewed the patients active and prn medications.       Assessment/Plan:  1.  Acute hypoxic respiratory failure requiring mechanical ventilation  2.  Cardiopulmonary arrest with V. Tach.  3.  Bilateral pneumonia, present on admission  4.  Sepsis  5.  COPD exacerbation  6.  Metabolic acidosis  7.  Shock liver  8.  Hyperkalemia  9.  End-stage renal disease on hemodialysis  10.  IV drug abuse  11.   Severe cardiomyopathy with ejection fraction of 10-15%/acute systolic CHF  12.  Hypoglycemia  13.  Metabolic encephalopathy      Plan:      -Attempt extubation later today, continue on IV Vancomycin, Zosyn and Zithromax, nebs, and IV Solu-Medrol. CTA showed bilateral groundglass opacities, no PE or dissection.  Continue HD accordingly.  Neurology consulted to further evaluate underlying anoxic brain injury from the cardiac arrest.  Pulmonary Dr. Urbina and Cardiology, Dr. Carcamo following and appreciate recommendations.  Continue to monitor patients left upper ext, improving today.  Reported by nurse peripheral left arm access was infiltrated during initial code blue on 9/17/17. Arterial US of left upper ext showed no distinct fluid collection or pseudoaneurysm appreciated in the area of interest. Patient with hx of IV drug use with medical noncompliance.  May benefit from Bmed evaluation prior to DC.     Guido King MD  09/19/17  12:17 PM

## 2017-09-19 NOTE — PROCEDURES
Called to the floor after patient became hypotensive and then entered PEA cardiac arrest.  CPR had begun prior to my arrival.  Patient had minimal respiratory effort on her own.  At that time patient was being bagged manually while CPR was being performed.  Given patient was unresponsive no medications were necessary prior to procedure.  Using the kaleidoscope a 7.5 endotracheal tube was able to be passed after suctioning the airway.  The tube was visualized with the camera passing the vocal cords.  There was good breath sounds with manual ventilation bilaterally.  There was good color change and improvement in oxygen saturation.  X-ray obtained which showed good placement of the endotracheal tube.    Koki Shah M.D.

## 2017-09-19 NOTE — PROGRESS NOTES
"  CC: Acute Respiratory Failure. S/P Cardiac Arrest.     S: Intubated.  Events noted.  Was extubated yesterday without events.  Actually underwent dialysis afterwards.  Had issues with hypertension but later on became bradycardic and underwent cardiac arrest and had to have CPR performed.    O:Vital signs reviewed. O2Sat: 98 % on 35-40%. Vent Day # 1. Right femoral line in place.    BP (!) 178/105  Pulse 78  Temp 97.7 °F (36.5 °C) (Rectal)   Resp 18  Ht 62\" (157.5 cm)  Wt 103 lb 6.3 oz (46.9 kg)  SpO2 100%  BMI 18.91 kg/m2      I & Os reviewed.   Intake/Output       09/18/17 0700 - 09/19/17 0659    Intake (ml) 1528    Output (ml) 2200    Net (ml) -672    Last Weight 103 lb 6.3 oz (46.9 kg)          General: No acute distress noted.  Eyes: PERRL.   Neck: Supple without JVD  Cardiovascular: S1 + S2. Reg.   Respiratory: No respiratory distress noted. No wheezing heard. Min crackles noted  Abdomen: Soft. Bowel sounds hypoactive   Extremities: No edema noted.  Neurologic: Was able to follow commands.  Detailed exam couldn't be performed due to intubation.  Skin: Appeared somewhat dry     Labs: Reviewed.     Results from last 7 days  Lab Units 09/19/17  0337 09/18/17  0304 09/17/17  1457  09/17/17  0555   SODIUM mmol/L 132* 136* 138  < > 137   POTASSIUM mmol/L 5.3* 4.5 4.9  < > 9.1*   CHLORIDE mmol/L 96* 96* 96*  < > 102   CO2 mmol/L 23.0* 27.0 29.0  < > 11.0*   BUN mg/dL 30* 43* 27*  < > 51*   CREATININE mg/dL 3.10* 4.10* 3.20*  < > 6.10*   CALCIUM mg/dL 8.6 6.8* 7.4*  < > 6.6*   BILIRUBIN mg/dL 4.7* 4.0*  --   --  3.0*   ALK PHOS U/L 158* 166*  --   --  192*   ALT (SGPT) U/L 3657* 3065*  --   --  562*   AST (SGOT) U/L 3286* 5182*  --   --  546*   GLUCOSE mg/dL 195* 161* 212*  < > 158*   < > = values in this interval not displayed.      Results from last 7 days  Lab Units 09/19/17  0337 09/18/17  0304 09/17/17  1457  09/16/17 2010   MAGNESIUM mg/dL 2.2 1.9  --   --  2.2   PHOSPHORUS mg/dL 6.4* 5.3* 5.7*  < > 6.9* "   < > = values in this interval not displayed.        Results from last 7 days  Lab Units 09/19/17  0337 09/18/17  0304 09/17/17  1107 09/17/17  0555 09/16/17 2010   WBC 10*3/mm3 16.91* 10.66 18.47* 11.25* 15.10*   HEMOGLOBIN g/dL 10.2* 7.2* 9.3* 9.6* 8.8*   PLATELETS 10*3/mm3 97* 97* 149 122* 184         Results from last 7 days  Lab Units 09/17/17  1107   INR  1.97*           dextrose 50 mL/hr Last Rate: 50 mL/hr (09/19/17 0423)   heparin (porcine) 12 Units/kg/hr Last Rate: Stopped (09/19/17 0320)   nitroglycerin 5-200 mcg/min Last Rate: Stopped (09/19/17 0258)   Pharmacy to dose vancomycin     propofol 5-50 mcg/kg/min Last Rate: 20 mcg/kg/min (09/19/17 0410)   sodium chloride 50 mL/hr Last Rate: Stopped (09/17/17 2142)         ABG: Reviewed  Lab Results   Component Value Date    PHART 7.327 09/19/2017    ZOC9AJJ 26.7 (L) 09/19/2017    PO2ART 227.0 (H) 09/19/2017    HGBBG 10.2 (L) 09/19/2017    C0HPYCAQ 42.2 09/18/2017    FCOHB 1.7 01/01/2017    CARBOXYHGB 2.3 09/16/2017    FMETHB 0.8 01/01/2017         CXRay: Reviewed  Imaging Results (last 24 hours)     Procedure Component Value Units Date/Time    XR Chest 1 View [[id number]] Collected:  09/18/17 0951     Updated:  09/18/17 0957    Narrative:       PROCEDURE: XR CHEST 1 VW-     HISTORY: Acute Respiratory Failure.; I16.9-Hypertensive crisis,  unspecified; E87.70-Fluid overload, unspecified; R74.8-Abnormal levels  of other serum enzymes; K85.90-Acute pancreatitis without necrosis or  infection, unspecified; Z91.19-Patient's noncompliance with other  medical treatment and regimen     COMPARISON: September 17, 2017.     FINDINGS: The heart is enlarged. The mediastinum is unremarkable.  Probable small left pleural effusion. There is no pneumothorax.  There  are no acute osseous abnormalities. Endotracheal tube and nasogastric  tube in stable. Electrodes overlie the chest.       Impression:       No significant change from prior.     Continued followup is  recommended.     This report was finalized on 9/18/2017 9:54 AM by Trav Burgess DO.    US Arterial Doppler Upper Extremity Left [[id number]] Updated:  09/18/17 1649    XR Chest 1 View [[id number]] Collected:  09/19/17 0811     Updated:  09/19/17 0817    Narrative:       PROCEDURE: XR CHEST 1 VW-     HISTORY: s/p placement of ETT and code blue; I16.9-Hypertensive crisis,  unspecified; E87.70-Fluid overload, unspecified; R74.8-Abnormal levels  of other serum enzymes; K85.90-Acute pancreatitis without necrosis or  infection, unspecified; Z91.19-Patient's noncompliance with other  medical treatment and regimen     COMPARISON: September 18, 2017.     FINDINGS: Endotracheal tube is 4 cm above the reyes. Nasogastric tube  is below the diaphragm.The heart is enlarged. The mediastinum is  unremarkable. Small left basilar opacity is again noted. There is no  pneumothorax.  There are no acute osseous abnormalities.           Impression:       Endotracheal and nasogastric tubes in appropriate position.  2. Small left basilar opacity.  3. Cardiomegaly.     Continued followup is recommended.     This report was finalized on 9/19/2017 8:15 AM by Trav Burgess DO.            Assessment & Recommendations/Plan:   1.  Acute Respiratory Failure.   2.  S/P Cardiac Arrest x 2.  3.  Smoking  4.  ESRD  5.  ? Drug seeking behavior.  6.  Minimal B/L Effusion.  7.  Noncompliance    Another episode of cardiac arrest last night with pulseless electrical activity.  Cause?    As per the note she had return of spontaneous circulation after about 8 minutes of CPR.  The patient underwent dialysis after extubation yesterday and tolerated without any significant issues.    No pulmonary issues that can explain the events.  Cardiology on the case.    We have reviewed patient's current orders and changes, if any, have been suggested to primary care team. Plan was also discussed with nursing staff, as necessary.       Haja Urbina MD  09/19/17  9:04  AM    Dictated utilizing Dragon dictation.

## 2017-09-20 ENCOUNTER — APPOINTMENT (OUTPATIENT)
Dept: CT IMAGING | Facility: HOSPITAL | Age: 27
End: 2017-09-20

## 2017-09-20 ENCOUNTER — APPOINTMENT (OUTPATIENT)
Dept: GENERAL RADIOLOGY | Facility: HOSPITAL | Age: 27
End: 2017-09-20
Attending: INTERNAL MEDICINE

## 2017-09-20 ENCOUNTER — APPOINTMENT (OUTPATIENT)
Dept: GENERAL RADIOLOGY | Facility: HOSPITAL | Age: 27
End: 2017-09-20

## 2017-09-20 VITALS
RESPIRATION RATE: 18 BRPM | HEIGHT: 62 IN | TEMPERATURE: 98.7 F | SYSTOLIC BLOOD PRESSURE: 186 MMHG | WEIGHT: 114.2 LBS | OXYGEN SATURATION: 100 % | HEART RATE: 74 BPM | DIASTOLIC BLOOD PRESSURE: 97 MMHG | BODY MASS INDEX: 21.02 KG/M2

## 2017-09-20 LAB
ABO + RH BLD: NORMAL
ALBUMIN SERPL-MCNC: 2.7 G/DL (ref 3.5–5)
ALBUMIN SERPL-MCNC: 3.1 G/DL (ref 3.5–5)
ALBUMIN/GLOB SERPL: 1.4 G/DL (ref 1–2)
ALP SERPL-CCNC: 124 U/L (ref 38–126)
ALP SERPL-CCNC: 163 U/L (ref 38–126)
ALT SERPL W P-5'-P-CCNC: 3498 U/L (ref 13–69)
ALT SERPL W P-5'-P-CCNC: 3797 U/L (ref 13–69)
ANION GAP SERPL CALCULATED.3IONS-SCNC: 22.4 MMOL/L
ARTERIAL PATENCY WRIST A: ABNORMAL
ARTERIAL PATENCY WRIST A: ABNORMAL
AST SERPL-CCNC: 2240 U/L (ref 15–46)
AST SERPL-CCNC: 2549 U/L (ref 15–46)
ATMOSPHERIC PRESS: 733 MMHG
BASE EXCESS BLDA CALC-SCNC: -14.5 MMOL/L
BASE EXCESS BLDA CALC-SCNC: -8 MMOL/L
BDY SITE: ABNORMAL
BDY SITE: ABNORMAL
BH BB BLOOD EXPIRATION DATE: NORMAL
BH BB BLOOD TYPE BARCODE: 7300
BH BB DISPENSE STATUS: NORMAL
BH BB PRODUCT CODE: NORMAL
BH BB UNIT NUMBER: NORMAL
BILIRUB CONJ SERPL-MCNC: 3.7 MG/DL (ref 0–0.4)
BILIRUB INDIRECT SERPL-MCNC: 1 MG/DL
BILIRUB SERPL-MCNC: 3.4 MG/DL (ref 0.2–1.3)
BILIRUB SERPL-MCNC: 4.7 MG/DL (ref 0.2–1.3)
BUN BLD-MCNC: 50 MG/DL (ref 7–20)
BUN/CREAT SERPL: 12.2 (ref 7.1–23.5)
CALCIUM SPEC-SCNC: 9.7 MG/DL (ref 8.4–10.2)
CHLORIDE SERPL-SCNC: 93 MMOL/L (ref 98–107)
CO2 SERPL-SCNC: 18 MMOL/L (ref 26–30)
COHGB MFR BLD: 1.4 %
CREAT BLD-MCNC: 4.1 MG/DL (ref 0.6–1.3)
CROSSMATCH INTERPRETATION: NORMAL
D-LACTATE SERPL-SCNC: 1.9 MMOL/L (ref 0.5–2)
D-LACTATE SERPL-SCNC: 3.2 MMOL/L (ref 0.5–2)
D-LACTATE SERPL-SCNC: 6.7 MMOL/L (ref 0.5–2)
DEPRECATED RDW RBC AUTO: 73.2 FL (ref 37–54)
ERYTHROCYTE [DISTWIDTH] IN BLOOD BY AUTOMATED COUNT: 19.3 % (ref 11.5–14.5)
GFR SERPL CREATININE-BSD FRML MDRD: 13 ML/MIN/1.73
GLOBULIN UR ELPH-MCNC: 2 GM/DL
GLUCOSE BLD-MCNC: 398 MG/DL (ref 74–98)
GLUCOSE BLDC GLUCOMTR-MCNC: 115 MG/DL (ref 70–130)
GLUCOSE BLDC GLUCOMTR-MCNC: 120 MG/DL (ref 70–130)
GLUCOSE BLDC GLUCOMTR-MCNC: 122 MG/DL (ref 70–130)
GLUCOSE BLDC GLUCOMTR-MCNC: 122 MG/DL (ref 70–130)
GLUCOSE BLDC GLUCOMTR-MCNC: 128 MG/DL (ref 70–130)
GLUCOSE BLDC GLUCOMTR-MCNC: 129 MG/DL (ref 70–130)
GLUCOSE BLDC GLUCOMTR-MCNC: 134 MG/DL (ref 70–130)
GLUCOSE BLDC GLUCOMTR-MCNC: 141 MG/DL (ref 70–130)
GLUCOSE BLDC GLUCOMTR-MCNC: 141 MG/DL (ref 70–130)
GLUCOSE BLDC GLUCOMTR-MCNC: 162 MG/DL (ref 70–130)
GLUCOSE BLDC GLUCOMTR-MCNC: 163 MG/DL (ref 70–130)
GLUCOSE BLDC GLUCOMTR-MCNC: 171 MG/DL (ref 70–130)
GLUCOSE BLDC GLUCOMTR-MCNC: 176 MG/DL (ref 70–130)
GLUCOSE BLDC GLUCOMTR-MCNC: 196 MG/DL (ref 70–130)
GLUCOSE BLDC GLUCOMTR-MCNC: 198 MG/DL (ref 70–130)
GLUCOSE BLDC GLUCOMTR-MCNC: 207 MG/DL (ref 70–130)
GLUCOSE BLDC GLUCOMTR-MCNC: 218 MG/DL (ref 70–130)
GLUCOSE BLDC GLUCOMTR-MCNC: 224 MG/DL (ref 70–130)
GLUCOSE BLDC GLUCOMTR-MCNC: 254 MG/DL (ref 70–130)
GLUCOSE BLDC GLUCOMTR-MCNC: 290 MG/DL (ref 70–130)
GLUCOSE BLDC GLUCOMTR-MCNC: 35 MG/DL (ref 70–130)
GLUCOSE BLDC GLUCOMTR-MCNC: 397 MG/DL (ref 70–130)
GLUCOSE BLDC GLUCOMTR-MCNC: 61 MG/DL (ref 70–130)
GLUCOSE BLDC GLUCOMTR-MCNC: 68 MG/DL (ref 70–130)
GLUCOSE BLDC GLUCOMTR-MCNC: 87 MG/DL (ref 70–130)
HCO3 BLDA-SCNC: 14.4 MMOL/L (ref 22–28)
HCO3 BLDA-SCNC: 18.6 MMOL/L (ref 22–28)
HCT VFR BLD AUTO: 28.3 % (ref 37–47)
HGB BLD-MCNC: 8.6 G/DL (ref 12–16)
HGB BLDA-MCNC: 9.1 G/DL (ref 12–18)
HGB BLDA-MCNC: 9.6 G/DL (ref 12–18)
HOROWITZ INDEX BLD+IHG-RTO: 100 %
HOROWITZ INDEX BLD+IHG-RTO: 50 %
MAGNESIUM SERPL-MCNC: 2.4 MG/DL (ref 1.6–2.3)
MCH RBC QN AUTO: 31.7 PG (ref 27–31)
MCHC RBC AUTO-ENTMCNC: 30.4 G/DL (ref 30–37)
MCV RBC AUTO: 104.4 FL (ref 81–99)
METHGB BLD QL: 1.1 %
MODALITY: ABNORMAL
MODALITY: ABNORMAL
OXYHGB MFR BLDV: 98.2 % (ref 94–99)
PCO2 BLDA: 38.7 MM HG (ref 35–45)
PCO2 BLDA: 41.6 MM HG (ref 35–45)
PH BLDA: 7.15 PH UNITS
PH BLDA: 7.29 PH UNITS
PHOSPHATE SERPL-MCNC: 10.1 MG/DL (ref 2.5–4.5)
PLATELET # BLD AUTO: 65 10*3/MM3 (ref 130–400)
PMV BLD AUTO: 13.5 FL (ref 6–12)
PO2 BLDA: 160 MM HG (ref 75–100)
PO2 BLDA: 452 MM HG (ref 75–100)
POTASSIUM BLD-SCNC: 5.4 MMOL/L (ref 3.5–5.1)
PROT SERPL-MCNC: 4.7 G/DL (ref 6.3–8.2)
PROT SERPL-MCNC: 5.3 G/DL (ref 6.3–8.2)
RBC # BLD AUTO: 2.71 10*6/MM3 (ref 4.2–5.4)
SODIUM BLD-SCNC: 128 MMOL/L (ref 137–145)
UNIT  ABO: NORMAL
UNIT  RH: NORMAL
VANCOMYCIN SERPL-MCNC: 11.72 MCG/ML (ref 5–40)
WBC NRBC COR # BLD: 14.32 10*3/MM3 (ref 4.8–10.8)

## 2017-09-20 PROCEDURE — 25010000002 AZITHROMYCIN: Performed by: INTERNAL MEDICINE

## 2017-09-20 PROCEDURE — 25010000002 EPINEPHRINE 0.1 MG/ML SOLUTION PREFILLED SYRINGE: Performed by: INTERNAL MEDICINE

## 2017-09-20 PROCEDURE — 05HM33Z INSERTION OF INFUSION DEVICE INTO RIGHT INTERNAL JUGULAR VEIN, PERCUTANEOUS APPROACH: ICD-10-PCS | Performed by: INTERNAL MEDICINE

## 2017-09-20 PROCEDURE — 80053 COMPREHEN METABOLIC PANEL: CPT | Performed by: INTERNAL MEDICINE

## 2017-09-20 PROCEDURE — 94799 UNLISTED PULMONARY SVC/PX: CPT

## 2017-09-20 PROCEDURE — 82805 BLOOD GASES W/O2 SATURATION: CPT

## 2017-09-20 PROCEDURE — 82375 ASSAY CARBOXYHB QUANT: CPT

## 2017-09-20 PROCEDURE — 25010000002 MORPHINE PER 10 MG: Performed by: INTERNAL MEDICINE

## 2017-09-20 PROCEDURE — 83605 ASSAY OF LACTIC ACID: CPT | Performed by: INTERNAL MEDICINE

## 2017-09-20 PROCEDURE — 83527 ASSAY OF INSULIN: CPT | Performed by: INTERNAL MEDICINE

## 2017-09-20 PROCEDURE — 84100 ASSAY OF PHOSPHORUS: CPT | Performed by: INTERNAL MEDICINE

## 2017-09-20 PROCEDURE — 92950 HEART/LUNG RESUSCITATION CPR: CPT

## 2017-09-20 PROCEDURE — 0 DIATRIZOATE MEGLUMINE & SODIUM PER 1 ML: Performed by: INTERNAL MEDICINE

## 2017-09-20 PROCEDURE — 83525 ASSAY OF INSULIN: CPT | Performed by: INTERNAL MEDICINE

## 2017-09-20 PROCEDURE — 74000 HC ABDOMEN KUB: CPT

## 2017-09-20 PROCEDURE — 71010 HC CHEST PA OR AP: CPT

## 2017-09-20 PROCEDURE — 80076 HEPATIC FUNCTION PANEL: CPT | Performed by: INTERNAL MEDICINE

## 2017-09-20 PROCEDURE — 25010000002 METHYLPREDNISOLONE PER 40 MG: Performed by: INTERNAL MEDICINE

## 2017-09-20 PROCEDURE — 94002 VENT MGMT INPAT INIT DAY: CPT

## 2017-09-20 PROCEDURE — 85027 COMPLETE CBC AUTOMATED: CPT | Performed by: INTERNAL MEDICINE

## 2017-09-20 PROCEDURE — 80202 ASSAY OF VANCOMYCIN: CPT | Performed by: INTERNAL MEDICINE

## 2017-09-20 PROCEDURE — 36600 WITHDRAWAL OF ARTERIAL BLOOD: CPT

## 2017-09-20 PROCEDURE — 90935 HEMODIALYSIS ONE EVALUATION: CPT

## 2017-09-20 PROCEDURE — 5A1D60Z PERFORMANCE OF URINARY FILTRATION, MULTIPLE: ICD-10-PCS | Performed by: INTERNAL MEDICINE

## 2017-09-20 PROCEDURE — 82962 GLUCOSE BLOOD TEST: CPT

## 2017-09-20 PROCEDURE — 0 IOPAMIDOL 61 % SOLUTION: Performed by: INTERNAL MEDICINE

## 2017-09-20 PROCEDURE — 74177 CT ABD & PELVIS W/CONTRAST: CPT

## 2017-09-20 PROCEDURE — 25010000002 PROPOFOL 1000 MG/ML EMULSION: Performed by: INTERNAL MEDICINE

## 2017-09-20 PROCEDURE — 83735 ASSAY OF MAGNESIUM: CPT | Performed by: INTERNAL MEDICINE

## 2017-09-20 PROCEDURE — 25010000002 HYDRALAZINE PER 20 MG: Performed by: INTERNAL MEDICINE

## 2017-09-20 PROCEDURE — 25010000002 PIPERACILLIN SOD-TAZOBACTAM PER 1 G: Performed by: INTERNAL MEDICINE

## 2017-09-20 PROCEDURE — 76937 US GUIDE VASCULAR ACCESS: CPT | Performed by: INTERNAL MEDICINE

## 2017-09-20 PROCEDURE — 99233 SBSQ HOSP IP/OBS HIGH 50: CPT | Performed by: INTERNAL MEDICINE

## 2017-09-20 PROCEDURE — 36556 INSERT NON-TUNNEL CV CATH: CPT | Performed by: INTERNAL MEDICINE

## 2017-09-20 PROCEDURE — 5A1945Z RESPIRATORY VENTILATION, 24-96 CONSECUTIVE HOURS: ICD-10-PCS | Performed by: EMERGENCY MEDICINE

## 2017-09-20 PROCEDURE — 94770: CPT

## 2017-09-20 PROCEDURE — 83050 HGB METHEMOGLOBIN QUAN: CPT

## 2017-09-20 PROCEDURE — 0BH17EZ INSERTION OF ENDOTRACHEAL AIRWAY INTO TRACHEA, VIA NATURAL OR ARTIFICIAL OPENING: ICD-10-PCS | Performed by: EMERGENCY MEDICINE

## 2017-09-20 PROCEDURE — 99239 HOSP IP/OBS DSCHRG MGMT >30: CPT | Performed by: INTERNAL MEDICINE

## 2017-09-20 PROCEDURE — 36591 DRAW BLOOD OFF VENOUS DEVICE: CPT

## 2017-09-20 PROCEDURE — 5A12012 PERFORMANCE OF CARDIAC OUTPUT, SINGLE, MANUAL: ICD-10-PCS | Performed by: EMERGENCY MEDICINE

## 2017-09-20 RX ORDER — SODIUM CHLORIDE 0.9 % (FLUSH) 0.9 %
1-10 SYRINGE (ML) INJECTION AS NEEDED
Start: 2017-09-20

## 2017-09-20 RX ORDER — ATROPINE SULFATE 1 MG/ML
INJECTION, SOLUTION INTRAMUSCULAR; INTRAVENOUS; SUBCUTANEOUS
Status: COMPLETED | OUTPATIENT
Start: 2017-09-20 | End: 2017-09-20

## 2017-09-20 RX ORDER — CALCIUM CHLORIDE 100 MG/ML
INJECTION INTRAVENOUS; INTRAVENTRICULAR
Status: COMPLETED | OUTPATIENT
Start: 2017-09-20 | End: 2017-09-20

## 2017-09-20 RX ORDER — SODIUM CHLORIDE 0.9 % (FLUSH) 0.9 %
10 SYRINGE (ML) INJECTION AS NEEDED
Start: 2017-09-20

## 2017-09-20 RX ORDER — DOCUSATE SODIUM 100 MG/1
100 CAPSULE, LIQUID FILLED ORAL 2 TIMES DAILY
Status: DISCONTINUED | OUTPATIENT
Start: 2017-09-20 | End: 2017-09-21 | Stop reason: HOSPADM

## 2017-09-20 RX ORDER — DEXTROSE MONOHYDRATE 25 G/50ML
INJECTION, SOLUTION INTRAVENOUS
Status: COMPLETED | OUTPATIENT
Start: 2017-09-20 | End: 2017-09-20

## 2017-09-20 RX ORDER — PSEUDOEPHEDRINE HCL 30 MG
100 TABLET ORAL 2 TIMES DAILY
Start: 2017-09-21

## 2017-09-20 RX ORDER — HYDRALAZINE HYDROCHLORIDE 20 MG/ML
20 INJECTION INTRAMUSCULAR; INTRAVENOUS EVERY 4 HOURS PRN
Status: DISCONTINUED | OUTPATIENT
Start: 2017-09-20 | End: 2017-09-21 | Stop reason: HOSPADM

## 2017-09-20 RX ORDER — FAMOTIDINE 10 MG/ML
20 INJECTION, SOLUTION INTRAVENOUS DAILY
Start: 2017-09-21

## 2017-09-20 RX ORDER — FOLIC ACID/VIT B COMPLEX AND C 0.8 MG
1 TABLET ORAL 2 TIMES DAILY
Qty: 30 TABLET
Start: 2017-09-20

## 2017-09-20 RX ORDER — HYDRALAZINE HYDROCHLORIDE 20 MG/ML
10 INJECTION INTRAMUSCULAR; INTRAVENOUS EVERY 6 HOURS PRN
Start: 2017-09-20

## 2017-09-20 RX ORDER — ONDANSETRON 2 MG/ML
4 INJECTION INTRAMUSCULAR; INTRAVENOUS EVERY 6 HOURS PRN
Start: 2017-09-20

## 2017-09-20 RX ORDER — IPRATROPIUM BROMIDE AND ALBUTEROL SULFATE 2.5; .5 MG/3ML; MG/3ML
3 SOLUTION RESPIRATORY (INHALATION)
Qty: 360 ML
Start: 2017-09-20

## 2017-09-20 RX ADMIN — SODIUM BICARBONATE 50 ML/HR: 84 INJECTION, SOLUTION INTRAVENOUS at 08:33

## 2017-09-20 RX ADMIN — HYDRALAZINE HYDROCHLORIDE 10 MG: 20 INJECTION INTRAMUSCULAR; INTRAVENOUS at 18:06

## 2017-09-20 RX ADMIN — METOPROLOL TARTRATE 12.5 MG: 25 TABLET ORAL at 13:25

## 2017-09-20 RX ADMIN — NITROGLYCERIN 1 INCH: 20 OINTMENT TOPICAL at 18:07

## 2017-09-20 RX ADMIN — NITROGLYCERIN 1 INCH: 20 OINTMENT TOPICAL at 13:26

## 2017-09-20 RX ADMIN — EPINEPHRINE 1 MG: 0.1 INJECTION, SOLUTION ENDOTRACHEAL; INTRACARDIAC; INTRAVENOUS at 02:11

## 2017-09-20 RX ADMIN — DEXTROSE MONOHYDRATE 25 ML: 25 INJECTION, SOLUTION INTRAVENOUS at 02:26

## 2017-09-20 RX ADMIN — EPINEPHRINE 1 MG: 0.1 INJECTION, SOLUTION ENDOTRACHEAL; INTRACARDIAC; INTRAVENOUS at 02:14

## 2017-09-20 RX ADMIN — IOPAMIDOL 100 ML: 612 INJECTION, SOLUTION INTRAVENOUS at 12:45

## 2017-09-20 RX ADMIN — FAMOTIDINE 20 MG: 10 INJECTION, SOLUTION INTRAVENOUS at 10:25

## 2017-09-20 RX ADMIN — TAZOBACTAM SODIUM AND PIPERACILLIN SODIUM 2.25 G: 250; 2 INJECTION, SOLUTION INTRAVENOUS at 20:43

## 2017-09-20 RX ADMIN — METHYLPREDNISOLONE SODIUM SUCCINATE 40 MG: 40 INJECTION, POWDER, FOR SOLUTION INTRAMUSCULAR; INTRAVENOUS at 05:40

## 2017-09-20 RX ADMIN — IPRATROPIUM BROMIDE AND ALBUTEROL SULFATE 3 ML: .5; 3 SOLUTION RESPIRATORY (INHALATION) at 07:35

## 2017-09-20 RX ADMIN — IPRATROPIUM BROMIDE AND ALBUTEROL SULFATE 3 ML: .5; 3 SOLUTION RESPIRATORY (INHALATION) at 16:15

## 2017-09-20 RX ADMIN — AZITHROMYCIN 500 MG: 500 INJECTION, POWDER, LYOPHILIZED, FOR SOLUTION INTRAVENOUS at 00:55

## 2017-09-20 RX ADMIN — Medication 1 TABLET: at 20:43

## 2017-09-20 RX ADMIN — SODIUM BICARBONATE 50 MEQ: 84 INJECTION, SOLUTION INTRAVENOUS at 02:23

## 2017-09-20 RX ADMIN — TAZOBACTAM SODIUM AND PIPERACILLIN SODIUM 2.25 G: 250; 2 INJECTION, SOLUTION INTRAVENOUS at 04:42

## 2017-09-20 RX ADMIN — IPRATROPIUM BROMIDE AND ALBUTEROL SULFATE 3 ML: .5; 3 SOLUTION RESPIRATORY (INHALATION) at 19:28

## 2017-09-20 RX ADMIN — ATROPINE SULFATE 1 MG: 1 INJECTION, SOLUTION INTRAMUSCULAR; INTRAVENOUS; SUBCUTANEOUS at 02:07

## 2017-09-20 RX ADMIN — CALCIUM CHLORIDE 1 G: 100 INJECTION INTRAVENOUS; INTRAVENTRICULAR at 02:24

## 2017-09-20 RX ADMIN — MORPHINE SULFATE 2 MG: 2 INJECTION, SOLUTION INTRAMUSCULAR; INTRAVENOUS at 04:24

## 2017-09-20 RX ADMIN — EPINEPHRINE 1 MG: 0.1 INJECTION, SOLUTION ENDOTRACHEAL; INTRACARDIAC; INTRAVENOUS at 02:22

## 2017-09-20 RX ADMIN — TAZOBACTAM SODIUM AND PIPERACILLIN SODIUM 2.25 G: 250; 2 INJECTION, SOLUTION INTRAVENOUS at 13:25

## 2017-09-20 RX ADMIN — METOPROLOL TARTRATE 12.5 MG: 25 TABLET ORAL at 20:43

## 2017-09-20 RX ADMIN — PROPOFOL 40 MCG/KG/MIN: 10 INJECTION, EMULSION INTRAVENOUS at 16:48

## 2017-09-20 RX ADMIN — MORPHINE SULFATE 2 MG: 2 INJECTION, SOLUTION INTRAMUSCULAR; INTRAVENOUS at 18:06

## 2017-09-20 RX ADMIN — DIATRIZOATE MEGLUMINE AND DIATRIZOATE SODIUM 30 ML: 660; 100 LIQUID ORAL; RECTAL at 12:45

## 2017-09-20 RX ADMIN — DEXTROSE MONOHYDRATE 50 ML: 25 INJECTION, SOLUTION INTRAVENOUS at 07:19

## 2017-09-20 RX ADMIN — METOPROLOL TARTRATE 12.5 MG: 25 TABLET ORAL at 10:26

## 2017-09-20 RX ADMIN — Medication 1 TABLET: at 10:26

## 2017-09-20 RX ADMIN — PROPOFOL 30 MCG/KG/MIN: 10 INJECTION, EMULSION INTRAVENOUS at 07:59

## 2017-09-20 RX ADMIN — DEXTROSE MONOHYDRATE 75 ML/HR: 100 INJECTION, SOLUTION INTRAVENOUS at 05:21

## 2017-09-20 RX ADMIN — HYDRALAZINE HYDROCHLORIDE 20 MG: 20 INJECTION INTRAMUSCULAR; INTRAVENOUS at 20:06

## 2017-09-20 RX ADMIN — MORPHINE SULFATE 2 MG: 2 INJECTION, SOLUTION INTRAMUSCULAR; INTRAVENOUS at 00:55

## 2017-09-20 RX ADMIN — METOPROLOL TARTRATE 12.5 MG: 25 TABLET ORAL at 18:07

## 2017-09-20 RX ADMIN — DEXTROSE MONOHYDRATE 50 ML: 25 INJECTION, SOLUTION INTRAVENOUS at 20:13

## 2017-09-20 NOTE — NURSING NOTE
02:03- Pt on bedpan bearing down, started agonal breathing with faint pulse.  PEA followed.  CODE called, CPR started.  See CODE sheet.

## 2017-09-20 NOTE — PROGRESS NOTES
"  CC: Acute Respiratory Failure. S/P Cardiac Arrest.     S: Intubated.  Events noted.  Was extubated yesterday again without events.  Actually underwent dialysis afterwards.  Had issues with hypertension but later on underwent cardiac arrest and had to have CPR performed.    O:Vital signs reviewed. O2Sat: 98 % on 35-40%. Right femoral line in place.    BP (!) 139/108  Pulse 84  Temp 98 °F (36.7 °C) (Oral)   Resp 18  Ht 62\" (157.5 cm)  Wt 114 lb 4.8 oz (51.8 kg)  SpO2 100%  BMI 20.91 kg/m2      I & Os reviewed.   Intake/Output       09/19/17 0700 - 09/20/17 0659 09/20/17 0700 - 09/21/17 0659    Intake (ml) 3949 --    Output (ml) -- 0    Net (ml) 3949 0    Last Weight 114 lb 4.8 oz (51.8 kg) --          General: No acute distress noted.  Eyes: PERRL.   Neck: Supple without JVD  Cardiovascular: S1 + S2. Reg.   Respiratory: No respiratory distress noted. No wheezing heard. Min crackles noted  Abdomen: Soft. Bowel sounds hypoactive. Lower abdominal tenderness.   Extremities: No edema noted.  Neurologic: Was able to follow commands.  Detailed exam couldn't be performed due to intubation.  Skin: Appeared somewhat dry     Labs: Reviewed.     Results from last 7 days  Lab Units 09/20/17  0230 09/19/17  0903 09/19/17  0337 09/18/17  0304   SODIUM mmol/L 128* 135* 132* 136*   POTASSIUM mmol/L 5.4* 4.6 5.3* 4.5   CHLORIDE mmol/L 93* 99 96* 96*   CO2 mmol/L 18.0* 22.0* 23.0* 27.0   BUN mg/dL 50* 37* 30* 43*   CREATININE mg/dL 4.10* 3.50* 3.10* 4.10*   CALCIUM mg/dL 9.7 8.0* 8.6 6.8*   BILIRUBIN mg/dL 3.4*  --  4.7* 4.0*   ALK PHOS U/L 124  --  158* 166*   ALT (SGPT) U/L 3498*  --  3657* 3065*   AST (SGOT) U/L 2240*  --  3286* 5182*   GLUCOSE mg/dL 398* 156* 195* 161*         Results from last 7 days  Lab Units 09/20/17  0230 09/19/17  0903 09/19/17  0337 09/18/17  0304   MAGNESIUM mg/dL 2.4*  --  2.2 1.9   PHOSPHORUS mg/dL 10.1* 6.5* 6.4* 5.3*           Results from last 7 days  Lab Units 09/20/17  0233 09/19/17  1919 " 09/19/17  0337 09/18/17  0304 09/17/17  1107   WBC 10*3/mm3 14.32* 14.68* 16.91* 10.66 18.47*   HEMOGLOBIN g/dL 8.6* 10.0* 10.2* 7.2* 9.3*   PLATELETS 10*3/mm3 65* 101* 97* 97* 149         Results from last 7 days  Lab Units 09/19/17  1919 09/17/17  1107   INR  2.33* 1.97*           dextrose 75 mL/hr Last Rate: 75 mL/hr (09/20/17 0521)   Pharmacy to dose vancomycin     propofol 5-50 mcg/kg/min Last Rate: 30 mcg/kg/min (09/20/17 0759)   sodium bicarbonate drip (greater than 75 mEq/bag) 50 mL/hr    sodium chloride 50 mL/hr Last Rate: Stopped (09/17/17 2142)         ABG: Reviewed  Lab Results   Component Value Date    PHART 7.290 09/20/2017    BFB8KVB 38.7 09/20/2017    PO2ART 160.0 (H) 09/20/2017    HGBBG 9.6 (L) 09/20/2017    [id number] 42.2 09/18/2017    FCOHB 1.7 01/01/2017    CARBOXYHGB 1.4 09/20/2017    FMETHB 0.8 01/01/2017         CXRay: Reviewed  Imaging Results (last 24 hours)     Procedure Component Value Units Date/Time    XR Chest 1 View [[id number]] Collected:  09/19/17 0811     Updated:  09/19/17 0817    Narrative:       PROCEDURE: XR CHEST 1 VW-     HISTORY: s/p placement of ETT and code blue; I16.9-Hypertensive crisis,  unspecified; E87.70-Fluid overload, unspecified; R74.8-Abnormal levels  of other serum enzymes; K85.90-Acute pancreatitis without necrosis or  infection, unspecified; Z91.19-Patient's noncompliance with other  medical treatment and regimen     COMPARISON: September 18, 2017.     FINDINGS: Endotracheal tube is 4 cm above the reyes. Nasogastric tube  is below the diaphragm.The heart is enlarged. The mediastinum is  unremarkable. Small left basilar opacity is again noted. There is no  pneumothorax.  There are no acute osseous abnormalities.           Impression:       Endotracheal and nasogastric tubes in appropriate position.  2. Small left basilar opacity.  3. Cardiomegaly.     Continued followup is recommended.     This report was finalized on 9/19/2017 8:15 AM by Trav Burgess DO.    US  "Arterial Doppler Upper Extremity Left [[id number]] Collected:  09/19/17 1126     Updated:  09/19/17 1132    Narrative:       PROCEDURE: US ARTERIAL DOPPLER UPPER EXTREMITY  LEFT-     HISTORY: mottled left forearm; I16.9-Hypertensive crisis, unspecified;  E87.70-Fluid overload, unspecified; R74.8-Abnormal levels of other serum  enzymes; K85.90-Acute pancreatitis without necrosis or infection,  unspecified; Z91.19-Patient's noncompliance with other medical treatment  and regimen     PROCEDURE: Segmental pressures with Doppler waveform evaluation of the  left upper extremity was performed.     FINDINGS:  Normal arterial flow is seen within the left upper extremity  arterial branches. There is no distinct fluid collection identified. No  pseudoaneurysm is appreciated.       Impression:       No distinct fluid collection or pseudoaneurysm is  appreciated in the area of interest.     Images were reviewed, interpreted, and dictated by Dr. Burgess.  Transcribed by Marizol Sinclair PA-C.     This report was finalized on 9/19/2017 11:30 AM by Trav Burgess DO.            Assessment & Recommendations/Plan:   1.  Acute Respiratory Failure.     2.  S/P Cardiac Arrest x 3.    3.  Smoking    4.  ESRD    5.  ? Drug seeking behavior.    6.  Minimal B/L Effusion.    Another episode of cardiac arrest last night with pulseless electrical activity.  Cause?    Her \"episodes\" usually follow her straining for bowel movement. The RN also describes \"something round on the rectal exam\". Will order CT Abdomen and Pelvis.     No pulmonary issues that can explain the events.  Cardiology on the case.    We have reviewed patient's current orders and changes, if any, have been suggested to primary care team. Plan was also discussed with nursing staff, as necessary.       Haja Urbina MD  09/20/17  8:08 AM    Dictated utilizing Dragon dictation.    "

## 2017-09-20 NOTE — PLAN OF CARE
Problem: Mechanical Ventilation, Invasive (Adult)  Intervention: Prevent Airway Displacement/Mechanical Dysfunction    09/20/17 1530   Prevent Airway Displacement/Mechanical Dysfunction   Airway Safety Measures manual resuscitator at bedside;oxygen flow meter at bedside;suction at bedside       Intervention: Prevent Ventilator-induced Lung Injury    09/20/17 1530   Prevent Ventilator-induced Lung Injury   Rhythm/Pattern (Respiratory) patient-ventilator asynchrony         Goal: Signs and Symptoms of Listed Potential Problems Will be Absent or Manageable (Mechanical Ventilation, Invasive)  Outcome: Ongoing (interventions implemented as appropriate)    09/20/17 1530   Mechanical Ventilation, Invasive   Problems Assessed (Mechanical Ventilation, Invasive) all   Problems Present (Mechanical Ventilation, Invasive) inability to wean

## 2017-09-20 NOTE — PROGRESS NOTES
"Nephrology Progress Note.    LOS: 3 days    Patient Care Team:  Neo Gresham DO as PCP - General (Family Medicine)    Chief Complaint:    Chief Complaint   Patient presents with   • Shortness of Breath       Subjective         Patient seen and examined this morning.  Events from last night noted. She coded again early this morning this is 3 rd day in a row that she has done the same thing.  She is intubated and sedated, she was extubated last evening and was trying to get up to bed side commode when had this episode, almost similar situation as before.  She does not have any fevers chills.  No nausea or vomiting no abdominal pain.  Denies any chest pain shortness of breath cough or sputum production.  There is no significant edema.       Review of Systems:    The pertinent  ROS was done and it is noted above, rest  was negative. She is still intubated and sedated    Objective     Vital Signs  BP (!) 179/118  Pulse 69  Temp 97.7 °F (36.5 °C) (Oral)   Resp 18  Ht 62\" (157.5 cm)  Wt 114 lb 3.2 oz (51.8 kg)  SpO2 100%  BMI 20.89 kg/m2           Intake/Output Summary (Last 24 hours) at 09/20/17 1803  Last data filed at 09/20/17 0735   Gross per 24 hour   Intake             1679 ml   Output                0 ml   Net             1679 ml       Physical Exam:    General Appearance:intubated and sedated does not appear in any acute distress,   HEENT: pupils round and reactive to light, oral mucosa dry, extra occular movements intact.  Neck: supple, no JVD, trachea midline  Lungs: Clear to Auscultation, unlabored breathing effort  Heart: RRR, normal S1 and S2, no S3, no rub  Abdomen: soft, non-tender, no palpable bladder, present bowel sounds to auscultation  Extremities: no edema, cyanosis or clubbing. Her left hand is blue and is colder than the rest of the arm. It is swollen as well.  Neuro: intubated and sedated no   Results Review:      Results from last 7 days  Lab Units 09/20/17  0230 09/19/17  0903 " 09/19/17  0337 09/18/17  0304   SODIUM mmol/L 128* 135* 132* 136*   POTASSIUM mmol/L 5.4* 4.6 5.3* 4.5   CHLORIDE mmol/L 93* 99 96* 96*   CO2 mmol/L 18.0* 22.0* 23.0* 27.0   BUN mg/dL 50* 37* 30* 43*   CREATININE mg/dL 4.10* 3.50* 3.10* 4.10*   CALCIUM mg/dL 9.7 8.0* 8.6 6.8*   BILIRUBIN mg/dL 3.4*  --  4.7* 4.0*   ALK PHOS U/L 124  --  158* 166*   ALT (SGPT) U/L 3498*  --  3657* 3065*   AST (SGOT) U/L 2240*  --  3286* 5182*   GLUCOSE mg/dL 398* 156* 195* 161*       Estimated Creatinine Clearance: 16.9 mL/min (by C-G formula based on Cr of 4.1).      Results from last 7 days  Lab Units 09/20/17  0230 09/19/17  0903 09/19/17  0337 09/18/17  0304   MAGNESIUM mg/dL 2.4*  --  2.2 1.9   PHOSPHORUS mg/dL 10.1* 6.5* 6.4* 5.3*               Results from last 7 days  Lab Units 09/20/17  0233 09/19/17 1919 09/19/17  0337 09/18/17  0304 09/17/17  1107   WBC 10*3/mm3 14.32* 14.68* 16.91* 10.66 18.47*   HEMOGLOBIN g/dL 8.6* 10.0* 10.2* 7.2* 9.3*   PLATELETS 10*3/mm3 65* 101* 97* 97* 149         Results from last 7 days  Lab Units 09/19/17 1919 09/17/17  1107   INR  2.33* 1.97*         Imaging Results (last 24 hours)     Procedure Component Value Units Date/Time    XR Abdomen KUB [[id number]] Collected:  09/20/17 0858     Updated:  09/20/17 0929    Narrative:       PROCEDURE: XR ABDOMEN KUB-     HISTORY: ng placement; I16.9-Hypertensive crisis, unspecified;  E87.70-Fluid overload, unspecified; R74.8-Abnormal levels of other serum  enzymes; K85.90-Acute pancreatitis without necrosis or infection,  unspecified; Z91.19-Patient's noncompliance with other medical treatment  and regimen     COMPARISON: September 17, 2017.     FINDINGS: An AP view of the abdomen and pelvis demonstrates an  nonspecific bowel gas pattern with no evidence of obstruction.  Nasogastric tube is seen beneath diaphragm. Right femoral line extends  to the L4 level. Hazy opacities are noted within the bilateral chest.  The heart is enlarged. Endotracheal tube is  above the reyes.        Impression:       NG tube beneath diaphragm.             This report was finalized on 9/20/2017 9:00 AM by Trav Burgess DO.    XR Chest 1 View [[id number]] Collected:  09/20/17 0939     Updated:  09/20/17 0945    Narrative:       PROCEDURE: XR CHEST 1 VW-     HISTORY: CODE BLUE ETT ; I16.9-Hypertensive crisis, unspecified;  E87.70-Fluid overload, unspecified; R74.8-Abnormal levels of other serum  enzymes; K85.90-Acute pancreatitis without necrosis or infection,  unspecified; Z91.19-Patient's noncompliance with other medical treatment  and regimen     COMPARISON: September 19, 2017.     FINDINGS: Endotracheal tube is 1 cm above the reyes. Electrodes overlie  the chest.The heart is enlarged. The mediastinum is unremarkable.  Bilateral hazy opacities are noted.The left diaphragm border is  obscured. There is no pneumothorax.  There are no acute osseous  abnormalities.           Impression:       1. Endotracheal tube 1 cm above the reyes.  2. Bilateral hazy opacities   3. The left diaphragm border is obscured.     Continued followup is recommended.     This report was finalized on 9/20/2017 9:42 AM by Trav Burgess DO.    CT Abdomen Pelvis With Contrast [[id number]] Collected:  09/20/17 1404     Updated:  09/20/17 1413    Narrative:       PROCEDURE: CT ABDOMEN PELVIS W CONTRAST-     HISTORY:  abdominal pain, pressure in rectum; I16.9-Hypertensive crisis,  unspecified; E87.70-Fluid overload, unspecified; R74.8-Abnormal levels  of other serum enzymes; K85.90-Acute pancreatitis without necrosis or  infection, unspecified; Z91.19-Patient's noncompliance with other  medical treatment and regimen     COMPARISON:  None .     TECHNIQUE: Multiple axial CT images were obtained from the lung bases  through the pubic symphysis following the administration of Isovue 300  and oral contrast.      FINDINGS:      ABDOMEN: The lung bases show bilateral groundglass opacities. There are  bibasilar consolidates.  There is a small right pleural effusion. The  heart is enlarged. The liver shows patchy enhancement likely secondary  to phase of contrast. The spleen is unremarkable. No adrenal mass is  present.  The pancreas is normal. The kidneys are significantly  atrophic. The aorta is normal in caliber. There is ascites. There is  anasarca. The abdominal portions of the GI tract are unremarkable with  no evidence of obstruction. There are no abnormally dilated loops of  bowel.     PELVIS: The appendix is not identified. The uterus is present. The  urinary bladder is not well defined secondary to the ascites.            Impression:       1. Bilateral groundglass opacities and consolidates with small right  pleural effusion.  2. Ascites and anasarca.  3. Patchy enhancement in the liver is likely secondary to phase of  contrast. Consider follow-up ultrasound.     688.36 mGy.cm          This study was performed with techniques to keep radiation doses as low  as reasonably achievable (ALARA). Individualized dose reduction  techniques using automated exposure control or adjustment of mA and/or  kV according to the patient size were employed.            Images were reviewed, interpreted, and dictated by Dr. Burgess.  Transcribed by Marizol Sinclair PA-C.     This report was finalized on 9/20/2017 2:11 PM by Trav Burgess DO.    XR Chest 1 View [[id number]] Collected:  09/20/17 1334     Updated:  09/20/17 1537    Narrative:       FINAL REPORT    CLINICAL HISTORY:  CENTRAL LINE PLACEMENT    COMPARISON:  Same day    FINDINGS:  The right IJ catheter terminates in the SVC.  The remaining support devices are stable.  The heart size is normal. The mediastinum is normal.  The lungs are clear. There is no pneumothorax. There is no osseous abnormality.      Impression:       1.  Right IJ terminates in the SVC.    2.  Lungs clear.    Authenticated by MARCEL Dean MD on 09/20/2017 01:34:36 PM          azithromycin 500 mg Intravenous Q24H   b  complex-vitamin c-folic acid 1 tablet Oral BID   docusate sodium 100 mg Oral BID   famotidine 20 mg Intravenous Daily   ipratropium-albuterol 3 mL Nebulization 4x Daily - RT   LORazepam 2 mg Oral Once   metoprolol tartrate 12.5 mg Oral 4x Daily   nitroglycerin 1 inch Topical Q6H   piperacillin-tazobactam 2.25 g Intravenous Q8H       dextrose 75 mL/hr Last Rate: 75 mL/hr (09/20/17 0521)   Pharmacy to dose vancomycin     propofol 5-50 mcg/kg/min Last Rate: 40 mcg/kg/min (09/20/17 1648)   sodium bicarbonate drip (greater than 75 mEq/bag) 50 mL/hr Last Rate: 50 mL/hr (09/20/17 0833)   sodium chloride 50 mL/hr Last Rate: Stopped (09/17/17 2142)       Medication Review:   Current Facility-Administered Medications   Medication Dose Route Frequency Provider Last Rate Last Dose   • AZITHROMYCIN 500 MG/250 ML 0.9% NS IVPB (vial-mate)  500 mg Intravenous Q24H Madhu Mota MD   500 mg at 09/20/17 0055   • b complex-vitamin c-folic acid (NEPHRO-KERI) tablet 1 tablet  1 tablet Oral BID Madhu Mota MD   1 tablet at 09/20/17 1026   • dextrose (D50W) solution 50 mL  50 mL Intravenous Q1H PRN Dana Arriola MD   50 mL at 09/20/17 0719   • dextrose 10 % infusion  75 mL/hr Intravenous Continuous Joseluis Pedersen DO 75 mL/hr at 09/20/17 0521 75 mL/hr at 09/20/17 0521   • docusate sodium (COLACE) capsule 100 mg  100 mg Oral BID Guido King MD       • famotidine (PEPCID) injection 20 mg  20 mg Intravenous Daily Madhu Mota MD   20 mg at 09/20/17 1025   • hydrALAZINE (APRESOLINE) injection 10 mg  10 mg Intravenous Q6H PRN Guido King MD   10 mg at 09/19/17 1801   • ipratropium-albuterol (DUO-NEB) nebulizer solution 3 mL  3 mL Nebulization Q1H PRN Madhu Mota MD       • ipratropium-albuterol (DUO-NEB) nebulizer solution 3 mL  3 mL Nebulization 4x Daily - RT Madhu Mota MD   3 mL at 09/20/17 1615   • LORazepam (ATIVAN) tablet 2 mg  2 mg Oral Once Guido King MD       • metoprolol tartrate (LOPRESSOR)  tablet 12.5 mg  12.5 mg Oral 4x Daily Nic Carcamo MD   12.5 mg at 09/20/17 1325   • morphine injection 2 mg  2 mg Intravenous Q2H PRN Madhu Mota MD   2 mg at 09/20/17 0424    And   • naloxone (NARCAN) injection 0.4 mg  0.4 mg Intravenous Q5 Min PRN Madhu Mota MD       • nitroglycerin (NITROSTAT) ointment 1 inch  1 inch Topical Q6H Nic Carcamo MD   1 inch at 09/20/17 1326   • ondansetron (ZOFRAN) injection 4 mg  4 mg Intravenous Q6H PRN Madhu Mota MD   4 mg at 09/17/17 0149   • Pharmacy to dose vancomycin   Does not apply Continuous PRN Madhu Mota MD       • piperacillin-tazobactam (ZOSYN) in iso-osmotic dextrose IVPB 2.25 g (premix)  2.25 g Intravenous Q8H Joseluis Pedersen DO   2.25 g at 09/20/17 1325   • propofol (DIPRIVAN) infusion 10 mg/mL 100 mL  5-50 mcg/kg/min Intravenous Titrated Haja Urbina MD 12.43 mL/hr at 09/20/17 1648 40 mcg/kg/min at 09/20/17 1648   • sodium bicarbonate 8.4 % 150 mEq in dextrose (D5W) 5 % 1,000 mL infusion (greater than 75 mEq)  50 mL/hr Intravenous Continuous Guido King MD 50 mL/hr at 09/20/17 0833 50 mL/hr at 09/20/17 0833   • sodium chloride 0.9 % bolus 1,000 mL  1,000 mL Intravenous PRN Madhu Mota MD       • sodium chloride 0.9 % bolus 1,000 mL  1,000 mL Intravenous PRN Madhu Mota MD       • sodium chloride 0.9 % flush 1-10 mL  1-10 mL Intravenous PRN Madhu Mota MD       • sodium chloride 0.9 % flush 10 mL  10 mL Intravenous PRN REY Ellsworth       • sodium chloride 0.9 % infusion  50 mL/hr Intravenous Continuous oJseluis Pedersen DO   Stopped at 09/17/17 2142   • vancomycin (VANCOCIN) IVPB 500 mg in 100 mL NS  500 mg Intravenous Q48H PRN Madhu Mota MD           Assessment/Plan       End stage renal disease on dialysis    Respiratory failure status post intubation    Status post cardiac arrest and code 99 for 45 minutes    Hypocalcemia    Tobacco use    Headache    Bipolar disorder    Depression     Esophageal reflux    Chronic pain    COPD (chronic obstructive pulmonary disease)    Hepatitis C antibody positive in blood    IV drug abuse    Anemia of ESRD    Medical non-compliance    Neuropathic pain of both legs    Plan:   It appears patient is having recurrent episodes of cardiac arrest, the story is fairly similar to the one that happened initially. Labs were reviewed and appeared they were done during the code process.  I will plan on dialysis today and she looks like will need daily dialysis for next few days to have the fluid controlled.  The ischemic hand likely from the code and the epi given into that iv site that was in the artery. She may need further evaluation details were discussed with the hospitalist services as well as with Dr. Carcamo.  Cardiac and pulmonary notes were reviewed and noted.    She is a poor prognosis because of multiple issues and problems all through her life.  Continue current treatment plan and reassess on day-to-day basis.    Further recommendations will depend on clinical course of the patient during the current hospitalization.      I also discussed the details with the nursing staff.    Rest as ordered.    Madhu Mota MD  09/20/17  6:03 PM      EMR Dragon/Transcription disclaimer:   Much of this encounter note is an electronic transcription/translation of spoken language to printed text. The electronic translation of spoken language may permit erroneous, or at times, nonsensical words or phrases to be inadvertently transcribed; Although I have reviewed the note for such errors, some may still exist.

## 2017-09-20 NOTE — DISCHARGE SUMMARY
[facility]      Name:  Mandy Espino   Age:  27 y.o.  Sex:  female  :  1990  MRN:  [id number]   Visit Number:  [id number]  Primary Care Physician:  Neo Gresham DO  Date of Discharge:  2017  Admission Date:  2017      Discharge Diagnosis:     Principal Problem:    Hypertensive urgency, malignant  Active Problems:    Tobacco use    Headache    Bipolar disorder    Depression    Esophageal reflux    Hypocalcemia    Severe anxiety with panic    Chronic pain    COPD (chronic obstructive pulmonary disease)    End stage renal disease on dialysis    Hepatitis C antibody positive in blood    IV drug abuse    Anemia of ESRD    COPD exacerbation    Medical non-compliance    Neuropathic pain of both legs         Consults:     Consults     Date and Time Order Name Status Description    2017 0959 Inpatient Consult to Neurology      2017 0806 Inpatient Consult to Pulmonology Completed     2017 0805 Inpatient Consult to Cardiology      2017 0724 Inpatient Consult to Nephrology Completed     2017 0723 Inpatient Consult to Pulmonology            Procedures Performed:       1238 INSERT CENTRAL LINE AT BEDSIDE   0348 INTUBATION   0754 INTUBATION        Hospital Course:  The patient was admitted on 2017, please see H&P for further details of HPI and ROS.    Patient is a 26 yo female with a pmhx of IV drug abuse, history of noncompliance to medicines, Hepatitis C, pancreatitis, ESRD on HD, hyperparathyroidism, COPD, tobacco dependence, Fahrs syndrome, bipolar/depression, who had been previously been referred to palliative care but then refused to follow up with who calls EMS for acute short of breath for which she took about 10 nebulizer treatments at home with no significant improvements.  Patient was placed on BiPAP and brought into the emergency room.  She started improving and was finally weaned off BiPAP to 4 L  nasal oxygen with a good ABG.  She was started on IV Rocephin and Zithromax, steroids, neb treatments and monitored closely int he unit. Patient was also noted to have a hypertensive emergency for which her home medications were restarted along with a nitroglycerin drip. On admission, her white count was 15.1, hgb: 8.8. K on was 4.7. AST was 506, ALT, 227 with Alk phos of 183 likely related to patients hx of hepatitis C.  UTox/ETOH screen was attempted, however patient is anuric secondary to her ESRD.     CT abd/pelvis without contrast showed:   Abdomen: There is cardiomegaly, small bilateral pleural effusions and pulmonary edema, consistent with CHF. The gallbladder and solid abdominal organs are normal, except for marked bilateral renal atrophy. No abnormality of the GI tract is seen.  Pelvis:  The uterus, ovaries and urinary bladder show no abnormality. There is a small amount of ascites in the abdomen or pelvis.      US of the gallbladder showed:  The gallbladder is unremarkable. There is no intra-or extrahepatic biliary ductal dilation. There is no ascites.    On 9/17/2017 at 5:07 am code blue was called.  Rhythm was Vtach with ACLS protocol initiated with resuscitation of patient. Upon later discussion with nursing staff it was noted that the IV access was placed by ED physician using sonography to the left forearm with two doses of epinephrine given through the line. However, soon after the medicines were administered a pulsatile bright red blood was noted to back into the extension tube for which the access was then discontinued with no obvious infiltration of left arm noted however, arm was noted to have a mottling color to it.      CT of the chest PE with contrast was ordered which showed:   No dissection or PE with ground glass opacities and areas of consolidation which may be due to edema or pneumonia. Patients antibiotics were broadened to include IV Azithromycin/Zosyn/Vanco.  Blood cultures X 2  remained negative. Pulmonary and Cardiology were consulted for further recommendations.     Echo showed EF of 30-35% with severe reduction is LV systolic function, moderate elevation in LVedp and moderate MR with mod/severe TR with global hypokinesis of the left ventricle.     Patient on 9/18/2017 was extubated during the later afternoon and then afterwards underwent HD.  Patient during the middle of the night became extremely agitated trying to get out of bed and cussing at staff.  Patient was noted to be extremely hypertensive and was given IV labetalol followed by a nitroglycerin drip.   At approximately 3 am patient became hypotensive with agonal breathing and shortly after became unresponsive with lost of pulse in PEA rhythym. ACLS protocol was initiated by night physician and patient was resuscitated.     Cardiology felt that there was no no significant enzyme leak to suggest any ischemic event and low-Dose Beta Blocker therapy was initiated.     Patient on 9/19/2017 was again extubated. During the middle of the night at approximately 2:03 am, while patient was straining for a bowel movement she became hypotensive and bradycardic with agonal breathing followed by a rhythm change to PEA.  Code blue was called with ACLS protocol initiated and patient was again resusciated.      It was felt per cardiology that her related attempts to have a bowel movement may be triggering a vasovagal response during defecation leading to rhythm changes.  Patient did have a repeat CT abd/pelvis with PO/IV contrast on 9/20/2017 that was unremarkable.  Cardiology feels that placement of a pacemaker would be indicated in certain patients with severe vasovagal syncope not responsive to medical therapy. However, any procedure in this patient may be fraught with bad outcomes in the future given her noncompliance and infection proneness.      Patient left upper extremity today appeared more mottled and edematous in appearance. Yesterday  she was able to get good pulse ox signals both from the little finger as well as the pointer finger while today she is not.   She however, continues to have palpable and dopplerable radial pulses. Arterial Doppler US of the left upper ext has shown no distinct fluid collection or pseudoaneurysm.  A 1 inch nitroglycerin ointment was applied to the left hand.  [facility] was called, ICU physician Dr. Mary Dean and Dr. Riddle hand specialist was notified of patients left hand ischemia along with her cardiac issues and has accepted transfer of patient.  Patient vent settings currently at , Rate: 18, PEEP: 5 with FIO2 of 40%.     Patient overall has a poor prognosis due to her multiple chronic health issues and multiple problems throughout her life including polysubstance abuse and non compliance with medicines.      Vital Signs:    Temp:  [96.3 °F (35.7 °C)-98.8 °F (37.1 °C)] 97.3 °F (36.3 °C)  Heart Rate:  [] 83  Resp:  [14-18] 18  BP: ()/() 190/124  FiO2 (%):  [35 %-50 %] 35 %      Physical Examination:    General Appearance:    Intubated    Lungs:     Chest shape is normal. Breath sounds heard bilaterally equally.  No crackles or wheezing.    Heart:    Normal S1 and S2, no murmur, no gallop, no rub.   Abdomen:     Normal bowel sounds,  Soft and non tender on palpation, non-distended, no guarding, no rebound tenderness   Extremities:   Left hand/forearm edematous with mottled appearance, radial pulses present with palpation and with doppler                Pertinent Lab Results:     Lab Results (last 24 hours)     Procedure Component Value Units Date/Time    Protime-INR [[id number]]  (Abnormal) Collected:  09/19/17 1919    Specimen:  Blood Updated:  09/19/17 2008     Protime 25.5 (H) Seconds      INR 2.33 (C)    aPTT [[id number]]  (Normal) Collected:  09/19/17 1919    Specimen:  Blood Updated:  09/19/17 2009     PTT 27.2 seconds     POC Glucose Fingerstick [[id number]]  (Normal) Collected:  09/19/17  2013    Specimen:  Blood Updated:  09/19/17 2015     Glucose 97 mg/dL       Serial Number: [id number]:  [id number]       CBC Auto Differential [[id number]]  (Abnormal) Collected:  09/19/17 1919    Specimen:  Blood Updated:  09/19/17 2049     WBC 14.68 (H) 10*3/mm3      RBC 3.21 (L) 10*6/mm3      Hemoglobin 10.0 (L) g/dL      Hematocrit 31.5 (L) %      MCV 98.1 fL      MCH 31.2 (H) pg      MCHC 31.7 g/dL      RDW 19.2 (H) %      RDW-SD 68.0 (H) fl      MPV 11.8 fL      Platelets 101 (L) 10*3/mm3     CBC & Differential [[id number]] Collected:  09/19/17 1919    Specimen:  Blood Updated:  09/19/17 2049    Narrative:       The following orders were created for panel order CBC & Differential.  Procedure                               Abnormality         Status                     ---------                               -----------         ------                     Manual Differential[[id number]]          Abnormal            Final result               CBC Auto Differential[[id number]]        Abnormal            Final result                 Please view results for these tests on the individual orders.    Manual Differential [[id number]]  (Abnormal) Collected:  09/19/17 1919    Specimen:  Blood Updated:  09/19/17 2049     Neutrophil % 75.0 %      Lymphocyte % 2.0 (L) %      Monocyte % 8.0 %      Bands %  13.0 (H) %      Metamyelocyte % 1.0 (H) %      Atypical Lymphocyte % 1.0 (H) %      Neutrophils Absolute 12.92 (H) 10*3/mm3      Lymphocytes Absolute 0.29 (L) 10*3/mm3      Monocytes Absolute 1.17 (H) 10*3/mm3      nRBC 9.0 (H) /100 WBC      Anisocytosis Slight/1+     [place] Cells Slight/1+     Dacrocytes Slight/1+     Hypochromia Slight/1+     Ovalocytes Slight/1+     Poikilocytes Slight/1+     WBC Morphology Normal     Platelet Estimate Decreased     Large Platelets Slight/1+    POC Glucose Fingerstick [[id number]]  (Normal) Collected:  09/19/17 2100    Specimen:  Blood Updated:  09/19/17 2105     Glucose 76 mg/dL       Serial  Number: [id number]:  [id number]       POC Glucose Fingerstick [[id number]]  (Abnormal) Collected:  09/19/17 2210    Specimen:  Blood Updated:  09/19/17 2215     Glucose 149 (H) mg/dL       Serial Number: [id number]:  [id number]       POC Glucose Fingerstick [[id number]]  (Abnormal) Collected:  09/19/17 2304    Specimen:  Blood Updated:  09/19/17 2310     Glucose 156 (H) mg/dL       Serial Number: [id number]:  [id number]       POC Glucose Fingerstick [[id number]]  (Abnormal) Collected:  09/19/17 2349    Specimen:  Blood Updated:  09/19/17 2355     Glucose 144 (H) mg/dL       Serial Number: [id number]:  [id number]       POC Glucose Fingerstick [[id number]]  (Abnormal) Collected:  09/20/17 0052    Specimen:  Blood Updated:  09/20/17 0100     Glucose 134 (H) mg/dL       Serial Number: [id number]:  [id number]       Blood Culture [[id number]]  (Normal) Collected:  09/16/17 2010    Specimen:  Blood from Hand, Left Updated:  09/20/17 0202     Blood Culture No growth at 3 days    POC Glucose Fingerstick [[id number]]  (Abnormal) Collected:  09/20/17 0225    Specimen:  Blood Updated:  09/20/17 0230     Glucose 68 (L) mg/dL       Serial Number: [id number]:  [id number]       POC Glucose Fingerstick [[id number]]  (Abnormal) Collected:  09/20/17 0233    Specimen:  Blood Updated:  09/20/17 0235     Glucose 397 (H) mg/dL       Serial Number: [id number]:  [id number]       Blood Gas, Arterial With Co-Ox [[id number]]  (Abnormal) Collected:  09/20/17 0249    Specimen:  Arterial Blood Updated:  09/20/17 0249     Site Arterial: left brachial     Reynaldo's Test N/A     pH, Arterial 7.148 pH units      pCO2, Arterial 41.6 mm Hg      pO2, Arterial 452.0 (H) mm Hg      HCO3, Arterial 14.4 (L) mmol/L      Base Excess, Arterial -14.5 mmol/L      Hemoglobin, Blood Gas 9.1 (L) g/dL      Oxyhemoglobin 98.2 %      Methemoglobin 1.10 %      Carboxyhemoglobin 1.4 %      Modality Ventilator     FIO2 100 %     Blood Culture  [[id number]]  (Normal) Collected:  09/17/17 0206    Specimen:  Blood from Hand, Left Updated:  09/20/17 0301     Blood Culture No growth at 3 days    POC Glucose Fingerstick [[id number]]  (Abnormal) Collected:  09/20/17 0307    Specimen:  Blood Updated:  09/20/17 0310     Glucose 176 (H) mg/dL       Serial Number: [id number]:  [id number]       Insulin, Free & Total, Serum [[id number]] Collected:  09/20/17 0300    Specimen:  Blood Updated:  09/20/17 0351    Lactic Acid, Plasma [[id number]]  (Abnormal) Collected:  09/20/17 0230    Specimen:  Blood Updated:  09/20/17 0356     Lactate 6.7 (C) mmol/L     Magnesium [[id number]]  (Abnormal) Collected:  09/20/17 0230    Specimen:  Blood Updated:  09/20/17 0356     Magnesium 2.4 (H) mg/dL     Comprehensive Metabolic Panel [[id number]]  (Abnormal) Collected:  09/20/17 0230    Specimen:  Blood Updated:  09/20/17 0400     Glucose 398 (C) mg/dL       Glucose >180, Hemoglobin A1C recommended.        BUN 50 (H) mg/dL      Creatinine 4.10 (H) mg/dL      Sodium 128 (L) mmol/L      Potassium 5.4 (H) mmol/L      Chloride 93 (L) mmol/L      CO2 18.0 (L) mmol/L      Calcium 9.7 mg/dL      Total Protein 4.7 (L) g/dL      Albumin 2.70 (L) g/dL      ALT (SGPT) 3498 (C) U/L      AST (SGOT) 2240 (C) U/L      Alkaline Phosphatase 124 U/L      Total Bilirubin 3.4 (H) mg/dL      eGFR Non African Amer 13 (L) mL/min/1.73      Globulin 2.0 gm/dL      A/G Ratio 1.4 g/dL      BUN/Creatinine Ratio 12.2     Anion Gap 22.4 mmol/L     Narrative:       Abnormal estimated GFR should be followed by more specific studies to confirm end stage chronic renal disease. The equation used for calculation may not be accurate for patients less than 19 years old, greater than 70 years old, patients at extremes of weight, malnutrition, or with acute renal dysfunction.    POC Glucose Fingerstick [[id number]]  (Normal) Collected:  09/20/17 0357    Specimen:  Blood Updated:  09/20/17 0400     Glucose 87 mg/dL       Serial  Number: [id number]:  [id number]       Phosphorus [[id number]]  (Abnormal) Collected:  09/20/17 0230    Specimen:  Blood Updated:  09/20/17 0403     Phosphorus 10.1 (C) mg/dL     CBC Auto Differential [[id number]]  (Abnormal) Collected:  09/20/17 0233    Specimen:  Blood Updated:  09/20/17 0437     WBC 14.32 (H) 10*3/mm3      RBC 2.71 (L) 10*6/mm3      Hemoglobin 8.6 (L) g/dL      Hematocrit 28.3 (L) %      .4 (H) fL      MCH 31.7 (H) pg      MCHC 30.4 g/dL      RDW 19.3 (H) %      RDW-SD 73.2 (H) fl      MPV 13.5 (H) fL      Platelets 65 (L) 10*3/mm3     CBC With Manual Differential [[id number]] Collected:  09/20/17 0233    Specimen:  Blood Updated:  09/20/17 0438    Narrative:       The following orders were created for panel order CBC With Manual Differential.  Procedure                               Abnormality         Status                     ---------                               -----------         ------                     CBC Auto Differential[[id number]]        Abnormal            Final result               Manual Differential[[id number]]                                                           Please view results for these tests on the individual orders.    POC Glucose Fingerstick [[id number]]  (Normal) Collected:  09/20/17 0501    Specimen:  Blood Updated:  09/20/17 0505     Glucose 122 mg/dL       Serial Number: [id number]:  [id number]       Blood Gas, Arterial [[id number]]  (Abnormal) Collected:  09/20/17 0545    Specimen:  Arterial Blood Updated:  09/20/17 0546     Site Arterial: left brachial     Reynaldo's Test N/A     pH, Arterial 7.290 pH units      pCO2, Arterial 38.7 mm Hg      pO2, Arterial 160.0 (H) mm Hg      HCO3, Arterial 18.6 (L) mmol/L      Base Excess, Arterial -8.0 mmol/L      Hemoglobin, Blood Gas 9.6 (L) g/dL      Barometric Pressure for Blood Gas 733 mmHg      Modality Ventilator     FIO2 50 %     POC Glucose Fingerstick [[id number]]  (Normal) Collected:  09/20/17 0533     Specimen:  Blood Updated:  09/20/17 0555     Glucose 115 mg/dL       Serial Number: [id number]:  [id number]       POC Glucose Fingerstick [[id number]]  (Abnormal) Collected:  09/20/17 0714    Specimen:  Blood Updated:  09/20/17 0720     Glucose 61 (L) mg/dL       Serial Number: [id number]:  [id number]       POC Glucose Fingerstick [[id number]]  (Abnormal) Collected:  09/20/17 0759    Specimen:  Blood Updated:  09/20/17 0807     Glucose 254 (H) mg/dL       Serial Number: [id number]:  [id number]       Lactate Acid, Reflex [[id number]]  (Abnormal) Collected:  09/20/17 0655    Specimen:  Blood Updated:  09/20/17 0813     Lactate 3.2 (C) mmol/L     Vancomycin, Random [[id number]]  (Normal) Collected:  09/20/17 0807    Specimen:  Blood Updated:  09/20/17 0904     Vancomycin Random 11.72 mcg/mL     POC Glucose Fingerstick [[id number]]  (Abnormal) Collected:  09/20/17 0902    Specimen:  Blood Updated:  09/20/17 0905     Glucose 218 (H) mg/dL       Serial Number: [id number]:  [id number]       POC Glucose Fingerstick [[id number]]  (Abnormal) Collected:  09/20/17 1000    Specimen:  Blood Updated:  09/20/17 1005     Glucose 207 (H) mg/dL       Serial Number: [id number]:  [id number]       POC Glucose Fingerstick [[id number]]  (Abnormal) Collected:  09/20/17 1055    Specimen:  Blood Updated:  09/20/17 1101     Glucose 171 (H) mg/dL       Serial Number: [id number]:  [id number]       Lactate Acid, Reflex [[id number]]  (Normal) Collected:  09/20/17 1100    Specimen:  Blood Updated:  09/20/17 1216     Lactate 1.9 mmol/L     POC Glucose Fingerstick [[id number]]  (Abnormal) Collected:  09/20/17 1216    Specimen:  Blood Updated:  09/20/17 1220     Glucose 162 (H) mg/dL       Serial Number: [id number]:  [id number]       POC Glucose Fingerstick [[id number]]  (Abnormal) Collected:  09/20/17 1311    Specimen:  Blood Updated:  09/20/17 1315     Glucose 141 (H) mg/dL       Serial Number: [id number]:  [id number]       POC  Glucose Fingerstick [[id number]]  (Abnormal) Collected:  09/20/17 1355    Specimen:  Blood Updated:  09/20/17 1400     Glucose 163 (H) mg/dL       Serial Number: [id number]:  [id number]       POC Glucose Fingerstick [[id number]]  (Abnormal) Collected:  09/20/17 1450    Specimen:  Blood Updated:  09/20/17 1455     Glucose 141 (H) mg/dL       Serial Number: [id number]:  [id number]       POC Glucose Fingerstick [[id number]]  (Normal) Collected:  09/20/17 1606    Specimen:  Blood Updated:  09/20/17 1610     Glucose 128 mg/dL       Serial Number: [id number]:  [id number]       POC Glucose Fingerstick [[id number]]  (Normal) Collected:  09/20/17 1656    Specimen:  Blood Updated:  09/20/17 1700     Glucose 129 mg/dL       Serial Number: [id number]:  [id number]       POC Glucose Fingerstick [[id number]]  (Normal) Collected:  09/20/17 1753    Specimen:  Blood Updated:  09/20/17 1756     Glucose 122 mg/dL       Serial Number: [id number]:  [id number]       Hepatic Function Panel [[id number]]  (Abnormal) Collected:  09/20/17 1741    Specimen:  Blood Updated:  09/20/17 1837     Total Protein 5.3 (L) g/dL      Albumin 3.10 (L) g/dL      ALT (SGPT) 3797 (C) U/L      AST (SGOT) 2549 (C) U/L      Alkaline Phosphatase 163 (H) U/L      Total Bilirubin 4.7 (H) mg/dL      Bilirubin, Direct 3.7 (H) mg/dL      Bilirubin, Indirect 1.0 mg/dL     POC Glucose Fingerstick [[id number]]  (Normal) Collected:  09/20/17 1851    Specimen:  Blood Updated:  09/20/17 1855     Glucose 120 mg/dL       Serial Number: [id number]:  [id number]             Pertinent Radiology Results:  Imaging Results (most recent)     Procedure Component Value Units Date/Time    US Gallbladder [[id number]] Collected:  09/16/17 2215     Updated:  09/16/17 2216    Narrative:       FINAL REPORT    TECHNIQUE:  Sonographic images of the right upper quadrant were obtained.    CLINICAL HISTORY:  ABNORMAL LABS. ABD PAIN.  END STAGE RENAL FAILURE.    FINDINGS:  The  gallbladder is unremarkable. There is no intra-or extrahepatic biliary ductal dilation. There is no ascites. There is a small right pleural effusion.      Impression:       Small right pleural effusion. No hepatobiliary abnormality identified.    Authenticated by Dominik Hood M.D. on 09/16/2017 10:15:25 PM    CT Abdomen Pelvis Without Contrast [[id number]] Collected:  09/17/17 0000     Updated:  09/17/17 0002    Narrative:       FINAL REPORT    TECHNIQUE:  Axial images through the abdomen and pelvis were obtained by computed tomography.  IV contrast was withheld.    CLINICAL HISTORY:  LLQ ABD PAIN    FINDINGS:  Abdomen: There is cardiomegaly, small bilateral pleural effusions and pulmonary edema, consistent with CHF. The gallbladder and solid abdominal organs are normal, except for marked bilateral renal atrophy. No abnormality of the GI tract is seen.    Pelvis:  The uterus, ovaries and urinary bladder show no abnormality. There is a small amount of ascites in the abdomen or pelvis. There is increased lung density, consistent with renal osteodystrophy.      Impression:       Congestive heart failure. Small amount of ascites in the abdomen and pelvis.    Authenticated by Dominik Hood M.D. on 09/17/2017 12:00:52 AM    CT Chest Pulmonary Embolism With Contrast [[id number]] Collected:  09/17/17 1023     Updated:  09/17/17 1024    Narrative:       FINAL REPORT    TECHNIQUE:  Postcontrast axial images of the chest were performed in a CTA protocol.This study was performed with technique to keep radiation doses as low as reasonably achievable, (ALARA). Individualized dose reduction techniques using automated exposure control or   adjustment of the MA and/or KV according to the patient's size were employed.    CLINICAL HISTORY:  SP CODE X2 POSS PE    FINDINGS:  The heart is normal in size.  There is axillary adenopathy measuring up to 20 mm.  ETT and NG tube are in good position.  There are small bilateral pleural effusions.  No  pericardial effusion is identified.  There is no evidence of PE or dissection.    There is severe bilateral renal atrophy.  Anasarca and edema is seen in the mesentery.  There is reflux of contrast into the hepatic veins of uncertain significance.  There is diffuse paratracheal and mediastinal edema.  There are severe diffuse   groundglass opacities and consolidation in the upper lobes bilaterally.  There is patchy right lower lobe consolidation.  There is no evidence of aneurysm.      Impression:       No dissection or pulmonary embolism.    Diffuse edema and anasarca.    Severe bilateral renal atrophy.    Ground glass opacities and areas of consolidation which may be due to edema or pneumonia.    Authenticated by Wayne Torre MD on 09/17/2017 10:23:56 AM    XR Chest 1 View [[id number]] Collected:  09/17/17 1102     Updated:  09/17/17 1105    Narrative:       PROCEDURE: XR CHEST 1 VW-     INDICATION:  Confirm ET Tube Placement; I16.9-Hypertensive crisis,  unspecified; E87.70-Fluid overload, unspecified; R74.8-Abnormal levels  of other serum enzymes; K85.90-Acute pancreatitis without necrosis or  infection, unspecified; Z91.19-Patient's noncompliance with other  medical treatment and regimen     FINDINGS:  A portable view of the chest was obtained.  Comparison is  made to a prior exam dated 9/16/2017.   There is been interval placement  of an endotracheal tube with its tip in the mid thoracic trachea. A new  NG tube courses below the diaphragm. The heart is enlarged. There has  been interval improvement in bilateral lung opacities. No pneumothorax  is visualized. There are likely small pleural effusions.       Impression:       ET tube in appropriate position. Improved bilateral lung  opacities with persistent cardiomegaly.     This report was finalized on 9/17/2017 11:03 AM by Morena Askew MD.    XR Chest 1 View [[id number]] Collected:  09/17/17 1103     Updated:  09/17/17 1106    Narrative:       PROCEDURE:  XR CHEST 1 VW-     INDICATION:  shortness of breath     FINDINGS:  A portable view of the chest was obtained.  Comparison is  made to a prior exam dated 7/6/2017.   The heart is enlarged. There are  bilateral mixed interstitial and alveolar opacities with bilateral  pleural effusions. Differential considerations include pulmonary edema  or pneumonia. There is no pneumothorax.       Impression:       Cardiomegaly with bilateral mixed interstitial and alveolar  opacities. This may represent pulmonary edema or bilateral pneumonia.  Recommend follow-up to resolution.     This report was finalized on 9/17/2017 11:04 AM by Morena Askew MD.    XR Abdomen KUB [[id number]] Collected:  09/17/17 1755     Updated:  09/17/17 1757    Narrative:       FINAL REPORT    TECHNIQUE:  A single view of the abdomen was obtained.    CLINICAL HISTORY:  CHECKING OG PLACEMENT    FINDINGS:  The NG tube ends in the proximal-mid stomach. The bowel gas pattern is unremarkable. A right femoral line is in place. There is no osseous abnormality.      Impression:       NG tube in proximal-mid stomach.    Authenticated by Dominik Hood M.D. on 09/17/2017 05:55:15 PM    XR Chest 1 View [[id number]] Collected:  09/18/17 0951     Updated:  09/18/17 0957    Narrative:       PROCEDURE: XR CHEST 1 VW-     HISTORY: Acute Respiratory Failure.; I16.9-Hypertensive crisis,  unspecified; E87.70-Fluid overload, unspecified; R74.8-Abnormal levels  of other serum enzymes; K85.90-Acute pancreatitis without necrosis or  infection, unspecified; Z91.19-Patient's noncompliance with other  medical treatment and regimen     COMPARISON: September 17, 2017.     FINDINGS: The heart is enlarged. The mediastinum is unremarkable.  Probable small left pleural effusion. There is no pneumothorax.  There  are no acute osseous abnormalities. Endotracheal tube and nasogastric  tube in stable. Electrodes overlie the chest.       Impression:       No significant change from prior.      Continued followup is recommended.     This report was finalized on 9/18/2017 9:54 AM by Trav Burgess DO.    XR Chest 1 View [[id number]] Collected:  09/19/17 0811     Updated:  09/19/17 0817    Narrative:       PROCEDURE: XR CHEST 1 VW-     HISTORY: s/p placement of ETT and code blue; I16.9-Hypertensive crisis,  unspecified; E87.70-Fluid overload, unspecified; R74.8-Abnormal levels  of other serum enzymes; K85.90-Acute pancreatitis without necrosis or  infection, unspecified; Z91.19-Patient's noncompliance with other  medical treatment and regimen     COMPARISON: September 18, 2017.     FINDINGS: Endotracheal tube is 4 cm above the reyes. Nasogastric tube  is below the diaphragm.The heart is enlarged. The mediastinum is  unremarkable. Small left basilar opacity is again noted. There is no  pneumothorax.  There are no acute osseous abnormalities.           Impression:       Endotracheal and nasogastric tubes in appropriate position.  2. Small left basilar opacity.  3. Cardiomegaly.     Continued followup is recommended.     This report was finalized on 9/19/2017 8:15 AM by Trav Burgess DO.    US Arterial Doppler Upper Extremity Left [[id number]] Collected:  09/19/17 1126     Updated:  09/19/17 1132    Narrative:       PROCEDURE: US ARTERIAL DOPPLER UPPER EXTREMITY  LEFT-     HISTORY: mottled left forearm; I16.9-Hypertensive crisis, unspecified;  E87.70-Fluid overload, unspecified; R74.8-Abnormal levels of other serum  enzymes; K85.90-Acute pancreatitis without necrosis or infection,  unspecified; Z91.19-Patient's noncompliance with other medical treatment  and regimen     PROCEDURE: Segmental pressures with Doppler waveform evaluation of the  left upper extremity was performed.     FINDINGS:  Normal arterial flow is seen within the left upper extremity  arterial branches. There is no distinct fluid collection identified. No  pseudoaneurysm is appreciated.       Impression:       No distinct fluid collection or  pseudoaneurysm is  appreciated in the area of interest.     Images were reviewed, interpreted, and dictated by Dr. Burgess.  Transcribed by Marizol Sinclair PA-C.     This report was finalized on 9/19/2017 11:30 AM by Trav Burgess DO.    XR Abdomen KUB [[id number]] Collected:  09/20/17 0858     Updated:  09/20/17 0929    Narrative:       PROCEDURE: XR ABDOMEN KUB-     HISTORY: ng placement; I16.9-Hypertensive crisis, unspecified;  E87.70-Fluid overload, unspecified; R74.8-Abnormal levels of other serum  enzymes; K85.90-Acute pancreatitis without necrosis or infection,  unspecified; Z91.19-Patient's noncompliance with other medical treatment  and regimen     COMPARISON: September 17, 2017.     FINDINGS: An AP view of the abdomen and pelvis demonstrates an  nonspecific bowel gas pattern with no evidence of obstruction.  Nasogastric tube is seen beneath diaphragm. Right femoral line extends  to the L4 level. Hazy opacities are noted within the bilateral chest.  The heart is enlarged. Endotracheal tube is above the reyes.        Impression:       NG tube beneath diaphragm.             This report was finalized on 9/20/2017 9:00 AM by Trav Burgess DO.    XR Chest 1 View [[id number]] Collected:  09/20/17 0939     Updated:  09/20/17 0945    Narrative:       PROCEDURE: XR CHEST 1 VW-     HISTORY: CODE BLUE ETT ; I16.9-Hypertensive crisis, unspecified;  E87.70-Fluid overload, unspecified; R74.8-Abnormal levels of other serum  enzymes; K85.90-Acute pancreatitis without necrosis or infection,  unspecified; Z91.19-Patient's noncompliance with other medical treatment  and regimen     COMPARISON: September 19, 2017.     FINDINGS: Endotracheal tube is 1 cm above the reyes. Electrodes overlie  the chest.The heart is enlarged. The mediastinum is unremarkable.  Bilateral hazy opacities are noted.The left diaphragm border is  obscured. There is no pneumothorax.  There are no acute osseous  abnormalities.           Impression:       1.  Endotracheal tube 1 cm above the reyes.  2. Bilateral hazy opacities   3. The left diaphragm border is obscured.     Continued followup is recommended.     This report was finalized on 9/20/2017 9:42 AM by Trav Burgess DO.    CT Abdomen Pelvis With Contrast [[id number]] Collected:  09/20/17 1404     Updated:  09/20/17 1413    Narrative:       PROCEDURE: CT ABDOMEN PELVIS W CONTRAST-     HISTORY:  abdominal pain, pressure in rectum; I16.9-Hypertensive crisis,  unspecified; E87.70-Fluid overload, unspecified; R74.8-Abnormal levels  of other serum enzymes; K85.90-Acute pancreatitis without necrosis or  infection, unspecified; Z91.19-Patient's noncompliance with other  medical treatment and regimen     COMPARISON:  None .     TECHNIQUE: Multiple axial CT images were obtained from the lung bases  through the pubic symphysis following the administration of Isovue 300  and oral contrast.      FINDINGS:      ABDOMEN: The lung bases show bilateral groundglass opacities. There are  bibasilar consolidates. There is a small right pleural effusion. The  heart is enlarged. The liver shows patchy enhancement likely secondary  to phase of contrast. The spleen is unremarkable. No adrenal mass is  present.  The pancreas is normal. The kidneys are significantly  atrophic. The aorta is normal in caliber. There is ascites. There is  anasarca. The abdominal portions of the GI tract are unremarkable with  no evidence of obstruction. There are no abnormally dilated loops of  bowel.     PELVIS: The appendix is not identified. The uterus is present. The  urinary bladder is not well defined secondary to the ascites.            Impression:       1. Bilateral groundglass opacities and consolidates with small right  pleural effusion.  2. Ascites and anasarca.  3. Patchy enhancement in the liver is likely secondary to phase of  contrast. Consider follow-up ultrasound.     688.36 mGy.cm          This study was performed with techniques to keep  radiation doses as low  as reasonably achievable (ALARA). Individualized dose reduction  techniques using automated exposure control or adjustment of mA and/or  kV according to the patient size were employed.            Images were reviewed, interpreted, and dictated by Dr. Burgess.  Transcribed by Marizol Sinclair PA-C.     This report was finalized on 9/20/2017 2:11 PM by Trav Burgess DO.    XR Chest 1 View [[id number]] Collected:  09/20/17 1334     Updated:  09/20/17 1537    Narrative:       FINAL REPORT    CLINICAL HISTORY:  CENTRAL LINE PLACEMENT    COMPARISON:  Same day    FINDINGS:  The right IJ catheter terminates in the SVC.  The remaining support devices are stable.  The heart size is normal. The mediastinum is normal.  The lungs are clear. There is no pneumothorax. There is no osseous abnormality.      Impression:       1.  Right IJ terminates in the SVC.    2.  Lungs clear.    Authenticated by MARCEL Dean MD on 09/20/2017 01:34:36 PM            Code Status:  FULL     Discharge Disposition:    Short Term Hospital (DC - External)    Discharge Medication:     Mandy Espino   Home Medication Instructions XOCHILT:[id number]    Printed on:09/20/17 1921   Medication Information                      AZITHROMYCIN 500 MG/250 ML 0.9% NS IVPB (vial-mate)  Infuse 500 mg into a venous catheter Daily. Indications: Upper Respiratory Tract Infection             b complex-vitamin c-folic acid (NEPHRO-KERI) 0.8 MG tablet tablet  Take 1 tablet by mouth 2 (Two) Times a Day.             docusate sodium 100 MG capsule  Take 100 mg by mouth 2 (Two) Times a Day.             famotidine (PEPCID) 10 MG/ML solution injection  Infuse 2 mL into a venous catheter Daily.             hydrALAZINE (APRESOLINE) 20 MG/ML injection  Infuse 0.5 mL into a venous catheter Every 6 (Six) Hours As Needed for High Blood Pressure (sbp > 180).             ipratropium-albuterol (DUO-NEB) 0.5-2.5 mg/mL nebulizer  Take 3 mL by nebulization 4 (Four)  Times a Day.             metoprolol tartrate (LOPRESSOR) 25 MG tablet  Take 0.5 tablets by mouth 4 (Four) Times a Day.             nitroglycerin (NITROSTAT) 2 % ointment  Place 1 inch on the skin Every 6 (Six) Hours.             ondansetron (ZOFRAN) 4 MG/2ML injection  Infuse 2 mL into a venous catheter Every 6 (Six) Hours As Needed for Nausea or Vomiting.             piperacillin-tazobactam (ZOSYN) 2-0.25 GM/50ML IVPB  Infuse 50 mL into a venous catheter Every 8 (Eight) Hours. Indications: Pneumonia, Infection of Blood or Tissues affecting the Whole Body             propofol (DIPRIVAN) 1000 mg/mL emulsion infusion  Infuse 259-2,590 mcg/min into a venous catheter Dose Adjusted By Provider As Needed.             sodium chloride 0.9 % flush  Infuse 1-10 mL into a venous catheter As Needed for Line Care.             sodium chloride 0.9 % flush  Infuse 10 mL into a venous catheter As Needed for Line Care.             vancomycin (VANCOCIN) IVPB 500 mg in 100 mL NS  Infuse 500 mg into a venous catheter Every Other Day As Needed (pharmacy dosing per levels). Indications: Bacteria in the Blood                 Discharge Diet:     Diet Instructions     Diet: Nothing By Mouth       Discharge Diet:  Nothing By Mouth                 Activity at Discharge:     Activity Instructions     Other Instructions (Specify)       Bed rest                 Follow-up Appointments:          Test Results Pending at Discharge:     Order Current Status    Drug Screen (10), Serum In process    Insulin, Free & Total, Serum In process    Blood Culture Preliminary result    Blood Culture Preliminary result             Guido King MD  09/20/17  7:21 PM    Time: Discharge 72min

## 2017-09-20 NOTE — NURSING NOTE
02:26-Positive pulses left groin.  02:28-Hr 116, Removed 7.5 ETT due to diff. Bagging.  Replaced with 7.5 ETT , 24 @ lip.  Condensation noted with bilateral breath sounds.  Easy to bag.  B/P=217/138.  02:29-Portable CXR done.  Vent settings , rate 18, peep 5 , FIO2 40%.  02:31-B/P-207/141, , rectal temp 96.3, Breathsounds clear per Dr. Arriola,  Marie maher put on pt.  02:33-Repeat .  Labs collected, Hr 104.  02:38-ABG collected,  149/125, HR 96.  02:40-Dr. Arriola talking with pts spouse on the phone.  02:50- Verbal orders Dr. Arriola, lactic , cbc, cmp,mg, phos, bicarb drip.  03:13-Wrist restraints in place due to pt awake and attempting to pull at ETT,  03:16- Dr. Arriola notified that the pt is awake.  Order given to start propofol if pt is agitated.  03:35-  Pt's  is at bedside.  Explained the medical care that has been given to the pt.  04:13-Pt is demanding we take the vent tube out.  Explained the need to keep it at this time.   at bedside.

## 2017-09-20 NOTE — PROCEDURES
Central Venous Catheter Insertion Procedure Note    Date of Procedure: September 20, 2017     Type: Right IJ Central Venous Line placement    Reason: vascular access. S/P Cardiac Arrest.     Consent: Informed consent was obtained for the procedure, including sedation. Consent was obtained from the Patient's family after explanation of the risks and benefits of the procedure. Risks including but not limited to damage to the overlying structures, pneumothorax, bleeding, infection, worsening of respiratory status, requirement for chest tube placement among others were explained.    Patient's family understood the risks and benefits and agreed to proceed with the procedure    Time Out: Time out was done with the RN at the bedside after confirmation of the name and date of birth with the patient's ID band    Details of the procedure:   Patient was appropriately positioned and the site was prepared. Maximum sterile technique was used including usual patient drapes, antiseptic technique and physician sterile garments.    The Right side of the neck was prepared in a sterile fashion and the site was inspected with ultrasound under sterile technique.     Local anesthesia was applied to the skin and subcutaneous tissues with lidocaine 1%. The needle was then inserted into the vein. A guide wire was then easily passed through the needle. Central line was then inserted into the vessel over the guide wire. The catheter was sutured into place and dressed following sterile protocol and sterile dressing applied.    Central line was immediately accessed and nursing staff was instructed to call with results of Chest X-Ray, if abnormal.    Dressing change and nursing care, to be done as per nursing protocol.      Findings:  There were no changes to vital signs.   All catheter ports were flushed with saline.   Patient did tolerate procedure well.       Complications:  No immediate complications noted.      Recommendations:  CXR ordered  to verify placement. No chest x-ray ordered if this was a femoral vein.    Daily assessment of the continued need of the Central Line to be assessed by ordering provider/attending physician.      Haja Urbina MD  09/20/17  12:39 PM

## 2017-09-20 NOTE — NURSING NOTE
0203- Nurse states patient was on bedpan and became unresponsive with agonal breathing. Faint pulses noted in the rama groin. Code blue called.  0205-pt in PEA cpr initiated and cpr compressors switched every 2 minutes throughout code.  Dr. Mederos at bedside on phone with Dr. Carcamo.  0228- 7.5 ETT removed due to diff bagging. 7.5 replace with condensation noted and breath sounds. pcxr done. 24 at the lip and secured.

## 2017-09-20 NOTE — PROGRESS NOTES
"Adult Nutrition  Assessment/PES    Patient Name:  Mandy Espino  YOB: 1990  MRN: [id number]  Admit Date:  9/16/2017    Assessment Date:  9/20/2017        Reason for Assessment       09/20/17 1054    Reason for Assessment    Reason For Assessment/Visit follow up protocol   NPO x 4 days    Diagnosis Diagnosis    Cardiac HTN    Gastrointestinal Pancreatitis, chronic    Hepatic Hepatitis    Renal ESRD;Hemodialysis    Substance Use Tobacco;Drug/illicit/recreational                Anthropometrics       09/20/17 1055    Anthropometrics    Height Method Stated    Height 157.5 cm (62\")    Weight Method Bed scale    Weight 51.8 kg (114 lb 3.2 oz)    Ideal Body Weight (IBW)    Ideal Body Weight (IBW), Female 50.83    % Ideal Body Weight 102.12    Body Mass Index (BMI)    BMI (kg/m2) 20.93    BMI Grade 19.1 - 24.9 - normal            Labs/Tests/Procedures/Meds       09/20/17 1057    Labs/Tests/Procedures/Meds    Labs/Tests Review Reviewed;Glucose;K+;BUN;Creat;ALT;AST;Na+;Cl-;Alb   High: Glucose, K+, BUN, Cr, ALT, AST    Medication Review Reviewed, pertinent              Estimated/Assessed Needs       09/20/17 1059    Calculation Measurements    Weight Used For Calculations 51.8 kg (114 lb 3.2 oz)    Height Used for Calculations 1.575 m (5' 2\")    Estimated/Assessed Energy Needs    Energy Need Method Harris-St Jeor    Age 27    RMR (Harris-St. Jeor Equation) 1206.25    Activity Factors (Harris St or)  Confined to bed  1.2    Estimated Kcal Range  ~1741-9179    Estimated/Assessed Protein Needs    Weight Used for Protein Calculation 51.8 kg (114 lb 3.2 oz)    Protein (gm/kg) 1.4    1.4 Gm Protein (gm) 72.52    Estimated Protein Range ~62.16-72.52    Estimated/Assessed Fluid Needs    Fluid Need Method --   output + 1000 ml            Nutrition Prescription Ordered       09/20/17 1100    Nutrition Prescription PO    Current PO Diet NPO            Evaluation of Received Nutrient/Fluid Intake       09/20/17 " 1059    PO Evaluation    Number of Days PO Intake Evaluated Insufficient Data              Problem/Interventions:        Problem 1       09/20/17 1101    Nutrition Diagnoses Problem 1    Problem 1 Increased Nutrient Needs    Macronutrient Protein    Etiology (related to) Medical Diagnosis    Renal ESRD;Hemodialysis    Signs/Symptoms (evidenced by) Other (comment)   renal dysfunction            Problem 2       09/20/17 1101    Nutrition Diagnoses Problem 2    Problem 2 Inadequate Intake/Infusion    Inadequate Intake Type Oral    Macronutrient Kcal;Fluid;Protein;Fiber;Carbohydrate    Micronutrient Vitamin;Mineral    Etiology (related to) MNT for Treatment/Condition    Signs/Symptoms (evidenced by) NPO            Problem 3       09/20/17 1102    Nutrition Diagnoses Problem 3    Problem 3 Needs Alternate Route    Etiology (related to) MNT for Treatment/Condition    Signs/Symptoms (evidenced by) NPO;Other (comment)            Problem 4       09/20/17 1103    Nutrition Diagnoses Problem 4    Problem 4 Altered GI Function    Etiology (related to) Medical Diagnosis    Gastrointestinal Other (comment)   smooth round mass in rectal area with pressure feeling per pt. and per RN    Signs/Symptoms (evidenced by) Other (comment)   possible mass detected in rectal area per NSG.              Intervention Goal       09/20/17 1102    Intervention Goal    General Meet nutritional needs for age/condition    PO Advance diet    TF/PN Inititiate TF/PN    Weight No significant weight loss            Nutrition Intervention       09/20/17 1102    Nutrition Intervention    RD/Tech Action Await begin PO;Follow Tx progress;Recommend/ordered    Recommended/Ordered PN            Nutrition Prescription       09/20/17 1105    Nutrition Prescription PO    PO Prescription Begin/change diet;Begin/change supplement    Begin/Change Diet to Clear Liquid    Supplement Ensure Clear    Supplement Frequency 3 times a day    New PO Prescription Ordered? No,  recommended    Nutrition Prescription PN    Parenteral Prescription --   Consider use of PN if pt. to remain NPO for greater than or equal to 5 days            Education/Evaluation       09/20/17 1106    Education    Education Education not appropriate at this time    Please explain Defer until post ICU    Monitor/Evaluation    Monitor Per protocol;I&O;Pertinent labs;Weight        Comments:  Rec. #1: Advance diet once medically feasible to renal. RD to add nutritional supplements once diet advances. Rec. #2: If pt. To continue NPO for greater than or equal to 5 days consider nutrition support. Rec. #3: Continue with renal MVI. RD to follow pt. Consult RD PRN.     Electronically signed by:  Nubia Telles RD  09/20/17 11:06 AM

## 2017-09-20 NOTE — PROGRESS NOTES
"   LOS: 3 days   Patient Care Team:  Neo Gresham DO as PCP - General (Family Medicine)      Interval History: Patient had a repeat episode of carpometacarpal very compromised the night requiring atropine and epinephrine and CPR with subsequent intubation.  Did go through the sequence of events this was between 2 to 2:30 this morning.  It again started as a drop in the blood pressure around an attempted bowel movement.        Review of Systems:   Pertinent items are noted in HPI.      Objective     Vital Sign Min/Max for last 24 hours  Temp  Min: 97.5 °F (36.4 °C)  Max: 98.8 °F (37.1 °C)   BP  Min: 60/30  Max: 217/138   Pulse  Min: 28  Max: 110   Resp  Min: 14  Max: 21   SpO2  Min: 37 %  Max: 100 %   Flow (L/min)  Min: 5  Max: 5   Weight  Min: 103 lb 6.3 oz (46.9 kg)  Max: 114 lb 4.8 oz (51.8 kg)     Flowsheet Rows         First Filed Value    Admission Height  58\" (147.3 cm) Documented at 09/16/2017 1849    Admission Weight  90 lb (40.8 kg) Documented at 09/16/2017 1849          Physical Exam:     General Appearance:    Alert, cooperative, in no acute distress   Head:    Normocephalic, without obvious abnormality, atraumatic   Eyes:            Lids and lashes normal, conjunctivae and sclerae normal, no   icterus, no pallor, corneas clear, PERRLA   Ears:    Ears appear intact with no abnormalities noted   Throat:   No oral lesions, no thrush, oral mucosa moist   Neck:   No adenopathy, supple, trachea midline, no thyromegaly, no     carotid bruit, no JVD   Back:     No kyphosis present, no scoliosis present, no skin lesions,       erythema or scars, no tenderness to percussion or                   palpation,   range of motion normal   Lungs:     Clear to auscultation,respirations regular, even and                   unlabored    Heart:    Regular rhythm and normal rate, normal S1 and S2, no            murmur, no gallop, no rub, no click   Breast Exam:    Deferred   Abdomen:   Generalized tenderness present.  " Patient wincing when you press on the belly.     Genitalia:    Deferred   Extremities:   Moves all extremities well, no edema, no cyanosis, no              redness   Pulses:   Pulses palpable and equal bilaterally   Skin:   No bleeding, bruising or rash   Lymph nodes:   No palpable adenopathy   Neurologic:   Cranial nerves 2 - 12 grossly intact, sensation intact, DTR        present and equal bilaterally          Results Review:     I reviewed the patient's new clinical results.    Results Review:   I reviewed the patient's new clinical results.        LAB DATA :         Laboratory results:      Results from last 7 days  Lab Units 09/20/17  0230 09/19/17  0903 09/19/17  0337 09/18/17  0304 09/17/17  1457 09/17/17  1107 09/17/17  0647 09/17/17  0555 09/16/17 2010   SODIUM mmol/L 128* 135* 132* 136* 138  --  140 137 141   POTASSIUM mmol/L 5.4* 4.6 5.3* 4.5 4.9 4.3 8.1* 9.1* 4.7   CHLORIDE mmol/L 93* 99 96* 96* 96*  --  99 102 101   CO2 mmol/L 18.0* 22.0* 23.0* 27.0 29.0  --  18.0* 11.0* 23.0*   BUN mg/dL 50* 37* 30* 43* 27*  --  52* 51* 47*   CREATININE mg/dL 4.10* 3.50* 3.10* 4.10* 3.20*  --  6.30* 6.10* 5.80*   CALCIUM mg/dL 9.7 8.0* 8.6 6.8* 7.4*  --  6.9* 6.6* 7.3*   BILIRUBIN mg/dL 3.4*  --  4.7* 4.0*  --   --   --  3.0* 0.9   ALK PHOS U/L 124  --  158* 166*  --   --   --  192* 183*   ALT (SGPT) U/L 3498*  --  3657* 3065*  --   --   --  562* 506*   AST (SGOT) U/L 2240*  --  3286* 5182*  --   --   --  546* 227*   GLUCOSE mg/dL 398* 156* 195* 161* 212*  --  99* 158* 101*       Results from last 7 days  Lab Units 09/20/17  0233 09/19/17 1919 09/19/17  0337 09/18/17  0304 09/17/17  1107 09/17/17  0555 09/16/17 2010   WBC 10*3/mm3 14.32* 14.68* 16.91* 10.66 18.47* 11.25* 15.10*   HEMOGLOBIN g/dL 8.6* 10.0* 10.2* 7.2* 9.3* 9.6* 8.8*   HEMATOCRIT % 28.3* 31.5* 33.4* 22.8* 30.0* 32.6* 28.5*   PLATELETS 10*3/mm3 65* 101* 97* 97* 149 122* 184       Results from last 7 days  Lab Units 09/19/17 1919 09/17/17  1107   INR   2.33* 1.97*       Results from last 7 days  Lab Units 09/17/17  0206 09/17/17  0129 09/16/17 2010   BLOODCX  No growth at 3 days  --  No growth at 3 days   MRSA SCREEN CX   --  No Methicillin Resistant Staphylococcus aureus isolated  --            Results from last 7 days  Lab Units 09/18/17  0304   TROPONIN I ng/mL 0.118*               Results from last 7 days  Lab Units 09/20/17  0545 09/20/17  0249  09/18/17  0526   PH, ARTERIAL pH units 7.290 7.148  < > 7.319   PCO2, ARTERIAL mm Hg 38.7 41.6  < > 56.7*   PO2 ART mm Hg 160.0* 452.0*  < > 31.3*   HCO3 ART mmol/L 18.6* 14.4*  < > 29.1*   BASE EXCESS ART mmol/L -8.0 -14.5  < > 3.0   O2 SATURATION ART %  --   --   --  42.2   HEMOGLOBIN, ARTERIAL g/dL 9.6* 9.1*  < > 7.8*   OXYHEMOGLOBIN %  --  98.2  --   --    METHEMOGLOBIN %  --  1.10  --   --    CARBOXYHEMOGLOBIN %  --  1.4  --   --    BAROMETRIC PRESSURE FOR BLOOD GAS mmHg 733  --   < > 735   MODALITY  Ventilator Ventilator  < > Ventilator   FIO2 % 50 100  < > 50   < > = values in this interval not displayed.  Lab Results   Component Value Date    HGBA1C 5.5 06/03/2014           Results from last 7 days  Lab Units 09/17/17  0555 09/16/17 2010   LIPASE U/L 320* 403*       IMAGING DATA:     Ct Abdomen Pelvis Without Contrast    Result Date: 9/17/2017  Narrative: FINAL REPORT TECHNIQUE: Axial images through the abdomen and pelvis were obtained by computed tomography.  IV contrast was withheld. CLINICAL HISTORY: LLQ ABD PAIN FINDINGS: Abdomen: There is cardiomegaly, small bilateral pleural effusions and pulmonary edema, consistent with CHF. The gallbladder and solid abdominal organs are normal, except for marked bilateral renal atrophy. No abnormality of the GI tract is seen. Pelvis:  The uterus, ovaries and urinary bladder show no abnormality. There is a small amount of ascites in the abdomen or pelvis. There is increased lung density, consistent with renal osteodystrophy.     Impression: Congestive heart failure.  Small amount of ascites in the abdomen and pelvis. Authenticated by Dominik Hood M.D. on 09/17/2017 12:00:52 AM    Us Arterial Doppler Upper Extremity Left    Result Date: 9/19/2017  Narrative: PROCEDURE: US ARTERIAL DOPPLER UPPER EXTREMITY  LEFT-  HISTORY: mottled left forearm; I16.9-Hypertensive crisis, unspecified; E87.70-Fluid overload, unspecified; R74.8-Abnormal levels of other serum enzymes; K85.90-Acute pancreatitis without necrosis or infection, unspecified; Z91.19-Patient's noncompliance with other medical treatment and regimen  PROCEDURE: Segmental pressures with Doppler waveform evaluation of the left upper extremity was performed.  FINDINGS:  Normal arterial flow is seen within the left upper extremity arterial branches. There is no distinct fluid collection identified. No pseudoaneurysm is appreciated.      Impression: No distinct fluid collection or pseudoaneurysm is appreciated in the area of interest.  Images were reviewed, interpreted, and dictated by Dr. Burgess. Transcribed by Marizol Sinclari PA-C.  This report was finalized on 9/19/2017 11:30 AM by Trav Burgess DO.    Us Gallbladder    Result Date: 9/16/2017  Narrative: FINAL REPORT TECHNIQUE: Sonographic images of the right upper quadrant were obtained. CLINICAL HISTORY: ABNORMAL LABS. ABD PAIN.  END STAGE RENAL FAILURE. FINDINGS: The gallbladder is unremarkable. There is no intra-or extrahepatic biliary ductal dilation. There is no ascites. There is a small right pleural effusion.     Impression: Small right pleural effusion. No hepatobiliary abnormality identified. Authenticated by Dominik Hood M.D. on 09/16/2017 10:15:25 PM    Xr Chest 1 View    Result Date: 9/19/2017  Narrative: PROCEDURE: XR CHEST 1 VW-  HISTORY: s/p placement of ETT and code blue; I16.9-Hypertensive crisis, unspecified; E87.70-Fluid overload, unspecified; R74.8-Abnormal levels of other serum enzymes; K85.90-Acute pancreatitis without necrosis or infection, unspecified;  Z91.19-Patient's noncompliance with other medical treatment and regimen  COMPARISON: September 18, 2017.  FINDINGS: Endotracheal tube is 4 cm above the reyes. Nasogastric tube is below the diaphragm.The heart is enlarged. The mediastinum is unremarkable. Small left basilar opacity is again noted. There is no pneumothorax.  There are no acute osseous abnormalities.         Impression: Endotracheal and nasogastric tubes in appropriate position. 2. Small left basilar opacity. 3. Cardiomegaly.  Continued followup is recommended.  This report was finalized on 9/19/2017 8:15 AM by Trav Burgess DO.    Xr Chest 1 View    Result Date: 9/18/2017  Narrative: PROCEDURE: XR CHEST 1 VW-  HISTORY: Acute Respiratory Failure.; I16.9-Hypertensive crisis, unspecified; E87.70-Fluid overload, unspecified; R74.8-Abnormal levels of other serum enzymes; K85.90-Acute pancreatitis without necrosis or infection, unspecified; Z91.19-Patient's noncompliance with other medical treatment and regimen  COMPARISON: September 17, 2017.  FINDINGS: The heart is enlarged. The mediastinum is unremarkable. Probable small left pleural effusion. There is no pneumothorax.  There are no acute osseous abnormalities. Endotracheal tube and nasogastric tube in stable. Electrodes overlie the chest.      Impression: No significant change from prior.  Continued followup is recommended.  This report was finalized on 9/18/2017 9:54 AM by Trav Burgess DO.    Xr Chest 1 View    Result Date: 9/17/2017  Narrative: PROCEDURE: XR CHEST 1 VW-  INDICATION:  shortness of breath  FINDINGS:  A portable view of the chest was obtained.  Comparison is made to a prior exam dated 7/6/2017.   The heart is enlarged. There are bilateral mixed interstitial and alveolar opacities with bilateral pleural effusions. Differential considerations include pulmonary edema or pneumonia. There is no pneumothorax.      Impression: Cardiomegaly with bilateral mixed interstitial and alveolar  opacities. This may represent pulmonary edema or bilateral pneumonia. Recommend follow-up to resolution.  This report was finalized on 9/17/2017 11:04 AM by Morena Askew MD.    Xr Chest 1 View    Result Date: 9/17/2017  Narrative: PROCEDURE: XR CHEST 1 VW-  INDICATION:  Confirm ET Tube Placement; I16.9-Hypertensive crisis, unspecified; E87.70-Fluid overload, unspecified; R74.8-Abnormal levels of other serum enzymes; K85.90-Acute pancreatitis without necrosis or infection, unspecified; Z91.19-Patient's noncompliance with other medical treatment and regimen  FINDINGS:  A portable view of the chest was obtained.  Comparison is made to a prior exam dated 9/16/2017.   There is been interval placement of an endotracheal tube with its tip in the mid thoracic trachea. A new NG tube courses below the diaphragm. The heart is enlarged. There has been interval improvement in bilateral lung opacities. No pneumothorax is visualized. There are likely small pleural effusions.      Impression: ET tube in appropriate position. Improved bilateral lung opacities with persistent cardiomegaly.  This report was finalized on 9/17/2017 11:03 AM by Morena Askew MD.    Ct Chest Pulmonary Embolism With Contrast    Result Date: 9/17/2017  Narrative: FINAL REPORT TECHNIQUE: Postcontrast axial images of the chest were performed in a CTA protocol.This study was performed with technique to keep radiation doses as low as reasonably achievable, (ALARA). Individualized dose reduction techniques using automated exposure control or  adjustment of the MA and/or KV according to the patient's size were employed. CLINICAL HISTORY: SP CODE X2 POSS PE FINDINGS: The heart is normal in size.  There is axillary adenopathy measuring up to 20 mm.  ETT and NG tube are in good position.  There are small bilateral pleural effusions.  No pericardial effusion is identified.  There is no evidence of PE or dissection.  There is severe bilateral renal atrophy.   Anasarca and edema is seen in the mesentery.  There is reflux of contrast into the hepatic veins of uncertain significance.  There is diffuse paratracheal and mediastinal edema.  There are severe diffuse groundglass opacities and consolidation in the upper lobes bilaterally.  There is patchy right lower lobe consolidation.  There is no evidence of aneurysm.     Impression: No dissection or pulmonary embolism. Diffuse edema and anasarca. Severe bilateral renal atrophy. Ground glass opacities and areas of consolidation which may be due to edema or pneumonia. Authenticated by Wayne Trore MD on 09/17/2017 10:23:56 AM    Xr Abdomen Kub    Result Date: 9/17/2017  Narrative: FINAL REPORT TECHNIQUE: A single view of the abdomen was obtained. CLINICAL HISTORY: CHECKING OG PLACEMENT FINDINGS: The NG tube ends in the proximal-mid stomach. The bowel gas pattern is unremarkable. A right femoral line is in place. There is no osseous abnormality.     Impression: NG tube in proximal-mid stomach. Authenticated by Dominik Hood M.D. on 09/17/2017 05:55:15 PM            Assessment/Plan    #1 recurrent cardiac pulmonary compromise: Patient has had another event last late last night.  This still seems to be very similar.  She tries to have a bowel movement after which the symptoms start.  Patient will definitely need further workup with respect to the etiology for the severe pain in her belly as well as her repeated attempts to have a bowel movement.  Nevertheless from a cardiac standpoint this is more compatible with inspirational syncope versus a severe mixed [vasodepressive predominantly and a cardioinhibitory] vasovagal response to her pain during defecation.  The treatment would entail correction of the trigger IE discomfort during defecation.  Patient has no history of this happening to her at home.  Would take a closer look at the cause for her abdominal pain.  Today on exam she does wince when we touch the abdomen.  If no  pathology found would suggest that we and use a bowel movement so that she does not make further attempts at forced defecation in the immediate extubation..    #2 rhythm disorder: Did reevaluate the rhythm sequence around this event between 2 to 2:30 AM this morning.  Patient was in sinus rhythm after which she dropped into junctional rhythm the rates were in the high 20s to low 30s.  Got atropine there are occasional PVCs was one triplet seen.  Subsequently on recovery noted to have junctional rhythm, sinus rhythm with nonsustained triplets and quadruplets SVT.  Couple of episodes of twisted 1 A-V conduction could not be ruled out.  There are also a few episodes of what appears to be atrial fibrillation with controlled ventricular rate.  At no point is there is a tachycardia a shockable rhythm or sinus arrest.  Placement of a pacemaker is indicated in patients with severe vasovagal syncope were not responsive to medical therapy.  The initial therapy would be to avoid the triggering agent and to maximize beta blocker therapy.  Maximizing beta blockers in her would be difficult if no etiology detected or this is recurrent in spite of correction of the triggering event would consider placement of the pacemaker with maximization of beta blockers.  Nevertheless any procedure in her is fraught with bad outcomes down the line given her noncompliance and infection proneness.  She does have diminished LV systolic function is being placed will need a dual-chamber pacer to see if we can pace mostly in the atria with intrinsic ventricular conduction.    #3 hemodynamic mediations: Patient's blood pressures are in general on the high side.  When these events happen she does get severely hypotensive.  Would strongly consider avoiding extraction of excessive fluid and keep her more waterlogged even around the dialysis.    #4 persistent hypoglycemia is an interesting finding.  I've ordered a insulin level this morning nevertheless  it had to be done with her coming off the D10 drip might have to be repeated again.  Incidentally right around this collar apparently patient also gets hypoglycemic and hypothermic.  If this all related to touch and markedly vessel failure which she seemed to have had during her hemodynamic events around the weekend- is a good question.    At this point suggest the following.    #1 evaluation for her abdominal pain and her GI symptoms.    #2 induction of a bowel movement.    #3 avoiding  fluid extraction around dialysis.    Principal Problem:    Hypertensive urgency, malignant  Active Problems:    Tobacco use    Headache    Bipolar disorder    Depression    Esophageal reflux    Hypocalcemia    Severe anxiety with panic    Chronic pain    COPD (chronic obstructive pulmonary disease)    End stage renal disease on dialysis    Hepatitis C antibody positive in blood    IV drug abuse    Anemia of ESRD    COPD exacerbation    Medical non-compliance    Neuropathic pain of both legs            Nic Carcamo MD  09/20/17  7:06 AM

## 2017-09-20 NOTE — NURSING NOTE
Assessed left arm for possibly blister area on dorsal surface, picture obtained; maroon darkened discoloration at forearm and extending down to hand, zero open areas; hand very cold and pale,; dialysis being provided at this time; hand being addressed by physicians at this time and I have no further recommendations at this time besides  Monitoring  for any breakdown and consider wound care reconsult  if that happens

## 2017-09-20 NOTE — PROGRESS NOTES
Spoke with [facility] ICU physician Dr. Mary Dean and Dr. Riddle hand specialist who has accepted transfer of patient for progressive more mottled appearing skin of left hand related to possible IV arterial placement of the left upper ext during admission with ACLS meds given and for further workup/treatment of patients cardiac issues.  Patient currently does have radial pulse of the left hand that is palpable and dopplerable.

## 2017-09-20 NOTE — PLAN OF CARE
Problem: COPD, Chronic Bronchitis/Emphysema (Adult)  Goal: Signs and Symptoms of Listed Potential Problems Will be Absent or Manageable (COPD, Chronic Bronchitis/Emphysema)  Outcome: Ongoing (interventions implemented as appropriate)    09/20/17 8761   COPD, Chronic Bronchitis/Emphysema   Problems Assessed (COPD, Chronic Bronchitis/Emphysema) all

## 2017-09-20 NOTE — PROCEDURES
Called to the floor after patient became unresponsive, apneic and pulseless.  When I arrived CPR was underway.  Patient was being manually bagged.  Patient had very poor saturations.  The primary team initially wanted to hold off on intubation, after several rounds of CPR and no response decision made to proceed with intubation.  Constant not obtained given emergent nature of procedure.  Sedation and paralytic medications were not given as patient was already unresponsive.  Initially 7.5 mm ET tube was passed through the vocal cords under direct visualization.  Tube was secured, there is good color change on the CO2 detector and patient had bilateral breath sounds.  After a few minutes of CPR patient acutely became hard to bag and ET tube appeared to be filled with gastric contents.  Decision was made to replace tube.  Again at 7.5 mm ET tube was passed through the vocal cords under direct visualization.  Again the tube was secured, there is good color change on CO2 detector and patient had bilateral breath sounds.  She was much easier to bag at this time.  Even after intubation patient had poor oxygen saturation.  Post intubation x-ray showed tube in satisfactory position.

## 2017-09-21 LAB
11OH-THC SPEC-MCNC: NEGATIVE NG/ML
CANNABIDIOL SERPLBLD CFM-MCNC: NEGATIVE NG/ML
CANNABINOIDS SPEC QL CFM: POSITIVE
CANNABINOL: NEGATIVE NG/ML
CARBOXYTHC SPEC-MCNC: 31.4 NG/ML
THC SERPLBLD CFM-MCNC: 1.4 NG/ML

## 2017-09-21 NOTE — NURSING NOTE
22:02-  hospital called with bed assignment.  [facility],  10th floor, room 241.  Gave report to accepting nurse, Yahir Camacho RN.  [phone].

## 2017-09-22 LAB
BACTERIA SPEC AEROBE CULT: NORMAL
BACTERIA SPEC AEROBE CULT: NORMAL
INSULIN FREE SERPL-ACNC: 11 UU/ML
INSULIN SERPL-ACNC: 11 UU/ML

## 2017-09-23 LAB
AMPHETAMINES BLD QL SCN: 23 NG/ML
AMPHETAMINES SPEC QL: POSITIVE
MDA SERPLBLD-MCNC: NEGATIVE NG/ML
MDEA SERPL-MCNC: NEGATIVE NG/ML
MDMA SERPLBLD-MCNC: NEGATIVE NG/ML
METHAMPHET UR QL CFM: 45 NG/ML

## 2017-09-27 LAB
7AMINOCLONAZEPAM UR-MCNC: 12 NG/ML
ALPRAZ SPEC-MCNC: NEGATIVE NG/ML
BENZODIAZ SPEC QL: POSITIVE
CHLORDIAZEP SERPL-MCNC: NEGATIVE NG/ML
CLONAZEPAM BLD-MCNC: NEGATIVE NG/ML
DESALKYLFLURAZ BLD CFM-MCNC: NEGATIVE NG/ML
DIAZEPAM SERPL-MCNC: NEGATIVE NG/ML
FLURAZEPAM BLD CFM-MCNC: NEGATIVE NG/ML
LORAZEPAM SPEC-MCNC: 16.7 NG/ML
MIDAZOLAM SPEC-MCNC: NEGATIVE NG/ML
NORCHLORDIAZEP SERPL-MCNC: NEGATIVE NG/ML
NORDIAZEPAM SERPL CFM-MCNC: 17 NG/ML
OXAZEPAM SPEC-MCNC: NEGATIVE NG/ML
TEMAZEPAM SPEC-MCNC: NEGATIVE NG/ML
TRIAZOLAM SPEC-MCNC: NEGATIVE NG/ML

## 2017-09-28 LAB
6MAM SERPLBLD-MCNC: NEGATIVE NG/ML
AMPHETAMINES SERPL QL SCN: ABNORMAL NG/ML
BARBITURATES SERPLBLD QL: NEGATIVE UG/ML
BENZODIAZ SERPL QL: ABNORMAL NG/ML
BZE BLD QL SCN: NEGATIVE NG/ML
CODEINE UR QL: NEGATIVE NG/ML
DHC BLD-MCNC: NEGATIVE NG/ML
HYDROCODONE SERPL-MCNC: NEGATIVE NG/ML
HYDROMORPHONE SERPL-MCNC: NEGATIVE NG/ML
METHADONE UR QL: NEGATIVE NG/ML
MORPHINE SERPLBLD-MCNC: 33 NG/ML
OPIATES SERPL QL SCN: ABNORMAL NG/ML
OPIATES SPEC QL: POSITIVE
OXYCODONE SERPL-MCNC: NEGATIVE NG/ML
OXYCODONE SERPLBLD CFM-MCNC: NEGATIVE NG/ML
OXYCODONE+OXYMORPHONE SERPLBLD CFM-IMP: NEGATIVE
OXYMORPHONE SERPLBLD CFM-MCNC: NEGATIVE NG/ML
PCP SPEC-MCNC: NEGATIVE NG/ML
PROPOXYPH SPEC QL: NEGATIVE NG/ML
THC SERPLBLD CFM-MCNC: ABNORMAL NG/ML

## 2017-11-07 ENCOUNTER — TELEPHONE (OUTPATIENT)
Dept: CARDIAC SURGERY | Facility: CLINIC | Age: 27
End: 2017-11-07

## 2017-11-07 NOTE — TELEPHONE ENCOUNTER
We tried to reach the patient with the number given in her chart. This number belongs to her spouse, there is a language barrier with the spouse and I asked if he could have the patient call us back. We have not received a phone call back from the patient and I have tried to reach the patient multiple times and was not able to leave a voicemail. We have also mailed a letter trying to reach the patient, the letter was returned back to our office. I have made another attempt to reach the patient today and was unable to leave a voicemail. (11/07/2017)    Today, 11/08/2017, I reached out to our interperting service, [employer], to try and reach the patient. Like I had stated before the phone number we have is the patients husbands number and there is a language barrier. The Interpreting service called the number I had given them and reached the patients , she stated that he was very rude and did not wish to speak to us. I have informed my , Adeline Salvador, with this information.

## 2020-03-27 NOTE — PROGRESS NOTES
[facility] Progress Note    6/12/2017      Antibiotics:  IV Anti-Infectives     Ordered     Dose/Rate Route Frequency Start Stop    06/21/17 0922  vancomycin (dosing per levels)     Ordering Provider:  Brianne Flood RPH     Does not apply Daily 06/22/17 0900      06/19/17 1548  vancomycin oral solution 250 mg     Ordering Provider:  REY Waller    250 mg Oral Every 6 Hours Scheduled 06/19/17 1800      06/12/17 2156  vancomycin in dextrose 5% 150 mL (VANCOCIN) IVPB 750 mg     Ordering Provider:  Brock Hooker MD    750 mg  over 60 Minutes Intravenous Once 06/13/17 1200 06/13/17 1244    06/12/17 2007  Pharmacy to dose vancomycin     Ordering Provider:  Jonelle Smith PA-C     Does not apply Continuous PRN 06/12/17 2004            CC:cough/pleurisy    HPI:  Patient is a 26 y.o. 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 difficile treated approximately January 2017, end-stage renal disease possibly from FSGS per past records, and numerous other comorbidity as outlined below. She has a history of medical noncompliance and apparently has left hospital on multiple occasions AGAINST MEDICAL ADVICE per outside notes.  She was apparently admitted mid May with diagnosis pneumonia and treated with empiric antibiotics but no specific microbiologic diagnosis. She is readmitted to [facility] with diagnosis right sided pneumonia and loculated pleural effusion associated with dyspnea/cough and right-sided pleurisy. She was transferred to [facility] for consideration of decortication. Of particular note, when she has done being treated at [facility], she prefers referral back to her infectious disease doctor, Dr. Granados.    6/14 decortication for empyema; post op hypercapnic resp failure requiring  "ventilatory support    6/19 CDiff positive     6/22/17 Generally feeling better;  Nc O2; per nursing, no pressors, no rash, no new focal pain.  No bleeding, no abdominal pain;  Diarrhea less;  Less SOB/less cough    ROS:      6/22/17 per nursing, No F/C/S,  No rash, No N/V and resp status stable otherwise        PE:   BP (!) 161/102 (BP Location: Left arm, Patient Position: Lying)  Pulse 93  Temp 98.2 °F (36.8 °C) (Oral)   Resp 20  Ht 61\" (154.9 cm)  Wt 124 lb 5 oz (56.4 kg)  SpO2 94%  Breastfeeding? No  BMI 23.49 kg/m2    GENERAL: nc O2 awake and NAD  HEENT:  No conjunctival injection. No icterus. Oropharynx clear without evidence of thrush or exudate.     HEART: RRR; No murmur, rubs, gallops.   LUNGS: diminished on right c/t left, scattered rhonchi.    ABDOMEN: Soft, nontender, nondistended. Positive bowel sounds. No rebound or guarding. NO mass or HSM.  EXT:  No cyanosis, clubbing or edema. No cord.  : Genitalia generally unremarkable.  Without Burgos catheter.  SKIN: Warm and dry without cutaneous eruptions on Inspection/palpation.   Neck supple no mass  Lymph--neck and groin negative  Musculoskeletal-- no joint effusions appreciated in the arms and legs.  Free range of motion at shoulders elbows wrists, hips knees and ankles    IV with no redness or purulence    No IE phenomena     Laboratory Data      Results from last 7 days  Lab Units 06/20/17  0346 06/19/17  0350 06/16/17  0334   WBC 10*3/mm3 10.36 15.05* 11.14*   HEMOGLOBIN g/dL 11.1* 11.0* 12.1   HEMATOCRIT % 36.2 34.4* 37.8   PLATELETS 10*3/mm3 240 212 212       Results from last 7 days  Lab Units 06/22/17  0435   SODIUM mmol/L 131*   POTASSIUM mmol/L 4.0   CHLORIDE mmol/L 96*   TOTAL CO2 mmol/L 16.0*   BUN mg/dL 32*   CREATININE mg/dL 5.20*   GLUCOSE mg/dL 89   CALCIUM mg/dL 8.8                   Estimated Creatinine Clearance: 12.4 mL/min (by C-G formula based on Cr of 5.2).      Microbiology:      Radiology:  Imaging Results (last 72 hours)  "    ** No results found for the last 72 hours. **            Impression:   --Acute hypercapnic respiratory failure.  Remains intubated postop, sedated.  After decortication.  Small right apical pneumothorax with chest tube.    --Acute loculated right pleural effusion/empyema per cardiothoracic surgery  associated with right lower lobe pneumonia. Empiric antibiotics ongoing for community-acquired/healthcare associated pneumonia.  Decortication on June 14.  MRSA so far in recent cultures.  Tapered to IV vancomycin    --Rhinovirus positive    --Acute/recurrent C. difficile colitis.  History C. difficile in the past.  Oral vancomycin initiated.I      --History hepatitis C. I do not treat viral hepatitis as a part of my practice. She has seen hepatology at [facility] and she should be referred back there after discharge for ongoing care and evaluation regarding possible treatment options. I discussed with her risks associated with hepatitis C including chronic hepatitis/cirrhosis and hepatocellular carcinoma. Follow-up at  is her responsibility. She is aware     --End-stage renal disease associated with FSGS; dialysis ongoing     --History polysubstance abuse which she reports is not active.     --History medical noncompliance with multiple episodes of leaving hospital AGAINST MEDICAL ADVICE per outside records. She understands the implication of her behavior    PLAN:   --IV vancomycin/oral vancomycin     --Highly complex issues with high risk for further serious morbidity and other serious sequela/mortality     --Monitor IV and IV antibiotic with risk for systemic complication and potential drug interaction     --Check/review labs cultures and scans;  D/w Dr Loredo     --Discussed with microbiology. Partial history per nursing staff              Fercho Singletary MD  6/22/2017         joana

## (undated) DEVICE — DRSNG WND GZ PAD BORDERED 4X8IN STRL

## (undated) DEVICE — SUT PROLN 3/0 SH D/A 36IN 8522H

## (undated) DEVICE — SPNG GZ STRL 2S 4X4 12PLY

## (undated) DEVICE — ENCORE® LATEX MICRO SIZE 7, STERILE LATEX POWDER-FREE SURGICAL GLOVE: Brand: ENCORE

## (undated) DEVICE — SPNG GZ WOVN 4X4IN 12PLY 10/BX STRL

## (undated) DEVICE — SINGLE USE BIOPSY VALVE MAJ-210: Brand: SINGLE USE BIOPSY VALVE (STERILE)

## (undated) DEVICE — AIRWY 90MM NO9

## (undated) DEVICE — TRAP,MUCUS SPECIMEN,40CC: Brand: MEDLINE

## (undated) DEVICE — PK THORACOTOMY 10

## (undated) DEVICE — CONN TBG Y 5 IN 1 LF STRL

## (undated) DEVICE — CANNULA,ADULT,SOFT-TOUCH,7TUBE,SC: Brand: MEDLINE

## (undated) DEVICE — WIPE THERAWASH SLV SPEC CARE 2PK

## (undated) DEVICE — ADAPT SWVL FIBROPTIC BRONCH

## (undated) DEVICE — OASIS DRAIN, SINGLE, INLINE & ATS COMPATIBLE: Brand: OASIS

## (undated) DEVICE — 3M™ MEDIPORE™ H SOFT CLOTH SURGICAL TAPE 2862, 2 INCH X 10 YARD (5CM X 9,1M), 12 ROLLS/CASE: Brand: 3M™ MEDIPORE™

## (undated) DEVICE — 2000CC GUARDIAN II: Brand: GUARDIAN

## (undated) DEVICE — SUT VIC 4/0 SH 27IN J415H

## (undated) DEVICE — UNDYED BRAIDED (POLYGLACTIN 910), SYNTHETIC ABSORBABLE SUTURE: Brand: COATED VICRYL

## (undated) DEVICE — SAFESECURE,SECUREMENT,FOLEY CATH,STERILE: Brand: MEDLINE

## (undated) DEVICE — SINGLE USE SUCTION VALVE MAJ-209: Brand: SINGLE USE SUCTION VALVE (STERILE)

## (undated) DEVICE — MEDI-VAC NON-CONDUCTIVE SUCTION TUBING: Brand: CARDINAL HEALTH

## (undated) DEVICE — APPL CHLORAPREP W/TINT 26ML BLU

## (undated) DEVICE — MEDI-VAC YANKAUER SUCTION HANDLE W/BULBOUS TIP: Brand: CARDINAL HEALTH

## (undated) DEVICE — TUBING, SUCTION, 1/4" X 10', STRAIGHT: Brand: MEDLINE

## (undated) DEVICE — SKIN AFFIX SURG ADHESIVE 72/CS 0.55ML: Brand: MEDLINE

## (undated) DEVICE — PAD GRND REM POLYHESIVE A/ DISP

## (undated) DEVICE — TOTAL TRAY, 16FR 10ML SIL FOLEY, URN: Brand: MEDLINE